# Patient Record
Sex: FEMALE | Race: BLACK OR AFRICAN AMERICAN | Employment: FULL TIME | URBAN - METROPOLITAN AREA
[De-identification: names, ages, dates, MRNs, and addresses within clinical notes are randomized per-mention and may not be internally consistent; named-entity substitution may affect disease eponyms.]

---

## 2022-10-17 ENCOUNTER — APPOINTMENT (OUTPATIENT)
Dept: CT IMAGING | Age: 52
End: 2022-10-17
Payer: COMMERCIAL

## 2022-10-17 ENCOUNTER — HOSPITAL ENCOUNTER (EMERGENCY)
Age: 52
Discharge: HOME | End: 2022-10-17
Attending: EMERGENCY MEDICINE
Payer: COMMERCIAL

## 2022-10-17 ENCOUNTER — APPOINTMENT (OUTPATIENT)
Dept: GENERAL RADIOLOGY | Age: 52
End: 2022-10-17
Payer: COMMERCIAL

## 2022-10-17 VITALS
DIASTOLIC BLOOD PRESSURE: 76 MMHG | SYSTOLIC BLOOD PRESSURE: 123 MMHG | RESPIRATION RATE: 20 BRPM | HEART RATE: 91 BPM | HEIGHT: 71 IN | TEMPERATURE: 97.8 F | OXYGEN SATURATION: 100 %

## 2022-10-17 DIAGNOSIS — R55 NEAR SYNCOPE: Primary | ICD-10-CM

## 2022-10-17 DIAGNOSIS — R42 DIZZINESS: ICD-10-CM

## 2022-10-17 DIAGNOSIS — Z98.890 POST-OPERATIVE STATE: ICD-10-CM

## 2022-10-17 DIAGNOSIS — R74.8 ELEVATED LIVER ENZYMES: ICD-10-CM

## 2022-10-17 LAB
ALBUMIN SERPL-MCNC: 2.9 G/DL (ref 3.5–5.2)
ALP BLD-CCNC: 174 U/L (ref 35–104)
ALT SERPL-CCNC: 65 U/L (ref 0–32)
ANION GAP SERPL CALCULATED.3IONS-SCNC: 13 MMOL/L (ref 7–16)
AST SERPL-CCNC: 58 U/L (ref 0–31)
BACTERIA: ABNORMAL /HPF
BASOPHILS ABSOLUTE: 0.06 E9/L (ref 0–0.2)
BASOPHILS RELATIVE PERCENT: 0.6 % (ref 0–2)
BILIRUB SERPL-MCNC: 0.4 MG/DL (ref 0–1.2)
BILIRUBIN URINE: NEGATIVE
BLOOD, URINE: ABNORMAL
BUN BLDV-MCNC: 15 MG/DL (ref 6–20)
CALCIUM SERPL-MCNC: 9.7 MG/DL (ref 8.6–10.2)
CHLORIDE BLD-SCNC: 93 MMOL/L (ref 98–107)
CLARITY: CLEAR
CO2: 22 MMOL/L (ref 22–29)
COLOR: YELLOW
CREAT SERPL-MCNC: 0.6 MG/DL (ref 0.5–1)
EKG ATRIAL RATE: 91 BPM
EKG P AXIS: 49 DEGREES
EKG P-R INTERVAL: 162 MS
EKG Q-T INTERVAL: 384 MS
EKG QRS DURATION: 74 MS
EKG QTC CALCULATION (BAZETT): 472 MS
EKG R AXIS: -3 DEGREES
EKG T AXIS: 59 DEGREES
EKG VENTRICULAR RATE: 91 BPM
EOSINOPHILS ABSOLUTE: 0.17 E9/L (ref 0.05–0.5)
EOSINOPHILS RELATIVE PERCENT: 1.6 % (ref 0–6)
GFR AFRICAN AMERICAN: >60
GFR NON-AFRICAN AMERICAN: >60 ML/MIN/1.73
GLUCOSE BLD-MCNC: 239 MG/DL (ref 74–99)
GLUCOSE URINE: NEGATIVE MG/DL
HCT VFR BLD CALC: 31.1 % (ref 34–48)
HEMOGLOBIN: 9.9 G/DL (ref 11.5–15.5)
IMMATURE GRANULOCYTES #: 0.05 E9/L
IMMATURE GRANULOCYTES %: 0.5 % (ref 0–5)
KETONES, URINE: NEGATIVE MG/DL
LEUKOCYTE ESTERASE, URINE: NEGATIVE
LIPASE: 47 U/L (ref 13–60)
LYMPHOCYTES ABSOLUTE: 1.73 E9/L (ref 1.5–4)
LYMPHOCYTES RELATIVE PERCENT: 16.5 % (ref 20–42)
MCH RBC QN AUTO: 28.5 PG (ref 26–35)
MCHC RBC AUTO-ENTMCNC: 31.8 % (ref 32–34.5)
MCV RBC AUTO: 89.6 FL (ref 80–99.9)
MONOCYTES ABSOLUTE: 0.66 E9/L (ref 0.1–0.95)
MONOCYTES RELATIVE PERCENT: 6.3 % (ref 2–12)
NEUTROPHILS ABSOLUTE: 7.8 E9/L (ref 1.8–7.3)
NEUTROPHILS RELATIVE PERCENT: 74.5 % (ref 43–80)
NITRITE, URINE: NEGATIVE
PDW BLD-RTO: 14.6 FL (ref 11.5–15)
PH UA: 6 (ref 5–9)
PLATELET # BLD: 293 E9/L (ref 130–450)
PMV BLD AUTO: 9.8 FL (ref 7–12)
POTASSIUM REFLEX MAGNESIUM: 4.2 MMOL/L (ref 3.5–5)
PROTEIN UA: NEGATIVE MG/DL
RBC # BLD: 3.47 E12/L (ref 3.5–5.5)
RBC UA: ABNORMAL /HPF (ref 0–2)
SODIUM BLD-SCNC: 128 MMOL/L (ref 132–146)
SPECIFIC GRAVITY UA: <=1.005 (ref 1–1.03)
TOTAL PROTEIN: 8.1 G/DL (ref 6.4–8.3)
TROPONIN, HIGH SENSITIVITY: 8 NG/L (ref 0–9)
UROBILINOGEN, URINE: 0.2 E.U./DL
WBC # BLD: 10.5 E9/L (ref 4.5–11.5)
WBC UA: ABNORMAL /HPF (ref 0–5)

## 2022-10-17 PROCEDURE — 71045 X-RAY EXAM CHEST 1 VIEW: CPT

## 2022-10-17 PROCEDURE — 81001 URINALYSIS AUTO W/SCOPE: CPT

## 2022-10-17 PROCEDURE — 6360000002 HC RX W HCPCS: Performed by: EMERGENCY MEDICINE

## 2022-10-17 PROCEDURE — 2580000003 HC RX 258: Performed by: EMERGENCY MEDICINE

## 2022-10-17 PROCEDURE — 96375 TX/PRO/DX INJ NEW DRUG ADDON: CPT

## 2022-10-17 PROCEDURE — 83690 ASSAY OF LIPASE: CPT

## 2022-10-17 PROCEDURE — 80053 COMPREHEN METABOLIC PANEL: CPT

## 2022-10-17 PROCEDURE — 96361 HYDRATE IV INFUSION ADD-ON: CPT

## 2022-10-17 PROCEDURE — 6360000004 HC RX CONTRAST MEDICATION: Performed by: RADIOLOGY

## 2022-10-17 PROCEDURE — 70450 CT HEAD/BRAIN W/O DYE: CPT

## 2022-10-17 PROCEDURE — 74177 CT ABD & PELVIS W/CONTRAST: CPT

## 2022-10-17 PROCEDURE — 99285 EMERGENCY DEPT VISIT HI MDM: CPT

## 2022-10-17 PROCEDURE — 84484 ASSAY OF TROPONIN QUANT: CPT

## 2022-10-17 PROCEDURE — 85025 COMPLETE CBC W/AUTO DIFF WBC: CPT

## 2022-10-17 PROCEDURE — 96374 THER/PROPH/DIAG INJ IV PUSH: CPT

## 2022-10-17 PROCEDURE — 93005 ELECTROCARDIOGRAM TRACING: CPT | Performed by: EMERGENCY MEDICINE

## 2022-10-17 RX ORDER — DIPHENHYDRAMINE HYDROCHLORIDE 50 MG/ML
12.5 INJECTION INTRAMUSCULAR; INTRAVENOUS ONCE
Status: COMPLETED | OUTPATIENT
Start: 2022-10-17 | End: 2022-10-17

## 2022-10-17 RX ORDER — MORPHINE SULFATE 4 MG/ML
4 INJECTION, SOLUTION INTRAMUSCULAR; INTRAVENOUS ONCE
Status: COMPLETED | OUTPATIENT
Start: 2022-10-17 | End: 2022-10-17

## 2022-10-17 RX ORDER — 0.9 % SODIUM CHLORIDE 0.9 %
1000 INTRAVENOUS SOLUTION INTRAVENOUS ONCE
Status: COMPLETED | OUTPATIENT
Start: 2022-10-17 | End: 2022-10-17

## 2022-10-17 RX ORDER — DEXAMETHASONE SODIUM PHOSPHATE 10 MG/ML
8 INJECTION INTRAMUSCULAR; INTRAVENOUS ONCE
Status: COMPLETED | OUTPATIENT
Start: 2022-10-17 | End: 2022-10-17

## 2022-10-17 RX ORDER — METOCLOPRAMIDE HYDROCHLORIDE 5 MG/ML
5 INJECTION INTRAMUSCULAR; INTRAVENOUS ONCE
Status: COMPLETED | OUTPATIENT
Start: 2022-10-17 | End: 2022-10-17

## 2022-10-17 RX ADMIN — MORPHINE SULFATE 4 MG: 4 INJECTION, SOLUTION INTRAMUSCULAR; INTRAVENOUS at 16:18

## 2022-10-17 RX ADMIN — DIPHENHYDRAMINE HYDROCHLORIDE 12.5 MG: 50 INJECTION, SOLUTION INTRAMUSCULAR; INTRAVENOUS at 16:18

## 2022-10-17 RX ADMIN — METOCLOPRAMIDE 5 MG: 5 INJECTION, SOLUTION INTRAMUSCULAR; INTRAVENOUS at 16:18

## 2022-10-17 RX ADMIN — IOPAMIDOL 75 ML: 755 INJECTION, SOLUTION INTRAVENOUS at 15:34

## 2022-10-17 RX ADMIN — SODIUM CHLORIDE 1000 ML: 9 INJECTION, SOLUTION INTRAVENOUS at 13:47

## 2022-10-17 RX ADMIN — DEXAMETHASONE SODIUM PHOSPHATE 8 MG: 10 INJECTION INTRAMUSCULAR; INTRAVENOUS at 16:18

## 2022-10-17 ASSESSMENT — LIFESTYLE VARIABLES
HOW OFTEN DO YOU HAVE A DRINK CONTAINING ALCOHOL: NEVER
HOW MANY STANDARD DRINKS CONTAINING ALCOHOL DO YOU HAVE ON A TYPICAL DAY: PATIENT DOES NOT DRINK

## 2022-10-17 ASSESSMENT — PAIN - FUNCTIONAL ASSESSMENT: PAIN_FUNCTIONAL_ASSESSMENT: NONE - DENIES PAIN

## 2022-10-17 NOTE — ED PROVIDER NOTES
Department of Emergency Medicine   ED  Provider Note  Admit Date/RoomTime: 10/17/2022 12:43 PM  [facility]          History of Present Illness:  10/17/22, Time: 12:58 PM EDT  Chief Complaint   Patient presents with    Dizziness     Patient with a sudden onset of dizziness around 1000. States she got weak in the knees and fell but denies LOC or injury. Roldan Chavez is a 45 y.o. female presenting to the ED for dizziness and lightheadedness, beginning this morning while at work. The complaint has been persistent, mild in severity, and worsened by nothing. Patient states that while she was at work today she began to feel dizzy and lightheaded. Patient was able to sit down, ate a snack but continued to feel lightheaded. Patient stated that when she stood up her legs felt weak but she did not fall nor did she strike her head. She states that since having gastric bypass several weeks ago she has been feeling either headache or lightheaded and dizzy. She denies any vision changes, no chest pain or shortness of breath. She denies any abdominal pain. There has been no nausea/vomiting/diarrhea, no dysuria. Review of Systems:   Pertinent positives and negatives are stated within HPI, all other systems reviewed and are negative.        --------------------------------------------- PAST HISTORY ---------------------------------------------  Past Medical History:  has a past medical history of Anemia, Diabetes mellitus ([facility] [address] Papanicolaou smear of cervix with atypical squamous cells of undetermined significance (ASC-US), and Screening for human papillomavirus (HPV). Past Surgical History:  has a past surgical history that includes pr  delivery+postpartum care; pr  delivery+postpartum care; Colposcopy; pr conization cervix,knife/laser; and Hamilton-en-Y Gastric Bypass. Social History:  reports that she has quit smoking.  She does not have any smokeless tobacco history on file. She reports that she does not use drugs. Family History: family history includes Breast Cancer in her none; Heart Disease in her none; Ovarian Cancer in her none; Uterine Cancer in her none. . Unless otherwise noted, family history is non contributory    The patients home medications have been reviewed. Allergies: Colchicine        ---------------------------------------------------PHYSICAL EXAM--------------------------------------    Constitutional/General: Alert and oriented x3  Head: Normocephalic and atraumatic  Eyes: PERRL, EOMI, sclera non icteric  Mouth: Oropharynx clear, handling secretions, no trismus, no asymmetry of the posterior oropharynx or uvular edema  Neck: Supple, full ROM, no stridor, no meningeal signs  Respiratory: Lungs clear to auscultation bilaterally, no wheezes, rales, or rhonchi. Not in respiratory distress  Cardiovascular:  Regular rate. Regular rhythm. No murmurs, no aortic murmurs, no gallops, or rubs. 2+ distal pulses. Equal extremity pulses. Chest: No chest wall tenderness  GI:  Abdomen Soft, Non tender, Non distended. No rebound, guarding, or rigidity. No pulsatile masses. Musculoskeletal: Moves all extremities x 4. Warm and well perfused, no clubbing, cyanosis, or edema. Capillary refill <3 seconds  Integument: skin warm and dry. No rashes. Neurologic: GCS 15, no focal deficits, symmetric strength 5/5 in the upper and lower extremities bilaterally  Psychiatric: Normal Affect          -------------------------------------------------- RESULTS -------------------------------------------------  I have personally reviewed all laboratory and imaging results for this patient. Results are listed below.      LABS: (Lab results interpreted by me)  Results for orders placed or performed during the hospital encounter of 10/17/22   CBC with Auto Differential   Result Value Ref Range    WBC 10.5 4.5 - 11.5 E9/L    RBC 3.47 (L) 3.50 - 5.50 E12/L    Hemoglobin 9.9 (L) 11.5 - 15.5 g/dL    Hematocrit 31.1 (L) 34.0 - 48.0 %    MCV 89.6 80.0 - 99.9 fL    MCH 28.5 26.0 - 35.0 pg    MCHC 31.8 (L) 32.0 - 34.5 %    RDW 14.6 11.5 - 15.0 fL    Platelets 846 345 - 103 E9/L    MPV 9.8 7.0 - 12.0 fL    Neutrophils % 74.5 43.0 - 80.0 %    Immature Granulocytes % 0.5 0.0 - 5.0 %    Lymphocytes % 16.5 (L) 20.0 - 42.0 %    Monocytes % 6.3 2.0 - 12.0 %    Eosinophils % 1.6 0.0 - 6.0 %    Basophils % 0.6 0.0 - 2.0 %    Neutrophils Absolute 7.80 (H) 1.80 - 7.30 E9/L    Immature Granulocytes # 0.05 E9/L    Lymphocytes Absolute 1.73 1.50 - 4.00 E9/L    Monocytes Absolute 0.66 0.10 - 0.95 E9/L    Eosinophils Absolute 0.17 0.05 - 0.50 E9/L    Basophils Absolute 0.06 0.00 - 0.20 E9/L   Comprehensive Metabolic Panel w/ Reflex to MG   Result Value Ref Range    Sodium 128 (L) 132 - 146 mmol/L    Potassium reflex Magnesium 4.2 3.5 - 5.0 mmol/L    Chloride 93 (L) 98 - 107 mmol/L    CO2 22 22 - 29 mmol/L    Anion Gap 13 7 - 16 mmol/L    Glucose 239 (H) 74 - 99 mg/dL    BUN 15 6 - 20 mg/dL    Creatinine 0.6 0.5 - 1.0 mg/dL    GFR Non-African American >60 >=60 mL/min/1.73    GFR African American >60     Calcium 9.7 8.6 - 10.2 mg/dL    Total Protein 8.1 6.4 - 8.3 g/dL    Albumin 2.9 (L) 3.5 - 5.2 g/dL    Total Bilirubin 0.4 0.0 - 1.2 mg/dL    Alkaline Phosphatase 174 (H) 35 - 104 U/L    ALT 65 (H) 0 - 32 U/L    AST 58 (H) 0 - 31 U/L   Lipase   Result Value Ref Range    Lipase 47 13 - 60 U/L   Troponin   Result Value Ref Range    Troponin, High Sensitivity 8 0 - 9 ng/L   Urinalysis with Microscopic   Result Value Ref Range    Color, UA Yellow Straw/Yellow    Clarity, UA Clear Clear    Glucose, Ur Negative Negative mg/dL    Bilirubin Urine Negative Negative    Ketones, Urine Negative Negative mg/dL    Specific Gravity, UA <=1.005 1.005 - 1.030    Blood, Urine MODERATE (A) Negative    pH, UA 6.0 5.0 - 9.0    Protein, UA Negative Negative mg/dL    Urobilinogen, Urine 0.2 <2.0 E.U./dL    Nitrite, Urine Negative Negative Leukocyte Esterase, Urine Negative Negative    WBC, UA NONE 0 - 5 /HPF    RBC, UA 10-20 (A) 0 - 2 /HPF    Bacteria, UA NONE SEEN None Seen /HPF   EKG 12 Lead   Result Value Ref Range    Ventricular Rate 91 BPM    Atrial Rate 91 BPM    P-R Interval 162 ms    QRS Duration 74 ms    Q-T Interval 384 ms    QTc Calculation (Bazett) 472 ms    P Axis 49 degrees    R Axis -3 degrees    T Axis 59 degrees   ,       RADIOLOGY:  Interpreted by Radiologist unless otherwise specified  XR CHEST PORTABLE   Final Result   No acute process. CT Head WO Contrast    (Results Pending)   CT ABDOMEN PELVIS W IV CONTRAST Additional Contrast? None    (Results Pending)         EKG Interpretation  Interpreted by emergency department physician, Dr. Urvashi Kimball    Date of EKG: 10/17/22  Time: 1307    Rhythm: normal sinus   Rate: 91  Axis: normal  Conduction: normal  ST Segments: no acute change  T Waves: no acute change    Clinical Impression: No findings suggestive of acute ischemia or injury, no conduction delay  Comparison to prior EKG: no previous EKG      ------------------------- NURSING NOTES AND VITALS REVIEWED ---------------------------   The nursing notes within the ED encounter and vital signs as below have been reviewed by myself  /76   Pulse 95   Temp 97.8 °F (36.6 °C) (Oral)   Resp 16   Ht 5' 11\" (1.803 m)   SpO2 97%     Oxygen Saturation Interpretation: Normal    The patients available past medical records and past encounters were reviewed. ------------------------------ ED COURSE/MEDICAL DECISION MAKING----------------------  Medications   0.9 % sodium chloride bolus (0 mLs IntraVENous Stopped 10/17/22 9)   iopamidol (ISOVUE-370) 76 % injection 75 mL (75 mLs IntraVENous Given 10/17/22 1534)           The cardiac monitor revealed sinus rhythm with a heart rate in the 80s as interpreted by me. The cardiac monitor was ordered secondary to the patient's chest pain and to monitor for patient for dysrhythmia. CPT [id number]           Medical Decision Making:     I, Dr. Lea Beach, am the primary provider of record    49-year-old female with recent gastric bypass presenting with lightheadedness and dizziness. She has been having similar episodes since her surgery. She arrives neurologically intact, hemodynamically stable. EKG shows no findings suggestive of acute ischemia or injury, no conduction delay. No prior EKG for comparison. Troponin 8. Urinalysis shows blood but no signs of infection. No leukocytosis, hemoglobin 9.9 with no baseline available. Metabolic panel is within acceptable limits, slight increase in glucose of 239, corrected sodium 131. She does have decreased albumin at 2.9, slight elevation in alkaline phosphatase and very subtle elevation in ALT/AST with no recorded baseline. Lipase is negative. Chest x-ray is unremarkable. Awaiting CT head, will also perform CT abdomen pelvis to assess for any postsurgical complication causing her symptoms. Patient's imaging is still pending, will be signed out to oncoming physician Dr. Galina Youssef to follow-up on imaging. If imaging is reassuring and she is feeling better, discharge and follow-up with her surgeon would be acceptable. Obviously if there are any abnormalities, would take the appropriate steps. Re-Evaluations: This patient's ED course included: a personal history and physicial examination, IV medications, cardiac monitoring, and continuous pulse oximetry    This patient has remained hemodynamically stable and been closely monitored during their ED course. Counseling: The emergency provider has spoken with the patient and discussed todays results, in addition to providing specific details for the plan of care and counseling regarding the diagnosis and prognosis.   Questions are answered at this time and they are agreeable with the plan.       --------------------------------- IMPRESSION AND DISPOSITION ---------------------------------    IMPRESSION  1. Near syncope        DISPOSITION  Disposition: Pending CT imaging  Patient condition is stable        NOTE: This report was transcribed using voice recognition software.  Every effort was made to ensure accuracy; however, inadvertent computerized transcription errors may be present        Benjamin Molina DO  10/17/22 1549

## 2022-10-17 NOTE — Clinical Note
Kristyn Vincent was seen and treated in our emergency department on 10/17/2022. She may return to work on 10/19/2022. If you have any questions or concerns, please don't hesitate to call.       Kuldeep Morrow, DO

## 2022-10-17 NOTE — ED NOTES
Pt ambulated to bathroom. Pt states she is still dizzy. Will continue to monitor.      Shalom Alejandra RN  10/17/22 1500

## 2022-10-17 NOTE — ED PROVIDER NOTES
Department of Emergency Medicine    ED  Provider Note - Sign out / Oncoming Provider   Admit Date/RoomTime: 10/17/2022 12:43 PM  1600PM EDT      I received this patient at sign out from Dr. Yu De Leon. I have discussed the patient's initial exam, treatment and plan of care with the out going physician. I have introduced my self to the patient / family and have answered their questions to this point. I have examined the patient myself and reviewed ordered tests / medications and  reviewed any available results to this point. If a resident is involved in the Emergency Department care, I have discussed my findings and plan with them as well. MDM:   Patient signed out to me pending CT scan and blood work and ambulation, states he feels completely better, discussed CT results lab work results importance of follow-up with her gastric surgeon either in [facility] or with a local gastric surgery clinic. She states understanding agreement. Was given a work note         Re-evaluation. Patients symptoms are improving  Repeat physical examination is improved  -   pt states they feel \"better\", tolerating PO well          --------------------------------- IMPRESSION AND DISPOSITION ---------------------------------    IMPRESSION  1. Near syncope    2. Dizziness    3. Elevated liver enzymes    4.  Post-operative state        DISPOSITION  Disposition: Discharge to home  Patient condition is stable         Art Reynolds DO  10/17/22 1398

## 2022-10-21 ENCOUNTER — HOSPITAL ENCOUNTER (OUTPATIENT)
Age: 52
Discharge: HOME | End: 2022-10-23
Payer: COMMERCIAL

## 2022-10-21 ENCOUNTER — OFFICE VISIT (OUTPATIENT)
Dept: FAMILY MEDICINE CLINIC | Age: 52
End: 2022-10-21
Payer: COMMERCIAL

## 2022-10-21 ENCOUNTER — HOSPITAL ENCOUNTER (OUTPATIENT)
Dept: GENERAL RADIOLOGY | Age: 52
Discharge: HOME | End: 2022-10-23
Payer: COMMERCIAL

## 2022-10-21 VITALS
WEIGHT: 224 LBS | OXYGEN SATURATION: 98 % | DIASTOLIC BLOOD PRESSURE: 78 MMHG | BODY MASS INDEX: 31.36 KG/M2 | SYSTOLIC BLOOD PRESSURE: 105 MMHG | RESPIRATION RATE: 18 BRPM | HEIGHT: 71 IN | HEART RATE: 98 BPM | TEMPERATURE: 97.8 F

## 2022-10-21 DIAGNOSIS — M54.2 NECK PAIN: Primary | ICD-10-CM

## 2022-10-21 DIAGNOSIS — M54.2 NECK PAIN: ICD-10-CM

## 2022-10-21 PROCEDURE — 72050 X-RAY EXAM NECK SPINE 4/5VWS: CPT

## 2022-10-21 PROCEDURE — 99203 OFFICE O/P NEW LOW 30 MIN: CPT

## 2022-10-21 RX ORDER — LORATADINE 10 MG/1
10 TABLET ORAL DAILY PRN
Status: ON HOLD | COMMUNITY
Start: 2017-08-29

## 2022-10-21 RX ORDER — FOLIC ACID 1 MG/1
1 TABLET ORAL DAILY
Status: ON HOLD | COMMUNITY
Start: 2022-10-03

## 2022-10-21 RX ORDER — FLUTICASONE PROPIONATE 50 MCG
1-2 SPRAY, SUSPENSION (ML) NASAL DAILY PRN
Status: ON HOLD | COMMUNITY
Start: 2020-07-22

## 2022-10-21 RX ORDER — LIDOCAINE 50 MG/G
1 PATCH TOPICAL EVERY 24 HOURS
COMMUNITY
Start: 2022-09-29 | End: 2022-10-28 | Stop reason: SDUPTHER

## 2022-10-21 RX ORDER — DULOXETIN HYDROCHLORIDE 30 MG/1
CAPSULE, DELAYED RELEASE ORAL
Status: ON HOLD | COMMUNITY
Start: 2022-10-12

## 2022-10-21 RX ORDER — CYCLOBENZAPRINE HCL 10 MG
10 TABLET ORAL NIGHTLY PRN
Status: ON HOLD | COMMUNITY
Start: 2022-10-12

## 2022-10-21 RX ORDER — AMITRIPTYLINE HYDROCHLORIDE 10 MG/1
10 TABLET, FILM COATED ORAL NIGHTLY
Status: ON HOLD | COMMUNITY
Start: 2022-10-12

## 2022-10-21 RX ORDER — METHOTREXATE 2.5 MG/1
10 TABLET ORAL WEEKLY
Status: ON HOLD | COMMUNITY
Start: 2022-10-03

## 2022-10-21 RX ORDER — PANTOPRAZOLE SODIUM 40 MG/1
40 TABLET, DELAYED RELEASE ORAL DAILY
Status: ON HOLD | COMMUNITY
Start: 2022-09-13

## 2022-10-21 ASSESSMENT — PATIENT HEALTH QUESTIONNAIRE - PHQ9
2. FEELING DOWN, DEPRESSED OR HOPELESS: 0
SUM OF ALL RESPONSES TO PHQ9 QUESTIONS 1 & 2: 0
SUM OF ALL RESPONSES TO PHQ QUESTIONS 1-9: 0
1. LITTLE INTEREST OR PLEASURE IN DOING THINGS: 0
SUM OF ALL RESPONSES TO PHQ QUESTIONS 1-9: 0

## 2022-10-21 NOTE — PATIENT INSTRUCTIONS
Physical therapy that offers dry needling and other modalities to decrease tendonitis and neck pain.

## 2022-10-21 NOTE — PROGRESS NOTES
S: 45 y.o. female with   Chief Complaint   Patient presents with    Establish Care     Previous PCP in [facility]     ED Follow-up     10/17 Near syncope, Dizziness, Elevated liver enzymes      Neck Pain     Radiates into shoulders & back       Pt is here to establish care. Pt has neck and head pain that comes and goes. It is radiating located around her ears, neck. No numbness tingling. No fever. Has a hx of inflammatory arthritis. See rhuematology, ENT,   Was given cymbalta for pain. O: VS:  height is 5' 11\" (1.803 m) and weight is 224 lb (101.6 kg). Her temporal temperature is 97.8 °F (36.6 °C). Her blood pressure is 105/78 and her pulse is 98. Her respiration is 18 and oxygen saturation is 98%. BP Readings from Last 3 Encounters:   10/21/22 105/78   10/17/22 123/76     See resident note    Impression/Plan:   1) neck pain - x-ray and to PT. 2) tmj - to dentist  3) prev - schedule follow up. Health Maintenance Due   Topic Date Due    Depression Screen  Never done    HIV screen  Never done    Hepatitis C screen  Never done    Diabetes screen  Never done    Colorectal Cancer Screen  Never done    Breast cancer screen  Never done    Shingles vaccine (2 of 3) 05/08/2020    Cervical cancer screen  05/04/2021    COVID-19 Vaccine (2 - Booster for Robby series) 05/05/2021         Attending Physician Statement  I have discussed the case, including pertinent history and exam findings with the resident. I also have seen the patient and performed key portions of the examination. I agree with the documented assessment and plan.       Geni Thomas MD

## 2022-10-21 NOTE — PROGRESS NOTES
bypass surgery. Question early liver cirrhosis. Please correlate with liver function test.       Constipation. Lower ventral hernia containing a loop of non distended colon. CT head without contrast showed no intracranial abnormality or hemorrhage. Past Medical History:      Diagnosis Date    Anemia     Diabetes mellitus ([address]     Papanicolaou smear of cervix with atypical squamous cells of undetermined significance (ASC-US)          Screening for human papillomavirus (HPV)     4/10/07      Past Surgical History:        Procedure Laterality Date    COLPOSCOPY      2001 : outside [address] [place] [address]      2022    [place]       :      [place]ARE       :      [place] CONIZATION CERVIX,KNIFE/LASER      2001 : outside [facility]      Allergies:    Colchicine and Darvon [propoxyphene]  Family History:       Problem Relation Age of Onset    Diabetes Mother     Hypertension Mother     Diabetes Father     Hypertension Father     Gout Father     Heart Disease None     Ovarian Cancer None     Uterine Cancer None     Breast Cancer None      Review of Systems:   Review of Systems   Constitutional:  Negative for fatigue, fever and unexpected weight change. HENT:  Negative for ear pain, sinus pressure and sinus pain. Eyes:  Negative for pain, discharge, redness and visual disturbance. Respiratory:  Negative for cough, chest tightness and shortness of breath. Cardiovascular:  Negative for chest pain and leg swelling. Gastrointestinal:  Negative for abdominal pain, blood in stool, diarrhea, nausea and vomiting. Genitourinary:  Negative for difficulty urinating. Musculoskeletal:  Positive for neck pain. Negative for arthralgias, gait problem, myalgias and neck stiffness. Skin:  Negative for wound. Neurological:  Negative for dizziness, light-headedness, numbness and headaches. Hematological: Negative. Psychiatric/Behavioral:  Negative for sleep disturbance and suicidal ideas. PHYSICAL EXAM   Vitals: /78   Pulse 98   Temp 97.8 °F (36.6 °C) (Temporal)   Resp 18   Ht 5' 11\" (1.803 m)   Wt 224 lb (101.6 kg)   SpO2 98%   BMI 31.24 kg/m²   Physical Exam  Vitals reviewed. Constitutional:       Appearance: Normal appearance. HENT:      Head: Normocephalic and atraumatic. Nose: Nose normal. No congestion or rhinorrhea. Mouth/Throat:      Mouth: Mucous membranes are moist.      Pharynx: Oropharynx is clear. Eyes:      General: No scleral icterus. Extraocular Movements: Extraocular movements intact. Conjunctiva/sclera: Conjunctivae normal.      Pupils: Pupils are equal, round, and reactive to light. Neck:      Comments: Decrease ROM in all direction due to pain   Tender neck, shoulder, ear, and TMJ b/l   Cardiovascular:      Rate and Rhythm: Normal rate and regular rhythm. Heart sounds: Normal heart sounds. No murmur heard. No gallop. Pulmonary:      Effort: Pulmonary effort is normal.      Breath sounds: Normal breath sounds. No wheezing or rales. Abdominal:      General: Bowel sounds are normal. There is no distension. Palpations: Abdomen is soft. Tenderness: There is no abdominal tenderness. There is no guarding. Musculoskeletal:         General: Normal range of motion. Cervical back: Neck supple. Tenderness present. Right lower leg: No edema. Left lower leg: No edema. Skin:     Coloration: Skin is not jaundiced. Neurological:      General: No focal deficit present. Mental Status: She is alert and oriented to person, place, and time. Psychiatric:         Mood and Affect: Mood normal.         Behavior: Behavior normal.         Thought Content:  Thought content normal.         Judgment: Judgment normal.     BP Readings from Last 3 Encounters:   10/21/22 105/78   10/17/22 123/76        ASSESSMENT AND PLAN   Tex Garcia was seen today for the followin. Neck pain  - Patient advised to avoid the ibuprofen   - XR CERVICAL SPINE (4-5 VIEWS); Future  -Supportive pain control with ice and heat,  -Close follow-up for preventative and discuss other conditions.  -Patient to follow-up with her surgeon.  -Follow-up with dentist for TMJ    Patient recently was in the emergency department CBC CMP lipase troponin and urinalysis with microscopy was reviewed. Counseled regarding above diagnosis, including possible risks and complications, especially if left uncontrolled. Counseled regarding the possible side effects, risks, benefits and alternatives to treatment; patient and/or guardian verbalizes understanding, agrees, feels comfortable with and wishes to proceed with above treatment plan. Call or go to ED immediately if symptoms worsen or persist. Advised patient to call with any new medication issues, and, as applicable, read all Rx info from pharmacy to assure aware of all possible risks and side effects of medication before taking. Patient and/or guardian given opportunity to ask questions/raise concerns. The patient verbalized comfort and understanding of instructions. I encourage further reading and education about your health conditions. Information on many health conditions is provided by the American Academy of Family Physicians: https://familydoctor. org/  Please bring any questions to me at your next visit. Medication List:    Current Outpatient Medications   Medication Sig Dispense Refill    fluticasone (FLONASE) 50 MCG/ACT nasal spray       folic acid (FOLVITE) 1 MG tablet Take 1 Tablet by mouth daily.       blood glucose test strips (ASCENSIA AUTODISC VI;ONE TOUCH ULTRA TEST VI) strip 1 strip      lidocaine (LIDODERM) 5 % Place 1 patch onto the skin every 24 hours      loratadine (CLARITIN) 10 MG tablet Take 10 mg by mouth daily      methotrexate (RHEUMATREX) 2.5 MG chemo tablet Take 4 Tablets by mouth once weekly. pantoprazole (PROTONIX) 40 MG tablet Take 1 tablet by mouth once a day      sertraline (ZOLOFT) 50 MG tablet Take 50 mg by mouth daily      DULoxetine (CYMBALTA) 30 MG extended release capsule take 1 capsule by mouth once daily for 2 weeks, then increase to 2 capsules daily      cyclobenzaprine (FLEXERIL) 10 MG tablet take 1 tablet by mouth once daily at bedtime as needed      amitriptyline (ELAVIL) 10 MG tablet take 1 tablet by mouth every night at bedtime       No current facility-administered medications for this visit. Return to Office: Return in about 1 week (around 10/28/2022). This document may have been prepared at least partially through the use of voice recognition software. Although effort is taken to assure the accuracy of this document, it is possible that grammatical, syntax,  or spelling errors may occur.     Kory Khan MD   Family Medicine Resident Physician PGY-2

## 2022-10-24 DIAGNOSIS — I65.23 CAROTID ARTERY CALCIFICATION, BILATERAL: Primary | ICD-10-CM

## 2022-10-24 NOTE — PROGRESS NOTES
Patient had a recent X-ray of neck which showed: Soft tissue calcifications associated with right and left neck soft tissue  which could suggest calcific atherosclerotic plaque at level of carotid bulbs  and proximal internal carotid arteries. Previous carotid US 10 years ago, showed stenosis. Carotid Ultrasounds ordered today. 1. Carotid artery calcification, bilateral  - US CAROTID ARTERY BILATERAL; Future      Case discussed with.  Dr. Miguel Angel Michael MD   Resident, PGY-2

## 2022-10-25 NOTE — RESULT ENCOUNTER NOTE
Please call patient and let her know that I have ordered an additional test for her. The xray showed some calcifications in her carotids, she had the same u/s ordered 10 years ago which showed she had some stenosis. Please let her know that she has no fractures but she does have some additional bony spurs anteriorly in her spine, which could be causing her pain.

## 2022-10-28 ENCOUNTER — OFFICE VISIT (OUTPATIENT)
Dept: FAMILY MEDICINE CLINIC | Age: 52
End: 2022-10-28
Payer: COMMERCIAL

## 2022-10-28 VITALS
WEIGHT: 224 LBS | HEIGHT: 71 IN | RESPIRATION RATE: 18 BRPM | BODY MASS INDEX: 31.36 KG/M2 | HEART RATE: 88 BPM | OXYGEN SATURATION: 98 % | SYSTOLIC BLOOD PRESSURE: 106 MMHG | DIASTOLIC BLOOD PRESSURE: 55 MMHG

## 2022-10-28 DIAGNOSIS — Z11.4 ENCOUNTER FOR SCREENING FOR HIV: ICD-10-CM

## 2022-10-28 DIAGNOSIS — Z13.1 DIABETES MELLITUS SCREENING: ICD-10-CM

## 2022-10-28 DIAGNOSIS — Z13.220 LIPID SCREENING: ICD-10-CM

## 2022-10-28 DIAGNOSIS — E87.1 HYPONATREMIA: Primary | ICD-10-CM

## 2022-10-28 DIAGNOSIS — M54.2 NECK PAIN: ICD-10-CM

## 2022-10-28 DIAGNOSIS — Z11.59 NEED FOR HEPATITIS C SCREENING TEST: ICD-10-CM

## 2022-10-28 DIAGNOSIS — I95.9 HYPOTENSION, UNSPECIFIED HYPOTENSION TYPE: ICD-10-CM

## 2022-10-28 PROCEDURE — 99213 OFFICE O/P EST LOW 20 MIN: CPT

## 2022-10-28 RX ORDER — LIDOCAINE 50 MG/G
1 PATCH TOPICAL EVERY 24 HOURS
Qty: 30 PATCH | Refills: 0 | Status: ON HOLD | OUTPATIENT
Start: 2022-10-28

## 2022-10-28 SDOH — ECONOMIC STABILITY: FOOD INSECURITY: WITHIN THE PAST 12 MONTHS, THE FOOD YOU BOUGHT JUST DIDN'T LAST AND YOU DIDN'T HAVE MONEY TO GET MORE.: NEVER TRUE

## 2022-10-28 SDOH — ECONOMIC STABILITY: FOOD INSECURITY: WITHIN THE PAST 12 MONTHS, YOU WORRIED THAT YOUR FOOD WOULD RUN OUT BEFORE YOU GOT MONEY TO BUY MORE.: NEVER TRUE

## 2022-10-28 ASSESSMENT — ENCOUNTER SYMPTOMS
DIARRHEA: 0
SINUS PAIN: 0
EYE DISCHARGE: 0
ABDOMINAL PAIN: 0
SINUS PRESSURE: 0
CHEST TIGHTNESS: 0
NAUSEA: 0
SHORTNESS OF BREATH: 0
EYE PAIN: 0
EYE DISCHARGE: 0
NAUSEA: 0
CHEST TIGHTNESS: 0
SINUS PRESSURE: 0
SINUS PAIN: 0
ABDOMINAL PAIN: 0
BLOOD IN STOOL: 0
SHORTNESS OF BREATH: 0
EYE REDNESS: 0
COUGH: 0
DIARRHEA: 0
VOMITING: 0
VOMITING: 0
COUGH: 0
EYE REDNESS: 0
EYE PAIN: 0
BLOOD IN STOOL: 0

## 2022-10-28 ASSESSMENT — SOCIAL DETERMINANTS OF HEALTH (SDOH): HOW HARD IS IT FOR YOU TO PAY FOR THE VERY BASICS LIKE FOOD, HOUSING, MEDICAL CARE, AND HEATING?: NOT HARD AT ALL

## 2022-10-28 NOTE — PROGRESS NOTES
[address]  Precepting Note    Subjective: Follow up new patient visit    Cervicalgia   Seen by [facility] neuro, ophthalmology, ENT and rheum  Is on chronic methotrexate   Imaging benign but has soft tissue calcification  Carotid u/s are ordered      Here today for continue care  HM issues reviewed  Needs Na repeated       ROS otherwise negative     Past medical, surgical, family and social history were reviewed, non-contributory, and unchanged unless otherwise stated. Objective:    BP (!) 106/55   Pulse 88   Resp 18   Ht 5' 11\" (1.803 m)   Wt 224 lb (101.6 kg)   SpO2 98%   BMI 31.24 kg/m²     Exam is as noted by resident    Assessment/Plan:  Cervicalgia   Refer to PT  Hyponatremia   Check labs incl Na and cortisol      Attending Physician Statement  I have reviewed the chart, including any radiology or labs. I have discussed the case, including pertinent history and exam findings with the resident. I agree with the assessment, plan and orders as documented by the resident. Please refer to the resident note for additional information.       Electronically signed by Shahnaz Patel MD on 10/28/2022 at 3:04 PM

## 2022-10-28 NOTE — PROGRESS NOTES
[facility]  Department of Family Medicine  Family Medicine Residency Program      Patient: Hiral Gasca  1970 45 y.o. female     Date of Service: 10/29/2022      Chief complaint:   Chief Complaint   Patient presents with    Follow-up       HISTORY OF PRESENTING ILLNESS     Hiral Gasca is a 45 y.o. female presented to the clinic for a one week follow up for preventive care and pain. Pain:  Location -neck and head pain   Intensity - 7/10 pain ,   Quality - achy   Onset - one week after her Gastric sleeve surgery 2022  Progression - better than last week  Radiation - b/l ears and checks. Alleviating talked to telemedicine surgery 650mg tylenol seems to help,    Aggrevating - not sure   Associated symptoms - no numbness, no tingeing , no dizziness     Open to Physical therapy     Patient informed of x-ray results     1. No fracture or malalignment involving cervical spine. 2. Degenerative endplate changes with anterior osteophytosis seen at C5/C6. 3. Soft tissue calcifications associated with right and left neck soft tissue  which could suggest calcific atherosclerotic plaque at level of carotid bulbs and proximal internal carotid arteries. Repeat - BP   110/60 mannual  Pulse 87   Patient denies any dizziness, recent falls, headaches.         Past Medical History:      Diagnosis Date    Anemia     Diabetes mellitus ([address]     Papanicolaou smear of cervix with atypical squamous cells of undetermined significance (ASC-US)          Screening for human papillomavirus (HPV)     4/10/07      Past Surgical History:        Procedure Laterality Date    COLPOSCOPY      2001 : outside [address] [place] [address]      2022    [place]      [place] :      [place]      1998 :      [place] CERVIX[place]KNIFE/LASER      [facility]      Allergies:    Colchicine and Darvon [propoxyphene]  Family History:       Problem Relation Age of Onset    Diabetes Mother     Hypertension Mother     Diabetes Father     Hypertension Father     Gout Father     Heart Disease None     Ovarian Cancer None     Uterine Cancer None     Breast Cancer None      Review of Systems:   Review of Systems   Constitutional:  Negative for fatigue, fever and unexpected weight change. HENT:  Negative for ear pain, sinus pressure and sinus pain. Eyes:  Negative for pain, discharge, redness and visual disturbance. Respiratory:  Negative for cough, chest tightness and shortness of breath. Cardiovascular:  Negative for chest pain and leg swelling. Gastrointestinal:  Negative for abdominal pain, blood in stool, diarrhea, nausea and vomiting. Genitourinary:  Negative for difficulty urinating. Musculoskeletal:  Positive for neck pain. Negative for arthralgias, gait problem, myalgias and neck stiffness. Skin:  Negative for wound. Neurological:  Negative for light-headedness, numbness and headaches. Hematological: Negative. Psychiatric/Behavioral:  Negative for sleep disturbance and suicidal ideas. PHYSICAL EXAM   Vitals: BP (!) 106/55   Pulse 88   Resp 18   Ht 5' 11\" (1.803 m)   Wt 224 lb (101.6 kg)   SpO2 98%   BMI 31.24 kg/m²   Physical Exam  Vitals reviewed. Constitutional:       Appearance: Normal appearance. HENT:      Head: Normocephalic and atraumatic. Nose: Nose normal. No congestion or rhinorrhea. Mouth/Throat:      Mouth: Mucous membranes are moist.      Pharynx: Oropharynx is clear. Eyes:      General: No scleral icterus. Extraocular Movements: Extraocular movements intact. Conjunctiva/sclera: Conjunctivae normal.      Pupils: Pupils are equal, round, and reactive to light. Neck:      Vascular: Carotid bruit present. Comments: Decrease ROM due to pain   Cardiovascular:      Rate and Rhythm: Normal rate and regular rhythm.       Heart sounds: Normal vegetables, decrease sweets and carbs. Counseled regarding above diagnosis, including possible risks and complications, especially if left uncontrolled. Counseled regarding the possible side effects, risks, benefits and alternatives to treatment; patient and/or guardian verbalizes understanding, agrees, feels comfortable with and wishes to proceed with above treatment plan. Call or go to ED immediately if symptoms worsen or persist. Advised patient to call with any new medication issues, and, as applicable, read all Rx info from pharmacy to assure aware of all possible risks and side effects of medication before taking. Patient and/or guardian given opportunity to ask questions/raise concerns. The patient verbalized comfort and understanding of instructions. I encourage further reading and education about your health conditions. Information on many health conditions is provided by the American Academy of Family [employer]icians: https://familydoctor. org/  Please bring any questions to me at your next visit. Medication List:    Current Outpatient Medications   Medication Sig Dispense Refill    lidocaine (LIDODERM) 5 % Place 1 patch onto the skin every 24 hours 30 patch 0    fluticasone (FLONASE) 50 MCG/ACT nasal spray       folic acid (FOLVITE) 1 MG tablet Take 1 Tablet by mouth daily. blood glucose test strips (ASCENSIA AUTODISC VI;ONE TOUCH ULTRA TEST VI) strip 1 strip      loratadine (CLARITIN) 10 MG tablet Take 10 mg by mouth daily      methotrexate (RHEUMATREX) 2.5 MG chemo tablet Take 4 Tablets by mouth once weekly.       pantoprazole (PROTONIX) 40 MG tablet Take 1 tablet by mouth once a day      sertraline (ZOLOFT) 50 MG tablet Take 50 mg by mouth daily      DULoxetine (CYMBALTA) 30 MG extended release capsule take 1 capsule by mouth once daily for 2 weeks, then increase to 2 capsules daily      cyclobenzaprine (FLEXERIL) 10 MG tablet take 1 tablet by mouth once daily at bedtime as needed amitriptyline (ELAVIL) 10 MG tablet take 1 tablet by mouth every night at bedtime       No current facility-administered medications for this visit. Return to Office: Return in about 1 month (around 11/28/2022) for Follow up on mental health concerns. This document may have been prepared at least partially through the use of voice recognition software. Although effort is taken to assure the accuracy of this document, it is possible that grammatical, syntax,  or spelling errors may occur.     Barrett Connors MD   Family Medicine Resident Physician PGY-2

## 2022-11-02 ENCOUNTER — NURSE ONLY (OUTPATIENT)
Dept: FAMILY MEDICINE CLINIC | Age: 52
End: 2022-11-02

## 2022-11-02 DIAGNOSIS — Z13.1 DIABETES MELLITUS SCREENING: ICD-10-CM

## 2022-11-02 DIAGNOSIS — Z11.59 NEED FOR HEPATITIS C SCREENING TEST: ICD-10-CM

## 2022-11-02 DIAGNOSIS — Z11.4 ENCOUNTER FOR SCREENING FOR HIV: ICD-10-CM

## 2022-11-02 DIAGNOSIS — E87.1 HYPONATREMIA: Primary | ICD-10-CM

## 2022-11-02 DIAGNOSIS — Z13.220 LIPID SCREENING: ICD-10-CM

## 2022-11-02 DIAGNOSIS — E87.1 HYPONATREMIA: ICD-10-CM

## 2022-11-02 LAB
ALBUMIN SERPL-MCNC: 3.4 G/DL (ref 3.5–5.2)
ALP BLD-CCNC: 177 U/L (ref 35–104)
ALT SERPL-CCNC: 38 U/L (ref 0–32)
ANION GAP SERPL CALCULATED.3IONS-SCNC: 11 MMOL/L (ref 7–16)
AST SERPL-CCNC: 37 U/L (ref 0–31)
BILIRUB SERPL-MCNC: 0.3 MG/DL (ref 0–1.2)
BUN BLDV-MCNC: 18 MG/DL (ref 6–20)
CALCIUM SERPL-MCNC: 10.2 MG/DL (ref 8.6–10.2)
CHLORIDE BLD-SCNC: 97 MMOL/L (ref 98–107)
CHOLESTEROL, TOTAL: 188 MG/DL (ref 0–199)
CO2: 27 MMOL/L (ref 22–29)
CORTISOL TOTAL: 18.11 MCG/DL (ref 2.68–18.4)
CREAT SERPL-MCNC: 0.9 MG/DL (ref 0.5–1)
GFR SERPL CREATININE-BSD FRML MDRD: >60 ML/MIN/1.73
GLUCOSE BLD-MCNC: 148 MG/DL (ref 74–99)
HBA1C MFR BLD: 7 % (ref 4–5.6)
HDLC SERPL-MCNC: 37 MG/DL
LDL CHOLESTEROL CALCULATED: 125 MG/DL (ref 0–99)
POTASSIUM SERPL-SCNC: 4.6 MMOL/L (ref 3.5–5)
SODIUM BLD-SCNC: 135 MMOL/L (ref 132–146)
TOTAL PROTEIN: 8.6 G/DL (ref 6.4–8.3)
TRIGL SERPL-MCNC: 128 MG/DL (ref 0–149)
TSH SERPL DL<=0.05 MIU/L-ACNC: 1.6 UIU/ML (ref 0.27–4.2)
VLDLC SERPL CALC-MCNC: 26 MG/DL

## 2022-11-03 LAB
HEPATITIS C ANTIBODY INTERPRETATION: NORMAL
HIV-1 AND HIV-2 ANTIBODIES: NORMAL

## 2022-11-04 ENCOUNTER — TELEPHONE (OUTPATIENT)
Dept: FAMILY MEDICINE CLINIC | Age: 52
End: 2022-11-04

## 2022-11-04 DIAGNOSIS — M54.2 CERVICALGIA: Primary | ICD-10-CM

## 2022-11-04 DIAGNOSIS — M19.90 INFLAMMATORY ARTHRITIS: ICD-10-CM

## 2022-11-04 RX ORDER — GABAPENTIN 100 MG/1
100 CAPSULE ORAL NIGHTLY
Qty: 90 CAPSULE | Refills: 1 | Status: ON HOLD | OUTPATIENT
Start: 2022-11-04 | End: 2023-05-03

## 2022-11-04 NOTE — TELEPHONE ENCOUNTER
Patient called stating she is in a lot of pain. Right side of head down her back. States she discussed this pain with the physician last Friday. Patient is in severe pain - sharp pain, throbbing. She stated that at this very moment she is a 10 on the pain scale. I did advise the ED, but patient states her copay is $250 and they never find anything. She is open to starting the gabapentin and is would like a referral to neurology and rheumatology     If agreeable, please send medication to [facility] [place] [address]    She would like the referrals to be within [facility].      Patient also awaiting lab results

## 2022-11-04 NOTE — TELEPHONE ENCOUNTER
Patient called discussed 10 out of 10 neck pain which has been an ongoing issue since august, I have seen the patient for over the last 2 weeks. Patient notified about all labs. Patient's hyponatremia has improved from 1   Cortisol level within normal limits  TSH with normal limits  HIV and hepatitis C screening both negative  Lipid panel elevated for LDL. Patient's hemoglobin 7.0-patient offered metformin at this time in addition to lifestyle modifications. Patient currently declined secondary to her ongoing issues and states after her bariatric surgery her glucose levels went down and did not the metformin anymore. 1. Cervicalgia  - gabapentin (NEURONTIN) 100 MG capsule; Take 1 capsule by mouth nightly for 180 days. Gabapentin start with 100 mg daily, increase to 100 mg 1-3 times daily next week. Dispense: 90 capsule; Refill: 1    2.  Inflammatory arthritis  Patient wants to establish care - followed with rheumatology in [address] [place] [address], Rheumatology, Leopoldo Breech, MD     Case discussed with Dr. Jayme Kenney

## 2022-11-05 ENCOUNTER — APPOINTMENT (OUTPATIENT)
Dept: CT IMAGING | Age: 52
DRG: 551 | End: 2022-11-05
Payer: COMMERCIAL

## 2022-11-05 ENCOUNTER — HOSPITAL ENCOUNTER (INPATIENT)
Age: 52
LOS: 18 days | Discharge: NURSING, REHAB OR LONG-TERM CARE | DRG: 551 | End: 2022-11-23
Attending: EMERGENCY MEDICINE | Admitting: INTERNAL MEDICINE
Payer: COMMERCIAL

## 2022-11-05 ENCOUNTER — APPOINTMENT (OUTPATIENT)
Dept: GENERAL RADIOLOGY | Age: 52
DRG: 551 | End: 2022-11-05
Payer: COMMERCIAL

## 2022-11-05 DIAGNOSIS — S32.020A COMPRESSION FRACTURE OF L2 VERTEBRA, INITIAL ENCOUNTER (HCC): Primary | ICD-10-CM

## 2022-11-05 PROBLEM — I88.8: Status: ACTIVE | Noted: 2022-11-05

## 2022-11-05 PROBLEM — S32.020B: Status: ACTIVE | Noted: 2022-11-05

## 2022-11-05 LAB
ALBUMIN SERPL-MCNC: 3.1 G/DL (ref 3.5–5.2)
ALP BLD-CCNC: 188 U/L (ref 35–104)
ALT SERPL-CCNC: 45 U/L (ref 0–32)
ANION GAP SERPL CALCULATED.3IONS-SCNC: 12 MMOL/L (ref 7–16)
AST SERPL-CCNC: 49 U/L (ref 0–31)
BILIRUB SERPL-MCNC: 0.3 MG/DL (ref 0–1.2)
BUN BLDV-MCNC: 14 MG/DL (ref 6–20)
CALCIUM SERPL-MCNC: 10 MG/DL (ref 8.6–10.2)
CHLORIDE BLD-SCNC: 95 MMOL/L (ref 98–107)
CHP ED QC CHECK: NORMAL
CO2: 26 MMOL/L (ref 22–29)
CREAT SERPL-MCNC: 0.7 MG/DL (ref 0.5–1)
GFR SERPL CREATININE-BSD FRML MDRD: >60 ML/MIN/1.73
GLUCOSE BLD-MCNC: 169 MG/DL (ref 74–99)
GLUCOSE BLD-MCNC: 218 MG/DL
HCT VFR BLD CALC: 32.4 % (ref 34–48)
HEMOGLOBIN: 10.4 G/DL (ref 11.5–15.5)
LACTIC ACID: 0.8 MMOL/L (ref 0.5–2.2)
MAGNESIUM: 2.1 MG/DL (ref 1.6–2.6)
MCH RBC QN AUTO: 28.7 PG (ref 26–35)
MCHC RBC AUTO-ENTMCNC: 32.1 % (ref 32–34.5)
MCV RBC AUTO: 89.3 FL (ref 80–99.9)
METER GLUCOSE: 218 MG/DL (ref 74–99)
PDW BLD-RTO: 15.2 FL (ref 11.5–15)
PLATELET # BLD: 295 E9/L (ref 130–450)
PMV BLD AUTO: 8.9 FL (ref 7–12)
POTASSIUM SERPL-SCNC: 4.2 MMOL/L (ref 3.5–5)
RBC # BLD: 3.63 E12/L (ref 3.5–5.5)
SODIUM BLD-SCNC: 133 MMOL/L (ref 132–146)
TOTAL PROTEIN: 8.5 G/DL (ref 6.4–8.3)
TROPONIN, HIGH SENSITIVITY: 11 NG/L (ref 0–9)
WBC # BLD: 11.2 E9/L (ref 4.5–11.5)

## 2022-11-05 PROCEDURE — 74177 CT ABD & PELVIS W/CONTRAST: CPT

## 2022-11-05 PROCEDURE — 96376 TX/PRO/DX INJ SAME DRUG ADON: CPT

## 2022-11-05 PROCEDURE — 6360000004 HC RX CONTRAST MEDICATION: Performed by: RADIOLOGY

## 2022-11-05 PROCEDURE — 99285 EMERGENCY DEPT VISIT HI MDM: CPT

## 2022-11-05 PROCEDURE — 6360000002 HC RX W HCPCS: Performed by: INTERNAL MEDICINE

## 2022-11-05 PROCEDURE — 2580000003 HC RX 258: Performed by: EMERGENCY MEDICINE

## 2022-11-05 PROCEDURE — 84484 ASSAY OF TROPONIN QUANT: CPT

## 2022-11-05 PROCEDURE — 83605 ASSAY OF LACTIC ACID: CPT

## 2022-11-05 PROCEDURE — 80053 COMPREHEN METABOLIC PANEL: CPT

## 2022-11-05 PROCEDURE — 85027 COMPLETE CBC AUTOMATED: CPT

## 2022-11-05 PROCEDURE — 1200000000 HC SEMI PRIVATE

## 2022-11-05 PROCEDURE — 6360000002 HC RX W HCPCS: Performed by: EMERGENCY MEDICINE

## 2022-11-05 PROCEDURE — 83735 ASSAY OF MAGNESIUM: CPT

## 2022-11-05 PROCEDURE — 72131 CT LUMBAR SPINE W/O DYE: CPT

## 2022-11-05 PROCEDURE — 82962 GLUCOSE BLOOD TEST: CPT

## 2022-11-05 PROCEDURE — 2580000003 HC RX 258: Performed by: RADIOLOGY

## 2022-11-05 PROCEDURE — 93005 ELECTROCARDIOGRAM TRACING: CPT | Performed by: EMERGENCY MEDICINE

## 2022-11-05 PROCEDURE — 71046 X-RAY EXAM CHEST 2 VIEWS: CPT

## 2022-11-05 PROCEDURE — 6370000000 HC RX 637 (ALT 250 FOR IP): Performed by: INTERNAL MEDICINE

## 2022-11-05 PROCEDURE — 72128 CT CHEST SPINE W/O DYE: CPT

## 2022-11-05 PROCEDURE — 6360000002 HC RX W HCPCS

## 2022-11-05 PROCEDURE — 96374 THER/PROPH/DIAG INJ IV PUSH: CPT

## 2022-11-05 RX ORDER — INSULIN LISPRO 100 [IU]/ML
0-4 INJECTION, SOLUTION INTRAVENOUS; SUBCUTANEOUS NIGHTLY
Status: DISCONTINUED | OUTPATIENT
Start: 2022-11-05 | End: 2022-11-23 | Stop reason: HOSPADM

## 2022-11-05 RX ORDER — ONDANSETRON 2 MG/ML
4 INJECTION INTRAMUSCULAR; INTRAVENOUS EVERY 6 HOURS PRN
Status: DISCONTINUED | OUTPATIENT
Start: 2022-11-05 | End: 2022-11-23 | Stop reason: HOSPADM

## 2022-11-05 RX ORDER — FENTANYL CITRATE 50 UG/ML
INJECTION, SOLUTION INTRAMUSCULAR; INTRAVENOUS
Status: COMPLETED
Start: 2022-11-05 | End: 2022-11-05

## 2022-11-05 RX ORDER — 0.9 % SODIUM CHLORIDE 0.9 %
1000 INTRAVENOUS SOLUTION INTRAVENOUS ONCE
Status: COMPLETED | OUTPATIENT
Start: 2022-11-05 | End: 2022-11-05

## 2022-11-05 RX ORDER — LIDOCAINE 4 G/G
1 PATCH TOPICAL DAILY
Status: DISCONTINUED | OUTPATIENT
Start: 2022-11-06 | End: 2022-11-23 | Stop reason: HOSPADM

## 2022-11-05 RX ORDER — AMITRIPTYLINE HYDROCHLORIDE 10 MG/1
10 TABLET, FILM COATED ORAL NIGHTLY
Status: DISCONTINUED | OUTPATIENT
Start: 2022-11-05 | End: 2022-11-23 | Stop reason: HOSPADM

## 2022-11-05 RX ORDER — ACETAMINOPHEN 650 MG/1
650 SUPPOSITORY RECTAL EVERY 6 HOURS PRN
Status: DISCONTINUED | OUTPATIENT
Start: 2022-11-05 | End: 2022-11-23 | Stop reason: HOSPADM

## 2022-11-05 RX ORDER — SODIUM CHLORIDE 0.9 % (FLUSH) 0.9 %
10 SYRINGE (ML) INJECTION PRN
Status: DISCONTINUED | OUTPATIENT
Start: 2022-11-05 | End: 2022-11-23 | Stop reason: HOSPADM

## 2022-11-05 RX ORDER — PANTOPRAZOLE SODIUM 40 MG/1
40 TABLET, DELAYED RELEASE ORAL
Status: DISCONTINUED | OUTPATIENT
Start: 2022-11-06 | End: 2022-11-23 | Stop reason: HOSPADM

## 2022-11-05 RX ORDER — HEPARIN SODIUM 10000 [USP'U]/ML
5000 INJECTION, SOLUTION INTRAVENOUS; SUBCUTANEOUS EVERY 8 HOURS
Status: DISCONTINUED | OUTPATIENT
Start: 2022-11-05 | End: 2022-11-23 | Stop reason: HOSPADM

## 2022-11-05 RX ORDER — SODIUM CHLORIDE 0.9 % (FLUSH) 0.9 %
10 SYRINGE (ML) INJECTION
Status: COMPLETED | OUTPATIENT
Start: 2022-11-05 | End: 2022-11-05

## 2022-11-05 RX ORDER — SODIUM CHLORIDE 0.9 % (FLUSH) 0.9 %
10 SYRINGE (ML) INJECTION EVERY 12 HOURS SCHEDULED
Status: DISCONTINUED | OUTPATIENT
Start: 2022-11-05 | End: 2022-11-23 | Stop reason: HOSPADM

## 2022-11-05 RX ORDER — FENTANYL CITRATE 50 UG/ML
50 INJECTION, SOLUTION INTRAMUSCULAR; INTRAVENOUS ONCE
Status: COMPLETED | OUTPATIENT
Start: 2022-11-05 | End: 2022-11-05

## 2022-11-05 RX ORDER — INSULIN LISPRO 100 [IU]/ML
0-4 INJECTION, SOLUTION INTRAVENOUS; SUBCUTANEOUS
Status: DISCONTINUED | OUTPATIENT
Start: 2022-11-05 | End: 2022-11-23 | Stop reason: HOSPADM

## 2022-11-05 RX ORDER — DEXTROSE MONOHYDRATE 100 MG/ML
INJECTION, SOLUTION INTRAVENOUS CONTINUOUS PRN
Status: DISCONTINUED | OUTPATIENT
Start: 2022-11-05 | End: 2022-11-23 | Stop reason: HOSPADM

## 2022-11-05 RX ORDER — CYCLOBENZAPRINE HCL 10 MG
10 TABLET ORAL NIGHTLY PRN
Status: DISCONTINUED | OUTPATIENT
Start: 2022-11-05 | End: 2022-11-10

## 2022-11-05 RX ORDER — ONDANSETRON 4 MG/1
4 TABLET, ORALLY DISINTEGRATING ORAL EVERY 8 HOURS PRN
Status: DISCONTINUED | OUTPATIENT
Start: 2022-11-05 | End: 2022-11-23 | Stop reason: HOSPADM

## 2022-11-05 RX ORDER — SODIUM CHLORIDE 9 MG/ML
INJECTION, SOLUTION INTRAVENOUS PRN
Status: DISCONTINUED | OUTPATIENT
Start: 2022-11-05 | End: 2022-11-23 | Stop reason: HOSPADM

## 2022-11-05 RX ORDER — FOLIC ACID 1 MG/1
1 TABLET ORAL DAILY
Status: DISCONTINUED | OUTPATIENT
Start: 2022-11-06 | End: 2022-11-23 | Stop reason: HOSPADM

## 2022-11-05 RX ORDER — ACETAMINOPHEN 325 MG/1
650 TABLET ORAL EVERY 6 HOURS PRN
Status: DISCONTINUED | OUTPATIENT
Start: 2022-11-05 | End: 2022-11-23 | Stop reason: HOSPADM

## 2022-11-05 RX ORDER — GABAPENTIN 100 MG/1
100 CAPSULE ORAL NIGHTLY
Status: DISCONTINUED | OUTPATIENT
Start: 2022-11-05 | End: 2022-11-10

## 2022-11-05 RX ADMIN — FENTANYL CITRATE 50 MCG: 0.05 INJECTION, SOLUTION INTRAMUSCULAR; INTRAVENOUS at 15:20

## 2022-11-05 RX ADMIN — FENTANYL CITRATE 50 MCG: 50 INJECTION, SOLUTION INTRAMUSCULAR; INTRAVENOUS at 15:20

## 2022-11-05 RX ADMIN — FENTANYL CITRATE 50 MCG: 0.05 INJECTION, SOLUTION INTRAMUSCULAR; INTRAVENOUS at 13:21

## 2022-11-05 RX ADMIN — Medication 10 ML: at 14:23

## 2022-11-05 RX ADMIN — FENTANYL CITRATE 50 MCG: 0.05 INJECTION, SOLUTION INTRAMUSCULAR; INTRAVENOUS at 17:30

## 2022-11-05 RX ADMIN — HYDROMORPHONE HYDROCHLORIDE 0.5 MG: 1 INJECTION, SOLUTION INTRAMUSCULAR; INTRAVENOUS; SUBCUTANEOUS at 22:59

## 2022-11-05 RX ADMIN — IOPAMIDOL 75 ML: 755 INJECTION, SOLUTION INTRAVENOUS at 14:23

## 2022-11-05 RX ADMIN — SODIUM CHLORIDE 1000 ML: 9 INJECTION, SOLUTION INTRAVENOUS at 13:22

## 2022-11-05 RX ADMIN — INSULIN LISPRO 1 UNITS: 100 INJECTION, SOLUTION INTRAVENOUS; SUBCUTANEOUS at 21:05

## 2022-11-05 ASSESSMENT — PAIN SCALES - GENERAL
PAINLEVEL_OUTOF10: 10
PAINLEVEL_OUTOF10: 9

## 2022-11-05 ASSESSMENT — PAIN DESCRIPTION - LOCATION
LOCATION: BACK

## 2022-11-05 ASSESSMENT — PAIN - FUNCTIONAL ASSESSMENT: PAIN_FUNCTIONAL_ASSESSMENT: 0-10

## 2022-11-05 ASSESSMENT — PAIN DESCRIPTION - ORIENTATION: ORIENTATION: LOWER

## 2022-11-05 NOTE — ED NOTES
Pt family member angry at bedside stating that she did not want the pt to receive pain medication. Pt family member was not present during med admin. Pt did not refuse medication. Patient alert and oriented x4.       [place], RN  11/05/22 8533

## 2022-11-05 NOTE — ED PROVIDER NOTES
HPI:  22,   Time: 10:19 AM EDT       Jakbo Zuniga is a 45 y.o. female presenting to the ED for Dizziness and fall complaint of back pain after fall, beginning several hours ago. The complaint has been persistent, moderate in severity, and worsened by nothing. Patient states she was getting up today when she had dizzy spell and fell down. Injuring her back. Patient stated she had a syncopal event several months ago similar when she got up from a lying position. She complains of dizziness. She denies any focal motor weakness. She has no chest pain or pain between her shoulder blades. Also complains of abdominal pain after the fall. She has history of diabetes squamous cell carcinoma of the cervix also states that she had gastric bypass several months ago. Review of Systems:   Pertinent positives and negatives are stated within HPI, all other systems reviewed and are negative.    --------------------------------------------- PAST HISTORY ---------------------------------------------  Past Medical History:  has a past medical history of Anemia, Diabetes mellitus ([facility] Papanicolaou smear of cervix with atypical squamous cells of undetermined significance (ASC-US), and Screening for human papillomavirus (HPV). Past Surgical History:  has a past surgical history that includes pr  delivery+postpartum care; pr  delivery+postpartum care; Colposcopy; pr conization cervix,knife/laser; and Gastric Band. Social History:  reports that she has quit smoking. She has never used smokeless tobacco. She reports that she does not use drugs. Family History: family history includes Breast Cancer in her none; Diabetes in her father and mother; Gout in her father; Heart Disease in her none; Hypertension in her father and mother; Ovarian Cancer in her none; Uterine Cancer in her none. The patients home medications have been reviewed.     Allergies: Colchicine and Darvon [propoxyphene]    ---------------------------------------------------PHYSICAL EXAM--------------------------------------    Constitutional/General: Alert and oriented x3, well appearing, non toxic in NAD  Head: Normocephalic and atraumatic  Eyes: PERRL, EOMI, conjunctive normal, sclera non icteric  Mouth: Oropharynx clear, handling secretions, no trismus, no asymmetry of the posterior oropharynx or uvular edema  Neck: Supple, full ROM, non tender to palpation in the midline, no stridor, no crepitus, no meningeal signs  Respiratory: Lungs clear to auscultation bilaterally, no wheezes, rales, or rhonchi. Not in respiratory distress  Cardiovascular:  Regular rate. Regular rhythm. No murmurs, gallops, or rubs. 2+ distal pulses  Chest: No chest wall tenderness  GI:  Abdomen Soft, Non tender, Non distended. +BS. No organomegaly, no palpable masses,  No rebound, guarding, or rigidity. Musculoskeletal: Moves all extremities x 4. Warm and well perfused, no clubbing, cyanosis, or edema. Capillary refill <3 seconds  Integument: skin warm and dry. No rashes. Lymphatic: no lymphadenopathy noted  Neurologic: GCS 15, no focal deficits, symmetric strength 5/5 in the upper and lower extremities bilaterally  Psychiatric: Normal Affect    -------------------------------------------------- RESULTS -------------------------------------------------  I have personally reviewed all laboratory and imaging results for this patient. Results are listed below.      LABS:  Results for orders placed or performed during the hospital encounter of 11/05/22   Troponin   Result Value Ref Range    Troponin, High Sensitivity 11 (H) 0 - 9 ng/L   CBC   Result Value Ref Range    WBC 11.2 4.5 - 11.5 E9/L    RBC 3.63 3.50 - 5.50 E12/L    Hemoglobin 10.4 (L) 11.5 - 15.5 g/dL    Hematocrit 32.4 (L) 34.0 - 48.0 %    MCV 89.3 80.0 - 99.9 fL    MCH 28.7 26.0 - 35.0 pg    MCHC 32.1 32.0 - 34.5 %    RDW 15.2 (H) 11.5 - 15.0 fL    Platelets 224 892 - 446 E9/L MPV 8.9 7.0 - 12.0 fL   Comprehensive Metabolic Panel   Result Value Ref Range    Sodium 133 132 - 146 mmol/L    Potassium 4.2 3.5 - 5.0 mmol/L    Chloride 95 (L) 98 - 107 mmol/L    CO2 26 22 - 29 mmol/L    Anion Gap 12 7 - 16 mmol/L    Glucose 169 (H) 74 - 99 mg/dL    BUN 14 6 - 20 mg/dL    Creatinine 0.7 0.5 - 1.0 mg/dL    Est, Glom Filt Rate >60 >=60 mL/min/1.73    Calcium 10.0 8.6 - 10.2 mg/dL    Total Protein 8.5 (H) 6.4 - 8.3 g/dL    Albumin 3.1 (L) 3.5 - 5.2 g/dL    Total Bilirubin 0.3 0.0 - 1.2 mg/dL    Alkaline Phosphatase 188 (H) 35 - 104 U/L    ALT 45 (H) 0 - 32 U/L    AST 49 (H) 0 - 31 U/L   Magnesium   Result Value Ref Range    Magnesium 2.1 1.6 - 2.6 mg/dL   Lactic Acid   Result Value Ref Range    Lactic Acid 0.8 0.5 - 2.2 mmol/L   Comprehensive Metabolic Panel w/ Reflex to MG   Result Value Ref Range    Sodium 134 132 - 146 mmol/L    Potassium reflex Magnesium 4.1 3.5 - 5.0 mmol/L    Chloride 96 (L) 98 - 107 mmol/L    CO2 25 22 - 29 mmol/L    Anion Gap 13 7 - 16 mmol/L    Glucose 134 (H) 74 - 99 mg/dL    BUN 11 6 - 20 mg/dL    Creatinine 0.6 0.5 - 1.0 mg/dL    Est, Glom Filt Rate >60 >=60 mL/min/1.73    Calcium 10.3 (H) 8.6 - 10.2 mg/dL    Total Protein 9.0 (H) 6.4 - 8.3 g/dL    Albumin 3.1 (L) 3.5 - 5.2 g/dL    Total Bilirubin 0.3 0.0 - 1.2 mg/dL    Alkaline Phosphatase 194 (H) 35 - 104 U/L    ALT 46 (H) 0 - 32 U/L    AST 43 (H) 0 - 31 U/L   CBC auto differential   Result Value Ref Range    WBC 10.2 4.5 - 11.5 E9/L    RBC 3.79 3.50 - 5.50 E12/L    Hemoglobin 10.7 (L) 11.5 - 15.5 g/dL    Hematocrit 34.0 34.0 - 48.0 %    MCV 89.7 80.0 - 99.9 fL    MCH 28.2 26.0 - 35.0 pg    MCHC 31.5 (L) 32.0 - 34.5 %    RDW 15.5 (H) 11.5 - 15.0 fL    Platelets 196 763 - 567 E9/L    MPV 8.7 7.0 - 12.0 fL    Neutrophils % 79.7 43.0 - 80.0 %    Immature Granulocytes % 0.6 0.0 - 5.0 %    Lymphocytes % 14.4 (L) 20.0 - 42.0 %    Monocytes % 2.9 2.0 - 12.0 %    Eosinophils % 1.8 0.0 - 6.0 %    Basophils % 0.6 0.0 - 2.0 % Neutrophils Absolute 8.11 (H) 1.80 - 7.30 E9/L    Immature Granulocytes # 0.06 E9/L    Lymphocytes Absolute 1.46 (L) 1.50 - 4.00 E9/L    Monocytes Absolute 0.29 0.10 - 0.95 E9/L    Eosinophils Absolute 0.18 0.05 - 0.50 E9/L    Basophils Absolute 0.06 0.00 - 0.20 E9/L   Hemoglobin A1c   Result Value Ref Range    Hemoglobin A1C 6.8 (H) 4.0 - 5.6 %   POCT glucose   Result Value Ref Range    Glucose 218 mg/dL    QC OK? pass    POCT Glucose   Result Value Ref Range    Meter Glucose 218 (H) 74 - 99 mg/dL   POCT Glucose   Result Value Ref Range    Meter Glucose 147 (H) 74 - 99 mg/dL   EKG 12 Lead   Result Value Ref Range    Ventricular Rate 88 BPM    Atrial Rate 88 BPM    P-R Interval 154 ms    QRS Duration 78 ms    Q-T Interval 370 ms    QTc Calculation (Bazett) 447 ms    P Axis 16 degrees    R Axis -30 degrees    T Axis 36 degrees       RADIOLOGY:  Interpreted by Radiologist.  CT THORACIC SPINE WO CONTRAST   Final Result   Focal depression involving superior endplate of T1 related to fracture of   uncertain chronicity. CT LUMBAR SPINE WO CONTRAST   Final Result   Stable mild vertebral malalignment. Disc space narrowing, discovertebral degenerative changes, and facet   arthritis with bony spinal canal stenosis. New superior vertebral body compression deformities at L2, L3, and L5.         CT ABDOMEN PELVIS W IV CONTRAST Additional Contrast? None   Final Result   1. New depressed fractures involving superior endplates of L2, L3, and L5. Chronic depressed fracture involving superior endplate of L4.   2. No acute process in the abdomen or pelvis. Stable ventral abdominal wall   hernia containing mesenteric fat and segment of large bowel. 3. Stable mild splenomegaly. XR CHEST (2 VW)   Final Result   No acute process. EKG:  This EKG is signed and interpreted by the EP.     Time: 1053  Rate: 88  Rhythm: Sinus  Interpretation: no acute changes  Comparison: stable as compared to patient's most recent EKG      ------------------------- NURSING NOTES AND VITALS REVIEWED ---------------------------   The nursing notes within the ED encounter and vital signs as below have been reviewed by myself. BP (!) 89/57   Pulse 98   Temp 97.6 °F (36.4 °C)   Resp 18   SpO2 92%   Oxygen Saturation Interpretation: Normal    The patients available past medical records and past encounters were reviewed.         ------------------------------ ED COURSE/MEDICAL DECISION MAKING----------------------  Medications   cyclobenzaprine (FLEXERIL) tablet 10 mg (10 mg Oral Given 86/7/79 1155)   folic acid (FOLVITE) tablet 1 mg (1 mg Oral Given 11/6/22 0857)   amitriptyline (ELAVIL) tablet 10 mg (10 mg Oral Not Given 11/6/22 0244)   gabapentin (NEURONTIN) capsule 100 mg (100 mg Oral Given 11/6/22 0200)   lidocaine 4 % external patch 1 patch (1 patch Topical Patch Applied 11/6/22 0857)   pantoprazole (PROTONIX) tablet 40 mg (40 mg Oral Given 11/6/22 0857)   sertraline (ZOLOFT) tablet 50 mg (50 mg Oral Given 11/6/22 0857)   sodium chloride flush 0.9 % injection 10 mL (10 mLs IntraVENous Given 11/6/22 0858)   sodium chloride flush 0.9 % injection 10 mL (has no administration in time range)   0.9 % sodium chloride infusion (has no administration in time range)   ondansetron (ZOFRAN-ODT) disintegrating tablet 4 mg (has no administration in time range)     Or   ondansetron (ZOFRAN) injection 4 mg (has no administration in time range)   magnesium hydroxide (MILK OF MAGNESIA) 400 MG/5ML suspension 30 mL (has no administration in time range)   acetaminophen (TYLENOL) tablet 650 mg (650 mg Oral Given 11/6/22 0703)     Or   acetaminophen (TYLENOL) suppository 650 mg ( Rectal See Alternative 11/6/22 0703)   heparin (porcine) injection 5,000 Units (5,000 Units SubCUTAneous Not Given 11/6/22 0703)   glucose chewable tablet 16 g (has no administration in time range)   dextrose bolus 10% 125 mL (has no administration in time range)     Or dextrose bolus 10% 250 mL (has no administration in time range)   glucagon (rDNA) injection 1 mg (has no administration in time range)   dextrose 10 % infusion (has no administration in time range)   insulin lispro (HUMALOG) injection vial 0-4 Units (0 Units SubCUTAneous Not Given 11/6/22 0857)   insulin lispro (HUMALOG) injection vial 0-4 Units (0 Units SubCUTAneous Not Given 11/5/22 2002)   HYDROmorphone (DILAUDID) injection 0.5 mg (0.5 mg IntraVENous Given 11/6/22 0257)   0.9 % sodium chloride bolus (0 mLs IntraVENous Stopped 11/5/22 1650)   fentaNYL (SUBLIMAZE) injection 50 mcg (50 mcg IntraVENous Given 11/5/22 1321)   sodium chloride flush 0.9 % injection 10 mL (10 mLs IntraVENous Given 11/5/22 1423)   iopamidol (ISOVUE-370) 76 % injection 75 mL (75 mLs IntraVENous Given 11/5/22 1423)   fentaNYL (SUBLIMAZE) injection 50 mcg (50 mcg IntraVENous Given 11/5/22 1520)   fentaNYL (SUBLIMAZE) injection 50 mcg (50 mcg IntraVENous Given 11/5/22 1730)         ED COURSE:  ED Course as of 11/06/22 0950   Sat Nov 05, 2022   1712 Discussed admission with [facility] hospitalist Dr. [place] will admit . [JN]      ED Course User Index  [JN] Bryson Rincon DO       Medical Decision Making:    Patient suffered a ground-level fall on her back. Complains of lower back pain. CT scan of the lumbar spine concerning for new compression fractures 2 3 and 5. Patient has no neurological deficits or concern for cauda equina. She was given multiple doses of fentanyl for pain control. [facility] I discussed results with patient and daughter. They are agreeable for admission.       This patient's ED course included: a personal history and physicial examination, re-evaluation prior to disposition, multiple bedside re-evaluations, IV medications, cardiac monitoring, continuous pulse oximetry, and complex medical decision making and emergency management    This patient has remained hemodynamically stable, improved, and been closely monitored during their ED course. Re-Evaluations:             Re-evaluation. Patients symptoms are improving    Re-examination  11/5/22   10:19 AM EDT    Consultations:             [facility]    Critical Care: none    Counseling: The emergency provider has spoken with the patient and family member patient and daughter and discussed todays results, in addition to providing specific details for the plan of care and counseling regarding the diagnosis and prognosis. Questions are answered at this time and they are agreeable with the plan.       --------------------------------- IMPRESSION AND DISPOSITION ---------------------------------    IMPRESSION  1. Compression fracture of L2 vertebra, initial [facility]        DISPOSITION  Disposition: Admit to med/surg floor  Patient condition is stable    NOTE: This report was transcribed using voice recognition software.  Every effort was made to ensure accuracy; however, inadvertent computerized transcription errors may be present       Bryson Rincon DO  11/06/22 [id number]

## 2022-11-05 NOTE — ED NOTES
Pt states that the pain medication has relieved most of her pain at the moment. Pt resting in position of comfort on cot presenting in no acute/ apparent distress (NAD). Respirations are noted even and unlabored with good rise and fall of the chest observed. Pt updated with current plan of care (POC) and all questions/ concerns addressed. Patient voices no needs at this time. Cot noted in lowest position, locked, with side rails X 2 up for patient safety. Will continue to monitor patient for acute changes.        Isla Heimlich, RN  11/05/22 9894

## 2022-11-05 NOTE — H&P
Hospitalist History & Physical      PATIENT NAME:  Cecy Null    MRN:  [id number]  SERVICE DATE:  22    Primary Care Physician: Carmen Yang MD       SUBJECTIVE  CHIEF COMPLAINT:  had concerns including Back Pain and Dizziness (Dizziness/back pain starting today. States she got up and got dizzy and fell back down.). HPI:  Ms. Cecy Null, a 45y.o. year old female  who  has a past medical history of Anemia, Diabetes mellitus ([facility] [address] Papanicolaou smear of cervix with atypical squamous cells of undetermined significance (ASC-US), and Screening for human papillomavirus (HPV). presents with dizzy after standing up and feel on her back, developed pain, no LOC no head trauma no chest pain palpitation fever or chills, no nausea or vomiting. No leg or arm weakness or tingling.      PAST MEDICAL HISTORY:    Past Medical History:   Diagnosis Date    Anemia     Diabetes mellitus ([facility] [address]     Papanicolaou smear of cervix with atypical squamous cells of undetermined significance (ASC-US)          Screening for human papillomavirus (HPV)     4/10/07      PAST SURGICAL HISTORY:    Past Surgical History:   Procedure Laterality Date    COLPOSCOPY      2001 : outside [facility]     GASTRIC BAND      2022    ME  DELIVERY+POSTPARTUM CARE       :      ME  DELIVERY+POSTPARTUM CARE      1998 :      ME CONIZATION CERVIX,KNIFE/LASER      2001 : outside [facility]      FAMILY HISTORY:    Family History   Problem Relation Age of Onset    Diabetes Mother     Hypertension Mother     Diabetes Father     Hypertension Father     Gout Father     Heart Disease None     Ovarian Cancer None     Uterine Cancer None     Breast Cancer None      SOCIAL HISTORY:    Social History     Socioeconomic History    Marital status: Single     Spouse name: Not on file    Number of children: Not on file    Years of education: Not on file    Highest education level: Not on file   Occupational History    Not on file   Tobacco Use    Smoking status: Former    Smokeless tobacco: Never    Tobacco comments:     Quit smokin2002   Vaping Use    Vaping Use: Never used   Substance and Sexual Activity    Alcohol use: Not on file    Drug use: No    Sexual activity: Not Currently   Other Topics Concern    Not on file   Social History Narrative    Not on file     Social Determinants of Health     Financial Resource Strain: Low Risk     Difficulty of Paying Living Expenses: Not hard at all   Food Insecurity: No Food Insecurity    Worried About Running Out of Food in the Last Year: Never true    Ran Out of Food in the Last Year: Never true   Transportation Needs: Not on file   Physical Activity: Not on file   Stress: Not on file   Social Connections: Not on file   Intimate Partner Violence: Not on file   Housing Stability: Not on file    TOBACCO:   reports that she has quit smoking. She has never used smokeless tobacco.  ETOH:   has no history on file for alcohol use. MEDICATIONS:   Prior to Admission medications    Medication Sig Start Date End Date Taking? Authorizing Provider   Ibuprofen (ADVIL PO) Take by mouth Takes as directed when needed for pain   Yes Historical Provider, MD   Multiple Vitamin (MULTIVITAMINS PO) Take 3 tablets by mouth daily   Yes Historical Provider, MD   CALCIUM PO Take 2 tablets by mouth daily   Yes Historical Provider, MD   gabapentin (NEURONTIN) 100 MG capsule Take 1 capsule by mouth nightly for 180 days. Gabapentin start with 100 mg daily, increase to 100 mg 1-3 times daily next week.  11/4/22 5/3/23  Sammie Olivares MD   lidocaine (LIDODERM) 5 % Place 1 patch onto the skin every 24 hours 10/28/22   Sammie Olivares MD   fluticasone (FLONASE) 50 MCG/ACT nasal spray 1-2 sprays by Nasal route daily as needed for Allergies 20   Historical Provider, MD   folic acid (FOLVITE) 1 MG tablet Take 1 mg by mouth daily 10/3/22   Historical Provider, MD   loratadine (CLARITIN) 10 MG tablet Take 10 mg by mouth daily as needed 8/29/17   Historical Provider, MD   methotrexate (RHEUMATREX) 2.5 MG chemo tablet Take 10 mg by mouth once a week (Fridays) 10/3/22   Historical Provider, MD   pantoprazole (PROTONIX) 40 MG tablet Take 40 mg by mouth daily 9/13/22   Historical Provider, MD   sertraline (ZOLOFT) 50 MG tablet Take 50 mg by mouth daily 5/7/18   Historical Provider, MD   DULoxetine (CYMBALTA) 30 MG extended release capsule take 1 capsule by mouth once daily for 2 weeks, then increase to 2 capsules daily 10/12/22   Historical Provider, MD   cyclobenzaprine (FLEXERIL) 10 MG tablet Take 10 mg by mouth nightly as needed 10/12/22   Historical Provider, MD   amitriptyline (ELAVIL) 10 MG tablet Take 10 mg by mouth nightly 10/12/22   Historical Provider, MD        ALLERGIES: Colchicine and Darvon [propoxyphene]    REVIEW OF SYSTEM:   ROS as noted in HPI, 12 point ROS reviewed and otherwise negative. OBJECTIVE  PHYSICAL EXAM:   Vitals:    11/05/22 0934   BP: (!) 96/59   Pulse: 91   Resp: 18   Temp: 97.6 °F (36.4 °C)   SpO2: 97%       General appearance: alert, appears stated age and cooperative  CONSTITUTIONAL:  no apparent distress  ENT:  normocephalic, without obvious abnormality, atraumatic  NECK:  supple, symmetrical, trachea midline  Heart: regular rate and rhythm, S1, S2 normal   Lungs: clear to auscultation bilaterally  Abdomen: soft lax, not tender, not distended, positive bowel sounds  Extremities: extremities normal, atraumatic, no cyanosis, edema  Skin: Normal skin color. No rashes or lesions. Neurologic:  Neurovascularly intact without any focal sensory/motor deficits. Cranial nerves: II-XII intact, grossly non-focal.  Psychiatric: Alert and oriented, thought content appropriate, normal insight      DATA:     Diagnostic tests reviewed for today's visit:    Most recent labs and imaging results reviewed.    Labs:   Recent Results (from the past 72 hour(s))   EKG 12 Lead    Collection Time: 11/05/22 10:55 AM   Result Value Ref Range    Ventricular Rate 88 BPM    Atrial Rate 88 BPM    P-R Interval 154 ms    QRS Duration 78 ms    Q-T Interval 370 ms    QTc Calculation (Bazett) 447 ms    P Axis 16 degrees    R Axis -30 degrees    T Axis 36 degrees   Troponin    Collection Time: 11/05/22 10:57 AM   Result Value Ref Range    Troponin, High Sensitivity 11 (H) 0 - 9 ng/L   CBC    Collection Time: 11/05/22 10:57 AM   Result Value Ref Range    WBC 11.2 4.5 - 11.5 E9/L    RBC 3.63 3.50 - 5.50 E12/L    Hemoglobin 10.4 (L) 11.5 - 15.5 g/dL    Hematocrit 32.4 (L) 34.0 - 48.0 %    MCV 89.3 80.0 - 99.9 fL    MCH 28.7 26.0 - 35.0 pg    MCHC 32.1 32.0 - 34.5 %    RDW 15.2 (H) 11.5 - 15.0 fL    Platelets 143 485 - 079 E9/L    MPV 8.9 7.0 - 12.0 fL   Comprehensive Metabolic Panel    Collection Time: 11/05/22 10:57 AM   Result Value Ref Range    Sodium 133 132 - 146 mmol/L    Potassium 4.2 3.5 - 5.0 mmol/L    Chloride 95 (L) 98 - 107 mmol/L    CO2 26 22 - 29 mmol/L    Anion Gap 12 7 - 16 mmol/L    Glucose 169 (H) 74 - 99 mg/dL    BUN 14 6 - 20 mg/dL    Creatinine 0.7 0.5 - 1.0 mg/dL    Est, Glom Filt Rate >60 >=60 mL/min/1.73    Calcium 10.0 8.6 - 10.2 mg/dL    Total Protein 8.5 (H) 6.4 - 8.3 g/dL    Albumin 3.1 (L) 3.5 - 5.2 g/dL    Total Bilirubin 0.3 0.0 - 1.2 mg/dL    Alkaline Phosphatase 188 (H) 35 - 104 U/L    ALT 45 (H) 0 - 32 U/L    AST 49 (H) 0 - 31 U/L   Magnesium    Collection Time: 11/05/22 10:57 AM   Result Value Ref Range    Magnesium 2.1 1.6 - 2.6 mg/dL   Lactic Acid    Collection Time: 11/05/22 10:57 AM   Result Value Ref Range    Lactic Acid 0.8 0.5 - 2.2 mmol/L     Oupatient labs:  Lab Results   Component Value Date    CHOL 188 11/02/2022    TRIG 128 11/02/2022    HDL 37 11/02/2022    LDLCALC 125 (H) 11/02/2022    TSH 1.600 11/02/2022    LABA1C 7.0 (H) 11/02/2022       Urinalysis:    Lab Results   Component Value Date/Time    NITRU Negative 10/17/2022 01:17 PM    WBCUA NONE 10/17/2022 01:17 PM BACTERIA NONE SEEN 10/17/2022 01:17 PM    RBCUA 10-20 10/17/2022 01:17 PM    BLOODU MODERATE 10/17/2022 01:17 PM    SPECGRAV <=1.005 10/17/2022 01:17 PM    GLUCOSEU Negative 10/17/2022 01:17 PM       Imaging:  XR CHEST (2 VW)    Result Date: 11/5/2022  EXAMINATION: TWO XRAY VIEWS OF THE CHEST 11/5/2022 12:38 pm COMPARISON: None. HISTORY: ORDERING SYSTEM PROVIDED HISTORY: cough TECHNOLOGIST PROVIDED HISTORY: Reason for exam:->cough What reading provider will be dictating this exam?->CRC FINDINGS: The lungs are without acute focal process. There is no effusion or pneumothorax. The cardiomediastinal silhouette is without acute process. The osseous structures are without acute process. No acute process. XR CERVICAL SPINE (4-5 VIEWS)    Result Date: 10/22/2022  EXAMINATION: XRAY VIEWS OF THE CERVICAL SPINE 10/21/2022 4:49 pm COMPARISON: None. HISTORY: ORDERING SYSTEM PROVIDED HISTORY: Neck pain TECHNOLOGIST PROVIDED HISTORY: Reason for exam:->NECK PAIN FINDINGS: No fracture or malalignment involving the cervical spine. Degenerative anterior osteophytosis seen at C5/C6. No prevertebral soft tissue edema. Calcifications associated with right and left neck soft tissue likely at level of bilateral carotid bulbs. Odontoid view is unremarkable. 1. No fracture or malalignment involving cervical spine. 2. Degenerative endplate changes with anterior osteophytosis seen at C5/C6. 3. Soft tissue calcifications associated with right and left neck soft tissue which could suggest calcific atherosclerotic plaque at level of carotid bulbs and proximal internal carotid arteries.      CT Head WO Contrast    Result Date: 10/17/2022  EXAMINATION: CT OF THE HEAD WITHOUT CONTRAST  10/17/2022 3:24 pm TECHNIQUE: CT of the head was performed without the administration of intravenous contrast. Automated exposure control, iterative reconstruction, and/or weight based adjustment of the mA/kV was utilized to reduce the radiation dose to as low as reasonably achievable. COMPARISON: None. HISTORY: ORDERING SYSTEM PROVIDED HISTORY: dizziness TECHNOLOGIST PROVIDED HISTORY: Reason for exam:->dizziness Has a \"code stroke\" or \"stroke alert\" been called? ->No Decision Support Exception - unselect if not a suspected or confirmed emergency medical condition->Emergency Medical Condition (MA) What reading provider will be dictating this exam?->CRC FINDINGS: BRAIN/VENTRICLES: There is no acute intracranial hemorrhage, mass effect or midline shift. No abnormal extra-axial fluid collection. The gray-white differentiation is maintained without evidence of an acute infarct. There is no evidence of hydrocephalus. ORBITS: The visualized portion of the orbits demonstrate no acute abnormality. SINUSES: The visualized paranasal sinuses and mastoid air cells demonstrate no acute abnormality. SOFT TISSUES/SKULL:  No acute abnormality of the visualized skull or soft tissues. 1. There is no acute intracranial abnormality. Specifically, there is no acute intracranial hemorrhage. .     CT THORACIC SPINE WO CONTRAST    Result Date: 11/5/2022  EXAMINATION: CT OF THE THORACIC SPINE WITHOUT CONTRAST  11/5/2022 2:18 pm: TECHNIQUE: CT of the thoracic spine was performed without the administration of intravenous contrast. Multiplanar reformatted images are provided for review. Automated exposure control, iterative reconstruction, and/or weight based adjustment of the mA/kV was utilized to reduce the radiation dose to as low as reasonably achievable. COMPARISON: None. HISTORY: ORDERING SYSTEM PROVIDED HISTORY: fall back pain TECHNOLOGIST PROVIDED HISTORY: Reason for exam:->fall back pain What reading provider will be dictating this exam?->CRC FINDINGS: Focal depression involving superior endplate of T1 related to fracture of uncertain chronicity. Satisfactory alignment of the thoracic spine. No osseous central canal stenosis.   Moderate right neural foraminal narrowing at T4/T5. Focal depression involving superior endplate of T1 related to fracture of uncertain chronicity. CT ABDOMEN PELVIS W IV CONTRAST Additional Contrast? None    Result Date: 11/5/2022  EXAMINATION: CT OF THE ABDOMEN AND PELVIS WITH CONTRAST 11/5/2022 2:18 pm TECHNIQUE: CT of the abdomen and pelvis was performed with the administration of intravenous contrast. Multiplanar reformatted images are provided for review. Automated exposure control, iterative reconstruction, and/or weight based adjustment of the mA/kV was utilized to reduce the radiation dose to as low as reasonably achievable. COMPARISON: October 17, 2022 HISTORY: ORDERING SYSTEM PROVIDED HISTORY: fall abdominal pain syncope TECHNOLOGIST PROVIDED HISTORY: Additional Contrast?->None Reason for exam:->fall abdominal pain syncope Decision Support Exception - unselect if not a suspected or confirmed emergency medical condition->Emergency Medical Condition (MA) What reading provider will be dictating this exam?->CRC FINDINGS: No bowel obstruction, free air, or free fluid. Large amount of stool throughout the colon. There is a ventral abdominal wall hernia containing segments of large bowel and mesenteric fat. Hernia measures 5.2 x 7.5 cm and appears similar in size compared to prior. No obstruction or strangulation at site of the hernia. The appendix is visualized and appears unremarkable. No intrahepatic or extrahepatic bile duct dilatation. Gallbladder is unremarkable. Pancreas is unremarkable. Stable mild splenomegaly. Redemonstration of multiple prominent and mildly enlarged lymph nodes at level of the amelia hepatis measuring up to 2.2 x 1.6 cm. Adrenal glands are unremarkable. No hydronephrosis or perinephric edema. New compression fractures are seen involving superior endplates of L2, L3, and L5.   Stable depressed fracture involving superior endplate of L4.     1. New depressed fractures involving superior endplates of L2, L3, and L5. Chronic depressed fracture involving superior endplate of L4. 2. No acute process in the abdomen or pelvis. Stable ventral abdominal wall hernia containing mesenteric fat and segment of large bowel. 3. Stable mild splenomegaly. CT ABDOMEN PELVIS W IV CONTRAST Additional Contrast? None    Result Date: 10/17/2022  EXAMINATION: CT OF THE ABDOMEN AND PELVIS WITH CONTRAST 10/17/2022 3:24 pm TECHNIQUE: CT of the abdomen and pelvis was performed with the administration of intravenous contrast. Multiplanar reformatted images are provided for review. Automated exposure control, iterative reconstruction, and/or weight based adjustment of the mA/kV was utilized to reduce the radiation dose to as low as reasonably achievable. COMPARISON: None. HISTORY: ORDERING SYSTEM PROVIDED HISTORY: weak, near syncope, recent gastric bypass TECHNOLOGIST PROVIDED HISTORY: Additional Contrast?->None Reason for exam:->weak, near syncope, recent gastric bypass Decision Support Exception - unselect if not a suspected or confirmed emergency medical condition->Emergency Medical Condition (MA) What reading provider will be dictating this exam?->CRC FINDINGS: Lower Chest: Parenchymal changes at the lung bases. No active airspace consolidation or pleural effusion noted. Organs: The liver demonstrates mildly undulating margins. There are no focal hepatic lesions. Gallbladder is present with no calcified stones. Spleen is prominent in size. Pancreas and adrenal glands appear unremarkable. There is symmetric enhancement of the kidneys with no hydronephrosis. GI/Bowel: Post gastric bypass surgery. There are no obstructing lesions. There is a large amount of retained stool in the colon. The appendix is normal. Pelvis: Bladder appears unremarkable. There is no significant free fluid. Peritoneum/Retroperitoneum: No free air or significant adenopathy. Bones/Soft Tissues: Physical hernia containing loop of colon.      No acute inflammatory process in the abdomen and pelvis, status post gastric bypass surgery. Question early liver cirrhosis. Please correlate with liver function test. Constipation. Lower ventral hernia containing a loop of non distended colon. XR CHEST PORTABLE    Result Date: 10/17/2022  EXAMINATION: ONE XRAY VIEW OF THE CHEST 10/17/2022 2:01 pm COMPARISON: None. HISTORY: ORDERING SYSTEM PROVIDED HISTORY: near syncope TECHNOLOGIST PROVIDED HISTORY: Reason for exam:->near syncope What reading provider will be dictating this exam?->CRC FINDINGS: The lungs are without acute focal process. There is no effusion or pneumothorax. The cardiomediastinal silhouette is without acute process. The osseous structures are without acute process. No acute process. CT THORACIC SPINE WO CONTRAST   Final Result   Focal depression involving superior endplate of T1 related to fracture of   uncertain chronicity. CT ABDOMEN PELVIS W IV CONTRAST Additional Contrast? None   Final Result   1. New depressed fractures involving superior endplates of L2, L3, and L5. Chronic depressed fracture involving superior endplate of L4.   2. No acute process in the abdomen or pelvis. Stable ventral abdominal wall   hernia containing mesenteric fat and segment of large bowel. 3. Stable mild splenomegaly. XR CHEST (2 VW)   Final Result   No acute process. CT LUMBAR SPINE WO CONTRAST    (Results Pending)         ASSESSMENT AND PLAN  Active Problems:    * No active hospital problems. *  Resolved Problems:    * No resolved hospital problems.  *    - Acute compression fracture of L2, L3, and L5  - Fall  - T2DM    Plan:  - admit for tele monitoring   - pain control  - fall precautions  - PT/OT  - consult neurosurgery   - follow labs  - resume home regimen   - accuchecks and ssi      VTE Prophylaxis: low molecular weight heparin -    DVT Prophylaxis: []Lovenox [x]Heparin []PCD [] [address] []Encouraged ambulation    Diet: No diet orders on file  Code Status: No Order  Surrogate decision maker confirmed with patient:  Primary Emergency Contact: verónica carter      Disposition: [x]Med/Surg [] Intermediate [] ICU/CCU   Admit status: [] Observation [x] Inpatient       Additional work up or/and treatment plan may be added today or thereafter based on clinical progression. I am managing a portion of pt care. Some medical issues are handled by other specialists. Additional work up and treatment should be done by my colleague hospitalist and at out pt setting by pt PCP and other out pt providers.      SIGNATURESenia Nunez MD  DATE: November 5, 2022

## 2022-11-05 NOTE — ED NOTES
Pt resting in position of comfort on cot. Respirations are noted even and unlabored with good rise and fall of the chest observed. Pt updated with current plan of care (POC) and all questions/ concerns addressed. Cot noted in lowest position, locked, with side rails X 2 up for patient safety. Will continue to monitor patient for acute changes.        Fermín Brown RN  11/05/22 0363

## 2022-11-06 ENCOUNTER — APPOINTMENT (OUTPATIENT)
Dept: CT IMAGING | Age: 52
DRG: 551 | End: 2022-11-06
Payer: COMMERCIAL

## 2022-11-06 PROBLEM — S32.020A COMPRESSION FRACTURE OF L2 LUMBAR VERTEBRA (HCC): Status: ACTIVE | Noted: 2022-11-06

## 2022-11-06 LAB
ALBUMIN SERPL-MCNC: 3.1 G/DL (ref 3.5–5.2)
ALP BLD-CCNC: 194 U/L (ref 35–104)
ALT SERPL-CCNC: 46 U/L (ref 0–32)
ANION GAP SERPL CALCULATED.3IONS-SCNC: 13 MMOL/L (ref 7–16)
AST SERPL-CCNC: 43 U/L (ref 0–31)
BASOPHILS ABSOLUTE: 0.06 E9/L (ref 0–0.2)
BASOPHILS RELATIVE PERCENT: 0.6 % (ref 0–2)
BILIRUB SERPL-MCNC: 0.3 MG/DL (ref 0–1.2)
BUN BLDV-MCNC: 11 MG/DL (ref 6–20)
CALCIUM SERPL-MCNC: 10.3 MG/DL (ref 8.6–10.2)
CHLORIDE BLD-SCNC: 96 MMOL/L (ref 98–107)
CO2: 25 MMOL/L (ref 22–29)
CREAT SERPL-MCNC: 0.6 MG/DL (ref 0.5–1)
EKG ATRIAL RATE: 88 BPM
EKG P AXIS: 16 DEGREES
EKG P-R INTERVAL: 154 MS
EKG Q-T INTERVAL: 370 MS
EKG QRS DURATION: 78 MS
EKG QTC CALCULATION (BAZETT): 447 MS
EKG R AXIS: -30 DEGREES
EKG T AXIS: 36 DEGREES
EKG VENTRICULAR RATE: 88 BPM
EOSINOPHILS ABSOLUTE: 0.18 E9/L (ref 0.05–0.5)
EOSINOPHILS RELATIVE PERCENT: 1.8 % (ref 0–6)
GFR SERPL CREATININE-BSD FRML MDRD: >60 ML/MIN/1.73
GLUCOSE BLD-MCNC: 134 MG/DL (ref 74–99)
HBA1C MFR BLD: 6.8 % (ref 4–5.6)
HCT VFR BLD CALC: 34 % (ref 34–48)
HEMOGLOBIN: 10.7 G/DL (ref 11.5–15.5)
IMMATURE GRANULOCYTES #: 0.06 E9/L
IMMATURE GRANULOCYTES %: 0.6 % (ref 0–5)
LYMPHOCYTES ABSOLUTE: 1.46 E9/L (ref 1.5–4)
LYMPHOCYTES RELATIVE PERCENT: 14.4 % (ref 20–42)
MCH RBC QN AUTO: 28.2 PG (ref 26–35)
MCHC RBC AUTO-ENTMCNC: 31.5 % (ref 32–34.5)
MCV RBC AUTO: 89.7 FL (ref 80–99.9)
METER GLUCOSE: 126 MG/DL (ref 74–99)
METER GLUCOSE: 133 MG/DL (ref 74–99)
METER GLUCOSE: 138 MG/DL (ref 74–99)
METER GLUCOSE: 147 MG/DL (ref 74–99)
MONOCYTES ABSOLUTE: 0.29 E9/L (ref 0.1–0.95)
MONOCYTES RELATIVE PERCENT: 2.9 % (ref 2–12)
NEUTROPHILS ABSOLUTE: 8.11 E9/L (ref 1.8–7.3)
NEUTROPHILS RELATIVE PERCENT: 79.7 % (ref 43–80)
PDW BLD-RTO: 15.5 FL (ref 11.5–15)
PLATELET # BLD: 334 E9/L (ref 130–450)
PMV BLD AUTO: 8.7 FL (ref 7–12)
POTASSIUM REFLEX MAGNESIUM: 4.1 MMOL/L (ref 3.5–5)
RBC # BLD: 3.79 E12/L (ref 3.5–5.5)
SODIUM BLD-SCNC: 134 MMOL/L (ref 132–146)
TOTAL PROTEIN: 9 G/DL (ref 6.4–8.3)
WBC # BLD: 10.2 E9/L (ref 4.5–11.5)

## 2022-11-06 PROCEDURE — 2580000003 HC RX 258: Performed by: INTERNAL MEDICINE

## 2022-11-06 PROCEDURE — 2580000003 HC RX 258: Performed by: FAMILY MEDICINE

## 2022-11-06 PROCEDURE — 6370000000 HC RX 637 (ALT 250 FOR IP): Performed by: INTERNAL MEDICINE

## 2022-11-06 PROCEDURE — 6360000002 HC RX W HCPCS: Performed by: INTERNAL MEDICINE

## 2022-11-06 PROCEDURE — 93010 ELECTROCARDIOGRAM REPORT: CPT | Performed by: INTERNAL MEDICINE

## 2022-11-06 PROCEDURE — 82962 GLUCOSE BLOOD TEST: CPT

## 2022-11-06 PROCEDURE — 85025 COMPLETE CBC W/AUTO DIFF WBC: CPT

## 2022-11-06 PROCEDURE — 80053 COMPREHEN METABOLIC PANEL: CPT

## 2022-11-06 PROCEDURE — 70496 CT ANGIOGRAPHY HEAD: CPT

## 2022-11-06 PROCEDURE — 6370000000 HC RX 637 (ALT 250 FOR IP)

## 2022-11-06 PROCEDURE — 70450 CT HEAD/BRAIN W/O DYE: CPT

## 2022-11-06 PROCEDURE — 83036 HEMOGLOBIN GLYCOSYLATED A1C: CPT

## 2022-11-06 PROCEDURE — 6360000004 HC RX CONTRAST MEDICATION: Performed by: RADIOLOGY

## 2022-11-06 PROCEDURE — 99254 IP/OBS CNSLTJ NEW/EST MOD 60: CPT | Performed by: NEUROLOGICAL SURGERY

## 2022-11-06 PROCEDURE — 70498 CT ANGIOGRAPHY NECK: CPT

## 2022-11-06 PROCEDURE — 1200000000 HC SEMI PRIVATE

## 2022-11-06 RX ORDER — 0.9 % SODIUM CHLORIDE 0.9 %
500 INTRAVENOUS SOLUTION INTRAVENOUS
Status: ACTIVE | OUTPATIENT
Start: 2022-11-06 | End: 2022-11-07

## 2022-11-06 RX ORDER — SODIUM CHLORIDE 0.9 % (FLUSH) 0.9 %
10 SYRINGE (ML) INJECTION PRN
Status: DISCONTINUED | OUTPATIENT
Start: 2022-11-06 | End: 2022-11-23 | Stop reason: HOSPADM

## 2022-11-06 RX ORDER — OXYCODONE HYDROCHLORIDE 5 MG/1
5 TABLET ORAL EVERY 4 HOURS PRN
Status: DISCONTINUED | OUTPATIENT
Start: 2022-11-06 | End: 2022-11-10

## 2022-11-06 RX ORDER — SODIUM CHLORIDE 9 MG/ML
INJECTION, SOLUTION INTRAVENOUS CONTINUOUS
Status: DISCONTINUED | OUTPATIENT
Start: 2022-11-06 | End: 2022-11-08

## 2022-11-06 RX ADMIN — HYDROMORPHONE HYDROCHLORIDE 0.5 MG: 1 INJECTION, SOLUTION INTRAMUSCULAR; INTRAVENOUS; SUBCUTANEOUS at 21:59

## 2022-11-06 RX ADMIN — HYDROMORPHONE HYDROCHLORIDE 0.5 MG: 1 INJECTION, SOLUTION INTRAMUSCULAR; INTRAVENOUS; SUBCUTANEOUS at 17:26

## 2022-11-06 RX ADMIN — SERTRALINE HYDROCHLORIDE 50 MG: 50 TABLET ORAL at 08:57

## 2022-11-06 RX ADMIN — OXYCODONE 5 MG: 5 TABLET ORAL at 16:25

## 2022-11-06 RX ADMIN — HEPARIN SODIUM 5000 UNITS: 10000 INJECTION INTRAVENOUS; SUBCUTANEOUS at 22:01

## 2022-11-06 RX ADMIN — CYCLOBENZAPRINE 10 MG: 10 TABLET, FILM COATED ORAL at 02:57

## 2022-11-06 RX ADMIN — GABAPENTIN 100 MG: 100 CAPSULE ORAL at 22:04

## 2022-11-06 RX ADMIN — ACETAMINOPHEN 650 MG: 325 TABLET, FILM COATED ORAL at 07:03

## 2022-11-06 RX ADMIN — OXYCODONE 5 MG: 5 TABLET ORAL at 12:25

## 2022-11-06 RX ADMIN — IOPAMIDOL 60 ML: 755 INJECTION, SOLUTION INTRAVENOUS at 19:26

## 2022-11-06 RX ADMIN — SODIUM CHLORIDE: 9 INJECTION, SOLUTION INTRAVENOUS at 13:45

## 2022-11-06 RX ADMIN — GABAPENTIN 100 MG: 100 CAPSULE ORAL at 02:00

## 2022-11-06 RX ADMIN — HYDROMORPHONE HYDROCHLORIDE 0.5 MG: 1 INJECTION, SOLUTION INTRAMUSCULAR; INTRAVENOUS; SUBCUTANEOUS at 02:57

## 2022-11-06 RX ADMIN — PANTOPRAZOLE SODIUM 40 MG: 40 TABLET, DELAYED RELEASE ORAL at 08:57

## 2022-11-06 RX ADMIN — FOLIC ACID 1 MG: 1 TABLET ORAL at 08:57

## 2022-11-06 RX ADMIN — SODIUM CHLORIDE, PRESERVATIVE FREE 10 ML: 5 INJECTION INTRAVENOUS at 08:58

## 2022-11-06 RX ADMIN — HYDROMORPHONE HYDROCHLORIDE 0.5 MG: 1 INJECTION, SOLUTION INTRAMUSCULAR; INTRAVENOUS; SUBCUTANEOUS at 13:43

## 2022-11-06 ASSESSMENT — PAIN SCALES - GENERAL
PAINLEVEL_OUTOF10: 10
PAINLEVEL_OUTOF10: 10
PAINLEVEL_OUTOF10: 6
PAINLEVEL_OUTOF10: 10

## 2022-11-06 ASSESSMENT — PAIN DESCRIPTION - ORIENTATION: ORIENTATION: MID;LOWER

## 2022-11-06 ASSESSMENT — PAIN DESCRIPTION - LOCATION
LOCATION: BACK

## 2022-11-06 ASSESSMENT — PAIN - FUNCTIONAL ASSESSMENT
PAIN_FUNCTIONAL_ASSESSMENT: 0-10
PAIN_FUNCTIONAL_ASSESSMENT: PREVENTS OR INTERFERES SOME ACTIVE ACTIVITIES AND ADLS

## 2022-11-06 ASSESSMENT — PAIN DESCRIPTION - DESCRIPTORS
DESCRIPTORS: ACHING;SPASM;SHARP;DISCOMFORT
DESCRIPTORS: SHARP;THROBBING

## 2022-11-06 ASSESSMENT — PAIN DESCRIPTION - PAIN TYPE: TYPE: ACUTE PAIN

## 2022-11-06 NOTE — PROGRESS NOTES
Hospitalist Progress Note      Synopsis: Patient admitted for   Ms. Sonu Lockhart, a 45y.o. year old female  who  has a past medical history of Anemia, Diabetes mellitus ([address] Papanicolaou smear of cervix with atypical squamous cells of undetermined significance (ASC-US), and Screening for human papillomavirus (HPV). presents with dizziness after standing up and feel on her back, developed pain, CT showed Acute compression fracture of L2, L3, and L5. Neurosurgery was consulted. Hospital day 1     Subjective: And examined at bedside, patient complaining of significant low back pain. Denies radiation of pain into her legs. Stable overnight. No issues reported. Patient seen and examined  Records reviewed. Temp (24hrs), Av.6 °F (36.4 °C), Min:97.6 °F (36.4 °C), Max:97.6 °F (36.4 °C)    DIET: ADULT DIET; Regular  CODE: Full Code  No intake or output data in the 24 hours ending 22 0759    Review of Systems: All bolded are positive; please see HPI  General:  Fever, chills, diaphoresis, fatigue, malaise, night sweats, weight loss  Psychological:  Anxiety, disorientation, hallucinations. ENT:  Epistaxis, headaches, vertigo, visual changes. Cardiovascular:  Chest pain, irregular heartbeats, palpitations, paroxysmal nocturnal dyspnea. Respiratory:  Shortness of breath, coughing, sputum production, hemoptysis, wheezing, orthopnea.   Gastrointestinal:  Nausea, vomiting, diarrhea, heartburn, constipation, abdominal pain, hematemesis, hematochezia, melena, acholic stools  Genito-Urinary:  Dysuria, urgency, frequency, hematuria  Musculoskeletal:  Joint pain, joint stiffness, joint swelling, muscle pain  Neurology:  Headache, focal neurological deficits, weakness, numbness, paresthesia  Derm:  Rashes, ulcers, excoriations, bruising  Extremities:  Decreased ROM, peripheral edema, mottling    Objective:    /64   Pulse 77   Temp 97.6 °F (36.4 °C)   Resp 17   SpO2 97%     General appearance: No apparent distress, appears stated age and cooperative. HEENT: Conjunctivae/corneas clear. Mucous membranes moist.  Neck: Supple. No JVD. Respiratory:  Clear to auscultation bilaterally. Normal respiratory effort. Cardiovascular:  RRR. S1, S2 without MRG. PV: Pulses palpable. No edema. Abdomen: Soft, non-tender, non-distended. +BS  Musculoskeletal: No obvious deformities. Skin: Normal skin color. No rashes or lesions. Good turgor. Neurologic:  Grossly non-focal. Awake, alert, following commands. Psychiatric: Alert and oriented, thought content appropriate, normal insight and judgement    Medications:  REVIEWED DAILY    Infusion Medications    sodium chloride      dextrose       Scheduled Medications    folic acid  1 mg Oral Daily    amitriptyline  10 mg Oral Nightly    gabapentin  100 mg Oral Nightly    lidocaine  1 patch Topical Daily    pantoprazole  40 mg Oral QAM AC    sertraline  50 mg Oral Daily    sodium chloride flush  10 mL IntraVENous 2 times per day    heparin (porcine)  5,000 Units SubCUTAneous Q8H    insulin lispro  0-4 Units SubCUTAneous TID WC    insulin lispro  0-4 Units SubCUTAneous Nightly     PRN Meds: cyclobenzaprine, sodium chloride flush, sodium chloride, ondansetron **OR** ondansetron, magnesium hydroxide, acetaminophen **OR** acetaminophen, glucose, dextrose bolus **OR** dextrose bolus, glucagon (rDNA), dextrose, HYDROmorphone    Labs:     Recent Labs     11/05/22  1057 11/06/22  0646   WBC 11.2 10.2   HGB 10.4* 10.7*   HCT 32.4* 34.0    334       Recent Labs     11/05/22  1057 11/06/22  0646    134   K 4.2 4.1   CL 95* 96*   CO2 26 25   BUN 14 11   CREATININE 0.7 0.6   CALCIUM 10.0 10.3*       Recent Labs     11/05/22  1057 11/06/22  0646   PROT 8.5* 9.0*   ALKPHOS 188* 194*   ALT 45* 46*   AST 49* 43*   BILITOT 0.3 0.3       No results for input(s): INR in the last 72 hours.     No results for input(s): Janise Seip in the last 72 hours.    Chronic labs:    Lab Results   Component Value Date    CHOL 188 11/02/2022    TRIG 128 11/02/2022    HDL 37 11/02/2022    LDLCALC 125 (H) 11/02/2022    TSH 1.600 11/02/2022    LABA1C 6.8 (H) 11/06/2022       Radiology: REVIEWED DAILY    Assessment:  - Acute compression fracture of L2, L3, and L5  - Fall  - T2DM  Plan:  - admit for tele monitoring   - pain control  - fall precautions  - PT/OT  - consult neurosurgery   - follow labs  - resume home regimen   - accuchecks and ssi    DVT Prophylaxis [] Lovenox  [x]  Heparin [] DOAC [] PCDs [] Ambulation    GI Prophylaxis [] PPI  [] H2 Blocker   [] Carafate  [] Diet/Tube Feeds   Level of care [x] Med/Surg  [] Intermediate  []  ICU   Diet ADULT DIET; Regular    Family contact []  N/A    [x] At bedside  [] Phone call   Disposition Patient requires continued admission further evaluation of back pain and lumbar fractures by neurosurgery   MDM [] Low    [x] Moderate  []   High       Discharge Plan: To home when clinically stable    +++++++++++++++++++++++++++++++++++++++++++++++++  Zipporah Lundborg, APRN [employer] - [address] [place] [address], [place]  +++++++++++++++++++++++++++++++++++++++++++++++++  NOTE: This report was transcribed using voice recognition software. Every effort was made to ensure accuracy; however, inadvertent computerized transcription errors may be present.

## 2022-11-06 NOTE — ED NOTES
Pt requesting pain medication. This RN perfect served attending Dr. Tresa Pina asking for pain meds for the patient.  Waiting for response back      Richard Lam RN  11/05/22 7605

## 2022-11-06 NOTE — CONSULTS
Chief Complaint:   Chief Complaint   Patient presents with    Back Pain    Dizziness     Dizziness/back pain starting today. States she got up and got dizzy and fell back down. HPI:     I had the pleasure of seeing Robin German today in house. As you know, this delightful right handed, single mother of two, non-smoker and  presents with low back pain s/p fall while feeling suddenly dizzy. She fell and landed on her buttocks. Upon specific questioning, she endorse significant back pain, no n/w/t, loss of bowel or bladder. CT showed L2 compression fracture for which we have been consulted.     Past Medical History:   Diagnosis Date    Anemia     Diabetes mellitus ([facility] [address]     Papanicolaou smear of cervix with atypical squamous cells of undetermined significance (ASC-US)          Screening for human papillomavirus (HPV)     4/10/07      Past Surgical History:   Procedure Laterality Date    COLPOSCOPY      2001 : outside [facility]      2022    [place]  DELIV[place] C[place]      1991 :      [place]      1998 :      [place] CON[place] CERV[place],KNIFE/LASER      2001 : outside [address] [place] [address] History   Problem Relation Age of Onset    Diabetes Mother     Hypertension Mother     Diabetes Father     Hypertension Father     Gout Father     Heart Disease None     Ovarian Cancer None     Uterine Cancer None     Breast Cancer None       Social History     Socioeconomic History    Marital status: Single     Spouse name: Not on file    Number of children: Not on file    Years of education: Not on file    Highest education level: Not on file   Occupational History    Not on file   Tobacco Use    Smoking status: Former    Smokeless tobacco: Never    Tobacco comments:     Quit smokin2002   Vaping Use    Vaping Use: Never used   Substance and Sexual Activity    Alcohol use: Not on file    Drug use: No    Sexual activity: Not Currently   Other Topics Concern    Not on file   Social History Narrative    Not on file     Social Determinants of Health     Financial Resource Strain: Low Risk     Difficulty of Paying Living Expenses: Not hard at all   Food Insecurity: No Food Insecurity    Worried About Running Out of Food in the Last Year: Never true    Ran Out of Food in the Last Year: Never true   Transportation Needs: Not on file   Physical Activity: Not on file   Stress: Not on file   Social Connections: Not on file   Intimate Partner Violence: Not on file   Housing Stability: Not on file       Medications:   Current Facility-Administered Medications   Medication Dose Route Frequency Provider Last Rate Last Admin    0.9 % sodium chloride bolus  500 mL IntraVENous Once PRN VALERIE Liu CNP        oxyCODONE (ROXICODONE) immediate release tablet 5 mg  5 mg Oral Q4H PRN VALERIE Liu - CNP   5 mg at 11/06/22 1225    cyclobenzaprine (FLEXERIL) tablet 10 mg  10 mg Oral Nightly PRN Rubi Monroy MD   10 mg at 94/43/20 8887    folic acid (FOLVITE) tablet 1 mg  1 mg Oral Daily Matthews Session, MD   1 mg at 11/06/22 0857    amitriptyline (ELAVIL) tablet 10 mg  10 mg Oral Nightly Matthews Session, MD        gabapentin (NEURONTIN) capsule 100 mg  100 mg Oral Nightly Rubi Monroy MD   100 mg at 11/06/22 0200    lidocaine 4 % external patch 1 patch  1 patch Topical Daily Matthews Session, MD   1 patch at 11/06/22 0857    pantoprazole (PROTONIX) tablet 40 mg  40 mg Oral QAM AC Rubi Monroy MD   40 mg at 11/06/22 0857    sertraline (ZOLOFT) tablet 50 mg  50 mg Oral Daily Rubi Monroy MD   50 mg at 11/06/22 0857    sodium chloride flush 0.9 % injection 10 mL  10 mL IntraVENous 2 times per day Matthews Session, MD   10 mL at 11/06/22 0858    sodium chloride flush 0.9 % injection 10 mL  10 mL IntraVENous PRN Matthews MD Jina        0Alexei9 % sodium chloride infusion   IntraVENous PRN Matthews Session, MD ondansetron (ZOFRAN-ODT) disintegrating tablet 4 mg  4 mg Oral Q8H PRN Rubi Ulloa MD        Or    ondansetron [facility]) injection 4 mg  4 mg IntraVENous Q6H PRN Rubi Ulloa MD        magnesium hydroxide (MILK OF MAGNESIA) 400 MG/5ML suspension 30 mL  30 mL Oral Daily PRN Rubi Ulloa MD        acetaminophen (TYLENOL) tablet 650 mg  650 mg Oral Q6H PRN Davionr Rosalba Ulloa MD   650 mg at 11/06/22 0703    Or    acetaminophen (TYLENOL) suppository 650 mg  650 mg Rectal Q6H PRN Rubi Ulloa MD        heparin (porcine) injection 5,000 Units  5,000 Units SubCUTAneous Joya Oneill MD        glucose chewable tablet 16 g  4 tablet Oral PRN Davionr oRsalba Ulloa MD        dextrose bolus 10% 125 mL  125 mL IntraVENous PRN Rubi Ulloa MD        Or    dextrose bolus 10% 250 mL  250 mL IntraVENous PRN Casi Motta MD        glucagon (rDNA) injection 1 mg  1 mg SubCUTAneous PRN Rubi Ulola MD        dextrose 10 % infusion   IntraVENous Continuous PRN Rubi Ulloa MD        insulin lispro (HUMALOG) injection vial 0-4 Units  0-4 Units SubCUTAneous TID WC Rubi Ulloa MD   1 Units at 11/05/22 2105    insulin lispro (HUMALOG) injection vial 0-4 Units  0-4 Units SubCUTAneous Nightly Rubi Ulloa MD        HYDROmorphone (DILAUDID) injection 0.5 mg  0.5 mg IntraVENous Q4H PRN Rubi Ulloa MD   0.5 mg at 11/06/22 0257        Allergies:    Colchicine and Darvon [propoxyphene]       Review of Systems:    Denies any chest pain, headache, dyspnea, recent weight loss, fevers, chills or night sweats. Physical Examination:    BP 93/64   Pulse 80   Temp 98 °F (36.7 °C) (Oral)   Resp 16   SpO2 98%      On focused neurological examination, she  is awake alert oriented and rationally conversant. Speech is clear and fluent. Pupils are equal and reactive to light bilaterally, extraocular movements are intact, visual fields are full to confrontation.   Her  face is symmetric and grossly intact to fine touch. Uvula and tongue are both midline. Shoulder shrug is symmetric and strong. Cervical spine is well aligned and nontender to direct palpation. She  can forward flex, extend , laterally rotate and laterally extend without limitation or pain. Motor examination reveals preserved power in the upper and lower extremities at 5 out of 5 throughout. Reflexes are symmetric and brisk. Plantar responses are downgoing. There is no clonus. Patient is intact to fine touch in all dermatomes throughout. Straight leg raise is negative. Palpation of the spine reveals no kyphotic or scoliotic gross deformities. There is pain to deep percussion and  palpation. ASSESSMENT:    I personally reviewed Rc Calvillo's radiographic images, particularly her CT of her lumbar spine which shows compression deformity at L2, L 3, L 5.  Spinal alignment is intact        MEDICAL DECISION MAKING & PLAN:    No acute Nsx intervention is required at this time. Ordered LSO for comfort. Needs pain control per primary service. Thank you so much for allowing us to participate in the care of this patient. No follow-ups on file. Electronically signed by Oumou New MD on 11/6/2022 at 12:30 PM       NOTE: This report was transcribed using voice recognition software.  Every effort was made to ensure accuracy; however, inadvertent computerized transcription errors may be present

## 2022-11-07 LAB
ANION GAP SERPL CALCULATED.3IONS-SCNC: 13 MMOL/L (ref 7–16)
BASOPHILS ABSOLUTE: 0.05 E9/L (ref 0–0.2)
BASOPHILS RELATIVE PERCENT: 0.5 % (ref 0–2)
BUN BLDV-MCNC: 13 MG/DL (ref 6–20)
CALCIUM SERPL-MCNC: 9.5 MG/DL (ref 8.6–10.2)
CHLORIDE BLD-SCNC: 95 MMOL/L (ref 98–107)
CO2: 23 MMOL/L (ref 22–29)
CREAT SERPL-MCNC: 0.7 MG/DL (ref 0.5–1)
EOSINOPHILS ABSOLUTE: 0.28 E9/L (ref 0.05–0.5)
EOSINOPHILS RELATIVE PERCENT: 3 % (ref 0–6)
GFR SERPL CREATININE-BSD FRML MDRD: >60 ML/MIN/1.73
GLUCOSE BLD-MCNC: 117 MG/DL (ref 74–99)
HCT VFR BLD CALC: 32.3 % (ref 34–48)
HEMOGLOBIN: 10.1 G/DL (ref 11.5–15.5)
IMMATURE GRANULOCYTES #: 0.04 E9/L
IMMATURE GRANULOCYTES %: 0.4 % (ref 0–5)
LYMPHOCYTES ABSOLUTE: 1.37 E9/L (ref 1.5–4)
LYMPHOCYTES RELATIVE PERCENT: 14.7 % (ref 20–42)
MCH RBC QN AUTO: 28.1 PG (ref 26–35)
MCHC RBC AUTO-ENTMCNC: 31.3 % (ref 32–34.5)
MCV RBC AUTO: 90 FL (ref 80–99.9)
METER GLUCOSE: 118 MG/DL (ref 74–99)
METER GLUCOSE: 137 MG/DL (ref 74–99)
METER GLUCOSE: 141 MG/DL (ref 74–99)
METER GLUCOSE: 181 MG/DL (ref 74–99)
MONOCYTES ABSOLUTE: 0.5 E9/L (ref 0.1–0.95)
MONOCYTES RELATIVE PERCENT: 5.4 % (ref 2–12)
NEUTROPHILS ABSOLUTE: 7.09 E9/L (ref 1.8–7.3)
NEUTROPHILS RELATIVE PERCENT: 76 % (ref 43–80)
PDW BLD-RTO: 15.5 FL (ref 11.5–15)
PLATELET # BLD: 325 E9/L (ref 130–450)
PMV BLD AUTO: 9 FL (ref 7–12)
POTASSIUM REFLEX MAGNESIUM: 4.5 MMOL/L (ref 3.5–5)
RBC # BLD: 3.59 E12/L (ref 3.5–5.5)
SODIUM BLD-SCNC: 131 MMOL/L (ref 132–146)
WBC # BLD: 9.3 E9/L (ref 4.5–11.5)

## 2022-11-07 PROCEDURE — 97530 THERAPEUTIC ACTIVITIES: CPT

## 2022-11-07 PROCEDURE — 97165 OT EVAL LOW COMPLEX 30 MIN: CPT

## 2022-11-07 PROCEDURE — 6360000002 HC RX W HCPCS: Performed by: INTERNAL MEDICINE

## 2022-11-07 PROCEDURE — 6370000000 HC RX 637 (ALT 250 FOR IP)

## 2022-11-07 PROCEDURE — 97161 PT EVAL LOW COMPLEX 20 MIN: CPT

## 2022-11-07 PROCEDURE — 97535 SELF CARE MNGMENT TRAINING: CPT

## 2022-11-07 PROCEDURE — 6370000000 HC RX 637 (ALT 250 FOR IP): Performed by: INTERNAL MEDICINE

## 2022-11-07 PROCEDURE — 2580000003 HC RX 258: Performed by: FAMILY MEDICINE

## 2022-11-07 PROCEDURE — 80048 BASIC METABOLIC PNL TOTAL CA: CPT

## 2022-11-07 PROCEDURE — 99232 SBSQ HOSP IP/OBS MODERATE 35: CPT | Performed by: NEUROLOGICAL SURGERY

## 2022-11-07 PROCEDURE — 82962 GLUCOSE BLOOD TEST: CPT

## 2022-11-07 PROCEDURE — 85025 COMPLETE CBC W/AUTO DIFF WBC: CPT

## 2022-11-07 PROCEDURE — 36415 COLL VENOUS BLD VENIPUNCTURE: CPT

## 2022-11-07 PROCEDURE — 1200000000 HC SEMI PRIVATE

## 2022-11-07 RX ORDER — MIDODRINE HYDROCHLORIDE 5 MG/1
5 TABLET ORAL
Status: DISCONTINUED | OUTPATIENT
Start: 2022-11-07 | End: 2022-11-10

## 2022-11-07 RX ADMIN — FOLIC ACID 1 MG: 1 TABLET ORAL at 08:46

## 2022-11-07 RX ADMIN — SERTRALINE HYDROCHLORIDE 50 MG: 50 TABLET ORAL at 08:47

## 2022-11-07 RX ADMIN — OXYCODONE 5 MG: 5 TABLET ORAL at 12:20

## 2022-11-07 RX ADMIN — HYDROMORPHONE HYDROCHLORIDE 0.5 MG: 1 INJECTION, SOLUTION INTRAMUSCULAR; INTRAVENOUS; SUBCUTANEOUS at 23:57

## 2022-11-07 RX ADMIN — GABAPENTIN 100 MG: 100 CAPSULE ORAL at 21:25

## 2022-11-07 RX ADMIN — SODIUM CHLORIDE: 9 INJECTION, SOLUTION INTRAVENOUS at 12:19

## 2022-11-07 RX ADMIN — PANTOPRAZOLE SODIUM 40 MG: 40 TABLET, DELAYED RELEASE ORAL at 05:54

## 2022-11-07 RX ADMIN — CYCLOBENZAPRINE 10 MG: 10 TABLET, FILM COATED ORAL at 23:47

## 2022-11-07 RX ADMIN — OXYCODONE 5 MG: 5 TABLET ORAL at 16:42

## 2022-11-07 RX ADMIN — MIDODRINE HYDROCHLORIDE 5 MG: 5 TABLET ORAL at 12:20

## 2022-11-07 RX ADMIN — OXYCODONE 5 MG: 5 TABLET ORAL at 00:10

## 2022-11-07 RX ADMIN — SODIUM CHLORIDE: 9 INJECTION, SOLUTION INTRAVENOUS at 00:11

## 2022-11-07 RX ADMIN — HYDROMORPHONE HYDROCHLORIDE 0.5 MG: 1 INJECTION, SOLUTION INTRAMUSCULAR; INTRAVENOUS; SUBCUTANEOUS at 04:37

## 2022-11-07 RX ADMIN — HEPARIN SODIUM 5000 UNITS: 10000 INJECTION INTRAVENOUS; SUBCUTANEOUS at 05:54

## 2022-11-07 RX ADMIN — OXYCODONE 5 MG: 5 TABLET ORAL at 21:25

## 2022-11-07 RX ADMIN — OXYCODONE 5 MG: 5 TABLET ORAL at 05:54

## 2022-11-07 RX ADMIN — ACETAMINOPHEN 650 MG: 325 TABLET, FILM COATED ORAL at 15:42

## 2022-11-07 RX ADMIN — AMITRIPTYLINE HYDROCHLORIDE 10 MG: 10 TABLET, FILM COATED ORAL at 21:25

## 2022-11-07 RX ADMIN — HYDROMORPHONE HYDROCHLORIDE 0.5 MG: 1 INJECTION, SOLUTION INTRAMUSCULAR; INTRAVENOUS; SUBCUTANEOUS at 08:55

## 2022-11-07 RX ADMIN — HEPARIN SODIUM 5000 UNITS: 10000 INJECTION INTRAVENOUS; SUBCUTANEOUS at 12:23

## 2022-11-07 RX ADMIN — HEPARIN SODIUM 5000 UNITS: 10000 INJECTION INTRAVENOUS; SUBCUTANEOUS at 21:26

## 2022-11-07 RX ADMIN — MIDODRINE HYDROCHLORIDE 5 MG: 5 TABLET ORAL at 16:42

## 2022-11-07 ASSESSMENT — PAIN DESCRIPTION - LOCATION
LOCATION: EAR;JAW
LOCATION: ABDOMEN;BACK
LOCATION: BACK;NECK
LOCATION: ARM;BACK
LOCATION: BACK
LOCATION: ABDOMEN
LOCATION: BACK;NECK
LOCATION: BACK
LOCATION: SHOULDER
LOCATION: BACK;SHOULDER
LOCATION: BACK

## 2022-11-07 ASSESSMENT — PAIN DESCRIPTION - DESCRIPTORS
DESCRIPTORS: ACHING;CRAMPING
DESCRIPTORS: THROBBING;STABBING;SPASM
DESCRIPTORS: SHARP;THROBBING
DESCRIPTORS: ACHING
DESCRIPTORS: SHARP;THROBBING
DESCRIPTORS: ACHING
DESCRIPTORS: THROBBING
DESCRIPTORS: THROBBING
DESCRIPTORS: CRAMPING;DISCOMFORT

## 2022-11-07 ASSESSMENT — PAIN SCALES - GENERAL
PAINLEVEL_OUTOF10: 9
PAINLEVEL_OUTOF10: 10
PAINLEVEL_OUTOF10: 9

## 2022-11-07 ASSESSMENT — PAIN - FUNCTIONAL ASSESSMENT
PAIN_FUNCTIONAL_ASSESSMENT: PREVENTS OR INTERFERES SOME ACTIVE ACTIVITIES AND ADLS
PAIN_FUNCTIONAL_ASSESSMENT: PREVENTS OR INTERFERES WITH MANY ACTIVE NOT PASSIVE ACTIVITIES

## 2022-11-07 ASSESSMENT — PAIN DESCRIPTION - ORIENTATION
ORIENTATION: RIGHT
ORIENTATION: OUTER
ORIENTATION: RIGHT
ORIENTATION: OUTER
ORIENTATION: RIGHT

## 2022-11-07 ASSESSMENT — PAIN DESCRIPTION - PAIN TYPE: TYPE: ACUTE PAIN

## 2022-11-07 NOTE — PROGRESS NOTES
Department of Neurosurgery  Progress Note    CHIEF COMPLAINT: Back pain, L2, L3 and L5 compression deformity     SUBJECTIVE:  No acute events overnight. Resting in bed with brace on. REVIEW OF SYSTEMS :  Constitutional: Negative for chills and fever. Neurological: Negative for dizziness, tremors and speech change.      OBJECTIVE:   VITALS:  BP (!) 90/50   Pulse 88   Temp 98 °F (36.7 °C) (Oral)   Resp 18   Ht 5' 11\" (1.803 m)   Wt 225 lb (102.1 kg)   SpO2 95%   BMI 31.38 kg/m²     PHYSICAL:  Alert, oriented  Appears stated age  PERRL  EOMI  LARSON  Sensation intact to light touch    DATA:  CBC:   Lab Results   Component Value Date/Time    WBC 9.3 11/07/2022 04:51 AM    RBC 3.59 11/07/2022 04:51 AM    HGB 10.1 11/07/2022 04:51 AM    HCT 32.3 11/07/2022 04:51 AM    MCV 90.0 11/07/2022 04:51 AM    MCH 28.1 11/07/2022 04:51 AM    MCHC 31.3 11/07/2022 04:51 AM    RDW 15.5 11/07/2022 04:51 AM     11/07/2022 04:51 AM    MPV 9.0 11/07/2022 04:51 AM     BMP:    Lab Results   Component Value Date/Time     11/07/2022 04:51 AM    K 4.5 11/07/2022 04:51 AM    CL 95 11/07/2022 04:51 AM    CO2 23 11/07/2022 04:51 AM    BUN 13 11/07/2022 04:51 AM    LABALBU 3.1 11/06/2022 06:46 AM    CREATININE 0.7 11/07/2022 04:51 AM    CALCIUM 9.5 11/07/2022 04:51 AM    GFRAA >60 10/17/2022 01:11 PM    LABGLOM >60 11/07/2022 04:51 AM    GLUCOSE 117 11/07/2022 04:51 AM     PT/INR:  No results found for: PROTIME, INR  PTT:  No results found for: APTT, PTT[APTT}    Current Inpatient Medications  Current Facility-Administered Medications: midodrine (PROAMATINE) tablet 5 mg, 5 mg, Oral, TID WC  oxyCODONE (ROXICODONE) immediate release tablet 5 mg, 5 mg, Oral, Q4H PRN  0.9 % sodium chloride infusion, , IntraVENous, Continuous  sodium chloride flush 0.9 % injection 10 mL, 10 mL, IntraVENous, PRN  cyclobenzaprine (FLEXERIL) tablet 10 mg, 10 mg, Oral, Nightly PRN  folic acid (FOLVITE) tablet 1 mg, 1 mg, Oral, Daily  amitriptyline (ELAVIL) tablet 10 mg, 10 mg, Oral, Nightly  gabapentin (NEURONTIN) capsule 100 mg, 100 mg, Oral, Nightly  lidocaine 4 % external patch 1 patch, 1 patch, Topical, Daily  pantoprazole (PROTONIX) tablet 40 mg, 40 mg, Oral, QAM AC  sertraline (ZOLOFT) tablet 50 mg, 50 mg, Oral, Daily  sodium chloride flush 0.9 % injection 10 mL, 10 mL, IntraVENous, 2 times per day  sodium chloride flush 0.9 % injection 10 mL, 10 mL, IntraVENous, PRN  0.9 % sodium chloride infusion, , IntraVENous, PRN  ondansetron (ZOFRAN-ODT) disintegrating tablet 4 mg, 4 mg, Oral, Q8H PRN **OR** ondansetron (ZOFRAN) injection 4 mg, 4 mg, IntraVENous, Q6H PRN  magnesium hydroxide (MILK OF MAGNESIA) 400 MG/5ML suspension 30 mL, 30 mL, Oral, Daily PRN  acetaminophen (TYLENOL) tablet 650 mg, 650 mg, Oral, Q6H PRN **OR** acetaminophen (TYLENOL) suppository 650 mg, 650 mg, Rectal, Q6H PRN  heparin (porcine) injection 5,000 Units, 5,000 Units, SubCUTAneous, Q8H  glucose chewable tablet 16 g, 4 tablet, Oral, PRN  dextrose bolus 10% 125 mL, 125 mL, IntraVENous, PRN **OR** dextrose bolus 10% 250 mL, 250 mL, IntraVENous, PRN  glucagon (rDNA) injection 1 mg, 1 mg, SubCUTAneous, PRN  dextrose 10 % infusion, , IntraVENous, Continuous PRN  insulin lispro (HUMALOG) injection vial 0-4 Units, 0-4 Units, SubCUTAneous, TID WC  insulin lispro (HUMALOG) injection vial 0-4 Units, 0-4 Units, SubCUTAneous, Nightly  HYDROmorphone (DILAUDID) injection 0.5 mg, 0.5 mg, IntraVENous, Q4H PRN    ASSESSMENT:   -Bart Lamar is a 44 y/o female who CT shows compression deformity at L2, L3 and L5. PLAN:  -Pain control  -LSO for comfort when >45 degrees  -PT/OT with brace  -No neurosurgical interventions, will sign off  -Follow up in clinic in 4 weeks with lumbar XR      Electronically signed by Lee Ann Albert PA-C on 11/7/2022 at 1:00 PM     Nsx Attending:    Patient was seen and examined by me with the team.  I personally reviewed all pertinent radiological images.   I concur with Miss Fahad's clinical assessment and plan. No new events. LSO present at bedside. Remains neurologically intact for me on exam with preserved power throughout. Plan as above. Thank you so much for allowing us to participate in the care of this patient. I certify that I spent more than half of the total time on this encounter. NOTE: This report was transcribed using voice recognition software.  Every effort was made to ensure accuracy; however, inadvertent computerized transcription errors may be present

## 2022-11-07 NOTE — PROGRESS NOTES
Hospitalist Progress Note      Synopsis:   Ms. Og Bhatia, a 45y.o. year old female  who  has a past medical history of Anemia, Diabetes mellitus ([address] Papanicolaou smear of cervix with atypical squamous cells of undetermined significance (ASC-US), and Screening for human papillomavirus (HPV). presents with dizziness after standing up and feel on her back, developed pain, CT showed Acute compression fracture of L2, L3, and L5. Neurosurgery was consulted. Additionally, but since she has complaints of dizziness since yesterday evening while she was going to the bathroom, causing her to lose consciousness. Allegedly, per her PCP she does have plaque in her carotid. CTA of head and neck was ordered and revealed no hemodynamically significant stenosis or dissection of the cervical internal carotid arteries. However, a 3 mm x 6 mm x 4 mm focus of mucus versus soft tissue nodule along the right lateral wall of the subglottic trachea was appreciated. Recommendations were a nonemergent follow-up CT with contrast versus direct visualization as outpatient. Surgery saw patient and decided that no acute intervention will be required at this time. Hospital day 2     Subjective:  Patient seen and examined at bedside, still with reports of intractable back pain, denies radiation into her legs. Stable overnight. No issues reported. Patient seen and examined  Records reviewed. Temp (24hrs), Av.1 °F (36.7 °C), Min:98 °F (36.7 °C), Max:98.4 °F (36.9 °C)    DIET: ADULT DIET; Regular  CODE: Full Code  No intake or output data in the 24 hours ending 22 1042    Review of Systems: All bolded are positive; please see HPI  General:  Fever, chills, diaphoresis, fatigue, malaise, night sweats, weight loss  Psychological:  Anxiety, disorientation, hallucinations. ENT:  Epistaxis, headaches, vertigo, visual changes.   Cardiovascular:  Chest pain, irregular heartbeats, palpitations, paroxysmal nocturnal dyspnea. Respiratory:  Shortness of breath, coughing, sputum production, hemoptysis, wheezing, orthopnea. Gastrointestinal:  Nausea, vomiting, diarrhea, heartburn, constipation, abdominal pain, hematemesis, hematochezia, melena, acholic stools  Genito-Urinary:  Dysuria, urgency, frequency, hematuria  Musculoskeletal:  Joint pain, joint stiffness, joint swelling, muscle pain  Neurology:  Headache, focal neurological deficits, weakness, numbness, paresthesia  Derm:  Rashes, ulcers, excoriations, bruising  Extremities:  Decreased ROM, peripheral edema, mottling    Objective:    BP (!) 90/50   Pulse 88   Temp 98 °F (36.7 °C) (Oral)   Resp 18   Ht 5' 11\" (1.803 m)   Wt 225 lb (102.1 kg)   SpO2 95%   BMI 31.38 kg/m²     General appearance: No apparent distress, appears stated age and cooperative. HEENT: Conjunctivae/corneas clear. Mucous membranes moist.  Neck: Supple. No JVD. Respiratory:  Clear to auscultation bilaterally. Normal respiratory effort. Cardiovascular:  RRR. S1, S2 without MRG. PV: Pulses palpable. No edema. Abdomen: Soft, non-tender, non-distended. +BS  Musculoskeletal: No obvious deformities. Skin: Normal skin color. No rashes or lesions. Good turgor. Neurologic:  Grossly non-focal. Awake, alert, following commands.    Psychiatric: Alert and oriented, thought content appropriate, normal insight and judgement    Medications:  REVIEWED DAILY    Infusion Medications    sodium chloride 100 mL/hr at 11/07/22 0011    sodium chloride      dextrose       Scheduled Medications    folic acid  1 mg Oral Daily    amitriptyline  10 mg Oral Nightly    gabapentin  100 mg Oral Nightly    lidocaine  1 patch Topical Daily    pantoprazole  40 mg Oral QAM AC    sertraline  50 mg Oral Daily    sodium chloride flush  10 mL IntraVENous 2 times per day    heparin (porcine)  5,000 Units SubCUTAneous Q8H    insulin lispro  0-4 Units SubCUTAneous TID WC    insulin lispro  0-4 Units SubCUTAneous Nightly     PRN Meds: sodium chloride, oxyCODONE, sodium chloride flush, cyclobenzaprine, sodium chloride flush, sodium chloride, ondansetron **OR** ondansetron, magnesium hydroxide, acetaminophen **OR** acetaminophen, glucose, dextrose bolus **OR** dextrose bolus, glucagon (rDNA), dextrose, HYDROmorphone    Labs:     Recent Labs     11/05/22  1057 11/06/22  0646 11/07/22  0451   WBC 11.2 10.2 9.3   HGB 10.4* 10.7* 10.1*   HCT 32.4* 34.0 32.3*    334 325       Recent Labs     11/05/22  1057 11/06/22  0646 11/07/22  0451    134 131*   K 4.2 4.1 4.5   CL 95* 96* 95*   CO2 26 25 23   BUN 14 11 13   CREATININE 0.7 0.6 0.7   CALCIUM 10.0 10.3* 9.5       Recent Labs     11/05/22  1057 11/06/22  0646   PROT 8.5* 9.0*   ALKPHOS 188* 194*   ALT 45* 46*   AST 49* 43*   BILITOT 0.3 0.3       No results for input(s): INR in the last 72 hours. No results for input(s): Merissa Marshall in the last 72 hours. Chronic labs:    Lab Results   Component Value Date    CHOL 188 11/02/2022    TRIG 128 11/02/2022    HDL 37 11/02/2022    LDLCALC 125 (H) 11/02/2022    TSH 1.600 11/02/2022    LABA1C 6.8 (H) 11/06/2022       Radiology: REVIEWED DAILY    Assessment:  Acute compression fracture of L2, L3, and L5  Fall  T2DM  Plan:    pain control  fall precautions  PT/OT  consult neurosurgery   follow labs  resume home regimen   accuchecks and ssi  CTA of head and neck was ordered and revealed no hemodynamically significant stenosis or dissection of the cervical internal carotid arteries. However, a 3 mm x 6 mm x 4 mm focus of mucus versus soft tissue nodule along the right lateral wall of the subglottic trachea was appreciated. Recommendations were a nonemergent follow-up CT with contrast versus direct visualization as outpatient.        DVT Prophylaxis [] Lovenox  [x]  Heparin [] DOAC [] PCDs [] Ambulation    GI Prophylaxis [] PPI  [] H2 Blocker   [] Carafate  [x] Diet/Tube Feeds   Level of care [x] Med/Surg  [] Intermediate  []  ICU   Diet ADULT DIET; Regular    Family contact []  N/A    [] At bedside  [] Phone call   Disposition Patient requires continued admission lumbar back fracture will need PT OT consult prior to discharge   MDM [] Low    [x] Moderate  []   High       Discharge Plan: To home possibly rehab pending clinical improvement    +++++++++++++++++++++++++++++++++++++++++++++++++  Walter Paris , [place]  +++++++++++++++++++++++++++++++++++++++++++++++++  NOTE: This report was transcribed using voice recognition software. Every effort was made to ensure accuracy; however, inadvertent computerized transcription errors may be present.

## 2022-11-07 NOTE — PROGRESS NOTES
[address]        SPPS:                                                  Patient Name: Og Bhatia    MRN: [id number]    : 1970    Room: [address] [place] [address]      Evaluating OT: Akanksha Bates, [address] OTR/L; [id number]      Referring Provider: Raleigh Kawasaki, MD    Specific Provider Orders/Date: OT Eval and Treat 22      Diagnosis: Compression fracture of L2 lumbar vertebra, open, initial encounter  Bubo     Surgery: none this admission     Pertinent Medical History:  has a past medical history of Anemia, Diabetes mellitus ([place]), Papanicolaou smear of cervix with atypical squamous cells of undetermined significance (ASC-US), and Screening for human papillomavirus (HPV).       Reason for admission: fell backwards at home d/t dizziness, LOC    Recommended Adaptive Equipment: TBD pending progression - BSC, extended tub bench, AE for LE bathing and dressing PRN     Precautions:  Fall Risk, Bed Alarm, LSO (for comfort post L2,L3,L5 compression fx), Orthostatic Hypotension, Spinal Neutrality      Assessment of current deficits:    [x] Functional mobility  [x]ADLs  [x] Strength               [x]Cognition    [x] Functional transfers   [x] IADLs         [x] Safety Awareness   [x]Endurance    [x] Fine Coordination              [x] Balance      [] Vision/perception   []Sensation     []Gross Motor Coordination  [] ROM  [] Delirium                   [] Motor Control     OT PLAN OF CARE   OT POC based on physician orders, patient diagnosis and results of clinical assessment    Frequency/Duration; 3-5 days/wk for 2 weeks PRN   Specific OT Treatment Interventions to include:   * Instruction/training on adapted ADL techniques and AE recommendations to increase functional independence within precautions       * Training on energy conservation strategies, correct breathing pattern and techniques to improve independence/tolerance for self-care routine  * Functional transfer/mobility training/DME recommendations for increased independence, safety, and fall prevention  * Patient/Family education to increase follow through with safety techniques and functional independence  * Recommendation of environmental modifications for increased safety with functional transfers/mobility and ADLs  * Therapeutic exercise to improve motor endurance, ROM, and functional strength for ADLs/functional transfers  * Therapeutic activities to facilitate/challenge dynamic balance, stand tolerance for increased safety and independence with ADLs    Home Living: Pt lives with daughter in 2 story home with 1 step and 0HR to enter. Bed and Bath located n 2nd floor with full flight of steps (7+3) and 1HR to access. 1/2 bath on 1st floor. Bathroom setup: tub/shower unit   Equipment owned: none    Prior Level of Function: IND with ADLs , IND with IADLs; ambulated with no AD prior  Driving: yes  Occupation: full time  (travels/drives to homes)    Pain Level: 10/10 lower back pain, and R side neck/arm pain  Cognition: A&O: 4/4; Follows multi step directions   Memory:  good   Sequencing:  fair   Problem solving:  fair   Judgement/safety:  fair     Functional Assessment:  AM-PAC Daily Activity Raw Score: 14/24   Initial Eval Status  Date: 11/7/22 Treatment Status  Date: STGs = LTGs  Time frame: 10-14 days   Feeding Minimal Assist   Independent    Grooming Minimal Assist   Independent    UB Dressing Minimal Assist   Independent    LB Dressing Dependent   Minimal Assist    Bathing Maximal Assist  Minimal Assist    Toileting Maximal Assist   Minimal Assist    Bed Mobility  Log Roll: Max A  Supine to sit: Max A x 2   Sit to supine: Max A x 2   Supine to sit: Mod Ind   Sit to supine: Mod Ind    Functional Transfers Sit to stand:NT   Stand to sit:NT  Stand pivot: NT  Commode: NT  Sit to stand: Mod Ind   Stand to sit: Mod Ind  Stand pivot: Mod Ind  Commode: Mod Ind    Functional Mobility NT  Limited d/t dizziness seated EOB  Mod Ind    Balance Sitting:     Static - SBA     Dynamic - SBA  Standing: NT  Sitting:  Static: ind  Dynamic: ind  Standing: mod ind   Activity Tolerance FAIR  Limited d/t dizziness and pain  GOOD   Visual/  Perceptual Glasses: yes - reading        Safety Fair  Educated on spinal neutrality/precautions  Good   during ADL completion following spinal percautions   Vitals BP sitting EOB: 73/38  BP supine: 86/58    Pt symptomatic/dizzy seated EOB required return supine for safety          Hand Dominance; Right   AROM (PROM) Strength Additional Info:  Goal:   RUE  ~30 degrees AROM limited d/t pain Limited d/t pain good  and wfl FMC/dexterity noted during ADL tasks   Improve overall RUE strength  for participation in functional tasks       LUE WFL 4/5 Good  and wfl FMC/dexterity noted during ADL tasks   Improve overall LUE strength for participation in functional tasks         [facility]   Sensation:  No c/o numbness or tingling   Tone: WFL   Edema: unremarkable    Comment: Cleared by RN to see pt. Upon arrival patient lying supine in bd and agreeable to OT session. At end of session, patient lying supine in bed with call light and phone within reach, all lines and tubes intact. Overall patient demonstrated  decreased independence and safety during completion of ADL/functional transfer/mobility tasks. Pt would benefit from continued skilled OT to increase safety and independence with completion of ADL/IADL tasks for functional independence and quality of life. Treatment: Pt required vc's for proper technique/safety with hand placement/body mechanics/posture for bed mobility/ADLs/functional tranfers/mobility/ww management. Pt required vc's for sequencing/initiation of ADLs/functional transfers. Pt able to  sit EOB ~3 mins to increase core strength/balance/activity tolerance for ease with ADLs.  Pt educated on spinal neutrality prior to OOB activity. Pt required increased time to complete ADLs/functional transfers due to management of multiple lines. Pt required skilled monitoring of SpO2 during session and education on energy conservation techniques. Pt required rest breaks during session and educated on pursed lip breathing. Pt appeared to have tolerated session well and appears motivated/cooperative/pleasant . Pt instructed on use of call light for assistance and fall prevention. Pt demo'ing fair understanding of education provided. Continue to educate. Rehab Potential: Good  for established goals     LTG: maximize independence with ADLs to return to PLOF    Patient and/or family were instructed on functional diagnosis, prognosis/goals and OT plan of care. Demonstrated FAIR understanding. [] Malnutrition indicators have been identified and nursing has been notified to ensure a dietitian consult is ordered. Eval Complexity: Low     Evaluation time includes thorough review of current medical information, gathering information on past medical & social history & PLOF, completion of standardized testing, informal observation of tasks, consultation with other medical professions/disciplines, assessment of data & development of POC/goals.      Time In: 0950  Time Out: 1045  Total Treatment Time:     Min Units   OT Eval Low 83975  x     OT Eval Medium 82928      OT Eval High 64834      OT Re-Eval [id number]       Therapeutic Ex [id number]       Therapeutic Activities 90875  15  1   ADL/Self Care 49514  25 2   Orthotic Management 57581       Manual [id number]     Neuro Re-Ed [id number]       Non-Billable Time          Evaluation Time additionally includes thorough review of current medical information, gathering information on past medical history/social history and prior level of function, interpretation of standardized testing/informal observation of tasks, assessment of data and development of plan of care and goals.           LALO Duong OTR/L; [id number]

## 2022-11-07 NOTE — HOME CARE
[facility] received referral. Will follow after discharge. Spoke with patient and verified demographics.   Alma Cormier LPN, [facility]

## 2022-11-07 NOTE — CARE COORDINATION
Patient with a past medical history of Anemia, Diabetes mellitus ([facility] [address] Papanicolaou smear of cervix with atypical squamous cells of undetermined significance (ASC-US), and Screening for human papillomavirus (HPV) is admitted with Acute compression fracture of L2, L3, and L5. Patient got dizzy after standing up and fell on her back. Per neurosurgery note today, LSO for comfort when >45 degrees. PT/OT with brace. No neurosurgical interventions, will sign off. Follow up in clinic in 4 weeks with lumbar XR. I met with patient in room to explain role, discuss therapy eval and transition of care. She lives with daughter in a two story home with one stairs to enter and no rail. Bed is on second floor and bath is on second floor with 13 steps and one rail to access. Pt ambulated without AD PTA and was fully independent. She would like home health care at discharge and does not have a preference for an agency. Referral made to [facility] at [address] [facility]. Home health care orders are in. Patient placed on daily therapy TX's to focus on stairs once her pain is better controlled since plan is home and she has 13 steps to get to bed & bath. Patient said her daughter will transport her home at time of discharge.   Mariusz Calderon RN CM  [phone]

## 2022-11-08 LAB
ANION GAP SERPL CALCULATED.3IONS-SCNC: 10 MMOL/L (ref 7–16)
BASOPHILS ABSOLUTE: 0.04 E9/L (ref 0–0.2)
BASOPHILS RELATIVE PERCENT: 0.6 % (ref 0–2)
BUN BLDV-MCNC: 11 MG/DL (ref 6–20)
CALCIUM SERPL-MCNC: 9.2 MG/DL (ref 8.6–10.2)
CHLORIDE BLD-SCNC: 99 MMOL/L (ref 98–107)
CO2: 23 MMOL/L (ref 22–29)
CREAT SERPL-MCNC: 0.6 MG/DL (ref 0.5–1)
EOSINOPHILS ABSOLUTE: 0.25 E9/L (ref 0.05–0.5)
EOSINOPHILS RELATIVE PERCENT: 3.5 % (ref 0–6)
GFR SERPL CREATININE-BSD FRML MDRD: >60 ML/MIN/1.73
GLUCOSE BLD-MCNC: 166 MG/DL (ref 74–99)
HCT VFR BLD CALC: 29.1 % (ref 34–48)
HEMOGLOBIN: 9.2 G/DL (ref 11.5–15.5)
IMMATURE GRANULOCYTES #: 0.03 E9/L
IMMATURE GRANULOCYTES %: 0.4 % (ref 0–5)
LYMPHOCYTES ABSOLUTE: 1.33 E9/L (ref 1.5–4)
LYMPHOCYTES RELATIVE PERCENT: 18.9 % (ref 20–42)
MCH RBC QN AUTO: 28.4 PG (ref 26–35)
MCHC RBC AUTO-ENTMCNC: 31.6 % (ref 32–34.5)
MCV RBC AUTO: 89.8 FL (ref 80–99.9)
METER GLUCOSE: 129 MG/DL (ref 74–99)
METER GLUCOSE: 139 MG/DL (ref 74–99)
METER GLUCOSE: 220 MG/DL (ref 74–99)
METER GLUCOSE: 220 MG/DL (ref 74–99)
METER GLUCOSE: 457 MG/DL (ref 74–99)
MONOCYTES ABSOLUTE: 0.58 E9/L (ref 0.1–0.95)
MONOCYTES RELATIVE PERCENT: 8.2 % (ref 2–12)
NEUTROPHILS ABSOLUTE: 4.82 E9/L (ref 1.8–7.3)
NEUTROPHILS RELATIVE PERCENT: 68.4 % (ref 43–80)
PDW BLD-RTO: 15 FL (ref 11.5–15)
PLATELET # BLD: 251 E9/L (ref 130–450)
PMV BLD AUTO: 8.8 FL (ref 7–12)
POTASSIUM REFLEX MAGNESIUM: 3.9 MMOL/L (ref 3.5–5)
RBC # BLD: 3.24 E12/L (ref 3.5–5.5)
SARS-COV-2, NAAT: NOT DETECTED
SODIUM BLD-SCNC: 132 MMOL/L (ref 132–146)
WBC # BLD: 7.1 E9/L (ref 4.5–11.5)

## 2022-11-08 PROCEDURE — 6370000000 HC RX 637 (ALT 250 FOR IP): Performed by: INTERNAL MEDICINE

## 2022-11-08 PROCEDURE — 6370000000 HC RX 637 (ALT 250 FOR IP)

## 2022-11-08 PROCEDURE — 6360000002 HC RX W HCPCS: Performed by: INTERNAL MEDICINE

## 2022-11-08 PROCEDURE — 6370000000 HC RX 637 (ALT 250 FOR IP): Performed by: FAMILY MEDICINE

## 2022-11-08 PROCEDURE — 85025 COMPLETE CBC W/AUTO DIFF WBC: CPT

## 2022-11-08 PROCEDURE — 80048 BASIC METABOLIC PNL TOTAL CA: CPT

## 2022-11-08 PROCEDURE — 1200000000 HC SEMI PRIVATE

## 2022-11-08 PROCEDURE — 2580000003 HC RX 258: Performed by: FAMILY MEDICINE

## 2022-11-08 PROCEDURE — 36415 COLL VENOUS BLD VENIPUNCTURE: CPT

## 2022-11-08 PROCEDURE — 87635 SARS-COV-2 COVID-19 AMP PRB: CPT

## 2022-11-08 PROCEDURE — 2580000003 HC RX 258: Performed by: INTERNAL MEDICINE

## 2022-11-08 PROCEDURE — 6360000002 HC RX W HCPCS: Performed by: FAMILY MEDICINE

## 2022-11-08 PROCEDURE — 82962 GLUCOSE BLOOD TEST: CPT

## 2022-11-08 RX ORDER — 0.9 % SODIUM CHLORIDE 0.9 %
1000 INTRAVENOUS SOLUTION INTRAVENOUS ONCE
Status: COMPLETED | OUTPATIENT
Start: 2022-11-08 | End: 2022-11-08

## 2022-11-08 RX ORDER — MECLIZINE HCL 12.5 MG/1
25 TABLET ORAL EVERY 8 HOURS SCHEDULED
Status: DISCONTINUED | OUTPATIENT
Start: 2022-11-08 | End: 2022-11-23 | Stop reason: HOSPADM

## 2022-11-08 RX ADMIN — OXYCODONE 5 MG: 5 TABLET ORAL at 17:48

## 2022-11-08 RX ADMIN — GABAPENTIN 100 MG: 100 CAPSULE ORAL at 21:55

## 2022-11-08 RX ADMIN — MECLIZINE 25 MG: 12.5 TABLET ORAL at 21:55

## 2022-11-08 RX ADMIN — INSULIN LISPRO 1 UNITS: 100 INJECTION, SOLUTION INTRAVENOUS; SUBCUTANEOUS at 17:24

## 2022-11-08 RX ADMIN — MECLIZINE 25 MG: 12.5 TABLET ORAL at 17:23

## 2022-11-08 RX ADMIN — MIDODRINE HYDROCHLORIDE 5 MG: 5 TABLET ORAL at 12:00

## 2022-11-08 RX ADMIN — PANTOPRAZOLE SODIUM 40 MG: 40 TABLET, DELAYED RELEASE ORAL at 06:22

## 2022-11-08 RX ADMIN — SODIUM CHLORIDE, PRESERVATIVE FREE 10 ML: 5 INJECTION INTRAVENOUS at 20:18

## 2022-11-08 RX ADMIN — HYDROMORPHONE HYDROCHLORIDE 1 MG: 1 INJECTION, SOLUTION INTRAMUSCULAR; INTRAVENOUS; SUBCUTANEOUS at 14:46

## 2022-11-08 RX ADMIN — OXYCODONE 5 MG: 5 TABLET ORAL at 01:43

## 2022-11-08 RX ADMIN — HYDROMORPHONE HYDROCHLORIDE 1 MG: 1 INJECTION, SOLUTION INTRAMUSCULAR; INTRAVENOUS; SUBCUTANEOUS at 20:17

## 2022-11-08 RX ADMIN — HYDROMORPHONE HYDROCHLORIDE 1 MG: 1 INJECTION, SOLUTION INTRAMUSCULAR; INTRAVENOUS; SUBCUTANEOUS at 08:51

## 2022-11-08 RX ADMIN — SERTRALINE HYDROCHLORIDE 50 MG: 50 TABLET ORAL at 07:43

## 2022-11-08 RX ADMIN — AMITRIPTYLINE HYDROCHLORIDE 10 MG: 10 TABLET, FILM COATED ORAL at 20:18

## 2022-11-08 RX ADMIN — HEPARIN SODIUM 5000 UNITS: 10000 INJECTION INTRAVENOUS; SUBCUTANEOUS at 11:27

## 2022-11-08 RX ADMIN — MIDODRINE HYDROCHLORIDE 5 MG: 5 TABLET ORAL at 17:23

## 2022-11-08 RX ADMIN — FOLIC ACID 1 MG: 1 TABLET ORAL at 07:43

## 2022-11-08 RX ADMIN — HYDROMORPHONE HYDROCHLORIDE 1 MG: 1 INJECTION, SOLUTION INTRAMUSCULAR; INTRAVENOUS; SUBCUTANEOUS at 03:47

## 2022-11-08 RX ADMIN — MIDODRINE HYDROCHLORIDE 5 MG: 5 TABLET ORAL at 07:43

## 2022-11-08 RX ADMIN — INSULIN LISPRO 1 UNITS: 100 INJECTION, SOLUTION INTRAVENOUS; SUBCUTANEOUS at 11:28

## 2022-11-08 RX ADMIN — OXYCODONE 5 MG: 5 TABLET ORAL at 12:48

## 2022-11-08 RX ADMIN — OXYCODONE 5 MG: 5 TABLET ORAL at 21:55

## 2022-11-08 RX ADMIN — OXYCODONE 5 MG: 5 TABLET ORAL at 06:22

## 2022-11-08 RX ADMIN — HEPARIN SODIUM 5000 UNITS: 10000 INJECTION INTRAVENOUS; SUBCUTANEOUS at 20:19

## 2022-11-08 RX ADMIN — CYCLOBENZAPRINE 10 MG: 10 TABLET, FILM COATED ORAL at 12:48

## 2022-11-08 RX ADMIN — SODIUM CHLORIDE, PRESERVATIVE FREE 10 ML: 5 INJECTION INTRAVENOUS at 07:43

## 2022-11-08 RX ADMIN — SODIUM CHLORIDE 1000 ML: 9 INJECTION, SOLUTION INTRAVENOUS at 02:46

## 2022-11-08 ASSESSMENT — PAIN DESCRIPTION - ORIENTATION
ORIENTATION: LOWER
ORIENTATION: RIGHT

## 2022-11-08 ASSESSMENT — PAIN DESCRIPTION - LOCATION
LOCATION: BACK

## 2022-11-08 ASSESSMENT — PAIN DESCRIPTION - DESCRIPTORS
DESCRIPTORS: THROBBING
DESCRIPTORS: ACHING;THROBBING
DESCRIPTORS: THROBBING
DESCRIPTORS: THROBBING
DESCRIPTORS: SHARP;PRESSURE;THROBBING
DESCRIPTORS: ACHING

## 2022-11-08 ASSESSMENT — PAIN SCALES - GENERAL
PAINLEVEL_OUTOF10: 10
PAINLEVEL_OUTOF10: 10
PAINLEVEL_OUTOF10: 7
PAINLEVEL_OUTOF10: 10

## 2022-11-08 NOTE — CARE COORDINATION
Plan is home with daughter and [facility] when medically ready. Home health care orders are in. Patient placed on daily therapy TX's to focus on stairs once her pain is better controlled since plan is home and she has 13 steps to get to bed & bath. Patient said her daughter will transport her home at time of discharge.    Jessica Le RN CM  [phone]

## 2022-11-08 NOTE — PROGRESS NOTES
Hospitalist Progress Note      Synopsis:   Ms. Mercedes Jin, a 45y.o. year old female  who  has a past medical history of Anemia, Diabetes mellitus ([address] Papanicolaou smear of cervix with atypical squamous cells of undetermined significance (ASC-US), and Screening for human papillomavirus (HPV). presents with dizziness after standing up and feel on her back, developed pain, CT showed Acute compression fracture of L2, L3, and L5. Neurosurgery was consulted. Additionally, but since she has complaints of dizziness since yesterday evening while she was going to the bathroom, causing her to lose consciousness. Allegedly, per her PCP she does have plaque in her carotid. CTA of head and neck was ordered and revealed no hemodynamically significant stenosis or dissection of the cervical internal carotid arteries. However, a 3 mm x 6 mm x 4 mm focus of mucus versus soft tissue nodule along the right lateral wall of the subglottic trachea was appreciated. Recommendations were a nonemergent follow-up CT with contrast versus direct visualization as outpatient. Surgery saw patient and decided that no acute intervention will be required at this time. Hospital day 3     Subjective:  Patient seen and examined at bedside, reports resolution of nausea and improvement in pain but complains she still feels dizzy with ambulation. Temp (24hrs), Av.7 °F (36.5 °C), Min:97.5 °F (36.4 °C), Max:97.8 °F (36.6 °C)    DIET: ADULT DIET; Regular  CODE: Full Code  No intake or output data in the 24 hours ending 22 0602    Review of Systems: All bolded are positive; please see HPI  General:  Fever, chills, diaphoresis, fatigue, malaise, night sweats, weight loss  Psychological:  Anxiety, disorientation, hallucinations. ENT:  Epistaxis, headaches, vertigo, visual changes. Cardiovascular:  Chest pain, irregular heartbeats, palpitations, paroxysmal nocturnal dyspnea.   Respiratory:  Shortness of breath, coughing, sputum production, hemoptysis, wheezing, orthopnea. Gastrointestinal:  Nausea, vomiting, diarrhea, heartburn, constipation, abdominal pain, hematemesis, hematochezia, melena, acholic stools  Genito-Urinary:  Dysuria, urgency, frequency, hematuria  Musculoskeletal:  Joint pain, joint stiffness, joint swelling, muscle pain  Neurology:  Headache, focal neurological deficits, weakness, numbness, paresthesia  Derm:  Rashes, ulcers, excoriations, bruising  Extremities:  Decreased ROM, peripheral edema, mottling    Objective:    BP (!) 81/55 Comment: nurse notifed  Pulse 81   Temp 97.5 °F (36.4 °C) (Oral)   Resp 18   Ht 5' 11\" (1.803 m)   Wt 232 lb 9.6 oz (105.5 kg)   SpO2 95%   BMI 32.44 kg/m²     General appearance: No apparent distress, appears stated age and cooperative. HEENT: Conjunctivae/corneas clear. Mucous membranes moist.  Neck: Supple. No JVD. Respiratory:  Clear to auscultation bilaterally. Normal respiratory effort. Cardiovascular:  RRR. S1, S2 without MRG. PV: Pulses palpable. No edema. Abdomen: Soft, non-tender, non-distended. +BS  Musculoskeletal: No obvious deformities. Skin: Normal skin color. No rashes or lesions. Good turgor. Neurologic:  Grossly non-focal. Awake, alert, following commands.    Psychiatric: Alert and oriented, thought content appropriate, normal insight and judgement    Medications:  REVIEWED DAILY    Infusion Medications    sodium chloride 100 mL/hr at 11/07/22 1219    sodium chloride      dextrose       Scheduled Medications    meclizine  25 mg Oral 3 times per day    midodrine  5 mg Oral TID WC    folic acid  1 mg Oral Daily    amitriptyline  10 mg Oral Nightly    gabapentin  100 mg Oral Nightly    lidocaine  1 patch Topical Daily    pantoprazole  40 mg Oral QAM AC    sertraline  50 mg Oral Daily    sodium chloride flush  10 mL IntraVENous 2 times per day    heparin (porcine)  5,000 Units SubCUTAneous Q8H    insulin lispro  0-4 Units SubCUTAneous TID WC    insulin lispro  0-4 Units SubCUTAneous Nightly     PRN Meds: HYDROmorphone, oxyCODONE, sodium chloride flush, cyclobenzaprine, sodium chloride flush, sodium chloride, ondansetron **OR** ondansetron, magnesium hydroxide, acetaminophen **OR** acetaminophen, glucose, dextrose bolus **OR** dextrose bolus, glucagon (rDNA), dextrose    Labs:     Recent Labs     11/06/22  0646 11/07/22 0451 11/08/22  0520   WBC 10.2 9.3 7.1   HGB 10.7* 10.1* 9.2*   HCT 34.0 32.3* 29.1*    325 251         Recent Labs     11/06/22  0646 11/07/22  0451 11/08/22  0520    131* 132   K 4.1 4.5 3.9   CL 96* 95* 99   CO2 25 23 23   BUN 11 13 11   CREATININE 0.6 0.7 0.6   CALCIUM 10.3* 9.5 9.2         Recent Labs     11/06/22  0646   PROT 9.0*   ALKPHOS 194*   ALT 46*   AST 43*   BILITOT 0.3         No results for input(s): INR in the last 72 hours. No results for input(s): [employer] in the last 72 hours. Chronic labs:    Lab Results   Component Value Date    CHOL 188 11/02/2022    TRIG 128 11/02/2022    HDL 37 11/02/2022    LDLCALC 125 (H) 11/02/2022    TSH 1.600 11/02/2022    LABA1C 6.8 (H) 11/06/2022       Radiology: REVIEWED DAILY    Assessment:  Acute compression fracture of L2, L3, and L5  Fall  T2DM  Dizziness, suspect BPPV  Plan:    Add Meclizine  Continue pain control  fall precautions  PT/OT  resume home regimen   accuchecks and ssi  CTA of head and neck was ordered and revealed no hemodynamically significant stenosis or dissection of the cervical internal carotid arteries. However, a 3 mm x 6 mm x 4 mm focus of mucus versus soft tissue nodule along the right lateral wall of the subglottic trachea was appreciated. Recommendations were a nonemergent follow-up CT with contrast versus direct visualization as outpatient.        DVT Prophylaxis [] Lovenox  [x]  Heparin [] DOAC [] PCDs [] Ambulation    GI Prophylaxis [] PPI  [] H2 Blocker   [] Carafate  [x] Diet/Tube Feeds   Level of care [x] Med/Surg  [] Intermediate  [] ICU   Diet ADULT DIET; Regular    Family contact []  N/A    [] At bedside  [] Phone call   Disposition Patient requires continued admission lumbar back fracture will need PT OT consult prior to discharge   MDM [] Low    [x] Moderate  []   High       Discharge Plan: To home possibly rehab pending clinical improvement    +++++++++++++++++++++++++++++++++++++++++++++++++  EMELY Doshi/ Jeffery Bishop[address]  +++++++++++++++++++++++++++++++++++++++++++++++++  NOTE: This report was transcribed using voice recognition software. Every effort was made to ensure accuracy; however, inadvertent computerized transcription errors may be present.

## 2022-11-08 NOTE — PROGRESS NOTES
Occupational Therapy  OT BEDSIDE TREATMENT NOTE   [address] [facility] [address] [address]        [id number]  Patient Name: Mandy Conklin  MRN: [id number]  : 1970  Room: [address] [place] [address]A     Attempted to see pt this AM, with pt denying of therapy at this time due to pain. Will attempt at a later date/time.         [facility] [address]/L [id number]

## 2022-11-09 LAB
ANION GAP SERPL CALCULATED.3IONS-SCNC: 9 MMOL/L (ref 7–16)
BASOPHILS ABSOLUTE: 0.04 E9/L (ref 0–0.2)
BASOPHILS RELATIVE PERCENT: 0.5 % (ref 0–2)
BUN BLDV-MCNC: 10 MG/DL (ref 6–20)
CALCIUM SERPL-MCNC: 9.4 MG/DL (ref 8.6–10.2)
CHLORIDE BLD-SCNC: 98 MMOL/L (ref 98–107)
CO2: 26 MMOL/L (ref 22–29)
CREAT SERPL-MCNC: 0.6 MG/DL (ref 0.5–1)
EOSINOPHILS ABSOLUTE: 0.24 E9/L (ref 0.05–0.5)
EOSINOPHILS RELATIVE PERCENT: 2.8 % (ref 0–6)
GFR SERPL CREATININE-BSD FRML MDRD: >60 ML/MIN/1.73
GLUCOSE BLD-MCNC: 137 MG/DL (ref 74–99)
HCT VFR BLD CALC: 29 % (ref 34–48)
HEMOGLOBIN: 9.3 G/DL (ref 11.5–15.5)
IMMATURE GRANULOCYTES #: 0.05 E9/L
IMMATURE GRANULOCYTES %: 0.6 % (ref 0–5)
LYMPHOCYTES ABSOLUTE: 1.29 E9/L (ref 1.5–4)
LYMPHOCYTES RELATIVE PERCENT: 15 % (ref 20–42)
MCH RBC QN AUTO: 28.9 PG (ref 26–35)
MCHC RBC AUTO-ENTMCNC: 32.1 % (ref 32–34.5)
MCV RBC AUTO: 90.1 FL (ref 80–99.9)
METER GLUCOSE: 123 MG/DL (ref 74–99)
METER GLUCOSE: 170 MG/DL (ref 74–99)
METER GLUCOSE: 187 MG/DL (ref 74–99)
MONOCYTES ABSOLUTE: 0.78 E9/L (ref 0.1–0.95)
MONOCYTES RELATIVE PERCENT: 9.1 % (ref 2–12)
NEUTROPHILS ABSOLUTE: 6.18 E9/L (ref 1.8–7.3)
NEUTROPHILS RELATIVE PERCENT: 72 % (ref 43–80)
PDW BLD-RTO: 15 FL (ref 11.5–15)
PLATELET # BLD: 287 E9/L (ref 130–450)
PMV BLD AUTO: 9.1 FL (ref 7–12)
POTASSIUM SERPL-SCNC: 4.2 MMOL/L (ref 3.5–5)
RBC # BLD: 3.22 E12/L (ref 3.5–5.5)
SODIUM BLD-SCNC: 133 MMOL/L (ref 132–146)
WBC # BLD: 8.6 E9/L (ref 4.5–11.5)

## 2022-11-09 PROCEDURE — 85025 COMPLETE CBC W/AUTO DIFF WBC: CPT

## 2022-11-09 PROCEDURE — 2580000003 HC RX 258: Performed by: INTERNAL MEDICINE

## 2022-11-09 PROCEDURE — 6360000002 HC RX W HCPCS: Performed by: FAMILY MEDICINE

## 2022-11-09 PROCEDURE — 6360000002 HC RX W HCPCS: Performed by: INTERNAL MEDICINE

## 2022-11-09 PROCEDURE — 80048 BASIC METABOLIC PNL TOTAL CA: CPT

## 2022-11-09 PROCEDURE — 1200000000 HC SEMI PRIVATE

## 2022-11-09 PROCEDURE — 6370000000 HC RX 637 (ALT 250 FOR IP): Performed by: FAMILY MEDICINE

## 2022-11-09 PROCEDURE — 6370000000 HC RX 637 (ALT 250 FOR IP): Performed by: INTERNAL MEDICINE

## 2022-11-09 PROCEDURE — 6370000000 HC RX 637 (ALT 250 FOR IP)

## 2022-11-09 PROCEDURE — 82962 GLUCOSE BLOOD TEST: CPT

## 2022-11-09 PROCEDURE — 36415 COLL VENOUS BLD VENIPUNCTURE: CPT

## 2022-11-09 RX ADMIN — PANTOPRAZOLE SODIUM 40 MG: 40 TABLET, DELAYED RELEASE ORAL at 05:15

## 2022-11-09 RX ADMIN — HYDROMORPHONE HYDROCHLORIDE 1 MG: 1 INJECTION, SOLUTION INTRAMUSCULAR; INTRAVENOUS; SUBCUTANEOUS at 17:29

## 2022-11-09 RX ADMIN — OXYCODONE 5 MG: 5 TABLET ORAL at 08:27

## 2022-11-09 RX ADMIN — SODIUM CHLORIDE, PRESERVATIVE FREE 10 ML: 5 INJECTION INTRAVENOUS at 21:53

## 2022-11-09 RX ADMIN — HEPARIN SODIUM 5000 UNITS: 10000 INJECTION INTRAVENOUS; SUBCUTANEOUS at 13:27

## 2022-11-09 RX ADMIN — HYDROMORPHONE HYDROCHLORIDE 1 MG: 1 INJECTION, SOLUTION INTRAMUSCULAR; INTRAVENOUS; SUBCUTANEOUS at 22:29

## 2022-11-09 RX ADMIN — MECLIZINE 25 MG: 12.5 TABLET ORAL at 13:27

## 2022-11-09 RX ADMIN — HEPARIN SODIUM 5000 UNITS: 10000 INJECTION INTRAVENOUS; SUBCUTANEOUS at 05:15

## 2022-11-09 RX ADMIN — OXYCODONE 5 MG: 5 TABLET ORAL at 03:18

## 2022-11-09 RX ADMIN — FOLIC ACID 1 MG: 1 TABLET ORAL at 08:27

## 2022-11-09 RX ADMIN — MECLIZINE 25 MG: 12.5 TABLET ORAL at 05:14

## 2022-11-09 RX ADMIN — OXYCODONE 5 MG: 5 TABLET ORAL at 16:15

## 2022-11-09 RX ADMIN — HYDROMORPHONE HYDROCHLORIDE 1 MG: 1 INJECTION, SOLUTION INTRAMUSCULAR; INTRAVENOUS; SUBCUTANEOUS at 10:22

## 2022-11-09 RX ADMIN — SERTRALINE HYDROCHLORIDE 50 MG: 50 TABLET ORAL at 08:27

## 2022-11-09 RX ADMIN — SODIUM CHLORIDE, PRESERVATIVE FREE 10 ML: 5 INJECTION INTRAVENOUS at 08:29

## 2022-11-09 RX ADMIN — MIDODRINE HYDROCHLORIDE 5 MG: 5 TABLET ORAL at 16:15

## 2022-11-09 RX ADMIN — GABAPENTIN 100 MG: 100 CAPSULE ORAL at 21:08

## 2022-11-09 RX ADMIN — HYDROMORPHONE HYDROCHLORIDE 1 MG: 1 INJECTION, SOLUTION INTRAMUSCULAR; INTRAVENOUS; SUBCUTANEOUS at 00:20

## 2022-11-09 RX ADMIN — CYCLOBENZAPRINE 10 MG: 10 TABLET, FILM COATED ORAL at 21:08

## 2022-11-09 RX ADMIN — HEPARIN SODIUM 5000 UNITS: 10000 INJECTION INTRAVENOUS; SUBCUTANEOUS at 21:09

## 2022-11-09 RX ADMIN — MIDODRINE HYDROCHLORIDE 5 MG: 5 TABLET ORAL at 08:27

## 2022-11-09 RX ADMIN — AMITRIPTYLINE HYDROCHLORIDE 10 MG: 10 TABLET, FILM COATED ORAL at 21:07

## 2022-11-09 RX ADMIN — OXYCODONE 5 MG: 5 TABLET ORAL at 21:07

## 2022-11-09 RX ADMIN — MIDODRINE HYDROCHLORIDE 5 MG: 5 TABLET ORAL at 12:04

## 2022-11-09 RX ADMIN — MECLIZINE 25 MG: 12.5 TABLET ORAL at 21:08

## 2022-11-09 RX ADMIN — HYDROMORPHONE HYDROCHLORIDE 1 MG: 1 INJECTION, SOLUTION INTRAMUSCULAR; INTRAVENOUS; SUBCUTANEOUS at 05:14

## 2022-11-09 ASSESSMENT — PAIN SCALES - GENERAL
PAINLEVEL_OUTOF10: 10
PAINLEVEL_OUTOF10: 10
PAINLEVEL_OUTOF10: 8
PAINLEVEL_OUTOF10: 10
PAINLEVEL_OUTOF10: 9
PAINLEVEL_OUTOF10: 10
PAINLEVEL_OUTOF10: 10
PAINLEVEL_OUTOF10: 9
PAINLEVEL_OUTOF10: 10
PAINLEVEL_OUTOF10: 9
PAINLEVEL_OUTOF10: 10

## 2022-11-09 ASSESSMENT — PAIN DESCRIPTION - DESCRIPTORS
DESCRIPTORS: ACHING
DESCRIPTORS: ACHING;THROBBING
DESCRIPTORS: ACHING;CRAMPING;DISCOMFORT
DESCRIPTORS: ACHING;THROBBING
DESCRIPTORS: ACHING;DISCOMFORT;CRAMPING
DESCRIPTORS: ACHING;CRAMPING;DISCOMFORT
DESCRIPTORS: ACHING
DESCRIPTORS: ACHING;CRAMPING;DISCOMFORT
DESCRIPTORS: ACHING;DISCOMFORT;CRAMPING
DESCRIPTORS: THROBBING

## 2022-11-09 ASSESSMENT — PAIN DESCRIPTION - LOCATION
LOCATION: BACK

## 2022-11-09 ASSESSMENT — PAIN DESCRIPTION - ORIENTATION
ORIENTATION: LOWER
ORIENTATION: MID
ORIENTATION: LOWER

## 2022-11-09 ASSESSMENT — PAIN DESCRIPTION - FREQUENCY: FREQUENCY: INTERMITTENT

## 2022-11-09 NOTE — PLAN OF CARE
Problem: Pain  Goal: Verbalizes/displays adequate comfort level or baseline comfort level  Outcome: Progressing  Flowsheets (Taken 11/9/2022 1045)  Verbalizes/displays adequate comfort level or baseline comfort level: Encourage patient to monitor pain and request assistance     Problem: Safety - Adult  Goal: Free from fall injury  Outcome: Progressing

## 2022-11-09 NOTE — PROGRESS NOTES
Hospitalist Progress Note      Synopsis:   Ms. Morenita Church, a 45y.o. year old female  who  has a past medical history of Anemia, Diabetes mellitus ([address] Papanicolaou smear of cervix with atypical squamous cells of undetermined significance (ASC-US), and Screening for human papillomavirus (HPV). presents with dizziness after standing up and feel on her back, developed pain, CT showed Acute compression fracture of L2, L3, and L5. Neurosurgery was consulted. Additionally, but since she has complaints of dizziness since yesterday evening while she was going to the bathroom, causing her to lose consciousness. Allegedly, per her PCP she does have plaque in her carotid. CTA of head and neck was ordered and revealed no hemodynamically significant stenosis or dissection of the cervical internal carotid arteries. However, a 3 mm x 6 mm x 4 mm focus of mucus versus soft tissue nodule along the right lateral wall of the subglottic trachea was appreciated. Recommendations were a nonemergent follow-up CT with contrast versus direct visualization as outpatient. Surgery saw patient and decided that no acute intervention will be required at this time. Hospital day 4     Subjective:  Patient seen and examined at bedside, reports resolution of nausea and improvement in pain but complains she still feels dizzy with ambulation. Temp (24hrs), Av °F (36.7 °C), Min:97.6 °F (36.4 °C), Max:98.2 °F (36.8 °C)    DIET: ADULT DIET; Regular  CODE: Full Code  No intake or output data in the 24 hours ending 22 1301    Review of Systems: All bolded are positive; please see HPI  General:  Fever, chills, diaphoresis, fatigue, malaise, night sweats, weight loss  Psychological:  Anxiety, disorientation, hallucinations. ENT:  Epistaxis, headaches, vertigo, visual changes. Cardiovascular:  Chest pain, irregular heartbeats, palpitations, paroxysmal nocturnal dyspnea.   Respiratory:  Shortness of breath, coughing, sputum production, hemoptysis, wheezing, orthopnea. Gastrointestinal:  Nausea, vomiting, diarrhea, heartburn, constipation, abdominal pain, hematemesis, hematochezia, melena, acholic stools  Genito-Urinary:  Dysuria, urgency, frequency, hematuria  Musculoskeletal:  Joint pain, joint stiffness, joint swelling, muscle pain  Neurology:  Headache, focal neurological deficits, weakness, numbness, paresthesia  Derm:  Rashes, ulcers, excoriations, bruising  Extremities:  Decreased ROM, peripheral edema, mottling    Objective:    BP 95/60   Pulse 88   Temp 98.2 °F (36.8 °C) (Oral)   Resp 16   Ht 5' 11\" (1.803 m)   Wt 229 lb 8 oz (104.1 kg)   SpO2 94%   BMI 32.01 kg/m²     General appearance: No apparent distress, appears stated age and cooperative. HEENT: Conjunctivae/corneas clear. Mucous membranes moist.  Neck: Supple. No JVD. Respiratory:  Clear to auscultation bilaterally. Normal respiratory effort. Cardiovascular:  RRR. S1, S2 without MRG. PV: Pulses palpable. No edema. Abdomen: Soft, non-tender, non-distended. +BS  Musculoskeletal: No obvious deformities. Skin: Normal skin color. No rashes or lesions. Good turgor. Neurologic:  Grossly non-focal. Awake, alert, following commands.    Psychiatric: Alert and oriented, thought content appropriate, normal insight and judgement    Medications:  REVIEWED DAILY    Infusion Medications    sodium chloride      dextrose       Scheduled Medications    meclizine  25 mg Oral 3 times per day    midodrine  5 mg Oral TID WC    folic acid  1 mg Oral Daily    amitriptyline  10 mg Oral Nightly    gabapentin  100 mg Oral Nightly    lidocaine  1 patch Topical Daily    pantoprazole  40 mg Oral QAM AC    sertraline  50 mg Oral Daily    sodium chloride flush  10 mL IntraVENous 2 times per day    heparin (porcine)  5,000 Units SubCUTAneous Q8H    insulin lispro  0-4 Units SubCUTAneous TID WC    insulin lispro  0-4 Units SubCUTAneous Nightly     PRN Meds: HYDROmorphone, oxyCODONE, sodium chloride flush, cyclobenzaprine, sodium chloride flush, sodium chloride, ondansetron **OR** ondansetron, magnesium hydroxide, acetaminophen **OR** acetaminophen, glucose, dextrose bolus **OR** dextrose bolus, glucagon (rDNA), dextrose    Labs:     Recent Labs     11/07/22  0451 11/08/22  0520 11/09/22 0442   WBC 9.3 7.1 8.6   HGB 10.1* 9.2* 9.3*   HCT 32.3* 29.1* 29.0*    251 287         Recent Labs     11/07/22  0451 11/08/22  0520 11/09/22 0442   * 132 133   K 4.5 3.9 4.2   CL 95* 99 98   CO2 23 23 26   BUN 13 11 10   CREATININE 0.7 0.6 0.6   CALCIUM 9.5 9.2 9.4         No results for input(s): PROT, ALB, ALKPHOS, ALT, AST, BILITOT, AMYLASE, LIPASE in the last 72 hours. No results for input(s): INR in the last 72 hours. No results for input(s): Jennie Beto in the last 72 hours. Chronic labs:    Lab Results   Component Value Date    CHOL 188 11/02/2022    TRIG 128 11/02/2022    HDL 37 11/02/2022    LDLCALC 125 (H) 11/02/2022    TSH 1.600 11/02/2022    LABA1C 6.8 (H) 11/06/2022       Radiology: REVIEWED DAILY    Assessment:  Dizziness, suspect BPPV  Acute compression fracture of L2, L3, and L5  Accidental Fall  T2DM    Plan:    Continue Meclizine, still reports feeling dizzy, monitor for improvement. Continue pain control  Fall precautions  PT/OT  Accuchecks and ssi  CTA of head and neck was ordered and revealed no hemodynamically significant stenosis or dissection of the cervical internal carotid arteries. However, a 3 mm x 6 mm x 4 mm focus of mucus versus soft tissue nodule along the right lateral wall of the subglottic trachea was appreciated. Recommendations were a nonemergent follow-up CT with contrast versus direct visualization as outpatient.        DVT Prophylaxis [] Lovenox  [x]  Heparin [] DOAC [] PCDs [] Ambulation    GI Prophylaxis [] PPI  [] H2 Blocker   [] Carafate  [x] Diet/Tube Feeds   Level of care [x] Med/Surg  [] Intermediate  []  ICU   Diet ADULT DIET; Regular    Family contact []  N/A    [] At bedside  [] Phone call   Disposition Patient requires continued admission lumbar back fracture will need PT OT consult prior to discharge   MDM [] Low    [x] Moderate  []   High       Discharge Plan: To home when dizziness is improved.    +++++++++++++++++++++++++++++++++++++++++++++++++  VALERIE Soria  [facility] Physician - [address] [place] [address]  +++++++++++++++++++++++++++++++++++++++++++++++++  NOTE: This report was transcribed using voice recognition software. Every effort was made to ensure accuracy; however, inadvertent computerized transcription errors may be present.

## 2022-11-09 NOTE — CARE COORDINATION
Plan is home with daughter and [facility] when medically ready. Home health care orders are in. Per internal med, patient reports resolution of nausea and improvement in pain but complains she still feels dizzy with ambulation. Suspect BPPV. Plan: Add Meclizine. Patient placed on daily therapy TX's to focus on stairs once her pain is better controlled since plan is home and she has 13 steps to get to bed & bath. Patient said her daughter will transport her home at time of discharge.   Shasha Parker RN CM  [phone]

## 2022-11-09 NOTE — PROGRESS NOTES
Physician Progress Note      PATIENT:               Brent Salgado  CSN #:                  [id number]  :                       1970  ADMIT DATE:       2022 9:49 AM  DISCH DATE:  RESPONDING  PROVIDER #:        Eduardo Conte MD          QUERY TEXT:    Pt noted to have \"Acute compression fracture of L2, L3, and L5\" per H&P . If possible, please document in progress notes and discharge summary if you   are evaluating and/or treating any of the following: The medical record reflects the following:  Risk Factors: 43 y/o, chronic depressed fracture L4 on CT.  ED \"syncopal   event several months ago similar when she got up from a lying position\". Clinical Indicators:  ED \"Patient suffered a ground-level fall on her   back\". H&P \"presents with dizzy after standing up and feel on her back,   developed pain\".  H&P and IM PN  \"Acute compression fracture of L2, L3,   and L5\". - Neurosurgery consult \" CT of her lumbar spine which shows   compression deformity at L2, L 3, L 5\". CT abdomen impression noted as \"New   depressed fractures involving superior endplates of L2, L3, and L5. Chronic   depressed fracture involving superior endplate of B3\". Treatment: Pain control, LSO, neurosurgery (recommends no acute intervention),   CT lumbar., OT/PT,  Options provided:  -- Nontraumatic acute compression fracture of L2, L3, and L5  -- Traumatic acute compression fracture of L2, L3, and L5  -- Other - I will add my own diagnosis  -- Disagree - Not applicable / Not valid  -- Disagree - Clinically unable to determine / Unknown  -- Refer to Clinical Documentation Reviewer    PROVIDER RESPONSE TEXT:    This patient has a traumatic acute compression fracture of L2, L3, and L5   fracture.     Query created by: Julius Walsh on 2022 1:29 AM      Electronically signed by:  Eduardo Conte MD 2022 12:31 PM

## 2022-11-10 LAB
ANION GAP SERPL CALCULATED.3IONS-SCNC: 12 MMOL/L (ref 7–16)
BASOPHILS ABSOLUTE: 0.03 E9/L (ref 0–0.2)
BASOPHILS RELATIVE PERCENT: 0.4 % (ref 0–2)
BUN BLDV-MCNC: 10 MG/DL (ref 6–20)
CALCIUM SERPL-MCNC: 9.4 MG/DL (ref 8.6–10.2)
CHLORIDE BLD-SCNC: 94 MMOL/L (ref 98–107)
CO2: 27 MMOL/L (ref 22–29)
CREAT SERPL-MCNC: 0.6 MG/DL (ref 0.5–1)
EOSINOPHILS ABSOLUTE: 0.21 E9/L (ref 0.05–0.5)
EOSINOPHILS RELATIVE PERCENT: 2.6 % (ref 0–6)
GFR SERPL CREATININE-BSD FRML MDRD: >60 ML/MIN/1.73
GLUCOSE BLD-MCNC: 130 MG/DL (ref 74–99)
HCT VFR BLD CALC: 31.5 % (ref 34–48)
HEMOGLOBIN: 10 G/DL (ref 11.5–15.5)
IMMATURE GRANULOCYTES #: 0.03 E9/L
IMMATURE GRANULOCYTES %: 0.4 % (ref 0–5)
LYMPHOCYTES ABSOLUTE: 0.93 E9/L (ref 1.5–4)
LYMPHOCYTES RELATIVE PERCENT: 11.5 % (ref 20–42)
MCH RBC QN AUTO: 28.3 PG (ref 26–35)
MCHC RBC AUTO-ENTMCNC: 31.7 % (ref 32–34.5)
MCV RBC AUTO: 89.2 FL (ref 80–99.9)
METER GLUCOSE: 136 MG/DL (ref 74–99)
METER GLUCOSE: 177 MG/DL (ref 74–99)
METER GLUCOSE: 233 MG/DL (ref 74–99)
MONOCYTES ABSOLUTE: 0.62 E9/L (ref 0.1–0.95)
MONOCYTES RELATIVE PERCENT: 7.6 % (ref 2–12)
NEUTROPHILS ABSOLUTE: 6.29 E9/L (ref 1.8–7.3)
NEUTROPHILS RELATIVE PERCENT: 77.5 % (ref 43–80)
PDW BLD-RTO: 15.1 FL (ref 11.5–15)
PLATELET # BLD: 289 E9/L (ref 130–450)
PMV BLD AUTO: 9.1 FL (ref 7–12)
POTASSIUM SERPL-SCNC: 3.7 MMOL/L (ref 3.5–5)
RBC # BLD: 3.53 E12/L (ref 3.5–5.5)
SODIUM BLD-SCNC: 133 MMOL/L (ref 132–146)
WBC # BLD: 8.1 E9/L (ref 4.5–11.5)

## 2022-11-10 PROCEDURE — 97535 SELF CARE MNGMENT TRAINING: CPT

## 2022-11-10 PROCEDURE — 2580000003 HC RX 258: Performed by: FAMILY MEDICINE

## 2022-11-10 PROCEDURE — 1200000000 HC SEMI PRIVATE

## 2022-11-10 PROCEDURE — 2580000003 HC RX 258: Performed by: INTERNAL MEDICINE

## 2022-11-10 PROCEDURE — 36415 COLL VENOUS BLD VENIPUNCTURE: CPT

## 2022-11-10 PROCEDURE — 6360000002 HC RX W HCPCS: Performed by: FAMILY MEDICINE

## 2022-11-10 PROCEDURE — 80048 BASIC METABOLIC PNL TOTAL CA: CPT

## 2022-11-10 PROCEDURE — 85025 COMPLETE CBC W/AUTO DIFF WBC: CPT

## 2022-11-10 PROCEDURE — 82962 GLUCOSE BLOOD TEST: CPT

## 2022-11-10 PROCEDURE — 6360000002 HC RX W HCPCS: Performed by: INTERNAL MEDICINE

## 2022-11-10 PROCEDURE — 6370000000 HC RX 637 (ALT 250 FOR IP): Performed by: FAMILY MEDICINE

## 2022-11-10 PROCEDURE — 6370000000 HC RX 637 (ALT 250 FOR IP): Performed by: INTERNAL MEDICINE

## 2022-11-10 PROCEDURE — 97530 THERAPEUTIC ACTIVITIES: CPT

## 2022-11-10 PROCEDURE — 6370000000 HC RX 637 (ALT 250 FOR IP)

## 2022-11-10 RX ORDER — 0.9 % SODIUM CHLORIDE 0.9 %
1000 INTRAVENOUS SOLUTION INTRAVENOUS ONCE
Status: COMPLETED | OUTPATIENT
Start: 2022-11-10 | End: 2022-11-10

## 2022-11-10 RX ORDER — KETOROLAC TROMETHAMINE 30 MG/ML
30 INJECTION, SOLUTION INTRAMUSCULAR; INTRAVENOUS EVERY 6 HOURS PRN
Status: DISCONTINUED | OUTPATIENT
Start: 2022-11-10 | End: 2022-11-10

## 2022-11-10 RX ORDER — OXYCODONE AND ACETAMINOPHEN 10; 325 MG/1; MG/1
1 TABLET ORAL EVERY 6 HOURS
Status: DISCONTINUED | OUTPATIENT
Start: 2022-11-10 | End: 2022-11-23 | Stop reason: HOSPADM

## 2022-11-10 RX ORDER — SODIUM CHLORIDE 9 MG/ML
INJECTION, SOLUTION INTRAVENOUS ONCE
Status: COMPLETED | OUTPATIENT
Start: 2022-11-10 | End: 2022-11-10

## 2022-11-10 RX ORDER — SODIUM CHLORIDE AND POTASSIUM CHLORIDE .9; .15 G/100ML; G/100ML
SOLUTION INTRAVENOUS CONTINUOUS
Status: DISCONTINUED | OUTPATIENT
Start: 2022-11-10 | End: 2022-11-14

## 2022-11-10 RX ORDER — TRAMADOL HYDROCHLORIDE 50 MG/1
50 TABLET ORAL EVERY 6 HOURS SCHEDULED
Status: DISCONTINUED | OUTPATIENT
Start: 2022-11-10 | End: 2022-11-10

## 2022-11-10 RX ORDER — MIDODRINE HYDROCHLORIDE 10 MG/1
10 TABLET ORAL
Status: DISCONTINUED | OUTPATIENT
Start: 2022-11-10 | End: 2022-11-18

## 2022-11-10 RX ORDER — KETOROLAC TROMETHAMINE 30 MG/ML
30 INJECTION, SOLUTION INTRAMUSCULAR; INTRAVENOUS ONCE
Status: COMPLETED | OUTPATIENT
Start: 2022-11-10 | End: 2022-11-10

## 2022-11-10 RX ADMIN — TRAMADOL HYDROCHLORIDE 50 MG: 50 TABLET, COATED ORAL at 14:48

## 2022-11-10 RX ADMIN — PANTOPRAZOLE SODIUM 40 MG: 40 TABLET, DELAYED RELEASE ORAL at 05:17

## 2022-11-10 RX ADMIN — MIDODRINE HYDROCHLORIDE 5 MG: 5 TABLET ORAL at 11:49

## 2022-11-10 RX ADMIN — SERTRALINE HYDROCHLORIDE 50 MG: 50 TABLET ORAL at 08:27

## 2022-11-10 RX ADMIN — HEPARIN SODIUM 5000 UNITS: 10000 INJECTION INTRAVENOUS; SUBCUTANEOUS at 20:45

## 2022-11-10 RX ADMIN — MIDODRINE HYDROCHLORIDE 5 MG: 5 TABLET ORAL at 08:23

## 2022-11-10 RX ADMIN — MIDODRINE HYDROCHLORIDE 10 MG: 10 TABLET ORAL at 19:07

## 2022-11-10 RX ADMIN — KETOROLAC TROMETHAMINE 30 MG: 30 INJECTION, SOLUTION INTRAMUSCULAR at 17:25

## 2022-11-10 RX ADMIN — HEPARIN SODIUM 5000 UNITS: 10000 INJECTION INTRAVENOUS; SUBCUTANEOUS at 12:03

## 2022-11-10 RX ADMIN — POTASSIUM CHLORIDE AND SODIUM CHLORIDE: 900; 150 INJECTION, SOLUTION INTRAVENOUS at 18:48

## 2022-11-10 RX ADMIN — SODIUM CHLORIDE, PRESERVATIVE FREE 10 ML: 5 INJECTION INTRAVENOUS at 08:25

## 2022-11-10 RX ADMIN — MECLIZINE 25 MG: 12.5 TABLET ORAL at 20:44

## 2022-11-10 RX ADMIN — KETOROLAC TROMETHAMINE 30 MG: 30 INJECTION, SOLUTION INTRAMUSCULAR at 01:04

## 2022-11-10 RX ADMIN — MECLIZINE 25 MG: 12.5 TABLET ORAL at 05:18

## 2022-11-10 RX ADMIN — AMITRIPTYLINE HYDROCHLORIDE 10 MG: 10 TABLET, FILM COATED ORAL at 20:44

## 2022-11-10 RX ADMIN — OXYCODONE AND ACETAMINOPHEN 1 TABLET: 325; 10 TABLET ORAL at 20:44

## 2022-11-10 RX ADMIN — MECLIZINE 25 MG: 12.5 TABLET ORAL at 14:49

## 2022-11-10 RX ADMIN — SODIUM CHLORIDE 1000 ML: 9 INJECTION, SOLUTION INTRAVENOUS at 14:47

## 2022-11-10 RX ADMIN — SODIUM CHLORIDE: 9 INJECTION, SOLUTION INTRAVENOUS at 01:12

## 2022-11-10 RX ADMIN — OXYCODONE 5 MG: 5 TABLET ORAL at 05:17

## 2022-11-10 RX ADMIN — KETOROLAC TROMETHAMINE 30 MG: 30 INJECTION, SOLUTION INTRAMUSCULAR at 08:25

## 2022-11-10 RX ADMIN — HEPARIN SODIUM 5000 UNITS: 10000 INJECTION INTRAVENOUS; SUBCUTANEOUS at 05:18

## 2022-11-10 RX ADMIN — FOLIC ACID 1 MG: 1 TABLET ORAL at 08:25

## 2022-11-10 ASSESSMENT — PAIN DESCRIPTION - PAIN TYPE: TYPE: ACUTE PAIN

## 2022-11-10 ASSESSMENT — PAIN DESCRIPTION - LOCATION
LOCATION: BACK

## 2022-11-10 ASSESSMENT — PAIN SCALES - GENERAL
PAINLEVEL_OUTOF10: 10
PAINLEVEL_OUTOF10: 9

## 2022-11-10 ASSESSMENT — PAIN - FUNCTIONAL ASSESSMENT: PAIN_FUNCTIONAL_ASSESSMENT: PREVENTS OR INTERFERES SOME ACTIVE ACTIVITIES AND ADLS

## 2022-11-10 ASSESSMENT — PAIN DESCRIPTION - DESCRIPTORS
DESCRIPTORS: ACHING;STABBING
DESCRIPTORS: ACHING
DESCRIPTORS: ACHING;DISCOMFORT
DESCRIPTORS: ACHING;THROBBING

## 2022-11-10 ASSESSMENT — PAIN DESCRIPTION - FREQUENCY: FREQUENCY: INTERMITTENT

## 2022-11-10 ASSESSMENT — PAIN DESCRIPTION - ORIENTATION
ORIENTATION: LOWER
ORIENTATION: MID
ORIENTATION: MID

## 2022-11-10 NOTE — PROGRESS NOTES
Hospitalist Progress Note      Synopsis:   Ms. Laurina Frankel, a 45y.o. year old female  who  has a past medical history of Anemia, Diabetes mellitus ([address] Papanicolaou smear of cervix with atypical squamous cells of undetermined significance (ASC-US), and Screening for human papillomavirus (HPV). presents with dizziness after standing up and feel on her back, developed pain, CT showed Acute compression fracture of L2, L3, and L5. Neurosurgery was consulted. Additionally, but since she has complaints of dizziness since yesterday evening while she was going to the bathroom, causing her to lose consciousness. Allegedly, per her PCP she does have plaque in her carotid. CTA of head and neck was ordered and revealed no hemodynamically significant stenosis or dissection of the cervical internal carotid arteries. However, a 3 mm x 6 mm x 4 mm focus of mucus versus soft tissue nodule along the right lateral wall of the subglottic trachea was appreciated. Recommendations were a nonemergent follow-up CT with contrast versus direct visualization as outpatient. Surgery saw patient and decided that no acute intervention will be required at this time. Hospital day 5     Subjective:  Patient seen and examined at bedside, reports resolution of nausea and improvement in pain but complains she still feels dizzy with ambulation. Temp (24hrs), Av.2 °F (36.8 °C), Min:98.1 °F (36.7 °C), Max:98.2 °F (36.8 °C)    DIET: ADULT DIET; Regular  CODE: Full Code    Intake/Output Summary (Last 24 hours) at 11/10/2022 6886  Last data filed at 2022 1748  Gross per 24 hour   Intake 240 ml   Output --   Net 240 ml       Review of Systems: All bolded are positive; please see HPI  General:  Fever, chills, diaphoresis, fatigue, malaise, night sweats, weight loss  Psychological:  Anxiety, disorientation, hallucinations. ENT:  Epistaxis, headaches, vertigo, visual changes.   Cardiovascular:  Chest pain, irregular heartbeats, palpitations, paroxysmal nocturnal dyspnea. Respiratory:  Shortness of breath, coughing, sputum production, hemoptysis, wheezing, orthopnea. Gastrointestinal:  Nausea, vomiting, diarrhea, heartburn, constipation, abdominal pain, hematemesis, hematochezia, melena, acholic stools  Genito-Urinary:  Dysuria, urgency, frequency, hematuria  Musculoskeletal:  Joint pain, joint stiffness, joint swelling, muscle pain  Neurology:  Headache, focal neurological deficits, weakness, numbness, paresthesia  Derm:  Rashes, ulcers, excoriations, bruising  Extremities:  Decreased ROM, peripheral edema, mottling    Objective:    /74   Pulse 89   Temp 98.1 °F (36.7 °C)   Resp 20   Ht 5' 11\" (1.803 m)   Wt 230 lb 9.6 oz (104.6 kg)   SpO2 95%   BMI 32.16 kg/m²     General appearance: No apparent distress, appears stated age and cooperative. HEENT: Conjunctivae/corneas clear. Mucous membranes moist.  Neck: Supple. No JVD. Respiratory:  Clear to auscultation bilaterally. Normal respiratory effort. Cardiovascular:  RRR. S1, S2 without MRG. PV: Pulses palpable. No edema. Abdomen: Soft, non-tender, non-distended. +BS  Musculoskeletal: No obvious deformities. Skin: Normal skin color. No rashes or lesions. Good turgor. Neurologic:  Grossly non-focal. Awake, alert, following commands.    Psychiatric: Alert and oriented, thought content appropriate, normal insight and judgement    Medications:  REVIEWED DAILY    Infusion Medications    0.9% NaCl with KCl 20 mEq      sodium chloride      dextrose       Scheduled Medications    traMADol  50 mg Oral 4 times per day    midodrine  10 mg Oral TID WC    meclizine  25 mg Oral 3 times per day    folic acid  1 mg Oral Daily    amitriptyline  10 mg Oral Nightly    lidocaine  1 patch Topical Daily    pantoprazole  40 mg Oral QAM AC    sertraline  50 mg Oral Daily    sodium chloride flush  10 mL IntraVENous 2 times per day    heparin (porcine)  5,000 Units SubCUTAneous Q8H    insulin lispro  0-4 Units SubCUTAneous TID WC    insulin lispro  0-4 Units SubCUTAneous Nightly     PRN Meds: ketorolac, HYDROmorphone, oxyCODONE, sodium chloride flush, cyclobenzaprine, sodium chloride flush, sodium chloride, ondansetron **OR** ondansetron, magnesium hydroxide, acetaminophen **OR** acetaminophen, glucose, dextrose bolus **OR** dextrose bolus, glucagon (rDNA), dextrose    Labs:     Recent Labs     11/08/22  0520 11/09/22  0442 11/10/22  0617   WBC 7.1 8.6 8.1   HGB 9.2* 9.3* 10.0*   HCT 29.1* 29.0* 31.5*    287 289         Recent Labs     11/08/22  0520 11/09/22  0442 11/10/22  0617    133 133   K 3.9 4.2 3.7   CL 99 98 94*   CO2 23 26 27   BUN 11 10 10   CREATININE 0.6 0.6 0.6   CALCIUM 9.2 9.4 9.4         No results for input(s): PROT, ALB, ALKPHOS, ALT, AST, BILITOT, AMYLASE, LIPASE in the last 72 hours. No results for input(s): INR in the last 72 hours. No results for input(s): Inga Nasuti in the last 72 hours. Chronic labs:    Lab Results   Component Value Date    CHOL 188 11/02/2022    TRIG 128 11/02/2022    HDL 37 11/02/2022    LDLCALC 125 (H) 11/02/2022    TSH 1.600 11/02/2022    LABA1C 6.8 (H) 11/06/2022       Radiology: REVIEWED DAILY    Assessment:  Dizziness, suspect BPPV  Acute compression fracture of L2, L3, and L5  Accidental Fall  T2DM  Lateral wall subglottic tracheal mass, incidental finding    Plan:    Continue Meclizine, still reports feeling dizzy, monitor for improvement. Positive orthostatics, will bolus a liter of saline and continue IV maintenance. Recommend out of bed to chair as tolerated with assistance in getting up. Tried tramadol but did not help, will switch to Percocet for better pain control   Fall precautions  PT/OT  Accuchecks and ssi  CTA of head and neck was ordered and revealed no hemodynamically significant stenosis or dissection of the cervical internal carotid arteries.   However, a 3 mm x 6 mm x 4 mm focus of mucus versus soft tissue nodule along the right lateral wall of the subglottic trachea was appreciated. Recommendations were a nonemergent follow-up CT with contrast versus direct visualization as outpatient. - will order CT while inpatient. DVT Prophylaxis [] Lovenox  [x]  Heparin [] DOAC [] PCDs [] Ambulation    GI Prophylaxis [] PPI  [] H2 Blocker   [] Carafate  [x] Diet/Tube Feeds   Level of care [x] Med/Surg  [] Intermediate  []  ICU   Diet ADULT DIET; Regular    Family contact []  N/A    [] At bedside  [] Phone call   Disposition Patient requires continued admission lumbar back fracture will need PT OT consult prior to discharge   MDM [] Low    [x] Moderate  []   High       Discharge Plan: To home when dizziness is improved.    +++++++++++++++++++++++++++++++++++++++++++++++++  Ana Cristina Head, APRN  [employer] - [address] [place] [address], [place]  +++++++++++++++++++++++++++++++++++++++++++++++++  NOTE: This report was transcribed using voice recognition software. Every effort was made to ensure accuracy; however, inadvertent computerized transcription errors may be present.

## 2022-11-10 NOTE — PROGRESS NOTES
Physical Therapy  Physical Therapy Treatment    Name: Lucinda Yip  : 1970  MRN: [id number]      Date of Service: 11/10/2022    Evaluating PT:  Ria [facility], PT, DPT [id number]    Room #:  1048/2399-N  Diagnosis:  Weldon Leak [I88.8]  Compression fracture of L2 lumbar vertebra, open, initial encounter ([facility] [S32.020B]  Compression fracture of L2 vertebra, initial encounter ([facility] [L82.054O]  PMHx/PSHx:    Past Medical History:   Diagnosis Date    Anemia     Di[facility] mellitus ([facility]     Papanicolaou smear of cervix with atypical squamous cells of undetermined significance (ASC-US)          Screening for human papillomavirus (HPV)     4/10/07      Precautions:  Fall risk, L2, L3, L5 compression fx- LSO for comfort, Orthostatic Hypotension  Equipment Needs:  TBD    SUBJECTIVE:    Pt lives with daughter in a two story home with one stairs to enter and no rail. Bed is on second floor and bath is on second floor with 13 steps and one rail to access. Pt ambulated without AD PTA and was fully independent. Equipment Owned:    OBJECTIVE:   Initial Evaluation  Date: 22 Treatment  11/10/22 Short Term/ Long Term   Goals   AM-PAC 6 Clicks   *limited due to severe pain and dizziness     Was pt agreeable to Eval/treatment? Yes Yes    Does pt have pain? Yes 10/10 LBP 6/10 LBP    Bed Mobility  Rolling: Max A  Supine to sit: Max x2  Sit to supine: Max x2  Scooting: Max x2 Rolling: Mod A  Supine to sit: Max A  Sit to supine: Max A  Scooting: SBA Indep   Transfers Sit to stand: NT  Stand to sit: NT  Stand pivot: NT Sit to stand: Mod A  Stand to sit: Mod A  Stand pivot:  Mod x2 Indep   Ambulation    NT NT >50 feet Indep   Stair negotiation: ascended and descended  NT  14 steps with one rail Supervision   ROM BUE:  R UE AROM limited due to pain  BLE:  B LE AROM WFL     Strength BUE:  Refer to OT  BLE:  4+/5  4+/5   Balance Sitting EOB:  CGA for safety  Dynamic Standing:  NT  Sitting EOB:  Indep  Dynamic Standing:  Indep     Pt is A & O x 4  Sensation:  Pt denies numbness and tingling to extremities  Edema:  unremarkable  Vitals: Supine 101/66, Sitting 68/51, Standing 77/55    Patient education  Pt educated on role and benefits of physical therapy, safety and technique for mobility    Patient response to education:   Pt verbalized understanding Pt demonstrated skill Pt requires further education in this area   x x x     ASSESSMENT:    Conditions Requiring Skilled Therapeutic Intervention:    []Decreased strength     []Decreased ROM  [x]Decreased functional mobility  []Decreased balance   [x]Decreased endurance   []Decreased posture  []Decreased sensation  []Decreased coordination   []Decreased vision  []Decreased safety awareness   [x]Increased pain       Comments:  Patient deemed medically stable prior to therapy treatment. Patient was semi supine in bed. Patient provided consent to evaluation and OT collaboration. Education to review spinal neutrality and LSO for comfort. Increased assist to EOB for trunk and LE management. Increased symptoms of dizziness once EOB, with significantly hypotensive BP as noted above. Once symptoms mildly reduced following LE activities, hydration and application of LSO with goal of lumbar support and increase in abdominal pressure, patient did stand pivot to [facility] for void. Symptoms increased again with standing but were stable to allow for void, quick hygiene per patient's request and then return to supine. Increased time for all activities to acclimate to positional changes. Patient left with skilled positioning utilizing pillows, with all needs met and call light in reach for safety. LSO donned for comfort. She will continue to benefit from skilled PT post DC in order to improve mobility. CM notified of mobility restrictions due to pain and orthostasis.      Treatment:  Patient practiced and was instructed in the following treatment:    Bed mobility training - pt given verbal and tactile cues to facilitate proper sequencing and safety during rolling and supine>sit as well as provided with physical assistance to complete task   Sitting EOB for >15 minutes for upright tolerance, postural awareness and BLE ROM, hygiene  LAQ, ankle pumps, glute sets  Skilled positioning - Pt placed in the chair position with pillows utilized to facilitate upright posture, joint and skin integrity, and comfort for R UE and LB    Skilled vital monitoring for postural tolerance    PHYSICAL THERAPY PLAN OF CARE:    Pt progressing toward goals very slowly.     Time in  0940  Time out  1025    Total Treatment Time  45 minutes       CPT codes:  [] Low Complexity PT evaluation 90703  [] Moderate Complexity PT evaluation 08896  [] High Complexity PT evaluation 85941  [] PT Re-evaluation 45493  [] Gait training 00907 - minutes  [] Manual therapy 53723 - minutes  [x] Therapeutic activities 33716 45 minutes  [] Therapeutic exercises 90231 - minutes  [] Neuromuscular reeducation 25546 - minutes     Yuridia Dwyer, PT, DPT [id number]

## 2022-11-10 NOTE — CARE COORDINATION
Plan is home with daughter and [facility] when medically ready. Home health care orders are in. Per therapy note today, Increased symptoms of dizziness once EOB, with significantly hypotensive BP. Rosalba Thapa Once symptoms mildly reduced following LE activities, hydration and application of LSO with goal of lumbar support and increase in abdominal pressure, patient did stand pivot to [facility] for void. Symptoms increased again with standing but were stable to allow for void, quick hygiene per patient's request and then return to supine. Increased time for all activities to acclimate to positional changes. Patient is on daily therapy TX's to focus on stairs once her dizziness is better controlled since plan is home and she has 13 steps to get to bed & bath. Patient's daughter will transport her home at time of discharge.    Mariusz Calderon RN   [phone]

## 2022-11-10 NOTE — PROGRESS NOTES
Occupational Therapy  OT BEDSIDE TREATMENT NOTE   [facility] [address]  [address] [place] [address]       VQAP:  Patient Name: Antonia Hodge  MRN: [id number]  : 1970  Room: [address] [place] [address]-A     Per OT Eval:    Evaluating OT: Lorna Champion, [address] OTR/L; [id number]       Referring Provider: Everlene Oppenheim, MD    Specific Provider Orders/Date: OT Eval and Treat 22       Diagnosis: Compression fracture of L2 lumbar vertebra, open, initial encounter  [place]     Surgery: none this admission     Pertinent Medical History:  has a past medical history of Anemia, Diabetes mellitus ([facility] Papanicolaou smear of cervix with atypical squamous cells of undetermined significance (ASC-US), and Screening for human papillomavirus (HPV).        Reason for admission: fell backwards at home d/t dizziness, LOC     Recommended Adaptive Equipment: TBD pending progression - BSC, extended tub bench, AE for LE bathing and dressing PRN      Precautions:  Fall Risk, Bed Alarm, LSO (for comfort post L2,L3,L5 compression fx), Orthostatic Hypotension, Spinal Neutrality       Assessment of current deficits:    [x] Functional mobility            [x]ADLs           [x] Strength                  [x]Cognition    [x] Functional transfers          [x] IADLs         [x] Safety Awareness   [x]Endurance    [x] Fine Coordination                         [x] Balance      [] Vision/perception   []Sensation      []Gross Motor Coordination             [] ROM           [] Delirium                   [] Motor Control      OT PLAN OF CARE   OT POC based on physician orders, patient diagnosis and results of clinical assessment     Frequency/Duration; 3-5 days/wk for 2 weeks PRN   Specific OT Treatment Interventions to include:   * Instruction/training on adapted ADL techniques and AE recommendations to increase functional independence within precautions       * Training on energy conservation strategies, correct breathing pattern and techniques to improve independence/tolerance for self-care routine  * Functional transfer/mobility training/DME recommendations for increased independence, safety, and fall prevention  * Patient/Family education to increase follow through with safety techniques and functional independence  * Recommendation of environmental modifications for increased safety with functional transfers/mobility and ADLs  * Therapeutic exercise to improve motor endurance, ROM, and functional strength for ADLs/functional transfers  * Therapeutic activities to facilitate/challenge dynamic balance, stand tolerance for increased safety and independence with ADLs     Home Living: Pt lives with daughter in 2 story home with 1 step and 0HR to enter. Bed and Bath located n 2nd floor with full flight of steps (7+3) and 1HR to access. 1/2 bath on 1st floor.    Bathroom setup: tub/shower unit   Equipment owned: none     Prior Level of Function: IND with ADLs , IND with IADLs; ambulated with no AD prior  Driving: yes  Occupation: full time  (travels/drives to homes)     Pain Level: 6/10 lower back pain, and R side neck/arm pain, pain meds on board    Cognition: A&O: 4/4; Follows multi step directions, pleasant & cooperative               Memory:  good              Sequencing:  fair              Problem solving:  fair              Judgement/safety:  fair                Functional Assessment:  AM-PAC Daily Activity Raw Score: 14/24    Initial Eval Status  Date: 11/7/22 Treatment Status  Date:  11/10/22 STGs = LTGs  Time frame: 10-14 days   Feeding Minimal Assist  Mod Indep  Independent    Grooming Minimal Assist   SBA  Simple grooming tasks seated  Independent    UB Dressing Minimal Assist   Min A  Doff/guerline gown seated  Independent    LB Dressing Dependent   Max A Minimal Assist    Bathing Maximal Assist  UB: Min A  LB: Max A  Sponge bathing  Minimal Assist    Toileting Maximal Assist   Max A  Unable to tolerate standing & wiping for hygiene, using BSC Minimal Assist    Bed Mobility  Log Roll: Max A  Supine to sit: Max A x 2   Sit to supine: Max A x 2   Mod A  rolling  Max A supine < > sit Supine to sit: Mod Ind   Sit to supine: Mod Ind    Functional Transfers Sit to stand:NT   Stand to sit:NT  Stand pivot: NT  Commode: NT  Mod A  Sit < > stand   Sit to stand: Mod Ind   Stand to sit: Mod Ind  Stand pivot: Mod Ind  Commode: Mod Ind    Functional Mobility NT  Limited d/t dizziness seated EOB  Mod A x 2 stand pivot  Mod Ind    Balance Sitting:     Static - SBA     Dynamic - SBA  Standing: NT Sitting: SBA  Standing: Mod A 1-2  Due to dizziness for safety   Sitting:  Static: ind  Dynamic: ind  Standing: mod ind   Activity Tolerance FAIR  Limited d/t dizziness and pain  Fair-  Limited by dizziness  BP supine 101/66  Seated 68/51  After transfer 77/55  (Informed nsg) GOOD   Visual/  Perceptual Glasses: yes - reading          Safety Fair  Educated on spinal neutrality/precautions  Fair Good   during ADL completion following spinal percautions   Vitals BP sitting EOB: 73/38  BP supine: 86/58     Pt symptomatic/dizzy seated EOB required return supine for safety                Education:  Pt was educated through out treatment regarding proper technique & safety with bed mobility, functional transfers & ADL compensatory strategies to ease tasks, improve safety & prevent falls to return home safely. Comments: Upon arrival pt was in bed & agreeable for therapy. At end of session pt was returned to bed due to dizziness, HOB semi-upright all lines and tubes intact, call light within reach. Pt has made Fair progress towards set goals.    Continue with current plan of care      Treatment Time In: 9:40            Treatment Time Out: 10:25           Treatment Charges: Mins Units   Ther Ex  99732     Manual Therapy 20208     Thera Activities 68604 15 1   ADL/Home Mgt 17102 30 2   Neuro Re-ed 69684     Group Therapy      Orthotic manage/training  [id number]     Non-Billable Time     Total Timed Treatment 45 3       Sudha BARRON  [address] [place] [address], [address] [place] [address]

## 2022-11-11 ENCOUNTER — APPOINTMENT (OUTPATIENT)
Dept: CT IMAGING | Age: 52
DRG: 551 | End: 2022-11-11
Payer: COMMERCIAL

## 2022-11-11 LAB
ANION GAP SERPL CALCULATED.3IONS-SCNC: 8 MMOL/L (ref 7–16)
BASOPHILS ABSOLUTE: 0.02 E9/L (ref 0–0.2)
BASOPHILS RELATIVE PERCENT: 0.3 % (ref 0–2)
BUN BLDV-MCNC: 11 MG/DL (ref 6–20)
CALCIUM SERPL-MCNC: 9.3 MG/DL (ref 8.6–10.2)
CHLORIDE BLD-SCNC: 100 MMOL/L (ref 98–107)
CO2: 28 MMOL/L (ref 22–29)
CREAT SERPL-MCNC: 0.6 MG/DL (ref 0.5–1)
EOSINOPHILS ABSOLUTE: 0.15 E9/L (ref 0.05–0.5)
EOSINOPHILS RELATIVE PERCENT: 2.6 % (ref 0–6)
GFR SERPL CREATININE-BSD FRML MDRD: >60 ML/MIN/1.73
GLUCOSE BLD-MCNC: 123 MG/DL (ref 74–99)
HCT VFR BLD CALC: 29.6 % (ref 34–48)
HEMOGLOBIN: 9.2 G/DL (ref 11.5–15.5)
IMMATURE GRANULOCYTES #: 0.05 E9/L
IMMATURE GRANULOCYTES %: 0.9 % (ref 0–5)
LYMPHOCYTES ABSOLUTE: 0.68 E9/L (ref 1.5–4)
LYMPHOCYTES RELATIVE PERCENT: 11.7 % (ref 20–42)
MCH RBC QN AUTO: 27.7 PG (ref 26–35)
MCHC RBC AUTO-ENTMCNC: 31.1 % (ref 32–34.5)
MCV RBC AUTO: 89.2 FL (ref 80–99.9)
METER GLUCOSE: 127 MG/DL (ref 74–99)
METER GLUCOSE: 132 MG/DL (ref 74–99)
METER GLUCOSE: 148 MG/DL (ref 74–99)
METER GLUCOSE: 224 MG/DL (ref 74–99)
MONOCYTES ABSOLUTE: 0.41 E9/L (ref 0.1–0.95)
MONOCYTES RELATIVE PERCENT: 7.1 % (ref 2–12)
NEUTROPHILS ABSOLUTE: 4.49 E9/L (ref 1.8–7.3)
NEUTROPHILS RELATIVE PERCENT: 77.4 % (ref 43–80)
PDW BLD-RTO: 15.1 FL (ref 11.5–15)
PLATELET # BLD: 280 E9/L (ref 130–450)
PMV BLD AUTO: 9.2 FL (ref 7–12)
POTASSIUM SERPL-SCNC: 4.1 MMOL/L (ref 3.5–5)
RBC # BLD: 3.32 E12/L (ref 3.5–5.5)
SODIUM BLD-SCNC: 136 MMOL/L (ref 132–146)
WBC # BLD: 5.8 E9/L (ref 4.5–11.5)

## 2022-11-11 PROCEDURE — 6360000002 HC RX W HCPCS: Performed by: FAMILY MEDICINE

## 2022-11-11 PROCEDURE — 6370000000 HC RX 637 (ALT 250 FOR IP): Performed by: FAMILY MEDICINE

## 2022-11-11 PROCEDURE — 97530 THERAPEUTIC ACTIVITIES: CPT

## 2022-11-11 PROCEDURE — 80048 BASIC METABOLIC PNL TOTAL CA: CPT

## 2022-11-11 PROCEDURE — 6370000000 HC RX 637 (ALT 250 FOR IP): Performed by: INTERNAL MEDICINE

## 2022-11-11 PROCEDURE — 6360000004 HC RX CONTRAST MEDICATION: Performed by: RADIOLOGY

## 2022-11-11 PROCEDURE — 1200000000 HC SEMI PRIVATE

## 2022-11-11 PROCEDURE — 70491 CT SOFT TISSUE NECK W/DYE: CPT

## 2022-11-11 PROCEDURE — 82962 GLUCOSE BLOOD TEST: CPT

## 2022-11-11 PROCEDURE — 36415 COLL VENOUS BLD VENIPUNCTURE: CPT

## 2022-11-11 PROCEDURE — 85025 COMPLETE CBC W/AUTO DIFF WBC: CPT

## 2022-11-11 PROCEDURE — 6360000002 HC RX W HCPCS: Performed by: INTERNAL MEDICINE

## 2022-11-11 RX ORDER — BISACODYL 10 MG
10 SUPPOSITORY, RECTAL RECTAL DAILY PRN
Status: DISCONTINUED | OUTPATIENT
Start: 2022-11-11 | End: 2022-11-23 | Stop reason: HOSPADM

## 2022-11-11 RX ORDER — SENNA PLUS 8.6 MG/1
1 TABLET ORAL NIGHTLY
Status: DISCONTINUED | OUTPATIENT
Start: 2022-11-11 | End: 2022-11-23 | Stop reason: HOSPADM

## 2022-11-11 RX ORDER — POLYETHYLENE GLYCOL 3350 17 G/17G
17 POWDER, FOR SOLUTION ORAL DAILY
Status: DISCONTINUED | OUTPATIENT
Start: 2022-11-11 | End: 2022-11-23 | Stop reason: HOSPADM

## 2022-11-11 RX ADMIN — AMITRIPTYLINE HYDROCHLORIDE 10 MG: 10 TABLET, FILM COATED ORAL at 20:36

## 2022-11-11 RX ADMIN — OXYCODONE AND ACETAMINOPHEN 1 TABLET: 325; 10 TABLET ORAL at 01:42

## 2022-11-11 RX ADMIN — HEPARIN SODIUM 5000 UNITS: 10000 INJECTION INTRAVENOUS; SUBCUTANEOUS at 04:52

## 2022-11-11 RX ADMIN — HEPARIN SODIUM 5000 UNITS: 10000 INJECTION INTRAVENOUS; SUBCUTANEOUS at 11:54

## 2022-11-11 RX ADMIN — MECLIZINE 25 MG: 12.5 TABLET ORAL at 14:34

## 2022-11-11 RX ADMIN — SERTRALINE HYDROCHLORIDE 50 MG: 50 TABLET ORAL at 08:20

## 2022-11-11 RX ADMIN — MIDODRINE HYDROCHLORIDE 10 MG: 10 TABLET ORAL at 17:05

## 2022-11-11 RX ADMIN — POTASSIUM CHLORIDE AND SODIUM CHLORIDE: 900; 150 INJECTION, SOLUTION INTRAVENOUS at 01:44

## 2022-11-11 RX ADMIN — MIDODRINE HYDROCHLORIDE 10 MG: 10 TABLET ORAL at 08:21

## 2022-11-11 RX ADMIN — OXYCODONE AND ACETAMINOPHEN 1 TABLET: 325; 10 TABLET ORAL at 14:35

## 2022-11-11 RX ADMIN — OXYCODONE AND ACETAMINOPHEN 1 TABLET: 325; 10 TABLET ORAL at 20:36

## 2022-11-11 RX ADMIN — PANTOPRAZOLE SODIUM 40 MG: 40 TABLET, DELAYED RELEASE ORAL at 05:18

## 2022-11-11 RX ADMIN — MECLIZINE 25 MG: 12.5 TABLET ORAL at 20:36

## 2022-11-11 RX ADMIN — HYDROMORPHONE HYDROCHLORIDE 1 MG: 1 INJECTION, SOLUTION INTRAMUSCULAR; INTRAVENOUS; SUBCUTANEOUS at 06:09

## 2022-11-11 RX ADMIN — IOPAMIDOL 75 ML: 755 INJECTION, SOLUTION INTRAVENOUS at 11:44

## 2022-11-11 RX ADMIN — HEPARIN SODIUM 5000 UNITS: 10000 INJECTION INTRAVENOUS; SUBCUTANEOUS at 20:36

## 2022-11-11 RX ADMIN — HYDROMORPHONE HYDROCHLORIDE 1 MG: 1 INJECTION, SOLUTION INTRAMUSCULAR; INTRAVENOUS; SUBCUTANEOUS at 08:20

## 2022-11-11 RX ADMIN — MECLIZINE 25 MG: 12.5 TABLET ORAL at 04:53

## 2022-11-11 RX ADMIN — MIDODRINE HYDROCHLORIDE 10 MG: 10 TABLET ORAL at 11:54

## 2022-11-11 RX ADMIN — OXYCODONE AND ACETAMINOPHEN 1 TABLET: 325; 10 TABLET ORAL at 09:45

## 2022-11-11 RX ADMIN — FOLIC ACID 1 MG: 1 TABLET ORAL at 08:18

## 2022-11-11 RX ADMIN — POLYETHYLENE GLYCOL 3350 17 G: 17 POWDER, FOR SOLUTION ORAL at 09:45

## 2022-11-11 RX ADMIN — HYDROMORPHONE HYDROCHLORIDE 1 MG: 1 INJECTION, SOLUTION INTRAMUSCULAR; INTRAVENOUS; SUBCUTANEOUS at 11:55

## 2022-11-11 ASSESSMENT — PAIN DESCRIPTION - LOCATION
LOCATION: ABDOMEN
LOCATION: BACK
LOCATION: NECK

## 2022-11-11 ASSESSMENT — PAIN SCALES - GENERAL
PAINLEVEL_OUTOF10: 9
PAINLEVEL_OUTOF10: 10
PAINLEVEL_OUTOF10: 9
PAINLEVEL_OUTOF10: 10
PAINLEVEL_OUTOF10: 9
PAINLEVEL_OUTOF10: 9
PAINLEVEL_OUTOF10: 10

## 2022-11-11 ASSESSMENT — PAIN DESCRIPTION - ORIENTATION
ORIENTATION: RIGHT
ORIENTATION: MID
ORIENTATION: MID

## 2022-11-11 ASSESSMENT — PAIN DESCRIPTION - DESCRIPTORS
DESCRIPTORS: SHARP
DESCRIPTORS: ACHING;DISCOMFORT;DULL
DESCRIPTORS: ACHING

## 2022-11-11 NOTE — PROGRESS NOTES
Nutrition Note    Type and Reason for Visit: Initial, RD Nutrition Re-Screen/LOS (RD Re-Screen Negative)    Nutrition Assessment:  Pt re-screened per LOS protocol. Chart reviewed. Pt currently eating ~75% of most meals (sporadic intake at times, of note, pt s/p gastric banding 8/2022) and w/ no other significant nutritional issues noted at this time. Will follow-up per policy. Please consult if RD needed.     Orin Cornell RD, LD  Contact: ext [phone]

## 2022-11-11 NOTE — PLAN OF CARE
Problem: Pain  Goal: Verbalizes/displays adequate comfort level or baseline comfort level  Outcome: Progressing     Problem: ABCDS Injury Assessment  Goal: Absence of physical injury  Outcome: Progressing     Problem: Safety - Adult  Goal: Free from fall injury  Outcome: Progressing     Problem: Chronic Conditions and Co-morbidities  Goal: Patient's chronic conditions and co-morbidity symptoms are monitored and maintained or improved  Outcome: Progressing     Problem: Skin/Tissue Integrity  Goal: Absence of new skin breakdown  Description: 1. Monitor for areas of redness and/or skin breakdown  2. Assess vascular access sites hourly  3. Every 4-6 hours minimum:  Change oxygen saturation probe site  4. Every 4-6 hours:  If on nasal continuous positive airway pressure, respiratory therapy assess nares and determine need for appliance change or resting period.   Outcome: Progressing

## 2022-11-11 NOTE — PROGRESS NOTES
Physical Therapy  Treatment Note    Name: Cecy Artist  : 1970  MRN: [id number]      Date of Service: 2022    Evaluating PT:  Angel Luis Roberto, PT, DPT [id number]    Room #:  3529/4863-H  Diagnosis:  Ingris Haley [I88.8]  Compression fracture of L2 lumbar vertebra, open, initial encounter ([facility] [S32.020B]  Compression fracture of L2 vertebra, initial encounter ([facility] [Q78.808V]  PMHx/PSHx:    Past Medical History:   Diagnosis Date    Anemia     Diabetes mellitus ([facility]     Papanicolaou smear of cervix with atypical squamous cells of undetermined significance (ASC-US)          Screening for human papillomavirus (HPV)     4/10/07      Precautions:  Fall risk, L2, L3, L5 compression fx- LSO for comfort, Orthostatic Hypotension  Equipment Needs:  TBD    SUBJECTIVE:    Pt lives with daughter in a two story home with one stairs to enter and no rail. Bed is on second floor and bath is on second floor with 13 steps and one rail to access. Pt ambulated without AD PTA and was fully independent. Equipment Owned:    OBJECTIVE:   Initial Evaluation  Date: 22 Treatment  22 Short Term/ Long Term   Goals   AM-PAC 6 Clicks   *limited due to severe pain and dizziness     Was pt agreeable to Eval/treatment? Yes Yes    Does pt have pain? Yes 10/10 LBP 6/10 LBP    Bed Mobility  Rolling:  Max A  Supine to sit: Max x2  Sit to supine: Max x2  Scooting: Max x2 Rolling: ModA  Supine to sit: MaxA  Sit to supine: MaxA  Scooting: SBA Indep   Transfers Sit to stand: NT  Stand to sit: NT  Stand pivot: NT Sit to stand: ModA  Stand to sit: ModA  Stand pivot: ModA Indep   Ambulation    NT 10 feet with [employer] with ModA >50 feet Indep   Stair negotiation: ascended and descended  NT NT 14 steps with one rail Supervision   ROM BUE:  R UE AROM limited due to pain  BLE:  B LE AROM WFL BLE: AROM WFL    Strength BUE:  Refer to OT  BLE:  4+/5 BLE: grossly 4+/5 4+/5   Balance Sitting EOB:  CGA for safety  Dynamic Standing:  NT Sitting: SBA  Standing: ModA with [employer] Sitting EOB:  Indep  Dynamic Standing:  Indep       Pt is A & O x 4  Edema:  None noted in BLE    Therapeutic Exercises:    Sit<>stand x2 with ModA<>Celestino  Standing balance activities: performed static and dynamic standing balance activities within AD with single and BUE support with reaches within ADRIANA with Min/ModA    Patient education  Pt educated on bed mobility, transfer technique, brace management, proper use of walker, positioning in bed. Patient response to education:   Pt verbalized understanding Pt demonstrated skill Pt requires further education in this area   Yes With assist Yes     ASSESSMENT:    Comments: The pt was able to roll and sit up at the EOB, reported she needed to use the bedside commode, nursing was present to take vitals and reported they were negative for orthostatics. The pt was assisted with maintaining spinal neutrality, assisted with bed mobility and transfer to a bedside commode, assisted with donning LSO. The pt was assisted with standing balance and short distance ambulation, the pt became fatigued and reported dizziness, bed was moved and the pt was assisted over to the bed, returned to supine and positioned with skill, left with call light in reach and all needs met. Treatment:  Patient practiced and was instructed in the following treatment:    Bed mobility training - pt given verbal and tactile cues to facilitate proper sequencing and safety during rolling and supine>sit as well as provided with physical assistance to complete task   Transfer training: verbal cues for hand placement during sit to stand transfer and assistance for anterior weight shift in order to facilitate initiation of the stand. Standing balance activities: performed static and dynamic standing balance activities within AD with single and BUE support in order to progress safety with standing activities as well as improve independence with standing ADLs.   Gait training: verbal cues for sequencing and safety, verbal cues for appropriate step length and positioning within walker with dynamic activities, physical assist for walker management, and steadying   Skilled repositioning in bed to promote improved posture and improved cardiorespiratory function. Pt positioned with bed in semi-chair position to enhance skin/joint protection. PLAN:    Patient is making good progress towards established goals. Will continue with current POC. Time in  0955  Time out  1020    Total Treatment Time  25 minutes     CPT codes:  [] Gait training 06492 0 minutes  [] Manual therapy 64516 0 minutes  [x] Therapeutic activities 27124 25 minutes  [] Therapeutic exercises 53512 0 minutes  [] Neuromuscular reeducation 61981 0 minutes    Martina Lara.  Jason No, [facility] Meri Lakhani  number:  [id number]

## 2022-11-11 NOTE — PROGRESS NOTES
Hospitalist Progress Note      Synopsis:   Ms. Og Bhatia, a 45y.o. year old female  who  has a past medical history of Anemia, Diabetes mellitus ([address] Papanicolaou smear of cervix with atypical squamous cells of undetermined significance (ASC-US), and Screening for human papillomavirus (HPV). presents with dizziness after standing up and feel on her back, developed pain, CT showed Acute compression fracture of L2, L3, and L5. Neurosurgery was consulted. Additionally, but since she has complaints of dizziness since yesterday evening while she was going to the bathroom, causing her to lose consciousness. Allegedly, per her PCP she does have plaque in her carotid. CTA of head and neck was ordered and revealed no hemodynamically significant stenosis or dissection of the cervical internal carotid arteries. However, a 3 mm x 6 mm x 4 mm focus of mucus versus soft tissue nodule along the right lateral wall of the subglottic trachea was appreciated. Recommendations were a nonemergent follow-up CT with contrast versus direct visualization as outpatient. Surgery saw patient and decided that no acute intervention will be required at this time. Hospital day 6     Subjective:  Patient seen and examined at bedside, complains she still feels dizzy with ambulation. Temp (24hrs), Av.9 °F (37.2 °C), Min:98.7 °F (37.1 °C), Max:99 °F (37.2 °C)    DIET: ADULT DIET; Regular  CODE: Full Code    Intake/Output Summary (Last 24 hours) at 2022 1852  Last data filed at 2022 1435  Gross per 24 hour   Intake 3060 ml   Output --   Net 3060 ml         Review of Systems: All bolded are positive; please see HPI  General:  Fever, chills, diaphoresis, fatigue, malaise, night sweats, weight loss  Psychological:  Anxiety, disorientation, hallucinations. ENT:  Epistaxis, headaches, vertigo, visual changes.   Cardiovascular:  Chest pain, irregular heartbeats, palpitations, paroxysmal nocturnal dyspnea. Respiratory:  Shortness of breath, coughing, sputum production, hemoptysis, wheezing, orthopnea. Gastrointestinal:  Nausea, vomiting, diarrhea, heartburn, constipation, abdominal pain, hematemesis, hematochezia, melena, acholic stools  Genito-Urinary:  Dysuria, urgency, frequency, hematuria  Musculoskeletal:  Joint pain, joint stiffness, joint swelling, muscle pain  Neurology:  Headache, focal neurological deficits, weakness, numbness, paresthesia  Derm:  Rashes, ulcers, excoriations, bruising  Extremities:  Decreased ROM, peripheral edema, mottling    Objective:    /71   Pulse 92   Temp 99 °F (37.2 °C) (Oral)   Resp 18   Ht 5' 11\" (1.803 m)   Wt 226 lb 8 oz (102.7 kg)   SpO2 95%   BMI 31.59 kg/m²     General appearance: No apparent distress, appears stated age and cooperative. HEENT: Conjunctivae/corneas clear. Mucous membranes moist.  Neck: Supple. No JVD. Respiratory:  Clear to auscultation bilaterally. Normal respiratory effort. Cardiovascular:  RRR. S1, S2 without MRG. PV: Pulses palpable. No edema. Abdomen: Soft, non-tender, non-distended. +BS  Musculoskeletal: No obvious deformities. Skin: Normal skin color. No rashes or lesions. Good turgor. Neurologic:  Grossly non-focal. Awake, alert, following commands.    Psychiatric: Alert and oriented, thought content appropriate, normal insight and judgement    Medications:  REVIEWED DAILY    Infusion Medications    0.9% NaCl with KCl 20 mEq 125 mL/hr at 11/11/22 0144    sodium chloride      dextrose       Scheduled Medications    polyethylene glycol  17 g Oral Daily    senna  1 tablet Oral Nightly    midodrine  10 mg Oral TID WC    oxyCODONE-acetaminophen  1 tablet Oral Q6H    meclizine  25 mg Oral 3 times per day    folic acid  1 mg Oral Daily    amitriptyline  10 mg Oral Nightly    lidocaine  1 patch Topical Daily    pantoprazole  40 mg Oral QAM AC    sertraline  50 mg Oral Daily    sodium chloride flush  10 mL IntraVENous 2 times per day    heparin (porcine)  5,000 Units SubCUTAneous Q8H    insulin lispro  0-4 Units SubCUTAneous TID WC    insulin lispro  0-4 Units SubCUTAneous Nightly     PRN Meds: bisacodyl, HYDROmorphone, sodium chloride flush, sodium chloride flush, sodium chloride, ondansetron **OR** ondansetron, magnesium hydroxide, acetaminophen **OR** acetaminophen, glucose, dextrose bolus **OR** dextrose bolus, glucagon (rDNA), dextrose    Labs:     Recent Labs     11/09/22  0442 11/10/22  0617 11/11/22  0449   WBC 8.6 8.1 5.8   HGB 9.3* 10.0* 9.2*   HCT 29.0* 31.5* 29.6*    289 280         Recent Labs     11/09/22  0442 11/10/22  0617 11/11/22  0449    133 136   K 4.2 3.7 4.1   CL 98 94* 100   CO2 26 27 28   BUN 10 10 11   CREATININE 0.6 0.6 0.6   CALCIUM 9.4 9.4 9.3         No results for input(s): PROT, ALB, ALKPHOS, ALT, AST, BILITOT, AMYLASE, LIPASE in the last 72 hours. No results for input(s): INR in the last 72 hours. No results for input(s): Shane Ebbs in the last 72 hours. Chronic labs:    Lab Results   Component Value Date    CHOL 188 11/02/2022    TRIG 128 11/02/2022    HDL 37 11/02/2022    LDLCALC 125 (H) 11/02/2022    TSH 1.600 11/02/2022    LABA1C 6.8 (H) 11/06/2022       Radiology: REVIEWED DAILY    Assessment:  Dizziness, suspect Diabetic Autonomic Dysfunction, BPPV  Acute compression fracture of L2, L3, and L5  Accidental Fall  T2DM  Lateral wall subglottic tracheal mass, - ruled out on repeat imaging. Plan:    Continue Meclizine and IV hydration. Still reports feeling dizzy but a little better  Positive orthostatics, and now improving. Recheck in the AM.  Recommend out of bed to chair as tolerated with assistance in getting up.   Tried tramadol but did not help, Continue Percocet for better pain control   Fall precautions  PT/OT  Accuchecks and ssi  CTA of head and neck was ordered and revealed no hemodynamically significant stenosis or dissection of the cervical internal carotid arteries. However, a 3 mm x 6 mm x 4 mm focus of mucus versus soft tissue nodule along the right lateral wall of the subglottic trachea was appreciated. Recommendations were a nonemergent follow-up CT with contrast versus direct visualization as outpatient. - will order CT while inpatient. DVT Prophylaxis [] Lovenox  [x]  Heparin [] DOAC [] PCDs [] Ambulation    GI Prophylaxis [] PPI  [] H2 Blocker   [] Carafate  [x] Diet/Tube Feeds   Level of care [x] Med/Surg  [] Intermediate  []  ICU   Diet ADULT DIET; Regular    Family contact []  N/A    [] At bedside  [] Phone call   Disposition Patient requires continued admission lumbar back fracture will need PT OT consult prior to discharge   MDM [] Low    [x] Moderate  []   High       Discharge Plan: To home when dizziness is improved.    +++++++++++++++++++++++++++++++++++++++++++++++++  VALERIE Madera Physician - [address] [place] [address]  +++++++++++++++++++++++++++++++++++++++++++++++++  NOTE: This report was transcribed using voice recognition software. Every effort was made to ensure accuracy; however, inadvertent computerized transcription errors may be present.

## 2022-11-11 NOTE — PLAN OF CARE
Problem: Pain  Goal: Verbalizes/displays adequate comfort level or baseline comfort level  Outcome: Progressing     Problem: ABCDS Injury Assessment  Goal: Absence of physical injury  Outcome: Progressing  Flowsheets (Taken 11/10/2022 0825)  Absence of Physical Injury: Implement safety measures based on patient assessment     Problem: Safety - Adult  Goal: Free from fall injury  Outcome: Progressing     Problem: Chronic Conditions and Co-morbidities  Goal: Patient's chronic conditions and co-morbidity symptoms are monitored and maintained or improved  Outcome: Progressing  Flowsheets (Taken 11/10/2022 0825)  Care Plan - Patient's Chronic Conditions and Co-Morbidity Symptoms are Monitored and Maintained or Improved: Monitor and assess patient's chronic conditions and comorbid symptoms for stability, deterioration, or improvement

## 2022-11-11 NOTE — CARE COORDINATION
Plan is home with daughter and [facility] when medically ready. Home health care orders are in. Per internal med note yesterday, Lateral wall subglottic tracheal mass, incidental finding. Plan: Continue Meclizine, still reports feeling dizzy, monitor for improvement. Positive orthostatics, will bolus a liter of saline and continue IV maintenance. Recommend out of bed to chair as tolerated with assistance in getting up. Tried tramadol but did not help, will switch to Percocet for better pain control. Patient is on daily therapy TX's to focus on stairs once her dizziness is better controlled since plan is home and she has 13 steps to get to bed & bath. Patient's daughter will transport her home at time of discharge.     Shasha Parker RN CM  [phone]

## 2022-11-11 NOTE — PROGRESS NOTES
Occupational Therapy     Date:2022  Patient Name: Josi Chatterjee  MRN: [id number]  : 1970  Room: [address] [place] [address]-A     Completed chart review & attempted with pt reporting pain & dizziness and unable to tolerate, stating she worked with PT earlier. Will attempt to see pt at another time. Lang Schroeder.  [address] [place] [address], [address] [place] [address]

## 2022-11-12 LAB
METER GLUCOSE: 126 MG/DL (ref 74–99)
METER GLUCOSE: 127 MG/DL (ref 74–99)
METER GLUCOSE: 168 MG/DL (ref 74–99)
METER GLUCOSE: 169 MG/DL (ref 74–99)
METER GLUCOSE: 190 MG/DL (ref 74–99)

## 2022-11-12 PROCEDURE — 6370000000 HC RX 637 (ALT 250 FOR IP): Performed by: INTERNAL MEDICINE

## 2022-11-12 PROCEDURE — 1200000000 HC SEMI PRIVATE

## 2022-11-12 PROCEDURE — 6360000002 HC RX W HCPCS: Performed by: FAMILY MEDICINE

## 2022-11-12 PROCEDURE — 6360000002 HC RX W HCPCS: Performed by: INTERNAL MEDICINE

## 2022-11-12 PROCEDURE — 6370000000 HC RX 637 (ALT 250 FOR IP): Performed by: FAMILY MEDICINE

## 2022-11-12 PROCEDURE — 2580000003 HC RX 258: Performed by: INTERNAL MEDICINE

## 2022-11-12 PROCEDURE — 82962 GLUCOSE BLOOD TEST: CPT

## 2022-11-12 RX ADMIN — HYDROMORPHONE HYDROCHLORIDE 1 MG: 1 INJECTION, SOLUTION INTRAMUSCULAR; INTRAVENOUS; SUBCUTANEOUS at 12:57

## 2022-11-12 RX ADMIN — SODIUM CHLORIDE, PRESERVATIVE FREE 10 ML: 5 INJECTION INTRAVENOUS at 01:04

## 2022-11-12 RX ADMIN — MECLIZINE 25 MG: 12.5 TABLET ORAL at 06:24

## 2022-11-12 RX ADMIN — HYDROMORPHONE HYDROCHLORIDE 1 MG: 1 INJECTION, SOLUTION INTRAMUSCULAR; INTRAVENOUS; SUBCUTANEOUS at 01:03

## 2022-11-12 RX ADMIN — POTASSIUM CHLORIDE AND SODIUM CHLORIDE: 900; 150 INJECTION, SOLUTION INTRAVENOUS at 22:49

## 2022-11-12 RX ADMIN — SODIUM CHLORIDE, PRESERVATIVE FREE 10 ML: 5 INJECTION INTRAVENOUS at 20:11

## 2022-11-12 RX ADMIN — MIDODRINE HYDROCHLORIDE 10 MG: 10 TABLET ORAL at 16:54

## 2022-11-12 RX ADMIN — MIDODRINE HYDROCHLORIDE 10 MG: 10 TABLET ORAL at 08:30

## 2022-11-12 RX ADMIN — HEPARIN SODIUM 5000 UNITS: 10000 INJECTION INTRAVENOUS; SUBCUTANEOUS at 20:13

## 2022-11-12 RX ADMIN — SENNOSIDES 8.6 MG: 8.6 TABLET, FILM COATED ORAL at 20:06

## 2022-11-12 RX ADMIN — OXYCODONE AND ACETAMINOPHEN 1 TABLET: 325; 10 TABLET ORAL at 22:49

## 2022-11-12 RX ADMIN — SODIUM CHLORIDE, PRESERVATIVE FREE 10 ML: 5 INJECTION INTRAVENOUS at 08:18

## 2022-11-12 RX ADMIN — PANTOPRAZOLE SODIUM 40 MG: 40 TABLET, DELAYED RELEASE ORAL at 06:24

## 2022-11-12 RX ADMIN — MIDODRINE HYDROCHLORIDE 10 MG: 10 TABLET ORAL at 12:16

## 2022-11-12 RX ADMIN — MECLIZINE 25 MG: 12.5 TABLET ORAL at 14:34

## 2022-11-12 RX ADMIN — OXYCODONE AND ACETAMINOPHEN 1 TABLET: 325; 10 TABLET ORAL at 16:50

## 2022-11-12 RX ADMIN — POLYETHYLENE GLYCOL 3350 17 G: 17 POWDER, FOR SOLUTION ORAL at 08:17

## 2022-11-12 RX ADMIN — HEPARIN SODIUM 5000 UNITS: 10000 INJECTION INTRAVENOUS; SUBCUTANEOUS at 12:16

## 2022-11-12 RX ADMIN — HYDROMORPHONE HYDROCHLORIDE 1 MG: 1 INJECTION, SOLUTION INTRAMUSCULAR; INTRAVENOUS; SUBCUTANEOUS at 03:55

## 2022-11-12 RX ADMIN — SERTRALINE HYDROCHLORIDE 50 MG: 50 TABLET ORAL at 08:17

## 2022-11-12 RX ADMIN — HYDROMORPHONE HYDROCHLORIDE 1 MG: 1 INJECTION, SOLUTION INTRAMUSCULAR; INTRAVENOUS; SUBCUTANEOUS at 20:13

## 2022-11-12 RX ADMIN — MECLIZINE 25 MG: 12.5 TABLET ORAL at 20:06

## 2022-11-12 RX ADMIN — AMITRIPTYLINE HYDROCHLORIDE 10 MG: 10 TABLET, FILM COATED ORAL at 20:09

## 2022-11-12 RX ADMIN — HEPARIN SODIUM 5000 UNITS: 10000 INJECTION INTRAVENOUS; SUBCUTANEOUS at 06:24

## 2022-11-12 RX ADMIN — FOLIC ACID 1 MG: 1 TABLET ORAL at 08:17

## 2022-11-12 RX ADMIN — OXYCODONE AND ACETAMINOPHEN 1 TABLET: 325; 10 TABLET ORAL at 08:17

## 2022-11-12 ASSESSMENT — PAIN SCALES - GENERAL
PAINLEVEL_OUTOF10: 9
PAINLEVEL_OUTOF10: 10
PAINLEVEL_OUTOF10: 6
PAINLEVEL_OUTOF10: 9
PAINLEVEL_OUTOF10: 10
PAINLEVEL_OUTOF10: 10
PAINLEVEL_OUTOF10: 7
PAINLEVEL_OUTOF10: 10
PAINLEVEL_OUTOF10: 10

## 2022-11-12 ASSESSMENT — PAIN DESCRIPTION - FREQUENCY: FREQUENCY: INTERMITTENT

## 2022-11-12 ASSESSMENT — PAIN - FUNCTIONAL ASSESSMENT: PAIN_FUNCTIONAL_ASSESSMENT: PREVENTS OR INTERFERES SOME ACTIVE ACTIVITIES AND ADLS

## 2022-11-12 ASSESSMENT — PAIN DESCRIPTION - LOCATION
LOCATION: BACK

## 2022-11-12 ASSESSMENT — PAIN DESCRIPTION - ORIENTATION
ORIENTATION: RIGHT;LEFT
ORIENTATION: RIGHT;LEFT

## 2022-11-12 ASSESSMENT — PAIN DESCRIPTION - PAIN TYPE: TYPE: ACUTE PAIN

## 2022-11-12 ASSESSMENT — PAIN DESCRIPTION - DESCRIPTORS
DESCRIPTORS: ACHING;DISCOMFORT

## 2022-11-12 NOTE — PROGRESS NOTES
Hospitalist Progress Note      Synopsis:   Ms. Jakob Zuniga, a 45y.o. year old female  who  has a past medical history of Anemia, Diabetes mellitus presents with back pain after getting dizzy upon standing and falling on her back. CT showed Acute compression fracture of L2, L3, and L5. Neurosurgery was consulted and placed LSO brace. Mild improvement in back pain on analgesics. Pt's dizziness suspected to be due Diabetic Autonomic Neuropathy vs BPPV. Pt also Orthostatic. CTA of head and neck, no hemodynamically significant stenosis or dissection of the cervical internal carotid arteries. However, a 3 mm x 6 mm x 4 mm focus of mucus versus soft tissue nodule along the right lateral wall of the subglottic trachea was appreciated which was ruled out on repeat imaging. Anticipate DC to home when dizziness improves and back pain controlled with meds. Hospital day 7     Subjective:  Patient seen and examined at bedside, complains she still feels dizzy with ambulation but seeing some improvement today. Temp (24hrs), Av.6 °F (36.4 °C), Min:97.6 °F (36.4 °C), Max:97.6 °F (36.4 °C)    DIET: ADULT DIET; Regular  CODE: Full Code    Intake/Output Summary (Last 24 hours) at 2022 0955  Last data filed at 2022 1435  Gross per 24 hour   Intake 3060 ml   Output --   Net 3060 ml         Objective:    /70   Pulse 94   Temp 97.6 °F (36.4 °C) (Oral)   Resp 22   Ht 5' 11\" (1.803 m)   Wt 218 lb 7 oz (99.1 kg)   SpO2 95%   BMI 30.47 kg/m²     General appearance: No apparent distress, appears stated age and cooperative. HEENT: Conjunctivae/corneas clear. Mucous membranes moist.  Neck: Supple. No JVD. Respiratory:  Clear to auscultation bilaterally. Normal respiratory effort. Cardiovascular:  RRR. S1, S2 without MRG. PV: Pulses palpable. No edema. Abdomen: Soft, non-tender, non-distended. +BS  Musculoskeletal: No obvious deformities. Skin: Normal skin color.   No rashes or lesions. Good turgor. Neurologic:  Grossly non-focal. Awake, alert, following commands. Psychiatric: Alert and oriented, thought content appropriate, normal insight and judgement    Medications:  REVIEWED DAILY    Infusion Medications    0.9% NaCl with KCl 20 mEq 125 mL/hr at 11/11/22 0144    sodium chloride      dextrose       Scheduled Medications    polyethylene glycol  17 g Oral Daily    senna  1 tablet Oral Nightly    midodrine  10 mg Oral TID WC    oxyCODONE-acetaminophen  1 tablet Oral Q6H    meclizine  25 mg Oral 3 times per day    folic acid  1 mg Oral Daily    amitriptyline  10 mg Oral Nightly    lidocaine  1 patch Topical Daily    pantoprazole  40 mg Oral QAM AC    sertraline  50 mg Oral Daily    sodium chloride flush  10 mL IntraVENous 2 times per day    heparin (porcine)  5,000 Units SubCUTAneous Q8H    insulin lispro  0-4 Units SubCUTAneous TID WC    insulin lispro  0-4 Units SubCUTAneous Nightly     PRN Meds: bisacodyl, HYDROmorphone, sodium chloride flush, sodium chloride flush, sodium chloride, ondansetron **OR** ondansetron, magnesium hydroxide, acetaminophen **OR** acetaminophen, glucose, dextrose bolus **OR** dextrose bolus, glucagon (rDNA), dextrose    Labs:     Recent Labs     11/10/22  0617 11/11/22  0449   WBC 8.1 5.8   HGB 10.0* 9.2*   HCT 31.5* 29.6*    280         Recent Labs     11/10/22  0617 11/11/22  0449    136   K 3.7 4.1   CL 94* 100   CO2 27 28   BUN 10 11   CREATININE 0.6 0.6   CALCIUM 9.4 9.3         No results for input(s): PROT, ALB, ALKPHOS, ALT, AST, BILITOT, AMYLASE, LIPASE in the last 72 hours. No results for input(s): INR in the last 72 hours. No results for input(s): Lis Earnest in the last 72 hours.     Chronic labs:    Lab Results   Component Value Date    CHOL 188 11/02/2022    TRIG 128 11/02/2022    HDL 37 11/02/2022    LDLCALC 125 (H) 11/02/2022    TSH 1.600 11/02/2022    LABA1C 6.8 (H) 11/06/2022       Radiology: REVIEWED DAILY    Assessment:  Dizziness, suspect Diabetic Autonomic Neuropathy vs BPPV  Acute compression fracture of L2, L3, and L5  Accidental Fall  T2DM      Plan:  Continue Meclizine and IV hydration. Still reports feeling dizzy but a little better  Positive orthostatics, and now improving. Monitor. Recommend out of bed to chair as tolerated with assistance in getting up. Tried tramadol but did not help, Continue Percocet for better pain control   Fall precautions  PT/OT  Accuchecks and ssi  CTA of head and neck on admission showed possible soft tissue nodule along the right lateral wall of the subglottic trachea was appreciated. Repeat CT however said this was no longer present. DVT Prophylaxis [] Lovenox  [x]  Heparin [] DOAC [] PCDs [] Ambulation    GI Prophylaxis [] PPI  [] H2 Blocker   [] Carafate  [x] Diet/Tube Feeds   Level of care [x] Med/Surg  [] Intermediate  []  ICU   Diet ADULT DIET; Regular    Family contact []  N/A    [] At bedside  [] Phone call   Disposition Patient requires continued admission lumbar back fracture will need PT OT consult prior to discharge   MDM [] Low    [x] Moderate  []   High         Discharge Plan: To home when dizziness is improved.    +++++++++++++++++++++++++++++++++++++++++++++++++  VALERIE Clinton Physician - [address] [place] [address]  +++++++++++++++++++++++++++++++++++++++++++++++++  NOTE: This report was transcribed using voice recognition software. Every effort was made to ensure accuracy; however, inadvertent computerized transcription errors may be present.

## 2022-11-13 ENCOUNTER — APPOINTMENT (OUTPATIENT)
Dept: GENERAL RADIOLOGY | Age: 52
DRG: 551 | End: 2022-11-13
Payer: COMMERCIAL

## 2022-11-13 LAB
METER GLUCOSE: 128 MG/DL (ref 74–99)
METER GLUCOSE: 136 MG/DL (ref 74–99)
METER GLUCOSE: 137 MG/DL (ref 74–99)
METER GLUCOSE: 158 MG/DL (ref 74–99)
METER GLUCOSE: 177 MG/DL (ref 74–99)

## 2022-11-13 PROCEDURE — 1200000000 HC SEMI PRIVATE

## 2022-11-13 PROCEDURE — 6360000002 HC RX W HCPCS: Performed by: INTERNAL MEDICINE

## 2022-11-13 PROCEDURE — 71045 X-RAY EXAM CHEST 1 VIEW: CPT

## 2022-11-13 PROCEDURE — 6360000002 HC RX W HCPCS: Performed by: FAMILY MEDICINE

## 2022-11-13 PROCEDURE — 82962 GLUCOSE BLOOD TEST: CPT

## 2022-11-13 PROCEDURE — 6370000000 HC RX 637 (ALT 250 FOR IP): Performed by: FAMILY MEDICINE

## 2022-11-13 PROCEDURE — 6370000000 HC RX 637 (ALT 250 FOR IP): Performed by: INTERNAL MEDICINE

## 2022-11-13 RX ADMIN — PANTOPRAZOLE SODIUM 40 MG: 40 TABLET, DELAYED RELEASE ORAL at 05:07

## 2022-11-13 RX ADMIN — MIDODRINE HYDROCHLORIDE 10 MG: 10 TABLET ORAL at 17:56

## 2022-11-13 RX ADMIN — OXYCODONE AND ACETAMINOPHEN 1 TABLET: 325; 10 TABLET ORAL at 10:53

## 2022-11-13 RX ADMIN — SERTRALINE HYDROCHLORIDE 50 MG: 50 TABLET ORAL at 09:07

## 2022-11-13 RX ADMIN — HEPARIN SODIUM 5000 UNITS: 10000 INJECTION INTRAVENOUS; SUBCUTANEOUS at 05:08

## 2022-11-13 RX ADMIN — POLYETHYLENE GLYCOL 3350 17 G: 17 POWDER, FOR SOLUTION ORAL at 09:08

## 2022-11-13 RX ADMIN — HEPARIN SODIUM 5000 UNITS: 10000 INJECTION INTRAVENOUS; SUBCUTANEOUS at 20:52

## 2022-11-13 RX ADMIN — HYDROMORPHONE HYDROCHLORIDE 1 MG: 1 INJECTION, SOLUTION INTRAMUSCULAR; INTRAVENOUS; SUBCUTANEOUS at 13:28

## 2022-11-13 RX ADMIN — OXYCODONE AND ACETAMINOPHEN 1 TABLET: 325; 10 TABLET ORAL at 05:07

## 2022-11-13 RX ADMIN — MECLIZINE 25 MG: 12.5 TABLET ORAL at 05:07

## 2022-11-13 RX ADMIN — FOLIC ACID 1 MG: 1 TABLET ORAL at 09:07

## 2022-11-13 RX ADMIN — MECLIZINE 25 MG: 12.5 TABLET ORAL at 20:47

## 2022-11-13 RX ADMIN — MIDODRINE HYDROCHLORIDE 10 MG: 10 TABLET ORAL at 12:33

## 2022-11-13 RX ADMIN — OXYCODONE AND ACETAMINOPHEN 1 TABLET: 325; 10 TABLET ORAL at 22:33

## 2022-11-13 RX ADMIN — POTASSIUM CHLORIDE AND SODIUM CHLORIDE: 900; 150 INJECTION, SOLUTION INTRAVENOUS at 22:35

## 2022-11-13 RX ADMIN — SENNOSIDES 8.6 MG: 8.6 TABLET, FILM COATED ORAL at 20:47

## 2022-11-13 RX ADMIN — HYDROMORPHONE HYDROCHLORIDE 1 MG: 1 INJECTION, SOLUTION INTRAMUSCULAR; INTRAVENOUS; SUBCUTANEOUS at 01:45

## 2022-11-13 RX ADMIN — OXYCODONE AND ACETAMINOPHEN 1 TABLET: 325; 10 TABLET ORAL at 17:56

## 2022-11-13 RX ADMIN — HYDROMORPHONE HYDROCHLORIDE 1 MG: 1 INJECTION, SOLUTION INTRAMUSCULAR; INTRAVENOUS; SUBCUTANEOUS at 20:47

## 2022-11-13 RX ADMIN — AMITRIPTYLINE HYDROCHLORIDE 10 MG: 10 TABLET, FILM COATED ORAL at 22:33

## 2022-11-13 RX ADMIN — MIDODRINE HYDROCHLORIDE 10 MG: 10 TABLET ORAL at 09:08

## 2022-11-13 RX ADMIN — MECLIZINE 25 MG: 12.5 TABLET ORAL at 15:07

## 2022-11-13 ASSESSMENT — PAIN SCALES - GENERAL
PAINLEVEL_OUTOF10: 9
PAINLEVEL_OUTOF10: 10
PAINLEVEL_OUTOF10: 9
PAINLEVEL_OUTOF10: 10
PAINLEVEL_OUTOF10: 9

## 2022-11-13 ASSESSMENT — PAIN DESCRIPTION - LOCATION
LOCATION: BACK

## 2022-11-13 ASSESSMENT — PAIN DESCRIPTION - DESCRIPTORS
DESCRIPTORS: JABBING
DESCRIPTORS: ACHING
DESCRIPTORS: ACHING;DISCOMFORT
DESCRIPTORS: ACHING;DISCOMFORT
DESCRIPTORS: ACHING;DISCOMFORT;CRAMPING
DESCRIPTORS: ACHING;DISCOMFORT
DESCRIPTORS: ACHING;DISCOMFORT

## 2022-11-13 ASSESSMENT — PAIN DESCRIPTION - ORIENTATION
ORIENTATION: MID
ORIENTATION: RIGHT;LEFT
ORIENTATION: LOWER
ORIENTATION: LOWER
ORIENTATION: MID
ORIENTATION: LOWER
ORIENTATION: LOWER

## 2022-11-13 NOTE — PROGRESS NOTES
Physical Therapy  [facility]    Name: Morenita Church  : 1970  MRN: [id number]  Date of Service: 2022  Attempted to see this pt for a PT treatment, she refused to get OOB due to back pain. Will re-attempt PT treatment at a later date. Kalli Mckay.  Charanjit Current, [address] [place], [place] [address]larry Lakhani  number:  PT [id number]

## 2022-11-13 NOTE — PROGRESS NOTES
Hospitalist Progress Note      Synopsis:   Ms. Sonu Lcokhart, a 45y.o. year old female  who  has a past medical history of Anemia, Diabetes mellitus presents with back pain after getting dizzy upon standing and falling on her back. CT showed Acute compression fracture of L2, L3, and L5. Neurosurgery was consulted and placed LSO brace. Mild improvement in back pain on analgesics. Pt's dizziness suspected to be due Diabetic Autonomic Neuropathy vs BPPV. Pt also Orthostatic. CTA of head and neck, no hemodynamically significant stenosis or dissection of the cervical internal carotid arteries. However, a 3 mm x 6 mm x 4 mm focus of mucus versus soft tissue nodule along the right lateral wall of the subglottic trachea was appreciated which was ruled out on repeat imaging. Anticipate DC to home when dizziness improves and back pain controlled with meds. Hospital day 8     Subjective:  Patient seen and examined at bedside. She is complaining of pain in her neck/ear. +Dizziness. She is also c/o of lower back pain when she moves her right leg. Temp (24hrs), Av °F (36.7 °C), Min:97.8 °F (36.6 °C), Max:98.2 °F (36.8 °C)    DIET: ADULT DIET; Regular  CODE: Full Code    Intake/Output Summary (Last 24 hours) at 2022  Last data filed at 2022  Gross per 24 hour   Intake 200 ml   Output --   Net 200 ml         Objective:    BP 90/65   Pulse 79   Temp 98.2 °F (36.8 °C) (Oral)   Resp 18   Ht 5' 11\" (1.803 m)   Wt 218 lb 7 oz (99.1 kg)   SpO2 100%   BMI 30.47 kg/m²     General appearance: No apparent distress, appears stated age and cooperative. HEENT: Conjunctivae/corneas clear. Mucous membranes moist.  Neck: Supple. No JVD. TTP along the right shoulder. Respiratory:  Diminished breath Sounds (b/l)   Cardiovascular:  RRR. S1, S2 without MRG. PV: Pulses palpable. No edema. Abdomen: Soft, non-tender, non-distended. +BS  Musculoskeletal: No obvious deformities.    Skin: Normal skin color. No rashes or lesions. Good turgor. Neurologic:  Grossly non-focal. Awake, alert, following commands. Psychiatric: Alert and oriented, thought content appropriate, normal insight and judgement    Medications:  REVIEWED DAILY    Infusion Medications    0.9% NaCl with KCl 20 mEq 125 mL/hr at 11/12/22 2249    sodium chloride      dextrose       Scheduled Medications    polyethylene glycol  17 g Oral Daily    senna  1 tablet Oral Nightly    midodrine  10 mg Oral TID WC    oxyCODONE-acetaminophen  1 tablet Oral Q6H    meclizine  25 mg Oral 3 times per day    folic acid  1 mg Oral Daily    amitriptyline  10 mg Oral Nightly    lidocaine  1 patch Topical Daily    pantoprazole  40 mg Oral QAM AC    sertraline  50 mg Oral Daily    sodium chloride flush  10 mL IntraVENous 2 times per day    heparin (porcine)  5,000 Units SubCUTAneous Q8H    insulin lispro  0-4 Units SubCUTAneous TID WC    insulin lispro  0-4 Units SubCUTAneous Nightly     PRN Meds: bisacodyl, HYDROmorphone, sodium chloride flush, sodium chloride flush, sodium chloride, ondansetron **OR** ondansetron, magnesium hydroxide, acetaminophen **OR** acetaminophen, glucose, dextrose bolus **OR** dextrose bolus, glucagon (rDNA), dextrose    Labs:     Recent Labs     11/11/22  0449   WBC 5.8   HGB 9.2*   HCT 29.6*            Recent Labs     11/11/22  0449      K 4.1      CO2 28   BUN 11   CREATININE 0.6   CALCIUM 9.3         No results for input(s): PROT, ALB, ALKPHOS, ALT, AST, BILITOT, AMYLASE, LIPASE in the last 72 hours. No results for input(s): INR in the last 72 hours. No results for input(s): Elie Gubler in the last 72 hours.     Chronic labs:    Lab Results   Component Value Date    CHOL 188 11/02/2022    TRIG 128 11/02/2022    HDL 37 11/02/2022    LDLCALC 125 (H) 11/02/2022    TSH 1.600 11/02/2022    LABA1C 6.8 (H) 11/06/2022       Radiology: REVIEWED DAILY    Assessment:    45y.o. year old female  who has a past medical history of Anemia, Diabetes mellitus presents with back pain after getting dizzy upon standing and falling on her back. CT showed Acute compression fracture of L2, L3, and L5. Neurosurgery was consulted and placed LSO brace. Mild improvement in back pain on analgesics. Pt's dizziness suspected to be due Diabetic Autonomic Neuropathy vs BPPV. Pt also Orthostatic. CTA of head and neck, no hemodynamically significant stenosis or dissection of the cervical internal carotid arteries. However, a 3 mm x 6 mm x 4 mm focus of mucus versus soft tissue nodule along the right lateral wall of the subglottic trachea was appreciated which was ruled out on repeat imaging. Anticipate DC to home when dizziness improves and back pain controlled with meds    Plan:  1.) Dizziness/Syncope: As per the NP's note on 11/7/22 in the synopsis: \" Additionally, but since she has complaints of dizziness since yesterday evening while she was going to the bathroom, causing her to lose consciousness. \"   Was suspected the dizziness was secondary to diabetic autonomic neuropathy vs BPPV. CT head 11/6/22: No acute abnormalities. PT/OT following. As per the notes, Patient is orthostatic positive. On Midodrine. Will repeat Orthostatics for the am.   Will order compression stockings. TSH was WNL. B12 levels are pending. ON IVF; Will consult neurology if there are any further recommendation at this time. 2.) Pain in her right ear, that can extend to her buccal region/temple/neck and shoulder. CTA of head and neck on admission showed possible soft tissue nodule along the right lateral wall of the subglottic trachea was appreciated. Repeat CT however said this was no longer present.    - patient states that the pain started to occur after her gastric bypass in August.   As per her that she saw ENT/Rheum/Neurology in the past.    3.) Acute compression fracture of L2, L3, and L5; Seen by neurosurgery.   --LSO for comfort when >45 degrees  -Follow up in clinic in 4 weeks with lumbar XR (Dr. Vanna Kendrick)    4.) DMII: hbA1c: 6.8. SSI     5.) Patient's states that her roommate tested positive for COVID: So there is risk for exposure. 11/8/22: test was negative. 6.) Diminished breath Sounds: Chest xray is pending. IS ordered. 7.) DVT proph: heparin    DVT Prophylaxis [] Lovenox  [x]  Heparin [] DOAC [] PCDs [] Ambulation    GI Prophylaxis [] PPI  [] H2 Blocker   [] Carafate  [x] Diet/Tube Feeds   Level of care [x] Med/Surg  [] Intermediate  []  ICU   Diet ADULT DIET; Regular    Family contact []  N/A    [] At bedside  [] Phone call   Disposition Patient requires continued admission lumbar back fracture will need PT OT consult prior to discharge   MDM [] Low    [x] Moderate  []   High         Discharge Plan: To home when dizziness is improved. Baylee Davis MD    +++++++++++++++++++++++++++++++++++++++++++++++++  [employer] - [address] [facility], [place]  +++++++++++++++++++++++++++++++++++++++++++++++++  NOTE: This report was transcribed using voice recognition software. Every effort was made to ensure accuracy; however, inadvertent computerized transcription errors may be present.

## 2022-11-13 NOTE — PLAN OF CARE
Problem: Pain  Goal: Verbalizes/displays adequate comfort level or baseline comfort level  Outcome: Progressing     Problem: ABCDS Injury Assessment  Goal: Absence of physical injury  Outcome: Progressing  Flowsheets (Taken 11/13/2022 0907)  Absence of Physical Injury: Implement safety measures based on patient assessment     Problem: Safety - Adult  Goal: Free from fall injury  Outcome: Progressing  Flowsheets (Taken 11/13/2022 0907)  Free From Fall Injury: Instruct family/caregiver on patient safety     Problem: Chronic Conditions and Co-morbidities  Goal: Patient's chronic conditions and co-morbidity symptoms are monitored and maintained or improved  Outcome: Progressing  Flowsheets (Taken 11/13/2022 0907)  Care Plan - Patient's Chronic Conditions and Co-Morbidity Symptoms are Monitored and Maintained or Improved: Monitor and assess patient's chronic conditions and comorbid symptoms for stability, deterioration, or improvement     Problem: Skin/Tissue Integrity  Goal: Absence of new skin breakdown  Description: 1. Monitor for areas of redness and/or skin breakdown  2. Assess vascular access sites hourly  3. Every 4-6 hours minimum:  Change oxygen saturation probe site  4. Every 4-6 hours:  If on nasal continuous positive airway pressure, respiratory therapy assess nares and determine need for appliance change or resting period.   Outcome: Progressing

## 2022-11-14 LAB
ANION GAP SERPL CALCULATED.3IONS-SCNC: 8 MMOL/L (ref 7–16)
BASOPHILS ABSOLUTE: 0.05 E9/L (ref 0–0.2)
BASOPHILS RELATIVE PERCENT: 0.9 % (ref 0–2)
BUN BLDV-MCNC: 9 MG/DL (ref 6–20)
CALCIUM SERPL-MCNC: 9.8 MG/DL (ref 8.6–10.2)
CHLORIDE BLD-SCNC: 100 MMOL/L (ref 98–107)
CO2: 28 MMOL/L (ref 22–29)
CREAT SERPL-MCNC: 0.6 MG/DL (ref 0.5–1)
EOSINOPHILS ABSOLUTE: 0.23 E9/L (ref 0.05–0.5)
EOSINOPHILS RELATIVE PERCENT: 4 % (ref 0–6)
GFR SERPL CREATININE-BSD FRML MDRD: >60 ML/MIN/1.73
GLUCOSE BLD-MCNC: 117 MG/DL (ref 74–99)
HCT VFR BLD CALC: 32 % (ref 34–48)
HEMOGLOBIN: 9.8 G/DL (ref 11.5–15.5)
IMMATURE GRANULOCYTES #: 0.02 E9/L
IMMATURE GRANULOCYTES %: 0.3 % (ref 0–5)
LYMPHOCYTES ABSOLUTE: 1.34 E9/L (ref 1.5–4)
LYMPHOCYTES RELATIVE PERCENT: 23.1 % (ref 20–42)
MAGNESIUM: 1.7 MG/DL (ref 1.6–2.6)
MCH RBC QN AUTO: 27.6 PG (ref 26–35)
MCHC RBC AUTO-ENTMCNC: 30.6 % (ref 32–34.5)
MCV RBC AUTO: 90.1 FL (ref 80–99.9)
METER GLUCOSE: 100 MG/DL (ref 74–99)
METER GLUCOSE: 113 MG/DL (ref 74–99)
METER GLUCOSE: 121 MG/DL (ref 74–99)
METER GLUCOSE: 164 MG/DL (ref 74–99)
MONOCYTES ABSOLUTE: 0.39 E9/L (ref 0.1–0.95)
MONOCYTES RELATIVE PERCENT: 6.7 % (ref 2–12)
NEUTROPHILS ABSOLUTE: 3.78 E9/L (ref 1.8–7.3)
NEUTROPHILS RELATIVE PERCENT: 65 % (ref 43–80)
PDW BLD-RTO: 15.3 FL (ref 11.5–15)
PHOSPHORUS: 3.9 MG/DL (ref 2.5–4.5)
PLATELET # BLD: 292 E9/L (ref 130–450)
PMV BLD AUTO: 8.9 FL (ref 7–12)
POTASSIUM SERPL-SCNC: 4.2 MMOL/L (ref 3.5–5)
RBC # BLD: 3.55 E12/L (ref 3.5–5.5)
SODIUM BLD-SCNC: 136 MMOL/L (ref 132–146)
VITAMIN B-12: 1061 PG/ML (ref 211–946)
WBC # BLD: 5.8 E9/L (ref 4.5–11.5)

## 2022-11-14 PROCEDURE — 97535 SELF CARE MNGMENT TRAINING: CPT

## 2022-11-14 PROCEDURE — 6370000000 HC RX 637 (ALT 250 FOR IP): Performed by: FAMILY MEDICINE

## 2022-11-14 PROCEDURE — 85025 COMPLETE CBC W/AUTO DIFF WBC: CPT

## 2022-11-14 PROCEDURE — 99222 1ST HOSP IP/OBS MODERATE 55: CPT | Performed by: PSYCHIATRY & NEUROLOGY

## 2022-11-14 PROCEDURE — 6370000000 HC RX 637 (ALT 250 FOR IP): Performed by: INTERNAL MEDICINE

## 2022-11-14 PROCEDURE — 6360000002 HC RX W HCPCS: Performed by: FAMILY MEDICINE

## 2022-11-14 PROCEDURE — 6370000000 HC RX 637 (ALT 250 FOR IP): Performed by: STUDENT IN AN ORGANIZED HEALTH CARE EDUCATION/TRAINING PROGRAM

## 2022-11-14 PROCEDURE — 6360000002 HC RX W HCPCS: Performed by: INTERNAL MEDICINE

## 2022-11-14 PROCEDURE — 82962 GLUCOSE BLOOD TEST: CPT

## 2022-11-14 PROCEDURE — 83735 ASSAY OF MAGNESIUM: CPT

## 2022-11-14 PROCEDURE — 1200000000 HC SEMI PRIVATE

## 2022-11-14 PROCEDURE — 2580000003 HC RX 258: Performed by: INTERNAL MEDICINE

## 2022-11-14 PROCEDURE — 80048 BASIC METABOLIC PNL TOTAL CA: CPT

## 2022-11-14 PROCEDURE — 36415 COLL VENOUS BLD VENIPUNCTURE: CPT

## 2022-11-14 PROCEDURE — 82607 VITAMIN B-12: CPT

## 2022-11-14 PROCEDURE — 84100 ASSAY OF PHOSPHORUS: CPT

## 2022-11-14 RX ORDER — BACLOFEN 10 MG/1
10 TABLET ORAL EVERY 8 HOURS PRN
Status: DISCONTINUED | OUTPATIENT
Start: 2022-11-14 | End: 2022-11-16

## 2022-11-14 RX ADMIN — MIDODRINE HYDROCHLORIDE 10 MG: 10 TABLET ORAL at 08:31

## 2022-11-14 RX ADMIN — OXYCODONE AND ACETAMINOPHEN 1 TABLET: 325; 10 TABLET ORAL at 04:35

## 2022-11-14 RX ADMIN — BACLOFEN 10 MG: 10 TABLET ORAL at 15:07

## 2022-11-14 RX ADMIN — SENNOSIDES 8.6 MG: 8.6 TABLET, FILM COATED ORAL at 20:43

## 2022-11-14 RX ADMIN — OXYCODONE AND ACETAMINOPHEN 1 TABLET: 325; 10 TABLET ORAL at 17:31

## 2022-11-14 RX ADMIN — HEPARIN SODIUM 5000 UNITS: 10000 INJECTION INTRAVENOUS; SUBCUTANEOUS at 11:58

## 2022-11-14 RX ADMIN — MIDODRINE HYDROCHLORIDE 10 MG: 10 TABLET ORAL at 11:52

## 2022-11-14 RX ADMIN — SODIUM CHLORIDE, PRESERVATIVE FREE 10 ML: 5 INJECTION INTRAVENOUS at 20:44

## 2022-11-14 RX ADMIN — SODIUM CHLORIDE, PRESERVATIVE FREE 10 ML: 5 INJECTION INTRAVENOUS at 08:33

## 2022-11-14 RX ADMIN — HYDROMORPHONE HYDROCHLORIDE 1 MG: 1 INJECTION, SOLUTION INTRAMUSCULAR; INTRAVENOUS; SUBCUTANEOUS at 01:45

## 2022-11-14 RX ADMIN — MECLIZINE 25 MG: 12.5 TABLET ORAL at 15:07

## 2022-11-14 RX ADMIN — FOLIC ACID 1 MG: 1 TABLET ORAL at 08:31

## 2022-11-14 RX ADMIN — HYDROMORPHONE HYDROCHLORIDE 1 MG: 1 INJECTION, SOLUTION INTRAMUSCULAR; INTRAVENOUS; SUBCUTANEOUS at 08:41

## 2022-11-14 RX ADMIN — HEPARIN SODIUM 5000 UNITS: 10000 INJECTION INTRAVENOUS; SUBCUTANEOUS at 20:41

## 2022-11-14 RX ADMIN — PANTOPRAZOLE SODIUM 40 MG: 40 TABLET, DELAYED RELEASE ORAL at 05:09

## 2022-11-14 RX ADMIN — OXYCODONE AND ACETAMINOPHEN 1 TABLET: 325; 10 TABLET ORAL at 22:36

## 2022-11-14 RX ADMIN — HYDROMORPHONE HYDROCHLORIDE 1 MG: 1 INJECTION, SOLUTION INTRAMUSCULAR; INTRAVENOUS; SUBCUTANEOUS at 20:43

## 2022-11-14 RX ADMIN — OXYCODONE AND ACETAMINOPHEN 1 TABLET: 325; 10 TABLET ORAL at 11:52

## 2022-11-14 RX ADMIN — HEPARIN SODIUM 5000 UNITS: 10000 INJECTION INTRAVENOUS; SUBCUTANEOUS at 04:35

## 2022-11-14 RX ADMIN — POLYETHYLENE GLYCOL 3350 17 G: 17 POWDER, FOR SOLUTION ORAL at 08:30

## 2022-11-14 RX ADMIN — AMITRIPTYLINE HYDROCHLORIDE 10 MG: 10 TABLET, FILM COATED ORAL at 20:43

## 2022-11-14 RX ADMIN — MECLIZINE 25 MG: 12.5 TABLET ORAL at 20:43

## 2022-11-14 RX ADMIN — SERTRALINE HYDROCHLORIDE 50 MG: 50 TABLET ORAL at 08:30

## 2022-11-14 RX ADMIN — MECLIZINE 25 MG: 12.5 TABLET ORAL at 05:09

## 2022-11-14 RX ADMIN — MIDODRINE HYDROCHLORIDE 10 MG: 10 TABLET ORAL at 17:31

## 2022-11-14 ASSESSMENT — PAIN DESCRIPTION - LOCATION
LOCATION: BACK

## 2022-11-14 ASSESSMENT — ENCOUNTER SYMPTOMS
VOMITING: 0
BACK PAIN: 1
SHORTNESS OF BREATH: 0
TROUBLE SWALLOWING: 0
NAUSEA: 0
VOICE CHANGE: 1

## 2022-11-14 ASSESSMENT — PAIN DESCRIPTION - DESCRIPTORS
DESCRIPTORS: ACHING;JABBING
DESCRIPTORS: THROBBING
DESCRIPTORS: CRAMPING
DESCRIPTORS: ACHING

## 2022-11-14 ASSESSMENT — PAIN DESCRIPTION - ORIENTATION
ORIENTATION: MID
ORIENTATION: LOWER;MID
ORIENTATION: MID
ORIENTATION: MID

## 2022-11-14 NOTE — CARE COORDINATION
Plan is home with daughter and [facility] when medically ready. Home health care orders are in. per internal med note from yesterday, patient is complaining of pain in her neck/ear. +Dizziness. She is also c/o of lower back pain when she moves her right leg. Patient is orthostatic positive. On Midodrine. Will repeat Orthostatics for the am. Will order compression stockings. .. B12 levels are pending. ON IVF; Will consult neurology if there are any further recommendation at this time. Pain in her right ear, that can extend to her buccal region/temple/neck and shoulder. ..patient states that the pain started to occur after her gastric bypass in August. As per her that she saw ENT/Rheum/Neurology in the past. CXR today was negative. Patient is on daily therapy TX's to focus on stairs once her dizziness is better controlled since plan is home and she has 13 steps to get to bed & bath. Patient's daughter will transport her home at time of discharge.     Arthur Goodwin RN   [phone]

## 2022-11-14 NOTE — PROGRESS NOTES
Hospitalist Progress Note      Synopsis: Ms. Ana Paula Guaman, a 45y.o. year old female  who  has a past medical history of Anemia, Diabetes mellitus presents with back pain after getting dizzy upon standing and falling on her back. CT showed Acute compression fracture of L2, L3, and L5. Neurosurgery was consulted and placed LSO brace. Mild improvement in back pain on analgesics. Pt's dizziness suspected to be due Diabetic Autonomic Neuropathy vs BPPV. Pt also Orthostatic. CTA of head and neck, no hemodynamically significant stenosis or dissection of the cervical internal carotid arteries. However, a 3 mm x 6 mm x 4 mm focus of mucus versus soft tissue nodule along the right lateral wall of the subglottic trachea was appreciated which was ruled out on repeat imaging. Anticipate DC to home when dizziness improves and back pain controlled with meds. Hospital day 9     Subjective:  Patient assessed at bedside, alert, oriented, answering questions appropriately   Patient continues to state 10/10 lower back pain radiating down both gluteal muscles  Patient states paraesthesia to bilateral lower extremities  Patient states ear pain that migrates from right to left intermittently  Patient for MRI today   XR chest portable resulted no acute process  Neurology following  Patient without further medical complaints at time of assessment  Records reviewed. Temp (24hrs), Av.9 °F (36.6 °C), Min:97.8 °F (36.6 °C), Max:98 °F (36.7 °C)    DIET: ADULT DIET; Regular  CODE: Full Code  No intake or output data in the 24 hours ending 22 0258    Review of Systems: All bolded are positive; please see HPI  General:  Fever, chills, diaphoresis, fatigue, malaise, night sweats, weight loss  Psychological:  Anxiety, disorientation, hallucinations. ENT:  Epistaxis, headaches, vertigo, visual changes. Cardiovascular:  Chest pain, irregular heartbeats, palpitations, paroxysmal nocturnal dyspnea.   Respiratory: Shortness of breath, coughing, sputum production, hemoptysis, wheezing, orthopnea. Gastrointestinal:  Nausea, vomiting, diarrhea, heartburn, constipation, abdominal pain, hematemesis, hematochezia, melena, acholic stools  Genito-Urinary:  Dysuria, urgency, frequency, hematuria  Musculoskeletal:  Joint pain, joint stiffness, joint swelling, muscle pain  Neurology:  Headache, focal neurological deficits, weakness, numbness, paresthesia  Derm:  Rashes, ulcers, excoriations, bruising  Extremities:  Decreased ROM, peripheral edema, mottling    Objective:    /77   Pulse 93   Temp 97.8 °F (36.6 °C) (Oral)   Resp 18   Ht 5' 11\" (1.803 m)   Wt 218 lb 7 oz (99.1 kg)   SpO2 100%   BMI 30.47 kg/m²     General appearance: No apparent distress, appears stated age and cooperative. HEENT: Conjunctivae/corneas clear. Mucous membranes moist.  Neck: Supple. No JVD. Respiratory:  Diminshed to auscultation bilaterally. Normal respiratory effort. Cardiovascular:  RRR. S1, S2 without MRG. PV: Pulses palpable. No edema. Abdomen: Soft, non-tender, non-distended. +BS  Musculoskeletal: No obvious deformities. Skin: Normal skin color. No rashes or lesions. Good turgor. Neurologic:  Grossly non-focal. Awake, alert, following commands.    Psychiatric: Alert and oriented, thought content appropriate, normal insight and judgement    Medications:  REVIEWED DAILY    Infusion Medications    sodium chloride      dextrose       Scheduled Medications    polyethylene glycol  17 g Oral Daily    senna  1 tablet Oral Nightly    midodrine  10 mg Oral TID WC    oxyCODONE-acetaminophen  1 tablet Oral Q6H    meclizine  25 mg Oral 3 times per day    folic acid  1 mg Oral Daily    amitriptyline  10 mg Oral Nightly    lidocaine  1 patch Topical Daily    pantoprazole  40 mg Oral QAM AC    sertraline  50 mg Oral Daily    sodium chloride flush  10 mL IntraVENous 2 times per day    heparin (porcine)  5,000 Units SubCUTAneous Q8H    insulin lispro  0-4 Units SubCUTAneous TID     insulin lispro  0-4 Units SubCUTAneous Nightly     PRN Meds: baclofen, bisacodyl, HYDROmorphone, sodium chloride flush, sodium chloride flush, sodium chloride, ondansetron **OR** ondansetron, magnesium hydroxide, acetaminophen **OR** acetaminophen, glucose, dextrose bolus **OR** dextrose bolus, glucagon (rDNA), dextrose    Labs:     Recent Labs     11/14/22 0432   WBC 5.8   HGB 9.8*   HCT 32.0*          Recent Labs     11/14/22 0432      K 4.2      CO2 28   BUN 9   CREATININE 0.6   CALCIUM 9.8   PHOS 3.9       No results for input(s): PROT, ALB, ALKPHOS, ALT, AST, BILITOT, AMYLASE, LIPASE in the last 72 hours. No results for input(s): INR in the last 72 hours. No results for input(s): Rupali Bigness in the last 72 hours. Chronic labs:    Lab Results   Component Value Date    CHOL 188 11/02/2022    TRIG 128 11/02/2022    HDL 37 11/02/2022    LDLCALC 125 (H) 11/02/2022    TSH 1.600 11/02/2022    LABA1C 6.8 (H) 11/06/2022       Radiology: REVIEWED DAILY    Assessment & Plan:    1.) Dizziness/Syncope: As per the NP's note on 11/7/22 in the synopsis: \" Additionally, but since she has complaints of dizziness since yesterday evening while she was going to the bathroom, causing her to lose consciousness. \"   Was suspected the dizziness was secondary to diabetic autonomic neuropathy vs BPPV. CT head 11/6/22: No acute abnormalities. PT/OT following. As per the notes, Patient is orthostatic positive. On Midodrine. Will repeat Orthostatics for the am.   Will order compression stockings. TSH was WNL. B12 levels 1061  ON IVF; Neurology following     2.) Pain in her right ear, that can extend to her buccal region/temple/neck and shoulder. CTA of head and neck on admission showed possible soft tissue nodule along the right lateral wall of the subglottic trachea was appreciated. Repeat CT however said this was no longer present.    - patient states that the pain started to occur after her gastric bypass in August.   As per her that she saw ENT/Rheum/Neurology in the past.     3.) Acute compression fracture of L2, L3, and L5; Seen by neurosurgery.   --LSO for comfort when >45 degrees  -Follow up in clinic in 4 weeks with lumbar XR (Dr. Gail Blanchard)     4.) DMII: hbA1c: 6.8. SSI      5.) Patient's states that her roommate tested positive for COVID: So there is risk for exposure. 11/8/22: test was negative. 6.) Diminished breath Sounds: Chest xray - No acute process     7.) DVT proph: heparin    DVT Prophylaxis [] Lovenox  [x]  Heparin [] DOAC [] PCDs [] Ambulation    GI Prophylaxis [x] PPI  [] H2 Blocker   [] Carafate  [] Diet/Tube Feeds   Level of care [x] Med/Surg  [] Intermediate  []  ICU   Diet ADULT DIET; Regular    Family contact [x]  N/A    [] At bedside  [] Phone call     Discharge Plan: To home once medically stable    +++++++++++++++++++++++++++++++++++++++++++++++++  VALERIE Sandoval Physician - [address] [place] [address], [facility]  +++++++++++++++++++++++++++++++++++++++++++++++++  NOTE: This report was transcribed using voice recognition software. Every effort was made to ensure accuracy; however, inadvertent computerized transcription errors may be present.

## 2022-11-14 NOTE — CONSULTS
[facility]  Neurology Consult    Date:  11/14/2022  Patient Name:  Antonia Hodge  YOB: 1970  MRN: [id number]     PCP:  Helen Sanchez MD   Referring:  No ref. provider found      Reason for consult: syncope, dizziness and neck pain. History obtained from: patient and medical record. Assessment  Antonia Hodge is a 45 y.o. female with:  Orthostatic syncope, most likely 2/2 diabetic autonomic neuropathy given her history  Occipital neuralgia, more prominent on right than left with significant bilateral sternocleidomastoid muscle tension. Plan  Ordered MRI w/o contrast.  Baclofen 10 mg PO prn. Consider occipital nerve blocks in the future    History of Present Illness:  Antonia Hodge is a 45 y.o. female with PMHx of anemia, DM w/ peripheral neuropathy and recent bariatric surgery presenting for evaluation of dizziness, syncope and neck pain. Pt had a bariatric surgery on 6/0/57 with no complications, but a week later started having a shooting right ear/neck pain that radiated in a band like fashion to her left ear and up to her frontal head. Notes that it is constant and unbearable. Tyelnol 650 mg was taken w/ no relief to sx so pt started taking Aleve and Advil that were contraindicated 2/2 surgery. Her evaluation by ENT was normal, rheumatology put her on methotrexate for increased RF and inflammatory markers. She was also seen by neurology and put on Duloxetine with no improvement in pain. Pt used Flexeril with mild relief to her pain. Pt states on 10/17 she had 2 syncopal episodes at work in which she felt dizzy and lost consciousness. Nine days ago as she got up to use the restroom, she felt dizzy and heavier (\"something weighing me down\") after which she had a syncopal episode.  Pt remembers falling but does not remember gaining consciousness 5 minutes later and taking unclearly to her daughter Disha Andrea was told my voice was not right, it was motorized\". The next thing she remembers is her being in the ED. Denied vision changes, palpitations, diaphoresis, SOB, dizziness induced by head movements, hx of migraines, gastroparesis and retinal neuropathy. In the ED, she was found to have  compression fractures of L2,3 and 5. CT and CTA head were negative. Neurosurgery was consulted and provided a LSO brace, no plan for intervention. During admission pt was found to be orthostatic positive, she was started on Midodrine. Today she is complaining of mild back pain. In addition, pt states that she had DM for almost 30 years and it was poorly controlled in the past (A1c 13-14%). Her Hgb A1c prior to the surgery was 8.4%, currently her A1c is 6.8%. She lost 90 lbs since her surgery. Review of Systems:  Review of Systems   Constitutional:  Negative for chills, diaphoresis and fever. HENT:  Positive for ear pain and voice change. Negative for hearing loss, tinnitus and trouble swallowing. Eyes:  Negative for visual disturbance. Respiratory:  Negative for shortness of breath. Cardiovascular:  Negative for chest pain and palpitations. Gastrointestinal:  Negative for nausea and vomiting. Musculoskeletal:  Positive for back pain and neck pain. Negative for gait problem and neck stiffness. Skin:  Negative for rash. Neurological:  Positive for dizziness, syncope, speech difficulty and headaches. Negative for seizures, facial asymmetry, weakness, light-headedness and numbness. Psychiatric/Behavioral:  Positive for confusion. All other systems reviewed and are negative.       Medical History:   Past Medical History:   Diagnosis Date    Anemia     Diabetes mellitus ([address]     Papanicolaou smear of cervix with atypical squamous cells of undetermined significance (ASC-US)     1995     Screening for human papillomavirus (HPV)     4/10/07         Surgical History:   Past Surgical History:   Procedure Laterality Date    COLPOSCOPY      2001 : outside [place]     [place]STR[place]      [place]      [place]ERV[place]KNIFE/LASER      2001 : outside [place]         Family History:  Family History   Problem Relation Age of Onset    Diabetes Mother     Hypertension Mother     Diabetes Father     Hypertension Father     Gout Father     Heart Disease None     Ovarian Cancer None     Uterine Cancer None     Breast Cancer None          Social History:  Social History     Tobacco Use    Smoking status: Former    Smokeless tobacco: Never    Tobacco comments:     Quit smokin2002   Vaping Use    Vaping Use: Never used   Substance Use Topics    Drug use: No        Home Medications:      Current Outpatient Medications   Medication Instructions    amitriptyline (ELAVIL) 10 mg, Oral, NIGHTLY    CALCIUM PO 2 tablets, Oral, DAILY    cyclobenzaprine (FLEXERIL) 10 mg, Oral, NIGHTLY PRN    DULoxetine (CYMBALTA) 30 MG extended release capsule take 1 capsule by mouth once daily for 2 weeks, then increase to 2 capsules daily    fluticasone (FLONASE) 50 MCG/ACT nasal spray 1-2 sprays, Nasal, DAILY PRN    folic acid (FOLVITE) 1 mg, Oral, DAILY    gabapentin (NEURONTIN) 100 mg, Oral, NIGHTLY, Gabapentin start with 100 mg daily, increase to 100 mg 1-3 times daily next week.    lidocaine (LIDODERM) 5 % 1 patch, TransDERmal, EVERY 24 HOURS    loratadine (CLARITIN) 10 mg, Oral, DAILY PRN    methotrexate (RHEUMATREX) 10 mg, Oral, WEEKLY, ()    Multiple Vitamin (MULTIVITAMINS PO) 3 tablets, Oral, DAILY    pantoprazole (PROTONIX) 40 mg, Oral, DAILY    sertraline (ZOLOFT) 50 mg, Oral, DAILY       Allergies:       Allergies   Allergen Reactions    Colchicine     Darvon [Propoxyphene] Other (See Comments)     GI Upset          Physical Examination  Vitals   Vitals:    22 0215 22 0435 22 0830 22 1731   BP:   101/77 115/79   Pulse:   93 Resp: 17 16 18    Temp:   97.8 °F (36.6 °C)    TempSrc:   Oral    SpO2:   100%    Weight:       Height:            General: Patient appears in no acute distress. Awake and oriented x 3. HEENT: Normocephalic, atraumatic  Chest: Clear to auscultation bilaterally  Heart: No murmurs appreciated  Extremities/Peripheral vascular: No edema/swelling noted. No cold limbs noted. Neurologic Examination    Mental Status  Alert, and oriented to person, place, and time. Speech is fluent with intact comprehension. No evidence of memory impairment. Attention and concentration appear normal.     Cranial Nerves  II, III, IV, VI: Pupils equally round and reactive to light. EOM intact, no nystagmus. V. Facial sensation intact to light touch bilaterally. VII: Facial movements symmetric and strong. VIII: Hearing intact to voice. IX,X: Palate elevates symmetrically. No dysarthria. XI: Sternocleidomastoid and trapezius 5/5 bilaterally. XII: Tongue is midline. Motor  Spontaneously moves all extremities. Right Left   Deltoid 5 5   Biceps 5 5   Triceps 5 5   Handgrip 5 5   Hip Flexion 5 5   Ankle Dorsiflexion 5 5   Ankle Plantarflexion 5 5     Tone: Normal in all four limbs    Bulk: Normal in all four limbs with no evidence of atrophy    Pronator drift: absent bilaterally    Sensation  Light Touch: Intact distally in all four limbs. Pinprick: decreased in bilateral hands up to wrists and bilateral lower extremities up to just above her knees. Vibration: Diminished up to wrists bilaterally.  Decreased up to knees in BL LEs    Reflexes     Right Left   Biceps 2 2   Brachioradialis 2 2   Patellar 2 2   Achilles 2 2   Ankle Clonus     Babinski absent absent     Coordination  Finger to nose testing normal bilaterally    Gait  Deferred for safety/fall consideration      Labs  Recent Labs     11/14/22  0432      K 4.2      CO2 28   BUN 9   CREATININE 0.6   GLUCOSE 117*   CALCIUM 9.8   WBC 5.8   RBC 3.55   HGB 9.8* HCT 32.0*   MCV 90.1   MCH 27.6   MCHC 30.6*   RDW 15.3*      MPV 8.9         Imaging  XR CHEST PORTABLE   Final Result   No acute process. CT SOFT TISSUE NECK W CONTRAST   Final Result   No acute abnormality of the soft tissue structures of the neck. Previously described soft tissue focus along the right tracheal wall in the   subglottic region is not seen on the current exam.         CT HEAD WO CONTRAST   Final Result   CT Head:      No acute intracranial hemorrhage or mass effect. No CT evidence of large acute infarct. CTA Neck:      No hemodynamically significant stenosis or dissection of the cervical   internal carotid arteries. No high-grade stenosis or dissection of the cervical vertebral arteries. CTA Head:      No large vessel arterial occlusion or high-grade stenosis. 3 mm x 6 mm x 4 mm focus of mucus versus soft tissue nodule along the right   lateral wall of the subglottic trachea. To exclude neoplasm, recommend   nonemergent follow-up CT neck with contrast versus direct visualization. RECOMMENDATION:   1.1 cm incidental right thyroid nodule. No follow-up imaging is recommended. Reference: J Am Dean Radiol. 2015 Feb;12(2): 143-50         CTA NECK W CONTRAST   Final Result   CT Head:      No acute intracranial hemorrhage or mass effect. No CT evidence of large acute infarct. CTA Neck:      No hemodynamically significant stenosis or dissection of the cervical   internal carotid arteries. No high-grade stenosis or dissection of the cervical vertebral arteries. CTA Head:      No large vessel arterial occlusion or high-grade stenosis. 3 mm x 6 mm x 4 mm focus of mucus versus soft tissue nodule along the right   lateral wall of the subglottic trachea. To exclude neoplasm, recommend   nonemergent follow-up CT neck with contrast versus direct visualization. RECOMMENDATION:   1.1 cm incidental right thyroid nodule.  No follow-up this patient. I discussed the chief complaint, history of present illness, and review of systems as well as the past medical/social/family history sections for this patient. I have examined this patient and participated in the care of this patient. I have reviewed the pertinent clinical information including physical exam, labs, and radiographic studies. I have discussed the patient's care and plan with the resident/NP/PA/medical student. Please see pertinent Consult or Medical Progress Note for further details.     Electronically signed by Orlin Stokes DO on 11/14/2022 at 11:06 PM

## 2022-11-14 NOTE — PROGRESS NOTES
Occupational Therapy  OT BEDSIDE TREATMENT NOTE   [address] [address]  [address] [place] [address]       PLPO:  Patient Name: Srinivasan Lynne  MRN: [id number]  : 1970  Room: [address] [place] [address]A     Per OT Eval:    Evaluating OT: Mae Slaughter, [address] OTR/L; [id number]       Referring Provider: Elisa Guerrero MD    Specific Provider Orders/Date: OT Eval and Treat 22       Diagnosis: Compression fracture of L2 lumbar vertebra, open, initial encounter  Bubo     Surgery: none this admission     Pertinent Medical History:  has a past medical history of Anemia, Diabetes mellitus ([facility] [facility] Papanicolaou smear of cervix with atypical squamous cells of undetermined significance (ASC-US), and Screening for human papillomavirus (HPV).        Reason for admission: fell backwards at home d/t dizziness, LOC     Recommended Adaptive Equipment: TBD pending progression - BSC, extended tub bench, AE for LE bathing and dressing PRN      Precautions:  Fall Risk, Bed Alarm, LSO (for comfort post L2,L3,L5 compression fx), Orthostatic Hypotension, Spinal Neutrality       Assessment of current deficits:    [x] Functional mobility            [x]ADLs           [x] Strength                  [x]Cognition    [x] Functional transfers          [x] IADLs         [x] Safety Awareness   [x]Endurance    [x] Fine Coordination                         [x] Balance      [] Vision/perception   []Sensation      []Gross Motor Coordination             [] ROM           [] Delirium                   [] Motor Control      OT PLAN OF CARE   OT POC based on physician orders, patient diagnosis and results of clinical assessment     Frequency/Duration; 3-5 days/wk for 2 weeks PRN   Specific OT Treatment Interventions to include:   * Instruction/training on adapted ADL techniques and AE recommendations to increase functional independence within precautions       * Training on energy conservation strategies, correct breathing pattern and techniques to improve independence/tolerance for self-care routine  * Functional transfer/mobility training/DME recommendations for increased independence, safety, and fall prevention  * Patient/Family education to increase follow through with safety techniques and functional independence  * Recommendation of environmental modifications for increased safety with functional transfers/mobility and ADLs  * Therapeutic exercise to improve motor endurance, ROM, and functional strength for ADLs/functional transfers  * Therapeutic activities to facilitate/challenge dynamic balance, stand tolerance for increased safety and independence with ADLs     Home Living: Pt lives with daughter in 2 story home with 1 step and 0HR to enter. Bed and Bath located n 2nd floor with full flight of steps (7+3) and 1HR to access. 1/2 bath on 1st floor. Bathroom setup: tub/shower unit   Equipment owned: none     Prior Level of Function: IND with ADLs , IND with IADLs; ambulated with no AD prior  Driving: yes  Occupation: full time  (travels/drives to homes)     Pain Level: 6/10 lower back pain, and R side neck/arm pain, pain meds on board    Cognition: A&O: 4/4; Follows multi step directions, pleasant & cooperative               Memory:  good              Sequencing:  fair+              Problem solving:  fair              Judgement/safety:  fair                Functional Assessment:  AM-PAC Daily Activity Raw Score: 16/24    Initial Eval Status  Date: 11/7/22 Treatment Status  Date:  11/14/22 STGs = LTGs  Time frame: 10-14 days   Feeding Minimal Assist  Mod Indep  Independent    Grooming Minimal Assist  Set up  To wash face and apply deodorant seated EOB Independent    UB Dressing Minimal Assist   SBA  Doff/guerline gown seated   Max A  To don/doff LSO seated Independent    LB Dressing Dependent   Max A  Max A to don/doff socks seated EOB.  Mod A to don/doff underwear seated EOB and standing for posterior Minimal Assist    Bathing Maximal Assist  UB: Min A  LB: Mod A  Sponge bathing seated EOB and standing for perla area Minimal Assist    Toileting Maximal Assist   Min A- hygiene  Min A- clothing management Minimal Assist    Bed Mobility  Log Roll: Max A  Supine to sit: Max A x 2   Sit to supine: Max A x 2   Mod A-  rolling  Max A supine < > sit  Educated pt on technique to increase independence. Supine to sit: Mod Ind   Sit to supine: Mod Ind    Functional Transfers Sit to stand:NT   Stand to sit:NT  Stand pivot: NT  Commode: NT  Min A  Sit < > stand  Cuing for hand placement  Mod A- toilet transfer (bedside commode) Sit to stand: Mod Ind   Stand to sit: Mod Ind  Stand pivot: Mod Ind  Commode: Mod Ind    Functional Mobility NT  Limited d/t dizziness seated EOB  Mod A- stand pivot Mod Ind    Balance Sitting:     Static - SBA     Dynamic - SBA  Standing: NT Sitting: Ind  Standing: Min A     Sitting:  Static: ind  Dynamic: ind  Standing: mod ind   Activity Tolerance FAIR  Limited d/t dizziness and pain  Fair GOOD   Visual/  Perceptual Glasses: yes - reading          Safety Fair  Educated on spinal neutrality/precautions  Fair Good   during ADL completion following spinal percautions   Vitals BP sitting EOB: 73/38  BP supine: 86/58     Pt symptomatic/dizzy seated EOB required return supine for safety                Comments: Upon arrival pt was in bed & agreeable for therapy. Pt educated on adaptive techniques to increase independence and safety during ADL's, bed mobility, and functional transfers while maintaining spinal precautions. At end of session pt was returned to bed due to pain, HOB semi-upright all lines and tubes intact, call light within reach. Pt has made Fair progress towards set goals.    Continue with current plan of care      Treatment Time In: 3:01            Treatment Time Out: 3:50           Treatment Charges: Mins Units   Ther Ex  [id number]     Manual Therapy [id number]     Thera Activities [id number]     ADL/Home t 47542 49 3   Neuro Re-ed 33270     Group Therapy      Orthotic manage/training  [id number]     Non-Billable Time     Total Timed Treatment [address]

## 2022-11-15 ENCOUNTER — APPOINTMENT (OUTPATIENT)
Dept: MRI IMAGING | Age: 52
DRG: 551 | End: 2022-11-15
Payer: COMMERCIAL

## 2022-11-15 LAB
METER GLUCOSE: 118 MG/DL (ref 74–99)
METER GLUCOSE: 118 MG/DL (ref 74–99)
METER GLUCOSE: 126 MG/DL (ref 74–99)
METER GLUCOSE: 210 MG/DL (ref 74–99)

## 2022-11-15 PROCEDURE — 6370000000 HC RX 637 (ALT 250 FOR IP): Performed by: STUDENT IN AN ORGANIZED HEALTH CARE EDUCATION/TRAINING PROGRAM

## 2022-11-15 PROCEDURE — 97530 THERAPEUTIC ACTIVITIES: CPT

## 2022-11-15 PROCEDURE — 70551 MRI BRAIN STEM W/O DYE: CPT

## 2022-11-15 PROCEDURE — 1200000000 HC SEMI PRIVATE

## 2022-11-15 PROCEDURE — 6360000002 HC RX W HCPCS: Performed by: FAMILY MEDICINE

## 2022-11-15 PROCEDURE — 97535 SELF CARE MNGMENT TRAINING: CPT

## 2022-11-15 PROCEDURE — 6360000002 HC RX W HCPCS: Performed by: INTERNAL MEDICINE

## 2022-11-15 PROCEDURE — 6370000000 HC RX 637 (ALT 250 FOR IP): Performed by: INTERNAL MEDICINE

## 2022-11-15 PROCEDURE — 82962 GLUCOSE BLOOD TEST: CPT

## 2022-11-15 PROCEDURE — 6370000000 HC RX 637 (ALT 250 FOR IP): Performed by: FAMILY MEDICINE

## 2022-11-15 PROCEDURE — 2580000003 HC RX 258: Performed by: INTERNAL MEDICINE

## 2022-11-15 RX ADMIN — SODIUM CHLORIDE, PRESERVATIVE FREE 10 ML: 5 INJECTION INTRAVENOUS at 08:33

## 2022-11-15 RX ADMIN — HEPARIN SODIUM 5000 UNITS: 10000 INJECTION INTRAVENOUS; SUBCUTANEOUS at 12:15

## 2022-11-15 RX ADMIN — HYDROMORPHONE HYDROCHLORIDE 1 MG: 1 INJECTION, SOLUTION INTRAMUSCULAR; INTRAVENOUS; SUBCUTANEOUS at 06:24

## 2022-11-15 RX ADMIN — HYDROMORPHONE HYDROCHLORIDE 1 MG: 1 INJECTION, SOLUTION INTRAMUSCULAR; INTRAVENOUS; SUBCUTANEOUS at 20:15

## 2022-11-15 RX ADMIN — INSULIN LISPRO 1 UNITS: 100 INJECTION, SOLUTION INTRAVENOUS; SUBCUTANEOUS at 12:14

## 2022-11-15 RX ADMIN — BACLOFEN 10 MG: 10 TABLET ORAL at 02:14

## 2022-11-15 RX ADMIN — HEPARIN SODIUM 5000 UNITS: 10000 INJECTION INTRAVENOUS; SUBCUTANEOUS at 20:14

## 2022-11-15 RX ADMIN — OXYCODONE AND ACETAMINOPHEN 1 TABLET: 325; 10 TABLET ORAL at 17:56

## 2022-11-15 RX ADMIN — FOLIC ACID 1 MG: 1 TABLET ORAL at 08:32

## 2022-11-15 RX ADMIN — AMITRIPTYLINE HYDROCHLORIDE 10 MG: 10 TABLET, FILM COATED ORAL at 20:15

## 2022-11-15 RX ADMIN — MIDODRINE HYDROCHLORIDE 10 MG: 10 TABLET ORAL at 12:15

## 2022-11-15 RX ADMIN — SERTRALINE HYDROCHLORIDE 50 MG: 50 TABLET ORAL at 08:32

## 2022-11-15 RX ADMIN — OXYCODONE AND ACETAMINOPHEN 1 TABLET: 325; 10 TABLET ORAL at 11:25

## 2022-11-15 RX ADMIN — PANTOPRAZOLE SODIUM 40 MG: 40 TABLET, DELAYED RELEASE ORAL at 06:25

## 2022-11-15 RX ADMIN — OXYCODONE AND ACETAMINOPHEN 1 TABLET: 325; 10 TABLET ORAL at 04:39

## 2022-11-15 RX ADMIN — MIDODRINE HYDROCHLORIDE 10 MG: 10 TABLET ORAL at 17:56

## 2022-11-15 RX ADMIN — MECLIZINE 25 MG: 12.5 TABLET ORAL at 04:39

## 2022-11-15 RX ADMIN — MECLIZINE 25 MG: 12.5 TABLET ORAL at 20:15

## 2022-11-15 RX ADMIN — OXYCODONE AND ACETAMINOPHEN 1 TABLET: 325; 10 TABLET ORAL at 23:21

## 2022-11-15 RX ADMIN — MIDODRINE HYDROCHLORIDE 10 MG: 10 TABLET ORAL at 08:32

## 2022-11-15 RX ADMIN — SENNOSIDES 8.6 MG: 8.6 TABLET, FILM COATED ORAL at 20:15

## 2022-11-15 RX ADMIN — HEPARIN SODIUM 5000 UNITS: 10000 INJECTION INTRAVENOUS; SUBCUTANEOUS at 04:39

## 2022-11-15 RX ADMIN — MECLIZINE 25 MG: 12.5 TABLET ORAL at 14:18

## 2022-11-15 RX ADMIN — SODIUM CHLORIDE, PRESERVATIVE FREE 10 ML: 5 INJECTION INTRAVENOUS at 20:15

## 2022-11-15 RX ADMIN — POLYETHYLENE GLYCOL 3350 17 G: 17 POWDER, FOR SOLUTION ORAL at 08:32

## 2022-11-15 ASSESSMENT — PAIN DESCRIPTION - LOCATION
LOCATION: NECK
LOCATION: BACK

## 2022-11-15 ASSESSMENT — PAIN DESCRIPTION - ORIENTATION
ORIENTATION: RIGHT
ORIENTATION: MID
ORIENTATION: RIGHT

## 2022-11-15 ASSESSMENT — PAIN DESCRIPTION - FREQUENCY: FREQUENCY: CONTINUOUS

## 2022-11-15 ASSESSMENT — PAIN SCALES - GENERAL
PAINLEVEL_OUTOF10: 10
PAINLEVEL_OUTOF10: 5
PAINLEVEL_OUTOF10: 10
PAINLEVEL_OUTOF10: 10

## 2022-11-15 ASSESSMENT — PAIN DESCRIPTION - DESCRIPTORS
DESCRIPTORS: ACHING;THROBBING
DESCRIPTORS: ACHING;SORE;THROBBING
DESCRIPTORS: CRAMPING;ACHING
DESCRIPTORS: ACHING;SORE

## 2022-11-15 ASSESSMENT — PAIN DESCRIPTION - PAIN TYPE: TYPE: ACUTE PAIN

## 2022-11-15 NOTE — PROGRESS NOTES
chloride flush, sodium chloride flush, sodium chloride, ondansetron **OR** ondansetron, magnesium hydroxide, acetaminophen **OR** acetaminophen, glucose, dextrose bolus **OR** dextrose bolus, glucagon (rDNA), dextrose      Intake/Output Summary (Last 24 hours) at 11/15/2022 1122  Last data filed at 11/15/2022 0910  Gross per 24 hour   Intake --   Output 900 ml   Net -900 ml       Exam:    /73   Pulse 77   Temp 97.9 °F (36.6 °C) (Oral)   Resp 18   Ht 5' 11\" (1.803 m)   Wt 225 lb 9.6 oz (102.3 kg)   SpO2 97%   BMI 31.46 kg/m²     General appearance: Sitting comfortably in bed. No apparent distress. Respiratory: Clear to auscultation bilaterally. Cardiovascular: Normal S1/S2. Regular rhythm and rate. Abdomen: Soft, non-tender, non-distended with normal bowel sounds. Musculoskeletal: No clubbing, cyanosis or edema bilaterally. Skin: Skin color, texture, turgor normal.  No rashes or lesions. Neurologic:  No focal deficit. Psychiatric: Alert and oriented, thought content appropriate, normal insight  Peripheral Pulses: +2 palpable, equal bilaterally       Labs:   Recent Labs     11/14/22  0432   WBC 5.8   HGB 9.8*   HCT 32.0*        Recent Labs     11/14/22  0432      K 4.2      CO2 28   BUN 9   CREATININE 0.6   CALCIUM 9.8   PHOS 3.9     No results for input(s): AST, ALT, BILIDIR, BILITOT, ALKPHOS in the last 72 hours. No results for input(s): INR in the last 72 hours. No results for input(s): [place] in the last 72 hours. Radiology:  XR CHEST PORTABLE   Final Result   No acute process. CT SOFT TISSUE NECK W CONTRAST   Final Result   No acute abnormality of the soft tissue structures of the neck. Previously described soft tissue focus along the right tracheal wall in the   subglottic region is not seen on the current exam.         CT HEAD WO CONTRAST   Final Result   CT Head:      No acute intracranial hemorrhage or mass effect.       No CT evidence of large acute infarct. CTA Neck:      No hemodynamically significant stenosis or dissection of the cervical   internal carotid arteries. No high-grade stenosis or dissection of the cervical vertebral arteries. CTA Head:      No large vessel arterial occlusion or high-grade stenosis. 3 mm x 6 mm x 4 mm focus of mucus versus soft tissue nodule along the right   lateral wall of the subglottic trachea. To exclude neoplasm, recommend   nonemergent follow-up CT neck with contrast versus direct visualization. RECOMMENDATION:   1.1 cm incidental right thyroid nodule. No follow-up imaging is recommended. Reference: J Am Dean Radiol. 2015 Feb;12(2): 143-50         CTA NECK W CONTRAST   Final Result   CT Head:      No acute intracranial hemorrhage or mass effect. No CT evidence of large acute infarct. CTA Neck:      No hemodynamically significant stenosis or dissection of the cervical   internal carotid arteries. No high-grade stenosis or dissection of the cervical vertebral arteries. CTA Head:      No large vessel arterial occlusion or high-grade stenosis. 3 mm x 6 mm x 4 mm focus of mucus versus soft tissue nodule along the right   lateral wall of the subglottic trachea. To exclude neoplasm, recommend   nonemergent follow-up CT neck with contrast versus direct visualization. RECOMMENDATION:   1.1 cm incidental right thyroid nodule. No follow-up imaging is recommended. Reference: J Am Dean Radiol. 2015 Feb;12(2): 143-50         CTA HEAD W CONTRAST   Final Result   CT Head:      No acute intracranial hemorrhage or mass effect. No CT evidence of large acute infarct. CTA Neck:      No hemodynamically significant stenosis or dissection of the cervical   internal carotid arteries. No high-grade stenosis or dissection of the cervical vertebral arteries. CTA Head:      No large vessel arterial occlusion or high-grade stenosis.       3 mm x 6 mm x 4 mm focus of mucus versus soft tissue nodule along the right   lateral wall of the subglottic trachea. To exclude neoplasm, recommend   nonemergent follow-up CT neck with contrast versus direct visualization. RECOMMENDATION:   1.1 cm incidental right thyroid nodule. No follow-up imaging is recommended. Reference: J Am Dean Radiol. 2015 Feb;12(2): 143-50         CT THORACIC SPINE WO CONTRAST   Final Result   Focal depression involving superior endplate of T1 related to fracture of   uncertain chronicity. CT LUMBAR SPINE WO CONTRAST   Final Result   Stable mild vertebral malalignment. Disc space narrowing, discovertebral degenerative changes, and facet   arthritis with bony spinal canal stenosis. New superior vertebral body compression deformities at L2, L3, and L5.         CT ABDOMEN PELVIS W IV CONTRAST Additional Contrast? None   Final Result   1. New depressed fractures involving superior endplates of L2, L3, and L5. Chronic depressed fracture involving superior endplate of L4.   2. No acute process in the abdomen or pelvis. Stable ventral abdominal wall   hernia containing mesenteric fat and segment of large bowel. 3. Stable mild splenomegaly. XR CHEST (2 VW)   Final Result   No acute process.          MRI BRAIN WO CONTRAST    (Results Pending)             Active Hospital Problems    Diagnosis Date Noted    Compression fracture of L2 lumbar vertebra ([facility] [address] [I29.355F] 11/06/2022     Priority: Medium    Compression fracture of L2 lumbar vertebra, open, initial encounter ([address] [S32.020B] 11/05/2022     Priority: Medium    Bubo [I88.8] 11/05/2022     Priority: Medium       Assessment  Syncope - likely secondary to orthostatic hypotension related to diabetic autonomic neuropathy  Acute compression fractures - involves L2, L3, and L5 vertebral bodies  Occipital neuralgia  Type 2 diabetes mellitus  Chronic anemia  Physical deconditioning    Plan:  Continue with Baclofen per neurologist  Awaiting MRI brain  PRN analgesia  Bowel regimen  LSO brace when out of bed  PT, activity as tolerated  Fall precautions  Basal corrective bolus insulin regimen    DVT Prophylaxis: SQ Heparin  Diet: ADULT DIET;  Regular  Code Status: Full Code    PT/OT Eval Status: Ongoing    Dispo - TBD    Ashley Reid MD 11/15/2022 11:22 AM

## 2022-11-15 NOTE — CARE COORDINATION
Plan is home with daughter and [facility] when medically ready. Home health care orders are in. Per neurology note today, 1. Orthostatic syncope, most likely 2/2 diabetic autonomic neuropathy given her history  2. Occipital neuralgia, more prominent on right than left with significant bilateral sternocleidomastoid muscle tension. Plan: Ordered MRI w/o contrast. Baclofen 10 mg PO prn. Consider occipital nerve blocks in the future. MRI is still pending. Await results and further input & plan from neurology. Patient is on daily therapy TX's to focus on stairs once her dizziness is better controlled since plan is home and she has 13 steps to get to bed & bath. Patient's daughter will transport her home at time of discharge.    Osvaldo Ellison RN CM  [phone]

## 2022-11-15 NOTE — PROGRESS NOTES
Physical Therapy  Treatment Note    Name: Jakob Zuniga  : 1970  MRN: [id number]      Date of Service: 11/15/2022    Evaluating PT:  Yuridia Dwyer PT, DPT [id number]    Room #:  8910/7237-C  Diagnosis:  Sharon Zayas [I88.8]  Compression fracture of L2 lumbar vertebra, open, initial encounter ([facility] [S32.020B]  Compression fracture of L2 vertebra, initial encounter ([facility] [Y06.828L]  PMHx/PSHx:    Past Medical History:   Diagnosis Date    Anemia     Diabetes mellitus ([facility]     Papanicolaou smear of cervix with atypical squamous cells of undetermined significance (ASC-US)          Screening for human papillomavirus (HPV)     4/10/07      Precautions:  Fall risk, L2, L3, L5 compression fx- LSO for comfort, Orthostatic Hypotension  Equipment Needs:  TBD    SUBJECTIVE:    Pt lives with daughter in a two story home with one stairs to enter and no rail. Bed is on second floor and bath is on second floor with 13 steps and one rail to access. Pt ambulated without AD PTA and was fully independent. Equipment Owned:    OBJECTIVE:   Initial Evaluation  Date: 22 Treatment  11/15/22 Short Term/ Long Term   Goals   AM-PAC 6 Clicks 0/  *limited due to severe pain and dizziness     Was pt agreeable to Eval/treatment? Yes Yes    Does pt have pain? Yes 10/10 LBP 6/10 LBP    Bed Mobility  Rolling:  Max A  Supine to sit: Max x2  Sit to supine: Max x2  Scooting: Max x2 Rolling: SBA  Supine to sit: Celestino  Sit to supine: ModA  Scooting: SBA Indep   Transfers Sit to stand: NT  Stand to sit: NT  Stand pivot: NT Sit to stand: ModA from bed Mod x2 from chair  Stand to sit: ModA  Stand pivot: ModA Indep   Ambulation    NT 10 feet with [employer] with ModA for R LE advancement >50 feet Indep   Stair negotiation: ascended and descended  NT NT 14 steps with one rail Supervision   ROM BUE:  R UE AROM limited due to pain  BLE:  B LE AROM WFL BLE: AROM WFL    Strength BUE:  Refer to OT  BLE:  4+/5 BLE: grossly 4+/5 4+/5   Balance Sitting EOB:  CGA for safety  Dynamic Standing:  NT Sitting: SBA  Standing: ModA with [employer] Sitting EOB:  Indep  Dynamic Standing:  Indep       Pt is A & O x 4  Edema:  None noted in BLE    Therapeutic Exercises:    Sit<>stand x2 with ModA<>Celestino  Standing balance activities: performed static and dynamic standing balance activities within AD with single and BUE support with reaches within ADRIANA with Min/ModA    Patient education  Pt educated on bed mobility, transfer technique, brace management, proper use of walker, positioning in bed. Patient response to education:   Pt verbalized understanding Pt demonstrated skill Pt requires further education in this area   Yes With assist Yes     ASSESSMENT:    Comments:  Patient deemed medically stable prior to start of session. Spinal precautions reviewed. The pt was able to roll and sit up at the EOB, the pt was assisted with maintaining spinal neutrality. Increased time EOB to allow improved tolerance to position change. Short distance ambulation to [facility] complete void, increased time to complete with cuing to perform deep breathing, and elevation of knees to relax pelvic floor. Return to bed required significantly increased time with assist to dependently advance R LE with ambulation. Mild posterior lean requiring external correction when side stepping. Patient returned to supine and positioned with skill, left with call light in reach and all needs met. Skilled positioning for comfort. Treatment:  Patient practiced and was instructed in the following treatment:    Bed mobility training - pt given verbal and tactile cues to facilitate proper sequencing and safety during rolling and supine>sit as well as provided with physical assistance to complete task   Transfer training: verbal cues for hand placement during sit to stand transfer and assistance for anterior weight shift in order to facilitate initiation of the stand.    Standing balance activities: performed static and dynamic standing balance activities within AD with single and BUE support in order to progress safety with standing activities as well as improve independence with standing ADLs. Gait training: verbal cues for sequencing and safety, verbal cues for appropriate step length and positioning within walker with dynamic activities, physical assist for walker management, and steadying   Skilled repositioning in bed to promote improved posture and improved cardiorespiratory function. Pt positioned with bed in semi-chair position to enhance skin/joint protection. PLAN:    Patient is making good progress towards established goals. Will continue with current POC.       Time in  1535  Time out  1630    Total Treatment Time  55 minutes     CPT codes:  [] Gait training 56779 0 minutes  [] Manual therapy 21890 0 minutes  [x] Therapeutic activities 75891 55 minutes  [] Therapeutic exercises 77755 0 minutes  [] Neuromuscular reeducation 61530 0 minutes    Manas Rowe PT, DPT  License number:  [id number]

## 2022-11-15 NOTE — PROGRESS NOTES
Occupational Therapy  OT BEDSIDE TREATMENT NOTE   [address] [address] [facility]  [address] [place] [address]       ZQOB:  Patient Name: Lucy Askew  MRN: [id number]  : 1970  Room: [address] [place] [address]-A     Per OT Eval:    Evaluating OT: Shala aWng, [address] N[address] Esparza OTR/L; [id number]       Referring Provider: Rudi Caballero MD    Specific Provider Orders/Date: OT Eval and Treat 22       Diagnosis: Compression fracture of L2 lumbar vertebra, open, initial encounter  Bubo     Surgery: none this admission     Pertinent Medical History:  has a past medical history of Anemia, Diabetes mellitus ([facility] Papanicolaou smear of cervix with atypical squamous cells of undetermined significance (ASC-US), and Screening for human papillomavirus (HPV).        Reason for admission: fell backwards at home d/t dizziness, LOC     Recommended Adaptive Equipment: TBD pending progression - BSC, extended tub bench, AE for LE bathing and dressing PRN      Precautions:  Fall Risk, Bed Alarm, LSO (for comfort post L2,L3,L5 compression fx), Orthostatic Hypotension, Spinal Neutrality       Assessment of current deficits:    [x] Functional mobility            [x]ADLs           [x] Strength                  [x]Cognition    [x] Functional transfers          [x] IADLs         [x] Safety Awareness   [x]Endurance    [x] Fine Coordination                         [x] Balance      [] Vision/perception   []Sensation      []Gross Motor Coordination             [] ROM           [] Delirium                   [] Motor Control      OT PLAN OF CARE   OT POC based on physician orders, patient diagnosis and results of clinical assessment     Frequency/Duration; 3-5 days/wk for 2 weeks PRN   Specific OT Treatment Interventions to include:   * Instruction/training on adapted ADL techniques and AE recommendations to increase functional independence within precautions       * Training on energy conservation strategies, correct breathing pattern and techniques to improve independence/tolerance for self-care routine  * Functional transfer/mobility training/DME recommendations for increased independence, safety, and fall prevention  * Patient/Family education to increase follow through with safety techniques and functional independence  * Recommendation of environmental modifications for increased safety with functional transfers/mobility and ADLs  * Therapeutic exercise to improve motor endurance, ROM, and functional strength for ADLs/functional transfers  * Therapeutic activities to facilitate/challenge dynamic balance, stand tolerance for increased safety and independence with ADLs     Home Living: Pt lives with daughter in 2 story home with 1 step and 0HR to enter. Bed and Bath located n 2nd floor with full flight of steps (7+3) and 1HR to access. 1/2 bath on 1st floor.    Bathroom setup: tub/shower unit   Equipment owned: none     Prior Level of Function: IND with ADLs , IND with IADLs; ambulated with no AD prior  Driving: yes  Occupation: full time  (travels/drives to homes)     Pain Level: lower back pain, and R side neck/arm pain, pain meds on board, did not rate this date, improved tolerance    Cognition: A&O: 4/4; Follows multi step directions, pleasant & cooperative               Memory:  good              Sequencing:  fair+              Problem solving:  fair+              Judgement/safety:  fair+                Functional Assessment:  AM-PAC Daily Activity Raw Score: 15/24    Initial Eval Status  Date: 11/7/22 Treatment Status  Date:  11/15/22 STGs = LTGs  Time frame: 10-14 days   Feeding Minimal Assist  Mod Indep  Independent    Grooming Minimal Assist  Set up  Seated  Independent    UB Dressing Minimal Assist   SBA  Assist to tie gown around back   Declined brace this date Independent    LB Dressing Dependent   Max A  Doff/guerline underwear, limited ROM of B LE & standing balance  Minimal Assist Bathing Maximal Assist  UB: Min A  LB: Mod A  Simulated this date  Minimal Assist    Toileting Maximal Assist   Min A- hygiene  Min A- clothing management Minimal Assist    Bed Mobility  Log Roll: Max A  Supine to sit: Max A x 2   Sit to supine: Max A x 2   Min A-  rolling  Mod A supine < > sit  Educated pt on technique to increase independence. Supine to sit: Mod Ind   Sit to supine: Mod Ind    Functional Transfers Sit to stand:NT   Stand to sit:NT  Stand pivot: NT  Commode: NT  Mod A  Sit < > stand  Cuing for hand placement  Mod A- toilet transfer (bedside commode) Sit to stand: Mod Ind   Stand to sit: Mod Ind  Stand pivot: Mod Ind  Commode: Mod Ind    Functional Mobility NT  Limited d/t dizziness seated EOB  Mod A  With walker, short household distance with very slow pace, cues for posture Mod Ind    Balance Sitting:     Static - SBA     Dynamic - SBA  Standing: NT Sitting: Ind  Standing: Min/Mod A  With walker     Sitting:  Static: ind  Dynamic: ind  Standing: mod ind   Activity Tolerance FAIR  Limited d/t dizziness and pain  Fair  Light tasks, mild dizziness but pain reported  GOOD   Visual/  Perceptual Glasses: yes - reading          Safety Fair  Educated on spinal neutrality/precautions  Fair Good   during ADL completion following spinal percautions   Vitals BP sitting EOB: 73/38  BP supine: 86/58     Pt symptomatic/dizzy seated EOB required return supine for safety                Comments: Upon arrival pt was in bed & agreeable for therapy. Pt educated on adaptive techniques to increase independence and safety during ADL's, bed mobility, and functional transfers while maintaining spinal precautions. At end of session pt was returned to bed due to pain & fatigue HOB semi-upright all lines and tubes intact, call light within reach. Pt has made Fair progress towards set goals.    Continue with current plan of care      Treatment Time In: 3:45         Treatment Time Out: 4:30           Treatment Charges: Mins Units   Ther Ex  25769     Manual Therapy 36672     Thera Activities 84264 30 2   ADL/Home Mgt 99770 15 1   Neuro Re-ed 71191     Group Therapy      Orthotic manage/training  55565     Non-Billable Time     Total Timed Treatment 45 3       Sudha BARRON  [address] [place] [address] Drive, [address] [place] [address]

## 2022-11-16 ENCOUNTER — TELEPHONE (OUTPATIENT)
Dept: FAMILY MEDICINE CLINIC | Age: 52
End: 2022-11-16

## 2022-11-16 LAB
METER GLUCOSE: 109 MG/DL (ref 74–99)
METER GLUCOSE: 133 MG/DL (ref 74–99)
METER GLUCOSE: 147 MG/DL (ref 74–99)
METER GLUCOSE: 165 MG/DL (ref 74–99)

## 2022-11-16 PROCEDURE — 6370000000 HC RX 637 (ALT 250 FOR IP): Performed by: INTERNAL MEDICINE

## 2022-11-16 PROCEDURE — 82962 GLUCOSE BLOOD TEST: CPT

## 2022-11-16 PROCEDURE — 6370000000 HC RX 637 (ALT 250 FOR IP): Performed by: FAMILY MEDICINE

## 2022-11-16 PROCEDURE — 97530 THERAPEUTIC ACTIVITIES: CPT

## 2022-11-16 PROCEDURE — 97535 SELF CARE MNGMENT TRAINING: CPT

## 2022-11-16 PROCEDURE — 6370000000 HC RX 637 (ALT 250 FOR IP): Performed by: NURSE PRACTITIONER

## 2022-11-16 PROCEDURE — 1200000000 HC SEMI PRIVATE

## 2022-11-16 PROCEDURE — 6360000002 HC RX W HCPCS: Performed by: INTERNAL MEDICINE

## 2022-11-16 PROCEDURE — 2580000003 HC RX 258: Performed by: INTERNAL MEDICINE

## 2022-11-16 PROCEDURE — 6360000002 HC RX W HCPCS: Performed by: FAMILY MEDICINE

## 2022-11-16 PROCEDURE — 99232 SBSQ HOSP IP/OBS MODERATE 35: CPT | Performed by: NURSE PRACTITIONER

## 2022-11-16 RX ORDER — BACLOFEN 10 MG/1
20 TABLET ORAL 2 TIMES DAILY
Status: DISCONTINUED | OUTPATIENT
Start: 2022-11-16 | End: 2022-11-23 | Stop reason: HOSPADM

## 2022-11-16 RX ADMIN — SERTRALINE HYDROCHLORIDE 50 MG: 50 TABLET ORAL at 09:02

## 2022-11-16 RX ADMIN — MIDODRINE HYDROCHLORIDE 10 MG: 10 TABLET ORAL at 09:02

## 2022-11-16 RX ADMIN — FOLIC ACID 1 MG: 1 TABLET ORAL at 09:02

## 2022-11-16 RX ADMIN — PANTOPRAZOLE SODIUM 40 MG: 40 TABLET, DELAYED RELEASE ORAL at 05:11

## 2022-11-16 RX ADMIN — SENNOSIDES 8.6 MG: 8.6 TABLET, FILM COATED ORAL at 21:31

## 2022-11-16 RX ADMIN — HYDROMORPHONE HYDROCHLORIDE 1 MG: 1 INJECTION, SOLUTION INTRAMUSCULAR; INTRAVENOUS; SUBCUTANEOUS at 02:33

## 2022-11-16 RX ADMIN — AMITRIPTYLINE HYDROCHLORIDE 10 MG: 10 TABLET, FILM COATED ORAL at 21:32

## 2022-11-16 RX ADMIN — OXYCODONE AND ACETAMINOPHEN 1 TABLET: 325; 10 TABLET ORAL at 23:02

## 2022-11-16 RX ADMIN — MIDODRINE HYDROCHLORIDE 10 MG: 10 TABLET ORAL at 17:47

## 2022-11-16 RX ADMIN — MIDODRINE HYDROCHLORIDE 10 MG: 10 TABLET ORAL at 12:57

## 2022-11-16 RX ADMIN — SODIUM CHLORIDE, PRESERVATIVE FREE 10 ML: 5 INJECTION INTRAVENOUS at 21:33

## 2022-11-16 RX ADMIN — MECLIZINE 25 MG: 12.5 TABLET ORAL at 05:11

## 2022-11-16 RX ADMIN — OXYCODONE AND ACETAMINOPHEN 1 TABLET: 325; 10 TABLET ORAL at 17:47

## 2022-11-16 RX ADMIN — OXYCODONE AND ACETAMINOPHEN 1 TABLET: 325; 10 TABLET ORAL at 11:40

## 2022-11-16 RX ADMIN — POLYETHYLENE GLYCOL 3350 17 G: 17 POWDER, FOR SOLUTION ORAL at 09:03

## 2022-11-16 RX ADMIN — HEPARIN SODIUM 5000 UNITS: 10000 INJECTION INTRAVENOUS; SUBCUTANEOUS at 05:11

## 2022-11-16 RX ADMIN — HEPARIN SODIUM 5000 UNITS: 10000 INJECTION INTRAVENOUS; SUBCUTANEOUS at 21:32

## 2022-11-16 RX ADMIN — SODIUM CHLORIDE, PRESERVATIVE FREE 10 ML: 5 INJECTION INTRAVENOUS at 09:03

## 2022-11-16 RX ADMIN — MECLIZINE 25 MG: 12.5 TABLET ORAL at 21:32

## 2022-11-16 RX ADMIN — OXYCODONE AND ACETAMINOPHEN 1 TABLET: 325; 10 TABLET ORAL at 05:11

## 2022-11-16 RX ADMIN — BACLOFEN 20 MG: 10 TABLET ORAL at 21:32

## 2022-11-16 RX ADMIN — MECLIZINE 25 MG: 12.5 TABLET ORAL at 15:26

## 2022-11-16 RX ADMIN — HEPARIN SODIUM 5000 UNITS: 10000 INJECTION INTRAVENOUS; SUBCUTANEOUS at 12:58

## 2022-11-16 ASSESSMENT — PAIN DESCRIPTION - LOCATION
LOCATION: BACK

## 2022-11-16 ASSESSMENT — PAIN DESCRIPTION - ORIENTATION
ORIENTATION: LOWER

## 2022-11-16 ASSESSMENT — PAIN DESCRIPTION - DESCRIPTORS
DESCRIPTORS: ACHING;THROBBING
DESCRIPTORS: ACHING;THROBBING
DESCRIPTORS: ACHING
DESCRIPTORS: ACHING;DISCOMFORT
DESCRIPTORS: ACHING;THROBBING
DESCRIPTORS: ACHING;DISCOMFORT

## 2022-11-16 ASSESSMENT — PAIN SCALES - GENERAL
PAINLEVEL_OUTOF10: 9
PAINLEVEL_OUTOF10: 9
PAINLEVEL_OUTOF10: 10
PAINLEVEL_OUTOF10: 9

## 2022-11-16 NOTE — PROGRESS NOTES
Physical Therapy  Treatment Note    Name: Robin German  : 1970  MRN: [id number]      Date of Service: 2022    Evaluating PT:  Christiano Antonio, PT, DPT [id number]    Room #:  9684/0436-N  Diagnosis:  Kathleen Sin [I88.8]  Compression fracture of L2 lumbar vertebra, open, initial encounter ([facility] [S32.020B]  Compression fracture of L2 vertebra, initial encounter ([facility] [G84.277P]  PMHx/PSHx:    Past Medical History:   Diagnosis Date    Anemia     Diabetes mellitus ([facility]     Papanicolaou smear of cervix with atypical squamous cells of undetermined significance (ASC-US)          Screening for human papillomavirus (HPV)     4/10/07      Precautions:  Fall risk, L2, L3, L5 compression fx- LSO for comfort, Orthostatic Hypotension  Equipment Needs:  TBD    SUBJECTIVE:    Pt lives with daughter in a two story home with one stairs to enter and no rail. Bed is on second floor and bath is on second floor with 13 steps and one rail to access. Pt ambulated without AD PTA and was fully independent. Equipment Owned:    OBJECTIVE:   Initial Evaluation  Date: 22 Treatment  22 Short Term/ Long Term   Goals   AM-PAC 6 Clicks   *limited due to severe pain and dizziness     Was pt agreeable to Eval/treatment? Yes Yes    Does pt have pain? Yes 10/10 LBP 6/10 LBP    Bed Mobility  Rolling:  Max A  Supine to sit: Max x2  Sit to supine: Max x2  Scooting: Max x2 NT Indep   Transfers Sit to stand: NT  Stand to sit: NT  Stand pivot: NT Sit to stand: ModA from commode  Stand to sit: Celestino  Stand pivot: Celestino with FWW Indep   Ambulation    NT 15 feet with [address] with Min A >50 feet Indep   Stair negotiation: ascended and descended  NT NT 14 steps with one rail Supervision   ROM BUE:  R UE AROM limited due to pain  BLE:  B LE AROM WFL BLE: AROM WFL    Strength BUE:  Refer to OT  BLE:  4+/5 BLE: grossly 4+/5 4+/5   Balance Sitting EOB:  CGA for safety  Dynamic Standing:  NT Sitting: SBA  Standing: Celestino with [address] Sitting EOB:  Indep  Dynamic Standing:  Indep       Pt is A & O x 4  Edema:  None noted in BLE    Therapeutic Exercises:    Sit<>stand x2 with ModA<>Celestino  Standing balance activities: performed static and dynamic standing balance activities within AD with single and BUE support with reaches within ADRIANA with Min/ModA    Patient education  Pt educated on bed mobility, transfer technique, brace management, proper use of walker, positioning in bed. Patient response to education:   Pt verbalized understanding Pt demonstrated skill Pt requires further education in this area   Yes With assist Yes     ASSESSMENT:    Comments:  Patient deemed medically stable prior to start of session. Patient was on commode with OT present upon arrival. LSO donned with assist of patient prior to mobility. Transfer from commode with ambulation to upright chair to follow. Continual need for significantly increased time to complete all tasks and distances, but improved gait including independent advancement of R LE. Continued deficits include: mild increase in trunk flexion, poor proximity to FWW and foot slide during swing phase of R LE. Patient left upright in chair and positioned with call light in reach and all needs met. Skilled positioning for comfort. Treatment:  Patient practiced and was instructed in the following treatment:    Transfer training: verbal cues for hand placement during sit to stand transfer and assistance for anterior weight shift in order to facilitate initiation of the stand. Standing balance activities: performed static and dynamic standing balance activities within AD with single and BUE support in order to progress safety with standing activities as well as improve independence with standing ADLs.   Gait training: verbal cues for sequencing and safety, verbal cues for appropriate step length and positioning within walker with dynamic activities, physical assist for walker management, and steadying   Skilled

## 2022-11-16 NOTE — PROGRESS NOTES
Jason Handy is a 45 y.o. right handed female     Neurology following for syncope, dizziness and neck pain    PMH of anemia, DM w/ peripheral neuropathy and recent bariatric surgery     Presenting for evaluation of dizziness, syncope and neck pain. Pt had a bariatric surgery on 7/0/87 with no complications, but a week later started having a shooting right ear/neck pain that radiated in a band like fashion to her left ear and up to her frontal head. Notes that it is constant and unbearable. Tyelnol 650 mg was taken w/ no relief to sx so pt started taking Aleve and Advil that were contraindicated 2/2 surgery. Her evaluation by ENT was normal, rheumatology put her on methotrexate for increased RF and inflammatory markers. She was also seen by neurology and put on Duloxetine with no improvement in pain. Pt used Flexeril with mild relief to her pain. Pt states on 10/17 she had 2 syncopal episodes at work in which she felt dizzy and lost consciousness. Nine days ago as she got up to use the restroom, she felt dizzy and heavier (\"something weighing me down\") after which she had a syncopal episode. Pt remembers falling but does not remember gaining consciousness 5 minutes later and taking unclearly to her daughter Yvonne Dey was told my voice was not right, it was motorized\". The next thing she remembers is her being in the ED. Denied vision changes, palpitations, diaphoresis, SOB, dizziness induced by head movements, hx of migraines, gastroparesis and retinal neuropathy. In the ED, she was found to have  compression fractures of L2,3 and 5. CT and CTA head were negative. Neurosurgery was consulted and provided a LSO brace, no plan for intervention. During admission pt was found to be orthostatic positive, she was started on Midodrine. MRI brain was negative. Patient lying in bed. She notes the baclofen did not help at all and asked for stronger dose if possible.   Discussed MRI brain results, obtaining 3.55 11/14/2022 04:32 AM    HGB 9.8 11/14/2022 04:32 AM    HCT 32.0 11/14/2022 04:32 AM     11/14/2022 04:32 AM    MCV 90.1 11/14/2022 04:32 AM    MCH 27.6 11/14/2022 04:32 AM    MCHC 30.6 11/14/2022 04:32 AM    RDW 15.3 11/14/2022 04:32 AM    LYMPHOPCT 23.1 11/14/2022 04:32 AM    MONOPCT 6.7 11/14/2022 04:32 AM    BASOPCT 0.9 11/14/2022 04:32 AM    MONOSABS 0.39 11/14/2022 04:32 AM    LYMPHSABS 1.34 11/14/2022 04:32 AM    EOSABS 0.23 11/14/2022 04:32 AM    BASOSABS 0.05 11/14/2022 04:32 AM     CMP:    Lab Results   Component Value Date/Time     11/14/2022 04:32 AM    K 4.2 11/14/2022 04:32 AM    K 3.9 11/08/2022 05:20 AM     11/14/2022 04:32 AM    CO2 28 11/14/2022 04:32 AM    BUN 9 11/14/2022 04:32 AM    CREATININE 0.6 11/14/2022 04:32 AM    GFRAA >60 10/17/2022 01:11 PM    LABGLOM >60 11/14/2022 04:32 AM    GLUCOSE 117 11/14/2022 04:32 AM    PROT 9.0 11/06/2022 06:46 AM    LABALBU 3.1 11/06/2022 06:46 AM    CALCIUM 9.8 11/14/2022 04:32 AM    BILITOT 0.3 11/06/2022 06:46 AM    ALKPHOS 194 11/06/2022 06:46 AM    AST 43 11/06/2022 06:46 AM    ALT 46 11/06/2022 06:46 AM     Hepatic Function Panel:    Lab Results   Component Value Date/Time    ALKPHOS 194 11/06/2022 06:46 AM    ALT 46 11/06/2022 06:46 AM    AST 43 11/06/2022 06:46 AM    PROT 9.0 11/06/2022 06:46 AM    BILITOT 0.3 11/06/2022 06:46 AM    LABALBU 3.1 11/06/2022 06:46 AM     HgBA1c:    Lab Results   Component Value Date/Time    LABA1C 6.8 11/06/2022 06:46 AM     FLP:    Lab Results   Component Value Date/Time    TRIG 128 11/02/2022 12:00 PM    HDL 37 11/02/2022 12:00 PM    LDLCALC 125 11/02/2022 12:00 PM    LABVLDL 26 11/02/2022 12:00 PM     MRI brain: negative     All labs and imaging studies reviewed independently today     Assessment:     Orthostatic syncope, likely 2/2 diabetic autonomic neuropathy     Occipital Neuralgia right side > left side with bilateral sternocleidomastoid muscle tension       Plan:     Increase baclofen to 20 mg BID  Occipital nerve blocks as OP  Follow up with OP Neurology   Neurology to sign off     Vilma Tai, APRN - CNP  1:27 PM  11/16/2022

## 2022-11-16 NOTE — PROGRESS NOTES
Consult received. Dr. Kory Hernandez to evaluate patient for appropriateness for ARU.   Mimi Brewer RN  ARU Pre-screener/  [phone]

## 2022-11-16 NOTE — PROGRESS NOTES
Hospitalist Progress Note      PCP: Chel Choudhury MD    Date of Admission: 11/5/2022      Hospital Course: This is a 49-year-old female with past medical history of diabetes mellitus, anemia who presented with back pain after a syncopal fall. On arrival to the emergency department, CT spine showed acute compression fracture of L2, L3, and L5. She was evaluated by neurosurgeon who recommended conservative management with LSO brace, pain management. CT head was negative for acute intracranial abnormality; CTA head and neck was negative for critical stenosis or LVO but it revealed a 3 mm x 6 mm x 4 mm focus of mucosal versus soft tissue nodule along the right lateral wall of the subglottic trachea. Patient was evaluated by neurologist for suspected orthostatic hypotension secondary to diabetic autonomic neuropathy; work-up in progress for occipital neuralgia with significant bilateral sternocleidomastoid muscle tension, she was started on baclofen. Subjective: Pt was seen and examined at bedside. No acute event overnight; reports improved dizziness, presyncopal symptoms but still complaining of right \"ear pain\". Denies any chest pain, shortness of breath, or palpitation.      Medications:  Reviewed    Infusion Medications    sodium chloride      dextrose       Scheduled Medications    polyethylene glycol  17 g Oral Daily    senna  1 tablet Oral Nightly    midodrine  10 mg Oral TID WC    oxyCODONE-acetaminophen  1 tablet Oral Q6H    meclizine  25 mg Oral 3 times per day    folic acid  1 mg Oral Daily    amitriptyline  10 mg Oral Nightly    lidocaine  1 patch Topical Daily    pantoprazole  40 mg Oral QAM AC    sertraline  50 mg Oral Daily    sodium chloride flush  10 mL IntraVENous 2 times per day    heparin (porcine)  5,000 Units SubCUTAneous Q8H    insulin lispro  0-4 Units SubCUTAneous TID WC    insulin lispro  0-4 Units SubCUTAneous Nightly     PRN Meds: baclofen, bisacodyl, HYDROmorphone, sodium intracranial hemorrhage or mass effect. No CT evidence of large acute infarct. CTA Neck:      No hemodynamically significant stenosis or dissection of the cervical   internal carotid arteries. No high-grade stenosis or dissection of the cervical vertebral arteries. CTA Head:      No large vessel arterial occlusion or high-grade stenosis. 3 mm x 6 mm x 4 mm focus of mucus versus soft tissue nodule along the right   lateral wall of the subglottic trachea. To exclude neoplasm, recommend   nonemergent follow-up CT neck with contrast versus direct visualization. RECOMMENDATION:   1.1 cm incidental right thyroid nodule. No follow-up imaging is recommended. Reference: J Am Dean Radiol. 2015 Feb;12(2): 143-50         CTA NECK W CONTRAST   Final Result   CT Head:      No acute intracranial hemorrhage or mass effect. No CT evidence of large acute infarct. CTA Neck:      No hemodynamically significant stenosis or dissection of the cervical   internal carotid arteries. No high-grade stenosis or dissection of the cervical vertebral arteries. CTA Head:      No large vessel arterial occlusion or high-grade stenosis. 3 mm x 6 mm x 4 mm focus of mucus versus soft tissue nodule along the right   lateral wall of the subglottic trachea. To exclude neoplasm, recommend   nonemergent follow-up CT neck with contrast versus direct visualization. RECOMMENDATION:   1.1 cm incidental right thyroid nodule. No follow-up imaging is recommended. Reference: J Am Dean Radiol. 2015 Feb;12(2): 143-50         CTA HEAD W CONTRAST   Final Result   CT Head:      No acute intracranial hemorrhage or mass effect. No CT evidence of large acute infarct. CTA Neck:      No hemodynamically significant stenosis or dissection of the cervical   internal carotid arteries. No high-grade stenosis or dissection of the cervical vertebral arteries.       CTA Head:      No large vessel arterial occlusion or high-grade stenosis. 3 mm x 6 mm x 4 mm focus of mucus versus soft tissue nodule along the right   lateral wall of the subglottic trachea. To exclude neoplasm, recommend   nonemergent follow-up CT neck with contrast versus direct visualization. RECOMMENDATION:   1.1 cm incidental right thyroid nodule. No follow-up imaging is recommended. Reference: J Am Dean Radiol. 2015 Feb;12(2): 143-50         CT THORACIC SPINE WO CONTRAST   Final Result   Focal depression involving superior endplate of T1 related to fracture of   uncertain chronicity. CT LUMBAR SPINE WO CONTRAST   Final Result   Stable mild vertebral malalignment. Disc space narrowing, discovertebral degenerative changes, and facet   arthritis with bony spinal canal stenosis. New superior vertebral body compression deformities at L2, L3, and L5.         CT ABDOMEN PELVIS W IV CONTRAST Additional Contrast? None   Final Result   1. New depressed fractures involving superior endplates of L2, L3, and L5. Chronic depressed fracture involving superior endplate of L4.   2. No acute process in the abdomen or pelvis. Stable ventral abdominal wall   hernia containing mesenteric fat and segment of large bowel. 3. Stable mild splenomegaly. XR CHEST (2 VW)   Final Result   No acute process. Active Hospital Problems    Diagnosis Date Noted    Compression fracture of L2 lumbar vertebra ([address] [M55.455Z] 11/06/2022     Priority: Medium    Compression fracture of L2 lumbar vertebra, open, initial encounter ([address] [S32.020B] 11/05/2022     Priority: Medium    Bubo [I88.8] 11/05/2022     Priority: Medium       Assessment  Syncope - likely secondary to orthostatic hypotension related to diabetic autonomic neuropathy.   MRI brain was negative for acute intracranial abnormality   Acute compression fractures - involves L2, L3, and L5 vertebral bodies  Occipital neuralgia  Hypotension  Type 2 diabetes mellitus  Chronic anemia  Physical deconditioning    Plan:  Continue with PRN analgesia   On Midodrine  Bowel regimen  LSO brace when out of bed or > 45 degrees in bed   PT, activity as tolerated  Fall precautions  Basal corrective bolus insulin regimen  Discharge planning was discussed with case management; PMNR consult for possible ARU placement    DVT Prophylaxis: SQ Heparin  Diet: ADULT DIET;  Regular  Code Status: Full Code    PT/OT Eval Status: Ongoing    Dispo - ARU    [facility] Rachael Hwang MD 11/16/2022 10:29 AM

## 2022-11-16 NOTE — TELEPHONE ENCOUNTER
Central scheduling unable to reach patient to schedule Rheumatology consultation (3 attempts made). Patient is currently hospitalized since 11/5. Will reach out to her after discharge.

## 2022-11-16 NOTE — PROGRESS NOTES
Occupational Therapy  OT BEDSIDE TREATMENT NOTE   [facility] [address] [facility]  [address] [place] [address]       MOYY:  Patient Name: Tex Garcia  MRN: [id number]  : 1970  Room: [address] [place] [address]-A     Per OT Eval:    Evaluating OT: Rebecca Brown, [address] OTR/L; [id number]       Referring Provider: Becka Flynn MD    Specific Provider Orders/Date: OT Eval and Treat 22       Diagnosis: Compression fracture of L2 lumbar vertebra, open, initial encounter  Bubo     Surgery: none this admission     Pertinent Medical History:  has a past medical history of Anemia, Diabetes mellitus ([facility] [address] [facility] Papanicolaou smear of cervix with atypical squamous cells of undetermined significance (ASC-US), and Screening for human papillomavirus (HPV).        Reason for admission: fell backwards at home d/t dizziness, LOC     Recommended Adaptive Equipment: TBD pending progression - BSC, extended tub bench, AE for LE bathing and dressing PRN      Precautions:  Fall Risk, Bed Alarm, LSO (for comfort post L2,L3,L5 compression fx), Orthostatic Hypotension, Spinal Neutrality       Assessment of current deficits:    [x] Functional mobility            [x]ADLs           [x] Strength                  [x]Cognition    [x] Functional transfers          [x] IADLs         [x] Safety Awareness   [x]Endurance    [x] Fine Coordination                         [x] Balance      [] Vision/perception   []Sensation      []Gross Motor Coordination             [] ROM           [] Delirium                   [] Motor Control      OT PLAN OF CARE   OT POC based on physician orders, patient diagnosis and results of clinical assessment     Frequency/Duration; 3-5 days/wk for 2 weeks PRN   Specific OT Treatment Interventions to include:   * Instruction/training on adapted ADL techniques and AE recommendations to increase functional independence within precautions       * Training on energy conservation strategies, correct breathing pattern and techniques to improve independence/tolerance for self-care routine  * Functional transfer/mobility training/DME recommendations for increased independence, safety, and fall prevention  * Patient/Family education to increase follow through with safety techniques and functional independence  * Recommendation of environmental modifications for increased safety with functional transfers/mobility and ADLs  * Therapeutic exercise to improve motor endurance, ROM, and functional strength for ADLs/functional transfers  * Therapeutic activities to facilitate/challenge dynamic balance, stand tolerance for increased safety and independence with ADLs     Home Living: Pt lives with daughter in 2 story home with 1 step and 0HR to enter. Bed and Bath located n 2nd floor with full flight of steps (7+3) and 1HR to access. 1/2 bath on 1st floor.    Bathroom setup: tub/shower unit   Equipment owned: none     Prior Level of Function: IND with ADLs , IND with IADLs; ambulated with no AD prior  Driving: yes  Occupation: full time  (travels/drives to homes)     Pain Level: minimal pain R side of neck, shoulder & back     Cognition: A&O: 4/4; Follows multi step directions, pleasant & cooperative               Memory:  good              Sequencing:  fair+              Problem solving:  fair+              Judgement/safety:  fair+                Functional Assessment:  AM-PAC Daily Activity Raw Score: 15/24    Initial Eval Status  Date: 11/7/22 Treatment Status  Date:  11/16/22 STGs = LTGs  Time frame: 10-14 days   Feeding Minimal Assist  Mod Indep  Independent    Grooming Minimal Assist  Set up  Seated in chair with support Independent    UB Dressing Minimal Assist   SBA  Assist to tie gown around back   Max A Rayo brace   seated at EOB,  instructed on technique  Independent    LB Dressing Dependent   Max A  limited ROM of B LE & standing balance, will educate on AE next session  Minimal Assist    Bathing Maximal Assist  UB: Min A  LB: Mod A  Simulated this date  Minimal Assist    Toileting Maximal Assist  Min A for standing balance with hygiene   Min A- clothing management, assist with underwear over buttock Minimal Assist    Bed Mobility  Log Roll: Max A  Supine to sit: Max A x 2   Sit to supine: Max A x 2   Min A-  rolling  Mod A supine < > sit  Educated pt on technique to increase independence. Supine to sit: Mod Ind   Sit to supine: Mod Ind    Functional Transfers Sit to stand:NT   Stand to sit:NT  Stand pivot: NT  Commode: NT  Mod A  Sit < > stand  Cuing for hand placement  Mod A- toilet transfer (bedside commode) Sit to stand: Mod Ind   Stand to sit: Mod Ind  Stand pivot: Mod Ind  Commode: Mod Ind    Functional Mobility NT  Limited d/t dizziness seated EOB  Mod A  With walker, short household distance with very slow pace, cues for posture Mod Ind    Balance Sitting:     Static - SBA     Dynamic - SBA  Standing: NT Sitting: Ind  Standing: Min A  With walker     Sitting:  Static: ind  Dynamic: ind  Standing: mod ind   Activity Tolerance FAIR  Limited d/t dizziness and pain  Fair  Improvement this date, increased tolerance with task & improved pace  GOOD   Visual/  Perceptual Glasses: yes - reading          Safety Fair  Educated on spinal neutrality/precautions  Fair Good   during ADL completion following spinal percautions   Vitals BP sitting EOB: 73/38  BP supine: 86/58     Pt symptomatic/dizzy seated EOB required return supine for safety                Comments: Upon arrival pt was in bed & agreeable for therapy. Pt educated on adaptive techniques to increase independence and safety during ADL's, bed mobility, and functional transfers while maintaining spinal precautions. At end of session pt was seated in chair, agreeable to stay up for an hour, all lines and tubes intact, call light within reach. Pt has made Fair progress towards set goals.    Continue with current plan of care      Treatment Time In: 3:15       Treatment Time Out: 3:40           Treatment Charges: Mins Units   Ther Ex  [id number]     Manual Therapy Kishor Collado [id number] [phone]   ADL/Home Mgt [id number] 10 1   Neuro Re-ed [id number]     Group Therapy      Orthotic manage/training  [id number]     Non-Billable Time     Total Timed Treatment 25 2       Sudha BARRON  [address] [place] [address], [address] [place] [address]

## 2022-11-16 NOTE — CARE COORDINATION
11/16 Update CM note. PMR consulted today, await assessment. Neuro increased Baclofen to 20 MG PO BID and recommend occipital nerve blocks and signed off. MRI Brain yesterday was negative. PT/OT evaluated yesterday, 11 and 15/24, ambulated 10' with [employer] with ModA. If ARU unable to accept, plan is to return home with [facility] for SN/PT/OT. [facility] orders on chart. JUDITH/destination will need completed pending what discharge disposition is.     Jon Palafox, MEIRN, RN  [facility] Case Management   Cell: [phone]

## 2022-11-17 LAB
ALBUMIN SERPL-MCNC: 3.2 G/DL (ref 3.5–5.2)
ANION GAP SERPL CALCULATED.3IONS-SCNC: 12 MMOL/L (ref 7–16)
BASOPHILS ABSOLUTE: 0.05 E9/L (ref 0–0.2)
BASOPHILS RELATIVE PERCENT: 0.6 % (ref 0–2)
BUN BLDV-MCNC: 10 MG/DL (ref 6–20)
CALCIUM SERPL-MCNC: 10.3 MG/DL (ref 8.6–10.2)
CHLORIDE BLD-SCNC: 93 MMOL/L (ref 98–107)
CO2: 28 MMOL/L (ref 22–29)
CREAT SERPL-MCNC: 0.7 MG/DL (ref 0.5–1)
EOSINOPHILS ABSOLUTE: 0.27 E9/L (ref 0.05–0.5)
EOSINOPHILS RELATIVE PERCENT: 3.2 % (ref 0–6)
GFR SERPL CREATININE-BSD FRML MDRD: >60 ML/MIN/1.73
GLUCOSE BLD-MCNC: 122 MG/DL (ref 74–99)
HCT VFR BLD CALC: 33.2 % (ref 34–48)
HEMOGLOBIN: 10.5 G/DL (ref 11.5–15.5)
IMMATURE GRANULOCYTES #: 0.05 E9/L
IMMATURE GRANULOCYTES %: 0.6 % (ref 0–5)
LYMPHOCYTES ABSOLUTE: 1.97 E9/L (ref 1.5–4)
LYMPHOCYTES RELATIVE PERCENT: 23.2 % (ref 20–42)
MCH RBC QN AUTO: 28.3 PG (ref 26–35)
MCHC RBC AUTO-ENTMCNC: 31.6 % (ref 32–34.5)
MCV RBC AUTO: 89.5 FL (ref 80–99.9)
METER GLUCOSE: 125 MG/DL (ref 74–99)
METER GLUCOSE: 129 MG/DL (ref 74–99)
METER GLUCOSE: 199 MG/DL (ref 74–99)
METER GLUCOSE: 209 MG/DL (ref 74–99)
MONOCYTES ABSOLUTE: 0.5 E9/L (ref 0.1–0.95)
MONOCYTES RELATIVE PERCENT: 5.9 % (ref 2–12)
NEUTROPHILS ABSOLUTE: 5.64 E9/L (ref 1.8–7.3)
NEUTROPHILS RELATIVE PERCENT: 66.5 % (ref 43–80)
PDW BLD-RTO: 15.7 FL (ref 11.5–15)
PHOSPHORUS: 5.1 MG/DL (ref 2.5–4.5)
PLATELET # BLD: 465 E9/L (ref 130–450)
PMV BLD AUTO: 9 FL (ref 7–12)
POTASSIUM SERPL-SCNC: 4 MMOL/L (ref 3.5–5)
RBC # BLD: 3.71 E12/L (ref 3.5–5.5)
SODIUM BLD-SCNC: 133 MMOL/L (ref 132–146)
WBC # BLD: 8.5 E9/L (ref 4.5–11.5)

## 2022-11-17 PROCEDURE — 36415 COLL VENOUS BLD VENIPUNCTURE: CPT

## 2022-11-17 PROCEDURE — 82962 GLUCOSE BLOOD TEST: CPT

## 2022-11-17 PROCEDURE — 1200000000 HC SEMI PRIVATE

## 2022-11-17 PROCEDURE — 80069 RENAL FUNCTION PANEL: CPT

## 2022-11-17 PROCEDURE — 2580000003 HC RX 258: Performed by: INTERNAL MEDICINE

## 2022-11-17 PROCEDURE — 85025 COMPLETE CBC W/AUTO DIFF WBC: CPT

## 2022-11-17 PROCEDURE — 6360000002 HC RX W HCPCS: Performed by: FAMILY MEDICINE

## 2022-11-17 PROCEDURE — 6360000002 HC RX W HCPCS: Performed by: INTERNAL MEDICINE

## 2022-11-17 PROCEDURE — 6370000000 HC RX 637 (ALT 250 FOR IP): Performed by: INTERNAL MEDICINE

## 2022-11-17 PROCEDURE — 97535 SELF CARE MNGMENT TRAINING: CPT

## 2022-11-17 PROCEDURE — 97530 THERAPEUTIC ACTIVITIES: CPT

## 2022-11-17 PROCEDURE — 6370000000 HC RX 637 (ALT 250 FOR IP): Performed by: NURSE PRACTITIONER

## 2022-11-17 PROCEDURE — 6370000000 HC RX 637 (ALT 250 FOR IP): Performed by: FAMILY MEDICINE

## 2022-11-17 RX ADMIN — AMITRIPTYLINE HYDROCHLORIDE 10 MG: 10 TABLET, FILM COATED ORAL at 21:45

## 2022-11-17 RX ADMIN — OXYCODONE AND ACETAMINOPHEN 1 TABLET: 325; 10 TABLET ORAL at 05:04

## 2022-11-17 RX ADMIN — BACLOFEN 20 MG: 10 TABLET ORAL at 21:45

## 2022-11-17 RX ADMIN — SODIUM CHLORIDE, PRESERVATIVE FREE 10 ML: 5 INJECTION INTRAVENOUS at 21:45

## 2022-11-17 RX ADMIN — FOLIC ACID 1 MG: 1 TABLET ORAL at 08:43

## 2022-11-17 RX ADMIN — SODIUM CHLORIDE, PRESERVATIVE FREE 10 ML: 5 INJECTION INTRAVENOUS at 08:44

## 2022-11-17 RX ADMIN — POLYETHYLENE GLYCOL 3350 17 G: 17 POWDER, FOR SOLUTION ORAL at 08:43

## 2022-11-17 RX ADMIN — MIDODRINE HYDROCHLORIDE 10 MG: 10 TABLET ORAL at 08:43

## 2022-11-17 RX ADMIN — HYDROMORPHONE HYDROCHLORIDE 1 MG: 1 INJECTION, SOLUTION INTRAMUSCULAR; INTRAVENOUS; SUBCUTANEOUS at 02:05

## 2022-11-17 RX ADMIN — MECLIZINE 25 MG: 12.5 TABLET ORAL at 05:04

## 2022-11-17 RX ADMIN — HEPARIN SODIUM 5000 UNITS: 10000 INJECTION INTRAVENOUS; SUBCUTANEOUS at 05:05

## 2022-11-17 RX ADMIN — SENNOSIDES 8.6 MG: 8.6 TABLET, FILM COATED ORAL at 21:45

## 2022-11-17 RX ADMIN — HEPARIN SODIUM 5000 UNITS: 10000 INJECTION INTRAVENOUS; SUBCUTANEOUS at 21:44

## 2022-11-17 RX ADMIN — PANTOPRAZOLE SODIUM 40 MG: 40 TABLET, DELAYED RELEASE ORAL at 05:05

## 2022-11-17 RX ADMIN — SERTRALINE HYDROCHLORIDE 50 MG: 50 TABLET ORAL at 08:44

## 2022-11-17 RX ADMIN — MIDODRINE HYDROCHLORIDE 10 MG: 10 TABLET ORAL at 18:10

## 2022-11-17 RX ADMIN — HEPARIN SODIUM 5000 UNITS: 10000 INJECTION INTRAVENOUS; SUBCUTANEOUS at 12:41

## 2022-11-17 RX ADMIN — OXYCODONE AND ACETAMINOPHEN 1 TABLET: 325; 10 TABLET ORAL at 23:18

## 2022-11-17 RX ADMIN — MECLIZINE 25 MG: 12.5 TABLET ORAL at 14:55

## 2022-11-17 RX ADMIN — MECLIZINE 25 MG: 12.5 TABLET ORAL at 21:45

## 2022-11-17 RX ADMIN — BACLOFEN 20 MG: 10 TABLET ORAL at 08:43

## 2022-11-17 RX ADMIN — OXYCODONE AND ACETAMINOPHEN 1 TABLET: 325; 10 TABLET ORAL at 12:40

## 2022-11-17 RX ADMIN — OXYCODONE AND ACETAMINOPHEN 1 TABLET: 325; 10 TABLET ORAL at 18:10

## 2022-11-17 RX ADMIN — MIDODRINE HYDROCHLORIDE 10 MG: 10 TABLET ORAL at 12:40

## 2022-11-17 ASSESSMENT — PAIN DESCRIPTION - LOCATION
LOCATION: BACK;NECK
LOCATION: BACK;NECK
LOCATION: BACK

## 2022-11-17 ASSESSMENT — PAIN DESCRIPTION - FREQUENCY
FREQUENCY: INTERMITTENT
FREQUENCY: INTERMITTENT

## 2022-11-17 ASSESSMENT — PAIN DESCRIPTION - ORIENTATION
ORIENTATION: LOWER
ORIENTATION: POSTERIOR
ORIENTATION: LOWER
ORIENTATION: POSTERIOR

## 2022-11-17 ASSESSMENT — PAIN SCALES - GENERAL
PAINLEVEL_OUTOF10: 10
PAINLEVEL_OUTOF10: 5
PAINLEVEL_OUTOF10: 9
PAINLEVEL_OUTOF10: 0
PAINLEVEL_OUTOF10: 4
PAINLEVEL_OUTOF10: 9
PAINLEVEL_OUTOF10: 7
PAINLEVEL_OUTOF10: 10
PAINLEVEL_OUTOF10: 10

## 2022-11-17 ASSESSMENT — PAIN DESCRIPTION - ONSET
ONSET: ON-GOING
ONSET: ON-GOING

## 2022-11-17 ASSESSMENT — PAIN - FUNCTIONAL ASSESSMENT
PAIN_FUNCTIONAL_ASSESSMENT: PREVENTS OR INTERFERES SOME ACTIVE ACTIVITIES AND ADLS
PAIN_FUNCTIONAL_ASSESSMENT: PREVENTS OR INTERFERES SOME ACTIVE ACTIVITIES AND ADLS

## 2022-11-17 ASSESSMENT — PAIN DESCRIPTION - DESCRIPTORS
DESCRIPTORS: ACHING
DESCRIPTORS: ACHING;DISCOMFORT;SORE
DESCRIPTORS: ACHING;THROBBING
DESCRIPTORS: DISCOMFORT;PRESSURE

## 2022-11-17 ASSESSMENT — PAIN DESCRIPTION - PAIN TYPE
TYPE: ACUTE PAIN
TYPE: ACUTE PAIN

## 2022-11-17 NOTE — CONSULTS
[address]                  [address] [place],  [facility]                                  CONSULTATION    PATIENT NAME: Maximilian Felder               :        1970  MED REC NO:   [id number]                            ROOM:       8405  ACCOUNT NO:   [de-identified]                           ADMIT DATE: 2022  PROVIDER:     Ever Gorman MD    CONSULT DATE:  2022    REHAB CONSULT    AGE:  45    SEX:  Female. CHIEF COMPLAINT:  Compression fractures of the spine. HISTORY OF PRESENT ILLNESS:  The patient had gastric bypass in 2022  in [facility] [place]. She tolerated that well initially. She began to have  some dizziness and headaches several weeks postop and those have gotten  somewhat worse. She had a fall at home and also I think had a fall at  work on 2022. She was brought to [facility] and found to  have compression fractures at L2, L3, and L5. She was not felt to  require neurosurgical intervention and was fit with a TLSO. She has  also been seen by Neurology because of this dizziness. She is on  midodrine at this point. There was some concern about another cause,  but at this point, it is just being treated with midodrine. She also  now is in contact isolation. I believe that was because of a COVID  exposure that she had here at the hospital.  She has been evaluated by  Therapy. She was initially at a max assist.  Currently, she was min to  mod for transfers, minimal to ambulate about 15 feet with a wheeled  walker, and minimal for most of her ADLs, and I was asked to see her for  planning further rehab. PAST MEDICAL HISTORY:  1. Gastric bypass as detailed above. 2.  Obesity. 3.  Type 2 diabetes mellitus. ALLERGIES:  COLCHICINE and DARVON.     CURRENT MEDICATIONS:  Elavil, baclofen, folic acid, heparin, sliding  scale insulin, Lidoderm patch, Antivert, midodrine, Percocet, Protonix,  and Zoloft. SOCIAL HISTORY:  She lives with her daughter. REVIEW OF SYSTEMS:  HEENT is positive for the dizziness. Pulmonary and  cardiac are negative. GI is positive for the gastric bypass.  is  negative. Orthopedic is positive for the current compression fractures. Neurologic is negative. PHYSICAL EXAMINATION:  GENERAL:  An obese,  female, in no acute distress at  rest.  HEAD:  Normocephalic and atraumatic. EYES:  Pupils equal, round, and reactive to light. PHARYNX:  Normal.  LUNGS:  Clear to P and A. HEART:  Regular rate and rhythm. S1, S2 normal.  No murmurs or gallops. ABDOMEN:  Bowel sounds normal.  Soft and nontender. No masses. EXTREMITIES:  Without clubbing, cyanosis, or edema. MENTAL STATUS:  Alert and oriented. SENSATION:  Grossly intact. NEUROMUSCULAR:  4/5 strength throughout. I do not see any focal  deficits, but she has a very little ability to take resistance in the  legs because it causes radiating pain to the back. PROBLEM LIST:  1.  L2, L3, and L5 compression fractures. 2.  Recent gastric bypass. 3.  Type 2 diabetes mellitus. 4.  Hypotension, currently on midodrine. 5.  Isolation due to COVID exposure. RECOMMENDATIONS:  The patient would like to be able to go home with home  therapy. We will see how she does with another therapy session and see  if and when she is going to get out of isolation. We would have to  address pre-cert if she needs to come to rehab, but if she is better  with her next session and can tolerate going home that would be her  preference.         Chayo Casillas MD    D: 11/17/2022 11:02:45       T: 11/17/2022 11:05:39     [email or web address]  Job#: [id number]     Doc#: [id number]    CC:

## 2022-11-17 NOTE — PROGRESS NOTES
Hospitalist Progress Note      PCP: Anu Starkey MD    Date of Admission: 11/5/2022      Hospital Course: This is a 52-year-old female with past medical history of diabetes mellitus, anemia who presented with back pain after a syncopal fall. On arrival to the emergency department, CT spine showed acute compression fracture of L2, L3, and L5. She was evaluated by neurosurgeon who recommended conservative management with LSO brace, pain management. CT head was negative for acute intracranial abnormality; CTA head and neck was negative for critical stenosis or LVO but it revealed a 3 mm x 6 mm x 4 mm focus of mucosal versus soft tissue nodule along the right lateral wall of the subglottic trachea. Patient was evaluated by neurologist for suspected orthostatic hypotension secondary to diabetic autonomic neuropathy; work-up in progress for occipital neuralgia with significant bilateral sternocleidomastoid muscle tension, she was started on baclofen. Subjective: Pt was seen and examined at bedside. No acute event overnight; reports improved dizziness, presyncopal symptoms but still complaining of right \"ear pain\". Denies any chest pain, shortness of breath, or palpitation.      Medications:  Reviewed    Infusion Medications    sodium chloride      dextrose       Scheduled Medications    baclofen  20 mg Oral BID    polyethylene glycol  17 g Oral Daily    senna  1 tablet Oral Nightly    midodrine  10 mg Oral TID WC    oxyCODONE-acetaminophen  1 tablet Oral Q6H    meclizine  25 mg Oral 3 times per day    folic acid  1 mg Oral Daily    amitriptyline  10 mg Oral Nightly    lidocaine  1 patch Topical Daily    pantoprazole  40 mg Oral QAM AC    sertraline  50 mg Oral Daily    sodium chloride flush  10 mL IntraVENous 2 times per day    heparin (porcine)  5,000 Units SubCUTAneous Q8H    insulin lispro  0-4 Units SubCUTAneous TID WC    insulin lispro  0-4 Units SubCUTAneous Nightly     PRN Meds: bisacodyl, HYDROmorphone, sodium chloride flush, sodium chloride flush, sodium chloride, ondansetron **OR** ondansetron, magnesium hydroxide, acetaminophen **OR** acetaminophen, glucose, dextrose bolus **OR** dextrose bolus, glucagon (rDNA), dextrose      Intake/Output Summary (Last 24 hours) at 11/17/2022 0900  Last data filed at 11/16/2022 2224  Gross per 24 hour   Intake 1090 ml   Output --   Net 1090 ml         Exam:    BP 98/67   Pulse 75   Temp 98.6 °F (37 °C) (Temporal)   Resp 20   Ht 5' 11\" (1.803 m)   Wt 217 lb 2 oz (98.5 kg)   SpO2 98%   BMI 30.28 kg/m²     General appearance: Sitting comfortably in bed. No apparent distress. Respiratory: Clear to auscultation bilaterally. Cardiovascular: Normal S1/S2. Regular rhythm and rate. Abdomen: Soft, non-tender, non-distended with normal bowel sounds. Musculoskeletal: No clubbing, cyanosis or edema bilaterally. Skin: Skin color, texture, turgor normal.  No rashes or lesions. Neurologic:  No focal deficit. Psychiatric: Alert and oriented, thought content appropriate, normal insight  Peripheral Pulses: +2 palpable, equal bilaterally       Labs:   No results for input(s): WBC, HGB, HCT, PLT in the last 72 hours. No results for input(s): NA, K, CL, CO2, BUN, CREATININE, CALCIUM, PHOS in the last 72 hours. Invalid input(s): MAGNES    No results for input(s): AST, ALT, BILIDIR, BILITOT, ALKPHOS in the last 72 hours. No results for input(s): INR in the last 72 hours. No results for input(s): Julien Matt in the last 72 hours. Radiology:  MRI BRAIN WO CONTRAST   Final Result   No evidence of recent infarct, acute intracranial hemorrhage, mass effect, or   hydrocephalus. XR CHEST PORTABLE   Final Result   No acute process. CT SOFT TISSUE NECK W CONTRAST   Final Result   No acute abnormality of the soft tissue structures of the neck.       Previously described soft tissue focus along the right tracheal wall in the   subglottic region is not seen on the current exam.         CT HEAD WO CONTRAST   Final Result   CT Head:      No acute intracranial hemorrhage or mass effect. No CT evidence of large acute infarct. CTA Neck:      No hemodynamically significant stenosis or dissection of the cervical   internal carotid arteries. No high-grade stenosis or dissection of the cervical vertebral arteries. CTA Head:      No large vessel arterial occlusion or high-grade stenosis. 3 mm x 6 mm x 4 mm focus of mucus versus soft tissue nodule along the right   lateral wall of the subglottic trachea. To exclude neoplasm, recommend   nonemergent follow-up CT neck with contrast versus direct visualization. RECOMMENDATION:   1.1 cm incidental right thyroid nodule. No follow-up imaging is recommended. Reference: J Am Dean Radiol. 2015 Feb;12(2): 143-50         CTA NECK W CONTRAST   Final Result   CT Head:      No acute intracranial hemorrhage or mass effect. No CT evidence of large acute infarct. CTA Neck:      No hemodynamically significant stenosis or dissection of the cervical   internal carotid arteries. No high-grade stenosis or dissection of the cervical vertebral arteries. CTA Head:      No large vessel arterial occlusion or high-grade stenosis. 3 mm x 6 mm x 4 mm focus of mucus versus soft tissue nodule along the right   lateral wall of the subglottic trachea. To exclude neoplasm, recommend   nonemergent follow-up CT neck with contrast versus direct visualization. RECOMMENDATION:   1.1 cm incidental right thyroid nodule. No follow-up imaging is recommended. Reference: J Am Dean Radiol. 2015 Feb;12(2): 143-50         CTA HEAD W CONTRAST   Final Result   CT Head:      No acute intracranial hemorrhage or mass effect. No CT evidence of large acute infarct. CTA Neck:      No hemodynamically significant stenosis or dissection of the cervical   internal carotid arteries.       No high-grade stenosis or dissection of the cervical vertebral arteries. CTA Head:      No large vessel arterial occlusion or high-grade stenosis. 3 mm x 6 mm x 4 mm focus of mucus versus soft tissue nodule along the right   lateral wall of the subglottic trachea. To exclude neoplasm, recommend   nonemergent follow-up CT neck with contrast versus direct visualization. RECOMMENDATION:   1.1 cm incidental right thyroid nodule. No follow-up imaging is recommended. Reference: J Am Dean Radiol. 2015 Feb;12(2): 143-50         CT THORACIC SPINE WO CONTRAST   Final Result   Focal depression involving superior endplate of T1 related to fracture of   uncertain chronicity. CT LUMBAR SPINE WO CONTRAST   Final Result   Stable mild vertebral malalignment. Disc space narrowing, discovertebral degenerative changes, and facet   arthritis with bony spinal canal stenosis. New superior vertebral body compression deformities at L2, L3, and L5.         CT ABDOMEN PELVIS W IV CONTRAST Additional Contrast? None   Final Result   1. New depressed fractures involving superior endplates of L2, L3, and L5. Chronic depressed fracture involving superior endplate of L4.   2. No acute process in the abdomen or pelvis. Stable ventral abdominal wall   hernia containing mesenteric fat and segment of large bowel. 3. Stable mild splenomegaly. XR CHEST (2 VW)   Final Result   No acute process. Active Hospital Problems    Diagnosis Date Noted    Compression fracture of L2 lumbar vertebra ([address] [C83.784B] 11/06/2022     Priority: Medium    Compression fracture of L2 lumbar vertebra, open, initial encounter ([address] [S32.020B] 11/05/2022     Priority: Medium    Bubo [I88.8] 11/05/2022     Priority: Medium       Assessment  Syncope - likely secondary to orthostatic hypotension related to diabetic autonomic neuropathy.   MRI brain was negative for acute intracranial abnormality   Acute compression fractures - involves L2, L3, and L5 vertebral bodies  Occipital neuralgia  Hypotension  Type 2 diabetes mellitus  Chronic anemia  Physical deconditioning    Plan:  Continue with PRN analgesia   On Midodrine  Bowel regimen  LSO brace when out of bed or > 45 degrees in bed   PT, activity as tolerated  Fall precautions  Basal corrective bolus insulin regimen  Discharge planning was discussed with case management, PM&R consult for possible ARU placement though patient is not very keen on going to rehab; she prefers to go home with [place] [id number] despite significant deconditioning. DVT Prophylaxis: SQ Heparin  Diet: ADULT DIET;  Regular  Code Status: Full Code    PT/OT Eval Status: Ongoing    Dispo - ARU versus Carri Johnston MD 11/17/2022 9:00 AM

## 2022-11-17 NOTE — PROGRESS NOTES
strategies, correct breathing pattern and techniques to improve independence/tolerance for self-care routine  * Functional transfer/mobility training/DME recommendations for increased independence, safety, and fall prevention  * Patient/Family education to increase follow through with safety techniques and functional independence  * Recommendation of environmental modifications for increased safety with functional transfers/mobility and ADLs  * Therapeutic exercise to improve motor endurance, ROM, and functional strength for ADLs/functional transfers  * Therapeutic activities to facilitate/challenge dynamic balance, stand tolerance for increased safety and independence with ADLs     Home Living: Pt lives with daughter in 2 story home with 1 step and 0HR to enter. Bed and Bath located n 2nd floor with full flight of steps (7+3) and 1HR to access. 1/2 bath on 1st floor.    Bathroom setup: tub/shower unit   Equipment owned: none     Prior Level of Function: IND with ADLs , IND with IADLs; ambulated with no AD prior  Driving: yes  Occupation: full time  (travels/drives to homes)     Pain Level: minimal pain R side of neck, shoulder & back     Cognition: A&O: 4/4; Follows multi step directions, pleasant & cooperative               Memory:  good              Sequencing:  fair+              Problem solving:  fair+              Judgement/safety:  fair+                Functional Assessment:  AM-PAC Daily Activity Raw Score: 15/24    Initial Eval Status  Date: 11/7/22 Treatment Status  Date:  11/17/22 STGs = LTGs  Time frame: 10-14 days   Feeding Minimal Assist  Mod Indep  Independent    Grooming Minimal Assist  Set up  Seated in chair with support Independent    UB Dressing Minimal Assist   SBA  Assist to tie gown around back   Max A Rayo brace   seated at EOB,  instructed on technique bed level to ease placement & better fit  Independent    LB Dressing Dependent   Mod A  Educated pt on AE, using reacher & sock aide to doff/guerline socks as well as pants with increased time & effort. Pt is limited by decreased R hand function when using reacher & standing balance when attempting to pull over hips, therefore requires assist over hips  Minimal Assist    Bathing Maximal Assist  UB: Min A  LB: Mod A  Simulated this date, recommending shower chair  Minimal Assist    Toileting Maximal Assist  Min A for standing balance with hygiene   Min A- clothing management, assist with underwear over buttock Minimal Assist    Bed Mobility  Log Roll: Max A  Supine to sit: Max A x 2   Sit to supine: Max A x 2   Min A-  rolling  Mod A supine < > sit  Educated pt on technique to increase independence. Supine to sit: Mod Ind   Sit to supine: Mod Ind    Functional Transfers Sit to stand:NT   Stand to sit:NT  Stand pivot: NT  Commode: NT  Mod/Min A  Sit < > stand  Cuing for hand placement   Sit to stand: Mod Ind   Stand to sit: Mod Ind  Stand pivot: Mod Ind  Commode:   Mod Ind    Functional Mobility NT  Limited d/t dizziness seated EOB  Mod/Min A  With walker, short household distance with very slow pace, cues for posture & walker management, flexed at knees & continued mild dizziness Mod Ind    Balance Sitting:     Static - SBA     Dynamic - SBA  Standing: NT Sitting: Ind  Standing: Min A  With walker     Sitting:  Static: ind  Dynamic: ind  Standing: mod ind   Activity Tolerance FAIR  Limited d/t dizziness and pain  Fair  Improvement this date, increased tolerance, continued dizziness with movements, see BP below  GOOD   Visual/  Perceptual Glasses: yes - reading          Safety Fair  Educated on spinal neutrality/precautions  Fair Good   during ADL completion following spinal percautions   Vitals BP sitting EOB: 73/38  BP supine: 86/58     Pt symptomatic/dizzy seated EOB required return supine for safety      BP supine 86/43 &  BP seated 88/52  BP standing 86/56  BP standing 93/60    Raul hose in place prior to session  Nsg present during BP assessment          Comments: Upon arrival pt was in bed & agreeable for therapy. Pt educated on adaptive techniques & use of AE to increase independence and safety during ADL's, bed mobility, and functional transfers while maintaining spinal precautions. At end of session pt was seated in chair, agreeable to stay up for an hour, all lines and tubes intact, call light within reach. Nsg aware pt was up in chair. Pt has made Fair progress towards set goals. Continue with current plan of care      Treatment Time In: 3:05    Treatment Time Out: 3:45           Treatment Charges: Mins Units   Ther Ex  [id number]     Manual Therapy [phone]     Thera Activities 29522 15 1   ADL/Home Mgt 73528 25 2   Neuro Re-ed 80076     Group Therapy      Orthotic manage/training  11528     Non-Billable Time     Total Timed Treatment 40 3       Sudha BARRON  [address] [place] [address], [address] [place] [address]

## 2022-11-17 NOTE — PLAN OF CARE
Problem: Pain  Goal: Verbalizes/displays adequate comfort level or baseline comfort level  11/17/2022 1049 by Mason Salinas RN  Outcome: Progressing     Problem: ABCDS Injury Assessment  Goal: Absence of physical injury  11/17/2022 1049 by Mason Salinas RN  Outcome: Progressing     Problem: Safety - Adult  Goal: Free from fall injury  11/17/2022 1049 by Mason Salinas RN  Outcome: Progressing     Problem: Chronic Conditions and Co-morbidities  Goal: Patient's chronic conditions and co-morbidity symptoms are monitored and maintained or improved  11/17/2022 1049 by Mason Salinas RN  Outcome: Progressing     Problem: Skin/Tissue Integrity  Goal: Absence of new skin breakdown  Description: 1. Monitor for areas of redness and/or skin breakdown  2. Assess vascular access sites hourly  3. Every 4-6 hours minimum:  Change oxygen saturation probe site  4. Every 4-6 hours:  If on nasal continuous positive airway pressure, respiratory therapy assess nares and determine need for appliance change or resting period.   11/17/2022 1049 by Mason Salinas RN  Outcome: Progressing

## 2022-11-17 NOTE — CARE COORDINATION
PMR was consulted yesterday, await assessment from Dr. Wally Ryan. Neuro increased Baclofen to 20 MG PO BID and recommend occipital nerve blocks and signed off. MRI Brain was negative. PT/OT evaluated yesterday, 14/24 and 15/24, ambulated 13' with [employer] with Min A. Bed and Bath located n 2nd floor with full flight of steps. If ARU unable to accept, plan is to return home with [facility] for SN/PT/OT. [facility] on chart. Patient is on daily therapy TX's to focus on stairs once her dizziness is better controlled since plan is might be home and she has 13 steps to get to bed & bath. Patient's daughter will transport her home at time of discharge.    Osvaldo Ellison RN CM  [phone]

## 2022-11-18 LAB
METER GLUCOSE: 107 MG/DL (ref 74–99)
METER GLUCOSE: 133 MG/DL (ref 74–99)
METER GLUCOSE: 149 MG/DL (ref 74–99)
METER GLUCOSE: 228 MG/DL (ref 74–99)
SARS-COV-2, NAAT: DETECTED

## 2022-11-18 PROCEDURE — 6370000000 HC RX 637 (ALT 250 FOR IP): Performed by: NURSE PRACTITIONER

## 2022-11-18 PROCEDURE — 97535 SELF CARE MNGMENT TRAINING: CPT

## 2022-11-18 PROCEDURE — 97530 THERAPEUTIC ACTIVITIES: CPT

## 2022-11-18 PROCEDURE — 1200000000 HC SEMI PRIVATE

## 2022-11-18 PROCEDURE — 6370000000 HC RX 637 (ALT 250 FOR IP): Performed by: FAMILY MEDICINE

## 2022-11-18 PROCEDURE — 82962 GLUCOSE BLOOD TEST: CPT

## 2022-11-18 PROCEDURE — 6360000002 HC RX W HCPCS: Performed by: INTERNAL MEDICINE

## 2022-11-18 PROCEDURE — 2580000003 HC RX 258: Performed by: INTERNAL MEDICINE

## 2022-11-18 PROCEDURE — 6370000000 HC RX 637 (ALT 250 FOR IP): Performed by: INTERNAL MEDICINE

## 2022-11-18 PROCEDURE — 6370000000 HC RX 637 (ALT 250 FOR IP): Performed by: HOSPITALIST

## 2022-11-18 PROCEDURE — 87635 SARS-COV-2 COVID-19 AMP PRB: CPT

## 2022-11-18 RX ORDER — MAGNESIUM HYDROXIDE/ALUMINUM HYDROXICE/SIMETHICONE 120; 1200; 1200 MG/30ML; MG/30ML; MG/30ML
30 SUSPENSION ORAL EVERY 6 HOURS PRN
Status: DISCONTINUED | OUTPATIENT
Start: 2022-11-18 | End: 2022-11-23 | Stop reason: HOSPADM

## 2022-11-18 RX ORDER — MIDODRINE HYDROCHLORIDE 5 MG/1
15 TABLET ORAL
Status: DISCONTINUED | OUTPATIENT
Start: 2022-11-18 | End: 2022-11-23 | Stop reason: HOSPADM

## 2022-11-18 RX ADMIN — SENNOSIDES 8.6 MG: 8.6 TABLET, FILM COATED ORAL at 21:45

## 2022-11-18 RX ADMIN — MIDODRINE HYDROCHLORIDE 15 MG: 5 TABLET ORAL at 12:21

## 2022-11-18 RX ADMIN — AMITRIPTYLINE HYDROCHLORIDE 10 MG: 10 TABLET, FILM COATED ORAL at 21:45

## 2022-11-18 RX ADMIN — HEPARIN SODIUM 5000 UNITS: 10000 INJECTION INTRAVENOUS; SUBCUTANEOUS at 21:46

## 2022-11-18 RX ADMIN — MIDODRINE HYDROCHLORIDE 10 MG: 10 TABLET ORAL at 08:48

## 2022-11-18 RX ADMIN — BACLOFEN 20 MG: 10 TABLET ORAL at 08:48

## 2022-11-18 RX ADMIN — HEPARIN SODIUM 5000 UNITS: 10000 INJECTION INTRAVENOUS; SUBCUTANEOUS at 12:22

## 2022-11-18 RX ADMIN — OXYCODONE AND ACETAMINOPHEN 1 TABLET: 325; 10 TABLET ORAL at 23:11

## 2022-11-18 RX ADMIN — FOLIC ACID 1 MG: 1 TABLET ORAL at 08:48

## 2022-11-18 RX ADMIN — POLYETHYLENE GLYCOL 3350 17 G: 17 POWDER, FOR SOLUTION ORAL at 08:48

## 2022-11-18 RX ADMIN — HEPARIN SODIUM 5000 UNITS: 10000 INJECTION INTRAVENOUS; SUBCUTANEOUS at 06:05

## 2022-11-18 RX ADMIN — SERTRALINE HYDROCHLORIDE 50 MG: 50 TABLET ORAL at 08:48

## 2022-11-18 RX ADMIN — MAGNESIUM HYDROXIDE 30 ML: 1200 LIQUID ORAL at 09:31

## 2022-11-18 RX ADMIN — OXYCODONE AND ACETAMINOPHEN 1 TABLET: 325; 10 TABLET ORAL at 12:07

## 2022-11-18 RX ADMIN — MECLIZINE 25 MG: 12.5 TABLET ORAL at 21:45

## 2022-11-18 RX ADMIN — OXYCODONE AND ACETAMINOPHEN 1 TABLET: 325; 10 TABLET ORAL at 17:24

## 2022-11-18 RX ADMIN — SODIUM CHLORIDE, PRESERVATIVE FREE 10 ML: 5 INJECTION INTRAVENOUS at 08:49

## 2022-11-18 RX ADMIN — INSULIN LISPRO 1 UNITS: 100 INJECTION, SOLUTION INTRAVENOUS; SUBCUTANEOUS at 12:21

## 2022-11-18 RX ADMIN — MECLIZINE 25 MG: 12.5 TABLET ORAL at 16:01

## 2022-11-18 RX ADMIN — MECLIZINE 25 MG: 12.5 TABLET ORAL at 06:05

## 2022-11-18 RX ADMIN — OXYCODONE AND ACETAMINOPHEN 1 TABLET: 325; 10 TABLET ORAL at 06:04

## 2022-11-18 RX ADMIN — MIDODRINE HYDROCHLORIDE 15 MG: 5 TABLET ORAL at 17:24

## 2022-11-18 RX ADMIN — PANTOPRAZOLE SODIUM 40 MG: 40 TABLET, DELAYED RELEASE ORAL at 06:05

## 2022-11-18 ASSESSMENT — PAIN - FUNCTIONAL ASSESSMENT: PAIN_FUNCTIONAL_ASSESSMENT: ACTIVITIES ARE NOT PREVENTED

## 2022-11-18 ASSESSMENT — PAIN DESCRIPTION - LOCATION
LOCATION: NECK
LOCATION: BACK

## 2022-11-18 ASSESSMENT — PAIN SCALES - GENERAL
PAINLEVEL_OUTOF10: 9
PAINLEVEL_OUTOF10: 9
PAINLEVEL_OUTOF10: 0

## 2022-11-18 ASSESSMENT — PAIN DESCRIPTION - DESCRIPTORS
DESCRIPTORS: ACHING
DESCRIPTORS: DISCOMFORT

## 2022-11-18 ASSESSMENT — PAIN DESCRIPTION - ORIENTATION
ORIENTATION: LOWER
ORIENTATION: LOWER

## 2022-11-18 NOTE — PROGRESS NOTES
Physical Therapy  Treatment Note    Name: Israel Thomposn  : 1970  MRN: [id number]      Date of Service: 2022    Evaluating PT:  Reinier Jurado, PT, DPT [id number]    Room #:  5192/3036-S  Diagnosis:  Natalie Pearson [I88.8]  Compression fracture of L2 lumbar vertebra, open, initial encounter ([facility] [S32.020B]  Compression fracture of L2 vertebra, initial encounter ([facility] [H22.665J]  PMHx/PSHx:    Past Medical History:   Diagnosis Date    Anemia     Diabetes mellitus ([facility]     Papanicolaou smear of cervix with atypical squamous cells of undetermined significance (ASC-US)          Screening for human papillomavirus (HPV)     4/10/07      Precautions:  Fall risk, L2, L3, L5 compression fx- LSO for comfort, Orthostatic Hypotension, COVID+  Equipment Needs:  TBD    SUBJECTIVE:    Pt lives with daughter in a two story home with one stairs to enter and no rail. Bed is on second floor and bath is on second floor with 13 steps and one rail to access. Pt ambulated without AD PTA and was fully independent. Equipment Owned:    OBJECTIVE:   Initial Evaluation  Date: 22 Treatment  22 Short Term/ Long Term   Goals   AM-PAC 6 Clicks   *limited due to severe pain and dizziness     Was pt agreeable to Eval/treatment? Yes Yes    Does pt have pain? Yes 10/10 LBP 0/10 LBP at rest  Pain with movement not quantified     Bed Mobility  Rolling:  Max A  Supine to sit: Max x2  Sit to supine: Max x2  Scooting: Max x2 NT Indep   Transfers Sit to stand: NT  Stand to sit: NT  Stand pivot: NT Sit to stand: ModA from bed  Stand to sit: Celestino  Stand pivot: Celestino with FWW Indep   Ambulation    NT 5 feet side stepping with [employer] with Mod A for R LE advancement >50 feet Indep   Stair negotiation: ascended and descended  NT NT 14 steps with one rail Supervision   ROM BUE:  R UE AROM limited due to pain  BLE:  B LE AROM WFL BLE: AROM WFL    Strength BUE:  Refer to OT  BLE:  4+/5 BLE: grossly 4+/5 4+/5   Balance Sitting EOB: CGA for safety  Dynamic Standing:  NT Sitting: SBA  Standing: Celestino with [employer] Sitting EOB:  Indep  Dynamic Standing:  Indep     Pt is A & O x 4  Edema:  None noted in BLE  Vitals:   Orthos: Supine 97/67 69bpm, Sitting 91/69 85bpm, Standing 70/55 89bpm     Therapeutic Exercises:    Sit<>stand x2 with ModA<>Celestino  Standing balance activities: performed static and dynamic standing balance activities within AD with single and BUE support with reaches within ADRIANA with Min/ModA    Patient education  Pt educated on bed mobility, transfer technique, brace management, proper use of walker, positioning in bed. Patient response to education:   Pt verbalized understanding Pt demonstrated skill Pt requires further education in this area   Yes With assist Yes     ASSESSMENT:    Comments:  Patient deemed medically stable prior to start of session. Patient was semi supine in bed upon arrival. No pain at rest. Orthos ordered and performed accordingly prior to further treatment. LSO donned with assist of patient prior to mobility. All vitals noted above with position hypotension sitting to standing despite wearing B compression stockings and LSO acting as abdominal binder. Symptoms with each position. Continual need for significantly increased time to complete all tasks and distances. Continued deficits during ambulation include: mild increase in trunk flexion, poor proximity to FWW and foot slide during swing phase of R LE. Additional new onset of cognitive fog and very slow processing with sequencing this date; memory retention assessed with word recall with 3/3 correct. However, maximal verbal cues needed throughout to complete multi step tasks. Additional new symptoms of coughing this date; due to this RN notified and new COVID test recommended. Patient left in chair position with call light in reach and all needs met. Skilled positioning for comfort.    Positive orthos, Cognitive fog, and coughing symptoms relayed to RN and CM this date. Treatment:  Patient practiced and was instructed in the following treatment:    Transfer training: verbal cues for hand placement during sit to stand transfer and assistance for anterior weight shift in order to facilitate initiation of the stand. Standing balance activities: performed static and dynamic standing balance activities within AD with single and BUE support in order to progress safety with standing activities as well as improve independence with standing ADLs. Gait training: verbal cues for sequencing and safety, verbal cues for appropriate step length and positioning within walker with dynamic activities, physical assist for walker management, and steadying   Skilled repositioning in upright chair to promote improved posture and improved cardiorespiratory function and lumbar support. Pt positioned with bed in semi-chair position to enhance skin/joint protection. PLAN:    Patient is making good progress towards established goals. Will continue with current POC.       Time in  1005  Time out  1045    Total Treatment Time  40 minutes     CPT codes:  [] Gait training 79414 0 minutes  [] Manual therapy 85312 0 minutes  [x] Therapeutic activities 99716 40 minutes  [] Therapeutic exercises 18825 0 minutes  [] Neuromuscular reeducation 18789 0 minutes    Brad King, PT, DPT  License number:  [id number]

## 2022-11-18 NOTE — PROGRESS NOTES
[address] [place] [address],[address] [place] [address] [address], [place]      CVZO:  Patient Name: Lucinda Yip  MRN: [id number]  : 1970  Room: [address] [place] [address]A     Per OT Eval:     Evaluating OT: Chapito Blanc, [address] OTR/L; [id number]       Referring Provider: Gurinder Colin MD    Specific Provider Orders/Date: OT Eval and Treat 22       Diagnosis: Compression fracture of L2 lumbar vertebra, open, initial encounter  Bubo     Surgery: none this admission     Pertinent Medical History:  has a past medical history of Anemia, Diabetes mellitus ([facility] [address] Papanicolaou smear of cervix with atypical squamous cells of undetermined significance (ASC-US), and Screening for human papillomavirus (HPV).        Reason for admission: fell backwards at home d/t dizziness, LOC     Recommended Adaptive Equipment: TBD pending progression - BSC, extended tub bench, AE for LE bathing and dressing PRN      Precautions:  Fall Risk, Bed Alarm, LSO (for comfort post L2,L3,L5 compression fx), Orthostatic Hypotension, Spinal Neutrality       Assessment of current deficits:    [x] Functional mobility            [x]ADLs           [x] Strength                  [x]Cognition    [x] Functional transfers          [x] IADLs         [x] Safety Awareness   [x]Endurance    [x] Fine Coordination                         [x] Balance      [] Vision/perception   []Sensation      []Gross Motor Coordination             [] ROM           [] Delirium                   [] Motor Control      OT PLAN OF CARE   OT POC based on physician orders, patient diagnosis and results of clinical assessment     Frequency/Duration; 3-5 days/wk for 2 weeks PRN   Specific OT Treatment Interventions to include:   * Instruction/training on adapted ADL techniques and AE recommendations to increase functional independence within precautions       * Training on energy conservation strategies, correct breathing pattern and techniques to improve independence/tolerance for self-care routine  * Functional transfer/mobility training/DME recommendations for increased independence, safety, and fall prevention  * Patient/Family education to increase follow through with safety techniques and functional independence  * Recommendation of environmental modifications for increased safety with functional transfers/mobility and ADLs  * Therapeutic exercise to improve motor endurance, ROM, and functional strength for ADLs/functional transfers  * Therapeutic activities to facilitate/challenge dynamic balance, stand tolerance for increased safety and independence with ADLs     Home Living: Pt lives with daughter in 2 story home with 1 step and 0HR to enter. Bed and Bath located n 2nd floor with full flight of steps (7+3) and 1HR to access. 1/2 bath on 1st floor.    Bathroom setup: tub/shower unit   Equipment owned: none     Prior Level of Function: IND with ADLs , IND with IADLs; ambulated with no AD prior  Driving: yes  Occupation: full time  (travels/drives to homes)     Pain Level: minimal pain R side of neck, shoulder & back      Cognition: A&O: 4/4; Follows multi step directions, pleasant & cooperative               Memory:  good              Sequencing:  fair+              Problem solving:  fair+              Judgement/safety:  fair+                Functional Assessment:  AM-PAC Daily Activity Raw Score: 15/24    Initial Eval Status  Date: 11/7/22 Treatment Status  Date:  11/18/22 STGs = LTGs  Time frame: 10-14 days   Feeding Minimal Assist  Mod Indep  Independent    Grooming Minimal Assist  Set up  Per last session Independent    UB Dressing Minimal Assist   SBA  Assist to tie gown around back   Max A Rayo brace   seated at EOB Independent    LB Dressing Dependent   Mod A  Per last session Minimal Assist    Bathing Maximal Assist  UB: Min A  LB: Mod A  Per last session  Minimal Assist    Toileting Maximal Assist  Min A   Per last session Minimal Assist    Bed Mobility  Log Roll: Max A  Supine to sit: Max A x 2   Sit to supine: Max A x 2  rolling: Min A   supine < > sit: Mod A      Supine to sit: Mod Ind   Sit to supine: Mod Ind    Functional Transfers Sit to stand:NT   Stand to sit:NT  Stand pivot: NT  Commode: NT  Sit < > stand: Mod A with ww and cues for all hand placement    Sit to stand: Mod Ind   Stand to sit: Mod Ind  Stand pivot: Mod Ind  Commode: Mod Ind    Functional Mobility NT  Limited d/t dizziness seated EOB  Mod A with ww   side step toward HOB  Repeated verbal   cues for step by step side stepping presented with slow pace and delayed processing with tactile assist to progress RLE and ww a  Pt symptomatic dizziness standing EOB Mod Ind    Balance Sitting:     Static - SBA     Dynamic - SBA  Standing: NT Sitting: Ind  Standing: Min A with ww    Sitting:  Static: ind  Dynamic: ind  Standing: mod ind   Activity Tolerance FAIR  Limited d/t dizziness and pain  Fair  Increased time for all tasks d/t delayed processing    Pt reported back pain while lying supine with bed adjustments required slow movements to reduce pain GOOD   Visual/  Perceptual Glasses: yes - reading          Safety Fair  Educated on spinal neutrality/precautions FAIR   Presented with decreased insight    3/3 STM recall however  Delayed processing during session required increased time and verbal cues for tasks   Good   during ADL completion following spinal percautions   Vitals BP sitting EOB: 73/38  BP supine: 86/58     Pt symptomatic/dizzy seated EOB required return supine for safety     BP supine 97/67 HR: 69  BP seated 91/69 HR: 85  BP standing 70/55 HR: 89    Raul hose in place prior to session and LSO donned           Comments: Upon arrival pt lying supine in bed.  Pt educated  through out treatment regarding proper technique & safety with  bed mobility, functional transfers & mobility, walker safety & completion of ADL tasks with modified techniques to improve independence, safety, prevent falls and allow pt to return to prior level of function. OT will continue with POC with focus on increasing independence on ADLs. At end of session, pt lying supine in bed with all lines and tubes intact, call light within reach. Pt has made FAIR progress towards set goals.    Continue with current plan of care      Treatment Time In:1008            Treatment Time Out: 8107               Treatment Charges: Mins Units   Ther Ex  80361     Manual Therapy [id number]     Thera Activities [id number] 23 2   ADL/Home t 00026 15 1   Neuro Re-ed 93085     Group Therapy      Orthotic manage/training  13826     Non-Billable Time     Total Timed Treatment 38 [address], [address], [address]

## 2022-11-18 NOTE — PROGRESS NOTES
Occupational Therapy  OT BEDSIDE TREATMENT NOTE   [address] [facility], [place] [address]  [address] [place] [address]       BAVZ:  Patient Name: Lucy Askew  MRN: [id number]  : 1970  Room: [address] [place] [address]A     Per OT Eval:    Evaluating OT: Shala Wang [place] OTR/L; [id number]       Referring Provider: Rudi Caballero MD    Specific Provider Orders/Date: OT Eval and Treat 22       Diagnosis: Compression fracture of L2 lumbar vertebra, open, initial encounter  Bubo     Surgery: none this admission     Pertinent Medical History:  has a past medical history of Anemia, Diabetes mellitus ([facility] [address] Papanicolaou smear of cervix with atypical squamous cells of undetermined significance (ASC-US), and Screening for human papillomavirus (HPV).        Reason for admission: fell backwards at home d/t dizziness, LOC     Recommended Adaptive Equipment:  issued AE reacher, sock aide, [address] shoe horn & sponge, Pt needs front wheeled walker & possible shower chair/bench      Precautions:  Fall Risk, Bed Alarm, LSO (for comfort post L2,L3,L5 compression fx), Orthostatic Hypotension, Spinal Neutrality, COVID      Assessment of current deficits:    [x] Functional mobility            [x]ADLs           [x] Strength                  [x]Cognition    [x] Functional transfers          [x] IADLs         [x] Safety Awareness   [x]Endurance    [x] Fine Coordination                         [x] Balance      [] Vision/perception   []Sensation      []Gross Motor Coordination             [] ROM           [] Delirium                   [] Motor Control      OT PLAN OF CARE   OT POC based on physician orders, patient diagnosis and results of clinical assessment     Frequency/Duration; 3-5 days/wk for 2 weeks PRN   Specific OT Treatment Interventions to include:   * Instruction/training on adapted ADL techniques and AE recommendations to increase functional independence within precautions * Training on energy conservation strategies, correct breathing pattern and techniques to improve independence/tolerance for self-care routine  * Functional transfer/mobility training/DME recommendations for increased independence, safety, and fall prevention  * Patient/Family education to increase follow through with safety techniques and functional independence  * Recommendation of environmental modifications for increased safety with functional transfers/mobility and ADLs  * Therapeutic exercise to improve motor endurance, ROM, and functional strength for ADLs/functional transfers  * Therapeutic activities to facilitate/challenge dynamic balance, stand tolerance for increased safety and independence with ADLs     Home Living: Pt lives with daughter in 2 story home with 1 step and 0HR to enter. Bed and Bath located n 2nd floor with full flight of steps (7+3) and 1HR to access. 1/2 bath on 1st floor.    Bathroom setup: tub/shower unit   Equipment owned: none     Prior Level of Function: IND with ADLs , IND with IADLs; ambulated with no AD prior  Driving: yes  Occupation: full time  (travels/drives to homes)     Pain Level: minimal pain R side of neck, shoulder & back     Cognition: A&O: 4/4; Follows multi step directions, pleasant & cooperative               Memory:  good              Sequencing:  fair+              Problem solving:  fair+              Judgement/safety:  fair+                Functional Assessment:  AM-PAC Daily Activity Raw Score: 15/24    Initial Eval Status  Date: 11/7/22 Treatment Status  Date:  11/18/22 STGs = LTGs  Time frame: 10-14 days   Feeding Minimal Assist  Mod Indep  Independent    Grooming Minimal Assist  Set up  simulated Independent    UB Dressing Minimal Assist  NT  Prior session   SBA gown  Max A with brace Independent    LB Dressing Dependent   Mod A  Reviewed use of AE with pt & issued, pt declined OOB activity at this time  Minimal Assist    Bathing Maximal Assist NT  Prior session    UB: Min A  LB: Mod A   Minimal Assist    Toileting Maximal Assist  NT  Min A   Per last session  Minimal Assist    Bed Mobility  Log Roll: Max A  Supine to sit: Max A x 2   Sit to supine: Max A x 2  NT  Per last session    Min A-  rolling  Mod A supine < > sit  Pt declined OOB activity this date    Supine to sit: Mod Ind   Sit to supine: Mod Ind    Functional Transfers Sit to stand:NT   Stand to sit:NT  Stand pivot: NT  Commode: NT NT  Prior session      Mod/Min A  Sit < > stand     Sit to stand: Mod Ind   Stand to sit: Mod Ind  Stand pivot: Mod Ind  Commode: Mod Ind    Functional Mobility NT  Limited d/t dizziness seated EOB NT  Prior session    Mod/Min A   Mod Ind    Balance Sitting:     Static - SBA     Dynamic - SBA  Standing: NT NT  Declined OOB     Sitting:  Static: ind  Dynamic: ind  Standing: mod ind   Activity Tolerance FAIR  Limited d/t dizziness and pain NT GOOD   Visual/  Perceptual Glasses: yes - reading          Safety Fair  Educated on spinal neutrality/precautions  Fair+  Good recall of spinal precautions & use of AE Good   during ADL completion following spinal percautions   Vitals BP sitting EOB: 73/38  BP supine: 86/58     Pt symptomatic/dizzy seated EOB required return supine for safety      BP supine 96/65    Raul hose in place prior to session  Nsg present during BP assessment          Comments: Upon arrival pt was in bed & agreeable for therapy only bed level this date. Pt was talking on phone with family & wanting to eat her meal. Pt educated on adaptive techniques & use of AE to increase independence with LB bath/dressing with good recall this date. At end of session pt remained in bed, all needs met. Pt has made Fair progress towards set goals.    Continue with current plan of care      Treatment Time In: 1:45    Treatment Time Out: 2:00           Treatment Charges: Mins Units   Ther Ex  45305     Manual Therapy [address] [facility] Activities [address]     ADL/Home Mgt 07848 15 1   Neuro Re-ed 30697     Group Therapy      Orthotic manage/training  87471     Non-Billable Time     Total Timed Treatment 15 1       Sudha BARRON  [address] [place] [address] Drive, [address] [place] [address]

## 2022-11-18 NOTE — PROGRESS NOTES
Nutrition Note    Type and Reason for Visit: RD Nutrition Re-Screen/LOS (RD Re-Screen Negative)    Nutrition Assessment:  Pt re-screened per LOS protocol. Chart reviewed. Pt currently eating ~75% of most meals (sporadic at times, however recent hx gastric bypass noted) and w/ no other significant nutritional issues noted at this time. Will follow-up per policy. Please consult if RD needed.     Srinivasan Renteria RD, LD  Contact: ext [phone]

## 2022-11-18 NOTE — PROGRESS NOTES
Hospitalist Progress Note      PCP: Tiesha Sorenson MD    Date of Admission: 11/5/2022      Hospital Course: This is a 20-year-old female with past medical history of diabetes mellitus, anemia who presented with back pain after a syncopal fall. On arrival to the emergency department, CT spine showed acute compression fracture of L2, L3, and L5. She was evaluated by neurosurgeon who recommended conservative management with LSO brace, pain management. CT head was negative for acute intracranial abnormality; CTA head and neck was negative for critical stenosis or LVO but it revealed a 3 mm x 6 mm x 4 mm focus of mucosal versus soft tissue nodule along the right lateral wall of the subglottic trachea. Patient was evaluated by neurologist for suspected orthostatic hypotension secondary to diabetic autonomic neuropathy; work-up in progress for occipital neuralgia with significant bilateral sternocleidomastoid muscle tension, she was started on baclofen. Patient unfortunately got exposed to COVID-19 from a roommate; she had been isolated since but repeat SARS-CoV-2 screen on 11/18 returned positive. Subjective: Pt was seen and examined at bedside. No acute event overnight; reports improved dizziness, presyncopal symptoms and periauricular pain. Denies any chest pain, shortness of breath, or palpitation.      Medications:  Reviewed    Infusion Medications    sodium chloride      dextrose       Scheduled Medications    midodrine  15 mg Oral TID WC    [Held by provider] baclofen  20 mg Oral BID    polyethylene glycol  17 g Oral Daily    senna  1 tablet Oral Nightly    oxyCODONE-acetaminophen  1 tablet Oral Q6H    meclizine  25 mg Oral 3 times per day    folic acid  1 mg Oral Daily    amitriptyline  10 mg Oral Nightly    lidocaine  1 patch Topical Daily    pantoprazole  40 mg Oral QAM AC    sertraline  50 mg Oral Daily    sodium chloride flush  10 mL IntraVENous 2 times per day    heparin (porcine)  5,000 Units SubCUTAneous Q8H    insulin lispro  0-4 Units SubCUTAneous TID WC    insulin lispro  0-4 Units SubCUTAneous Nightly     PRN Meds: aluminum & magnesium hydroxide-simethicone, bisacodyl, HYDROmorphone, sodium chloride flush, sodium chloride flush, sodium chloride, ondansetron **OR** ondansetron, magnesium hydroxide, acetaminophen **OR** acetaminophen, glucose, dextrose bolus **OR** dextrose bolus, glucagon (rDNA), dextrose      Intake/Output Summary (Last 24 hours) at 11/18/2022 1556  Last data filed at 11/18/2022 0600  Gross per 24 hour   Intake 300 ml   Output --   Net 300 ml         Exam:    BP 93/65   Pulse 87   Temp 98.5 °F (36.9 °C) (Temporal)   Resp 20   Ht 5' 11\" (1.803 m)   Wt 218 lb (98.9 kg)   SpO2 97%   BMI 30.40 kg/m²     General appearance: Sitting comfortably in bed. No apparent distress. Respiratory: Clear to auscultation bilaterally. Cardiovascular: Normal S1/S2. Regular rhythm and rate. Abdomen: Soft, non-tender, non-distended with normal bowel sounds. Musculoskeletal: No clubbing, cyanosis or edema bilaterally. Skin: Skin color, texture, turgor normal.  No rashes or lesions. Neurologic:  No focal deficit. Psychiatric: Alert and oriented, thought content appropriate, normal insight  Peripheral Pulses: +2 palpable, equal bilaterally       Labs:   Recent Labs     11/17/22  0915   WBC 8.5   HGB 10.5*   HCT 33.2*   *       Recent Labs     11/17/22  0915      K 4.0   CL 93*   CO2 28   BUN 10   CREATININE 0.7   CALCIUM 10.3*   PHOS 5.1*       No results for input(s): AST, ALT, BILIDIR, BILITOT, ALKPHOS in the last 72 hours. No results for input(s): INR in the last 72 hours. No results for input(s): Dilma Malena in the last 72 hours. Radiology:  MRI BRAIN WO CONTRAST   Final Result   No evidence of recent infarct, acute intracranial hemorrhage, mass effect, or   hydrocephalus. XR CHEST PORTABLE   Final Result   No acute process.          CT SOFT TISSUE NECK W CONTRAST   Final Result   No acute abnormality of the soft tissue structures of the neck. Previously described soft tissue focus along the right tracheal wall in the   subglottic region is not seen on the current exam.         CT HEAD WO CONTRAST   Final Result   CT Head:      No acute intracranial hemorrhage or mass effect. No CT evidence of large acute infarct. CTA Neck:      No hemodynamically significant stenosis or dissection of the cervical   internal carotid arteries. No high-grade stenosis or dissection of the cervical vertebral arteries. CTA Head:      No large vessel arterial occlusion or high-grade stenosis. 3 mm x 6 mm x 4 mm focus of mucus versus soft tissue nodule along the right   lateral wall of the subglottic trachea. To exclude neoplasm, recommend   nonemergent follow-up CT neck with contrast versus direct visualization. RECOMMENDATION:   1.1 cm incidental right thyroid nodule. No follow-up imaging is recommended. Reference: J Am Dean Radiol. 2015 Feb;12(2): 143-50         CTA NECK W CONTRAST   Final Result   CT Head:      No acute intracranial hemorrhage or mass effect. No CT evidence of large acute infarct. CTA Neck:      No hemodynamically significant stenosis or dissection of the cervical   internal carotid arteries. No high-grade stenosis or dissection of the cervical vertebral arteries. CTA Head:      No large vessel arterial occlusion or high-grade stenosis. 3 mm x 6 mm x 4 mm focus of mucus versus soft tissue nodule along the right   lateral wall of the subglottic trachea. To exclude neoplasm, recommend   nonemergent follow-up CT neck with contrast versus direct visualization. RECOMMENDATION:   1.1 cm incidental right thyroid nodule. No follow-up imaging is recommended. Reference: J Am Dean Radiol.  2015 Feb;12(2): 143-50         CTA HEAD W CONTRAST   Final Result   CT Head:      No acute intracranial hemorrhage or mass effect. No CT evidence of large acute infarct. CTA Neck:      No hemodynamically significant stenosis or dissection of the cervical   internal carotid arteries. No high-grade stenosis or dissection of the cervical vertebral arteries. CTA Head:      No large vessel arterial occlusion or high-grade stenosis. 3 mm x 6 mm x 4 mm focus of mucus versus soft tissue nodule along the right   lateral wall of the subglottic trachea. To exclude neoplasm, recommend   nonemergent follow-up CT neck with contrast versus direct visualization. RECOMMENDATION:   1.1 cm incidental right thyroid nodule. No follow-up imaging is recommended. Reference: J Am Dean Radiol. 2015 Feb;12(2): 143-50         CT THORACIC SPINE WO CONTRAST   Final Result   Focal depression involving superior endplate of T1 related to fracture of   uncertain chronicity. CT LUMBAR SPINE WO CONTRAST   Final Result   Stable mild vertebral malalignment. Disc space narrowing, discovertebral degenerative changes, and facet   arthritis with bony spinal canal stenosis. New superior vertebral body compression deformities at L2, L3, and L5.         CT ABDOMEN PELVIS W IV CONTRAST Additional Contrast? None   Final Result   1. New depressed fractures involving superior endplates of L2, L3, and L5. Chronic depressed fracture involving superior endplate of L4.   2. No acute process in the abdomen or pelvis. Stable ventral abdominal wall   hernia containing mesenteric fat and segment of large bowel. 3. Stable mild splenomegaly. XR CHEST (2 VW)   Final Result   No acute process.                    Active Hospital Problems    Diagnosis Date Noted    Compression fracture of L2 lumbar vertebra ([facility] [address] [F05.175E] 11/06/2022     Priority: Medium    Compression fracture of L2 lumbar vertebra, open, initial encounter ([address] [S32.020B] 11/05/2022     Priority: Medium    Bubo [I88.8] 11/05/2022     Priority: Medium Assessment  Syncope - likely secondary to orthostatic hypotension related to diabetic autonomic neuropathy. MRI brain was negative for acute intracranial abnormality   Acute compression fractures - involves L2, L3, and L5 vertebral bodies  Hypotension - associated with orthostatic changes  Occipital neuralgia  COVID-19 infection  Type 2 diabetes mellitus  Anemia of chronic disease  Thrombocytosis  Physical deconditioning      Plan:  Increase Midodrine to 15 mg every 8 hours  Will hold Baclofen for now for rate may be contributing to hypotension  Place abdominal binder, compression stockings  Continue with PRN analgesia   LSO brace when out of bed or > 45 degrees in bed   PT, activity as tolerated  Fall precautions  Basal corrective bolus insulin regimen  Outpatient follow-up with neurologist for possible occipital nerve block  Discharge planning was discussed with case management, patient is now agreeable to ARU      DVT Prophylaxis: SQ Heparin  Diet: ADULT DIET;  Regular  Code Status: Full Code    PT/OT Eval Status: Ongoing    Dispo - ARU     Ashley Mendoza MD 11/18/2022 3:56 PM

## 2022-11-19 LAB
METER GLUCOSE: 176 MG/DL (ref 74–99)
METER GLUCOSE: 180 MG/DL (ref 74–99)
METER GLUCOSE: 186 MG/DL (ref 74–99)
METER GLUCOSE: 82 MG/DL (ref 74–99)

## 2022-11-19 PROCEDURE — 1200000000 HC SEMI PRIVATE

## 2022-11-19 PROCEDURE — 6370000000 HC RX 637 (ALT 250 FOR IP): Performed by: NURSE PRACTITIONER

## 2022-11-19 PROCEDURE — 82962 GLUCOSE BLOOD TEST: CPT

## 2022-11-19 PROCEDURE — 6370000000 HC RX 637 (ALT 250 FOR IP): Performed by: FAMILY MEDICINE

## 2022-11-19 PROCEDURE — 6370000000 HC RX 637 (ALT 250 FOR IP): Performed by: HOSPITALIST

## 2022-11-19 PROCEDURE — 6370000000 HC RX 637 (ALT 250 FOR IP): Performed by: INTERNAL MEDICINE

## 2022-11-19 PROCEDURE — 6360000002 HC RX W HCPCS: Performed by: INTERNAL MEDICINE

## 2022-11-19 PROCEDURE — 2580000003 HC RX 258: Performed by: INTERNAL MEDICINE

## 2022-11-19 RX ADMIN — SODIUM CHLORIDE, PRESERVATIVE FREE 10 ML: 5 INJECTION INTRAVENOUS at 22:07

## 2022-11-19 RX ADMIN — MIDODRINE HYDROCHLORIDE 15 MG: 5 TABLET ORAL at 09:25

## 2022-11-19 RX ADMIN — MECLIZINE 25 MG: 12.5 TABLET ORAL at 14:01

## 2022-11-19 RX ADMIN — SODIUM CHLORIDE, PRESERVATIVE FREE 10 ML: 5 INJECTION INTRAVENOUS at 13:10

## 2022-11-19 RX ADMIN — MIDODRINE HYDROCHLORIDE 15 MG: 5 TABLET ORAL at 17:33

## 2022-11-19 RX ADMIN — HEPARIN SODIUM 5000 UNITS: 10000 INJECTION INTRAVENOUS; SUBCUTANEOUS at 12:07

## 2022-11-19 RX ADMIN — MECLIZINE 25 MG: 12.5 TABLET ORAL at 05:22

## 2022-11-19 RX ADMIN — OXYCODONE AND ACETAMINOPHEN 1 TABLET: 325; 10 TABLET ORAL at 12:16

## 2022-11-19 RX ADMIN — PANTOPRAZOLE SODIUM 40 MG: 40 TABLET, DELAYED RELEASE ORAL at 05:22

## 2022-11-19 RX ADMIN — SENNOSIDES 8.6 MG: 8.6 TABLET, FILM COATED ORAL at 22:04

## 2022-11-19 RX ADMIN — MECLIZINE 25 MG: 12.5 TABLET ORAL at 22:04

## 2022-11-19 RX ADMIN — OXYCODONE AND ACETAMINOPHEN 1 TABLET: 325; 10 TABLET ORAL at 17:33

## 2022-11-19 RX ADMIN — MIDODRINE HYDROCHLORIDE 15 MG: 5 TABLET ORAL at 12:07

## 2022-11-19 RX ADMIN — OXYCODONE AND ACETAMINOPHEN 1 TABLET: 325; 10 TABLET ORAL at 05:22

## 2022-11-19 RX ADMIN — OXYCODONE AND ACETAMINOPHEN 1 TABLET: 325; 10 TABLET ORAL at 22:04

## 2022-11-19 RX ADMIN — HEPARIN SODIUM 5000 UNITS: 10000 INJECTION INTRAVENOUS; SUBCUTANEOUS at 05:23

## 2022-11-19 RX ADMIN — FOLIC ACID 1 MG: 1 TABLET ORAL at 09:25

## 2022-11-19 RX ADMIN — SERTRALINE HYDROCHLORIDE 50 MG: 50 TABLET ORAL at 09:25

## 2022-11-19 RX ADMIN — AMITRIPTYLINE HYDROCHLORIDE 10 MG: 10 TABLET, FILM COATED ORAL at 22:05

## 2022-11-19 RX ADMIN — BACLOFEN 20 MG: 10 TABLET ORAL at 22:04

## 2022-11-19 ASSESSMENT — PAIN - FUNCTIONAL ASSESSMENT: PAIN_FUNCTIONAL_ASSESSMENT: PREVENTS OR INTERFERES SOME ACTIVE ACTIVITIES AND ADLS

## 2022-11-19 ASSESSMENT — PAIN DESCRIPTION - ORIENTATION
ORIENTATION: LOWER

## 2022-11-19 ASSESSMENT — PAIN DESCRIPTION - DESCRIPTORS
DESCRIPTORS: ACHING;CRAMPING;DISCOMFORT
DESCRIPTORS: ACHING;CRAMPING;DISCOMFORT
DESCRIPTORS: ACHING;DISCOMFORT;CRUSHING

## 2022-11-19 ASSESSMENT — PAIN DESCRIPTION - LOCATION
LOCATION: BACK
LOCATION: BACK
LOCATION: NECK;BACK

## 2022-11-19 ASSESSMENT — PAIN SCALES - GENERAL
PAINLEVEL_OUTOF10: 9
PAINLEVEL_OUTOF10: 9
PAINLEVEL_OUTOF10: 10
PAINLEVEL_OUTOF10: 10

## 2022-11-19 NOTE — PROGRESS NOTES
Hospitalist Progress Note      SYNOPSIS: Patient admitted on 2022 for Compression fracture of L2 lumbar vertebra, open, initial encounter ([facility] [address] [facility]      SUBJECTIVE:    Patient seen and examined. Patient mentions that she has had issues with low blood pressure since her gastric sleeve surgery last year. Doing okay otherwise. Records reviewed. She denies any problems breathing. 64-year-old female with past medical history of diabetes mellitus, gastric sleeve surgery, chronic anemia who presented with back pain after a syncopal fall. On arrival to the emergency department, CT spine showed acute compression fracture of L2, L3, and L5. She was evaluated by neurosurgeon who recommended conservative management with LSO brace, pain management. CT head was negative for acute intracranial abnormality; CTA head and neck was negative for critical stenosis or LVO but it revealed a 3 mm x 6 mm x 4 mm focus of mucosal versus soft tissue nodule along the right lateral wall of the subglottic trachea. Patient was evaluated by neurologist for suspected orthostatic hypotension secondary to diabetic autonomic neuropathy; work-up in progress for occipital neuralgia with significant bilateral sternocleidomastoid muscle tension, she was started on baclofen. Patient unfortunately got exposed to COVID-19 from a roommate; she had been isolated since but repeat SARS-CoV-2 screen on  returned positive. Stable overnight. No other overnight issues reported. Temp (24hrs), Av.4 °F (36.9 °C), Min:98.2 °F (36.8 °C), Max:98.6 °F (37 °C)    DIET: ADULT DIET;  Regular  CODE: Full Code    Intake/Output Summary (Last 24 hours) at 2022 1300  Last data filed at 2022 0454  Gross per 24 hour   Intake 360 ml   Output --   Net 360 ml       OBJECTIVE:    BP 97/68   Pulse 90   Temp 98.2 °F (36.8 °C) (Oral)   Resp 17   Ht 5' 11\" (1.803 m)   Wt 212 lb 12.8 oz (96.5 kg)   SpO2 97%   BMI 29.68 kg/m²     General appearance: No apparent distress, appears stated age and cooperative. HEENT:  Conjunctivae/corneas clear. Neck: Supple. No jugular venous distention. Respiratory: Clear to auscultation bilaterally, normal respiratory effort  Cardiovascular: Regular rate rhythm, normal S1-S2  Abdomen: Soft, nontender, nondistended  Musculoskeletal: No clubbing, cyanosis, no bilateral lower extremity edema. Brisk capillary refill. Skin:  No rashes  on visible skin  Neurologic: awake, alert and following commands     ASSESSMENT:  Syncope - likely secondary to orthostatic hypotension related to diabetic autonomic neuropathy. MRI brain was negative for acute intracranial abnormality   Acute compression fractures - involves L2, L3, and L5 vertebral bodies  Hypotension - associated with orthostatic changes  Occipital neuralgia  COVID-19 infection  Type 2 diabetes mellitus  Anemia of chronic disease  Thrombocytosis  Physical deconditioning          PLAN:  1. Continue midodrine details of blood pressure. Continue NOHEMI hose. Appreciate neurology's input. Concerning COVID-19 will treat conservatively as patient has no symptoms stable on room air at 97%.     DISPOSITION: Awaiting pre-CERT for discharge to rehab/SNF    Medications:  REVIEWED DAILY    Infusion Medications    sodium chloride      dextrose       Scheduled Medications    midodrine  15 mg Oral TID WC    [Held by provider] baclofen  20 mg Oral BID    polyethylene glycol  17 g Oral Daily    senna  1 tablet Oral Nightly    oxyCODONE-acetaminophen  1 tablet Oral Q6H    meclizine  25 mg Oral 3 times per day    folic acid  1 mg Oral Daily    amitriptyline  10 mg Oral Nightly    lidocaine  1 patch Topical Daily    pantoprazole  40 mg Oral QAM AC    sertraline  50 mg Oral Daily    sodium chloride flush  10 mL IntraVENous 2 times per day    heparin (porcine)  5,000 Units SubCUTAneous Q8H    insulin lispro  0-4 Units SubCUTAneous TID WC    insulin lispro  0-4 Units SubCUTAneous Nightly     PRN Meds: aluminum & magnesium hydroxide-simethicone, bisacodyl, HYDROmorphone, sodium chloride flush, sodium chloride flush, sodium chloride, ondansetron **OR** ondansetron, magnesium hydroxide, acetaminophen **OR** acetaminophen, glucose, dextrose bolus **OR** dextrose bolus, glucagon (rDNA), dextrose    Labs:     Recent Labs     11/17/22 0915   WBC 8.5   HGB 10.5*   HCT 33.2*   *       Recent Labs     11/17/22 0915      K 4.0   CL 93*   CO2 28   BUN 10   CREATININE 0.7   CALCIUM 10.3*   PHOS 5.1*       No results for input(s): PROT, ALB, ALKPHOS, ALT, AST, BILITOT, AMYLASE, LIPASE in the last 72 hours. No results for input(s): INR in the last 72 hours. No results for input(s): Donzella Rands in the last 72 hours. Chronic labs:    Lab Results   Component Value Date    CHOL 188 11/02/2022    TRIG 128 11/02/2022    HDL 37 11/02/2022    LDLCALC 125 (H) 11/02/2022    TSH 1.600 11/02/2022    LABA1C 6.8 (H) 11/06/2022       Radiology: REVIEWED DAILY    +++++++++++++++++++++++++++++++++++++++++++++++++  sEperanza Payne MD  [facility] Physician - [address], [address]  +++++++++++++++++++++++++++++++++++++++++++++++++  NOTE: This report was transcribed using voice recognition software. Every effort was made to ensure accuracy; however, inadvertent computerized transcription errors may be present.

## 2022-11-20 LAB
METER GLUCOSE: 137 MG/DL (ref 74–99)
METER GLUCOSE: 172 MG/DL (ref 74–99)
METER GLUCOSE: 225 MG/DL (ref 74–99)
METER GLUCOSE: 256 MG/DL (ref 74–99)

## 2022-11-20 PROCEDURE — 2580000003 HC RX 258: Performed by: INTERNAL MEDICINE

## 2022-11-20 PROCEDURE — 6360000002 HC RX W HCPCS: Performed by: INTERNAL MEDICINE

## 2022-11-20 PROCEDURE — 82962 GLUCOSE BLOOD TEST: CPT

## 2022-11-20 PROCEDURE — 6370000000 HC RX 637 (ALT 250 FOR IP): Performed by: NURSE PRACTITIONER

## 2022-11-20 PROCEDURE — 6370000000 HC RX 637 (ALT 250 FOR IP): Performed by: FAMILY MEDICINE

## 2022-11-20 PROCEDURE — 1200000000 HC SEMI PRIVATE

## 2022-11-20 PROCEDURE — 6370000000 HC RX 637 (ALT 250 FOR IP): Performed by: INTERNAL MEDICINE

## 2022-11-20 PROCEDURE — 6370000000 HC RX 637 (ALT 250 FOR IP): Performed by: HOSPITALIST

## 2022-11-20 RX ADMIN — FOLIC ACID 1 MG: 1 TABLET ORAL at 08:36

## 2022-11-20 RX ADMIN — OXYCODONE AND ACETAMINOPHEN 1 TABLET: 325; 10 TABLET ORAL at 11:07

## 2022-11-20 RX ADMIN — HEPARIN SODIUM 5000 UNITS: 10000 INJECTION INTRAVENOUS; SUBCUTANEOUS at 13:08

## 2022-11-20 RX ADMIN — BACLOFEN 20 MG: 10 TABLET ORAL at 21:27

## 2022-11-20 RX ADMIN — INSULIN LISPRO 1 UNITS: 100 INJECTION, SOLUTION INTRAVENOUS; SUBCUTANEOUS at 13:08

## 2022-11-20 RX ADMIN — AMITRIPTYLINE HYDROCHLORIDE 10 MG: 10 TABLET, FILM COATED ORAL at 21:27

## 2022-11-20 RX ADMIN — SODIUM CHLORIDE, PRESERVATIVE FREE 10 ML: 5 INJECTION INTRAVENOUS at 21:34

## 2022-11-20 RX ADMIN — HEPARIN SODIUM 5000 UNITS: 10000 INJECTION INTRAVENOUS; SUBCUTANEOUS at 21:28

## 2022-11-20 RX ADMIN — MECLIZINE 25 MG: 12.5 TABLET ORAL at 15:31

## 2022-11-20 RX ADMIN — OXYCODONE AND ACETAMINOPHEN 1 TABLET: 325; 10 TABLET ORAL at 16:56

## 2022-11-20 RX ADMIN — MIDODRINE HYDROCHLORIDE 15 MG: 5 TABLET ORAL at 08:36

## 2022-11-20 RX ADMIN — SENNOSIDES 8.6 MG: 8.6 TABLET, FILM COATED ORAL at 21:28

## 2022-11-20 RX ADMIN — HEPARIN SODIUM 5000 UNITS: 10000 INJECTION INTRAVENOUS; SUBCUTANEOUS at 06:13

## 2022-11-20 RX ADMIN — SODIUM CHLORIDE, PRESERVATIVE FREE 10 ML: 5 INJECTION INTRAVENOUS at 08:45

## 2022-11-20 RX ADMIN — MECLIZINE 25 MG: 12.5 TABLET ORAL at 06:12

## 2022-11-20 RX ADMIN — SERTRALINE HYDROCHLORIDE 50 MG: 50 TABLET ORAL at 08:36

## 2022-11-20 RX ADMIN — MECLIZINE 25 MG: 12.5 TABLET ORAL at 21:28

## 2022-11-20 RX ADMIN — MIDODRINE HYDROCHLORIDE 15 MG: 5 TABLET ORAL at 16:56

## 2022-11-20 RX ADMIN — BACLOFEN 20 MG: 10 TABLET ORAL at 08:36

## 2022-11-20 RX ADMIN — OXYCODONE AND ACETAMINOPHEN 1 TABLET: 325; 10 TABLET ORAL at 06:13

## 2022-11-20 RX ADMIN — PANTOPRAZOLE SODIUM 40 MG: 40 TABLET, DELAYED RELEASE ORAL at 06:13

## 2022-11-20 RX ADMIN — OXYCODONE AND ACETAMINOPHEN 1 TABLET: 325; 10 TABLET ORAL at 21:28

## 2022-11-20 RX ADMIN — MIDODRINE HYDROCHLORIDE 15 MG: 5 TABLET ORAL at 13:07

## 2022-11-20 ASSESSMENT — PAIN DESCRIPTION - DESCRIPTORS
DESCRIPTORS: ACHING;DISCOMFORT
DESCRIPTORS: ACHING;DISCOMFORT
DESCRIPTORS: ACHING;SHARP;DISCOMFORT
DESCRIPTORS: ACHING;DISCOMFORT
DESCRIPTORS: STABBING;SHOOTING

## 2022-11-20 ASSESSMENT — PAIN SCALES - GENERAL
PAINLEVEL_OUTOF10: 9

## 2022-11-20 ASSESSMENT — PAIN DESCRIPTION - ORIENTATION
ORIENTATION: LOWER

## 2022-11-20 ASSESSMENT — PAIN DESCRIPTION - LOCATION
LOCATION: BACK

## 2022-11-20 NOTE — PROGRESS NOTES
Hospitalist Progress Note      SYNOPSIS: Patient admitted on 2022 for Compression fracture of L2 lumbar vertebra, open, initial encounter ([facility] [address]      SUBJECTIVE:    Patient seen and examined. Denies any complaint today but mention that she had not had a bowel movement for 2 weeks but had one 2 days ago. She has been taking senna as she feels has been helping. She would like to wait to see if she goes today or tomorrow before adding any medication. Records reviewed. 22-year-old female with past medical history of diabetes mellitus, gastric sleeve surgery, chronic anemia who presented with back pain after a syncopal fall. On arrival to the emergency department, CT spine showed acute compression fracture of L2, L3, and L5. She was evaluated by neurosurgeon who recommended conservative management with LSO brace, pain management. CT head was negative for acute intracranial abnormality; CTA head and neck was negative for critical stenosis or LVO but it revealed a 3 mm x 6 mm x 4 mm focus of mucosal versus soft tissue nodule along the right lateral wall of the subglottic trachea. Patient was evaluated by neurologist for suspected orthostatic hypotension secondary to diabetic autonomic neuropathy; work-up in progress for occipital neuralgia with significant bilateral sternocleidomastoid muscle tension, she was started on baclofen. Patient unfortunately got exposed to COVID-19 from a roommate; she had been isolated since but repeat SARS-CoV-2 screen on  returned positive. Stable overnight. No other overnight issues reported. Temp (24hrs), Av.6 °F (37 °C), Min:98 °F (36.7 °C), Max:99.1 °F (37.3 °C)    DIET: ADULT DIET;  Regular  CODE: Full Code    Intake/Output Summary (Last 24 hours) at 2022 1237  Last data filed at 2022 0845  Gross per 24 hour   Intake 3 ml   Output --   Net 3 ml       OBJECTIVE:    /69   Pulse 84   Temp 98 °F (36.7 °C) (Oral)   Resp 18   Ht 5' 11\" (1.803 m)   Wt 209 lb 4 oz (94.9 kg)   SpO2 96%   BMI 29.18 kg/m²     General appearance: No apparent distress, appears stated age and cooperative. HEENT:  Conjunctivae/corneas clear. Neck: Supple. No jugular venous distention. Respiratory: Clear to auscultation bilaterally, normal respiratory effort  Cardiovascular: Regular rate rhythm, normal S1-S2  Abdomen: Soft, nontender, nondistended  Musculoskeletal: No clubbing, cyanosis, no bilateral lower extremity edema. Brisk capillary refill. Skin:  No rashes  on visible skin  Neurologic: awake, alert and following commands     ASSESSMENT:  Syncope - likely secondary to orthostatic hypotension related to diabetic autonomic neuropathy. MRI brain was negative for acute intracranial abnormality   Acute compression fractures - involves L2, L3, and L5 vertebral bodies  Hypotension - associated with orthostatic changes  Occipital neuralgia  COVID-19 infection  Type 2 diabetes mellitus  Anemia of chronic disease  Thrombocytosis  Physical deconditioning     PLAN:  1. Continue midodrine given orthostatic hypotension. BP stable this morning. Continue NOHEMI hose. Appreciate neurology's input. Concerning COVID-19, no treatment warranted as patient has no symptoms and stable on room air.     DISPOSITION: Precertification pending for discharge to rehab/SNF    Medications:  REVIEWED DAILY    Infusion Medications    sodium chloride      dextrose       Scheduled Medications    midodrine  15 mg Oral TID WC    baclofen  20 mg Oral BID    polyethylene glycol  17 g Oral Daily    senna  1 tablet Oral Nightly    oxyCODONE-acetaminophen  1 tablet Oral Q6H    meclizine  25 mg Oral 3 times per day    folic acid  1 mg Oral Daily    amitriptyline  10 mg Oral Nightly    lidocaine  1 patch Topical Daily    pantoprazole  40 mg Oral QAM AC    sertraline  50 mg Oral Daily    sodium chloride flush  10 mL IntraVENous 2 times per day    heparin (porcine)  5,000 Units SubCUTAneous Q8H insulin lispro  0-4 Units SubCUTAneous TID WC    insulin lispro  0-4 Units SubCUTAneous Nightly     PRN Meds: aluminum & magnesium hydroxide-simethicone, bisacodyl, HYDROmorphone, sodium chloride flush, sodium chloride flush, sodium chloride, ondansetron **OR** ondansetron, magnesium hydroxide, acetaminophen **OR** acetaminophen, glucose, dextrose bolus **OR** dextrose bolus, glucagon (rDNA), dextrose    Labs:     No results for input(s): WBC, HGB, HCT, PLT in the last 72 hours. No results for input(s): NA, K, CL, CO2, BUN, CREATININE, CALCIUM, PHOS in the last 72 hours. Invalid input(s): MAGNES    No results for input(s): PROT, ALB, ALKPHOS, ALT, AST, BILITOT, AMYLASE, LIPASE in the last 72 hours. No results for input(s): INR in the last 72 hours. No results for input(s): Rupali Bigness in the last 72 hours. Chronic labs:    Lab Results   Component Value Date    CHOL 188 11/02/2022    TRIG 128 11/02/2022    HDL 37 11/02/2022    LDLCALC 125 (H) 11/02/2022    TSH 1.600 11/02/2022    LABA1C 6.8 (H) 11/06/2022       Radiology: REVIEWED DAILY    +++++++++++++++++++++++++++++++++++++++++++++++++  Mj Ivy MD  [facility] Physician - [address] [place] [address]  +++++++++++++++++++++++++++++++++++++++++++++++++  NOTE: This report was transcribed using voice recognition software. Every effort was made to ensure accuracy; however, inadvertent computerized transcription errors may be present.

## 2022-11-21 LAB
METER GLUCOSE: 106 MG/DL (ref 74–99)
METER GLUCOSE: 122 MG/DL (ref 74–99)
METER GLUCOSE: 235 MG/DL (ref 74–99)
METER GLUCOSE: 240 MG/DL (ref 74–99)

## 2022-11-21 PROCEDURE — 97530 THERAPEUTIC ACTIVITIES: CPT

## 2022-11-21 PROCEDURE — 6370000000 HC RX 637 (ALT 250 FOR IP): Performed by: FAMILY MEDICINE

## 2022-11-21 PROCEDURE — 6370000000 HC RX 637 (ALT 250 FOR IP): Performed by: NURSE PRACTITIONER

## 2022-11-21 PROCEDURE — 6370000000 HC RX 637 (ALT 250 FOR IP): Performed by: HOSPITALIST

## 2022-11-21 PROCEDURE — 6360000002 HC RX W HCPCS: Performed by: INTERNAL MEDICINE

## 2022-11-21 PROCEDURE — 97535 SELF CARE MNGMENT TRAINING: CPT

## 2022-11-21 PROCEDURE — 82962 GLUCOSE BLOOD TEST: CPT

## 2022-11-21 PROCEDURE — 6370000000 HC RX 637 (ALT 250 FOR IP): Performed by: INTERNAL MEDICINE

## 2022-11-21 PROCEDURE — 1200000000 HC SEMI PRIVATE

## 2022-11-21 PROCEDURE — 2580000003 HC RX 258: Performed by: INTERNAL MEDICINE

## 2022-11-21 RX ADMIN — OXYCODONE AND ACETAMINOPHEN 1 TABLET: 325; 10 TABLET ORAL at 10:46

## 2022-11-21 RX ADMIN — MECLIZINE 25 MG: 12.5 TABLET ORAL at 21:33

## 2022-11-21 RX ADMIN — HEPARIN SODIUM 5000 UNITS: 10000 INJECTION INTRAVENOUS; SUBCUTANEOUS at 05:38

## 2022-11-21 RX ADMIN — INSULIN LISPRO 1 UNITS: 100 INJECTION, SOLUTION INTRAVENOUS; SUBCUTANEOUS at 12:08

## 2022-11-21 RX ADMIN — BACLOFEN 20 MG: 10 TABLET ORAL at 21:33

## 2022-11-21 RX ADMIN — BACLOFEN 20 MG: 10 TABLET ORAL at 08:41

## 2022-11-21 RX ADMIN — OXYCODONE AND ACETAMINOPHEN 1 TABLET: 325; 10 TABLET ORAL at 05:37

## 2022-11-21 RX ADMIN — AMITRIPTYLINE HYDROCHLORIDE 10 MG: 10 TABLET, FILM COATED ORAL at 21:33

## 2022-11-21 RX ADMIN — OXYCODONE AND ACETAMINOPHEN 1 TABLET: 325; 10 TABLET ORAL at 23:17

## 2022-11-21 RX ADMIN — SERTRALINE HYDROCHLORIDE 50 MG: 50 TABLET ORAL at 08:41

## 2022-11-21 RX ADMIN — MIDODRINE HYDROCHLORIDE 15 MG: 5 TABLET ORAL at 17:22

## 2022-11-21 RX ADMIN — PANTOPRAZOLE SODIUM 40 MG: 40 TABLET, DELAYED RELEASE ORAL at 05:37

## 2022-11-21 RX ADMIN — MIDODRINE HYDROCHLORIDE 15 MG: 5 TABLET ORAL at 08:41

## 2022-11-21 RX ADMIN — HEPARIN SODIUM 5000 UNITS: 10000 INJECTION INTRAVENOUS; SUBCUTANEOUS at 11:58

## 2022-11-21 RX ADMIN — FOLIC ACID 1 MG: 1 TABLET ORAL at 08:41

## 2022-11-21 RX ADMIN — MIDODRINE HYDROCHLORIDE 15 MG: 5 TABLET ORAL at 11:57

## 2022-11-21 RX ADMIN — SENNOSIDES 8.6 MG: 8.6 TABLET, FILM COATED ORAL at 21:33

## 2022-11-21 RX ADMIN — MECLIZINE 25 MG: 12.5 TABLET ORAL at 05:36

## 2022-11-21 RX ADMIN — MECLIZINE 25 MG: 12.5 TABLET ORAL at 13:55

## 2022-11-21 RX ADMIN — SODIUM CHLORIDE, PRESERVATIVE FREE 10 ML: 5 INJECTION INTRAVENOUS at 21:33

## 2022-11-21 RX ADMIN — OXYCODONE AND ACETAMINOPHEN 1 TABLET: 325; 10 TABLET ORAL at 17:22

## 2022-11-21 RX ADMIN — HEPARIN SODIUM 5000 UNITS: 10000 INJECTION INTRAVENOUS; SUBCUTANEOUS at 21:34

## 2022-11-21 ASSESSMENT — PAIN DESCRIPTION - FREQUENCY: FREQUENCY: CONTINUOUS

## 2022-11-21 ASSESSMENT — PAIN DESCRIPTION - ORIENTATION
ORIENTATION: LOWER
ORIENTATION: LOWER;MID

## 2022-11-21 ASSESSMENT — PAIN SCALES - GENERAL
PAINLEVEL_OUTOF10: 9

## 2022-11-21 ASSESSMENT — PAIN DESCRIPTION - PAIN TYPE: TYPE: ACUTE PAIN

## 2022-11-21 ASSESSMENT — PAIN DESCRIPTION - LOCATION
LOCATION: BACK

## 2022-11-21 ASSESSMENT — PAIN DESCRIPTION - ONSET: ONSET: ON-GOING

## 2022-11-21 ASSESSMENT — PAIN DESCRIPTION - DESCRIPTORS: DESCRIPTORS: ACHING

## 2022-11-21 NOTE — PROGRESS NOTES
Occupational Therapy  OT BEDSIDE TREATMENT NOTE   [address] [place] [address] [facility]  [address] [place] [address]       WOMA:  Patient Name: Yary Prajapati  MRN: [id number]  : 1970  Room: [address] [place] [address]-A     Per OT Eval:    Evaluating OT: Stevie Rodriguez, [address] OTR/L; [id number]       Referring Provider: Rusty Mistry MD    Specific Provider Orders/Date: OT Eval and Treat 22       Diagnosis: Compression fracture of L2 lumbar vertebra, open, initial encounter  Bubo     Surgery: none this admission     Pertinent Medical History:  has a past medical history of Anemia, Diabetes mellitus ([facility] [address] Papanicolaou smear of cervix with atypical squamous cells of undetermined significance (ASC-US), and Screening for human papillomavirus (HPV).        Reason for admission: fell backwards at home d/t dizziness, LOC     Recommended Adaptive Equipment: TBD pending progression - BSC, extended tub bench, AE for LE bathing and dressing PRN      Precautions:  Fall Risk, Bed Alarm, LSO (for comfort post L2,L3,L5 compression fx), Orthostatic Hypotension, Spinal Neutrality       Assessment of current deficits:    [x] Functional mobility            [x]ADLs           [x] Strength                  [x]Cognition    [x] Functional transfers          [x] IADLs         [x] Safety Awareness   [x]Endurance    [x] Fine Coordination                         [x] Balance      [] Vision/perception   []Sensation      []Gross Motor Coordination             [] ROM           [] Delirium                   [] Motor Control      OT PLAN OF CARE   OT POC based on physician orders, patient diagnosis and results of clinical assessment     Frequency/Duration; 3-5 days/wk for 2 weeks PRN   Specific OT Treatment Interventions to include:   * Instruction/training on adapted ADL techniques and AE recommendations to increase functional independence within precautions       * Training on energy conservation strategies, correct breathing pattern and techniques to improve independence/tolerance for self-care routine  * Functional transfer/mobility training/DME recommendations for increased independence, safety, and fall prevention  * Patient/Family education to increase follow through with safety techniques and functional independence  * Recommendation of environmental modifications for increased safety with functional transfers/mobility and ADLs  * Therapeutic exercise to improve motor endurance, ROM, and functional strength for ADLs/functional transfers  * Therapeutic activities to facilitate/challenge dynamic balance, stand tolerance for increased safety and independence with ADLs     Home Living: Pt lives with daughter in 2 story home with 1 step and 0HR to enter. Bed and Bath located n 2nd floor with full flight of steps (7+3) and 1HR to access. 1/2 bath on 1st floor.    Bathroom setup: tub/shower unit   Equipment owned: none     Prior Level of Function: IND with ADLs , IND with IADLs; ambulated with no AD prior  Driving: yes  Occupation: full time  (travels/drives to homes)     Pain Level: minimal pain R side of neck, shoulder & back     Cognition: A&O: 4/4; Follows 1-2 step directions, pleasant & cooperative               Memory: fair appeared to have decreased recall with instruction from prior therapy sessions               Sequencing:  fair (repeated cues needed with sequencing during tasks)              Problem solving:  fair              Judgement/safety:  fair                Functional Assessment:  AM-PAC Daily Activity Raw Score: 15/24    Initial Eval Status  Date: 11/7/22 Treatment Status  Date:  11/21/22 STGs = LTGs  Time frame: 10-14 days   Feeding Minimal Assist  Mod Indep  Independent    Grooming Minimal Assist  Set up  Seated in chair with support Independent    UB Dressing Minimal Assist   SBA  Assist to tie gown around back   Mod A Rayo brace   seated at EOB,  instructed on technique seated at EOB Independent    LB Dressing Dependent   Mod A  Simulated with AE last session   Minimal Assist    Bathing Maximal Assist  UB: Min A  LB: Mod A  Simulated this date, recommending shower chair  Minimal Assist    Toileting Maximal Assist  Min A   Simulated  Minimal Assist    Bed Mobility  Log Roll: Max A  Supine to sit: Max A x 2   Sit to supine: Max A x 2    Min A supine < > sit  Educated pt on technique to increase independence, with increased time & effort, HOB was upright    Supine to sit: Mod Ind   Sit to supine: Mod Ind    Functional Transfers Sit to stand:NT   Stand to sit:NT  Stand pivot: NT  Commode: NT  Mod/Min A  Sit < > stand  Cuing for hand placement   Sit to stand: Mod Ind   Stand to sit: Mod Ind  Stand pivot: Mod Ind  Commode:   Mod Ind    Functional Mobility NT  Limited d/t dizziness seated EOB  Mod/Min A  With walker, short household distance with very slow pace, cues for posture & walker management, flexed at knees & continued mild dizziness Mod Ind    Balance Sitting:     Static - SBA     Dynamic - SBA  Standing: NT Sitting: Ind  Standing: Min A  With walker     Sitting:  Static: ind  Dynamic: ind  Standing: mod ind   Activity Tolerance FAIR  Limited d/t dizziness and pain  Fair  Improvement this date, increased tolerance, continued dizziness with standing see BP below  GOOD   Visual/  Perceptual Glasses: yes - reading          Safety Fair  Educated on spinal neutrality/precautions  Fair-  Decreased recall or carryover of instructions  Good   during ADL completion following spinal percautions   Vitals BP sitting EOB: 73/38  BP supine: 86/58     Pt symptomatic/dizzy seated EOB required return supine for safety      BP supine 94/63   BP seated 88/64  BP standing 68/46  10/10 dizziness with standing, very slowly diminished  BP after walking 102/70   @ rest  with activity     Raul wood in place prior to session            Comments: Upon arrival pt was in bed & agreeable for therapy. Pt educated on adaptive techniques & use of AE to increase independence and safety during ADL's, bed mobility, and functional transfers while maintaining spinal precautions. At end of session pt was seated in chair, agreeable to stay up for an hour, all lines and tubes intact, call light within reach. Nsg aware pt was up in chair. Pt has made Fair progress towards set goals. Continue with current plan of care      Treatment Time In: 2:55    Treatment Time Out: 3:40           Treatment Charges: Mins Units   Ther Ex  35402     Manual Therapy Kishor Collado [id number] 70 2   ADL/Home Mgt [id number] 15 1   Neuro Re-ed 55022     Group Therapy      Orthotic manage/training  17392     Non-Billable Time     Total Timed Treatment 45 3       Sudha [place]ON  [address] [place] [address] Drive, [address] [place] [address]

## 2022-11-21 NOTE — PROGRESS NOTES
Physical Therapy  Treatment Note    Name: Jason Stager  : 1970  MRN: [id number]      Date of Service: 2022    Evaluating PT:  Malachi Eason, PT, DPT [id number]    Room #:  0954/7349-K  Diagnosis:  Nandini Bearden [I88.8]  Compression fracture of L2 lumbar vertebra, open, initial encounter ([facility] [S32.020B]  Compression fracture of L2 vertebra, initial encounter ([facility] [V50.933P]  PMHx/PSHx:    Past Medical History:   Diagnosis Date    Anemia     Di[facility] ([facility]     Papanicolaou smear of cervix with atypical squamous cells of undetermined significance (ASC-US)          Screening for human papillomavirus (HPV)     4/10/07      Precautions:  Fall risk, L2, L3, L5 compression fx- LSO for comfort, Orthostatic Hypotension, COVID+  Equipment Needs:  TBD    SUBJECTIVE:    Pt lives with daughter in a two story home with one stairs to enter and no rail. Bed is on second floor and bath is on second floor with 13 steps and one rail to access. Pt ambulated without AD PTA and was fully independent. Equipment Owned:    OBJECTIVE:   Initial Evaluation  Date: 22 Treatment  22 Short Term/ Long Term   Goals   AM-PAC 6 Clicks   *limited due to severe pain and dizziness     Was pt agreeable to Eval/treatment? Yes Yes    Does pt have pain? Yes 10/10 LBP 0/10 LBP at rest  Pain with movement not quantified     Bed Mobility  Rolling: Max A  Supine to sit: Max x2  Sit to supine: Max x2  Scooting: Max x2 Supine to sit: Min A  Scooting:  Mod A Indep   Transfers Sit to stand: NT  Stand to sit: NT  Stand pivot: NT Sit to stand: ModA from bed  Stand to sit: Celestino  Stand pivot: Celestino with FWW Indep   Ambulation    NT 6 feet with FWW Min A; R LE manual advancement 25% of time >50 feet Indep   Stair negotiation: ascended and descended  NT NT 14 steps with one rail Supervision   ROM BUE:  R UE AROM limited due to pain  BLE:  B LE AROM WFL BLE: AROM WFL    Strength BUE:  Refer to OT  BLE:  4+/5 BLE: grossly 4+/5 4+/5   Balance Sitting EOB:  CGA for safety  Dynamic Standing:  NT Sitting: SBA  Standing: Celestino with [address] Sitting EOB:  Indep  Dynamic Standing:  Indep     Pt is A & O x 4  Edema:  None noted in BLE  Vitals:   Orthos: Supine 94/63 91bpm, Sitting 88/64 109bpm, Standing 68/46 122bpm     Therapeutic Exercises:    1 sets of 10 reps-  Seated marching with AAROM utilizing towel and UE assist for R LE  Isometric hip adduction hold 3s  Glute sets hold 3s  TA contractions hold 3s    Patient education  Pt educated on bed mobility, transfer technique, brace management, proper use of walker, positioning in bed. Patient response to education:   Pt verbalized understanding Pt demonstrated skill Pt requires further education in this area   Yes With assist Yes     ASSESSMENT:    Comments:  Patient deemed medically stable prior to start of session. Patient was semi supine in bed upon arrival. No pain at rest. LSO donned with assist of patient prior to mobility. All vitals noted above with position hypotension sitting to standing despite wearing B compression stockings and LSO acting as abdominal binder. Symptoms with each position. Continual need for significantly increased time to complete all tasks and distances. Continued deficits during ambulation include: mild increase in trunk flexion, poor proximity to FWW and foot slide during swing phase of R LE. Persisting cognitive fog and very slow processing with sequencing this date. Maximal verbal cues needed throughout to complete multi step tasks. Seated there ex introduced to prevent onset of atrophy and to improve core recruitment. Patient left in chair position with call light in reach and all needs met. Skilled positioning for comfort. Positive orthos, Cognitive fog relayed to RN and CM this date.      Treatment:  Patient practiced and was instructed in the following treatment:    Transfer training: verbal cues for hand placement during sit to stand transfer and assistance for anterior weight shift in order to facilitate initiation of the stand. Standing balance activities: performed static and dynamic standing balance activities within AD with single and BUE support in order to progress safety with standing activities as well as improve independence with standing ADLs. Gait training: verbal cues for sequencing and safety, verbal cues for appropriate step length and positioning within walker with dynamic activities, physical assist for walker management, and steadying   Skilled repositioning in upright chair to promote improved posture and improved cardiorespiratory function and lumbar support. Pt positioned with bed in semi-chair position to enhance skin/joint protection. PLAN:    Patient is making good progress towards established goals. Will continue with current POC.       Time in  1455  Time out  1540    Total Treatment Time  45 minutes     CPT codes:  [] Gait training 06428 0 minutes  [] Manual therapy 75468 0 minutes  [x] Therapeutic activities 72944 45 minutes  [] Therapeutic exercises 26995 0 minutes  [] Neuromuscular reeducation 22880 0 minutes    Tasha Porter PT, DPT  License number:  [id number]

## 2022-11-21 NOTE — PROGRESS NOTES
Hospitalist Progress Note      SYNOPSIS: Patient admitted on 2022 for Compression fracture of L2 lumbar vertebra, open, initial encounter ([facility] [address]      SUBJECTIVE:    Patient seen and examined. She reports mild dizziness this morning when she first woke up which has resolved now. She was able to get up and wash herself and use the bedside commode without any dizziness. Records reviewed. 52-year-old female with past medical history of diabetes mellitus, gastric sleeve surgery, chronic anemia who presented with back pain after a syncopal fall. On arrival to the emergency department, CT spine showed acute compression fracture of L2, L3, and L5. She was evaluated by neurosurgeon who recommended conservative management with LSO brace, pain management. CT head was negative for acute intracranial abnormality; CTA head and neck was negative for critical stenosis or LVO but it revealed a 3 mm x 6 mm x 4 mm focus of mucosal versus soft tissue nodule along the right lateral wall of the subglottic trachea. Patient was evaluated by neurologist for suspected orthostatic hypotension secondary to diabetic autonomic neuropathy; work-up in progress for occipital neuralgia with significant bilateral sternocleidomastoid muscle tension, she was started on baclofen. Patient unfortunately got exposed to COVID-19 from a roommate; she had been isolated since but repeat SARS-CoV-2 screen on  returned positive. Stable overnight. No other overnight issues reported. Temp (24hrs), Av.2 °F (36.8 °C), Min:97.9 °F (36.6 °C), Max:98.5 °F (36.9 °C)    DIET: ADULT DIET;  Regular  CODE: Full Code    Intake/Output Summary (Last 24 hours) at 2022 1050  Last data filed at 2022 0553  Gross per 24 hour   Intake 537 ml   Output 250 ml   Net 287 ml       OBJECTIVE:    /73   Pulse 82   Temp 98.5 °F (36.9 °C)   Resp 20   Ht 5' 11\" (1.803 m)   Wt 209 lb 7 oz (95 kg)   SpO2 97%   BMI 29.21 kg/m²     General appearance: No apparent distress, appears stated age and cooperative. HEENT:  Conjunctivae/corneas clear. Neck: Supple. No jugular venous distention. Respiratory: Clear to auscultation bilaterally, normal respiratory effort  Cardiovascular: Regular rate rhythm, normal S1-S2  Abdomen: Soft, nontender, nondistended  Musculoskeletal: No clubbing, cyanosis, no bilateral lower extremity edema. Brisk capillary refill. Skin:  No rashes  on visible skin  Neurologic: awake, alert and following commands     ASSESSMENT:  Syncope - likely secondary to orthostatic hypotension related to diabetic autonomic neuropathy. MRI brain was negative for acute intracranial abnormality   Acute compression fractures - involves L2, L3, and L5 vertebral bodies  Hypotension - associated with orthostatic changes  Occipital neuralgia  COVID-19 infection  Type 2 diabetes mellitus  Anemia of chronic disease  Thrombocytosis  Physical deconditioning        PLAN:  Continue midodrine given orthostatic hypotension. Cortisol level was within normal limit on admission. BP stable this morning 103/73. Continue NOHEMI hose. Concerning COVID-19, no treatment warranted as patient has no symptoms and stable on room air.     DISPOSITION:  Precertification pending for discharge to rehab/SNF    Medications:  REVIEWED DAILY    Infusion Medications    sodium chloride      dextrose       Scheduled Medications    midodrine  15 mg Oral TID WC    baclofen  20 mg Oral BID    polyethylene glycol  17 g Oral Daily    senna  1 tablet Oral Nightly    oxyCODONE-acetaminophen  1 tablet Oral Q6H    meclizine  25 mg Oral 3 times per day    folic acid  1 mg Oral Daily    amitriptyline  10 mg Oral Nightly    lidocaine  1 patch Topical Daily    pantoprazole  40 mg Oral QAM AC    sertraline  50 mg Oral Daily    sodium chloride flush  10 mL IntraVENous 2 times per day    heparin (porcine)  5,000 Units SubCUTAneous Q8H    insulin lispro  0-4 Units SubCUTAneous TID WC insulin lispro  0-4 Units SubCUTAneous Nightly     PRN Meds: aluminum & magnesium hydroxide-simethicone, bisacodyl, HYDROmorphone, sodium chloride flush, sodium chloride flush, sodium chloride, ondansetron **OR** ondansetron, magnesium hydroxide, acetaminophen **OR** acetaminophen, glucose, dextrose bolus **OR** dextrose bolus, glucagon (rDNA), dextrose    Labs:     No results for input(s): WBC, HGB, HCT, PLT in the last 72 hours. No results for input(s): NA, K, CL, CO2, BUN, CREATININE, CALCIUM, PHOS in the last 72 hours. Invalid input(s): MAGNES    No results for input(s): PROT, ALB, ALKPHOS, ALT, AST, BILITOT, AMYLASE, LIPASE in the last 72 hours. No results for input(s): INR in the last 72 hours. No results for input(s): Dilma Malena in the last 72 hours. Chronic labs:    Lab Results   Component Value Date    CHOL 188 11/02/2022    TRIG 128 11/02/2022    HDL 37 11/02/2022    LDLCALC 125 (H) 11/02/2022    TSH 1.600 11/02/2022    LABA1C 6.8 (H) 11/06/2022       Radiology: REVIEWED DAILY    +++++++++++++++++++++++++++++++++++++++++++++++++  Onelia Shahid MD  [facility] - [address], [place]  +++++++++++++++++++++++++++++++++++++++++++++++++  NOTE: This report was transcribed using voice recognition software. Every effort was made to ensure accuracy; however, inadvertent computerized transcription errors may be present.

## 2022-11-21 NOTE — CARE COORDINATION
11/21 Update CM note. PT/OT just evaluated pt, awaiting notes. Received message from Renuka Xavier in [address] regarding the need for evals. Therapy attempted to meet with pt earlier but she declined evals at this time. Await written notes and response from ARU.     MEIR PetersenN, RN  [facility] Case Management   Cell: [phone]

## 2022-11-22 LAB
METER GLUCOSE: 116 MG/DL (ref 74–99)
METER GLUCOSE: 137 MG/DL (ref 74–99)
METER GLUCOSE: 148 MG/DL (ref 74–99)
METER GLUCOSE: 216 MG/DL (ref 74–99)

## 2022-11-22 PROCEDURE — 1200000000 HC SEMI PRIVATE

## 2022-11-22 PROCEDURE — 6360000002 HC RX W HCPCS: Performed by: INTERNAL MEDICINE

## 2022-11-22 PROCEDURE — 6370000000 HC RX 637 (ALT 250 FOR IP): Performed by: HOSPITALIST

## 2022-11-22 PROCEDURE — 6370000000 HC RX 637 (ALT 250 FOR IP): Performed by: FAMILY MEDICINE

## 2022-11-22 PROCEDURE — 82962 GLUCOSE BLOOD TEST: CPT

## 2022-11-22 PROCEDURE — 2580000003 HC RX 258: Performed by: INTERNAL MEDICINE

## 2022-11-22 PROCEDURE — 6370000000 HC RX 637 (ALT 250 FOR IP): Performed by: NURSE PRACTITIONER

## 2022-11-22 PROCEDURE — 6370000000 HC RX 637 (ALT 250 FOR IP): Performed by: INTERNAL MEDICINE

## 2022-11-22 RX ADMIN — SODIUM CHLORIDE, PRESERVATIVE FREE 10 ML: 5 INJECTION INTRAVENOUS at 21:39

## 2022-11-22 RX ADMIN — BACLOFEN 20 MG: 10 TABLET ORAL at 08:43

## 2022-11-22 RX ADMIN — SERTRALINE HYDROCHLORIDE 50 MG: 50 TABLET ORAL at 08:42

## 2022-11-22 RX ADMIN — OXYCODONE AND ACETAMINOPHEN 1 TABLET: 325; 10 TABLET ORAL at 05:34

## 2022-11-22 RX ADMIN — FOLIC ACID 1 MG: 1 TABLET ORAL at 08:42

## 2022-11-22 RX ADMIN — MIDODRINE HYDROCHLORIDE 15 MG: 5 TABLET ORAL at 08:42

## 2022-11-22 RX ADMIN — MECLIZINE 25 MG: 12.5 TABLET ORAL at 16:37

## 2022-11-22 RX ADMIN — HEPARIN SODIUM 5000 UNITS: 10000 INJECTION INTRAVENOUS; SUBCUTANEOUS at 21:39

## 2022-11-22 RX ADMIN — ACETAMINOPHEN 650 MG: 325 TABLET, FILM COATED ORAL at 03:20

## 2022-11-22 RX ADMIN — HEPARIN SODIUM 5000 UNITS: 10000 INJECTION INTRAVENOUS; SUBCUTANEOUS at 03:21

## 2022-11-22 RX ADMIN — MIDODRINE HYDROCHLORIDE 15 MG: 5 TABLET ORAL at 16:38

## 2022-11-22 RX ADMIN — PANTOPRAZOLE SODIUM 40 MG: 40 TABLET, DELAYED RELEASE ORAL at 05:35

## 2022-11-22 RX ADMIN — SENNOSIDES 8.6 MG: 8.6 TABLET, FILM COATED ORAL at 21:39

## 2022-11-22 RX ADMIN — HEPARIN SODIUM 5000 UNITS: 10000 INJECTION INTRAVENOUS; SUBCUTANEOUS at 11:43

## 2022-11-22 RX ADMIN — BACLOFEN 20 MG: 10 TABLET ORAL at 21:39

## 2022-11-22 RX ADMIN — AMITRIPTYLINE HYDROCHLORIDE 10 MG: 10 TABLET, FILM COATED ORAL at 21:39

## 2022-11-22 RX ADMIN — MIDODRINE HYDROCHLORIDE 15 MG: 5 TABLET ORAL at 11:42

## 2022-11-22 RX ADMIN — MECLIZINE 25 MG: 12.5 TABLET ORAL at 21:39

## 2022-11-22 RX ADMIN — SODIUM CHLORIDE, PRESERVATIVE FREE 10 ML: 5 INJECTION INTRAVENOUS at 08:44

## 2022-11-22 RX ADMIN — OXYCODONE AND ACETAMINOPHEN 1 TABLET: 325; 10 TABLET ORAL at 22:59

## 2022-11-22 RX ADMIN — MECLIZINE 25 MG: 12.5 TABLET ORAL at 05:35

## 2022-11-22 RX ADMIN — OXYCODONE AND ACETAMINOPHEN 1 TABLET: 325; 10 TABLET ORAL at 18:31

## 2022-11-22 RX ADMIN — OXYCODONE AND ACETAMINOPHEN 1 TABLET: 325; 10 TABLET ORAL at 11:41

## 2022-11-22 ASSESSMENT — PAIN DESCRIPTION - LOCATION
LOCATION: BACK

## 2022-11-22 ASSESSMENT — PAIN DESCRIPTION - ONSET
ONSET: ON-GOING
ONSET: GRADUAL

## 2022-11-22 ASSESSMENT — PAIN DESCRIPTION - ORIENTATION
ORIENTATION: LOWER
ORIENTATION: LOWER;MID
ORIENTATION: LOWER
ORIENTATION: LOWER;MID

## 2022-11-22 ASSESSMENT — PAIN SCALES - GENERAL
PAINLEVEL_OUTOF10: 9
PAINLEVEL_OUTOF10: 9
PAINLEVEL_OUTOF10: 3
PAINLEVEL_OUTOF10: 9

## 2022-11-22 ASSESSMENT — PAIN DESCRIPTION - PAIN TYPE
TYPE: ACUTE PAIN
TYPE: ACUTE PAIN;CHRONIC PAIN
TYPE: ACUTE PAIN
TYPE: ACUTE PAIN

## 2022-11-22 ASSESSMENT — PAIN DESCRIPTION - FREQUENCY
FREQUENCY: CONTINUOUS
FREQUENCY: CONTINUOUS
FREQUENCY: INTERMITTENT
FREQUENCY: CONTINUOUS

## 2022-11-22 ASSESSMENT — PAIN DESCRIPTION - DESCRIPTORS
DESCRIPTORS: ACHING
DESCRIPTORS: ACHING;DISCOMFORT;SORE
DESCRIPTORS: ACHING
DESCRIPTORS: ACHING;SORE

## 2022-11-22 ASSESSMENT — PAIN - FUNCTIONAL ASSESSMENT: PAIN_FUNCTIONAL_ASSESSMENT: PREVENTS OR INTERFERES SOME ACTIVE ACTIVITIES AND ADLS

## 2022-11-22 NOTE — CARE COORDINATION
11/22 Update CM note. Pt remains in droplet plus isolation for COVID. BP's bordeline, last documented was 99/58. PT/OT evals noted from yesterday afternoon. PT-14, OT-15, ambulated 6' with FWW Celestino (RLE manual advancement 25% of time). LM this AM with Marvel Mcdonald in [address] regarding pre-cert status and if any additional information is needed. Await callback.     Lisa Boone, BSN, RN  [facility] Case Management   Cell: [phone]

## 2022-11-22 NOTE — PLAN OF CARE
Problem: Pain  Goal: Verbalizes/displays adequate comfort level or baseline comfort level  11/22/2022 1030 by Contreras Hough RN  Outcome: Progressing     Problem: ABCDS Injury Assessment  Goal: Absence of physical injury  11/22/2022 1030 by Contreras Hough RN  Outcome: Progressing     Problem: Safety - Adult  Goal: Free from fall injury  11/22/2022 1030 by Contreras Hough RN  Outcome: Progressing     Problem: Chronic Conditions and Co-morbidities  Goal: Patient's chronic conditions and co-morbidity symptoms are monitored and maintained or improved  11/22/2022 1030 by Contreras Hough RN  Outcome: Progressing     Problem: Skin/Tissue Integrity  Goal: Absence of new skin breakdown  Description: 1. Monitor for areas of redness and/or skin breakdown  2. Assess vascular access sites hourly  3. Every 4-6 hours minimum:  Change oxygen saturation probe site  4. Every 4-6 hours:  If on nasal continuous positive airway pressure, respiratory therapy assess nares and determine need for appliance change or resting period.   11/22/2022 1030 by Contreras Hough RN  Outcome: Progressing

## 2022-11-22 NOTE — PROGRESS NOTES
Insurance requested updated therapies yesterday. Therapy attempted to see her but she was in the middle of eating and requested a later time. She did see therapies later in the day, however updated therapies were requested by 12 noon. Insurance stated we could have Dr. Liseth Holloway peer to peer once updated therapies were completed. Dr. Iron Sepulveda to do peer to peer. Will await an answer.    Serene Kimble RN  [facility] Pre-screener/  [phone]

## 2022-11-22 NOTE — PLAN OF CARE
Problem: Pain  Goal: Verbalizes/displays adequate comfort level or baseline comfort level  Outcome: Progressing     Problem: ABCDS Injury Assessment  Goal: Absence of physical injury  Outcome: Progressing     Problem: Safety - Adult  Goal: Free from fall injury  Outcome: Progressing     Problem: Chronic Conditions and Co-morbidities  Goal: Patient's chronic conditions and co-morbidity symptoms are monitored and maintained or improved  Outcome: Progressing     Problem: Skin/Tissue Integrity  Goal: Absence of new skin breakdown  Outcome: Progressing

## 2022-11-22 NOTE — PROGRESS NOTES
Hospitalist Progress Note      SYNOPSIS: Patient admitted on 2022 for Compression fracture of L2 lumbar vertebra, open, initial encounter [facility])  53-year-old female with past medical history of diabetes mellitus, gastric sleeve surgery, chronic anemia who presented with back pain after a syncopal fall. On arrival to the emergency department, CT spine showed acute compression fracture of L2, L3, and L5. She was evaluated by neurosurgeon who recommended conservative management with LSO brace, pain management. CT head was negative for acute intracranial abnormality; CTA head and neck was negative for critical stenosis or LVO but it revealed a 3 mm x 6 mm x 4 mm focus of mucosal versus soft tissue nodule along the right lateral wall of the subglottic trachea. Patient was evaluated by neurologist for suspected orthostatic hypotension secondary to diabetic autonomic neuropathy; work-up in progress for occipital neuralgia with significant bilateral sternocleidomastoid muscle tension, she was started on baclofen. Patient unfortunately got exposed to COVID-19 from a roommate; she had been isolated since but repeat SARS-CoV-2 screen on  returned positive. Consulted PT/OT. Recommend acute rehab. Awaiting insurance approval.    SUBJECTIVE:  Patient was seen at bedside this morning. Discussed about the plan regarding acute rehab. Patient would prefer to go home if she is able to. Stable overnight. No other overnight issues reported. Temp (24hrs), Av.3 °F (36.3 °C), Min:97.2 °F (36.2 °C), Max:97.5 °F (36.4 °C)    DIET: ADULT DIET;  Regular  CODE: Full Code    Intake/Output Summary (Last 24 hours) at 2022 1329  Last data filed at 2022 0539  Gross per 24 hour   Intake 300 ml   Output 600 ml   Net -300 ml       OBJECTIVE:    BP 91/63   Pulse 79   Temp 97.2 °F (36.2 °C) (Oral)   Resp 18   Ht 5' 11\" (1.803 m)   Wt 207 lb (93.9 kg)   SpO2 97%   BMI 28.87 kg/m²     General appearance: No apparent distress, appears stated age and cooperative. HEENT:  Conjunctivae/corneas clear. Neck: Supple. No jugular venous distention. Respiratory: Clear to auscultation bilaterally, normal respiratory effort  Cardiovascular: Regular rate rhythm, normal S1-S2  Abdomen: Soft, nontender, nondistended  Musculoskeletal: No clubbing, cyanosis, no bilateral lower extremity edema. Brisk capillary refill. Skin:  No rashes  on visible skin  Neurologic: awake, alert and following commands     ASSESSMENT:  Syncope - likely secondary to orthostatic hypotension related to diabetic autonomic neuropathy. MRI brain was negative for acute intracranial abnormality   Acute compression fractures - involves L2, L3, and L5 vertebral bodies  Hypotension - associated with orthostatic changes  Occipital neuralgia  COVID-19 infection  Type 2 diabetes mellitus  Anemia of chronic disease  Thrombocytosis  Physical deconditioning        PLAN:  Continue midodrine given orthostatic hypotension. Cortisol level was within normal limit on admission. BP 91/63 millimeters of Hg. We will continue to monitor continue NOHEMI hose. Concerning COVID-19, no treatment warranted as patient has no symptoms and stable on room air.     DISPOSITION:  Precertification pending for discharge to rehab/SNF    Medications:  REVIEWED DAILY    Infusion Medications    sodium chloride      dextrose       Scheduled Medications    midodrine  15 mg Oral TID WC    baclofen  20 mg Oral BID    polyethylene glycol  17 g Oral Daily    senna  1 tablet Oral Nightly    oxyCODONE-acetaminophen  1 tablet Oral Q6H    meclizine  25 mg Oral 3 times per day    folic acid  1 mg Oral Daily    amitriptyline  10 mg Oral Nightly    lidocaine  1 patch Topical Daily    pantoprazole  40 mg Oral QAM AC    sertraline  50 mg Oral Daily    sodium chloride flush  10 mL IntraVENous 2 times per day    heparin (porcine)  5,000 Units SubCUTAneous Q8H    insulin lispro  0-4 Units SubCUTAneous TID WC    insulin lispro  0-4 Units SubCUTAneous Nightly     PRN Meds: aluminum & magnesium hydroxide-simethicone, bisacodyl, HYDROmorphone, sodium chloride flush, sodium chloride flush, sodium chloride, ondansetron **OR** ondansetron, magnesium hydroxide, acetaminophen **OR** acetaminophen, glucose, dextrose bolus **OR** dextrose bolus, glucagon (rDNA), dextrose    Labs:     No results for input(s): WBC, HGB, HCT, PLT in the last 72 hours. No results for input(s): NA, K, CL, CO2, BUN, CREATININE, CALCIUM, PHOS in the last 72 hours. Invalid input(s): MAGNES    No results for input(s): PROT, ALB, ALKPHOS, ALT, AST, BILITOT, AMYLASE, LIPASE in the last 72 hours. No results for input(s): INR in the last 72 hours. No results for input(s): [employer] in the last 72 hours. Chronic labs:    Lab Results   Component Value Date    CHOL 188 11/02/2022    TRIG 128 11/02/2022    HDL 37 11/02/2022    LDLCALC 125 (H) 11/02/2022    TSH 1.600 11/02/2022    LABA1C 6.8 (H) 11/06/2022       Radiology: REVIEWED DAILY    +++++++++++++++++++++++++++++++++++++++++++++++++  Genoveva Arredondo MD  [address]  +++++++++++++++++++++++++++++++++++++++++++++++++  NOTE: This report was transcribed using voice recognition software. Every effort was made to ensure accuracy; however, inadvertent computerized transcription errors may be present.

## 2022-11-22 NOTE — PROGRESS NOTES
Acute Rehab Pre-Admission Screen      Referral date: 11/16/2022  Onset/Hospital Admit Date: 11/5/2022  9:49 AM    Current Location: [id number]    Name: Mercedes Jin  YOB: 1970  Age: 45 y.o. Admitting Diagnosis:Compression fracture L 2   Address: [address] [place] [address]  Home Phone: [phone] (home)  Social Security #:     Sex: female  Race:B. Black or   Ethnicity: A. No, not of ,  or Faroese origin    Marital Status: single   Ethnic/Cultural/Buddhism Considerations: none    Advanced Directives: [x] Full Code  [] [address] [] Medications only       [] Living Will  [] DPOA      []Organ donor      [] No mechanical breathing or ventilation     [] no tube feeding, nutrition or hydration      [] Patient does not have advanced directives or living will     Copies in Chart: no    COVERAGE INFORMATION   Primary Insurer: [employer]  Payor Contact: Review Link  Authorization #: [id number]    Verified coverage: [] Patient  [] Family/caregiver    [x] financial department [] insurance carrier    COVID SCREEN DATE:  Result: SARS-CoV-2, NAAT  COVID-19, Rapid  Collected: 11/18/22 1125   Result status: Final   Resulting lab: [address] [facility]   Reference range: Not Detected   Value: DETECTED Abnormal     Will need to be in isolation on admission to ARU. Asking  On acute side to order PCR. MEDICAL UPDATE:  History of present admission: Maggie Pineda is a 45 y.o. female who was admitted with back pain and dizziness. She went to get up and fell backwards. She was found to have L2, L3, and L5 acute compression fractures. She has a PMH of DM type 2 and Anemia. She did not require any Neurosurgical intervention and was fit with a TLSO brace. She recently had a COVID 19 exposure and on 11/18/2022 she tested positive. She recently had Gastric Bypass surgery in Aug. Of 2022.        PHYSICIAN / REFERRAL INFORMATION  Referring Physician: Dr. Patsy Humphrey MD  Attending Physician: Chidi Moise MD  Primary Care Physician: Sahara Browne MD  Consultants/Opinions (see full consult notes on chart): Neurology, Neurosurgery. SOCIAL INFORMATION  Primary  Contact: Christina Walker  Relationship: child  Primary Phone: [phone]   Previous Community Services: none  Caregiver available: [x] Yes [] No Hours per day available: as needed  Patient previously employed:  [x] Yes [] Part Time [] Full Time [] No [] Retired  Occupation/Profession:   Prior living arrangements: [] Home  [] Assisted living  [] SNF [] Other  Lived with:  [] Alone  [] Spouse  [x] Family  [] Other  Lived with: Miguel Gabriel phone: [phone]  Home:  [address] home  1 entry steps  Rails: 0  Steps:  flight to 2nd floor  Rails:  1   Bedroom: [] 1st floor  [x] 2nd floor    Bathroom:  [] 1st floor  [x] 2nd floor    Prior Functional Level: Independent for: ADL's and IADL's  Assistance for:   Dependent for:   Dominant hand: [x] Right  [] Left    Previous Home Equipment:  [] Cane [] Grab bars [] Orthotic / prosthetic   [] Shower chair [] Tub bench  [] 3-in-1 Commode [] Long handle sponge   [] Oxygen [] Sock aide  [] Wheelchair  [] motorized wc/scooter  [] Wheelchair cushion   [] Crutches [] Long handle shoehorn  [] Reachers [] Toilet seat elevator [] Rollator  [] Walker(wheeled)   [] Walker(standard) [] Mechanical lift    [x] None of the above     Has patient had 2 or more falls in the past year or any fall with injury in the past year? [] yes   [] no   [x]unknown    Has patient had major surgery during past 100 days prior to admission?    [x] yes   [] no Type/ Date: 2022 Gastric Bypass    Surgical History:  Past Surgical History:   Procedure Laterality Date    COLPOSCOPY      2001 : outside [address] [place] [address]      2022    [facility]  DELIVERY+POSTPARTUM CARE      1991 :      [facility]  DELIVERY+POSTPARTUM CARE      1998 : occupational therapy, rehabilitation psychology, recreation therapy and rehabilitation social work, nutrition services due to new deficits     Risk for Medical/Clinical Complications: Falls, injury, pain, skin breakdown, abnormal vitals, abnormal labs, DVT, PE, pneumonia, decreased mobility, neuro changes    CLINICAL DATA:     Height : 5'11\"     Weight:  207 lbs   BMI: 28.87       Date: 11/23/2022 Date:  Date:    temperature 97.4     pulse 84     respirations 18     Blood pressure 90/62     Pulse oximeter 95 % on room air        ALLERGIES: Colchicine and Darvon [propoxyphene]    DIET : ADULT DIET; Regular    Current Lab and Diagnostic Tests:   Recent Results (from the past 24 hour(s))   POCT Glucose    Collection Time: 11/22/22  1:34 PM   Result Value Ref Range    Meter Glucose 148 (H) 74 - 99 mg/dL   POCT Glucose    Collection Time: 11/22/22  6:29 PM   Result Value Ref Range    Meter Glucose 116 (H) 74 - 99 mg/dL   POCT Glucose    Collection Time: 11/22/22  8:06 PM   Result Value Ref Range    Meter Glucose 216 (H) 74 - 99 mg/dL   POCT Glucose    Collection Time: 11/23/22  5:24 AM   Result Value Ref Range    Meter Glucose 137 (H) 74 - 99 mg/dL   POCT Glucose    Collection Time: 11/23/22 12:29 PM   Result Value Ref Range    Meter Glucose 149 (H) 74 - 99 mg/dL     XR CHEST (2 VW)    Result Date: 11/5/2022  EXAMINATION: TWO XRAY VIEWS OF THE CHEST 11/5/2022 12:38 pm     No acute process. CT HEAD WO CONTRAST    Result Date: 11/6/2022  EXAMINATION: CTA OF THE NECK; CT OF THE HEAD WITHOUT CONTRAST; CTA OF THE HEAD WITH CONTRAST 11/6/2022 7:27 pm     CT Head: No acute intracranial hemorrhage or mass effect. No CT evidence of large acute infarct. CTA Neck: No hemodynamically significant stenosis or dissection of the cervical internal carotid arteries. No high-grade stenosis or dissection of the cervical vertebral arteries. CTA Head: No large vessel arterial occlusion or high-grade stenosis.  3 mm x 6 mm x 4 mm focus of mucus versus soft tissue nodule along the right lateral wall of the subglottic trachea. To exclude neoplasm, recommend nonemergent follow-up CT neck with contrast versus direct visualization. RECOMMENDATION: 1.1 cm incidental right thyroid nodule. No follow-up imaging is recommended. Reference: J Am Dean Radiol. 2015 Feb;12(2): 143-50     CT THORACIC SPINE WO CONTRAST    Result Date: 11/5/2022  EXAMINATION: CT OF THE THORACIC SPINE WITHOUT CONTRAST  11/5/2022 2:18 pm:     Focal depression involving superior endplate of T1 related to fracture of uncertain chronicity. CT LUMBAR SPINE WO CONTRAST    Result Date: 11/5/2022  EXAMINATION: CT OF THE LUMBAR SPINE WITHOUT CONTRAST  11/5/2022     Stable mild vertebral malalignment. Disc space narrowing, discovertebral degenerative changes, and facet arthritis with bony spinal canal stenosis. New superior vertebral body compression deformities at L2, L3, and L5.     CT ABDOMEN PELVIS W IV CONTRAST Additional Contrast? None    Result Date: 11/5/2022  EXAMINATION: CT OF THE ABDOMEN AND PELVIS WITH CONTRAST 11/5/2022 2:18 pm   1. New depressed fractures involving superior endplates of L2, L3, and L5. Chronic depressed fracture involving superior endplate of L4. 2. No acute process in the abdomen or pelvis. Stable ventral abdominal wall hernia containing mesenteric fat and segment of large bowel. 3. Stable mild splenomegaly. CTA NECK W CONTRAST    Result Date: 11/6/2022  EXAMINATION: CTA OF THE NECK; CT OF THE HEAD WITHOUT CONTRAST; CTA OF THE HEAD WITH CONTRAST 11/6/2022 7:27 pm     CT Head: No acute intracranial hemorrhage or mass effect. No CT evidence of large acute infarct. CTA Neck: No hemodynamically significant stenosis or dissection of the cervical internal carotid arteries. No high-grade stenosis or dissection of the cervical vertebral arteries. CTA Head: No large vessel arterial occlusion or high-grade stenosis.  3 mm x 6 mm x 4 mm focus of mucus versus soft tissue nodule along the right lateral wall of the subglottic trachea. To exclude neoplasm, recommend nonemergent follow-up CT neck with contrast versus direct visualization. RECOMMENDATION: 1.1 cm incidental right thyroid nodule. No follow-up imaging is recommended. Reference: J Am Dean Radiol. 2015 Feb;12(2): 143-50     CTA HEAD W CONTRAST    Result Date: 11/6/2022  EXAMINATION: CTA OF THE NECK; CT OF THE HEAD WITHOUT CONTRAST; CTA OF THE HEAD WITH CONTRAST 11/6/2022 7:27 pm     CT Head: No acute intracranial hemorrhage or mass effect. No CT evidence of large acute infarct. CTA Neck: No hemodynamically significant stenosis or dissection of the cervical internal carotid arteries. No high-grade stenosis or dissection of the cervical vertebral arteries. CTA Head: No large vessel arterial occlusion or high-grade stenosis. 3 mm x 6 mm x 4 mm focus of mucus versus soft tissue nodule along the right lateral wall of the subglottic trachea. To exclude neoplasm, recommend nonemergent follow-up CT neck with contrast versus direct visualization. RECOMMENDATION: 1.1 cm incidental right thyroid nodule. No follow-up imaging is recommended. Reference: J Am Dean Radiol.  2015 Feb;12(2): 143-50       Additional labs or diagnostic studies needed before admission to rehabilitation unit:      Medications:   midodrine  15 mg Oral TID WC    baclofen  20 mg Oral BID    polyethylene glycol  17 g Oral Daily    senna  1 tablet Oral Nightly    oxyCODONE-acetaminophen  1 tablet Oral Q6H    meclizine  25 mg Oral 3 times per day    folic acid  1 mg Oral Daily    amitriptyline  10 mg Oral Nightly    lidocaine  1 patch Topical Daily    pantoprazole  40 mg Oral QAM AC    sertraline  50 mg Oral Daily    sodium chloride flush  10 mL IntraVENous 2 times per day    heparin (porcine)  5,000 Units SubCUTAneous Q8H    insulin lispro  0-4 Units SubCUTAneous TID WC    insulin lispro  0-4 Units SubCUTAneous Nightly      sodium chloride      dextrose aluminum & magnesium hydroxide-simethicone, bisacodyl, HYDROmorphone, sodium chloride flush, sodium chloride flush, sodium chloride, ondansetron **OR** ondansetron, magnesium hydroxide, acetaminophen **OR** acetaminophen, glucose, dextrose bolus **OR** dextrose bolus, glucagon (rDNA), dextrose    SPECIAL PRECAUTIONS: [] No current precautions  [] Cardiac  [] Renal [] Sternal [] Respiratory      [] Neurological           [] Hip  [] Spinal [] Seizure  [] Aspiration  [] Isolation precautions:    [] Contact   [] Respiratory   [] Protective     [] Droplet    [] Weight Bearing precautions:         [] Non Weight Bearing :         [] Toe Touch Weight Bearing :        [] Partial Weight Bearing :         [] Weight Bearing as Tolerated :         [x] Fall Risk:   [x] Recent history of falls [x] Falls risk level (Churchill Scale): high      [] Bed Alarm    [x] Do not leave alone in the bathroom    [] Chair Alarm    [] Cognitive impairment      [] One to One supervision  [] Sitter / Anisa Champ sitter   [] Safety enclosure bed  [x] Decreased balance     SPECIAL REHABILITATION NEEDS:   [x] IV Therapy: [x] PRN Adapter  [] Midline  [] PICC      [] Central Line    [] TPN       [] Oxygen: [] Trach [] Bi-PAP [] CPAP  [] Nasal cannula  [] Liters:      [] Wound Care:   [] Pressure ulcers(stage and location) -    [] Wound vac   [] Wound or incision care    [x] Pain Management (level of pain, meds): Oxycodone straight, Tylenol PRN, Lidocaine patch     [] Incontinence Bladder [] Montaño  Insertion date:    []Hemodialysis and  Frequency:   [] Incontinence Bowel    [x] Last bowel movement : 11/19/2022-per patient    Substance use history: [] Yes  [] No   [x] Tobacco  [] Alcohol  [] Other     [] Ethnic  [] Cultural  [] Spiritual  [] Language [] Needs  [] Other than English  [] Hearing Impaired  [] Visually Impaired  [] Speaking Impaired  [] Blind  [] Special equipment:  [] Devices/Splints  [] Type   [x] Brace   [x] TypeLSO  [] Bariatric bed  [] Extra wide commode  [] Extra wide wheelchair [] Extra wide walker  [] Antonio walker  [] Antonio wheelchair  [] Transfer lift    [] Other equipment     FUNCTIONAL STATUS PT / Kayla / Munir Fent:  FIM / EVAL Discipline Initial: 11/7/2022 Follow Up: 11/21/2022 Current:    Eating OT Minimal assist   Modified independent    Grooming OT Minimal assist   Set up    Bathing OT Maximal assist   Moderate assist      Dressing Upper Extremity OT Minimal assist   Moderate assist      Dressing Lower Extremity OT dependent   Moderate assist      Toileting OT Maximal assist   Minimal assist      Toilet Transfers OT NT Moderate assist      Tub/Shower Transfers OT NT NT    Homemaking OT NT NT    Bed Mobility PT Maximal assist   Minimal assist      Bed/Wheelchair Transfers PT NT Moderate assist      Locomotion Walk / Wheelchair  Device:  Distance: PT NT Minimal assist  [employer]  6 feet    Endurance PT fair fair    Expression SP - -    Social Interaction SP - -    Problem Solving SP fair fair    Memory SP good fair    Comprehension SP fair Fair     Swallowing SP - -    Bowel Management NSG  Incontinent    Bladder Management NSG  continent      Comments on Functional Status: Able to tolerate 3 hours of therapy per day split into four 45 minute sessions.      [x] Able to participate a minimum of 3 hours per day of therapy intervention    Required treatments/services: [x] Rehabilitation nursing [] Dietitian / nurtition                 [x] Case management  [] Respiratory Therapy      [x] Social work   [] Other     Required Therapy:  Therapy Hours per Day Days per Week Therapeutic Interventions Required   [x] Physical Therapy 1 5-7 Gait, transfers, Safety, strength, education, endurance   [x] Occupational Therapy 1 5-7 ADLs, IADLs, Safety, strength, education, endurance   [] Speech Pathology      [] Prosthetics / Orthotics       []         Anticipated Discharge Plan:   Anticipated DME Needs:  [x] Home     [] Commode   [] Alone    [] Wheelchair   [] Supervised    [] Walker   [x] Assist    [] Oxygen        [] Hospital Bed  [] Assisted Living    [] Ramp        [x] To Be Determined    Anticipated Home Health Services:  Anticipated Outpatient Services:  [] PT       [] PT  [] OT      [] OT  [] Speech     [] Speech  [] Nursing     [] Dialysis  [] Aide      [x] To Be Determined  [x] To Be Determined    Anticipated support group:  [] Amputation  [] Multiple Sclerosis  [] Stroke  [] Brain Injury  [] Spinal cord injury  [] Other     Barriers to discharge: Impaired mobility and impaired self care    Discharge Support: [] Patient lives alone and does not have a caregiver available     [x] Patient has a caregiver available     [x] Discharge plan has been verified with patient's caregiver      [x] Caregiver is in agreement with the discharge plan     Expected functional status for safe discharge: modified self care    Patient/support person goals: to go home    Expected length of stay: 1-2 weeks    Discussed expected length of stay and agreeable to IRF plan: [] Yes   [x] No    Impairment Group Category: 8.9    Etiological Diagnosis: L 2, L 3, and L 5 compression fractures. Primary Rehabilitation Diagnosis:  L 2, L 3, L 5 compression fractures    Electronically signed by Maico James RN on 11/23/2022 at 1:30 PM    Prescreen completed __________________________________ (signature of prescreener)    Date:   11/23/2022 Time:  1:30pm    JUSTIFICATION FOR ADMISSION TO ACUTE REHABILITATION:  Patient has suffered decline in functional abilities for gait, transfers, ADL's and IADL's as well as endurance. Patient has functional deficits requiring intensive therapy across multiple disciplines in order to return home safely. Patient will need physician oversight for respiratory issues, abnormal vital signs, nutritional and hydration status, safety issues, medications and therapy modalities. PT and OT will work on deficits as noted in evaluations.  Case management and social work will provide services for DME and management of a safe discharge home.           RECOMMEND LEVEL OF CARE  Recommend inpatient rehabilitation: [x] Yes   [] No  If no indicate reason:  [] Functional level too high  [] Unmotivated  [] No insurance carrier approval [] Unlikely to return to community  [] No medical necessity  [] Patient or family chose other facility  [] Too medically complex  [] Inadequate discharge plan  [] Rehabilitation bed unavailable [] Functional level too low  [] patient or family refused ARU    If patient not accepted for IRF admission, recommended level of care:  [] [facility]  [] [address]  [] [place]   [] Home Care  [] Other      [] LTAC       Physician Assigned:  [] Dr. Kim Whittington         [x] Dr. Susana Mcdowell              [] Dr. Lelo Hickman [] Dr. Buzz Giraldo  [] Dr. Dora Anders:    ____________________________________________________________________  ____________________________________________________________________  ____________________________________________________________________  ____________________________________________________________________  ____________________________________________________________________      Physician Signature:_____________________________________    Print Signature:_________________________________________    Date:   11/23/2022 Time:    1:30pm

## 2022-11-23 ENCOUNTER — HOSPITAL ENCOUNTER (INPATIENT)
Age: 52
LOS: 14 days | Discharge: HOME HEALTH | DRG: 559 | End: 2022-12-07
Attending: PHYSICAL MEDICINE & REHABILITATION | Admitting: PHYSICAL MEDICINE & REHABILITATION
Payer: COMMERCIAL

## 2022-11-23 VITALS
HEIGHT: 71 IN | RESPIRATION RATE: 18 BRPM | HEART RATE: 84 BPM | TEMPERATURE: 97.4 F | BODY MASS INDEX: 28.98 KG/M2 | SYSTOLIC BLOOD PRESSURE: 90 MMHG | WEIGHT: 207 LBS | DIASTOLIC BLOOD PRESSURE: 62 MMHG | OXYGEN SATURATION: 95 %

## 2022-11-23 DIAGNOSIS — S32.020A COMPRESSION FRACTURE OF L2 LUMBAR VERTEBRA, CLOSED, INITIAL ENCOUNTER (HCC): ICD-10-CM

## 2022-11-23 DIAGNOSIS — S32.020D COMPRESSION FRACTURE OF L2 VERTEBRA WITH ROUTINE HEALING, SUBSEQUENT ENCOUNTER: ICD-10-CM

## 2022-11-23 DIAGNOSIS — R26.9 ABNORMALITY OF GAIT AND MOBILITY: Primary | ICD-10-CM

## 2022-11-23 LAB
ADENOVIRUS BY PCR: NOT DETECTED
BORDETELLA PARAPERTUSSIS BY PCR: NOT DETECTED
BORDETELLA PERTUSSIS BY PCR: NOT DETECTED
CHLAMYDOPHILIA PNEUMONIAE BY PCR: NOT DETECTED
CORONAVIRUS 229E BY PCR: NOT DETECTED
CORONAVIRUS HKU1 BY PCR: NOT DETECTED
CORONAVIRUS NL63 BY PCR: NOT DETECTED
CORONAVIRUS OC43 BY PCR: NOT DETECTED
HUMAN METAPNEUMOVIRUS BY PCR: NOT DETECTED
HUMAN RHINOVIRUS/ENTEROVIRUS BY PCR: NOT DETECTED
INFLUENZA A BY PCR: NOT DETECTED
INFLUENZA B BY PCR: NOT DETECTED
METER GLUCOSE: 137 MG/DL (ref 74–99)
METER GLUCOSE: 149 MG/DL (ref 74–99)
METER GLUCOSE: 191 MG/DL (ref 74–99)
MYCOPLASMA PNEUMONIAE BY PCR: NOT DETECTED
PARAINFLUENZA VIRUS 1 BY PCR: NOT DETECTED
PARAINFLUENZA VIRUS 2 BY PCR: NOT DETECTED
PARAINFLUENZA VIRUS 3 BY PCR: NOT DETECTED
PARAINFLUENZA VIRUS 4 BY PCR: NOT DETECTED
RESPIRATORY SYNCYTIAL VIRUS BY PCR: NOT DETECTED
SARS-COV-2, PCR: DETECTED

## 2022-11-23 PROCEDURE — 6360000002 HC RX W HCPCS: Performed by: INTERNAL MEDICINE

## 2022-11-23 PROCEDURE — 6360000002 HC RX W HCPCS: Performed by: FAMILY MEDICINE

## 2022-11-23 PROCEDURE — 6370000000 HC RX 637 (ALT 250 FOR IP): Performed by: HOSPITALIST

## 2022-11-23 PROCEDURE — 6370000000 HC RX 637 (ALT 250 FOR IP): Performed by: INTERNAL MEDICINE

## 2022-11-23 PROCEDURE — 82962 GLUCOSE BLOOD TEST: CPT

## 2022-11-23 PROCEDURE — 6370000000 HC RX 637 (ALT 250 FOR IP): Performed by: NURSE PRACTITIONER

## 2022-11-23 PROCEDURE — 6370000000 HC RX 637 (ALT 250 FOR IP): Performed by: STUDENT IN AN ORGANIZED HEALTH CARE EDUCATION/TRAINING PROGRAM

## 2022-11-23 PROCEDURE — 97535 SELF CARE MNGMENT TRAINING: CPT

## 2022-11-23 PROCEDURE — 6370000000 HC RX 637 (ALT 250 FOR IP): Performed by: FAMILY MEDICINE

## 2022-11-23 PROCEDURE — 97530 THERAPEUTIC ACTIVITIES: CPT

## 2022-11-23 PROCEDURE — 0202U NFCT DS 22 TRGT SARS-COV-2: CPT

## 2022-11-23 PROCEDURE — 1280000000 HC REHAB R&B

## 2022-11-23 PROCEDURE — 2580000003 HC RX 258: Performed by: INTERNAL MEDICINE

## 2022-11-23 PROCEDURE — 6360000002 HC RX W HCPCS: Performed by: STUDENT IN AN ORGANIZED HEALTH CARE EDUCATION/TRAINING PROGRAM

## 2022-11-23 RX ORDER — AMITRIPTYLINE HYDROCHLORIDE 10 MG/1
10 TABLET, FILM COATED ORAL NIGHTLY
Status: CANCELLED | OUTPATIENT
Start: 2022-11-23

## 2022-11-23 RX ORDER — MIDODRINE HYDROCHLORIDE 5 MG/1
15 TABLET ORAL
Status: CANCELLED | OUTPATIENT
Start: 2022-11-23

## 2022-11-23 RX ORDER — MECLIZINE HCL 12.5 MG/1
25 TABLET ORAL EVERY 8 HOURS SCHEDULED
Status: CANCELLED | OUTPATIENT
Start: 2022-11-23

## 2022-11-23 RX ORDER — SENNA PLUS 8.6 MG/1
1 TABLET ORAL NIGHTLY
Status: CANCELLED | OUTPATIENT
Start: 2022-11-23

## 2022-11-23 RX ORDER — BACLOFEN 10 MG/1
20 TABLET ORAL 2 TIMES DAILY
Status: CANCELLED | OUTPATIENT
Start: 2022-11-23

## 2022-11-23 RX ORDER — ACETAMINOPHEN 325 MG/1
650 TABLET ORAL EVERY 4 HOURS PRN
Status: DISCONTINUED | OUTPATIENT
Start: 2022-11-23 | End: 2022-12-07 | Stop reason: HOSPADM

## 2022-11-23 RX ORDER — FOLIC ACID 1 MG/1
1 TABLET ORAL DAILY
Status: DISCONTINUED | OUTPATIENT
Start: 2022-11-24 | End: 2022-12-07 | Stop reason: HOSPADM

## 2022-11-23 RX ORDER — PANTOPRAZOLE SODIUM 40 MG/1
40 TABLET, DELAYED RELEASE ORAL
Status: CANCELLED | OUTPATIENT
Start: 2022-11-24

## 2022-11-23 RX ORDER — PANTOPRAZOLE SODIUM 40 MG/1
40 TABLET, DELAYED RELEASE ORAL
Status: DISCONTINUED | OUTPATIENT
Start: 2022-11-24 | End: 2022-12-07 | Stop reason: HOSPADM

## 2022-11-23 RX ORDER — BISACODYL 10 MG
10 SUPPOSITORY, RECTAL RECTAL DAILY PRN
Status: CANCELLED | OUTPATIENT
Start: 2022-11-23

## 2022-11-23 RX ORDER — SENNA PLUS 8.6 MG/1
1 TABLET ORAL NIGHTLY
Status: DISCONTINUED | OUTPATIENT
Start: 2022-11-23 | End: 2022-12-07 | Stop reason: HOSPADM

## 2022-11-23 RX ORDER — MIDODRINE HYDROCHLORIDE 5 MG/1
15 TABLET ORAL
Status: DISCONTINUED | OUTPATIENT
Start: 2022-11-23 | End: 2022-12-07 | Stop reason: HOSPADM

## 2022-11-23 RX ORDER — POLYETHYLENE GLYCOL 3350 17 G/17G
17 POWDER, FOR SOLUTION ORAL DAILY PRN
Status: DISCONTINUED | OUTPATIENT
Start: 2022-11-23 | End: 2022-12-07 | Stop reason: HOSPADM

## 2022-11-23 RX ORDER — BISACODYL 10 MG
10 SUPPOSITORY, RECTAL RECTAL DAILY PRN
Status: DISCONTINUED | OUTPATIENT
Start: 2022-11-23 | End: 2022-12-07 | Stop reason: HOSPADM

## 2022-11-23 RX ORDER — FOLIC ACID 1 MG/1
1 TABLET ORAL DAILY
Status: CANCELLED | OUTPATIENT
Start: 2022-11-24

## 2022-11-23 RX ORDER — BACLOFEN 10 MG/1
20 TABLET ORAL 2 TIMES DAILY
Status: DISCONTINUED | OUTPATIENT
Start: 2022-11-23 | End: 2022-11-25

## 2022-11-23 RX ORDER — HEPARIN SODIUM 10000 [USP'U]/ML
5000 INJECTION, SOLUTION INTRAVENOUS; SUBCUTANEOUS EVERY 8 HOURS
Status: DISCONTINUED | OUTPATIENT
Start: 2022-11-23 | End: 2022-12-07 | Stop reason: HOSPADM

## 2022-11-23 RX ORDER — OXYCODONE AND ACETAMINOPHEN 10; 325 MG/1; MG/1
1 TABLET ORAL EVERY 6 HOURS
Status: CANCELLED | OUTPATIENT
Start: 2022-11-23

## 2022-11-23 RX ORDER — AMITRIPTYLINE HYDROCHLORIDE 10 MG/1
10 TABLET, FILM COATED ORAL NIGHTLY
Status: DISCONTINUED | OUTPATIENT
Start: 2022-11-23 | End: 2022-11-25

## 2022-11-23 RX ORDER — MECLIZINE HCL 12.5 MG/1
25 TABLET ORAL EVERY 8 HOURS SCHEDULED
Status: DISCONTINUED | OUTPATIENT
Start: 2022-11-23 | End: 2022-11-28

## 2022-11-23 RX ORDER — HEPARIN SODIUM 10000 [USP'U]/ML
5000 INJECTION, SOLUTION INTRAVENOUS; SUBCUTANEOUS EVERY 8 HOURS
Status: CANCELLED | OUTPATIENT
Start: 2022-11-23

## 2022-11-23 RX ORDER — OXYCODONE AND ACETAMINOPHEN 10; 325 MG/1; MG/1
1 TABLET ORAL EVERY 6 HOURS
Status: DISCONTINUED | OUTPATIENT
Start: 2022-11-23 | End: 2022-11-30

## 2022-11-23 RX ADMIN — MECLIZINE 25 MG: 12.5 TABLET ORAL at 12:52

## 2022-11-23 RX ADMIN — SENNOSIDES 8.6 MG: 8.6 TABLET, FILM COATED ORAL at 21:15

## 2022-11-23 RX ADMIN — FOLIC ACID 1 MG: 1 TABLET ORAL at 08:49

## 2022-11-23 RX ADMIN — OXYCODONE AND ACETAMINOPHEN 1 TABLET: 325; 10 TABLET ORAL at 16:58

## 2022-11-23 RX ADMIN — OXYCODONE AND ACETAMINOPHEN 1 TABLET: 325; 10 TABLET ORAL at 11:32

## 2022-11-23 RX ADMIN — BACLOFEN 20 MG: 10 TABLET ORAL at 08:49

## 2022-11-23 RX ADMIN — MIDODRINE HYDROCHLORIDE 15 MG: 5 TABLET ORAL at 16:58

## 2022-11-23 RX ADMIN — SERTRALINE HYDROCHLORIDE 50 MG: 50 TABLET ORAL at 08:49

## 2022-11-23 RX ADMIN — OXYCODONE AND ACETAMINOPHEN 1 TABLET: 325; 10 TABLET ORAL at 22:52

## 2022-11-23 RX ADMIN — HEPARIN SODIUM 5000 UNITS: 10000 INJECTION INTRAVENOUS; SUBCUTANEOUS at 21:18

## 2022-11-23 RX ADMIN — MIDODRINE HYDROCHLORIDE 15 MG: 5 TABLET ORAL at 08:48

## 2022-11-23 RX ADMIN — BACLOFEN 20 MG: 10 TABLET ORAL at 21:15

## 2022-11-23 RX ADMIN — AMITRIPTYLINE HYDROCHLORIDE 10 MG: 10 TABLET, FILM COATED ORAL at 21:15

## 2022-11-23 RX ADMIN — SODIUM CHLORIDE, PRESERVATIVE FREE 10 ML: 5 INJECTION INTRAVENOUS at 08:50

## 2022-11-23 RX ADMIN — MECLIZINE 25 MG: 12.5 TABLET ORAL at 21:15

## 2022-11-23 RX ADMIN — MIDODRINE HYDROCHLORIDE 15 MG: 5 TABLET ORAL at 12:53

## 2022-11-23 RX ADMIN — HYDROMORPHONE HYDROCHLORIDE 1 MG: 1 INJECTION, SOLUTION INTRAMUSCULAR; INTRAVENOUS; SUBCUTANEOUS at 03:34

## 2022-11-23 RX ADMIN — HEPARIN SODIUM 5000 UNITS: 10000 INJECTION INTRAVENOUS; SUBCUTANEOUS at 12:53

## 2022-11-23 ASSESSMENT — PAIN SCALES - GENERAL
PAINLEVEL_OUTOF10: 9

## 2022-11-23 ASSESSMENT — PAIN DESCRIPTION - DESCRIPTORS
DESCRIPTORS: THROBBING;TENDER
DESCRIPTORS: THROBBING
DESCRIPTORS: ACHING
DESCRIPTORS: ACHING

## 2022-11-23 ASSESSMENT — PAIN DESCRIPTION - LOCATION
LOCATION: BACK;NECK
LOCATION: BACK;NECK
LOCATION: BACK
LOCATION: BACK;NECK
LOCATION: BACK

## 2022-11-23 ASSESSMENT — PAIN DESCRIPTION - ORIENTATION
ORIENTATION: LOWER
ORIENTATION: LOWER;RIGHT
ORIENTATION: LOWER;RIGHT
ORIENTATION: LOWER
ORIENTATION: LOWER;RIGHT

## 2022-11-23 ASSESSMENT — PAIN DESCRIPTION - ONSET
ONSET: ON-GOING
ONSET: ON-GOING

## 2022-11-23 ASSESSMENT — PAIN DESCRIPTION - FREQUENCY
FREQUENCY: CONTINUOUS
FREQUENCY: CONTINUOUS

## 2022-11-23 ASSESSMENT — PAIN DESCRIPTION - PAIN TYPE
TYPE: ACUTE PAIN
TYPE: ACUTE PAIN
TYPE: CHRONIC PAIN
TYPE: CHRONIC PAIN

## 2022-11-23 NOTE — PROGRESS NOTES
Hospitalist Progress Note      SYNOPSIS: Patient admitted on 2022 for Compression fracture of L2 lumbar vertebra, open, initial encounter [facility])  57-year-old female with past medical history of diabetes mellitus, gastric sleeve surgery, chronic anemia who presented with back pain after a syncopal fall. On arrival to the emergency department, CT spine showed acute compression fracture of L2, L3, and L5. She was evaluated by neurosurgeon who recommended conservative management with LSO brace, pain management. CT head was negative for acute intracranial abnormality; CTA head and neck was negative for critical stenosis or LVO but it revealed a 3 mm x 6 mm x 4 mm focus of mucosal versus soft tissue nodule along the right lateral wall of the subglottic trachea. Patient was evaluated by neurologist for suspected orthostatic hypotension secondary to diabetic autonomic neuropathy; work-up in progress for occipital neuralgia with significant bilateral sternocleidomastoid muscle tension, she was started on baclofen. Patient unfortunately got exposed to COVID-19 from a roommate; she had been isolated since but repeat SARS-CoV-2 screen on  returned positive. Consulted PT/OT. Recommend acute rehab. Awaiting insurance approval.     SUBJECTIVE:  Patient was seen at bedside this morning. Discussed about the plan regarding acute rehab. Complains of increased pain when shes cold. Discussed regarding stiffness in cold which aggravates pain. Stable overnight. No other overnight issues reported. Temp (24hrs), Av °F (36.7 °C), Min:97.4 °F (36.3 °C), Max:98.5 °F (36.9 °C)    DIET: ADULT DIET;  Regular  CODE: Full Code    Intake/Output Summary (Last 24 hours) at 2022 0917  Last data filed at 2022 0612  Gross per 24 hour   Intake 240 ml   Output --   Net 240 ml       OBJECTIVE:    BP 90/62   Pulse 84   Temp 97.4 °F (36.3 °C) (Tympanic)   Resp 18   Ht 5' 11\" (1.803 m)   Wt 207 lb (93.9 kg) SpO2 95%   BMI 28.87 kg/m²     General appearance: No apparent distress, appears stated age and cooperative. HEENT:  Conjunctivae/corneas clear. Neck: Supple. No jugular venous distention. Respiratory: Clear to auscultation bilaterally, normal respiratory effort  Cardiovascular: Regular rate rhythm, normal S1-S2  Abdomen: Soft, nontender, nondistended  Musculoskeletal: No clubbing, cyanosis, no bilateral lower extremity edema. Brisk capillary refill. Skin:  No rashes  on visible skin  Neurologic: awake, alert and following commands     ASSESSMENT:  Syncope - likely secondary to orthostatic hypotension related to diabetic autonomic neuropathy. MRI brain was negative for acute intracranial abnormality   Acute compression fractures - involves L2, L3, and L5 vertebral bodies  Hypotension - associated with orthostatic changes  Occipital neuralgia  COVID-19 infection  Type 2 diabetes mellitus  Anemia of chronic disease  Thrombocytosis  Physical deconditioning        PLAN:  Continue midodrine given orthostatic hypotension. Cortisol level was within normal limit on admission. BP 91/63 millimeters of Hg. We will continue to monitor continue NOHEMI hose. Concerning COVID-19, no treatment warranted as patient has no symptoms and stable on room air.     DISPOSITION:  Precertification pending for discharge to rehab/SNF    Medications:  REVIEWED DAILY    Infusion Medications    sodium chloride      dextrose       Scheduled Medications    midodrine  15 mg Oral TID WC    baclofen  20 mg Oral BID    polyethylene glycol  17 g Oral Daily    senna  1 tablet Oral Nightly    oxyCODONE-acetaminophen  1 tablet Oral Q6H    meclizine  25 mg Oral 3 times per day    folic acid  1 mg Oral Daily    amitriptyline  10 mg Oral Nightly    lidocaine  1 patch Topical Daily    pantoprazole  40 mg Oral QAM AC    sertraline  50 mg Oral Daily    sodium chloride flush  10 mL IntraVENous 2 times per day    heparin (porcine)  5,000 Units SubCUTAneous Q8H    insulin lispro  0-4 Units SubCUTAneous TID WC    insulin lispro  0-4 Units SubCUTAneous Nightly     PRN Meds: aluminum & magnesium hydroxide-simethicone, bisacodyl, HYDROmorphone, sodium chloride flush, sodium chloride flush, sodium chloride, ondansetron **OR** ondansetron, magnesium hydroxide, acetaminophen **OR** acetaminophen, glucose, dextrose bolus **OR** dextrose bolus, glucagon (rDNA), dextrose    Labs:     No results for input(s): WBC, HGB, HCT, PLT in the last 72 hours. No results for input(s): NA, K, CL, CO2, BUN, CREATININE, CALCIUM, PHOS in the last 72 hours. Invalid input(s): MAGNES    No results for input(s): PROT, ALB, ALKPHOS, ALT, AST, BILITOT, AMYLASE, LIPASE in the last 72 hours. No results for input(s): INR in the last 72 hours. No results for input(s): Lis Earnest in the last 72 hours. Chronic labs:    Lab Results   Component Value Date    CHOL 188 11/02/2022    TRIG 128 11/02/2022    HDL 37 11/02/2022    LDLCALC 125 (H) 11/02/2022    TSH 1.600 11/02/2022    LABA1C 6.8 (H) 11/06/2022       Radiology: REVIEWED DAILY    +++++++++++++++++++++++++++++++++++++++++++++++++  MD EMELY Pritchard/ Jeffery Weber [address] [place] [address]  +++++++++++++++++++++++++++++++++++++++++++++++++  NOTE: This report was transcribed using voice recognition software. Every effort was made to ensure accuracy; however, inadvertent computerized transcription errors may be present.

## 2022-11-23 NOTE — PROGRESS NOTES
Progress Note  Date:2022       JDXB:[id number]/0230-A  Patient Austyn Bailey     YOB: 1970     Age:52 y.o. Subjective    Subjective:  Symptoms:  Stable. She reports weakness. Diet:  Poor intake. Activity level: Impaired due to weakness. Pain:  She complains of pain that is mild. Review of Systems   Neurological:  Positive for weakness. Objective         Vitals Last 24 Hours:  TEMPERATURE:  Temp  Av °F (36.7 °C)  Min: 97.4 °F (36.3 °C)  Max: 98.5 °F (36.9 °C)  RESPIRATIONS RANGE: Resp  Av  Min: 18  Max: 18  PULSE OXIMETRY RANGE: SpO2  Av.5 %  Min: 95 %  Max: 96 %  PULSE RANGE: Pulse  Av  Min: 82  Max: 87  BLOOD PRESSURE RANGE: Systolic (71WAW), LCX:10 , Min:85 , UDN:719   ; Diastolic (92FAW), UOK:74, Min:56, Max:71    I/O (24Hr): Intake/Output Summary (Last 24 hours) at 2022 0943  Last data filed at 2022 0612  Gross per 24 hour   Intake 240 ml   Output --   Net 240 ml     Objective:  General Appearance: In no acute distress. Vital signs: (most recent): Blood pressure 90/62, pulse 84, temperature 97.4 °F (36.3 °C), temperature source Tympanic, resp. rate 18, height 5' 11\" (1.803 m), weight 207 lb (93.9 kg), SpO2 95 %. (Orthostatic hypotension). Output: Producing urine. Lungs:  Normal effort and normal respiratory rate. Breath sounds clear to auscultation. Heart: Normal rate. Regular rhythm. S1 normal and S2 normal.    Abdomen: Abdomen is soft. Bowel sounds are normal.     Neurological: Patient is alert. (4/5 strength). Labs/Imaging/Diagnostics    Labs:  CBC:No results for input(s): WBC, RBC, HGB, HCT, MCV, RDW, PLT in the last 72 hours. CHEMISTRIES:No results for input(s): NA, K, CL, CO2, BUN, CREATININE, GLUCOSE, CA, PHOS, MG in the last 72 hours. PT/INR:No results for input(s): PROTIME, INR in the last 72 hours. APTT:No results for input(s): APTT in the last 72 hours.   LIVER PROFILE:No results for input(s): AST, ALT, BILIDIR, BILITOT, ALKPHOS in the last 72 hours. Imaging Last 24 Hours:  No results found. Assessment//Plan           Hospital Problems             Last Modified POA    * (Principal) Compression fracture of L2 lumbar vertebra, open, initial encounter ([facility] [address] 11/5/2022 Yes    Bubo 11/5/2022 Yes    Compression fracture of L2 lumbar vertebra ([facility] [address] 11/6/2022 Yes     Assessment:    Condition: In stable condition. (Multiple compression fractures  Orthostatic hypotension  Prior gastric bypass  COVID-19). Plan:   Encourage ambulation. (Patient is currently in isolation for COVID-19  I am attempting to get her pre-CERT for acute rehab  The biggest issue is her orthostatic hypotension  She is already on a high dose of midodrine  We may have to look at adding sodium chloride and/or Florinef  I will address that in the pre-CERT today).      Electronically signed by Angelique Brooks MD on 11/23/22 at 9:43 AM EST

## 2022-11-23 NOTE — H&P
PM&R Admission History & Physical Examination    Patient: Paul Arce  Age/sex: 45 y.o. female  Medical Record #: [id number]    Admission to Acute Rehab Unit: Date: 2022 diagnosis: Compression fracture of L2 lumbar vertebra, closed, initial encounter [facility]) [G37.848P]  Admitting Physician: Angelique Brody MD  Consulting physician: Jasvir Klinetingham  Primary care provider: Sahara Browne MD      Chief complaint:   Impairments and acitivities limitations in ADLs, mobility,  secondary to fall with multiple spinal fractures    HPI:   Paul Arce is a 45 y.o. female with past medical history significant for diabetes and recent gastric bypass surgery who presented to [facility] on .  2022 following a fall. She has had multiple recent falls. She was found to have compression fractures at L2-L3 and L5. She was not felt to require neurosurgical intervention. She has had ongoing problems due to orthostatic hypotension despite treatment with midodrine. She also tested positive for [facility] course was complicated by COVID and orthostatic hypotension. Present status: Stable  Pain: Moderate  PO intake: Fair  BM: Not reported  Micturition: Voiding  DVT prophylaxis with heparin.    Weight bearing status: Protonix    Past Medical History:   Diagnosis Date    Anemia     Diabetes mellitus ([address]     Papanicolaou smear of cervix with atypical squamous cells of undetermined significance (ASC-US)          Screening for human papillomavirus (HPV)     4/10/07        Past Surgical History:   Procedure Laterality Date    COLPOSCOPY      2001 : outside [address] [place] [address]      2022    NE  DELIVERY+POSTPARTUM CARE      1991 :      NE  DELIVERY+POSTPARTUM CARE      1998 :      NE CONIZATION CERVIX,KNIFE/LASER      2001 : outside [facility]        Family History   Problem Relation Age of Onset    Diabetes Mother     Hypertension Mother Diabetes Father     Hypertension Father     Gout Father     Heart Disease None     Ovarian Cancer None     Uterine Cancer None     Breast Cancer None        Allergies   Allergen Reactions    Colchicine     Darvon [Propoxyphene] Other (See Comments)     GI Upset       Scheduled Meds:  amitriptyline, 10 mg, Nightly  baclofen, 20 mg, BID  [START ON 69/04/1377] folic acid, 1 mg, Daily  heparin (porcine), 5,000 Units, Q8H  meclizine, 25 mg, 3 times per day  midodrine, 15 mg, TID WC  oxyCODONE-acetaminophen, 1 tablet, Q6H  [START ON 11/24/2022] pantoprazole, 40 mg, QAM AC  senna, 1 tablet, Nightly  [START ON 11/24/2022] sertraline, 50 mg, Daily        PRN Meds  bisacodyl, 10 mg, Daily PRN  acetaminophen, 650 mg, Q4H PRN  polyethylene glycol, 17 g, Daily PRN          Social History:  Tobacco/EtOh/Illicits: None  Working History: Had been working prior to the fall  Social supports: Daughter  Home situation: Lives in two-story home      Functional History:  Home DME: None  Premorbid ADL's: Independent  Premorbid Mobility: Independent  Day of Admission: Min to mod assist    Date of Admission: 11/23/2022    Physical Therapy(OBJECTIVE:    Initial Evaluation  Date: 11/07/22 Treatment  11/23/22 Short Term/ Long Term   Goals   AM-PAC 6 Clicks 9/36  *limited due to severe pain and dizziness 14/24     Was pt agreeable to Eval/treatment? Yes Yes     Does pt have pain? Yes 10/10 LBP 0/10 LBP at rest  Pain with movement not quantified      Bed Mobility  Rolling: Max A  Supine to sit: Max x2  Sit to supine: Max x2  Scooting: Max x2 Supine to sit: Min A  Scooting:  Mod A Indep   Transfers Sit to stand: NT  Stand to sit: NT  Stand pivot: NT Sit to stand: ModA from bed  Stand to sit: Celestino  Stand pivot: Celestino with FWW Indep   Ambulation    NT 6 feet with FWW Min A >50 feet Indep   Stair negotiation: ascended and descended  NT NT 14 steps with one rail Supervision   ROM BUE:  R UE AROM limited due to pain  BLE:  B LE AROM WFL BLE: AROM WFL Strength BUE:  Refer to OT  BLE:  4+/5 BLE: grossly 4+/5 4+/5   Balance Sitting EOB:  CGA for safety  Dynamic Standing:  NT Sitting: SBA  Standing: Celestino with [employer] Sitting EOB:  Indep  Dynamic Standing:  Indep      Pt is A & O x 4  Edema:  None noted in BLE  Vitals:   Orthos: Supine 94/63 91bpm, Sitting 88/64 109bpm, Standing 68/46 122bpm        Occupational Therapy(    :Functional Assessment:  AM-PAC Daily Activity Raw Score: 15/24    Initial Eval Status  Date: 11/7/22 Treatment Status  Date:  11/23/22 STGs = LTGs  Time frame: 10-14 days   Feeding Minimal Assist  Mod Indep  Independent    Grooming Minimal Assist  Set up  Seated in chair with support Independent    UB Dressing Minimal Assist   SBA  Assist to tie gown around back   Mod A Rayo brace   seated at EOB,  instructed on technique seated at EOB Independent    LB Dressing Dependent   Mod A  Simulated pants  Using sock aide to rayo socks with cues to recall technique SBA Minimal Assist    Bathing Maximal Assist  UB: Min A  LB: Mod A  Simulated this date, recommending shower chair  Minimal Assist    Toileting Maximal Assist  Min A   Simulated  Minimal Assist    Bed Mobility  Log Roll: Max A  Supine to sit: Max A x 2   Sit to supine: Max A x 2  Min A supine  > sit  Educated pt on technique to increase independence, with increased time & effort, HOB was upright     Supine to sit: Mod Ind   Sit to supine: Mod Ind    Functional Transfers Sit to stand:NT   Stand to sit:NT  Stand pivot: NT  Commode: NT  Min A  Sit < > stand  Cuing for hand placement    Sit to stand: Mod Ind   Stand to sit: Mod Ind  Stand pivot: Mod Ind  Commode:   Mod Ind    Functional Mobility NT  Limited d/t dizziness seated EOB  Min A  With walker, short household distance with very slow pace, cues for posture & walker management, flexed at knees  Mod Ind    Balance Sitting:     Static - SBA     Dynamic - SBA  Standing: NT Sitting: Ind  Standing: Min A  With walker     Sitting:  Static: intact  VII: Facial Motor:  intact  VIII. Hearing:  intact  XI/X: Palate:  intact  XI: Shoulder shrug/SCM:  intact  XII: Tongue:  intact      Neglect testing: No evidence of tactile or visual neglect     Cerebellar:  MILAN's:  intact  F - N:  intact    Sensory:    LT:  intact         Proprioception:  intact    Motor: Tone was  normal    Motor Findings  Strength: Right  Strength: Left    Deltoid  4 4   Biceps  4 4   Triceps  4 4   Wrist Extensors  4 4   FDP 4 4   Finger abductors 4 4   Iliospoas  4 4   Quadriceps  4 4   Tibialis anterior  4 4   EHL 4 4   Gastroc soleus  4 4       DTRs:  Reflex Right Left   Biceps 2+ 2+   Triceps 2+ 2+   Brachioradialis 2+ 2+   Patella 2+ 2+   Achilles  2+ 2+   Babinski Negative Denies polyuria or polydipsia   Clonus Negative Negative   Riley Negative Negative       Balance/Gait:  Gait: Impaired    Laboratory:  Lab Results   Component Value Date    WBC 8.5 11/17/2022    HGB 10.5 (L) 11/17/2022    HCT 33.2 (L) 11/17/2022    MCV 89.5 11/17/2022     (H) 11/17/2022     Lab Results   Component Value Date/Time     11/17/2022 09:15 AM    K 4.0 11/17/2022 09:15 AM    K 3.9 11/08/2022 05:20 AM    CL 93 11/17/2022 09:15 AM    CO2 28 11/17/2022 09:15 AM    BUN 10 11/17/2022 09:15 AM    CREATININE 0.7 11/17/2022 09:15 AM    GLUCOSE 122 11/17/2022 09:15 AM    CALCIUM 10.3 11/17/2022 09:15 AM      Lab Results   Component Value Date    ALT 46 (H) 11/06/2022    AST 43 (H) 11/06/2022    ALKPHOS 194 (H) 11/06/2022    BILITOT 0.3 11/06/2022     Lab Results   Component Value Date/Time    COLORU Yellow 10/17/2022 01:17 PM    NITRU Negative 10/17/2022 01:17 PM    GLUCOSEU Negative 10/17/2022 01:17 PM    KETUA Negative 10/17/2022 01:17 PM    UROBILINOGEN 0.2 10/17/2022 01:17 PM    BILIRUBINUR Negative 10/17/2022 01:17 PM     Hemoglobin A1C   Date Value Ref Range Status   11/06/2022 6.8 (H) 4.0 - 5.6 % Final       Microbiology:   No results for input(s): BC in the last 72 hours.   No results for input(s): Paola Sever in the last 72 hours. No results for input(s): LABURIN in the last 72 hours. No results for input(s): CULTRESP in the last 72 hours. Pertinent Imaging (radiologist interpretation unless otherwise stated): No orders to display       DIAGNOSIS: Multiple spinal fractures  Orthostatic hypotension  Type 2 diabetes mellitus  Diabetic neuropathy  Recent gastric bypass surgery    IMPRESSION/INDIVIDUAL PLAN OF CARE:  Josi Chatterjee is a 45 y.o. right handed female with PMH significant for diabetes and recent gastric bypass, who presented on 11/23/2022 s/p multiple falls with multiple compression fractures, f. Hospital course was /complicated by acute COVID infection and orthostatic hypotension. Patient has impairments in:   Demonstrating , impaired strength, impaired ROM, impaired balance, impaired safety awareness, decreased endurance. Patient has activities limitations in self care, mobility,  and is admitted to [facility] for acute comprehensive rehabilitation. I. Rehabilitation Necessity/Diagnosis:   Medical impact on function as a result of impaired functional mobility, ambulation, bed mobility, transfers, self-care/ADL's, strength, endurance, range of motion/flexibility, coordination and impaired gait/balance. Treatment plan will include a comprehensive rehabilitation program with intense therapies for 3 hours/day, 5 days/week. 1. Physical therapy to address bed mobidility, car and mat transfer, progressive ambulation with use of least restrictive assistive device, optimization of gait biomechanics, truncal strengthening, lower extremity strengthening, coordination, range of motion/flexibility, wheelchair and cushion evaluation, durable medical equipment evaluation, patient and family instruction and endurance training.   2. Occupational therapy to address feeding, grooming, upper and lower body dressing and bathing, toileting, tub/toilet/bed transfers, durable medical equipment evaluation, upper extremity strengthening, range of motion/flexibility, dexterity, coordination and patient and family instruction. 3. Rehabilitation nursing 24 hours per day to monitor bowel and bladder function, work on bowel routine, voiding trials/callahan catheter management as per bladder management protocol, assess falls risk upon admission and periodically thereafter, implement and revise falls prevention strategies, maintain skin integrity through initial and daily pressure sore risk assessment (Zion scale), implement and revise pressure sore prevention strategies, educate patient and family members regarding medication administration, ADL's, transfers, and mobility and continue therapy carryover with ADL's, transfers, and mobility  4. Social work and case management consults for discharge planning/disposition issues, as well as coordination and communication of patient progress between family and providers. 5. Rehab psychology - as needed for adjustment, coping  6. Recreational therapy for community reintegration activities. This patient is sufficiently stable at this time of admission to [facility] to be able to participate in an intensive rehabilitation program described above and requires physician supervision by a rehabilitation physician as outlined below in Medical Management section. II. Medical Management:  # Neuro - s/p severe orthostatic hypotension. She has not responded to midodrine    # Ortho - s/p spinal compression fractures. She still having significant pain requiring high doses of narcotics      # Pulm -maintain adequate oxygenation and pulmonary function    # GI/bowel/FEN -   - Diet: Regular, thin  liquids. - Bowel program: Colace 100 BID, senna 2 tabs nightly PRN,   - GI prophylaxis: Protonix  - Given high prevalence of Vitamin D deficiency in [place]: supplement with ergocalciferol 50K units/week x8 weeks. - MVI daily.       # Pain control - Tylenol PRN for mild pain, Percocet for more severe pain    # DVT prophylaxis - chemoprophylaxis with heparin    # Skin -   - maintain skin integrity. - turns q2H. # Renal/urinary -   - Renal: No evidence of renal failure on last BMP will recheck  - Bladder: check PVR's times three    # Heme -   -  Anemia: Hgb stable will recheck  # Cardiovascular System-address blood pressure and orthostatic hypotension  # ID -continue on isolation for COVID  # Endocrine -maintain adequate control of diabetes    # Disposition/social - likely discharge home, will discuss in team rounds. # Code status: Full Code was discussed with patient    III. Estimated length of stay: 1-2 weeks    IV. Goals -improved to independent with all ADLs and self-care  Improved to independent with transfers and gait  Improved pain control  Improved blood pressure control    V. Anticipated discharge setting: Home    VI. Prognosis:  Rehab Prognosis: Good  Medical Prognosis: Good    CMS Required Post-Admission Physician Evaluation Elements  History and Physical, including medical history, functional history and active comorbidities as in above text. Post -Admission Physician Evaluation comparison with pre-admission screen:  I concur with the preadmission screen   Medication Reconciliation CMS Requirements:  Drug Regimen Review:  Upon admission, a complete drug regimen review identify potential clinically significant medication issues requiring immediate attention by a physician. Medication Intervention:   I am going to consider additional sodium chloride and Florinef for blood pressure control. I will address this if possible with her gastric bypass surgeon. I may also add vitamin D and Miacalcin to try to improve pain control and decrease the necessity for narcotics    Electronically signed by Mars Hanson MD on 11/23/2022 at 4:53 PM       Please note that the above documentation was prepared using voice recognition software. Every attempt was made to ensure accuracy but there may be spelling, grammatical, and contextual errors.

## 2022-11-23 NOTE — DISCHARGE INSTR - COC
Continuity of Care Form    Patient Name: Hiral Gasca   :  1970  MRN:  [id number]    [facility] date:  2022  Discharge date:  2022    Code Status Order: Full Code   Advance Directives:     Admitting Physician:  Oly Stiles MD  PCP: Tex Garcia MD    Discharging Nurse: Trini Osman RN  [address] Unit/Room#: 5237/6602-B  Discharging Unit Phone Number: [phone]    Emergency Contact:   Extended Emergency Contact Information  Primary Emergency Contact: verónica carter  Mobile Phone: [phone]  Relation: Child  Preferred language: English    Past Surgical History:  Past Surgical History:   Procedure Laterality Date    COLPOSCOPY      2001 : outside [address] [place] [address]      2022    IN  DELIVERY+POSTPARTUM CARE      1991 :      IN  DELIVERY+POSTPARTUM CARE      1998 :      IN CONIZATION CERVIX,KNIFE/LASER      2001 : outside [facility]        Immunization History:   Immunization History   Administered Date(s) Administered    COVID-19, J&J, (age 18y+), IM, 0.5 mL 03/10/2021       Active Problems:  Patient Active Problem List   Diagnosis Code    Compression fracture of L2 lumbar vertebra, open, initial encounter ([facility] [address] S32.020B    Bubo I88.8    Compression fracture of L2 lumbar vertebra ([facility] S32.020A       Isolation/Infection:   Isolation            Droplet Plus          Patient Infection Status       Infection Onset Added Last Indicated Last Indicated By Review Planned Expiration Resolved Resolved By    COVID-19 22 Respiratory Panel, Molecular, with COVID-19 (Restricted: peds pts or suitable admitted adults) 22      Resolved    COVID-19 (Rule Out)  11/15/22 11/18/22 COVID-19, Rapid (Ordered)   22 Rule-Out Test Resulted    Exposure 22.             Nurse Assessment:  Last Vital Signs: BP 90/62   Pulse 84   Temp 97.4 °F (36.3 °C) (Tympanic)   Resp 18   Ht 5' 11\" (1.803 m)   Wt 207 lb (93.9 kg)   SpO2 95%   BMI 28.87 kg/m²     Last documented pain score (0-10 scale): Pain Level: 9  Last Weight:   Wt Readings from Last 1 Encounters:   11/22/22 207 lb (93.9 kg)     Mental Status:  oriented and alert    IV Access:  - None    Nursing Mobility/ADLs:  Walking   Assisted  Transfer  Assisted  Bathing  Assisted  Dressing  Assisted  Toileting  Assisted  Feeding  Independent  Med Admin  Assisted  Med Delivery   whole    Wound Care Documentation and Therapy:        Elimination:  Continence: Bowel: yes  Bladder: Yes  Urinary Catheter: None   Colostomy/Ileostomy/Ileal Conduit: No       Date of Last BM: ***    Intake/Output Summary (Last 24 hours) at 11/23/2022 1417  Last data filed at 11/23/2022 0612  Gross per 24 hour   Intake 240 ml   Output --   Net 240 ml     I/O last 3 completed shifts: In: 5 [P.O.:540]  Out: 600 [Urine:600]    Safety Concerns: At Risk for Falls    Impairments/Disabilities:      None    Nutrition Therapy:  Current Nutrition Therapy:   - Oral Diet:  General    Routes of Feeding: Oral  Liquids: Thin Liquids  Daily Fluid Restriction: no  Last Modified Barium Swallow with Video (Video Swallowing Test): not done    Treatments at the Time of Hospital Discharge:   Respiratory Treatments: ***  Oxygen Therapy:  is not on home oxygen therapy.   Ventilator:    - No ventilator support    Rehab Therapies: Physical Therapy  Weight Bearing Status/Restrictions: No weight bearing restrictions  Other Medical Equipment (for information only, NOT a DME order):  walker  Other Treatments: ***    Patient's personal belongings (please select all that are sent with patient):  Socorro Harada, clothing RN SIGNATURE:  Electronically signed by Luci Sepulveda RN on 11/23/22 at 2:26 PM EST    CASE MANAGEMENT/SOCIAL WORK SECTION    Inpatient Status Date: ***    Readmission Risk Assessment Score:  Readmission Risk              Risk of Unplanned Readmission:  15           Discharging to Facility/ Agency   Name: Address:  Phone:  Fax:    Dialysis Facility (if applicable)   Name:  Address:  Dialysis Schedule:  Phone:  Fax:    / signature: {Esignature:[id number]}    PHYSICIAN SECTION    Prognosis: {Prognosis:7978038994}    Condition at Discharge: Santana Magana Patient Condition:[id number]}    Rehab Potential (if transferring to Rehab): {Prognosis:3208866018}    Recommended Labs or Other Treatments After Discharge: ***    Physician Certification: I certify the above information and transfer of Lucy Askew  is necessary for the continuing treatment of the diagnosis listed and that she requires {Admit to Appropriate Level of Care:84568} for {GREATER/LESS:980000941} 30 days.      Update Admission H&P: {CHP DME Changes in PWUUL:009040782}    PHYSICIAN SIGNATURE:  {Esignature:[id number]}

## 2022-11-23 NOTE — LETTER
[address]  Phone: [phone]        December 2, 2022     Patient: Mandy Conklin   YOB: 1970   Date of Visit: 11/05/2022       To Whom It May Concern:    Heron Ortiz is currently a patient on the Acute Rehab Unit. She was admitted to the Emergency Room on 11/5/2022. She is receiving physical, occupational, and speech therapy as well as rehab nursing. Her length of stay is undetermined at this time. If you have any questions or concerns, please don't hesitate to call [phone]. Sincerely,        . DAVIAN Kimble  Acute Rehab Social Work  [address]

## 2022-11-23 NOTE — CARE COORDINATION
DR. Sheeba Perales from [facility] AT 11:40 AM. Await outcome. Per Dr. Trinh Covert note today, Patient is currently in isolation for COVID-19. I am attempting to get her pre-CERT for acute rehab. The biggest issue is her orthostatic hypotension. She is already on a high dose of midodrine. We may have to look at adding sodium chloride and/or Florinef. I will address that in the pre-CERT today. If AR denied, Plan will be home with daughter and [facility] when medically ready. Home health care orders are in. Patient's daughter will transport her home at time of discharge if home is the plan.   Deb Chau RN cm [phone]

## 2022-11-23 NOTE — PROGRESS NOTES
Dr. Manuela Kumar did Peer to Peer with her insurance. She was approved for 7 days and will admit today. She will be admitted to room 5517-B. She does not need a rapid test as she was already positive on 11/18/2022. She will admit to ARU in isolation. We are asking if she could have a PCR test to confirm.  Evelyn kaufman.    Abdullahi Eyl RN  ARU Pre-screener/  [phone]

## 2022-11-23 NOTE — PROGRESS NOTES
Met with patient at bedside. She is aware that she was approved to come to ARU later today. She is agreeable to the 3 hours of therapy a day. She will admit to room 5517-B.    Jey Kumar RN  ARU Pre-screener/  [phone]

## 2022-11-23 NOTE — CARE COORDINATION
Discharge order noted. Per Surjit Alex from [address] [place] [address] note today, Dr. Herson Up did Peer to Peer with her insurance. She was approved for 7 days and will admit today. She will be admitted to room 5517-B. She does not need a rapid test as she was already positive on 11/18/2022. She will admit to ARU in isolation. We are asking if she could have a PCR test to confirm. PCR Covid-19 test ordered. Anette from [facility] notified.   West Pickard RN   [phone]

## 2022-11-23 NOTE — PROGRESS NOTES
Physical Therapy  Treatment Note    Name: Cesar Millan  : 1970  MRN: [id number]      Date of Service: 2022    Evaluating PT:  Ceci Chesaning, PT, DPT [id number]    Room #:  8919/4170-P  Diagnosis:  Joseluis Nip [I88.8]  Compression fracture of L2 lumbar vertebra, open, initial encounter ([facility] [S32.020B]  Compression fracture of L2 vertebra, initial encounter ([facility] [R61.970R]  PMHx/PSHx:    Past Medical History:   Diagnosis Date    Anemia     Diabetes mellitus ([facility]     Papanicolaou smear of cervix with atypical squamous cells of undetermined significance (ASC-US)          Screening for human papillomavirus (HPV)     4/10/07      Precautions:  Fall risk, L2, L3, L5 compression fx- LSO for comfort, Orthostatic Hypotension, COVID+  Equipment Needs:  TBD    SUBJECTIVE:    Pt lives with daughter in a two story home with one stairs to enter and no rail. Bed is on second floor and bath is on second floor with 13 steps and one rail to access. Pt ambulated without AD PTA and was fully independent. Equipment Owned:    OBJECTIVE:   Initial Evaluation  Date: 22 Treatment  22 Short Term/ Long Term   Goals   AM-PAC 6 Clicks   *limited due to severe pain and dizziness     Was pt agreeable to Eval/treatment? Yes Yes    Does pt have pain? Yes 10/10 LBP 0/10 LBP at rest  Pain with movement not quantified     Bed Mobility  Rolling: Max A  Supine to sit: Max x2  Sit to supine: Max x2  Scooting: Max x2 Supine to sit: Min A  Scooting:  Mod A Indep   Transfers Sit to stand: NT  Stand to sit: NT  Stand pivot: NT Sit to stand: ModA from bed  Stand to sit: Celestino  Stand pivot: Celestino with FWW Indep   Ambulation    NT 6 feet with FWW Min A >50 feet Indep   Stair negotiation: ascended and descended  NT NT 14 steps with one rail Supervision   ROM BUE:  R UE AROM limited due to pain  BLE:  B LE AROM WFL BLE: AROM WFL    Strength BUE:  Refer to OT  BLE:  4+/5 BLE: grossly 4+/5 4+/5   Balance Sitting EOB:  CGA for safety  Dynamic Standing:  NT Sitting: SBA  Standing: Celestino with [address] Sitting EOB:  Indep  Dynamic Standing:  Indep     Pt is A & O x 4  Edema:  None noted in BLE  Vitals:   Orthos: Supine 94/63 91bpm, Sitting 88/64 109bpm, Standing 68/46 122bpm     Therapeutic Exercises:    1 sets of 10 reps-  Seated marching with AAROM utilizing towel and UE assist for R LE  Isometric hip adduction hold 3s  Glute sets hold 3s  TA contractions hold 3s    Patient education  Pt educated on bed mobility, transfer technique, brace management, proper use of walker, positioning in bed. Patient response to education:   Pt verbalized understanding Pt demonstrated skill Pt requires further education in this area   Yes With assist Yes     ASSESSMENT:    Comments:  Patient deemed medically stable prior to start of session. Patient was semi supine in bed upon arrival. No pain at rest. LSO donned with assist of patient prior to mobility. Symptoms of pain but dizziness denied. Continual need for significantly increased time to complete all tasks and distances. Continued deficits during ambulation include: mild increase in trunk flexion, poor proximity to FWW and foot slide during swing phase of R LE. Persisting cognitive fog and very slow processing with sequencing this date. Maximal verbal cues needed throughout to complete multi step tasks. Seated there ex introduced to prevent onset of atrophy and to improve core recruitment. Patient left in chair position with call light in reach and all needs met. Skilled positioning for comfort. Treatment:  Patient practiced and was instructed in the following treatment:    Transfer training: verbal cues for hand placement during sit to stand transfer and assistance for anterior weight shift in order to facilitate initiation of the stand.    Standing balance activities: performed static and dynamic standing balance activities within AD with single and BUE support in order to progress safety with standing activities as well as improve independence with standing ADLs. Gait training: verbal cues for sequencing and safety, verbal cues for appropriate step length and positioning within walker with dynamic activities, physical assist for walker management, and steadying   Skilled repositioning in upright chair to promote improved posture and improved cardiorespiratory function and lumbar support. Pt positioned with bed in semi-chair position to enhance skin/joint protection. PLAN:    Patient is making good progress towards established goals. Will continue with current POC.       Time in  1110  Time out  1150    Total Treatment Time  40 minutes     CPT codes:  [] Gait training 97453 0 minutes  [] Manual therapy 89205 0 minutes  [x] Therapeutic activities 34987 40 minutes  [] Therapeutic exercises 85682 0 minutes  [] Neuromuscular reeducation 12942 0 minutes    Brooks Foster PT, DPT  License number:  [id number]

## 2022-11-23 NOTE — LETTER
PORTABLE PATIENT PROFILE  Diogenes Alejandra  [id number]    MEDICAL DIAGNOSIS/CONDITION:   Patient Active Problem List   Diagnosis    Compression fracture of L2 lumbar vertebra, open, initial encounter ([facility]    Bubo    Compression fracture of L2 lumbar vertebra ([facility]    Compression fracture of L2 lumbar vertebra, closed, initial encounter ([facility]    Malnutrition following gastrointestinal surgery    Syncope and collapse    Orthostatic hypotension    Excessive weight loss       INSURANCE INFORMATION:  Pay[employer]: [employer] /  /  /     ADVANCED DIRECTIVES:      [unfilled]     EMERGENCY CONTACT:       RISK FACTORS:   Social History     Tobacco Use    Smoking status: Former    Smokeless tobacco: Never    Tobacco comments:     Quit smokin2002   Substance Use Topics    Alcohol use: Not on file       ALLERGIES:  Allergies   Allergen Reactions    Colchicine     Darvon [Propoxyphene] Other (See Comments)     GI Upset       IMMUNIZATIONS:  Immunization History   Administered Date(s) Administered    COVID-19, J&J, (age 18y+), IM, 0.5 mL 03/10/2021       SWALLOWING:        VISION AND HEARING:   Sensory Problems  Visual impairment: Glasses    PHYSICIANS INVOLVED WITH CARE:    Joesph Chun MD  No ref.  provider found  MD Joesph Fierro MD

## 2022-11-23 NOTE — PLAN OF CARE
Problem: Pain  Goal: Verbalizes/displays adequate comfort level or baseline comfort level  11/23/2022 1421 by Surya Vu RN  Outcome: Completed  11/23/2022 1144 by Surya Vu RN  Outcome: Progressing     Problem: ABCDS Injury Assessment  Goal: Absence of physical injury  11/23/2022 1421 by Surya Vu RN  Outcome: Completed  11/23/2022 1144 by Surya Vu RN  Outcome: Progressing     Problem: Safety - Adult  Goal: Free from fall injury  11/23/2022 1421 by Surya Vu RN  Outcome: Completed  11/23/2022 1144 by Surya Vu RN  Outcome: Progressing     Problem: Chronic Conditions and Co-morbidities  Goal: Patient's chronic conditions and co-morbidity symptoms are monitored and maintained or improved  11/23/2022 1421 by Surya Vu RN  Outcome: Completed  11/23/2022 1144 by Surya Vu RN  Outcome: Progressing     Problem: Skin/Tissue Integrity  Goal: Absence of new skin breakdown  Description: 1. Monitor for areas of redness and/or skin breakdown  2. Assess vascular access sites hourly  3. Every 4-6 hours minimum:  Change oxygen saturation probe site  4. Every 4-6 hours:  If on nasal continuous positive airway pressure, respiratory therapy assess nares and determine need for appliance change or resting period.   11/23/2022 1421 by Surya Vu RN  Outcome: Completed  11/23/2022 1144 by Surya Vu RN  Outcome: Progressing

## 2022-11-23 NOTE — PROGRESS NOTES
Occupational Therapy  OT BEDSIDE TREATMENT NOTE   [address] [place] [address] [address]  [address] [place] [address]       NSYB:  Patient Name: Yary Prajapati  MRN: [id number]  : 1970  Room: [address] [place] [address]-A     Per OT Eval:    Evaluating OT: Stevie Rodriguez, [address] OTR/L; [id number]       Referring Provider: Rusty Mistry MD    Specific Provider Orders/Date: OT Eval and Treat 22       Diagnosis: Compression fracture of L2 lumbar vertebra, open, initial encounter  Bubo     Surgery: none this admission     Pertinent Medical History:  has a past medical history of Anemia, Diabetes mellitus ([facility] Papanicolaou smear of cervix with atypical squamous cells of undetermined significance (ASC-US), and Screening for human papillomavirus (HPV).        Reason for admission: fell backwards at home d/t dizziness, LOC     Recommended Adaptive Equipment: TBD pending progression - BSC, extended tub bench, AE for LE bathing and dressing PRN      Precautions:  Fall Risk, Bed Alarm, LSO (for comfort post L2,L3,L5 compression fx), Orthostatic Hypotension, Spinal Neutrality       Assessment of current deficits:    [x] Functional mobility            [x]ADLs           [x] Strength                  [x]Cognition    [x] Functional transfers          [x] IADLs         [x] Safety Awareness   [x]Endurance    [x] Fine Coordination                         [x] Balance      [] Vision/perception   []Sensation      []Gross Motor Coordination             [] ROM           [] Delirium                   [] Motor Control      OT PLAN OF CARE   OT POC based on physician orders, patient diagnosis and results of clinical assessment     Frequency/Duration; 3-5 days/wk for 2 weeks PRN   Specific OT Treatment Interventions to include:   * Instruction/training on adapted ADL techniques and AE recommendations to increase functional independence within precautions       * Training on energy conservation strategies, correct breathing pattern and techniques to improve independence/tolerance for self-care routine  * Functional transfer/mobility training/DME recommendations for increased independence, safety, and fall prevention  * Patient/Family education to increase follow through with safety techniques and functional independence  * Recommendation of environmental modifications for increased safety with functional transfers/mobility and ADLs  * Therapeutic exercise to improve motor endurance, ROM, and functional strength for ADLs/functional transfers  * Therapeutic activities to facilitate/challenge dynamic balance, stand tolerance for increased safety and independence with ADLs     Home Living: Pt lives with daughter in 2 story home with 1 step and 0HR to enter. Bed and Bath located n 2nd floor with full flight of steps (7+3) and 1HR to access. 1/2 bath on 1st floor.    Bathroom setup: tub/shower unit   Equipment owned: none     Prior Level of Function: IND with ADLs , IND with IADLs; ambulated with no AD prior  Driving: yes  Occupation: full time  (travels/drives to homes)     Pain Level: 9.5 low back pain, pain meds provided     Cognition: A&O: 4/4; Follows 1-2 step directions, pleasant & cooperative but mod encouragement for participation due to c/o low back pain               Memory: fair appeared to have decreased recall with instruction from prior therapy sessions               Sequencing:  fair (repeated cues needed with sequencing during tasks)              Problem solving:  fair              Judgement/safety:  fair                Functional Assessment:  AM-PAC Daily Activity Raw Score: 15/24    Initial Eval Status  Date: 11/7/22 Treatment Status  Date:  11/23/22 STGs = LTGs  Time frame: 10-14 days   Feeding Minimal Assist  Mod Indep  Independent    Grooming Minimal Assist  Set up  Seated in chair with support Independent    UB Dressing Minimal Assist   SBA  Assist to tie gown around back   Mod A Rayo brace   seated at EOB,  instructed on technique seated at EOB Independent    LB Dressing Dependent   Mod A  Simulated pants  Using sock aide to rayo socks with cues to recall technique SBA Minimal Assist    Bathing Maximal Assist  UB: Min A  LB: Mod A  Simulated this date, recommending shower chair  Minimal Assist    Toileting Maximal Assist  Min A   Simulated  Minimal Assist    Bed Mobility  Log Roll: Max A  Supine to sit: Max A x 2   Sit to supine: Max A x 2  Min A supine  > sit  Educated pt on technique to increase independence, with increased time & effort, HOB was upright    Supine to sit: Mod Ind   Sit to supine: Mod Ind    Functional Transfers Sit to stand:NT   Stand to sit:NT  Stand pivot: NT  Commode: NT  Min A  Sit < > stand  Cuing for hand placement   Sit to stand: Mod Ind   Stand to sit: Mod Ind  Stand pivot: Mod Ind  Commode: Mod Ind    Functional Mobility NT  Limited d/t dizziness seated EOB  Min A  With walker, short household distance with very slow pace, cues for posture & walker management, flexed at knees  Mod Ind    Balance Sitting:     Static - SBA     Dynamic - SBA  Standing: NT Sitting: Ind  Standing: Min A  With walker     Sitting:  Static: ind  Dynamic: ind  Standing: mod ind   Activity Tolerance FAIR  Limited d/t dizziness and pain  Fair  Light tasks due to pain  GOOD   Visual/  Perceptual Glasses: yes - reading          Safety Fair  Educated on spinal neutrality/precautions  Fair-  Decreased recall or carryover of instructions  Good   during ADL completion following spinal percautions   Vitals BP sitting EOB: 73/38  BP supine: 86/58     Pt symptomatic/dizzy seated EOB required return supine for safety          Raul hose in place prior to session            Comments: Upon arrival pt was in bed & agreeable for therapy.  Pt educated on adaptive techniques & use of AE to increase independence and safety during ADL's, bed mobility, and functional transfers while maintaining spinal precautions. At end of session pt was seated in chair, agreeable to stay up for an hour, all lines and tubes intact, call light within reach. Nsg aware pt was up in chair. Pt has made Fair progress towards set goals. Continue with current plan of care    Treatment Time In: 11:10    Treatment Time Out: 11:50          Treatment Charges: Mins Units   Ther Ex  [id number]     Manual Therapy [id number]     Thera Activities 98600 30 2   ADL/Home Mgt 28375 10 1   Neuro Re-ed 36671     Group Therapy      Orthotic manage/training  85233     Non-Billable Time     Total Timed Treatment 40 3       Sudha BARRON  [address] [place] [address], [address] [place] [address]

## 2022-11-23 NOTE — DISCHARGE SUMMARY
Hospitalist Discharge Summary    Patient ID: Laurina Frankel   Patient : 1970  Patient's PCP: Rajendra Damon MD    Admit Date: 2022   Admitting Physician: Lola Puga MD    Discharge Date:  2022   Discharge Physician: Travis Leone MD   Discharge Condition: Stable  Discharge Disposition: Molly Figueroa course in brief:  (Please refer to daily progress notes for a comprehensive review of the hospitalization by requesting medical records)  75-year-old female with past medical history of diabetes mellitus, gastric sleeve surgery, chronic anemia who presented with back pain after a syncopal fall. On arrival to the emergency department, CT spine showed acute compression fracture of L2, L3, and L5. She was evaluated by neurosurgeon who recommended conservative management with LSO brace, pain management. CT head was negative for acute intracranial abnormality; CTA head and neck was negative for critical stenosis or LVO but it revealed a 3 mm x 6 mm x 4 mm focus of mucosal versus soft tissue nodule along the right lateral wall of the subglottic trachea. Patient was evaluated by neurologist for suspected orthostatic hypotension secondary to diabetic autonomic neuropathy; work-up in progress for occipital neuralgia with significant bilateral sternocleidomastoid muscle tension, she was started on baclofen. Patient unfortunately got exposed to COVID-19 from a roommate; she had been isolated since but repeat SARS-CoV-2 screen on  returned positive. Consulted PT/OT. Recommend acute rehab. Continue midodrine given orthostatic hypotension. Patient was deemed stable for DC to Acute rehab.         Consults:   IP CONSULT TO HOSPITALIST  IP CONSULT TO NEUROSURGERY  INPATIENT CONSULT TO ORTHOTIST/PROSTHETIST  TEST MOCK CON71  IP CONSULT TO NEUROLOGY  IP CONSULT TO PHYSICAL MEDICINE REHAB    Discharge Diagnoses:  Syncope - likely secondary to orthostatic hypotension related to diabetic autonomic neuropathy. MRI brain was negative for acute intracranial abnormality   Acute compression fractures - involves L2, L3, and L5 vertebral bodies  Hypotension - associated with orthostatic changes  Occipital neuralgia  COVID-19 infection  Type 2 diabetes mellitus  Anemia of chronic disease  Thrombocytosis  Physical deconditioning      Discharge Instructions / Follow up: Follow-up with PCP within 1 week of discharge. Follow-up with consultants as indicated by them. Compliance with medications as prescribed on discharge. Future Appointments   Date Time Provider Manav Otto   12/1/2022  3:20 Edel Brown MD [facility]   12/8/2022 11:00 AM SHAMA Villasenor [facility]       The patient's condition is stable. At this time the patient is without objective evidence of an acute process requiring continuing hospitalization or inpatient management. They are stable for discharge with outpatient follow-up. I have spoken with the patient and discussed the results of the current hospitalization, in addition to providing specific details for the plan of care and counseling regarding the diagnosis and prognosis. The plan has been discussed in detail and they are aware of the specific conditions for emergent return, as well as the importance of follow-up. Their questions are answered at this time and they are agreeable with the plan for discharge to ARU. Continued appropriate risk factor modification of blood pressure, diabetes and serum lipids will remain essential to reducing risk of future atherosclerotic development    Activity: activity as tolerated    Physical exam:  General appearance: No apparent distress, appears stated age and cooperative. HEENT: Conjunctivae/corneas clear. Mucous membranes moist.  Neck: Supple. No JVD. Respiratory:  Clear to auscultation bilaterally. Normal respiratory effort. Cardiovascular:  RRR. S1, S2 without MRG. PV: Pulses palpable. No edema. Abdomen: Soft, non-tender, non-distended. +BS  Musculoskeletal: No obvious deformities. Decreased ROM in the trunk due to pain. No tenderness elicited  Skin: Normal skin color. No rashes or lesions. Good turgor. Neurologic:  Grossly non-focal. Awake, alert, following commands. Psychiatric: Alert and oriented, thought content appropriate, normal insight and judgement    Significant labs:  CBC:   No results for input(s): WBC, RBC, HGB, HCT, MCV, RDW, PLT in the last 72 hours. BMP: No results for input(s): NA, K, CL, CO2, BUN, CREATININE, CA, MG, PHOS in the last 72 hours. LFT:  No results for input(s): PROT, ALB, ALKPHOS, ALT, AST, BILITOT, AMYLASE, LIPASE in the last 72 hours. PT/INR: No results for input(s): INR, APTT in the last 72 hours. BNP: No results for input(s): BNP in the last 72 hours. Hgb A1C:   Lab Results   Component Value Date    LABA1C 6.8 (H) 11/06/2022     Folate and B12:   Lab Results   Component Value Date    ZQSRGCMJ93 7542 (H) 11/14/2022   , No results found for: FOLATE  Thyroid Studies:   Lab Results   Component Value Date    TSH 1.600 11/02/2022       Urinalysis:    Lab Results   Component Value Date/Time    NITRU Negative 10/17/2022 01:17 PM    WBCUA NONE 10/17/2022 01:17 PM    BACTERIA NONE SEEN 10/17/2022 01:17 PM    RBCUA 10-20 10/17/2022 01:17 PM    BLOODU MODERATE 10/17/2022 01:17 PM    SPECGRAV <=1.005 10/17/2022 01:17 PM    GLUCOSEU Negative 10/17/2022 01:17 PM       Imaging:  XR CHEST (2 VW)    Result Date: 11/5/2022  EXAMINATION: TWO XRAY VIEWS OF THE CHEST 11/5/2022 12:38 pm COMPARISON: None. HISTORY: ORDERING SYSTEM PROVIDED HISTORY: cough TECHNOLOGIST PROVIDED HISTORY: Reason for exam:->cough What reading provider will be dictating this exam?->CRC FINDINGS: The lungs are without acute focal process. There is no effusion or pneumothorax. The cardiomediastinal silhouette is without acute process. The osseous structures are without acute process.      No acute process. CT HEAD WO CONTRAST    Result Date: 11/6/2022  EXAMINATION: CTA OF THE NECK; CT OF THE HEAD WITHOUT CONTRAST; CTA OF THE HEAD WITH CONTRAST 11/6/2022 7:27 pm TECHNIQUE: CTA of the neck was performed with the administration of intravenous contrast. Multiplanar reformatted images are provided for review. MIP images are provided for review. Stenosis of the internal carotid arteries measured using NASCET criteria. Automated exposure control, iterative reconstruction, and/or weight based adjustment of the mA/kV was utilized to reduce the radiation dose to as low as reasonably achievable.; CT of the head was performed without the administration of intravenous contrast. Automated exposure control, iterative reconstruction, and/or weight based adjustment of the mA/kV was utilized to reduce the radiation dose to as low as reasonably achievable.; CTA of the head/brain was performed with the administration of intravenous contrast. Multiplanar reformatted images are provided for review. MIP images are provided for review. Automated exposure control, iterative reconstruction, and/or weight based adjustment of the mA/kV was utilized to reduce the radiation dose to as low as reasonably achievable. COMPARISON: CT head 10/17/2022 HISTORY: ORDERING SYSTEM PROVIDED HISTORY: dizziness, syncope. carotid plaque TECHNOLOGIST PROVIDED HISTORY: Reason for exam:->dizziness, syncope. carotid plaque Has a \"code stroke\" or \"stroke alert\" been called? ->No What reading provider will be dictating this exam?->CRC FINDINGS: CT HEAD: No acute intracranial hemorrhage or mass effect. No CT evidence of large acute infarct. Minimal chronic microvascular ischemic changes. No evidence of hydrocephalus. No acute calvarial fracture. Small right maxillary sinus mucous retention cyst. CTA NECK: Aortic arch: The brachiocephalic artery and the left common carotid artery share a common origin.   Calcified plaque Common carotids: No significant stenosis. Retropharyngeal course on the right. Internal carotids: Less than 50% stenosis of the bilateral cervical ICA, secondary to soft and calcified plaque. Vertebrals: The cervical vertebral arteries demonstrate no high-grade stenosis, occlusion, or dissection. Calcified plaque at the origins bilaterally. 11 mm right thyroid nodule. Degenerative changes of the spine. 3 mm x 6 mm x 4 mm mucus versus nodule along the right lateral wall of the subglottic trachea (series 304 image 95 and series 607, image 67). Small right maxillary sinus mucous retention cyst.  Age-indeterminate mild height loss of T1 secondary to Schmorl's node. There are a few missing teeth. CTA HEAD: ANTERIOR CIRCULATION: Intracranial ICA: No high-grade stenosis or occlusion of the intracranial internal carotid arteries. MCA: No flow-limiting stenosis. ART: No flow-limiting stenosis. POSTERIOR CIRCULATION: Intradural vertebral arteries: No flow-limiting stenosis. Basilar artery: No flow-limiting stenosis. PCA: No flow-limiting stenosis. Small right P1 segment due to prominent posterior communicating artery. Dural sinuses: The major dural venus sinuses are grossly patent. CT Head: No acute intracranial hemorrhage or mass effect. No CT evidence of large acute infarct. CTA Neck: No hemodynamically significant stenosis or dissection of the cervical internal carotid arteries. No high-grade stenosis or dissection of the cervical vertebral arteries. CTA Head: No large vessel arterial occlusion or high-grade stenosis. 3 mm x 6 mm x 4 mm focus of mucus versus soft tissue nodule along the right lateral wall of the subglottic trachea. To exclude neoplasm, recommend nonemergent follow-up CT neck with contrast versus direct visualization. RECOMMENDATION: 1.1 cm incidental right thyroid nodule. No follow-up imaging is recommended. Reference: J Am Dean Radiol.  2015 Feb;12(2): 143-50     CT SOFT TISSUE NECK W CONTRAST    Result Date: 11/11/2022  EXAMINATION: CT OF THE NECK SOFT TISSUE WITH CONTRAST  11/11/2022 TECHNIQUE: CT of the neck was performed with the administration of intravenous contrast. Multiplanar reformatted images are provided for review. Automated exposure control, iterative reconstruction, and/or weight based adjustment of the mA/kV was utilized to reduce the radiation dose to as low as reasonably achievable. COMPARISON: CT soft tissue neck performed 11/06/2022. HISTORY: ORDERING SYSTEM PROVIDED HISTORY: subglottic tracheal mass on prior CT TECHNOLOGIST PROVIDED HISTORY: Reason for exam:->subglottic tracheal mass on prior CT What reading provider will be dictating this exam?->CRC FINDINGS: PHARYNX/LARYNX:  The palatine tonsils are normal in appearance. The tongue is normal in appearance. The valleculae, epiglottis, aryepiglottic folds and pyriform sinuses appear unremarkable. The previously visualized soft tissue focus along the right tracheal wall in the subglottic region is not seen on the current exam and likely represented mucous. The true and false vocal cords are normal in appearance. No mass or abscess is seen. SALIVARY GLANDS/THYROID:  The parotid and submandibular glands appear unremarkable. The thyroid gland appears unremarkable. LYMPH NODES:  No cervical or supraclavicular lymphadenopathy is seen. SOFT TISSUES:  No appreciable soft tissue swelling or mass is seen. BRAIN/ORBITS/SINUSES:  The visualized portion of the intracranial contents appear unremarkable. The visualized portion of the orbits, paranasal sinuses and mastoid air cells demonstrate no acute abnormality. LUNG APICES/SUPERIOR MEDIASTINUM:  No focal consolidation is seen within the visualized lung apices. No superior mediastinal lymphadenopathy or mass. The visualized portion of the trachea appears unremarkable. BONES:  No aggressive appearing lytic or blastic bony lesion. No acute abnormality of the soft tissue structures of the neck. Previously described soft tissue focus along the right tracheal wall in the subglottic region is not seen on the current exam.     CT THORACIC SPINE WO CONTRAST    Result Date: 11/5/2022  EXAMINATION: CT OF THE THORACIC SPINE WITHOUT CONTRAST  11/5/2022 2:18 pm: TECHNIQUE: CT of the thoracic spine was performed without the administration of intravenous contrast. Multiplanar reformatted images are provided for review. Automated exposure control, iterative reconstruction, and/or weight based adjustment of the mA/kV was utilized to reduce the radiation dose to as low as reasonably achievable. COMPARISON: None. HISTORY: ORDERING SYSTEM PROVIDED HISTORY: fall back pain TECHNOLOGIST PROVIDED HISTORY: Reason for exam:->fall back pain What reading provider will be dictating this exam?->CRC FINDINGS: Focal depression involving superior endplate of T1 related to fracture of uncertain chronicity. Satisfactory alignment of the thoracic spine. No osseous central canal stenosis. Moderate right neural foraminal narrowing at T4/T5. Focal depression involving superior endplate of T1 related to fracture of uncertain chronicity. CT LUMBAR SPINE WO CONTRAST    Result Date: 11/5/2022  EXAMINATION: CT OF THE LUMBAR SPINE WITHOUT CONTRAST  11/5/2022 TECHNIQUE: CT of the lumbar spine was performed without the administration of intravenous contrast. Multiplanar reformatted images are provided for review. Adjustment of mA and/or kV according to patient size was utilized. Automated exposure control, iterative reconstruction, and/or weight based adjustment of the mA/kV was utilized to reduce the radiation dose to as low as reasonably achievable. COMPARISON: CT abdomen and pelvis 10/17/2022.  HISTORY: ORDERING SYSTEM PROVIDED HISTORY: fall lower back pain TECHNOLOGIST PROVIDED HISTORY: Reason for exam:->fall lower back pain Decision Support Exception - unselect if not a suspected or confirmed emergency medical condition->Emergency Medical Condition (MA) What reading provider will be dictating this exam?->CRC FINDINGS: There is mild posterior malalignment of L5 on S1. This finding is unchanged. Disc space narrowing and discovertebral degenerative changes are identified. Facet arthritis is also seen. There is bony spinal canal stenosis, greatest at L2-3 and L3-4. There are new superior vertebral body compression deformities at L2, L3, and L5. No significant retropulsion or post-traumatic bony spinal canal stenosis is seen at these levels. There is a stable upper endplate defect most consistent with a Schmorl node at L4 on the left. The paraspinal soft tissues appear unremarkable. Stable mild vertebral malalignment. Disc space narrowing, discovertebral degenerative changes, and facet arthritis with bony spinal canal stenosis. New superior vertebral body compression deformities at L2, L3, and L5.     CT ABDOMEN PELVIS W IV CONTRAST Additional Contrast? None    Result Date: 11/5/2022  EXAMINATION: CT OF THE ABDOMEN AND PELVIS WITH CONTRAST 11/5/2022 2:18 pm TECHNIQUE: CT of the abdomen and pelvis was performed with the administration of intravenous contrast. Multiplanar reformatted images are provided for review. Automated exposure control, iterative reconstruction, and/or weight based adjustment of the mA/kV was utilized to reduce the radiation dose to as low as reasonably achievable. COMPARISON: October 17, 2022 HISTORY: ORDERING SYSTEM PROVIDED HISTORY: fall abdominal pain syncope TECHNOLOGIST PROVIDED HISTORY: Additional Contrast?->None Reason for exam:->fall abdominal pain syncope Decision Support Exception - unselect if not a suspected or confirmed emergency medical condition->Emergency Medical Condition (MA) What reading provider will be dictating this exam?->CRC FINDINGS: No bowel obstruction, free air, or free fluid. Large amount of stool throughout the colon.   There is a ventral abdominal wall hernia containing segments of large bowel and mesenteric fat. Hernia measures 5.2 x 7.5 cm and appears similar in size compared to prior. No obstruction or strangulation at site of the hernia. The appendix is visualized and appears unremarkable. No intrahepatic or extrahepatic bile duct dilatation. Gallbladder is unremarkable. Pancreas is unremarkable. Stable mild splenomegaly. Redemonstration of multiple prominent and mildly enlarged lymph nodes at level of the amelia hepatis measuring up to 2.2 x 1.6 cm. Adrenal glands are unremarkable. No hydronephrosis or perinephric edema. New compression fractures are seen involving superior endplates of L2, L3, and L5. Stable depressed fracture involving superior endplate of L4.     1. New depressed fractures involving superior endplates of L2, L3, and L5. Chronic depressed fracture involving superior endplate of L4. 2. No acute process in the abdomen or pelvis. Stable ventral abdominal wall hernia containing mesenteric fat and segment of large bowel. 3. Stable mild splenomegaly. XR CHEST PORTABLE    Result Date: 11/14/2022  EXAMINATION: ONE XRAY VIEW OF THE CHEST 11/13/2022 9:11 pm COMPARISON: 5 November 2022 HISTORY: ORDERING SYSTEM PROVIDED HISTORY: Diminished breath sounds TECHNOLOGIST PROVIDED HISTORY: Reason for exam:->Diminished breath sounds What reading provider will be dictating this exam?->CRC FINDINGS: The lungs are without acute focal process. There is no effusion or pneumothorax. The cardiomediastinal silhouette is without acute process. The osseous structures are without acute process. No acute process. CTA NECK W CONTRAST    Result Date: 11/6/2022  EXAMINATION: CTA OF THE NECK; CT OF THE HEAD WITHOUT CONTRAST; CTA OF THE HEAD WITH CONTRAST 11/6/2022 7:27 pm TECHNIQUE: CTA of the neck was performed with the administration of intravenous contrast. Multiplanar reformatted images are provided for review. MIP images are provided for review.  Stenosis of the internal carotid arteries measured using NASCET criteria. Automated exposure control, iterative reconstruction, and/or weight based adjustment of the mA/kV was utilized to reduce the radiation dose to as low as reasonably achievable.; CT of the head was performed without the administration of intravenous contrast. Automated exposure control, iterative reconstruction, and/or weight based adjustment of the mA/kV was utilized to reduce the radiation dose to as low as reasonably achievable.; CTA of the head/brain was performed with the administration of intravenous contrast. Multiplanar reformatted images are provided for review. MIP images are provided for review. Automated exposure control, iterative reconstruction, and/or weight based adjustment of the mA/kV was utilized to reduce the radiation dose to as low as reasonably achievable. COMPARISON: CT head 10/17/2022 HISTORY: ORDERING SYSTEM PROVIDED HISTORY: dizziness, syncope. carotid plaque TECHNOLOGIST PROVIDED HISTORY: Reason for exam:->dizziness, syncope. carotid plaque Has a \"code stroke\" or \"stroke alert\" been called? ->No What reading provider will be dictating this exam?->CRC FINDINGS: CT HEAD: No acute intracranial hemorrhage or mass effect. No CT evidence of large acute infarct. Minimal chronic microvascular ischemic changes. No evidence of hydrocephalus. No acute calvarial fracture. Small right maxillary sinus mucous retention cyst. CTA NECK: Aortic arch: The brachiocephalic artery and the left common carotid artery share a common origin. Calcified plaque Common carotids: No significant stenosis. Retropharyngeal course on the right. Internal carotids: Less than 50% stenosis of the bilateral cervical ICA, secondary to soft and calcified plaque. Vertebrals: The cervical vertebral arteries demonstrate no high-grade stenosis, occlusion, or dissection. Calcified plaque at the origins bilaterally. 11 mm right thyroid nodule. Degenerative changes of the spine.   3 mm x 6 mm x 4 mm mucus versus nodule along the right lateral wall of the subglottic trachea (series 304 image 95 and series 607, image 67). Small right maxillary sinus mucous retention cyst.  Age-indeterminate mild height loss of T1 secondary to Schmorl's node. There are a few missing teeth. CTA HEAD: ANTERIOR CIRCULATION: Intracranial ICA: No high-grade stenosis or occlusion of the intracranial internal carotid arteries. MCA: No flow-limiting stenosis. ART: No flow-limiting stenosis. POSTERIOR CIRCULATION: Intradural vertebral arteries: No flow-limiting stenosis. Basilar artery: No flow-limiting stenosis. PCA: No flow-limiting stenosis. Small right P1 segment due to prominent posterior communicating artery. Dural sinuses: The major dural venus sinuses are grossly patent. CT Head: No acute intracranial hemorrhage or mass effect. No CT evidence of large acute infarct. CTA Neck: No hemodynamically significant stenosis or dissection of the cervical internal carotid arteries. No high-grade stenosis or dissection of the cervical vertebral arteries. CTA Head: No large vessel arterial occlusion or high-grade stenosis. 3 mm x 6 mm x 4 mm focus of mucus versus soft tissue nodule along the right lateral wall of the subglottic trachea. To exclude neoplasm, recommend nonemergent follow-up CT neck with contrast versus direct visualization. RECOMMENDATION: 1.1 cm incidental right thyroid nodule. No follow-up imaging is recommended. Reference: J Am Dean Radiol. 2015 Feb;12(2): 143-50     CTA HEAD W CONTRAST    Result Date: 11/6/2022  EXAMINATION: CTA OF THE NECK; CT OF THE HEAD WITHOUT CONTRAST; CTA OF THE HEAD WITH CONTRAST 11/6/2022 7:27 pm TECHNIQUE: CTA of the neck was performed with the administration of intravenous contrast. Multiplanar reformatted images are provided for review. MIP images are provided for review. Stenosis of the internal carotid arteries measured using NASCET criteria.  Automated exposure control, iterative reconstruction, and/or weight based adjustment of the mA/kV was utilized to reduce the radiation dose to as low as reasonably achievable.; CT of the head was performed without the administration of intravenous contrast. Automated exposure control, iterative reconstruction, and/or weight based adjustment of the mA/kV was utilized to reduce the radiation dose to as low as reasonably achievable.; CTA of the head/brain was performed with the administration of intravenous contrast. Multiplanar reformatted images are provided for review. MIP images are provided for review. Automated exposure control, iterative reconstruction, and/or weight based adjustment of the mA/kV was utilized to reduce the radiation dose to as low as reasonably achievable. COMPARISON: CT head 10/17/2022 HISTORY: ORDERING SYSTEM PROVIDED HISTORY: dizziness, syncope. carotid plaque TECHNOLOGIST PROVIDED HISTORY: Reason for exam:->dizziness, syncope. carotid plaque Has a \"code stroke\" or \"stroke alert\" been called? ->No What reading provider will be dictating this exam?->CRC FINDINGS: CT HEAD: No acute intracranial hemorrhage or mass effect. No CT evidence of large acute infarct. Minimal chronic microvascular ischemic changes. No evidence of hydrocephalus. No acute calvarial fracture. Small right maxillary sinus mucous retention cyst. CTA NECK: Aortic arch: The brachiocephalic artery and the left common carotid artery share a common origin. Calcified plaque Common carotids: No significant stenosis. Retropharyngeal course on the right. Internal carotids: Less than 50% stenosis of the bilateral cervical ICA, secondary to soft and calcified plaque. Vertebrals: The cervical vertebral arteries demonstrate no high-grade stenosis, occlusion, or dissection. Calcified plaque at the origins bilaterally. 11 mm right thyroid nodule. Degenerative changes of the spine.   3 mm x 6 mm x 4 mm mucus versus nodule along the right lateral wall of the subglottic trachea (series 304 image 95 and series 607, image 67). Small right maxillary sinus mucous retention cyst.  Age-indeterminate mild height loss of T1 secondary to Schmorl's node. There are a few missing teeth. CTA HEAD: ANTERIOR CIRCULATION: Intracranial ICA: No high-grade stenosis or occlusion of the intracranial internal carotid arteries. MCA: No flow-limiting stenosis. ART: No flow-limiting stenosis. POSTERIOR CIRCULATION: Intradural vertebral arteries: No flow-limiting stenosis. Basilar artery: No flow-limiting stenosis. PCA: No flow-limiting stenosis. Small right P1 segment due to prominent posterior communicating artery. Dural sinuses: The major dural venus sinuses are grossly patent. CT Head: No acute intracranial hemorrhage or mass effect. No CT evidence of large acute infarct. CTA Neck: No hemodynamically significant stenosis or dissection of the cervical internal carotid arteries. No high-grade stenosis or dissection of the cervical vertebral arteries. CTA Head: No large vessel arterial occlusion or high-grade stenosis. 3 mm x 6 mm x 4 mm focus of mucus versus soft tissue nodule along the right lateral wall of the subglottic trachea. To exclude neoplasm, recommend nonemergent follow-up CT neck with contrast versus direct visualization. RECOMMENDATION: 1.1 cm incidental right thyroid nodule. No follow-up imaging is recommended. Reference: J Am Dean Radiol.  2015 Feb;12(2): 143-50     MRI BRAIN WO CONTRAST    Result Date: 11/15/2022  EXAMINATION: MRI OF THE BRAIN WITHOUT CONTRAST  11/15/2022 4:16 pm TECHNIQUE: Multiplanar multisequence MRI of the brain was performed without the administration of intravenous contrast. COMPARISON: CT brain 11/06/2022 HISTORY: ORDERING SYSTEM PROVIDED HISTORY: dizziness with associated fall, right ear pain TECHNOLOGIST PROVIDED HISTORY: Reason for exam:->dizziness with associated fall, right ear pain What reading provider will be dictating this exam?->CRC FINDINGS: Ischemia: No restricted diffusion is seen to suggest ischemia. Parenchyma: No evidence of recent intracranial hemorrhage. No mass effect. Ventricles: No evidence of hydrocephalus. Flow voids: Flow voids are present in the proximal major intracranial arteries. Trace bilateral mastoid effusions. Trace ethmoidal mucosal thickening. No evidence of recent infarct, acute intracranial hemorrhage, mass effect, or hydrocephalus. Discharge Medications:      Medication List        ASK your doctor about these medications      amitriptyline 10 MG tablet  Commonly known as: ELAVIL     CALCIUM PO     cyclobenzaprine 10 MG tablet  Commonly known as: FLEXERIL     DULoxetine 30 MG extended release capsule  Commonly known as: CYMBALTA     fluticasone 50 MCG/ACT nasal spray  Commonly known as: FLONASE     folic acid 1 MG tablet  Commonly known as: FOLVITE     gabapentin 100 MG capsule  Commonly known as: NEURONTIN  Take 1 capsule by mouth nightly for 180 days. Gabapentin start with 100 mg daily, increase to 100 mg 1-3 times daily next week.     lidocaine 5 %  Commonly known as: LIDODERM  Place 1 patch onto the skin every 24 hours     loratadine 10 MG tablet  Commonly known as: CLARITIN     methotrexate 2.5 MG chemo tablet  Commonly known as: RHEUMATREX     MULTIVITAMINS PO     pantoprazole 40 MG tablet  Commonly known as: PROTONIX     sertraline 50 MG tablet  Commonly known as: ZOLOFT              Time Spent on discharge is more than 45 minutes in the examination, evaluation, counseling and review of medications and discharge plan.    +++++++++++++++++++++++++++++++++++++++++++++++++  Rosaland Dandy, MD C/ [facility] [address]  +++++++++++++++++++++++++++++++++++++++++++++++++  NOTE: This report was transcribed using voice recognition software.  Every effort was made to ensure accuracy; however, inadvertent computerized transcription errors may be present.

## 2022-11-24 LAB
ANION GAP SERPL CALCULATED.3IONS-SCNC: 13 MMOL/L (ref 7–16)
BASOPHILS ABSOLUTE: 0.05 E9/L (ref 0–0.2)
BASOPHILS RELATIVE PERCENT: 0.7 % (ref 0–2)
BUN BLDV-MCNC: 17 MG/DL (ref 6–20)
CALCIUM SERPL-MCNC: 10.2 MG/DL (ref 8.6–10.2)
CHLORIDE BLD-SCNC: 98 MMOL/L (ref 98–107)
CO2: 25 MMOL/L (ref 22–29)
CREAT SERPL-MCNC: 0.6 MG/DL (ref 0.5–1)
EOSINOPHILS ABSOLUTE: 0.19 E9/L (ref 0.05–0.5)
EOSINOPHILS RELATIVE PERCENT: 2.7 % (ref 0–6)
GFR SERPL CREATININE-BSD FRML MDRD: >60 ML/MIN/1.73
GLUCOSE BLD-MCNC: 136 MG/DL (ref 74–99)
HCT VFR BLD CALC: 29.8 % (ref 34–48)
HEMOGLOBIN: 9.4 G/DL (ref 11.5–15.5)
IMMATURE GRANULOCYTES #: 0.02 E9/L
IMMATURE GRANULOCYTES %: 0.3 % (ref 0–5)
LYMPHOCYTES ABSOLUTE: 1.96 E9/L (ref 1.5–4)
LYMPHOCYTES RELATIVE PERCENT: 27.5 % (ref 20–42)
MCH RBC QN AUTO: 27.6 PG (ref 26–35)
MCHC RBC AUTO-ENTMCNC: 31.5 % (ref 32–34.5)
MCV RBC AUTO: 87.6 FL (ref 80–99.9)
METER GLUCOSE: 104 MG/DL (ref 74–99)
METER GLUCOSE: 177 MG/DL (ref 74–99)
METER GLUCOSE: 202 MG/DL (ref 74–99)
METER GLUCOSE: 247 MG/DL (ref 74–99)
MONOCYTES ABSOLUTE: 0.58 E9/L (ref 0.1–0.95)
MONOCYTES RELATIVE PERCENT: 8.1 % (ref 2–12)
NEUTROPHILS ABSOLUTE: 4.34 E9/L (ref 1.8–7.3)
NEUTROPHILS RELATIVE PERCENT: 60.7 % (ref 43–80)
PDW BLD-RTO: 15.7 FL (ref 11.5–15)
PLATELET # BLD: 320 E9/L (ref 130–450)
PMV BLD AUTO: 9.7 FL (ref 7–12)
POTASSIUM REFLEX MAGNESIUM: 4.3 MMOL/L (ref 3.5–5)
RBC # BLD: 3.4 E12/L (ref 3.5–5.5)
SODIUM BLD-SCNC: 136 MMOL/L (ref 132–146)
WBC # BLD: 7.1 E9/L (ref 4.5–11.5)

## 2022-11-24 PROCEDURE — 80048 BASIC METABOLIC PNL TOTAL CA: CPT

## 2022-11-24 PROCEDURE — 6370000000 HC RX 637 (ALT 250 FOR IP): Performed by: PHYSICAL MEDICINE & REHABILITATION

## 2022-11-24 PROCEDURE — 6370000000 HC RX 637 (ALT 250 FOR IP): Performed by: STUDENT IN AN ORGANIZED HEALTH CARE EDUCATION/TRAINING PROGRAM

## 2022-11-24 PROCEDURE — 97162 PT EVAL MOD COMPLEX 30 MIN: CPT

## 2022-11-24 PROCEDURE — 82962 GLUCOSE BLOOD TEST: CPT

## 2022-11-24 PROCEDURE — 97530 THERAPEUTIC ACTIVITIES: CPT

## 2022-11-24 PROCEDURE — 2580000003 HC RX 258: Performed by: PHYSICAL MEDICINE & REHABILITATION

## 2022-11-24 PROCEDURE — 97535 SELF CARE MNGMENT TRAINING: CPT

## 2022-11-24 PROCEDURE — 6360000002 HC RX W HCPCS: Performed by: STUDENT IN AN ORGANIZED HEALTH CARE EDUCATION/TRAINING PROGRAM

## 2022-11-24 PROCEDURE — 1280000000 HC REHAB R&B

## 2022-11-24 PROCEDURE — 36415 COLL VENOUS BLD VENIPUNCTURE: CPT

## 2022-11-24 PROCEDURE — 85025 COMPLETE CBC W/AUTO DIFF WBC: CPT

## 2022-11-24 PROCEDURE — 97165 OT EVAL LOW COMPLEX 30 MIN: CPT

## 2022-11-24 RX ORDER — SODIUM CHLORIDE 9 MG/ML
INJECTION, SOLUTION INTRAVENOUS CONTINUOUS
Status: DISCONTINUED | OUTPATIENT
Start: 2022-11-24 | End: 2022-11-30

## 2022-11-24 RX ORDER — AMMONIUM LACTATE 12 G/100G
LOTION TOPICAL PRN
Status: DISCONTINUED | OUTPATIENT
Start: 2022-11-24 | End: 2022-12-07 | Stop reason: HOSPADM

## 2022-11-24 RX ORDER — INSULIN LISPRO 100 [IU]/ML
0-4 INJECTION, SOLUTION INTRAVENOUS; SUBCUTANEOUS NIGHTLY
Status: DISCONTINUED | OUTPATIENT
Start: 2022-11-24 | End: 2022-11-30

## 2022-11-24 RX ORDER — DEXTROSE MONOHYDRATE 100 MG/ML
INJECTION, SOLUTION INTRAVENOUS CONTINUOUS PRN
Status: DISCONTINUED | OUTPATIENT
Start: 2022-11-24 | End: 2022-11-30

## 2022-11-24 RX ORDER — INSULIN LISPRO 100 [IU]/ML
0-4 INJECTION, SOLUTION INTRAVENOUS; SUBCUTANEOUS
Status: DISCONTINUED | OUTPATIENT
Start: 2022-11-24 | End: 2022-11-30

## 2022-11-24 RX ADMIN — ACETAMINOPHEN 650 MG: 325 TABLET ORAL at 01:23

## 2022-11-24 RX ADMIN — FOLIC ACID 1 MG: 1 TABLET ORAL at 09:23

## 2022-11-24 RX ADMIN — HEPARIN SODIUM 5000 UNITS: 10000 INJECTION INTRAVENOUS; SUBCUTANEOUS at 21:19

## 2022-11-24 RX ADMIN — MIDODRINE HYDROCHLORIDE 15 MG: 5 TABLET ORAL at 11:08

## 2022-11-24 RX ADMIN — SODIUM CHLORIDE: 9 INJECTION, SOLUTION INTRAVENOUS at 15:35

## 2022-11-24 RX ADMIN — PANTOPRAZOLE SODIUM 40 MG: 40 TABLET, DELAYED RELEASE ORAL at 06:11

## 2022-11-24 RX ADMIN — BACLOFEN 20 MG: 10 TABLET ORAL at 22:37

## 2022-11-24 RX ADMIN — AMITRIPTYLINE HYDROCHLORIDE 10 MG: 10 TABLET, FILM COATED ORAL at 21:18

## 2022-11-24 RX ADMIN — HEPARIN SODIUM 5000 UNITS: 10000 INJECTION INTRAVENOUS; SUBCUTANEOUS at 13:43

## 2022-11-24 RX ADMIN — MECLIZINE 25 MG: 12.5 TABLET ORAL at 06:11

## 2022-11-24 RX ADMIN — MECLIZINE 25 MG: 12.5 TABLET ORAL at 13:47

## 2022-11-24 RX ADMIN — OXYCODONE AND ACETAMINOPHEN 1 TABLET: 325; 10 TABLET ORAL at 16:58

## 2022-11-24 RX ADMIN — OXYCODONE AND ACETAMINOPHEN 1 TABLET: 325; 10 TABLET ORAL at 22:47

## 2022-11-24 RX ADMIN — MIDODRINE HYDROCHLORIDE 15 MG: 5 TABLET ORAL at 09:23

## 2022-11-24 RX ADMIN — MECLIZINE 25 MG: 12.5 TABLET ORAL at 21:18

## 2022-11-24 RX ADMIN — BACLOFEN 20 MG: 10 TABLET ORAL at 09:23

## 2022-11-24 RX ADMIN — SERTRALINE 50 MG: 50 TABLET, FILM COATED ORAL at 09:23

## 2022-11-24 RX ADMIN — HEPARIN SODIUM 5000 UNITS: 10000 INJECTION INTRAVENOUS; SUBCUTANEOUS at 04:58

## 2022-11-24 RX ADMIN — SENNOSIDES 8.6 MG: 8.6 TABLET, FILM COATED ORAL at 21:18

## 2022-11-24 RX ADMIN — Medication: at 23:12

## 2022-11-24 RX ADMIN — MIDODRINE HYDROCHLORIDE 15 MG: 5 TABLET ORAL at 16:57

## 2022-11-24 RX ADMIN — OXYCODONE AND ACETAMINOPHEN 1 TABLET: 325; 10 TABLET ORAL at 04:56

## 2022-11-24 ASSESSMENT — PAIN DESCRIPTION - ORIENTATION
ORIENTATION: LOWER

## 2022-11-24 ASSESSMENT — PAIN DESCRIPTION - DESCRIPTORS
DESCRIPTORS: THROBBING
DESCRIPTORS: ACHING
DESCRIPTORS: DISCOMFORT;SHARP;SHOOTING
DESCRIPTORS: ACHING
DESCRIPTORS: ACHING

## 2022-11-24 ASSESSMENT — PAIN DESCRIPTION - LOCATION
LOCATION: BACK

## 2022-11-24 ASSESSMENT — PAIN SCALES - GENERAL
PAINLEVEL_OUTOF10: 9
PAINLEVEL_OUTOF10: 8
PAINLEVEL_OUTOF10: 9
PAINLEVEL_OUTOF10: 8
PAINLEVEL_OUTOF10: 0
PAINLEVEL_OUTOF10: 9
PAINLEVEL_OUTOF10: 9
PAINLEVEL_OUTOF10: 8

## 2022-11-24 ASSESSMENT — PAIN DESCRIPTION - ONSET: ONSET: ON-GOING

## 2022-11-24 ASSESSMENT — PAIN DESCRIPTION - FREQUENCY: FREQUENCY: CONTINUOUS

## 2022-11-24 ASSESSMENT — PAIN DESCRIPTION - PAIN TYPE
TYPE: ACUTE PAIN

## 2022-11-24 ASSESSMENT — PAIN - FUNCTIONAL ASSESSMENT
PAIN_FUNCTIONAL_ASSESSMENT: ACTIVITIES ARE NOT PREVENTED
PAIN_FUNCTIONAL_ASSESSMENT: ACTIVITIES ARE NOT PREVENTED
PAIN_FUNCTIONAL_ASSESSMENT: PREVENTS OR INTERFERES SOME ACTIVE ACTIVITIES AND ADLS

## 2022-11-24 NOTE — PROGRESS NOTES
No acute events overnight. Severe orthostasis in PT this am upon standing. BP improved upon returning to bed. Now sitting up, asymptomatic, BP 85/62. Thigh high TEDs added. Patient on midodrine. Encourage adequate fluid intake. Monitor closely.        Yuriy Savage MD  Physical Medicine and Rehabilitation

## 2022-11-24 NOTE — PROGRESS NOTES
OCCUPATIONAL THERAPY INITIAL EVALUATION      Date:2022  Patient Name: Hiral Gasca  MRN: [id number]  : 1970  Room: [address] [place] [address]    Discharge Recommendations: Home with Home Health OT    Frequency / Duration: BID 5-7 tx/wk; 3-4 weeks    Recommended Adaptive Equipment: AE LB dressing/bathing/toileting, extended tub bench    Referring Physician: Luis Carlos Bell MD  Specific OT orders/date: 22 Bid daily      Diagnosis: Compression fracture of L2 lumbar vertebra    Reason for Admission: Pt fell at home d/t dizziness resulting in L2, L3, & L5 compression fx's. Surgery/Procedure: None this admission     Past Medical History:  has a past medical history of Anemia, Diabetes mellitus ([address] Papanicolaou smear of cervix with atypical squamous cells of undetermined significance (ASC-US), and Screening for human papillomavirus (HPV). Precautions: LSO for comfort (L2, L3, L5 compression fx's), spinal precautions, monitor BP (orthostatic hypotension), droplet+ precautions COVID-19    Pain Level:   Pt did not rate on scale however c/o mild low back pain with movement. Therapist provided repositioning techniques. Home Living: Pt lives with daughter in a 2 with 1 steps to enter and 0 rail(s); bed/bath on 2nd floor with 7+3 steps & 2 handrails. Bathroom setup: [address] [place] [address] owned: None    Prior Level of Function: IND with ADLs;  IND with IADLs. No AD for functional mobility. Driving: Yes  Occupation:       Cognition: A&O: 4/4. Pt motivated to improve safety & function throughout session. Follows 2 step commands.    Memory: Good   Comprehension: Fair+   Problem solving: Fair+   Judgement/safety: Fair               Communication skills: Good           Vision: WFL               Glasses:Yes - readers                                                   Hearing: WFL    Perception: WFL    UE Assessment:  Hand Dominance: Right    Rt UE ROM: WFL  Rt UE Strength: 4-/5  Rt  strength: WFL  Coordination: WFL    Lt UE ROM: WFL  Lt UE Strength: 4-/5   Lt  Strength: WFL  Coordination: WFL    Sensation: WFL BUE - light touch sensation intact  Tone: WFL BUE  Edema: Unremarkable     Functional Mobility Assessment:  Supine to sit: TBA  Sit to supine: TBA  (Pt seated in w/c upon arrival)    Sit to stand: Moderate Assist  Stand to sit: Moderate Assist    Commode Transfer: TBA (deferred d/t safety & low BP)   Tub / Shower Transfer: TBA (deferred d/t safety & low BP)     Function Mobility: Moderate Assist     Balance:  Static sitting: Supervision  Dynamic sitting: SBA    Static standing: Moderate Assist  Dynamic standing: Moderate Assist    Activities of Daily Living:  Feeding: Setup    Grooming: Minimal Assist seated    *oral care: SBA seated    Bathing: Moderate Assist seated    Toileting: Maximal Assist    UB Dressing: Moderate Assist to don LSO    LB Dressing: Moderate Assist with AE    *footwear: Maximal Assist    Homemaking: TBA    Endurance: Fair tolerance with light activity    Assessment of current deficits   [x]Functional mobility   [x]ADLs [x]Strength  []Cognition  [x]Functional transfers  [x]IADLs [x]Safety Awareness  [x]Endurance  []Fine Motor Coordination  [x]Balance      []Vision/perception  []Sensation    []Gross Motor Coordination  []ROM  []Delirium                  []Communication     Plan of Care: 5-7 tx/wk for 3-4 weeks.   [x]ADL retraining: adapted techniques and AE recommendations to increase independence within precautions    [x]Energy conservation techniques to improve tolerance for self care routine   [x]Functional transfer/mobility training for fall prevention & DME recommendations         [x]Patient/family education to increase safety and functional independence   [x]Environmental modifications for safe mobility and completion of ADLs                   []Cognitive retraining ex's to improve problem solving skills & safe participation in ADLs/IADLs     []Sensory re-education techniques to improve extremity awareness, maintain skin integrity and improve hand function                     []Visual/Perceptual retraining ex's to improve body awareness and safety during transfers and ADLs  []Splinting/postioning needs to maintain joint/skin integrity and prevent contractures  [x]Therapeutic activity to improve functional skills                                   [x]Therapeutic exercise to improve functional engagement of daily activities. [x]Balance retraining ex's for postural control with dynamic challenges  []Neuromuscular re-education exercises                    []Delirium prevention/treatment    [x]Other:       Patient / Family Goal:   Return home independently & safely. Long Term Goals: 3-4 weeks  Time Frame for Long term goals: 3-4 weeks   Long term goal 1: Pt will be IND for self feeding  Long term goal 2: Pt will be Mod I  for grooming task  Long term goal 3: Pt will be SBA for UB dressing including LSO. Long term goal 4: Pt will be Min A  for LB dressing and Mod A for footwear using AE PRN. Long term goal 5: Pt will be Min A for bathing task using AE PRN. Long term goal 6: Pt will be Mod A  for toileting task  Long term goal 7: Pt will be Mod A for commode transfer using AD as needed  Long term goal 8: Pt will be Mod A  for tub/shower  transfer  Long term goal 9: Pt will be Min A for light homemaking task  Long term goal 11: Pt will increase BUE strength by 1 muscle grade to improve functional engagement of ADLs. Long term goal 12: Pt will demonstrate Good safety, insight, reasoning, judgement, & problem-solving strategies throughout functional tasks to implement safety awareness & fall prevention strategies. Rehab potential:   good     Treatment Initiated:  Treatment: OT treatment provided this date includes:    ADL-  Instruction/training on safety and adapted techniques for completion of ADLs: Pt sat EOB to address dynamic sitting balance & pelvic stability to engage in ADLs while maintaining safety. Pt required assist for posterior hygiene care & LB dressing tasks with verbal cues to maintain spinal precautions. Pt required assist to don LSO with proper positioning seated EOB. Pt required increased time throughout ADLs c/o 5/10 dizziness - pt reporting improved symptoms after ~3 minutes with seated rest break. Mobility-  Instruction/training on safety and improved independence with functional transfers and functional mobility. Pt utilized ww to<>from w/c with verbal cues for proper hand placement & increased assist to maintain proper body mechanics & safe transfer progressions. Pt demo'd narrow ADRIANA & unsteady gait pattern throughout functional mobility short distance with assist/cues for ww management/safety. Sitting EOB ~25 minutes to improve dynamic sitting balance and activity tolerance during ADLs. Skilled positioning/alignment-  Proper Positioning/Alignment. Pt required assist/cues throughout session to implement proper body mechanics/spinal precautions to promote skin/joint integrity, safety awareness, & implementation of fall prevention strategies throughout daily activities. Skilled monitoring of Vitals -   BP seated on w/c beginning of session 80/66, 102 bpm. After ~5 minutes BP 94/61, 100 bpm. BP standing 65/46, 115 bpm. BP seated EOB 83/60, 109 bpm. BP standing 68/45, 131 bpm. RN made aware.        Time in Time out   Evaluation     Treatment 8:00am    8:16am 8:15am    9:15am   Individual  Time: 75 minutes   Concurrent Time (tx more than 1 pt at a time): NA  Co-Tx Time: NA    Low Complexity Evaluation +  Total Tx Time: 60 minutes    Tomasa Kayser OTR/L; [id number]

## 2022-11-24 NOTE — PROGRESS NOTES
Physical Therapy    Evaluating Therapist: Austin Walton PT, DPT [id number]       ROOM: [address] [place] [address]  DIAGNOSIS: Closed compression fracture of L2 lumbar vertebra   PRECAUTIONS: Falls, Spinal precautions, LSO for comfort (L2, L3, and L5 compression fx), Orthostatic hypotension (compression stockings), Droplet Plus isolation, COVID-19  HPI:  Pt had a recent gastric bypass and suffered a fall on 11/5. She has had multiple recent falls. Compression fractures noted at L2-L3 and L5. She was not a candidate for neurosurgical intervention. Pt has tested positive for orthostatic hypotension and COVID. Social:  Pt lives with her dtr in a 2 floor plan with 1 steps and 0 rails. Bedroom and bathroom are on the 2nd level with 7+3 steps with B rails. Prior to admission: Independent with no assistive device. Works as a . Drives. Initial Evaluation  Date: 11/24/22 AM     PM    Short Term Goals Long Term Goals    Was pt agreeable to Eval/treatment? Yes        Does pt have pain? 9/10 mid back       Bed Mobility  Rolling: Min A  Supine to sit: Mod A  Sit to supine: Mod A  Scooting: Min A   SBA Modified Independent     Transfers Sit to stand:  Mod A  Stand to sit: Mod A  Stand pivot: Min A with FWW   SBA with FWW   Modified Independent with LRAD   Ambulation    3 feet x2 reps with FWW with Min A   >100 feet with FWW with SBA >300 feet with LRAD with Modified Independent     Walking 10 feet on uneven surface NT d/t hypotension and droplet plus isolation    10 feet with FWW with Min A 10 feet with LRAD with Modified Independent     Wheel Chair Mobility NA       Car Transfers NT d/t hypotension and droplet plus isolation    Min A Modified Independent     Stair negotiation: ascended and descended  NT d/t hypotension and droplet plus isolation    4 steps with B rail with Min A 12 steps with 1 rail with Modified Independent     Curb Step:   ascended and descended NT d/t hypotension and droplet plus isolation    6 inch step with FWW and Min A 6 inch step with LRAD and Modified Independent     Picking up object off the floor NT d/t hypotension    Will  cone with Min A assist Will  cone with no assist   BLE ROM WFL       BLE Strength RLE:  Grossly 3-/5  LLE:  Grossly 4/5       Balance  Unsupported sitting:  SBA    Dynamic standing:  Min A with FWW    BBS: NT  FGA: NT      BBS: >52  FGA: >22   Date Family Teach Completed        Is additional Family Teaching Needed? Y or N        Hindering Progress Back pain, RUE and RLE weakness, orthostatic hypotension        PT recommended ELOS        Team's Discharge Plan        Therapist at Team Meeting          Pt is alert and Oriented x 4  Sensation: Reports mild numbness in B feet. Lt touch sensation intact. Edema: Unremarkable   Endurance: Fair(-) Limited d/t hypotension      ASSESSMENT  Pt displays functional ability as noted in the objective portion of this evaluation. Sitting in chair BP:  85/53   Standing BP:  66/50  Sitting in chair BP:  92/67  Sitting EOB BP:  98/66  Sitting in chair BP:  94/71    Comments:  Pt limited in evaluation d/t hypotension and droplet plus isolation d/t COVID-19. RN present on entry. Pt hypotensive in chair. RN donned B thigh high joey hose. Assessed BP in standing. Pt hypotensive (66/50). Discussed with RN. Pt OK for light activity d/t having received BP meds prior. Assessed pt's UE and LE ROM and strength in chair. Pt demonstrates weakness of both RUE and RLE compared to LUE and LLE. Pt able to stand and pivot wheelchair<>bed. Completed sit<>supine with assistance of trunk and BLE. Pt had pain in lying flat in bed. Returned to sitting EOB. BP assessed and better than previous readings. Completed STS from bed with cues for hand placement. Completed pivot back to wheelchair. Further activity terminated d/t hypotension. Discussed with RN.        Patient education  Pt educated on LSO, sit<>stand and pivot training Patient response to education:   Pt verbalized understanding Pt demonstrated skill Pt requires further education in this area   Yes  Yes  Yes      Rehab potential is Good for reaching above PT goals. Pts/ family goals   1. Home     Patient and or family understand(s) diagnosis, prognosis, and plan of care. Good    PLAN  PT care will be provided in accordance with the objectives noted above. Whenever appropriate, clear delegation orders will be provided for nursing staff. Exercises and functional mobility practice will be used as well as appropriate assistive devices or modalities to obtain goals. Patient and family education will also be administered as needed. Frequency of treatments will be 2x/day M-F and 1x/day Sat or Sun x 14 days.     Time in: 1120  Time out: [address] [facility], PT, DPT  [id number]

## 2022-11-24 NOTE — PROGRESS NOTES
Patient's BP 90/62 manual sitting. This nurse assisted patient in applying MATT thigh high NOHEMI hose. This nurse then assisted physical therapist to stand patient, patient's BP significantly dropped 66/50. Dr. Jade Elise notified, orders to hold off on scheduled Percocet, recheck BP manually in one hour and notify physician if BP less than 90 systolic.

## 2022-11-25 LAB
METER GLUCOSE: 135 MG/DL (ref 74–99)
METER GLUCOSE: 151 MG/DL (ref 74–99)
METER GLUCOSE: 168 MG/DL (ref 74–99)
METER GLUCOSE: 179 MG/DL (ref 74–99)

## 2022-11-25 PROCEDURE — 97535 SELF CARE MNGMENT TRAINING: CPT

## 2022-11-25 PROCEDURE — 97530 THERAPEUTIC ACTIVITIES: CPT

## 2022-11-25 PROCEDURE — 6370000000 HC RX 637 (ALT 250 FOR IP): Performed by: STUDENT IN AN ORGANIZED HEALTH CARE EDUCATION/TRAINING PROGRAM

## 2022-11-25 PROCEDURE — 1280000000 HC REHAB R&B

## 2022-11-25 PROCEDURE — 6370000000 HC RX 637 (ALT 250 FOR IP): Performed by: PHYSICAL MEDICINE & REHABILITATION

## 2022-11-25 PROCEDURE — 97110 THERAPEUTIC EXERCISES: CPT

## 2022-11-25 PROCEDURE — 6360000002 HC RX W HCPCS: Performed by: STUDENT IN AN ORGANIZED HEALTH CARE EDUCATION/TRAINING PROGRAM

## 2022-11-25 PROCEDURE — 2580000003 HC RX 258: Performed by: PHYSICAL MEDICINE & REHABILITATION

## 2022-11-25 PROCEDURE — 82962 GLUCOSE BLOOD TEST: CPT

## 2022-11-25 RX ORDER — BACLOFEN 10 MG/1
10 TABLET ORAL 2 TIMES DAILY
Status: DISCONTINUED | OUTPATIENT
Start: 2022-11-25 | End: 2022-11-28

## 2022-11-25 RX ORDER — SODIUM CHLORIDE 1000 MG
1 TABLET, SOLUBLE MISCELLANEOUS
Status: DISCONTINUED | OUTPATIENT
Start: 2022-11-25 | End: 2022-12-07 | Stop reason: HOSPADM

## 2022-11-25 RX ORDER — FLUDROCORTISONE ACETATE 0.1 MG/1
0.1 TABLET ORAL DAILY
Status: DISCONTINUED | OUTPATIENT
Start: 2022-11-25 | End: 2022-12-07 | Stop reason: HOSPADM

## 2022-11-25 RX ORDER — SODIUM CHLORIDE 0.9 % (FLUSH) 0.9 %
5-40 SYRINGE (ML) INJECTION 2 TIMES DAILY
Status: DISCONTINUED | OUTPATIENT
Start: 2022-11-25 | End: 2022-12-04

## 2022-11-25 RX ADMIN — OXYCODONE AND ACETAMINOPHEN 1 TABLET: 325; 10 TABLET ORAL at 05:10

## 2022-11-25 RX ADMIN — FLUDROCORTISONE ACETATE 0.1 MG: 0.1 TABLET ORAL at 11:14

## 2022-11-25 RX ADMIN — FOLIC ACID 1 MG: 1 TABLET ORAL at 09:10

## 2022-11-25 RX ADMIN — MIDODRINE HYDROCHLORIDE 15 MG: 5 TABLET ORAL at 14:55

## 2022-11-25 RX ADMIN — SERTRALINE 50 MG: 50 TABLET, FILM COATED ORAL at 09:11

## 2022-11-25 RX ADMIN — OXYCODONE AND ACETAMINOPHEN 1 TABLET: 325; 10 TABLET ORAL at 23:50

## 2022-11-25 RX ADMIN — OXYCODONE AND ACETAMINOPHEN 1 TABLET: 325; 10 TABLET ORAL at 16:53

## 2022-11-25 RX ADMIN — SODIUM CHLORIDE, PRESERVATIVE FREE 10 ML: 5 INJECTION INTRAVENOUS at 22:41

## 2022-11-25 RX ADMIN — BACLOFEN 10 MG: 10 TABLET ORAL at 22:41

## 2022-11-25 RX ADMIN — MECLIZINE 25 MG: 12.5 TABLET ORAL at 14:56

## 2022-11-25 RX ADMIN — HEPARIN SODIUM 5000 UNITS: 10000 INJECTION INTRAVENOUS; SUBCUTANEOUS at 05:11

## 2022-11-25 RX ADMIN — HEPARIN SODIUM 5000 UNITS: 10000 INJECTION INTRAVENOUS; SUBCUTANEOUS at 14:58

## 2022-11-25 RX ADMIN — MECLIZINE 25 MG: 12.5 TABLET ORAL at 06:23

## 2022-11-25 RX ADMIN — MIDODRINE HYDROCHLORIDE 15 MG: 5 TABLET ORAL at 09:10

## 2022-11-25 RX ADMIN — PANTOPRAZOLE SODIUM 40 MG: 40 TABLET, DELAYED RELEASE ORAL at 06:23

## 2022-11-25 RX ADMIN — SODIUM CHLORIDE 1 G: 1 TABLET ORAL at 11:29

## 2022-11-25 RX ADMIN — SENNOSIDES 8.6 MG: 8.6 TABLET, FILM COATED ORAL at 23:51

## 2022-11-25 RX ADMIN — SODIUM CHLORIDE, PRESERVATIVE FREE 10 ML: 5 INJECTION INTRAVENOUS at 09:10

## 2022-11-25 RX ADMIN — SODIUM CHLORIDE 1 G: 1 TABLET ORAL at 16:55

## 2022-11-25 RX ADMIN — SODIUM CHLORIDE, PRESERVATIVE FREE 10 ML: 5 INJECTION INTRAVENOUS at 01:55

## 2022-11-25 RX ADMIN — OXYCODONE AND ACETAMINOPHEN 1 TABLET: 325; 10 TABLET ORAL at 11:14

## 2022-11-25 RX ADMIN — HEPARIN SODIUM 5000 UNITS: 10000 INJECTION INTRAVENOUS; SUBCUTANEOUS at 22:43

## 2022-11-25 RX ADMIN — MECLIZINE 25 MG: 12.5 TABLET ORAL at 22:41

## 2022-11-25 ASSESSMENT — PAIN - FUNCTIONAL ASSESSMENT
PAIN_FUNCTIONAL_ASSESSMENT: ACTIVITIES ARE NOT PREVENTED
PAIN_FUNCTIONAL_ASSESSMENT: ACTIVITIES ARE NOT PREVENTED
PAIN_FUNCTIONAL_ASSESSMENT: PREVENTS OR INTERFERES SOME ACTIVE ACTIVITIES AND ADLS
PAIN_FUNCTIONAL_ASSESSMENT: PREVENTS OR INTERFERES SOME ACTIVE ACTIVITIES AND ADLS

## 2022-11-25 ASSESSMENT — PAIN SCALES - GENERAL
PAINLEVEL_OUTOF10: 8
PAINLEVEL_OUTOF10: 9
PAINLEVEL_OUTOF10: 8

## 2022-11-25 ASSESSMENT — PAIN DESCRIPTION - LOCATION
LOCATION: BACK
LOCATION: BACK;SHOULDER
LOCATION: BUTTOCKS

## 2022-11-25 ASSESSMENT — PAIN DESCRIPTION - ORIENTATION
ORIENTATION: LOWER
ORIENTATION: RIGHT;LOWER

## 2022-11-25 ASSESSMENT — PAIN DESCRIPTION - DESCRIPTORS
DESCRIPTORS: DISCOMFORT
DESCRIPTORS: THROBBING
DESCRIPTORS: THROBBING;DISCOMFORT
DESCRIPTORS: ACHING

## 2022-11-25 NOTE — PROGRESS NOTES
Occupational Therapy  OCCUPATIONAL THERAPY DAILY NOTE    Date:2022  Patient Name: Paul Arce  MRN: [id number]  : 1970  Room: [address] [place], [place] [address]       Referring Physician: Angelique Brody MD    Diagnosis:  S/P Fall with Compression fractures of L2  L3, L5 lumbar vertebra       Surgery/Procedure: None this admission      Past Medical History:  has a past medical history of Anemia, Diabetes mellitus ([address] Gastric Sleeve      Precautions: LSO for comfort (L2, L3, L5 compression fx's), spinal precautions, monitor BP (orthostatic hypotension), droplet+ precautions COVID-19      Functional Assessment:   Date Status AE  Comments   Feeding 22 Set up      Grooming 22 Minimal Assist   Washed hands seated at sink          Oral Care 22 Minimal Assist      Bathing 22 Moderate Assist      UB Dressing 22 Moderate Assist   Pt doffed LSO brace  at SBA    LB Dressing 22 Moderate Assist      Footwear 22 Maximal Assist  Reacher  Pt assisted with doffing slipper socks using reacher    Toileting 22 Mod Assist    Assist to pull pants up and down. Pt completed hygiene following voiding    Homemaking 22 TBA         Functional Transfers / Balance:   Date Status DME  Comments   Sit Balance 22 Supervision      Stand Balance 22 Minimal Assist  ww    [] Tub  [] Shower   Transfer 22 TBA      Commode   Transfer 22 Moderate Assist  Ww, grab rail  Assist with sit to stand off of low commode surface. Pt declined raised commode seat at this time    Functional   Mobility 22 Minimal Assist  Ww  A few steps in bathroom    Other:SPT w/c to bed     Sit to stand transfer w/c to ww       Be mobility : sit to supine  22   Minimal Assist       Minimal Assist         Minimal Assist  Ww      ww Verbal cues for hand placement on ww     Pt completed STS transfer x3 reps with blood pressure remaining stable     Assist to lift RLE onto bed and verbal cues for pt to follow spinal precautions      Functional Exercises / Activity:   AROM to RUE in all planes of pt's tolerance to help maintain joint integrity 3 sets 10 reps   Strength exercises to LUE with 2 # dumbbell weigh 3 sets 10 reps in all planes   R hand strength exercises with pink medium resistive foam block 3 sets 10 reps     Sensory / Neuromuscular Re-Education:      Cognitive Skills:   Status Comments   Problem   Solving fair     Memory fair     Sequencing fair     Safety fair       Visual Perception:    Education:  Pt was educated on use of AE for doffing slipper socks     [] Family teach completed on:    Pain Level: 7/10 RUE during activity and low back     Additional Notes:   11/25/22 Pt's BP am seated after activity 97/67                  Pt's BP following commode and w/c transfers 101/69     Patient has made good  progress during treatment sessions toward set goals. Therapy emphasis to obtain goals:Plan of Care: 5-7 tx/wk for 3-4 weeks. [x]ADL retraining: adapted techniques and AE recommendations to increase independence within precautions                  [x]Energy conservation techniques to improve tolerance for self care routine   [x]Functional transfer/mobility training for fall prevention & DME recommendations         [x]Patient/family education to increase safety and functional independence            [x]Environmental modifications for safe mobility and completion of ADLs                            [x]Cognitive retraining ex's to improve problem solving skills & safe participation in ADLs/IADLs     [x]Therapeutic activity to improve functional skills                                         [x]Therapeutic exercise to improve functional engagement of daily activities. [x]Balance retraining ex's for postural control with dynamic challenges  [x]Neuromuscular re-education exercises                                              [x] Continue with current OT Plan of care.   [] Prepare for Discharge    Long Term Goals: 3-4 weeks  Time Frame for Long term goals: 3-4 weeks   Long term goal 1: Pt will be IND for self feeding  Long term goal 2: Pt will be Mod I  for grooming task  Long term goal 3: Pt will be SBA for UB dressing including LSO. Long term goal 4: Pt will be Min A  for LB dressing and Mod A for footwear using AE PRN. Long term goal 5: Pt will be Min A for bathing task using AE PRN. Long term goal 6: Pt will be Mod A  for toileting task  Long term goal 7: Pt will be Mod A for commode transfer using AD as needed  Long term goal 8: Pt will be Mod A  for tub/shower  transfer  Long term goal 9: Pt will be Min A for light homemaking task  Long term goal 11: Pt will increase BUE strength by 1 muscle grade to improve functional engagement of ADLs. Long term goal 12: Pt will demonstrate Good safety, insight, reasoning, judgement, & problem-solving strategies throughout functional tasks to implement safety awareness & fall prevention strategies.      DISCHARGE RECOMMENDATIONS  Recommended DME:    Post Discharge Care:   []Home Independently  []Home with 24hr Care / Supervision [x]Home with Partial Supervision []Home with Home Health OT []Home with Out Pt OT []Other: ___   Comments:         Time in Time out Tx Time Breakdown  Variance:   First Session  [id number] [x] Individual Tx-30   [] Concurrent Tx -  [] Co-Tx -   [] Group Tx -   [x] Time Missed - 15       Pt with Nursing    Second Session [id number] [x] Individual Tx-45   [] Concurrent Tx -  [] Co-Tx -   [] Group Tx -   [] Time Missed -     Third Session    [] Individual Tx-   [] Concurrent Tx -  [] Co-Tx -   [] Group Tx -   [] Time Missed -         Total Tx Time: 75 mins        Lluvia Jimenez OTR/L [id number]

## 2022-11-25 NOTE — CARE COORDINATION
Acute Rehab Social Work Assessment     PCP: Dr. Lacie Banerjee    Patient Resides with: her daughter- Rafi Dixon. Sudhakar Born is  and recently moved to [place] about a month ago. Home Architecture : They live in a 2story condo with (1) entry step (0) rails. Bed/Bath on 2nd floor with (13) steps (1) rail. Pt. Has tub/shower combo with curtain. Will you return to your own home? Yes. Primary Caregivers: Sudhakar Born has (2) children; Syl Mckay (25) works FT remotely for [employer]. Rafi Lat will be able to provide 24hr supervision and assistance. Latricia Viera (32) lives in [place]. Level of Function PTA : Pt. Was completely independent prior to admission. She graduated with a MSW and was working [employer]. 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 DME Pta  : None    Community Agency Involvement PTA : [facility] (11/7/22)    Family Teaching: to be scheduled    Strength: Per Fay Castillo is stubborn in a good way. \"    Goals: Per [place] Lat- \" I'd like to see her be more active and independent. \"    IRF ALE - Transportation, Mood, Social isolation, health literacy completed under flowsheets. NAME RELATION PRIMARY # ADDITIONAL #    Syl Mckay daughter Oscar Ambrose son [place]              Height: 5'11  Weight: 211    [facility] reported Sudhakar Born started passing out about a month ago and has concerns about this once she is home. Rafi Dixon asking if Cardiology needs to be consulted. Dr. Selby Apley aware and will consult.     DAVIAN Rodgers  11/25/2022

## 2022-11-25 NOTE — PROGRESS NOTES
Physical Therapy    Evaluating Therapist: Nj Shannon PT, DPT [id number]       ROOM: [address] [place] [address]  DIAGNOSIS: Closed compression fracture of L2 lumbar vertebra   PRECAUTIONS: Falls, Spinal precautions, LSO for comfort (L2, L3, and L5 compression fx), Orthostatic hypotension (compression stockings), Droplet Plus isolation, COVID-19  HPI:  Pt had a recent gastric bypass and suffered a fall on 11/5. She has had multiple recent falls. Compression fractures noted at L2-L3 and L5. She was not a candidate for neurosurgical intervention. Pt has tested positive for orthostatic hypotension and COVID. Social:  Pt lives with her dtr in a 2 floor plan with 1 steps and 0 rails. Bedroom and bathroom are on the 2nd level with 7+3 steps with B rails. Prior to admission: Independent with no assistive device. Works as a . Drives. Initial Evaluation  Date: 11/24/22 AM     PM    Short Term Goals Long Term Goals    Was pt agreeable to Eval/treatment? Yes  yes yes     Does pt have pain? 9/10 mid back  no     Bed Mobility  Rolling: Min A  Supine to sit: Mod A  Sit to supine: Mod A  Scooting: Min A NT NT SBA Modified Independent     Transfers Sit to stand:  Mod A  Stand to sit: Mod A  Stand pivot: Min A with FWW Sit to stand: Min A  Stand to sit: Min A  Stand pivot: NT Sit to stand: Min A  Stand to sit: Min A  Stand pivot: Min A with FWW SBA with FWW   Modified Independent with LRAD   Ambulation    3 feet x2 reps with FWW with Min A NT NT >100 feet with FWW with SBA >300 feet with LRAD with Modified Independent     Walking 10 feet on uneven surface NT d/t hypotension and droplet plus isolation  NT NT 10 feet with FWW with Min A 10 feet with LRAD with Modified Independent     Wheel Chair Mobility NA NT NT     Car Transfers NT d/t hypotension and droplet plus isolation  NT NT Min A Modified Independent     Stair negotiation: ascended and descended  NT d/t hypotension and droplet plus isolation  NT NT 4 steps with B rail with Min A 12 steps with 1 rail with Modified Independent     Curb Step:   ascended and descended NT d/t hypotension and droplet plus isolation  NT NT 6 inch step with FWW and Min A 6 inch step with LRAD and Modified Independent     Picking up object off the floor NT d/t hypotension    Will  cone with Min A assist Will  cone with no assist   BLE ROM WFL       BLE Strength RLE:  Grossly 3-/5  LLE:  Grossly 4/5       Balance  Unsupported sitting:  SBA    Dynamic standing:  Min A with FWW    BBS: NT  FGA: NT      BBS: >52  FGA: >22   Date Family Teach Completed        Is additional Family Teaching Needed? Y or N        Hindering Progress Back pain, RUE and RLE weakness, orthostatic hypotension        PT recommended ELOS        Team's Discharge Plan        Therapist at Team Meeting          Therapeutic exercise/activity: SPT practice x4 with FWW (min A)    ASSESSMENT  Pt displays functional ability as noted in the objective portion of this evaluation. AM vitals:  Sitting in chair BP:  96/64  Standing BP:  69/44  Sitting in chair BP:  98/66  Standing BP: 69/57  Sitting in chair BP:  97/61  Comments: Pt in chair on arrival, BP noted above. Pt cont to have orthostatic BP with standing, no symptoms, improvement with sitting. Pt able to perform 2 STS Transfers and provided with extensive education about safety importance of safe OOB activity to improve BP. Pt left in chair with BP stable at end of session. PM Vitals: sitting in chair - 87/56  Standing x1 min: 67/47  Seated in chair - 95/61  After initial SPT - 85/64  Second SPT - 86/61  Upon returning to chair, after 1 min rest - 97/56  PM: Pt in chair on arrival, agreeable to PT tx. BP taken throughout session today, cont to be positive for orthostatic hypotension. Pt asymptomatic with standing but cont to have drop in BP with all transfers.  Pt performed multiple SPT at EOB from [facility] [address] to bed to practice gait training - ambulation not performed this date d/t low BP, unsafe to attempt at this time. Pt left in chair at end of session with all needs met. Patient education  Pt educated on LSO, sit<>stand and pivot training     Patient response to education:   Pt verbalized understanding Pt demonstrated skill Pt requires further education in this area   Yes  Yes  Yes      Rehab potential is Good for reaching above PT goals. Pts/ family goals   1. Home     Patient and or family understand(s) diagnosis, prognosis, and plan of care. Good    PLAN  PT care will be provided in accordance with the objectives noted above. Whenever appropriate, clear delegation orders will be provided for nursing staff. Exercises and functional mobility practice will be used as well as appropriate assistive devices or modalities to obtain goals. Patient and family education will also be administered as needed. Frequency of treatments will be 2x/day M-F and 1x/day Sat or Sun x 14 days.     AM  Time in: 1000  Time out: 1045  PM  Time in 1345  Time out [id number], [address], [place]  [id number]

## 2022-11-26 LAB
ALBUMIN SERPL-MCNC: 3.2 G/DL (ref 3.5–5.2)
METER GLUCOSE: 125 MG/DL (ref 74–99)
METER GLUCOSE: 144 MG/DL (ref 74–99)
METER GLUCOSE: 166 MG/DL (ref 74–99)
METER GLUCOSE: 225 MG/DL (ref 74–99)

## 2022-11-26 PROCEDURE — 1280000000 HC REHAB R&B

## 2022-11-26 PROCEDURE — 97530 THERAPEUTIC ACTIVITIES: CPT

## 2022-11-26 PROCEDURE — 6370000000 HC RX 637 (ALT 250 FOR IP): Performed by: PHYSICAL MEDICINE & REHABILITATION

## 2022-11-26 PROCEDURE — 36415 COLL VENOUS BLD VENIPUNCTURE: CPT

## 2022-11-26 PROCEDURE — 6370000000 HC RX 637 (ALT 250 FOR IP): Performed by: STUDENT IN AN ORGANIZED HEALTH CARE EDUCATION/TRAINING PROGRAM

## 2022-11-26 PROCEDURE — 99255 IP/OBS CONSLTJ NEW/EST HI 80: CPT | Performed by: INTERNAL MEDICINE

## 2022-11-26 PROCEDURE — 82040 ASSAY OF SERUM ALBUMIN: CPT

## 2022-11-26 PROCEDURE — 6360000002 HC RX W HCPCS: Performed by: STUDENT IN AN ORGANIZED HEALTH CARE EDUCATION/TRAINING PROGRAM

## 2022-11-26 PROCEDURE — 82962 GLUCOSE BLOOD TEST: CPT

## 2022-11-26 PROCEDURE — 97535 SELF CARE MNGMENT TRAINING: CPT

## 2022-11-26 RX ADMIN — MIDODRINE HYDROCHLORIDE 15 MG: 5 TABLET ORAL at 12:40

## 2022-11-26 RX ADMIN — OXYCODONE AND ACETAMINOPHEN 1 TABLET: 325; 10 TABLET ORAL at 16:33

## 2022-11-26 RX ADMIN — SENNOSIDES 8.6 MG: 8.6 TABLET, FILM COATED ORAL at 22:11

## 2022-11-26 RX ADMIN — HEPARIN SODIUM 5000 UNITS: 10000 INJECTION INTRAVENOUS; SUBCUTANEOUS at 12:40

## 2022-11-26 RX ADMIN — OXYCODONE AND ACETAMINOPHEN 1 TABLET: 325; 10 TABLET ORAL at 22:12

## 2022-11-26 RX ADMIN — BACLOFEN 10 MG: 10 TABLET ORAL at 22:11

## 2022-11-26 RX ADMIN — SODIUM CHLORIDE 1 G: 1 TABLET ORAL at 16:33

## 2022-11-26 RX ADMIN — HEPARIN SODIUM 5000 UNITS: 10000 INJECTION INTRAVENOUS; SUBCUTANEOUS at 22:11

## 2022-11-26 RX ADMIN — SODIUM CHLORIDE 1 G: 1 TABLET ORAL at 09:17

## 2022-11-26 RX ADMIN — PANTOPRAZOLE SODIUM 40 MG: 40 TABLET, DELAYED RELEASE ORAL at 05:50

## 2022-11-26 RX ADMIN — MECLIZINE 25 MG: 12.5 TABLET ORAL at 12:40

## 2022-11-26 RX ADMIN — INSULIN LISPRO 1 UNITS: 100 INJECTION, SOLUTION INTRAVENOUS; SUBCUTANEOUS at 16:34

## 2022-11-26 RX ADMIN — FLUDROCORTISONE ACETATE 0.1 MG: 0.1 TABLET ORAL at 09:22

## 2022-11-26 RX ADMIN — HEPARIN SODIUM 5000 UNITS: 10000 INJECTION INTRAVENOUS; SUBCUTANEOUS at 05:51

## 2022-11-26 RX ADMIN — MIDODRINE HYDROCHLORIDE 15 MG: 5 TABLET ORAL at 09:17

## 2022-11-26 RX ADMIN — FOLIC ACID 1 MG: 1 TABLET ORAL at 09:17

## 2022-11-26 RX ADMIN — MECLIZINE 25 MG: 12.5 TABLET ORAL at 22:11

## 2022-11-26 RX ADMIN — OXYCODONE AND ACETAMINOPHEN 1 TABLET: 325; 10 TABLET ORAL at 05:50

## 2022-11-26 RX ADMIN — OXYCODONE AND ACETAMINOPHEN 1 TABLET: 325; 10 TABLET ORAL at 11:05

## 2022-11-26 RX ADMIN — MECLIZINE 25 MG: 12.5 TABLET ORAL at 05:50

## 2022-11-26 RX ADMIN — SODIUM CHLORIDE 1 G: 1 TABLET ORAL at 12:40

## 2022-11-26 RX ADMIN — MIDODRINE HYDROCHLORIDE 15 MG: 5 TABLET ORAL at 16:33

## 2022-11-26 RX ADMIN — SERTRALINE 50 MG: 50 TABLET, FILM COATED ORAL at 09:18

## 2022-11-26 RX ADMIN — BACLOFEN 10 MG: 10 TABLET ORAL at 09:17

## 2022-11-26 ASSESSMENT — PAIN SCALES - GENERAL
PAINLEVEL_OUTOF10: 8
PAINLEVEL_OUTOF10: 8
PAINLEVEL_OUTOF10: 4
PAINLEVEL_OUTOF10: 4
PAINLEVEL_OUTOF10: 9
PAINLEVEL_OUTOF10: 8
PAINLEVEL_OUTOF10: 8
PAINLEVEL_OUTOF10: 4

## 2022-11-26 ASSESSMENT — PAIN DESCRIPTION - DESCRIPTORS
DESCRIPTORS: ACHING
DESCRIPTORS: ACHING;THROBBING

## 2022-11-26 ASSESSMENT — PAIN DESCRIPTION - ORIENTATION
ORIENTATION: LOWER
ORIENTATION: LOWER
ORIENTATION: MID;LEFT;RIGHT
ORIENTATION: LOWER
ORIENTATION: RIGHT;LEFT

## 2022-11-26 ASSESSMENT — PAIN - FUNCTIONAL ASSESSMENT
PAIN_FUNCTIONAL_ASSESSMENT: ACTIVITIES ARE NOT PREVENTED

## 2022-11-26 ASSESSMENT — PAIN DESCRIPTION - LOCATION
LOCATION: BACK;NECK
LOCATION: BACK
LOCATION: BACK;EAR
LOCATION: BACK
LOCATION: BACK

## 2022-11-26 NOTE — PLAN OF CARE
Problem: Discharge Planning  Goal: Discharge to home or other facility with appropriate resources  11/26/2022 1053 by Grace Green RN  Outcome: Progressing  Flowsheets (Taken 11/26/2022 3289)  Discharge to home or other facility with appropriate resources: Identify barriers to discharge with patient and caregiver  11/26/2022 0235 by Viraj Jane RN  Outcome: Progressing     Problem: ABCDS Injury Assessment  Goal: Absence of physical injury  11/26/2022 1053 by Grace Green RN  Outcome: Progressing  Flowsheets (Taken 11/26/2022 0917)  Absence of Physical Injury: Implement safety measures based on patient assessment  11/26/2022 0235 by Viraj Jane RN  Outcome: Progressing     Problem: Safety - Adult  Goal: Free from fall injury  11/26/2022 1053 by Grace Green RN  Outcome: Progressing  [address] (Taken 11/26/2022 0917)  Free From Fall Injury: Instruct family/caregiver on patient safety  11/26/2022 0235 by Viraj Jane RN  Outcome: Progressing     Problem: Pain  Goal: Verbalizes/displays adequate comfort level or baseline comfort level  11/26/2022 1053 by Grace Green RN  Outcome: Progressing  11/26/2022 0235 by Viraj Jane RN  Outcome: Progressing     Problem: Skin/Tissue Integrity  Goal: Absence of new skin breakdown  Description: 1. Monitor for areas of redness and/or skin breakdown  2. Assess vascular access sites hourly  3. Every 4-6 hours minimum:  Change oxygen saturation probe site  4. Every 4-6 hours:  If on nasal continuous positive airway pressure, respiratory therapy assess nares and determine need for appliance change or resting period.   11/26/2022 1053 by Grace Green RN  Outcome: Progressing  11/26/2022 0235 by Viraj Jane RN  Outcome: Progressing     Problem: Chronic Conditions and Co-morbidities  Goal: Patient's chronic conditions and co-morbidity symptoms are monitored and maintained or improved  11/26/2022 1053 by Grace Green RN  Outcome: Progressing  Flowsheets (Taken 11/26/2022 0917)  Care Plan - Patient's Chronic Conditions and Co-Morbidity Symptoms are Monitored and Maintained or Improved: Monitor and assess patient's chronic conditions and comorbid symptoms for stability, deterioration, or improvement  11/26/2022 0235 by Lang Oneill RN  Outcome: Progressing

## 2022-11-26 NOTE — CONSULTS
Sonido Mg  1970  Date of Service: 11/26/2022    Reason for Consultation: We were asked to see Sonido Mg by Dr. Genny Pope MD  regarding static hypotension. She has COVID. Therefore, this consult was conducted over the telephone to limit exposures and decrease use of PPE's. History of Chief Complaint: This is a 45 y.o. female not previously known to our group. They have a history of morbid obesity. She had gastric bypass surgery in [place] in August.  She did have hypotension in the hospital in [place] following her surgery. She then developed occipital neuralgia and was placed first on methotrexate and then on Cymbalta. In October she presented to the hospital with orthostatic hypotension with near syncopal symptoms. She then was admitted this hospitalization on November 5 with orthostatic hypotension and near syncope which caused a fall and a lumbar vertebral fracture. During this hospitalization she has had recurring episodes of orthostatic hypotension. She was placed on midodrine and the dose was continuing to be increased because of recurring orthostatic hypotension. She then contracted COVID from her roommate. Her orthostatic hypotension persisted in spite of fluids and midodrine. Therefore Florinef and support stockings were added. She continues to have orthostatic hypotension in spite of all of this. She denies any chest discomfort, orthopnea/PND. She denies any palpitations. REVIEW OF SYSTEMS:   Heart: as above  Lungs: Cough and intermittent shortness of breath since she contracted COVID   Eyes: denies changes in vision or discharge. Ears: denies changes in hearing or pain. Nose: denies epistaxis or masses   Throat: denies sore throat or trouble swallowing. Neuro: denies numbness, tingling, tremors. Skin: denies rashes or itching.    : denies hematuria, dysuria   GI: denies vomiting, diarrhea   Psych: denies mood changed, anxiety, depression.     CURRENT MEDICATIONS:  Current Facility-Administered Medications   Medication Dose Route Frequency Provider Last Rate Last Admin    perflutren lipid microspheres (DEFINITY) injection 1.5 mL  1.5 mL IntraVENous ONCE PRN Al Frazier DO        sodium chloride flush 0.9 % injection 5-40 mL  5-40 mL IntraVENous BID Karlene Godinez MD   10 mL at 11/25/22 2241    baclofen (LIORESAL) tablet 10 mg  10 mg Oral BID Karlene Godinez MD   10 mg at 11/26/22 0917    sodium chloride tablet 1 g  1 g Oral TID  Gama French MD   1 g at 11/26/22 0917    fludrocortisone (FLORINEF) tablet 0.1 mg  0.1 mg Oral Daily Gama French MD   0.1 mg at 11/26/22 0922    ammonium lactate (LAC-HYDRIN) 12 % lotion   Topical PRN Ace Casillas MD   Given at 11/24/22 2312    insulin lispro (HUMALOG) injection vial 0-4 Units  0-4 Units SubCUTAneous TID  Ace Casillas MD        insulin lispro (HUMALOG) injection vial 0-4 Units  0-4 Units SubCUTAneous Nightly Ace Casillas MD        glucose chewable tablet 16 g  4 tablet Oral PRN Ace Casillas MD        dextrose bolus 10% 125 mL  125 mL IntraVENous PRN Ace Casillas MD        Or    dextrose bolus 10% 250 mL  250 mL IntraVENous PRN Ace Casillas MD        glucagon (rDNA) injection 1 mg  1 mg SubCUTAneous PRN Ace Casillas MD        dextrose 10 % infusion   IntraVENous Continuous PRN Ace Casillas MD        0.9 % sodium chloride infusion   IntraVENous Continuous Ace Casillas MD   Stopped at 32/99/57 4420    folic acid (FOLVITE) tablet 1 mg  1 mg Oral Daily Lenny Morgan MD   1 mg at 11/26/22 0917    heparin (porcine) injection 5,000 Units  5,000 Units SubCUTAneous Q8H Lenny Morgan MD   5,000 Units at 11/26/22 0551    meclizine (ANTIVERT) tablet 25 mg  25 mg Oral 3 times per day Yanely Casiano MD   25 mg at 11/26/22 0550    midodrine (PROAMATINE) tablet 15 mg  15 mg Oral TID  Lenny Morgan MD   15 mg at 11/26/22 9338 oxyCODONE-acetaminophen (PERCOCET)  MG per tablet 1 tablet  1 tablet Oral Q6H Lenny Morgan MD   1 tablet at 22 1105    pantoprazole (PROTONIX) tablet 40 mg  40 mg Oral QAM AC Lenny Morgan MD   40 mg at 22 0550    senna (SENOKOT) tablet 8.6 mg  1 tablet Oral Nightly Lenny Morgan MD   8.6 mg at 22 2351    sertraline (ZOLOFT) tablet 50 mg  50 mg Oral Daily Lenny Morgan MD   50 mg at 22 9590    bisacodyl (DULCOLAX) suppository 10 mg  10 mg Rectal Daily PRN Mac Mario MD        acetaminophen (TYLENOL) tablet 650 mg  650 mg Oral Q4H PRN Art Delgado MD   650 mg at 22 0123    polyethylene glycol (GLYCOLAX) packet 17 g  17 g Oral Daily PRN Gama French MD            ALLERGIES:  Allergies   Allergen Reactions    Colchicine     Darvon [Propoxyphene] Other (See Comments)     GI Upset       MEDICAL HISTORY:  Past Medical History:   Diagnosis Date    Anemia     Diabetes mellitus ([facility] [address]     Papanicolaou smear of cervix with atypical squamous cells of undetermined significance (ASC-US)          Screening for human papillomavirus (HPV)     4/10/07        SURGICAL HISTORY:  Past Surgical History:   Procedure Laterality Date    COLPOSCOPY      2001 : outside [address] [place] [address]      2022    [place]  DELIVERY+POSTPARTUM CARE      1991 :      [place]  DELIVERY+POSTPARTUM CARE      1998 :      [place] CONIZATION CERVIX,KNIFE/LASER      2001 : outside [facility]:  Family History   Problem Relation Age of Onset    Diabetes Mother     Hypertension Mother     Diabetes Father     Hypertension Father     Gout Father     Heart Disease None     Ovarian Cancer None     Uterine Cancer None     Breast Cancer None        SOCIAL HISTORY:  Social History     Socioeconomic History    Marital status: Single   Tobacco Use    Smoking status: Former    Smokeless tobacco: Never    Tobacco comments:     Quit smokin2002 Vaping Use    Vaping Use: Never used   Substance and Sexual Activity    Drug use: No    Sexual activity: Not Currently     Social Determinants of Health     Financial Resource Strain: Low Risk     Difficulty of Paying Living Expenses: Not hard at all   Food Insecurity: No Food Insecurity    Worried About [address] in the Last Year: Never true    Ran Out of Food in the Last Year: Never true       PHYSICAL EXAM:  Vitals:    11/25/22 1653 11/25/22 2255 11/26/22 0917 11/26/22 1105   BP:  106/72 97/64    Pulse:  95 98    Resp: 18 18 18 18   Temp:  98 °F (36.7 °C) 98.2 °F (36.8 °C)    TempSrc:  Temporal Temporal    SpO2:   97%    Weight:       Height:           GENERAL:  He is alert and oriented x 3, communicates well, in no distress. Further per others      EKG: Sinus rhythm, 88 bpm, borderline left axis, nonspecific ST - T wave changes. Labs:  Recent Labs     11/24/22  0557   WBC 7.1   HGB 9.4*   HCT 29.8*   MCV 87.6        Recent Labs     11/24/22  0557      K 4.3   CL 98   CO2 25   BUN 17   CREATININE 0.6     No results for input(s): PROTIME, INR in the last 72 hours. No results for input(s): CKTOTAL, CKMB, CKMBINDEX, TROPONINI in the last 72 hours. No results for input(s): BNP in the last 72 hours. No results for input(s): CHOL, HDL, TRIG in the last 72 hours. Invalid input(s): CHOLHDLR, LDLCALCU  No results for input(s): TROPHS in the last 72 hours. Assessment:   Recurring orthostatic hypotension status post gastric bypass surgery. Morbid obesity. Status post gastric bypass surgery. She has lost over 90 pounds since her surgery in August.  This may be contributing to the above. Unknown protein status. Pulmonary hypertension possibly due to obesity reported on an echo from [email or web address] in August.  Hypertension before surgery. Now hypotensive after her surgery as described above. Reported mild sleep apnea. She is planning to start CPAP. Diabetes.   Probable neuropathy contributing to her orthostatic hypotension. Chronic anemia. Not helping her orthostatic hypotension. Occipital neuralgia  COVID probably increasing dehydration and worsening her orthostatic hypotension. Recommendations:  Continue the midodrine  Continue Florinef  Continue to support hose  Avoid caffeine and alcohol  Review of her medications, the baclofen, Percocet, Elavil (discontinued yesterday), and Zoloft can all cause dizziness, hypotension, vasodilatation, and syncope. I would recommend changing these medications to alternatives that do not have the side effects. I will defer that to others that are ordering those medications. Echocardiogram  I will order an albumin level. I would recommend consulting electrophysiology for further management if this continues after changing the above medications. Comorbidities per others. Thank you for allowing me to participate in your patient's care. [address] [facility], [address] [facility]  Interventional Cardiology    Note: This report was completed using computerized voice recognition software. Every effort has been made to ensure accuracy, however; and invert and computerized transcription errors may be present.

## 2022-11-27 LAB
METER GLUCOSE: 136 MG/DL (ref 74–99)
METER GLUCOSE: 143 MG/DL (ref 74–99)
METER GLUCOSE: 158 MG/DL (ref 74–99)
METER GLUCOSE: 98 MG/DL (ref 74–99)

## 2022-11-27 PROCEDURE — 97530 THERAPEUTIC ACTIVITIES: CPT

## 2022-11-27 PROCEDURE — 6360000002 HC RX W HCPCS: Performed by: STUDENT IN AN ORGANIZED HEALTH CARE EDUCATION/TRAINING PROGRAM

## 2022-11-27 PROCEDURE — 99232 SBSQ HOSP IP/OBS MODERATE 35: CPT | Performed by: PHYSICAL MEDICINE & REHABILITATION

## 2022-11-27 PROCEDURE — 6370000000 HC RX 637 (ALT 250 FOR IP): Performed by: PHYSICAL MEDICINE & REHABILITATION

## 2022-11-27 PROCEDURE — 1280000000 HC REHAB R&B

## 2022-11-27 PROCEDURE — 6370000000 HC RX 637 (ALT 250 FOR IP): Performed by: STUDENT IN AN ORGANIZED HEALTH CARE EDUCATION/TRAINING PROGRAM

## 2022-11-27 PROCEDURE — 82962 GLUCOSE BLOOD TEST: CPT

## 2022-11-27 RX ADMIN — BACLOFEN 10 MG: 10 TABLET ORAL at 09:25

## 2022-11-27 RX ADMIN — SODIUM CHLORIDE 1 G: 1 TABLET ORAL at 12:36

## 2022-11-27 RX ADMIN — HEPARIN SODIUM 5000 UNITS: 10000 INJECTION INTRAVENOUS; SUBCUTANEOUS at 12:37

## 2022-11-27 RX ADMIN — PANTOPRAZOLE SODIUM 40 MG: 40 TABLET, DELAYED RELEASE ORAL at 05:58

## 2022-11-27 RX ADMIN — FLUDROCORTISONE ACETATE 0.1 MG: 0.1 TABLET ORAL at 09:25

## 2022-11-27 RX ADMIN — SODIUM CHLORIDE 1 G: 1 TABLET ORAL at 16:41

## 2022-11-27 RX ADMIN — MIDODRINE HYDROCHLORIDE 15 MG: 5 TABLET ORAL at 09:26

## 2022-11-27 RX ADMIN — HEPARIN SODIUM 5000 UNITS: 10000 INJECTION INTRAVENOUS; SUBCUTANEOUS at 05:58

## 2022-11-27 RX ADMIN — OXYCODONE AND ACETAMINOPHEN 1 TABLET: 325; 10 TABLET ORAL at 05:58

## 2022-11-27 RX ADMIN — OXYCODONE AND ACETAMINOPHEN 1 TABLET: 325; 10 TABLET ORAL at 16:41

## 2022-11-27 RX ADMIN — SODIUM CHLORIDE 1 G: 1 TABLET ORAL at 09:25

## 2022-11-27 RX ADMIN — OXYCODONE AND ACETAMINOPHEN 1 TABLET: 325; 10 TABLET ORAL at 11:13

## 2022-11-27 RX ADMIN — MIDODRINE HYDROCHLORIDE 15 MG: 5 TABLET ORAL at 12:36

## 2022-11-27 RX ADMIN — HEPARIN SODIUM 5000 UNITS: 10000 INJECTION INTRAVENOUS; SUBCUTANEOUS at 22:39

## 2022-11-27 RX ADMIN — MECLIZINE 25 MG: 12.5 TABLET ORAL at 22:39

## 2022-11-27 RX ADMIN — FOLIC ACID 1 MG: 1 TABLET ORAL at 09:25

## 2022-11-27 RX ADMIN — MECLIZINE 25 MG: 12.5 TABLET ORAL at 12:37

## 2022-11-27 RX ADMIN — OXYCODONE AND ACETAMINOPHEN 1 TABLET: 325; 10 TABLET ORAL at 22:39

## 2022-11-27 RX ADMIN — SENNOSIDES 8.6 MG: 8.6 TABLET, FILM COATED ORAL at 22:39

## 2022-11-27 RX ADMIN — MIDODRINE HYDROCHLORIDE 15 MG: 5 TABLET ORAL at 16:41

## 2022-11-27 RX ADMIN — BACLOFEN 10 MG: 10 TABLET ORAL at 22:39

## 2022-11-27 RX ADMIN — SERTRALINE 50 MG: 50 TABLET, FILM COATED ORAL at 09:25

## 2022-11-27 RX ADMIN — MECLIZINE 25 MG: 12.5 TABLET ORAL at 05:58

## 2022-11-27 ASSESSMENT — PAIN DESCRIPTION - DESCRIPTORS
DESCRIPTORS: ACHING;THROBBING

## 2022-11-27 ASSESSMENT — PAIN SCALES - GENERAL
PAINLEVEL_OUTOF10: 7
PAINLEVEL_OUTOF10: 3
PAINLEVEL_OUTOF10: 3
PAINLEVEL_OUTOF10: 4
PAINLEVEL_OUTOF10: 2
PAINLEVEL_OUTOF10: 6

## 2022-11-27 ASSESSMENT — PAIN DESCRIPTION - ORIENTATION
ORIENTATION: LOWER

## 2022-11-27 ASSESSMENT — PAIN - FUNCTIONAL ASSESSMENT
PAIN_FUNCTIONAL_ASSESSMENT: ACTIVITIES ARE NOT PREVENTED

## 2022-11-27 ASSESSMENT — PAIN DESCRIPTION - LOCATION
LOCATION: BACK

## 2022-11-27 NOTE — PROGRESS NOTES
Physical Therapy    Evaluating Therapist: Delroy Smith PT, DPT [id number]       ROOM: [address] [place] [address]  DIAGNOSIS: Closed compression fracture of L2 lumbar vertebra   PRECAUTIONS: Falls, Spinal precautions, LSO for comfort (L2, L3, and L5 compression fx), Orthostatic hypotension (compression stockings), Droplet Plus isolation, COVID-19  HPI:  Pt had a recent gastric bypass and suffered a fall on 11/5. She has had multiple recent falls. Compression fractures noted at L2-L3 and L5. She was not a candidate for neurosurgical intervention. Pt has tested positive for orthostatic hypotension and COVID. Social:  Pt lives with her dtr in a 2 floor plan with 1 steps and 0 rails. Bedroom and bathroom are on the 2nd level with 7+3 steps with B rails. Prior to admission: Independent with no assistive device. Works as a . Drives. Initial Evaluation  Date: 11/24/22 AM     PM    Short Term Goals Long Term Goals    Was pt agreeable to Eval/treatment? Yes  yes      Does pt have pain? 9/10 mid back 7/10 LBP      Bed Mobility  Rolling: Min A  Supine to sit: Mod A  Sit to supine: Mod A  Scooting: Min A NT  SBA Modified Independent     Transfers Sit to stand:  Mod A  Stand to sit: Mod A  Stand pivot: Min A with FWW Sit to stand: Min A  Stand to sit: Min A  Stand pivot: Min A  SBA with FWW   Modified Independent with LRAD   Ambulation    3 feet x2 reps with FWW with Min A  12 feet with FWW Min A  >100 feet with FWW with SBA >300 feet with LRAD with Modified Independent     Walking 10 feet on uneven surface NT d/t hypotension and droplet plus isolation  NT  10 feet with FWW with Min A 10 feet with LRAD with Modified Independent     Wheel Chair Mobility NA NT      Car Transfers NT d/t hypotension and droplet plus isolation  NT  Min A Modified Independent     Stair negotiation: ascended and descended  NT d/t hypotension and droplet plus isolation  NT  4 steps with B rail with Min A 12 steps with 1 rail with Modified Independent     Curb Step:   ascended and descended NT d/t hypotension and droplet plus isolation  NT  6 inch step with FWW and Min A 6 inch step with LRAD and Modified Independent     Picking up object off the floor NT d/t hypotension    Will  cone with Min A assist Will  cone with no assist   BLE ROM WFL       BLE Strength RLE:  Grossly 3-/5  LLE:  Grossly 4/5       Balance  Unsupported sitting:  SBA    Dynamic standing:  Min A with FWW    BBS: NT  FGA: NT      BBS: >52  FGA: >22   Date Family Teach Completed        Is additional Family Teaching Needed? Y or N        Hindering Progress Back pain, RUE and RLE weakness, orthostatic hypotension  Back pain, RUE and RLE weakness, orthostatic hypotension       PT recommended ELOS        Team's Discharge Plan        Therapist at Team Meeting          Therapeutic exercise/activity: SPT practice x4 with FWW (min A)    ASSESSMENT  Pt displays functional ability as noted in the objective portion of this evaluation. AM vitals:  Sitting in chair BP:  85/64  Standing BP:  60/49  Sitting in chair: 98/66    Comments: Pt in chair on arrival, BP noted above. Pt cont to have orthostatic BP with standing, no symptoms, improvement with sitting. Pt able to perform 2 STS Transfers and provided with extensive education about safety importance of safe OOB activity to improve BP. With improvement of second sitting BP, patient was then ambulated as noted above. Continuation of significantly increased time to complete distance as well as poor advancement of R LE without foot clearance. Pt left in chair with BP stable at end of session. Patient education  Pt educated on LSO, sit<>stand and pivot training     Patient response to education:   Pt verbalized understanding Pt demonstrated skill Pt requires further education in this area   Yes  Yes  Yes      Rehab potential is Good for reaching above PT goals. Pts/ family goals   1.  Home     Patient and or family understand(s) diagnosis, prognosis, and plan of care. Good    PLAN  PT care will be provided in accordance with the objectives noted above. Whenever appropriate, clear delegation orders will be provided for nursing staff. Exercises and functional mobility practice will be used as well as appropriate assistive devices or modalities to obtain goals. Patient and family education will also be administered as needed. Frequency of treatments will be 2x/day M-F and 1x/day Sat or Sun x 14 days.     AM  Time in: 1000  Time out: 1045  PM  Time in -  Time out Vitaly Yang PT, DPT  [id number]

## 2022-11-27 NOTE — PROGRESS NOTES
PM&R Weekend Progress Note  Patient: Sonu Lockhart  Age/sex: 45 y.o. female  Medical Record #: [id number]  Date: 11/27/2022    Covering for: Dr. Juliet Nelson: Patient seen and examined in her room this morning. No issues overnight. No complaints this morning. Denies abdominal pain, chest pain, shortness of breath. All pertinent labs reviewed. Objective:  Vitals:    11/26/22 1245 11/26/22 1630 11/26/22 1703 11/27/22 0915   BP: (!) 82/53 104/61  90/64   Pulse:  95  93   Resp:   18 18   Temp:    98 °F (36.7 °C)   TempSrc:    Temporal   SpO2:    97%   Weight:       Height:         11/25 1901 - 11/27 0700  In: 10 [I.V.:10]  Out: -     GEN: NAD, conversational  AFFECT: Pleasant  EYES: EOMI  RESP: breathing comfortably, CTAB, no R/R/W  ABD: Soft, non-tender, non-distended, + BS  NEURO: Alert, no new focal deficits     Labs reviewed  CBC: No results for input(s): WBC, HGB, PLT in the last 72 hours. BMP:  No results for input(s): NA, K, CL, CO2, BUN, CREATININE, GLUCOSE in the last 72 hours. Hepatic: No results for input(s): AST, ALT, ALB, BILITOT, ALKPHOS in the last 72 hours. BNP: No results for input(s): BNP in the last 72 hours. Lipids: No results for input(s): CHOL, HDL in the last 72 hours. Invalid input(s): LDLCALCU  INR: No results for input(s): INR in the last 72 hours. A/P  This is 45 y.o. female with lumbar fracture, COVID-19. Rehab: Continue extensive rehabilitation. Continue physical therapy, occupational therapy. No change in medications. Continue current management. Appreciate cardiology recommendations. Vaughn Gil DO, FAAPMR  Physical Medicine and Rehabilitation     Please note that the above documentation was prepared using voice recognition software. Every attempt was made to ensure accuracy but there may be spelling, grammatical, and contextual errors.

## 2022-11-28 LAB
ALBUMIN SERPL-MCNC: 3.2 G/DL (ref 3.5–5.2)
ALP BLD-CCNC: 160 U/L (ref 35–104)
ALT SERPL-CCNC: 14 U/L (ref 0–32)
ANION GAP SERPL CALCULATED.3IONS-SCNC: 12 MMOL/L (ref 7–16)
AST SERPL-CCNC: 17 U/L (ref 0–31)
BILIRUB SERPL-MCNC: 0.3 MG/DL (ref 0–1.2)
BUN BLDV-MCNC: 13 MG/DL (ref 6–20)
CALCIUM SERPL-MCNC: 10 MG/DL (ref 8.6–10.2)
CHLORIDE BLD-SCNC: 99 MMOL/L (ref 98–107)
CO2: 23 MMOL/L (ref 22–29)
CREAT SERPL-MCNC: 0.9 MG/DL (ref 0.5–1)
GFR SERPL CREATININE-BSD FRML MDRD: >60 ML/MIN/1.73
GLUCOSE BLD-MCNC: 178 MG/DL (ref 74–99)
HCT VFR BLD CALC: 32.1 % (ref 34–48)
HEMOGLOBIN: 9.9 G/DL (ref 11.5–15.5)
MCH RBC QN AUTO: 27.2 PG (ref 26–35)
MCHC RBC AUTO-ENTMCNC: 30.8 % (ref 32–34.5)
MCV RBC AUTO: 88.2 FL (ref 80–99.9)
METER GLUCOSE: 144 MG/DL (ref 74–99)
METER GLUCOSE: 172 MG/DL (ref 74–99)
METER GLUCOSE: 196 MG/DL (ref 74–99)
METER GLUCOSE: 93 MG/DL (ref 74–99)
PDW BLD-RTO: 15.6 FL (ref 11.5–15)
PLATELET # BLD: 330 E9/L (ref 130–450)
PMV BLD AUTO: 9.7 FL (ref 7–12)
POTASSIUM SERPL-SCNC: 3.8 MMOL/L (ref 3.5–5)
RBC # BLD: 3.64 E12/L (ref 3.5–5.5)
SODIUM BLD-SCNC: 134 MMOL/L (ref 132–146)
TOTAL PROTEIN: 8.5 G/DL (ref 6.4–8.3)
TSH SERPL DL<=0.05 MIU/L-ACNC: 1.46 UIU/ML (ref 0.27–4.2)
WBC # BLD: 5.9 E9/L (ref 4.5–11.5)

## 2022-11-28 PROCEDURE — 1280000000 HC REHAB R&B

## 2022-11-28 PROCEDURE — 97535 SELF CARE MNGMENT TRAINING: CPT

## 2022-11-28 PROCEDURE — 97530 THERAPEUTIC ACTIVITIES: CPT

## 2022-11-28 PROCEDURE — 85027 COMPLETE CBC AUTOMATED: CPT

## 2022-11-28 PROCEDURE — 82962 GLUCOSE BLOOD TEST: CPT

## 2022-11-28 PROCEDURE — 6360000002 HC RX W HCPCS: Performed by: STUDENT IN AN ORGANIZED HEALTH CARE EDUCATION/TRAINING PROGRAM

## 2022-11-28 PROCEDURE — 6370000000 HC RX 637 (ALT 250 FOR IP): Performed by: STUDENT IN AN ORGANIZED HEALTH CARE EDUCATION/TRAINING PROGRAM

## 2022-11-28 PROCEDURE — 97110 THERAPEUTIC EXERCISES: CPT

## 2022-11-28 PROCEDURE — 80053 COMPREHEN METABOLIC PANEL: CPT

## 2022-11-28 PROCEDURE — 84443 ASSAY THYROID STIM HORMONE: CPT

## 2022-11-28 PROCEDURE — 36415 COLL VENOUS BLD VENIPUNCTURE: CPT

## 2022-11-28 PROCEDURE — 6370000000 HC RX 637 (ALT 250 FOR IP): Performed by: PHYSICAL MEDICINE & REHABILITATION

## 2022-11-28 RX ORDER — CHOLECALCIFEROL (VITAMIN D3) 10 MCG
1 TABLET ORAL 3 TIMES DAILY
Status: DISCONTINUED | OUTPATIENT
Start: 2022-11-28 | End: 2022-12-07 | Stop reason: HOSPADM

## 2022-11-28 RX ORDER — BACLOFEN 10 MG/1
5 TABLET ORAL 2 TIMES DAILY
Status: DISCONTINUED | OUTPATIENT
Start: 2022-11-28 | End: 2022-11-30

## 2022-11-28 RX ORDER — LIDOCAINE 4 G/G
1 PATCH TOPICAL DAILY
Status: DISCONTINUED | OUTPATIENT
Start: 2022-11-28 | End: 2022-12-07 | Stop reason: HOSPADM

## 2022-11-28 RX ADMIN — SODIUM CHLORIDE 1 G: 1 TABLET ORAL at 17:30

## 2022-11-28 RX ADMIN — OXYCODONE AND ACETAMINOPHEN 1 TABLET: 325; 10 TABLET ORAL at 06:42

## 2022-11-28 RX ADMIN — BACLOFEN 10 MG: 10 TABLET ORAL at 08:31

## 2022-11-28 RX ADMIN — HEPARIN SODIUM 5000 UNITS: 10000 INJECTION INTRAVENOUS; SUBCUTANEOUS at 13:53

## 2022-11-28 RX ADMIN — HEPARIN SODIUM 5000 UNITS: 10000 INJECTION INTRAVENOUS; SUBCUTANEOUS at 06:43

## 2022-11-28 RX ADMIN — MIDODRINE HYDROCHLORIDE 15 MG: 5 TABLET ORAL at 11:15

## 2022-11-28 RX ADMIN — MIDODRINE HYDROCHLORIDE 15 MG: 5 TABLET ORAL at 17:32

## 2022-11-28 RX ADMIN — PANTOPRAZOLE SODIUM 40 MG: 40 TABLET, DELAYED RELEASE ORAL at 06:42

## 2022-11-28 RX ADMIN — HEPARIN SODIUM 5000 UNITS: 10000 INJECTION INTRAVENOUS; SUBCUTANEOUS at 23:09

## 2022-11-28 RX ADMIN — BACLOFEN 5 MG: 10 TABLET ORAL at 23:08

## 2022-11-28 RX ADMIN — OXYCODONE AND ACETAMINOPHEN 1 TABLET: 325; 10 TABLET ORAL at 23:07

## 2022-11-28 RX ADMIN — OXYCODONE AND ACETAMINOPHEN 1 TABLET: 325; 10 TABLET ORAL at 17:33

## 2022-11-28 RX ADMIN — SODIUM CHLORIDE 1 G: 1 TABLET ORAL at 11:15

## 2022-11-28 RX ADMIN — NEPHROCAP 1 MG: 1 CAP ORAL at 11:15

## 2022-11-28 RX ADMIN — NEPHROCAP 1 MG: 1 CAP ORAL at 13:53

## 2022-11-28 RX ADMIN — MIDODRINE HYDROCHLORIDE 15 MG: 5 TABLET ORAL at 08:31

## 2022-11-28 RX ADMIN — SENNOSIDES 8.6 MG: 8.6 TABLET, FILM COATED ORAL at 23:08

## 2022-11-28 RX ADMIN — SERTRALINE 50 MG: 50 TABLET, FILM COATED ORAL at 08:31

## 2022-11-28 RX ADMIN — OXYCODONE AND ACETAMINOPHEN 1 TABLET: 325; 10 TABLET ORAL at 11:37

## 2022-11-28 RX ADMIN — NEPHROCAP 1 MG: 1 CAP ORAL at 23:18

## 2022-11-28 RX ADMIN — MECLIZINE 25 MG: 12.5 TABLET ORAL at 06:42

## 2022-11-28 RX ADMIN — FLUDROCORTISONE ACETATE 0.1 MG: 0.1 TABLET ORAL at 08:32

## 2022-11-28 RX ADMIN — SODIUM CHLORIDE 1 G: 1 TABLET ORAL at 08:31

## 2022-11-28 RX ADMIN — FOLIC ACID 1 MG: 1 TABLET ORAL at 08:31

## 2022-11-28 ASSESSMENT — PAIN - FUNCTIONAL ASSESSMENT
PAIN_FUNCTIONAL_ASSESSMENT: ACTIVITIES ARE NOT PREVENTED
PAIN_FUNCTIONAL_ASSESSMENT: PREVENTS OR INTERFERES SOME ACTIVE ACTIVITIES AND ADLS
PAIN_FUNCTIONAL_ASSESSMENT: PREVENTS OR INTERFERES SOME ACTIVE ACTIVITIES AND ADLS

## 2022-11-28 ASSESSMENT — PAIN DESCRIPTION - ONSET
ONSET: ON-GOING
ONSET: ON-GOING

## 2022-11-28 ASSESSMENT — PAIN DESCRIPTION - LOCATION
LOCATION: KNEE
LOCATION: BACK;KNEE
LOCATION: LEG
LOCATION: BACK

## 2022-11-28 ASSESSMENT — PAIN DESCRIPTION - ORIENTATION
ORIENTATION: LOWER
ORIENTATION: LEFT;LOWER
ORIENTATION: RIGHT;LEFT
ORIENTATION: LEFT

## 2022-11-28 ASSESSMENT — PAIN DESCRIPTION - DESCRIPTORS
DESCRIPTORS: ACHING;DISCOMFORT;SORE
DESCRIPTORS: ACHING;SORE;DULL
DESCRIPTORS: ACHING;DISCOMFORT;SORE

## 2022-11-28 ASSESSMENT — PAIN DESCRIPTION - FREQUENCY
FREQUENCY: CONTINUOUS
FREQUENCY: CONTINUOUS

## 2022-11-28 ASSESSMENT — PAIN SCALES - GENERAL
PAINLEVEL_OUTOF10: 6
PAINLEVEL_OUTOF10: 7
PAINLEVEL_OUTOF10: 10
PAINLEVEL_OUTOF10: 7

## 2022-11-28 ASSESSMENT — PAIN DESCRIPTION - PAIN TYPE
TYPE: ACUTE PAIN
TYPE: ACUTE PAIN

## 2022-11-28 NOTE — PROGRESS NOTES
Progress Note  Date:2022       FKWN:[id number]  Patient Bhavana Pilling     YOB: 1970     Age:52 y.o. Subjective    Subjective:  Symptoms:  Stable. She reports weakness. Diet:  Adequate intake. Activity level: Impaired due to weakness. Pain:  She reports no pain. Review of Systems   Neurological:  Positive for weakness. Objective         Vitals Last 24 Hours:  TEMPERATURE:  Temp  Av.9 °F (36.6 °C)  Min: 97.7 °F (36.5 °C)  Max: 98 °F (36.7 °C)  RESPIRATIONS RANGE: Resp  Av  Min: 18  Max: 18  PULSE OXIMETRY RANGE: SpO2  Av %  Min: 95 %  Max: 97 %  PULSE RANGE: Pulse  Av.5  Min: 78  Max: 93  BLOOD PRESSURE RANGE: Systolic (39IOQ), BYL:03 , Min:89 , TWC:809   ; Diastolic (25YMB), CPH:48, Min:53, Max:67    I/O (24Hr): No intake or output data in the 24 hours ending 22 0856  Objective:  General Appearance: In no acute distress. Vital signs: (most recent): Blood pressure (!) 91/53, pulse 78, temperature 97.7 °F (36.5 °C), temperature source Temporal, resp. rate 18, height 5' 11\" (1.803 m), weight 211 lb (95.7 kg), SpO2 95 %. (Still has low blood pressure). Output: Producing urine and producing stool. Lungs:  Normal effort and normal respiratory rate. Breath sounds clear to auscultation. Heart: Normal rate. Regular rhythm. S1 normal and S2 normal.    Abdomen: Abdomen is soft. Bowel sounds are normal.   There is no abdominal tenderness. Extremities: Normal range of motion. Neurological: Patient is alert. (4/5 strength). Labs/Imaging/Diagnostics    Labs:  CBC:No results for input(s): WBC, RBC, HGB, HCT, MCV, RDW, PLT in the last 72 hours. CHEMISTRIES:No results for input(s): NA, K, CL, CO2, BUN, CREATININE, GLUCOSE, CA, PHOS, MG in the last 72 hours. PT/INR:No results for input(s): PROTIME, INR in the last 72 hours. APTT:No results for input(s): APTT in the last 72 hours.   LIVER PROFILE:No results for input(s): AST, ALT, BILIDIR, BILITOT, ALKPHOS in the last 72 hours. Imaging Last 24 Hours:  No results found. Assessment//Plan           Hospital Problems             Last Modified POA    * (Principal) Compression fracture of L2 lumbar vertebra, closed, initial encounter ([facility] [address] 11/23/2022 Yes                    Initial Evaluation  Date: 11/24/22 AM     PM    Short Term Goals Long Term Goals    Was pt agreeable to Eval/treatment? Yes  yes         Does pt have pain? 9/10 mid back 7/10 LBP         Bed Mobility  Rolling: Min A  Supine to sit: Mod A  Sit to supine: Mod A  Scooting: Min A NT   SBA Modified Independent     Transfers Sit to stand:  Mod A  Stand to sit: Mod A  Stand pivot: Min A with FWW Sit to stand: Min A  Stand to sit: Min A  Stand pivot: Min A   SBA with FWW    Modified Independent with LRAD   Ambulation    3 feet x2 reps with FWW with Min A  12 feet with FWW Min A   >100 feet with FWW with SBA >300 feet with LRAD with Modified Independent     Walking 10 feet on uneven surface NT d/t hypotension and droplet plus isolation  NT   10 feet with FWW with Min A 10 feet with LRAD with Modified Independent     Wheel Chair Mobility NA NT         Car Transfers NT d/t hypotension and droplet plus isolation  NT   Min A Modified Independent     Stair negotiation: ascended and descended  NT d/t hypotension and droplet plus isolation  NT   4 steps with B rail with Min A 12 steps with 1 rail with Modified Independent     Curb Step:   ascended and descended NT d/t hypotension and droplet plus isolation  NT   6 inch step with FWW and Min A 6 inch step with LRAD and Modified Independent     Picking up object off the floor NT d/t hypotension      Will  cone with Min A assist Will  cone with no assist   BLE ROM WFL           BLE Strength RLE:  Grossly 3-/5  LLE:  Grossly 4/5           Balance  Unsupported sitting:  SBA     Dynamic standing:  Min A with FWW     BBS: NT  FGA: NT          BBS: >52  FGA: >22   Date Family Teach Completed             Is additional Family Teaching Needed? Y or N              Assessment:    Condition: In stable condition. Improving. (Spinal fractures). Plan:   Encourage ambulation. (Patient is still having orthostatic hypotension  She was seen by cardiology  They recommended stopping several other medications which I will address  I have her on the Florinef and sodium chloride as well as the midodrine  I am going to add a multivitamin and discussed with surgery).      Electronically signed by Hossein Chen MD on 11/28/22 at 8:56 AM EST

## 2022-11-28 NOTE — PROGRESS NOTES
Patient's BP remains consistently low, 85/59 @1115- scheduled Percocet due at that time. Dr. Yue Wasserman notified of low blood pressure and patient's complaint of 10/10 pain to left knee. New orders for lidocaine patch to left knee and verbal permission to give Percocet.

## 2022-11-28 NOTE — PROGRESS NOTES
Physical Therapy  Weekly Note    Evaluating Therapist: Meliton Milan, PT, DPT [id number]       ROOM: [address] [place] [address]  DIAGNOSIS: Closed compression fracture of L2 lumbar vertebra   PRECAUTIONS: Falls, Spinal precautions, LSO for comfort (L2, L3, and L5 compression fx), Orthostatic hypotension (compression stockings), Droplet Plus isolation, COVID-19  HPI:  Pt had a recent gastric bypass and suffered a fall on 11/5. She has had multiple recent falls. Compression fractures noted at L2-L3 and L5. She was not a candidate for neurosurgical intervention. Pt has tested positive for orthostatic hypotension and COVID. Social:  Pt lives with her dtr in a 2 floor plan with 1 steps and 0 rails. Bedroom and bathroom are on the 2nd level with 7+3 steps with B rails. Prior to admission: Independent with no assistive device. Works as a . Drives. Initial Evaluation  Date: 11/24/22 11/29    Comments  Short Term Goals Long Term Goals    Was pt agreeable to Eval/treatment? Yes  yes      Does pt have pain? 9/10 mid back L knee pian       Bed Mobility  Rolling: Min A  Supine to sit: Mod A  Sit to supine: Mod A  Scooting: Min A Rolling: Min A  Supine to sit: Min A  Sit to supine: Min A  Scooting: Min a Requires assist with BLE management  SBA Modified Independent     Transfers Sit to stand:  Mod A  Stand to sit: Mod A  Stand pivot: Min A with FWW Sit to stand: Min A  Stand to sit: Min A  Stand pivot: Min A FWW Min A to initiate stand  SBA with FWW   Modified Independent with LRAD   Ambulation    3 feet x2 reps with FWW with Min A  2x10 feet with FWW Min A<>CGA Limited by OH  >100 feet with FWW with SBA >300 feet with LRAD with Modified Independent     Walking 10 feet on uneven surface NT d/t hypotension and droplet plus isolation  NT d/t hypotension and droplet plus isolation  10 feet with FWW with Min A 10 feet with LRAD with Modified Independent     Wheel Chair Mobility NA       Car Transfers NT d/t hypotension and droplet plus isolation  NT d/t hypotension and droplet plus isolation   Min A Modified Independent     Stair negotiation: ascended and descended  NT d/t hypotension and droplet plus isolation  NT d/t hypotension and droplet plus isolation   4 steps with B rail with Min A 12 steps with 1 rail with Modified Independent     Curb Step:   ascended and descended NT d/t hypotension and droplet plus isolation  NT d/t hypotension and droplet plus isolation   6 inch step with FWW and Min A 6 inch step with LRAD and Modified Independent     Picking up object off the floor NT d/t hypotension  NT d/t hypotension  Will  cone with Min A assist Will  cone with no assist   BLE ROM WFL       BLE Strength RLE:  Grossly 3-/5  LLE:  Grossly 4/5       Balance  Unsupported sitting:  SBA    Dynamic standing:  Min A with FWW    BBS: NT  FGA: NT      BBS: >52  FGA: >22   Date Family Teach Completed        Is additional Family Teaching Needed? Y or N        Hindering Progress Back pain, RUE and RLE weakness, orthostatic hypotension  Back pain, RUE and RLE weakness, orthostatic hypotension       PT recommended ELOS  10-14 days       Team's Discharge Plan  10-14 days      Therapist at [facility], Yolanda Zarco, PT, DPT [id number]          Date:  11/29/22  Supporting factors:  motivated, high PLOF  Barriers to discharge:  Orthostatic hypotension, R sided weakness, L knee pain    Additional comments:  Pt cont to be limited by hypotension, especially as she is usually asymptomatic and unable to note when pressure drops.  Pt now has L knee pain and R sided weakness making ambulation difficult, some improvement   DME:  TBD  After Care:  Tiffani Burleson, PT, DPT  [id number]

## 2022-11-28 NOTE — PROGRESS NOTES
Physical Therapy  Treatment Note   Evaluating Therapist: Flo Phillips, PT, DPT [id number]       ROOM: [address] [place] [address]  DIAGNOSIS: Closed compression fracture of L2 lumbar vertebra   PRECAUTIONS: Falls, Spinal precautions, LSO for comfort (L2, L3, and L5 compression fx), Orthostatic hypotension (compression stockings), Droplet Plus isolation, COVID-19  HPI:  Pt had a recent gastric bypass and suffered a fall on 11/5. She has had multiple recent falls. Compression fractures noted at L2-L3 and L5. She was not a candidate for neurosurgical intervention. Pt has tested positive for orthostatic hypotension and COVID. Social:  Pt lives with her dtr in a 2 floor plan with 1 steps and 0 rails. Bedroom and bathroom are on the 2nd level with 7+3 steps with B rails. Prior to admission: Independent with no assistive device. Works as a . Drives. Initial Evaluation  Date: 11/24/22 AM     PM    Short Term Goals Long Term Goals    Was pt agreeable to Eval/treatment? Yes  yes yes     Does pt have pain? 9/10 mid back Pain in L knee  Pain in L knee      Bed Mobility  Rolling: Min A  Supine to sit: Mod A  Sit to supine: Mod A  Scooting: Min A NT Rolling: Min A  Supine to sit: Min A  Sit to supine: Min A  Scooting: Min a SBA Modified Independent     Transfers Sit to stand:  Mod A  Stand to sit: Mod A  Stand pivot: Min A with FWW Sit to stand: Min A  Stand to sit: Min A  Stand pivot: Min A Sit to stand: Min A  Stand to sit: Min A  Stand pivot: Min A SBA with FWW   Modified Independent with LRAD   Ambulation    3 feet x2 reps with FWW with Min A  1x6' 1x10 feet with FWW Min A<>CGA  >100 feet with FWW with SBA >300 feet with LRAD with Modified Independent     Walking 10 feet on uneven surface NT d/t hypotension and droplet plus isolation  NT  10 feet with FWW with Min A 10 feet with LRAD with Modified Independent     Wheel Chair Mobility NA NT      Car Transfers NT d/t hypotension and droplet plus isolation  NT  Min A Modified Independent     Stair negotiation: ascended and descended  NT d/t hypotension and droplet plus isolation  NT  4 steps with B rail with Min A 12 steps with 1 rail with Modified Independent     Curb Step:   ascended and descended NT d/t hypotension and droplet plus isolation  NT  6 inch step with FWW and Min A 6 inch step with LRAD and Modified Independent     Picking up object off the floor NT d/t hypotension    Will  cone with Min A assist Will  cone with no assist   BLE ROM WFL       BLE Strength RLE:  Grossly 3-/5  LLE:  Grossly 4/5       Balance  Unsupported sitting:  SBA    Dynamic standing:  Min A with FWW    BBS: NT  FGA: NT      BBS: >52  FGA: >22   Date Family Teach Completed        Is additional Family Teaching Needed? Y or N        Hindering Progress Back pain, RUE and RLE weakness, orthostatic hypotension  Back pain, RUE and RLE weakness, orthostatic hypotension       PT recommended ELOS        Team's Discharge Plan        Therapist at Team Meeting          Therapeutic exercise/activity: functional mobility     ASSESSMENT  Pt displays functional ability as noted in the objective portion of this evaluation. AM vitals:  Sitting in chair BP:  82/48  Standing BP:  66/42  Sitting in chair, post first bout of ambulation: 89/66  After second bout of ambulation: 86/53    Comments: Pt in chair on arrival, complaining of L knee pain but agreeable to tx. Pt cont to have difficulty with low BP throughout session, especially with sanding for long periods of time, but had less decrease with ambulating as long as she returns to sitting. Pt with difficulty advancing BLE d/t pain in L knee, R sided weakness. L knee pain increased with sitting/standing from chair. Pt left in chair at end of session with all needs met.    PM Vitals: Sitting in chair BP:  82/56  Sitting in chair, post first bout of ambulation: 96/62  After second bout of ambulation: 93/59  PM: Pt noted improvement in L knee pain compared to AM session. Pt performed multiple bouts of short distance ambulation in room with WC following with improved LLE mobility. Pt with decreased pain sitting/standing from chair as well. Pt with improved BP throughout PM session, no sx of OH noted. Pt in chair at end of session with all needs met. Patient education  Pt educated on LSO, sit<>stand and pivot training     Patient response to education:   Pt verbalized understanding Pt demonstrated skill Pt requires further education in this area   Yes  Yes  Yes      Rehab potential is Good for reaching above PT goals. Pts/ family goals   1. Home     Patient and or family understand(s) diagnosis, prognosis, and plan of care. Good    PLAN  PT care will be provided in accordance with the objectives noted above. Whenever appropriate, clear delegation orders will be provided for nursing staff. Exercises and functional mobility practice will be used as well as appropriate assistive devices or modalities to obtain goals. Patient and family education will also be administered as needed. Frequency of treatments will be 2x/day M-F and 1x/day Sat or Sun x 14 days.     AM  Time in: 1000  Time out: 1045  PM  Time in: 1345  Time out: [id number] [place], [place]  [id number]

## 2022-11-28 NOTE — PROGRESS NOTES
Occupational Therapy  OCCUPATIONAL THERAPY DAILY NOTE    Date:2022  Patient Name: Herve Black  MRN: [id number]  : 1970  Room: [address] [place] [address]       Referring Physician: Reyes Hector MD    Diagnosis:  S/P Fall with Compression fractures of L2  L3, L5 lumbar vertebra       Surgery/Procedure: None this admission      Past Medical History:  has a past medical history of Anemia, Diabetes mellitus ([address] Gastric Sleeve      Precautions: LSO for comfort (L2, L3, L5 compression fx's), spinal precautions, monitor BP (orthostatic hypotension), droplet+ precautions COVID-19      Functional Assessment:   Date Status AE  Comments   Feeding 22 Set up      Grooming 22 Minimal Assist            Oral Care 22 Minimal Assist      Bathing 22 Moderate Assist      UB Dressing 22 Min/mod Assist      LB Dressing 22 Moderate Assist      Footwear 22  Mod A  Sock aid  Pt needed total assist to don thigh high joey hose. Pt able to don slipper socks over the joey hose at [address] [place] [address] with sock aid   Toileting 22 Mod Assist       Homemaking 22  Min A/CGA   Pt stood at bedside table and folded wash clothes and pillow cases.   Pt removed pillow cases and replaced with new ones seated at Mod I level      Functional Transfers / Balance:   Date Status DME  Comments   Sit Balance 22 Sup      Stand Balance 22  CGA/Min A  ww    [] Tub  [] Shower   Transfer 22 TBA      Commode   Transfer 22  Min  Assist  Ww, 3 in 1 placed over commode     Functional   Mobility 22  Min A/CGA  Ww  A few feet in pt's room    Other:SPT w/c to bed     Sit to stand transfer w/c to ww       Bed mobility : supine to sit 22   Minimal Assist       Minimal Assist         Minimal Assist  Ww      ww      Functional Exercises / Activity:   Strength exercises to LUE with 2 # dumbbell weight 3 sets 10 reps in all planes RUE pt completed elbow flex/ext  3 sets 10 reps   AROM exercises to pt's R shoulder including pendulum exercises to help maintain joint integrity     B hand strength with 3 # digi flex 3 sets 15 reps   Standing balance/endurance activity at bedside table level. Pt stood x3 reps at CGA/Min A while completing laundry activity. Pt's blood pressure following standing activity with 99/66. Pt with no complaints of dizziness. Sensory / Neuromuscular Re-Education:      Cognitive Skills:   Status Comments   Problem   Solving fair     Memory fair     Sequencing fair     Safety fair       Visual Perception:    Education:  Pt educated on pacing and energy conservation techniques to utilize during ADL and functional activities    [] Family teach completed on:    Pain Level: 10/10  L knee. Nursing notified and meds provided     Additional Notes:   11/25/22 Pt's BP am seated after activity 97/67                  Pt's BP following commode and w/c transfers 101/69     11/26/22 Pt's BP supine 82/53 and following activity increased 92/66. Pt requested a fan in her room due to being so hot at times and nurse informed. Patient has made good  progress during treatment sessions toward set goals. Therapy emphasis to obtain goals:Plan of Care: 5-7 tx/wk for 3-4 weeks.   [x]ADL retraining: adapted techniques and AE recommendations to increase independence within precautions                  [x]Energy conservation techniques to improve tolerance for self care routine   [x]Functional transfer/mobility training for fall prevention & DME recommendations         [x]Patient/family education to increase safety and functional independence            [x]Environmental modifications for safe mobility and completion of ADLs                            [x]Cognitive retraining ex's to improve problem solving skills & safe participation in ADLs/IADLs     [x]Therapeutic activity to improve functional skills [x]Therapeutic exercise to improve functional engagement of daily activities. [x]Balance retraining ex's for postural control with dynamic challenges  [x]Neuromuscular re-education exercises                                              [x] Continue with current OT Plan of care. [] Prepare for Discharge    Long Term Goals: 3-4 weeks  Time Frame for Long term goals: 3-4 weeks   Long term goal 1: Pt will be IND for self feeding  Long term goal 2: Pt will be Mod I  for grooming task  Long term goal 3: Pt will be SBA for UB dressing including LSO. Long term goal 4: Pt will be Min A  for LB dressing and Mod A for footwear using AE PRN. Long term goal 5: Pt will be Min A for bathing task using AE PRN. Long term goal 6: Pt will be Mod A  for toileting task  Long term goal 7: Pt will be Mod A for commode transfer using AD as needed  Long term goal 8: Pt will be Mod A  for tub/shower  transfer  Long term goal 9: Pt will be Min A for light homemaking task  Long term goal 11: Pt will increase BUE strength by 1 muscle grade to improve functional engagement of ADLs. Long term goal 12: Pt will demonstrate Good safety, insight, reasoning, judgement, & problem-solving strategies throughout functional tasks to implement safety awareness & fall prevention strategies.      DISCHARGE RECOMMENDATIONS  Recommended DME:    Post Discharge Care:   []Home Independently  []Home with 24hr Care / Supervision [x]Home with Partial Supervision []Home with Home Health OT []Home with Out Pt OT []Other: ___   Comments:         Time in Time out Tx Time Breakdown  Variance:   First Session  [id number]  [x] Individual Tx-45  [] Concurrent Tx -  [] Co-Tx -   [] Group Tx -   [] Time Missed -      Second Session [id number] [x] Individual Tx-45  [] Concurrent Tx -  [] Co-Tx -   [] Group Tx -   [] Time Missed -     Third Session    [] Individual Tx-   [] Concurrent Tx -  [] Co-Tx -   [] Group Tx -   [] Time Missed -         Total Tx Time: 90 mins Shaista Lange OTR/L [id number]

## 2022-11-29 ENCOUNTER — APPOINTMENT (OUTPATIENT)
Dept: GENERAL RADIOLOGY | Age: 52
DRG: 559 | End: 2022-11-29
Attending: PHYSICAL MEDICINE & REHABILITATION
Payer: COMMERCIAL

## 2022-11-29 ENCOUNTER — CLINICAL DOCUMENTATION (OUTPATIENT)
Dept: SPIRITUAL SERVICES | Age: 52
End: 2022-11-29

## 2022-11-29 LAB
METER GLUCOSE: 123 MG/DL (ref 74–99)
METER GLUCOSE: 131 MG/DL (ref 74–99)
METER GLUCOSE: 140 MG/DL (ref 74–99)
METER GLUCOSE: 142 MG/DL (ref 74–99)

## 2022-11-29 PROCEDURE — 97530 THERAPEUTIC ACTIVITIES: CPT

## 2022-11-29 PROCEDURE — 97535 SELF CARE MNGMENT TRAINING: CPT

## 2022-11-29 PROCEDURE — 73562 X-RAY EXAM OF KNEE 3: CPT | Performed by: RADIOLOGY

## 2022-11-29 PROCEDURE — 73562 X-RAY EXAM OF KNEE 3: CPT

## 2022-11-29 PROCEDURE — 82962 GLUCOSE BLOOD TEST: CPT

## 2022-11-29 PROCEDURE — 6370000000 HC RX 637 (ALT 250 FOR IP): Performed by: PHYSICAL MEDICINE & REHABILITATION

## 2022-11-29 PROCEDURE — 97110 THERAPEUTIC EXERCISES: CPT

## 2022-11-29 PROCEDURE — 1280000000 HC REHAB R&B

## 2022-11-29 PROCEDURE — 6370000000 HC RX 637 (ALT 250 FOR IP): Performed by: STUDENT IN AN ORGANIZED HEALTH CARE EDUCATION/TRAINING PROGRAM

## 2022-11-29 PROCEDURE — 6360000002 HC RX W HCPCS: Performed by: STUDENT IN AN ORGANIZED HEALTH CARE EDUCATION/TRAINING PROGRAM

## 2022-11-29 PROCEDURE — 2500000003 HC RX 250 WO HCPCS: Performed by: PHYSICAL MEDICINE & REHABILITATION

## 2022-11-29 RX ADMIN — MICONAZOLE NITRATE: 20.6 POWDER TOPICAL at 17:55

## 2022-11-29 RX ADMIN — ACETAMINOPHEN 650 MG: 325 TABLET ORAL at 03:35

## 2022-11-29 RX ADMIN — SODIUM CHLORIDE 1 G: 1 TABLET ORAL at 11:36

## 2022-11-29 RX ADMIN — FLUDROCORTISONE ACETATE 0.1 MG: 0.1 TABLET ORAL at 08:07

## 2022-11-29 RX ADMIN — SODIUM CHLORIDE 1 G: 1 TABLET ORAL at 17:54

## 2022-11-29 RX ADMIN — OXYCODONE AND ACETAMINOPHEN 1 TABLET: 325; 10 TABLET ORAL at 17:54

## 2022-11-29 RX ADMIN — FOLIC ACID 1 MG: 1 TABLET ORAL at 08:07

## 2022-11-29 RX ADMIN — MIDODRINE HYDROCHLORIDE 15 MG: 5 TABLET ORAL at 11:36

## 2022-11-29 RX ADMIN — NEPHROCAP 1 MG: 1 CAP ORAL at 08:07

## 2022-11-29 RX ADMIN — NEPHROCAP 1 MG: 1 CAP ORAL at 13:36

## 2022-11-29 RX ADMIN — HEPARIN SODIUM 5000 UNITS: 10000 INJECTION INTRAVENOUS; SUBCUTANEOUS at 13:37

## 2022-11-29 RX ADMIN — MIDODRINE HYDROCHLORIDE 15 MG: 5 TABLET ORAL at 17:54

## 2022-11-29 RX ADMIN — PANTOPRAZOLE SODIUM 40 MG: 40 TABLET, DELAYED RELEASE ORAL at 06:49

## 2022-11-29 RX ADMIN — Medication: at 22:54

## 2022-11-29 RX ADMIN — PETROLATUM: 420 OINTMENT TOPICAL at 22:53

## 2022-11-29 RX ADMIN — HEPARIN SODIUM 5000 UNITS: 10000 INJECTION INTRAVENOUS; SUBCUTANEOUS at 22:04

## 2022-11-29 RX ADMIN — SODIUM CHLORIDE 1 G: 1 TABLET ORAL at 08:07

## 2022-11-29 RX ADMIN — MIDODRINE HYDROCHLORIDE 15 MG: 5 TABLET ORAL at 08:07

## 2022-11-29 RX ADMIN — SENNOSIDES 8.6 MG: 8.6 TABLET, FILM COATED ORAL at 22:01

## 2022-11-29 RX ADMIN — OXYCODONE AND ACETAMINOPHEN 1 TABLET: 325; 10 TABLET ORAL at 11:46

## 2022-11-29 RX ADMIN — OXYCODONE AND ACETAMINOPHEN 1 TABLET: 325; 10 TABLET ORAL at 23:09

## 2022-11-29 RX ADMIN — OXYCODONE AND ACETAMINOPHEN 1 TABLET: 325; 10 TABLET ORAL at 05:41

## 2022-11-29 RX ADMIN — NEPHROCAP 1 MG: 1 CAP ORAL at 22:01

## 2022-11-29 RX ADMIN — HEPARIN SODIUM 5000 UNITS: 10000 INJECTION INTRAVENOUS; SUBCUTANEOUS at 05:42

## 2022-11-29 RX ADMIN — BACLOFEN 5 MG: 10 TABLET ORAL at 08:07

## 2022-11-29 RX ADMIN — BACLOFEN 5 MG: 10 TABLET ORAL at 22:01

## 2022-11-29 RX ADMIN — MICONAZOLE NITRATE: 20.6 POWDER TOPICAL at 22:40

## 2022-11-29 ASSESSMENT — PAIN DESCRIPTION - ONSET: ONSET: ON-GOING

## 2022-11-29 ASSESSMENT — PAIN DESCRIPTION - FREQUENCY: FREQUENCY: CONTINUOUS

## 2022-11-29 ASSESSMENT — PAIN DESCRIPTION - DESCRIPTORS
DESCRIPTORS: ACHING
DESCRIPTORS: ACHING;DISCOMFORT;THROBBING
DESCRIPTORS: ACHING;DISCOMFORT;SORE;THROBBING
DESCRIPTORS: THROBBING
DESCRIPTORS: SORE

## 2022-11-29 ASSESSMENT — PAIN DESCRIPTION - ORIENTATION
ORIENTATION: LEFT
ORIENTATION: LEFT
ORIENTATION: LEFT;LOWER
ORIENTATION: LEFT
ORIENTATION: LEFT
ORIENTATION: LEFT;LOWER

## 2022-11-29 ASSESSMENT — PAIN - FUNCTIONAL ASSESSMENT
PAIN_FUNCTIONAL_ASSESSMENT: ACTIVITIES ARE NOT PREVENTED

## 2022-11-29 ASSESSMENT — PAIN DESCRIPTION - LOCATION
LOCATION: KNEE
LOCATION: KNEE;BACK
LOCATION: KNEE
LOCATION: KNEE
LOCATION: KNEE;BACK
LOCATION: KNEE

## 2022-11-29 ASSESSMENT — PAIN SCALES - GENERAL
PAINLEVEL_OUTOF10: 9
PAINLEVEL_OUTOF10: 10
PAINLEVEL_OUTOF10: 6
PAINLEVEL_OUTOF10: 7

## 2022-11-29 ASSESSMENT — PAIN DESCRIPTION - PAIN TYPE: TYPE: ACUTE PAIN

## 2022-11-29 NOTE — PROGRESS NOTES
Occupational Therapy  OCCUPATIONAL THERAPY DAILY NOTE    Date:2022  Patient Name: Cesar Millan  MRN: [id number]  : 1970  Room: [address] [place], [place] [address]     Referring Physician: Hossein Chen MD  Diagnosis:  S/P Fall with Compression fractures of L2  L3, L5 lumbar vertebra  Surgery/Procedure: None this admission    Past Medical History:  has a past medical history of Anemia, Diabetes mellitus ([address] Gastric Sleeve    Precautions: LSO for comfort (L2, L3, L5 compression fx's), spinal precautions, monitor BP (orthostatic hypotension),     Functional Assessment:   Date Status AE  Comments   Feeding 22 Set up      Grooming 22 Minimal Assist            Oral Care 22 Minimal Assist      Bathing 22 Moderate Assist      UB Dressing 22 Min/mod Assist      LB Dressing 22 Moderate Assist      Footwear 22  Mod A  Sock aid  Pt donned slipper socks with sock aid    Toileting 22 Mod Assist       Homemaking 22  Min A/CGA        Functional Transfers / Balance:   Date Status DME  Comments   Sit Balance 22 Sup      Stand Balance 22  CGA/Min A  ww    [x] Tub  [] Shower   Transfer 22  Mod A  Ww, ETB  Assist to lift BLE's over edge of tub and with sit to stand off of tub bench    Commode   Transfer 22  Min  Assist  Ww, 3 in 1 placed over commode     Functional   Mobility 22  Min A/CGA  Ww  Short distance in therapy apt setting over carpet floor surface    Other:SPT w/c to bed     Sit to stand transfer w/c to ww       Bed mobility : supine to sit    On/off firm recliner  22  Minimal Assist       Minimal Assist         Minimal Assist       Minima Assist  Ww      Ww              ww      Functional Exercises / Activity:   AROM exercises to R shoulder with pt completing over the door pulleys for 3 mins x 3 reps   BUE ROM/strength exercises: christiano box  with 5 # wt 3 sets 10 reps in all planes on table top surface                                                     2 # weighted ball 3 sets 10 reps in all planes within spinal precautions                                                     Red can do bar 3 sets 10 reps  B hand strength with 5 # digi flex 3 sets 10 reps       Sensory / Neuromuscular Re-Education:      Cognitive Skills:   Status Comments   Problem   Solving fair     Memory fair     Sequencing fair     Safety fair       Visual Perception:    Education:  Pt was educated on types of DME for tub/shower combo    [] Family teach completed on:    Pain Level: 5/10  L knee. Additional Notes:   11/25/22 Pt's BP am seated after activity 97/67                  Pt's BP following commode and w/c transfers 101/69     11/26/22 Pt's BP supine 82/53 and following activity increased 92/66. Pt requested a fan in her room due to being so hot at times and nurse informed. Patient has made good  progress during treatment sessions toward set goals. Therapy emphasis to obtain goals:Plan of Care: 5-7 tx/wk for 3-4 weeks. [x]ADL retraining: adapted techniques and AE recommendations to increase independence within precautions                  [x]Energy conservation techniques to improve tolerance for self care routine   [x]Functional transfer/mobility training for fall prevention & DME recommendations         [x]Patient/family education to increase safety and functional independence            [x]Environmental modifications for safe mobility and completion of ADLs                            [x]Cognitive retraining ex's to improve problem solving skills & safe participation in ADLs/IADLs     [x]Therapeutic activity to improve functional skills                                         [x]Therapeutic exercise to improve functional engagement of daily activities.    [x]Balance retraining ex's for postural control with dynamic challenges  [x]Neuromuscular re-education exercises [x] Continue with current OT Plan of care. [] Prepare for Discharge    Long Term Goals: 3-4 weeks  Time Frame for Long term goals: 3-4 weeks   Long term goal 1: Pt will be IND for self feeding  Long term goal 2: Pt will be Mod I  for grooming task  Long term goal 3: Pt will be SBA for UB dressing including LSO. Long term goal 4: Pt will be Min A  for LB dressing and Mod A for footwear using AE PRN. Long term goal 5: Pt will be Min A for bathing task using AE PRN. Long term goal 6: Pt will be SBA  for toileting task  Long term goal 7: Pt will be SBA for commode transfer using AD as needed  Long term goal 8: Pt will be Mod A  for tub/shower  transfer  Long term goal 9: Pt will be Min A for light homemaking task  Long term goal 11: Pt will increase BUE strength by 1 muscle grade to improve functional engagement of ADLs. Long term goal 12: Pt will demonstrate Good safety, insight, reasoning, judgement, & problem-solving strategies throughout functional tasks to implement safety awareness & fall prevention strategies. 11/29/22- Pt POC & goals are updated on this date. Pt lives with her daughter in a 2-story condo- [address] no HR- bed/bath 2nd floor- tub combo. Pt daughter able to provide Sup & assist. PLOF independent.  Pt tentative length of stay 10-14 days Krishan Jackson OTR/L [id number]   DISCHARGE RECOMMENDATIONS  Recommended DME:    Post Discharge Care:   []Home Independently  []Home with 24hr Care / Supervision [x]Home with Partial Supervision []Home with Home Health OT []Home with Out Pt OT []Other: ___   Comments:         Time in Time out Tx Time Breakdown  Variance:   First Session  [id number]  [x] Individual Tx-  [] Concurrent Tx -  [] Co-Tx -   [] Group Tx -   [] Time Missed -      Second Session [id number] [x] Individual Tx-  [] Concurrent Tx -  [] Co-Tx -   [] Group Tx -   [] Time Missed -     Third Session    [] Individual Tx-   [] Concurrent Tx -  [] Co-Tx -   [] Group Tx -   [] Time Missed - Total Tx Time:  90 mins    Nisha Triana OTR/L [id number]

## 2022-11-29 NOTE — FLOWSHEET NOTE
Inpatient Wound Care(initial evaluation) 5517    Admit Date: 11/23/2022  3:25 PM    Reason for consult:  folds, buttocks    Significant history:    Chief complaint:   Impairments and activities limitations in ADLs, mobility,  secondary to fall with multiple spinal fractures     HPI:   Lucy Askew is a 45 y.o. female with past medical history significant for diabetes and recent gastric bypass surgery who presented to [facility] on .  11/5/2022 following a fall. She has had multiple recent falls. She was found to have compression fractures at L2-L3 and L5. She was not felt to require neurosurgical intervention. She has had ongoing problems due to orthostatic hypotension despite treatment with midodrine. She also tested positive for [facility] course was complicated by COVID and orthostatic hypotension. Findings:     11/29/22 1215   Skin Integumentary    Skin Integrity Excoriation   Location under breast, groins, abdominal fold   Skin Fold Management Yes   Treatment Pharmaceutical   Date Applied   (ordered)   Dressing Site Abdominal pannus;Breasts;Groin   Skin Integrity Site 2   Skin Integrity Location 2 Erosion/denuded   Location 2 under breast   Skin Integrity Site 3   Skin Integrity Location 3   (raised hyper pigmented area, painful)    Location 3 left inner buttocks   Skin Integrity Site 4   Skin Integrity Location 4   (chronic discoloration)   Location 4 coccyx   Skin Integrity Site 5   Skin Integrity Location 5   (dry flaky skin)   Location 5 generalized     **Informed Consent**    The patient has given verbal consent to have photos taken of folds, buttocks  and inserted into their chart as part of their permanent medical record for purposes of documentation, treatment management and/or medical review.    All Images taken on 11/29/22 of patient name: Lucy Askew were transmitted and stored on secured Jawbone located within [employer] by a registered

## 2022-11-29 NOTE — PROGRESS NOTES
Progress Note  Date:2022       [id number]  Patient Estuardo Dawson     YOB: 1970     Age:52 y.o. Subjective    Subjective:  Symptoms:  Stable. She reports weakness. Diet:  Adequate intake. Activity level: Impaired due to weakness. Pain:  She complains of pain that is moderate. Review of Systems   Neurological:  Positive for weakness. Objective         Vitals Last 24 Hours:  TEMPERATURE:  Temp  Av.3 °F (36.3 °C)  Min: 97.3 °F (36.3 °C)  Max: 97.3 °F (36.3 °C)  RESPIRATIONS RANGE: Resp  Av.4  Min: 16  Max: 20  PULSE OXIMETRY RANGE: SpO2  Av %  Min: 93 %  Max: 93 %  PULSE RANGE: Pulse  Av.5  Min: 81  Max: 94  BLOOD PRESSURE RANGE: Systolic (52SGO), MBF:74 , Min:85 , QWM:40   ; Diastolic (45XII), CXC:09, Min:58, Max:59    I/O (24Hr): Intake/Output Summary (Last 24 hours) at 2022 0825  Last data filed at 2022 1406  Gross per 24 hour   Intake 240 ml   Output --   Net 240 ml     Objective:  General Appearance: In no acute distress. Vital signs: (most recent): Blood pressure (!) 96/58, pulse 81, temperature 97.3 °F (36.3 °C), temperature source Temporal, resp. rate 20, height 5' 11\" (1.803 m), weight 211 lb (95.7 kg), SpO2 93 %. (Orthostatic hypotension). Output: Producing urine and producing stool. Lungs:  Normal effort and normal respiratory rate. Breath sounds clear to auscultation. Heart: Normal rate. Regular rhythm. S1 normal and S2 normal.    Abdomen: Abdomen is soft. Bowel sounds are normal.   There is no abdominal tenderness. Extremities: Decreased range of motion. Neurological: Patient is alert. (4/5 strength).     Labs/Imaging/Diagnostics    Labs:  CBC:  Recent Labs     22  1037   WBC 5.9   RBC 3.64   HGB 9.9*   HCT 32.1*   MCV 88.2   RDW 15.6*        CHEMISTRIES:  Recent Labs     22  1037      K 3.8   CL 99   CO2 23   BUN 13   CREATININE 0.9   GLUCOSE 178*     PT/INR:No results for input(s): PROTIME, INR in the last 72 hours. APTT:No results for input(s): APTT in the last 72 hours. LIVER PROFILE:  Recent Labs     11/28/22  1037   AST 17   ALT 14   BILITOT 0.3   ALKPHOS 160*       Imaging Last 24 Hours:  No results found. Assessment//Plan           Hospital Problems             Last Modified POA    * (Principal) Compression fracture of L2 lumbar vertebra, closed, initial encounter ([facility] [address] 11/23/2022 Yes     Assessment:    Condition: In stable condition. (Spinal fracture  Orthostatic hypotension  COVID). Plan:   Encourage ambulation. (Patient can come out of COVID isolation  She still having orthostatic hypotension  I had cardiology see her and I will address the hypotension further  I am also going to contact someone in relation to the gastric bypass).      Electronically signed by Sheba Rosales MD on 11/29/22 at 8:25 AM EST

## 2022-11-29 NOTE — PATIENT CARE CONFERENCE
[address] NOTE/PATIENT PLAN OF CARE    The physician was present and led this team conference    Date: 2022  Admission date: 2022  Patient Name: Viktoriya Cristobal        MRN: [id number]    : 1970  (45 y.o.)  Gender: female   Rehab diagnosis/surgery with date:  lumbar fractures at L2-L3, L5  Impairment Group Code:  8.9      MEDICAL/FUNCTIONAL HISTORY/STATUS:  orthostatic hypotension, recent COVID infection, can come out of isolation  today  recent history of gastric bypass  in August with 90 pound wt loss  history of diabetes with neuropathy, sleep apnea, pulmonary hypertension, chronic anemia  wearing an LSO brace    Consultations/Labs/X-rays: cardiology following for orthostatic hypotension      MEDICATION UPDATE:  on florinef and Midodrine      NURSING :    Bowel:   Always Continent  [x]   Occasionally incontinent  []   Frequently incontinent  []   Always incontinent  []   Not occurred  []     Bladder:   Always Continent  [x]    Incontinent less than daily[]   Incontinent  daily []   Always incontinent  []   No urine output    []   Indwelling catheter []       Toilet Hygiene:   Current level : mod assist  Short term Toilet hygiene goal: min assist  Long term toilet hygiene goal:  standby assist    Skin integrity: red areas  Pain: back and left knee    NUTRITION    Diet  regular  Liquid consistency   thin    SOCIAL INFORMATION:  Lives with: daughter  Prior community services:  [place]:  2 story condo, no entry, 2nd floor bed and bath with 13 steps up and 1 rail  Prior Level of function:  independent, was working as a   DME:  none    FAMILY / PATIENT EDUCATION:  will schedule with daughter    PHYSICAL THERAPY    Bed mobility:   Current level: min assist  Short term bed mobility goal: standby assist  Long term bed mobility goal: Modified independent    Chair/bed transfers:  Current level: min assist with a wheeled walker  Short term Chair/bed transfers goal: standby assist  Long term Chair/bed transfers goal: Modified independent      Ambulation:   Current level: 10 ft wheeled walker. at Contact guard assist-min assist  Short term ambulation goal: 100 ft at standby assist  Long term ambulation goal: 300 ft at Modified independent    Stairs:   Current level : to be assessed. Other comments: asymptomatic  with hypotension, having left knee pain      OCCUPATIONAL THERAPY:      Tub/shower:   Current level: to be assessed    Feeding:  Current level: setup  Short term feeding goal: Modified independent. Long term feeding goal: independent    Grooming:   Current level: min assist  Short term grooming goal: supervision  Long term grooming goal: Modified independent    Bathing:  Current level: sit and stand is mod assist  Short term bathing goal: min assist  Long term bathing goal: min assist    Homemaking:   Current level: limited due to recent isolation, min assist  Short term homemaking goal: min assist  Long term homemaking goal: Modified independent.     Upper body dressing:  Current level: min assist-mod assist  Short term upper body dressing goal: min assist  Long term upper body dressing goal: standby assist    Lower body dressing:                                                                          Current level: mod assist             Short term lower body dressing goal: min assist          Long term lower body dressing goal: min assist            Footwear  Current level: mod assist with a sock aide  Short term goal:  min assist  Long term goal:min assist      Toilet transfer:   Current level: min assist with a wheeled walker to 3 in 1 commode  Short term toilet transfer goal: Contact guard assist  Long term toilet transfer goal: standby assist      Patient/family's personal goals: return home  Factors supporting goal achievement:  motivated  Factors hindering goal achievement:  orthostatic hypotension      Discharge Plan   Estimated Length of Stay: 10-14 days            Destination: home  Services at Discharge: to be assessed  Equipment at Discharge: to be assessed      INTERDISCIPLINARY TEAM/PHYSICIAN RECOMMENDATION AND/OR REVISIONS OF PLAN OF CARE:  continue to monitor hypotension, pain control, family education      I approve the established interdisciplinary plan of care as documented within the medical record of Hiral Gasca. Electronically signed by Kendrick Jin RN on 11/29/2022 at 9:42 AM  The following interdisciplinary team members were present:  Clemmie Meigs, RN Leetta Hugger, LSW Richard S. Haybarger, PT, DPT  Ambrosio Abernathy OTR

## 2022-11-29 NOTE — CARE COORDINATION
Per Team:  Plan 10-14 day JEFF. Updated the pt. And her daughterHemal Robles. LTG: Min A / Pod [address]77 and limited by orthostatic BP-  Cardiology following. Pt. Also complains of L knee pain- which Dr. William Fregoso is arthritis but will use a lidocaine patch.     FT 12/6 PM    Mendez Whittington, LSW  11/29/2022

## 2022-11-29 NOTE — ACP (ADVANCE CARE PLANNING)
Advance Care Planning   Advance Care Planning Note  Ambulatory Spiritual Care Services    Date:  11/29/2022    Received request from [facility] Provider. Consultation conversation participants:   Patient who understands ACP conversation     Goals of ACP Conversation:  Discuss advance care planning documents    Health Care Decision Makers:      Primary Decision Maker: verónica walker - Jose Carlos - [phone]    Secondary Decision Maker: Jose Walker - Child - [phone]    Supplemental (Other) Decision Maker: Sravanthi Lora - Other - [phone]   Summary:  Verified Documents  Completed [facility]    Advance Care Planning Documents (Patient Wishes)  Currently on file:   None    Assessment:   Patient family supports healthcare decisions     Interventions:  Provided education on documents for clarity and greater understanding  Discussed and provided education on state decision maker hierarchy  Assisted in the completion of documents according to patient's wishes at this time  Encouraged ongoing ACP conversation with future decision makers and loved ones    Care Preferences Communicated:     Hospitalization:  If the patient's health worsens and it becomes clear that the chance of recovery is unlikely,     the patient wants hospitalization. Ventilation:   If the patient, in their present state of health, suddenly became very ill and unable to breathe on their own,     the patient would desire the use of a ventilator (breathing machine). If their health worsens and it becomes clear that the change of recovery is unlikely,     the patient would desire the use of a ventilator (breathing machine). Resuscitation:  In the event the patient's heart stopped as a result of an underlying serious health condition, the patient communicates a preference for      resuscitative attempts (CPR). Outcomes:  New advance directive completed.   Returned original document(s) to patient, as well as copies for distribution to appointed agents  Copy of advance directive given to staff to scan into medical record. Routed ACP note to attending provider or other IDT member.     Patient / Healthcare Decision Maker Instructions:  Review completed ACP document(s) and update, if needed, with changes health or future preferences    Electronically signed by Scotty Agarwal on 11/29/2022 at 5:12 PM.

## 2022-11-29 NOTE — PROGRESS NOTES
Physical Therapy  Treatment Note   Evaluating Therapist: Trinity Fitzgerald, PT, DPT [id number]       ROOM: [address] [place] [address]  DIAGNOSIS: Closed compression fracture of L2 lumbar vertebra   PRECAUTIONS: Falls, Spinal precautions, LSO for comfort (L2, L3, and L5 compression fx), Orthostatic hypotension (compression stockings), Droplet Plus isolation, COVID-19  HPI:  Pt had a recent gastric bypass and suffered a fall on 11/5. She has had multiple recent falls. Compression fractures noted at L2-L3 and L5. She was not a candidate for neurosurgical intervention. Pt has tested positive for orthostatic hypotension and COVID. Social:  Pt lives with her dtr in a 2 floor plan with 1 steps and 0 rails. Bedroom and bathroom are on the 2nd level with 7+3 steps with B rails. Prior to admission: Independent with no assistive device. Works as a . Drives. Initial Evaluation  Date: 11/24/22 AM     PM    Short Term Goals Long Term Goals    Was pt agreeable to Eval/treatment? Yes  yes yes     Does pt have pain? 9/10 mid back Pain in L knee  Pain in L knee      Bed Mobility  Rolling: Min A  Supine to sit: Mod A  Sit to supine: Mod A  Scooting: Min A NT Rolling: Min A  Supine to sit: Min A  Sit to supine: Min A  Scooting: Min a SBA Modified Independent     Transfers Sit to stand:  Mod A  Stand to sit: Mod A  Stand pivot: Min A with FWW Sit to stand: Min A  Stand to sit: Min A  Stand pivot: Min A Sit to stand: Min A<>CGA  Stand to sit: Min A<>CGA  Stand pivot: NT SBA with FWW   Modified Independent with LRAD   Ambulation    3 feet x2 reps with FWW with Min A  2x25 feet with FWW Min A<>CGA (WC follow) 4x40' FWW CGA (WC follow) >100 feet with FWW with SBA >300 feet with LRAD with Modified Independent     Walking 10 feet on uneven surface NT d/t hypotension and droplet plus isolation  NT NT 10 feet with FWW with Min A 10 feet with LRAD with Modified Independent     Wheel Chair Mobility NA NT NT     Car Transfers NT d/t hypotension and droplet plus isolation  NT NT Min A Modified Independent     Stair negotiation: ascended and descended  NT d/t hypotension and droplet plus isolation  NT NT 4 steps with B rail with Min A 12 steps with 1 rail with Modified Independent     Curb Step:   ascended and descended NT d/t hypotension and droplet plus isolation  NT NT 6 inch step with FWW and Min A 6 inch step with LRAD and Modified Independent     Picking up object off the floor NT d/t hypotension    Will  cone with Min A assist Will  cone with no assist   BLE ROM WFL       BLE Strength RLE:  Grossly 3-/5  LLE:  Grossly 4/5       Balance  Unsupported sitting:  SBA    Dynamic standing:  Min A with FWW    BBS: NT  FGA: NT      BBS: >52  FGA: >22   Date Family Teach Completed        Is additional Family Teaching Needed? Y or N        Hindering Progress Back pain, RUE and RLE weakness, orthostatic hypotension  Back pain, RUE and RLE weakness, orthostatic hypotension       PT recommended OS        Team's Discharge Plan        Therapist at Team Meeting          Therapeutic exercise/activity: functional mobility     ASSESSMENT  Pt displays functional ability as noted in the objective portion of this evaluation. AM vitals:  Sitting in chair BP:  89/59  Standing BP:  59/49  Sitting in chair, post first bout of ambulation: 81/65  After second bout of ambulation: 95/63  Comments: Pt in chair on arrival, required assist with donning clothing, brace and joey hose prior to attending PT. Pt performed multiple stands to don garments, able to maintain standing at walker without assist while assisting with clothing. Pt BP noted above and pt had no sx of [place] throughout session but did have some L knee pain. Pt performed two bouts of ambulation with slow gait speed but improved step length. Pt taken to OT at end of session with all needs met.      PM Vitals: Sitting in chair BP:  106/82  Sitting in chair, post first bout of ambulation: 96/68  After second bout of ambulation: 116/57  PM: Pt in bed on arrival, agreeable to PT tx. Pt performed multiple bouts of ambulation today with same deficits as AM session. Pt able to slightly increase distance this session without OH sx. Pt taken to OT at end of session. Patient education  Pt educated on LSO, sit<>stand and pivot training     Patient response to education:   Pt verbalized understanding Pt demonstrated skill Pt requires further education in this area   Yes  Yes  Yes      Rehab potential is Good for reaching above PT goals. Pts/ family goals   1. Home     Patient and or family understand(s) diagnosis, prognosis, and plan of care. Good    PLAN  PT care will be provided in accordance with the objectives noted above. Whenever appropriate, clear delegation orders will be provided for nursing staff. Exercises and functional mobility practice will be used as well as appropriate assistive devices or modalities to obtain goals. Patient and family education will also be administered as needed. Frequency of treatments will be 2x/day M-F and 1x/day Sat or Sun x 14 days.     AM  Time in: 1000  Time out: 1045  PM  Time in: 1345  Time out: [address] [facility], [address] [facility], [address] [place] [address]  [id number]

## 2022-11-30 LAB
METER GLUCOSE: 129 MG/DL (ref 74–99)
METER GLUCOSE: 207 MG/DL (ref 74–99)

## 2022-11-30 PROCEDURE — 6370000000 HC RX 637 (ALT 250 FOR IP): Performed by: PHYSICAL MEDICINE & REHABILITATION

## 2022-11-30 PROCEDURE — 99232 SBSQ HOSP IP/OBS MODERATE 35: CPT | Performed by: INTERNAL MEDICINE

## 2022-11-30 PROCEDURE — 6360000002 HC RX W HCPCS: Performed by: STUDENT IN AN ORGANIZED HEALTH CARE EDUCATION/TRAINING PROGRAM

## 2022-11-30 PROCEDURE — 6370000000 HC RX 637 (ALT 250 FOR IP): Performed by: STUDENT IN AN ORGANIZED HEALTH CARE EDUCATION/TRAINING PROGRAM

## 2022-11-30 PROCEDURE — 97535 SELF CARE MNGMENT TRAINING: CPT

## 2022-11-30 PROCEDURE — 82962 GLUCOSE BLOOD TEST: CPT

## 2022-11-30 PROCEDURE — 97530 THERAPEUTIC ACTIVITIES: CPT

## 2022-11-30 PROCEDURE — 1280000000 HC REHAB R&B

## 2022-11-30 PROCEDURE — 97110 THERAPEUTIC EXERCISES: CPT

## 2022-11-30 RX ORDER — OXYCODONE HYDROCHLORIDE AND ACETAMINOPHEN 5; 325 MG/1; MG/1
1 TABLET ORAL EVERY 4 HOURS
Status: DISCONTINUED | OUTPATIENT
Start: 2022-11-30 | End: 2022-12-07 | Stop reason: HOSPADM

## 2022-11-30 RX ADMIN — FOLIC ACID 1 MG: 1 TABLET ORAL at 09:28

## 2022-11-30 RX ADMIN — PETROLATUM: 420 OINTMENT TOPICAL at 20:59

## 2022-11-30 RX ADMIN — SENNOSIDES 8.6 MG: 8.6 TABLET, FILM COATED ORAL at 20:57

## 2022-11-30 RX ADMIN — OXYCODONE AND ACETAMINOPHEN 1 TABLET: 5; 325 TABLET ORAL at 17:35

## 2022-11-30 RX ADMIN — FLUDROCORTISONE ACETATE 0.1 MG: 0.1 TABLET ORAL at 09:29

## 2022-11-30 RX ADMIN — NEPHROCAP 1 MG: 1 CAP ORAL at 09:27

## 2022-11-30 RX ADMIN — OXYCODONE AND ACETAMINOPHEN 1 TABLET: 5; 325 TABLET ORAL at 09:28

## 2022-11-30 RX ADMIN — SODIUM CHLORIDE 1 G: 1 TABLET ORAL at 12:46

## 2022-11-30 RX ADMIN — MIDODRINE HYDROCHLORIDE 15 MG: 5 TABLET ORAL at 09:27

## 2022-11-30 RX ADMIN — OXYCODONE AND ACETAMINOPHEN 1 TABLET: 5; 325 TABLET ORAL at 12:46

## 2022-11-30 RX ADMIN — HEPARIN SODIUM 5000 UNITS: 10000 INJECTION INTRAVENOUS; SUBCUTANEOUS at 20:59

## 2022-11-30 RX ADMIN — PANTOPRAZOLE SODIUM 40 MG: 40 TABLET, DELAYED RELEASE ORAL at 06:30

## 2022-11-30 RX ADMIN — NEPHROCAP 1 MG: 1 CAP ORAL at 20:57

## 2022-11-30 RX ADMIN — OXYCODONE AND ACETAMINOPHEN 1 TABLET: 325; 10 TABLET ORAL at 04:27

## 2022-11-30 RX ADMIN — MIDODRINE HYDROCHLORIDE 15 MG: 5 TABLET ORAL at 17:35

## 2022-11-30 RX ADMIN — MICONAZOLE NITRATE: 20.6 POWDER TOPICAL at 20:59

## 2022-11-30 RX ADMIN — SODIUM CHLORIDE 1 G: 1 TABLET ORAL at 17:35

## 2022-11-30 RX ADMIN — SODIUM CHLORIDE 1 G: 1 TABLET ORAL at 09:28

## 2022-11-30 RX ADMIN — HEPARIN SODIUM 5000 UNITS: 10000 INJECTION INTRAVENOUS; SUBCUTANEOUS at 04:28

## 2022-11-30 RX ADMIN — NEPHROCAP 1 MG: 1 CAP ORAL at 12:46

## 2022-11-30 RX ADMIN — OXYCODONE AND ACETAMINOPHEN 1 TABLET: 5; 325 TABLET ORAL at 20:57

## 2022-11-30 RX ADMIN — HEPARIN SODIUM 5000 UNITS: 10000 INJECTION INTRAVENOUS; SUBCUTANEOUS at 12:46

## 2022-11-30 RX ADMIN — MIDODRINE HYDROCHLORIDE 15 MG: 5 TABLET ORAL at 12:46

## 2022-11-30 RX ADMIN — MICONAZOLE NITRATE: 20.6 POWDER TOPICAL at 09:27

## 2022-11-30 ASSESSMENT — PAIN SCALES - GENERAL
PAINLEVEL_OUTOF10: 6
PAINLEVEL_OUTOF10: 7
PAINLEVEL_OUTOF10: 4

## 2022-11-30 ASSESSMENT — PAIN DESCRIPTION - LOCATION
LOCATION: BACK
LOCATION: KNEE
LOCATION: KNEE

## 2022-11-30 ASSESSMENT — PAIN - FUNCTIONAL ASSESSMENT: PAIN_FUNCTIONAL_ASSESSMENT: ACTIVITIES ARE NOT PREVENTED

## 2022-11-30 ASSESSMENT — PAIN DESCRIPTION - ORIENTATION
ORIENTATION: LEFT
ORIENTATION: LEFT

## 2022-11-30 ASSESSMENT — PAIN DESCRIPTION - DESCRIPTORS
DESCRIPTORS: DISCOMFORT;SORE;TENDER
DESCRIPTORS: ACHING

## 2022-11-30 NOTE — PROGRESS NOTES
Spoke to nursing taking care of patient. Patient has had low BPs that improve with activity. She has not had any syncope. She has had some lightheadedness with standing. On midodrine and florinef from previous providers. continue thigh high teds. Encourage oral fluid increase. Bariatric surgery in the past and has consult to them to evaluate. Can see tomorrow if still needs full consult. Nothing urgent at this time.    Roland Burgos, APRN - CNP

## 2022-11-30 NOTE — PROGRESS NOTES
Comprehensive Nutrition Assessment    Type and Reason for Visit:  Initial (LOS)    Nutrition Recommendations/Plan:   No nutritional supplementation recommended at this time. Malnutrition Assessment:  Malnutrition Status:  Insufficient data (11/30/22 1150)    Context:  Acute Illness     Findings of the 6 clinical characteristics of malnutrition:  Energy Intake:  No significant decrease in energy intake  Weight Loss:  Unable to assess (d/t lack of actual weight history)     Body Fat Loss:  Unable to assess     Muscle Mass Loss:  Unable to assess    Fluid Accumulation:  No significant fluid accumulation     Strength:  Not Performed    Nutrition Assessment:    Patient adm s/p recent falls ; noted compression lumbar fractures ; recent COVID-19 ; hx of DM ; hx of gastric bypass surgery/gastric band sleeve (8/2022) ; no ONS recommended at this time    Nutrition Related Findings:    I&Os WNL, no edema, active BS, rounded abd, erosion to buttocks ; Wound Type: None       Current Nutrition Intake & Therapies:    Average Meal Intake: %     ADULT DIET; Regular    Anthropometric Measures:  Height: 5' 11\" (180.3 cm)  Ideal Body Weight (IBW): 155 lbs (70 kg)       Current Body Weight: 211 lb (95.7 kg) (11/23, bedscale), 136.1 % IBW. Current BMI (kg/m2): 29.4  Usual Body Weight:  (UTO ; EMR shows past weight of 232# bedscale on 11/8/22)                       BMI Categories: Overweight (BMI 25.0-29. 9)    Estimated Daily Nutrient Needs:  Energy Requirements Based On: Formula  Weight Used for Energy Requirements: Current  Energy (kcal/day): 5320-2960 (REE 1655 x 1.2 SF)  Weight Used for Protein Requirements: Ideal  Protein (g/day):  (1.3-1.5g/kg IBW)  Method Used for Fluid Requirements: 1 ml/kcal  Fluid (ml/day): 9159-4088    Nutrition Diagnosis:   No nutrition diagnosis at this time     Nutrition Interventions:   Food and/or Nutrient Delivery: Continue Current Diet  Nutrition Education/Counseling: Education not indicated  Coordination of Nutrition Care: Continue to monitor while inpatient       Goals:  Previous Goal Met: Progressing toward Goal(s)  Goals: PO intake 75% or greater, by next RD assessment       Nutrition Monitoring and Evaluation:   Behavioral-Environmental Outcomes: None Identified  Food/Nutrient Intake Outcomes: Food and Nutrient Intake  Physical Signs/Symptoms Outcomes: Biochemical Data, Chewing or Swallowing, GI Status, Fluid Status or Edema, Hemodynamic Status, Meal Time Behavior, Nutrition Focused Physical Findings, Skin, Weight    Discharge Planning:     Too soon to determine     Santa Cain RD, LD  Contact: [phone]

## 2022-11-30 NOTE — CONSULTS
GENERAL SURGERY  CONSULT NOTE  2022    Physician Consulted: Dr. Morris Record  Reason for Consult: orthostatic hypotension following bariatric surgery  Referring Physician: Dr. Sara Stone is a 45 y.o. female who is currently admitted to the rehab center for recovery of recent fall with lumbar fractures treated with with brace. Bariatric surgery consulted for hypotension that began following her gastric sleeve surgery in [facility] [email or web address] [facility]    Patient states 2022 she underwent bariatric surgery. Prior to the operation she lost approximately 50 lbs. She states she has lost an additional 50 pounds following the surgery. Patient states her symptoms of lightheaded and dizziness began in early October although there is EMR records indicating she has had orthostatic hypotension prior to her symptoms. She states that on  she collapsed to the floor and was found unconscious by her daughter which is what led to her most recent admission. Since being in rehab she has not had any symptoms of feeling faint and denies any postural tachycardia. She seems to be adequately eating and drinking plenty of fluids. Besides her lower back pain, her only other complaint is right facial pain which she was evaluated by rheumatology as outpatient and treated for possible trigeminal neuralgia.        Past Medical History:   Diagnosis Date    Anemia     Diabetes mellitus ([address]     Papanicolaou smear of cervix with atypical squamous cells of undetermined significance (ASC-US)          Screening for human papillomavirus (HPV)     4/10/07        Past Surgical History:   Procedure Laterality Date    COLPOSCOPY      2001 : outside [address] [place] [address]      2022    OR  DELIVERY+POSTPARTUM CARE      1991 :      OR  DELIVERY+POSTPARTUM CARE      1998 :      OR CONIZATION CERVIX,KNIFE/LASER      2001 : outside [facility]        Medications Prior to Admission    Prior to Admission medications    Medication Sig Start Date End Date Taking? Authorizing Provider   Multiple Vitamin (MULTIVITAMINS PO) Take 3 tablets by mouth daily    Historical Provider, MD   CALCIUM PO Take 2 tablets by mouth daily    Historical Provider, MD   gabapentin (NEURONTIN) 100 MG capsule Take 1 capsule by mouth nightly for 180 days. Gabapentin start with 100 mg daily, increase to 100 mg 1-3 times daily next week.  11/4/22 5/3/23  Eden Lamas MD   lidocaine (LIDODERM) 5 % Place 1 patch onto the skin every 24 hours 10/28/22   Eden Lamas MD   fluticasone (FLONASE) 50 MCG/ACT nasal spray 1-2 sprays by Nasal route daily as needed for Allergies 7/22/20   Historical Provider, MD   folic acid (FOLVITE) 1 MG tablet Take 1 mg by mouth daily 10/3/22   Historical Provider, MD   loratadine (CLARITIN) 10 MG tablet Take 10 mg by mouth daily as needed 8/29/17   Historical Provider, MD   methotrexate (RHEUMATREX) 2.5 MG chemo tablet Take 10 mg by mouth once a week (Fridays) 10/3/22   Historical Provider, MD   pantoprazole (PROTONIX) 40 MG tablet Take 40 mg by mouth daily 9/13/22   Historical Provider, MD   sertraline (ZOLOFT) 50 MG tablet Take 50 mg by mouth daily 5/7/18   Historical Provider, MD   DULoxetine (CYMBALTA) 30 MG extended release capsule take 1 capsule by mouth once daily for 2 weeks, then increase to 2 capsules daily 10/12/22   Historical Provider, MD   cyclobenzaprine (FLEXERIL) 10 MG tablet Take 10 mg by mouth nightly as needed 10/12/22   Historical Provider, MD   amitriptyline (ELAVIL) 10 MG tablet Take 10 mg by mouth nightly 10/12/22   Historical Provider, MD       Allergies   Allergen Reactions    Colchicine     Darvon [Propoxyphene] Other (See Comments)     GI Upset       Family History   Problem Relation Age of Onset    Diabetes Mother     Hypertension Mother     Diabetes Father     Hypertension Father     Gout Father     Heart Disease None     Ovarian Cancer None     Uterine Cancer None     Breast Cancer None        Social History     Tobacco Use    Smoking status: Former    Smokeless tobacco: Never    Tobacco comments:     Quit smokin2002   Vaping Use    Vaping Use: Never used   Substance Use Topics    Drug use: No         Review of Systems: Review of Systems - History obtained from chart review and the patient  General ROS: positive for  - weight loss  negative for - fatigue or malaise  Ophthalmic ROS: negative for - loss of vision  Allergy and Immunology ROS: negative for - hives  Respiratory ROS: no cough, shortness of breath, or wheezing  Cardiovascular ROS: negative for - irregular heartbeat, palpitations, or rapid heart rate  Gastrointestinal ROS: no abdominal pain, change in bowel habits, or black or bloody stools  Genito-Urinary ROS: no dysuria, trouble voiding, or hematuria  Neurological ROS: positive for - right facial pain   Dermatological ROS: positive for - darkening skin of the face    PHYSICAL EXAM:    Vitals:    22 0919   BP: (!) 87/52   Pulse: 96   Resp: 16   Temp: 97.7 °F (36.5 °C)   SpO2:        GENERAL: Acute distress, answers questions appropriately  HEAD:  Normocephalic. Atraumatic. EYES:   No scleral icterus. PERRL. LUNGS:  No increased work of breathing on room air  CARDIOVASCULAR: RR, hypotensive  ABDOMEN:  Soft, non-distended, non-tender. Reducible midline mass, no guarding or rigidity  EXTREMITIES:   MAEx4. Atraumatic. No LE edema. SKIN: Darker complexion of the face compared to neck and extremities warm, dry  NEUROLOGIC:  GCS 15    ASSESSMENT/PLAN:  45 y.o. female with gastric sleeve and total of 100 pound weight loss in less than 1 year presents with orthostatic hypotension.   -Physiologic changes following bariatric surgery could predispose patient to protein and vitamin malnutrition particularly B1, 6 and 12 ultimately leading to decreased intravascular oncotic pressure.   Recent albumin 3.2    Plan will be  - Recommend protein dense nutrition goal of 100 g a day  - Patient is on B complex vitamin as well as folic acid, please continue  - Recommend compression stockings  - Cardiology and EP following, already on max dose midodrine  - Recommend follow-up visit with Dr. Ryder Tavares when possible      discussed with Dr. Vivek Boyce.     Omar Paul, DO  Surgery Resident PGY-1  11/30/2022  4:35 PM

## 2022-11-30 NOTE — PLAN OF CARE
Problem: Discharge Planning  Goal: Discharge to home or other facility with appropriate resources  11/30/2022 1616 by Juliana Vasquez RN  Outcome: Progressing  11/30/2022 0221 by Ami Avalos RN  Outcome: Progressing     Problem: ABCDS Injury Assessment  Goal: Absence of physical injury  11/30/2022 1616 by Juliana Vasquez RN  Outcome: Progressing  11/30/2022 0221 by Ami Avalos RN  Outcome: Progressing     Problem: Safety - Adult  Goal: Free from fall injury  11/30/2022 1616 by Juliana Vasquez RN  Outcome: Progressing  11/30/2022 0221 by Ami Avalos RN  Outcome: Progressing     Problem: Pain  Goal: Verbalizes/displays adequate comfort level or baseline comfort level  11/30/2022 1616 by Juliana Vasquez RN  Outcome: Progressing  11/30/2022 0221 by Ami Avalos RN  Outcome: Progressing     Problem: Skin/Tissue Integrity  Goal: Absence of new skin breakdown  Description: 1. Monitor for areas of redness and/or skin breakdown  2. Assess vascular access sites hourly  3. Every 4-6 hours minimum:  Change oxygen saturation probe site  4. Every 4-6 hours:  If on nasal continuous positive airway pressure, respiratory therapy assess nares and determine need for appliance change or resting period.   11/30/2022 1616 by Juliana Vasquez RN  Outcome: Progressing  11/30/2022 0221 by Ami Avalos RN  Outcome: Progressing

## 2022-11-30 NOTE — PROGRESS NOTES
Patient/Other Family Member Physical Therapy  Treatment Note   Evaluating Therapist: Veronica Guajardo, PT, DPT [id number]       ROOM: [address] [place] [address]  DIAGNOSIS: Closed compression fracture of L2 lumbar vertebra   PRECAUTIONS: Falls, Spinal precautions, LSO for comfort (L2, L3, and L5 compression fx), Orthostatic hypotension (compression stockings), Droplet Plus isolation, COVID-19  HPI:  Pt had a recent gastric bypass and suffered a fall on 11/5. She has had multiple recent falls. Compression fractures noted at L2-L3 and L5. She was not a candidate for neurosurgical intervention. Pt has tested positive for orthostatic hypotension and COVID. Social:  Pt lives with her dtr in a 2 floor plan with 1 steps and 0 rails. Bedroom and bathroom are on the 2nd level with 7+3 steps with B rails. Prior to admission: Independent with no assistive device. Works as a . Drives. Initial Evaluation  Date: 11/24/22 AM     PM    Short Term Goals Long Term Goals    Was pt agreeable to Eval/treatment? Yes  yes yes     Does pt have pain? 9/10 mid back Pain in L knee  Pain in L knee      Bed Mobility  Rolling: Min A  Supine to sit: Mod A  Sit to supine: Mod A  Scooting: Min A NT NT SBA Modified Independent     Transfers Sit to stand:  Mod A  Stand to sit: Mod A  Stand pivot: Min A with FWW Sit to stand: Min A  Stand to sit: Min A  Stand pivot: Min A Sit to stand: Min A<>CGA  Stand to sit: Min A<>CGA  Stand pivot: NT SBA with FWW   Modified Independent with LRAD   Ambulation    3 feet x2 reps with FWW with Min A  4x35' feet with FWW CGA 5x45' FWW CGA >100 feet with FWW with SBA >300 feet with LRAD with Modified Independent     Walking 10 feet on uneven surface NT d/t hypotension and droplet plus isolation  NT NT 10 feet with FWW with Min A 10 feet with LRAD with Modified Independent     Wheel Chair Mobility NA NT NT     Car Transfers NT d/t hypotension and droplet plus isolation  NT NT Min A Modified Independent     Stair negotiation: ascended and descended  NT d/t hypotension and droplet plus isolation  NT NT 4 steps with B rail with Min A 12 steps with 1 rail with Modified Independent     Curb Step:   ascended and descended NT d/t hypotension and droplet plus isolation  NT NT 6 inch step with FWW and Min A 6 inch step with LRAD and Modified Independent     Picking up object off the floor NT d/t hypotension    Will  cone with Min A assist Will  cone with no assist   BLE ROM WFL       BLE Strength RLE:  Grossly 3-/5  LLE:  Grossly 4/5       Balance  Unsupported sitting:  SBA    Dynamic standing:  Min A with FWW    BBS: NT  FGA: NT      BBS: >52  FGA: >22   Date Family Teach Completed        Is additional Family Teaching Needed? Y or N        Hindering Progress Back pain, RUE and RLE weakness, orthostatic hypotension  Back pain, RUE and RLE weakness, orthostatic hypotension       PT recommended ELOS        Team's Discharge Plan        Therapist at Team Meeting          Therapeutic exercise/activity: functional mobility     ASSESSMENT  Pt displays functional ability as noted in the objective portion of this evaluation. AM vitals:  Sitting in chair BP:  86/57  Sitting in chair, post first bout of ambulation: 97/57  After second bout of ambulation: 90/60  Comments: Pt in chair on arrival and agreeable to PT tx. Pt notes L knee pain is improving since yesterday. Pt able to perform 4 bouts of ambulation with CGA; pt encouraged to increase speed and able to slightly with cues. Pt with no OH sx throughout session today. Pt taken to OT at end of session. PM Vitals: Sitting in chair BP: 90/56  Sitting in chair, post first bout of ambulation: 87/56  After second bout of ambulation: 93/56  After third bout of ambulation: 96/56  PM: Pt in chair on arrival, agreeable to PT tx. BP stable as noted above throughout session. Pt performed multiple bouts of ambulation with slightly increased distance this session. Pt returned to room at end of session. Patient education  Pt educated on LSO, sit<>stand and pivot training     Patient response to education:   Pt verbalized understanding Pt demonstrated skill Pt requires further education in this area   Yes  Yes  Yes      Rehab potential is Good for reaching above PT goals. Pts/ family goals   1. Home     Patient and or family understand(s) diagnosis, prognosis, and plan of care. Good    PLAN  PT care will be provided in accordance with the objectives noted above. Whenever appropriate, clear delegation orders will be provided for nursing staff. Exercises and functional mobility practice will be used as well as appropriate assistive devices or modalities to obtain goals. Patient and family education will also be administered as needed. Frequency of treatments will be 2x/day M-F and 1x/day Sat or Sun x 14 days.     AM  Time in: 1000  Time out: 1045  PM  Time in: 1345  Time out: [id number] [address]  [id number]

## 2022-11-30 NOTE — PROGRESS NOTES
Progress Note  Date:2022       PFPZ:[id number]  Patient Name:Mae Villela     YOB: 1970     Age:52 y.o. Subjective    Subjective:  Symptoms:  Stable. She reports weakness. Diet:  Adequate intake. Activity level: Impaired due to weakness. Pain:  She complains of pain that is mild. Review of Systems   Neurological:  Positive for weakness. Objective         Vitals Last 24 Hours:  TEMPERATURE:  Temp  Av.9 °F (36.6 °C)  Min: 97.9 °F (36.6 °C)  Max: 97.9 °F (36.6 °C)  RESPIRATIONS RANGE: Resp  Av  Min: 18  Max: 18  PULSE OXIMETRY RANGE: No data recorded  PULSE RANGE: Pulse  Av.7  Min: 74  Max: 92  BLOOD PRESSURE RANGE: Systolic (43YQB), RNL:47 , Min:97 , ALKO:315   ; Diastolic (13MUB), NJV:09, Min:59, Max:71    I/O (24Hr): Intake/Output Summary (Last 24 hours) at 2022 0857  Last data filed at 2022 2306  Gross per 24 hour   Intake 240 ml   Output --   Net 240 ml     Objective:  General Appearance: In no acute distress. Vital signs: (most recent): Blood pressure (!) 100/59, pulse 74, temperature 97.9 °F (36.6 °C), temperature source Temporal, resp. rate 18, height 5' 11\" (1.803 m), weight 211 lb (95.7 kg), SpO2 93 %. (Blood pressure ranging  systolic). Output: Producing urine and producing stool. Lungs:  Normal effort and normal respiratory rate. Breath sounds clear to auscultation. Heart: Normal rate. Regular rhythm. S1 normal and S2 normal.    Abdomen: Abdomen is soft. Bowel sounds are normal.   There is no abdominal tenderness. Extremities: Normal range of motion. Neurological: Patient is alert. (4/5 strength).     Labs/Imaging/Diagnostics    Labs:  CBC:  Recent Labs     22  1037   WBC 5.9   RBC 3.64   HGB 9.9*   HCT 32.1*   MCV 88.2   RDW 15.6*        CHEMISTRIES:  Recent Labs     22  1037      K 3.8   CL 99   CO2 23   BUN 13   CREATININE 0.9   GLUCOSE 178*     PT/INR:No results for input(s): PROTIME, INR in the last 72 hours. APTT:No results for input(s): APTT in the last 72 hours. LIVER PROFILE:  Recent Labs     11/28/22  1037   AST 17   ALT 14   BILITOT 0.3   ALKPHOS 160*       Imaging Last 24 Hours:  XR KNEE LEFT (3 VIEWS)    Result Date: 11/29/2022  EXAMINATION: THREE XRAY VIEWS OF THE LEFT KNEE 11/29/2022 5:33 pm COMPARISON: None. HISTORY: ORDERING SYSTEM PROVIDED HISTORY: pain TECHNOLOGIST PROVIDED HISTORY: Reason for exam:->pain What reading provider will be dictating this exam?->CRC FINDINGS: There is mild degenerative narrowing of the medial and lateral compartment with associated trace osteophytes along margin of medial tibial plateau and lateral femoral condyle. There is mild degenerative change along the posterior patellar margin. There is a small suprapatellar joint effusion. There is no acute fracture or dislocation on these views. The mineralization and bony architecture within normal limits. There is no pathological calcification or evidence for soft tissue abnormality. 1.  Mild tricompartmental osteoarthritis. 2.  Small joint effusion.      Assessment//Plan           Hospital Problems             Last Modified POA    * (Principal) Compression fracture of L2 lumbar vertebra, closed, initial encounter ([facility] [address] 11/23/2022 Yes   Assessment:      Date   Status   AE    Comments     Feeding   11/25/22   Set up              Grooming   11/25/22   Minimal Assist                    Oral Care   11/25/22   Minimal Assist              Bathing   11/25/22   Moderate Assist              UB Dressing   11/26/22   Min/mod Assist              LB Dressing   11/25/22   Moderate Assist              Footwear   11/29/22    Mod A    Sock aid    Pt donned slipper socks with sock aid      Toileting   11/25/22   Mod Assist               Homemaking   11/28/22    Min A/CGA                   Functional Transfers / Balance:      Date Status DME  Comments   Sit Balance 11/28/22 Sup        Stand Balance 11/29/22  CGA/Min A  ww     [x] Tub  [] Shower   Transfer 11/29/22  Mod A  Ww, ETB  Assist to lift BLE's over edge of tub and with sit to stand off of tub bench    Commode   Transfer 11/28/22  Min  Assist  Ww, 3 in 1 placed over commode      Functional   Mobility 11/29/22  Min A/CGA  Ww  Short distance in therapy apt setting over carpet floor surface    Other:SPT w/c to bed      Sit to stand transfer w/c to ww         Bed mobility : supine to sit     On/off firm recliner  11/29/22 11/29/22 11/26/22 11/29/22  Minimal Assist         Minimal Assist            Minimal Assist         Minima Assist  Ww        Ww                    ww        Assessment:    Condition: In stable condition. Improving. (L2 fracture  Orthostatic hypotension  Gastric sleeve). Plan:   Encourage ambulation. (Patient's blood pressure seems to be a little bit better  I have a call out to the bariatric center to discuss her case although she had the surgery in [email or web address]  Pain is a little better  I am going to decrease the Percocet to 5/3/2025 every 4 hours straight  I am also going to stop the baclofen  Keep her on all the medications I have started to stabilize her blood pressure).      Electronically signed by Carmen Murray MD on 11/30/22 at 8:57 AM EST

## 2022-11-30 NOTE — PROGRESS NOTES
PROGRESS NOTE     CARDIOLOGY    Chief complaint: Seen today for follow up, management & recommendations for orthostatic hypotension. This was conducted over the telephone to reduce exposures and use of PPE's for her COVID. She denies chest pain or shortness of breath today. She states that she has only been standing at the bedside for about a second. She denies near syncope with this. Wt Readings from Last 3 Encounters:   11/23/22 211 lb (95.7 kg)   11/22/22 207 lb (93.9 kg)   10/28/22 224 lb (101.6 kg)     Temp Readings from Last 3 Encounters:   11/30/22 97.7 °F (36.5 °C) (Oral)   11/23/22 97.4 °F (36.3 °C) (Tympanic)   10/21/22 97.8 °F (36.6 °C) (Temporal)     BP Readings from Last 3 Encounters:   11/30/22 (!) 87/52   11/23/22 90/62   10/28/22 (!) 106/55     Pulse Readings from Last 3 Encounters:   11/30/22 96   11/23/22 84   10/28/22 88         Intake/Output Summary (Last 24 hours) at 11/30/2022 0925  Last data filed at 11/29/2022 2306  Gross per 24 hour   Intake 240 ml   Output --   Net 240 ml       Recent Labs     11/28/22  1037   WBC 5.9   HGB 9.9*   HCT 32.1*   MCV 88.2        Recent Labs     11/28/22  1037      K 3.8   CL 99   CO2 23   BUN 13   CREATININE 0.9     No results for input(s): PROTIME, INR in the last 72 hours. No results for input(s): CKTOTAL, CKMB, CKMBINDEX, TROPONINI in the last 72 hours. No results for input(s): BNP in the last 72 hours. No results for input(s): CHOL, HDL, TRIG in the last 72 hours. Invalid input(s): CHOLHDLR, LDLCALCU  No results for input(s): TROPHS in the last 72 hours.       oxyCODONE-acetaminophen (PERCOCET) 5-325 MG per tablet 1 tablet, Q4H  miconazole (MICOTIN) 2 % powder, BID  white petrolatum ointment, Nightly   And  white petrolatum ointment, BID PRN  b complex-C-folic acid (NEPHROCAPS) capsule 1 mg, TID  lidocaine 4 % external patch 1 patch, Daily  sodium chloride flush 0.9 % injection 5-40 mL, BID  sodium chloride tablet 1 g, TID WC  fludrocortisone (FLORINEF) tablet 0.1 mg, Daily  ammonium lactate (LAC-HYDRIN) 12 % lotion, PRN  folic acid (FOLVITE) tablet 1 mg, Daily  heparin (porcine) injection 5,000 Units, Q8H  midodrine (PROAMATINE) tablet 15 mg, TID WC  pantoprazole (PROTONIX) tablet 40 mg, QAM AC  senna (SENOKOT) tablet 8.6 mg, Nightly  bisacodyl (DULCOLAX) suppository 10 mg, Daily PRN  acetaminophen (TYLENOL) tablet 650 mg, Q4H PRN  polyethylene glycol (GLYCOLAX) packet 17 g, Daily PRN        Review of systems:     Further is unobtainable. Physical exam:    Constitutional: A&O x3, communicates well, no acute distress. Further per others. Assessment/Recommendations  Morbid obesity. Status post gastric bypass surgery. Significant weight loss. Recurring orthostatic hypotension since her gastric bypass surgery. She is on multiple medications. However, her her orthostatics remain positive. I will consult electrophysiology for further recommendations. Pulmonary hypertension  Hypertension prior to her surgery. Now hypotensive as described above. Mild sleep apnea. She is planning on using CPAP. Diabetes. Possible neuropathy contributing to her orthostatic hypotension. Chronic anemia. Not helping her orthostatic hypotension. Occipital neuralgia  COVID  Cardiology will sign off. Note: This report was completed using computerized voice recognition software. Every effort has been made to ensure accuracy, however; and invert and computerized transcription errors may be present.

## 2022-11-30 NOTE — PROGRESS NOTES
Occupational Therapy  OCCUPATIONAL THERAPY DAILY NOTE    Date:2022  Patient Name: Jeff Espinosa  MRN: [id number]  : 1970  Room: [address] [place], [place] [address]     Referring Physician: Nathalie Abreu MD  Diagnosis:  S/P Fall with Compression fractures of L2  L3, L5 lumbar vertebra  Surgery/Procedure: None this admission    Past Medical History:  has a past medical history of Anemia, Diabetes mellitus ([address] Gastric Sleeve    Precautions: LSO for comfort (L2, L3, L5 compression fx's), spinal precautions, monitor BP (orthostatic hypotension),     Functional Assessment:   Date Status AE  Comments   Feeding 22  Independent    Pt able to open all packages on tray and feed herself breakfast    Grooming   Mod I    Pt washed face and styled hair seated at Mod I level          Oral Care 22  Mod I   Pt brushed teeth and used mouth wash seated at sink    Bathing 22  SBA   Pt completed bathing seated in shower    UB Dressing 22  Min A   Assist to don LSO brace tightly    LB Dressing 22  Min A  Reacher  Pt donned depends and pants with reacher. Min A to stand and pull up pants    Footwear 22  Min A  Sock aid  Assist to don thigh high joey hose stockings. Pt used sock aid to don slipper socks needing some assist to pull on fully over joey hoes    Toileting 22  Min A   Pt completed hygiene.  Assist for balance to stand and pull up over hips    Homemaking 22  Min A/CGA        Functional Transfers / Balance:   Date Status DME  Comments   Sit Balance 22  Sup      Stand Balance 22 CGA/Min A  ww    [x] Tub    [x] Shower   Transfer 22  Mod A     Mod A  Ww, ETB     Grab rail, shower seat      Transfer in and out of walk in shower with assist to sit down on and stand back up from shower seat    Commode   Transfer 22  Min  Assist  Ww, 3 in 1 placed over commode     Functional   Mobility 22  Min A/CGA  Ww  A few feet in pt's bathroom Other: SPT w/c to bed     Sit to stand transfer w/c to ww       Bed mobility : supine to sit    On/off firm recliner  11/30/22 11/29/22 11/30/22 11/29/22  Minimal Assist       Minimal Assist         Minimal Assist       Minimal Assist  Ww      Ww              ww      Functional Exercises / Activity:  BUE ROM/strength exercises: wheel and putty on table top surface               ROM to R shoulder with pt completing ROM shoulder x 2 reps   B hand strength with 7 # digi flex 3 sets 10 reps       Sensory / Neuromuscular Re-Education:      Cognitive Skills:   Status Comments   Problem   Solving fair     Memory fair     Sequencing fair     Safety fair       Visual Perception:    Education:  Pt was educated on safe transfers in and out of walk in shower     [] Family teach completed on:    Pain Level: 5/10  L knee. Additional Notes:   11/25/22 Pt's BP am seated after activity 97/67                  Pt's BP following commode and w/c transfers 101/69     11/26/22 Pt's BP supine 82/53 and following activity increased 92/66. Pt requested a fan in her room due to being so hot at times and nurse informed. Patient has made good  progress during treatment sessions toward set goals. Therapy emphasis to obtain goals:Plan of Care: 5-7 tx/wk for 3-4 weeks.   [x]ADL retraining: adapted techniques and AE recommendations to increase independence within precautions                  [x]Energy conservation techniques to improve tolerance for self care routine   [x]Functional transfer/mobility training for fall prevention & DME recommendations         [x]Patient/family education to increase safety and functional independence            [x]Environmental modifications for safe mobility and completion of ADLs                            [x]Cognitive retraining ex's to improve problem solving skills & safe participation in ADLs/IADLs     [x]Therapeutic activity to improve functional skills [x]Therapeutic exercise to improve functional engagement of daily activities. [x]Balance retraining ex's for postural control with dynamic challenges  [x]Neuromuscular re-education exercises                                              [x] Continue with current OT Plan of care. [] Prepare for Discharge    Long Term Goals: 3-4 weeks  Time Frame for Long term goals: 3-4 weeks   Long term goal 1: Pt will be IND for self feeding  Long term goal 2: Pt will be Mod I  for grooming task  Long term goal 3: Pt will be SBA for UB dressing including LSO. Long term goal 4: Pt will be Min A  for LB dressing and Mod A for footwear using AE PRN. Long term goal 5: Pt will be Min A for bathing task using AE PRN. Long term goal 6: Pt will be SBA  for toileting task  Long term goal 7: Pt will be SBA for commode transfer using AD as needed  Long term goal 8: Pt will be Mod A  for tub/shower  transfer  Long term goal 9: Pt will be Min A for light homemaking task  Long term goal 11: Pt will increase BUE strength by 1 muscle grade to improve functional engagement of ADLs. Long term goal 12: Pt will demonstrate Good safety, insight, reasoning, judgement, & problem-solving strategies throughout functional tasks to implement safety awareness & fall prevention strategies. 11/29/22- Pt POC & goals are updated on this date. Pt lives with her daughter in a 2-story condo- [address] no HR- bed/bath 2nd floor- tub combo. Pt daughter able to provide Sup & assist. PLOF independent.  Pt tentative length of stay 10-14 days Emy Menon OTR/L [id number]   DISCHARGE RECOMMENDATIONS  Recommended DME:    Post Discharge Care:   []Home Independently  []Home with 24hr Care / Supervision [x]Home with Partial Supervision []Home with Home Health OT []Home with Out Pt OT []Other: ___   Comments:         Time in Time out Tx Time Breakdown  Variance:   First Session  [id number] [x] Individual Tx-60  [] Concurrent Tx -  [] Co-Tx -   [] Group Tx -   [] Time Missed -      Second Session [id number] [x] Individual Tx-30   [] Concurrent Tx -  [] Co-Tx -   [] Group Tx -   [] Time Missed -     Third Session    [] Individual Tx-   [] Concurrent Tx -  [] Co-Tx -   [] Group Tx -   [] Time Missed -         Total Tx Time:  90 mins    Talbot Essex OTR/L [id number]

## 2022-12-01 PROBLEM — K91.2 MALNUTRITION FOLLOWING GASTROINTESTINAL SURGERY: Status: ACTIVE | Noted: 2022-12-01

## 2022-12-01 LAB
METER GLUCOSE: 110 MG/DL (ref 74–99)
METER GLUCOSE: 118 MG/DL (ref 74–99)

## 2022-12-01 PROCEDURE — 97110 THERAPEUTIC EXERCISES: CPT

## 2022-12-01 PROCEDURE — 6370000000 HC RX 637 (ALT 250 FOR IP): Performed by: PHYSICAL MEDICINE & REHABILITATION

## 2022-12-01 PROCEDURE — 99253 IP/OBS CNSLTJ NEW/EST LOW 45: CPT | Performed by: SURGERY

## 2022-12-01 PROCEDURE — 82962 GLUCOSE BLOOD TEST: CPT

## 2022-12-01 PROCEDURE — 6360000002 HC RX W HCPCS: Performed by: STUDENT IN AN ORGANIZED HEALTH CARE EDUCATION/TRAINING PROGRAM

## 2022-12-01 PROCEDURE — 97530 THERAPEUTIC ACTIVITIES: CPT

## 2022-12-01 PROCEDURE — 1280000000 HC REHAB R&B

## 2022-12-01 PROCEDURE — 6370000000 HC RX 637 (ALT 250 FOR IP): Performed by: STUDENT IN AN ORGANIZED HEALTH CARE EDUCATION/TRAINING PROGRAM

## 2022-12-01 PROCEDURE — 99222 1ST HOSP IP/OBS MODERATE 55: CPT | Performed by: INTERNAL MEDICINE

## 2022-12-01 RX ADMIN — HEPARIN SODIUM 5000 UNITS: 10000 INJECTION INTRAVENOUS; SUBCUTANEOUS at 05:45

## 2022-12-01 RX ADMIN — MIDODRINE HYDROCHLORIDE 15 MG: 5 TABLET ORAL at 08:05

## 2022-12-01 RX ADMIN — MIDODRINE HYDROCHLORIDE 15 MG: 5 TABLET ORAL at 17:33

## 2022-12-01 RX ADMIN — NEPHROCAP 1 MG: 1 CAP ORAL at 12:58

## 2022-12-01 RX ADMIN — OXYCODONE AND ACETAMINOPHEN 1 TABLET: 5; 325 TABLET ORAL at 01:12

## 2022-12-01 RX ADMIN — SENNOSIDES 8.6 MG: 8.6 TABLET, FILM COATED ORAL at 21:55

## 2022-12-01 RX ADMIN — SODIUM CHLORIDE 1 G: 1 TABLET ORAL at 12:58

## 2022-12-01 RX ADMIN — OXYCODONE AND ACETAMINOPHEN 1 TABLET: 5; 325 TABLET ORAL at 05:45

## 2022-12-01 RX ADMIN — NEPHROCAP 1 MG: 1 CAP ORAL at 21:54

## 2022-12-01 RX ADMIN — SODIUM CHLORIDE 1 G: 1 TABLET ORAL at 17:33

## 2022-12-01 RX ADMIN — OXYCODONE AND ACETAMINOPHEN 1 TABLET: 5; 325 TABLET ORAL at 17:33

## 2022-12-01 RX ADMIN — MICONAZOLE NITRATE: 20.6 POWDER TOPICAL at 08:07

## 2022-12-01 RX ADMIN — OXYCODONE AND ACETAMINOPHEN 1 TABLET: 5; 325 TABLET ORAL at 21:55

## 2022-12-01 RX ADMIN — FLUDROCORTISONE ACETATE 0.1 MG: 0.1 TABLET ORAL at 08:06

## 2022-12-01 RX ADMIN — OXYCODONE AND ACETAMINOPHEN 1 TABLET: 5; 325 TABLET ORAL at 08:06

## 2022-12-01 RX ADMIN — MICONAZOLE NITRATE: 20.6 POWDER TOPICAL at 22:28

## 2022-12-01 RX ADMIN — MIDODRINE HYDROCHLORIDE 15 MG: 5 TABLET ORAL at 12:58

## 2022-12-01 RX ADMIN — HEPARIN SODIUM 5000 UNITS: 10000 INJECTION INTRAVENOUS; SUBCUTANEOUS at 22:02

## 2022-12-01 RX ADMIN — PANTOPRAZOLE SODIUM 40 MG: 40 TABLET, DELAYED RELEASE ORAL at 05:45

## 2022-12-01 RX ADMIN — NEPHROCAP 1 MG: 1 CAP ORAL at 08:06

## 2022-12-01 RX ADMIN — FOLIC ACID 1 MG: 1 TABLET ORAL at 08:05

## 2022-12-01 RX ADMIN — HEPARIN SODIUM 5000 UNITS: 10000 INJECTION INTRAVENOUS; SUBCUTANEOUS at 12:58

## 2022-12-01 RX ADMIN — SODIUM CHLORIDE 1 G: 1 TABLET ORAL at 08:05

## 2022-12-01 RX ADMIN — OXYCODONE AND ACETAMINOPHEN 1 TABLET: 5; 325 TABLET ORAL at 12:58

## 2022-12-01 RX ADMIN — PETROLATUM: 420 OINTMENT TOPICAL at 22:27

## 2022-12-01 ASSESSMENT — PAIN DESCRIPTION - LOCATION
LOCATION: BACK;KNEE
LOCATION: BACK
LOCATION: GENERALIZED
LOCATION: BACK

## 2022-12-01 ASSESSMENT — PAIN SCALES - GENERAL
PAINLEVEL_OUTOF10: 7
PAINLEVEL_OUTOF10: 6

## 2022-12-01 ASSESSMENT — PAIN DESCRIPTION - ORIENTATION: ORIENTATION: LOWER

## 2022-12-01 ASSESSMENT — PAIN DESCRIPTION - DESCRIPTORS
DESCRIPTORS: DISCOMFORT;SORE;TENDER
DESCRIPTORS: ACHING
DESCRIPTORS: DISCOMFORT;TENDER;SORE
DESCRIPTORS: DISCOMFORT;SORE;TENDER

## 2022-12-01 NOTE — CONSULTS
[address] and [address] Electrophysiology  Consultation Report  PATIENT: Nathalie Royal RECORD NUMBER: [id number]  DATE OF SERVICE:  12/1/2022  ATTENDING ELECTROPHYSIOLOGIST: Jacki Sosa MD  PRIMARY ELECTROPHYSIOLOGIST: Neel Lopez .MD  REFERRING PHYSICIAN: Robe France MD and Kennedy Lemus MD  CHIEF COMPLAINT: Lightheadedness and near syncope    HPI: This is a 45 y.o. female with a history of morbid obesity, hypertension, diabetes who has been attempting to lose weight for the past year. She says she lost 50 pounds prior to her gastric bypass surgery in August.  Following her surgery she has lost more than 50 pounds again. She recalls significant head and neck pain mostly on the right side of her face since her surgery. Her surgery was performed in [place] since she lived in that area but now has moved locally. More recently she has had symptoms of lightheadedness and syncope resulting in frequent falls and injuries. She has had compression fractures of the lumbar vertebrae and is currently in acute rehab recovering gradually. She was also positive for COVID 19 infection which she developed during her hospital admission. She has been placed on Florinef as well as midodrine for orthostatic hypotension. Support stockings have also been given and the patient has been gradually undergoing physical therapy with improvement in her functional status. All her antihypertensive medications have been discontinued.     Patient Active Problem List    Diagnosis Date Noted    Malnutrition following gastrointestinal surgery 12/01/2022     Priority: Medium    Compression fracture of L2 lumbar vertebra, closed, initial encounter ([facility] 11/23/2022     Priority: Medium    Compression fracture of L2 lumbar vertebra ([facility] 11/06/2022     Priority: Medium    Compression fracture of L2 lumbar vertebra, open, initial encounter ([facility] 11/05/2022     Priority: Delmy Landin 2022     Priority: Medium       Past Medical History:   Diagnosis Date    Anemia     Diabetes mellitus ([address]     Papanicolaou smear of cervix with atypical squamous cells of undetermined significance (ASC-US)          Screening for human papillomavirus (HPV)     4/10/07        Family History   Problem Relation Age of Onset    Diabetes Mother     Hypertension Mother     Diabetes Father     Hypertension Father     Gout Father     Heart Disease None     Ovarian Cancer None     Uterine Cancer None     Breast Cancer None        Social History     Tobacco Use    Smoking status: Former    Smokeless tobacco: Never    Tobacco comments:     Quit smokin2002   Substance Use Topics    Alcohol use: Not on file       Current Facility-Administered Medications   Medication Dose Route Frequency Provider Last Rate Last Admin    oxyCODONE-acetaminophen (PERCOCET) 5-325 MG per tablet 1 tablet  1 tablet Oral Q4H Floresita Yañez MD   1 tablet at 22 1733    miconazole (MICOTIN) 2 % powder   Topical BID Floresita Yañez MD   Given at 22 6243    white petrolatum ointment   Topical Nightly Floresita Yañez MD   Given at 22    And    white petrolatum ointment   Topical BID PRN Gama A MD Gillian        b complex-C-folic acid (NEPHROCAPS) capsule 1 mg  1 capsule Oral TID Floresita Yañez MD   1 mg at 22 1258    lidocaine 4 % external patch 1 patch  1 patch TransDERmal Daily Gama A MD Gillian   1 patch at 22 0805    sodium chloride flush 0.9 % injection 5-40 mL  5-40 mL IntraVENous BID Gama A MD Gillian   10 mL at 22 2241    sodium chloride tablet 1 g  1 g Oral TID WC Gama A MD Gillian   1 g at 22 1733    fludrocortisone (FLORINEF) tablet 0.1 mg  0.1 mg Oral Daily Gama A MD Gillian   0.1 mg at 22 0806    ammonium lactate (LAC-HYDRIN) 12 % lotion   Topical PRN Rush Eid MD   Given at 49/15/37 3114    folic acid (FOLVITE) tablet 1 mg  1 mg Oral Daily [facility] MD Eddie   1 mg at 12/01/22 0805    heparin (porcine) injection 5,000 Units  5,000 Units SubCUTAneous Q8H Lenny Morgan MD   5,000 Units at 12/01/22 1258    midodrine (PROAMATINE) tablet 15 mg  15 mg Oral TID WC Lenny Morgan MD   15 mg at 12/01/22 1733    pantoprazole (PROTONIX) tablet 40 mg  40 mg Oral QAM AC Lenny Morgan MD   40 mg at 12/01/22 0545    senna (SENOKOT) tablet 8.6 mg  1 tablet Oral Nightly Lenny Morgan MD   8.6 mg at 11/30/22 2057    bisacodyl (DULCOLAX) suppository 10 mg  10 mg Rectal Daily PRN Lory Kaur MD        acetaminophen (TYLENOL) tablet 650 mg  650 mg Oral Q4H PRN Baron Gwendolyn MD   650 mg at 11/29/22 0335    polyethylene glycol (GLYCOLAX) packet 17 g  17 g Oral Daily PRN Baron Gwendolyn MD            Allergies   Allergen Reactions    Colchicine     Darvon [Propoxyphene] Other (See Comments)     GI Upset       ROS:   Constitutional: Negative for fever, activity change and appetite change. HENT: Negative for epistaxis. Eyes: Negative for diploplia, blurred vision. Respiratory: Negative for cough, chest tightness, shortness of breath and wheezing. Cardiovascular: pertinent positives in HPI  Gastrointestinal: Negative for abdominal pain and blood in stool. All other review of systems are negative     PHYSICAL EXAM:   Vitals:    12/01/22 0615 12/01/22 0802 12/01/22 1328 12/01/22 1734   BP:  (!) 99/53  (!) 97/59   Pulse:  86  82   Resp: 16 16 16 16   Temp:  97.5 °F (36.4 °C)     TempSrc:  Temporal     SpO2:  97%     Weight:       Height:          Constitutional: Well-developed, no acute distress  Eyes: conjunctivae normal, no xanthelasma   Ears, Nose, Throat: oral mucosa moist, no cyanosis   CV: no JVD. Or leg edema, normally placed PMI, regular rate and rhythm. Normal S1S2 and no S3. No murmurs, rubs, or gallops.    Lungs: clear to auscultation bilaterally, normal respiratory effort without used of accessory muscles  Abdomen: soft, non-tender, bowel sounds present, no masses or hepatomegaly   Musculoskeletal: no digital clubbing, no edema   Skin: warm, no rashes     I have personally reviewed the laboratory, cardiac diagnostic and radiographic testing as outlined below:    Data:    No results for input(s): WBC, HGB, HCT, PLT in the last 72 hours. No results for input(s): NA, K, CL, CO2, BUN, CREATININE, GLU, CALCIUM in the last 72 hours. Invalid input(s): MAGNESIUM   Lab Results   Component Value Date/Time    MG 1.7 11/14/2022 04:32 AM     No results for input(s): TSH in the last 72 hours. No results for input(s): INR in the last 72 hours. CXR: 11/5/22    FINDINGS:   The lungs are without acute focal process. There is no effusion or   pneumothorax. The cardiomediastinal silhouette is without acute process. The   osseous structures are without acute process. Impression   No acute process. EKG: Normal sinus rhythm, left axis deviation     MRI brain:11/15/22    FINDINGS:   Ischemia: No restricted diffusion is seen to suggest ischemia. Parenchyma: No evidence of recent intracranial hemorrhage. No mass effect. Ventricles: No evidence of hydrocephalus. Flow voids: Flow voids are present in the proximal major intracranial   arteries. Trace bilateral mastoid effusions. Trace ethmoidal mucosal thickening. Impression   No evidence of recent infarct, acute intracranial hemorrhage, mass effect, or   hydrocephalus     Echocardiogram: Pending      I have independently reviewed all of the ECGs and rhythm strips per above     Assessment/Plan: This is a 45 y.o. female with a history of     1. Morbid obesity by history--patient has lost more than  pounds in the last 6 months. 2.  Orthostatic hypotension with frequent falls related to the same. The patient has been taken off her antihypertensive medications and placed on high-dose midodrine as well as Florinef.   She has support stockings and physical therapy/ambulation is ongoing    3. Hypertension and diabetes, longstanding related to her obesity  Patient now off antihypertensives    4. Sleep apnea    5. Ex-smoker    6. COVID-19 infection in the hospital.  Patient's symptoms have improved considerably    Recommendations:    Patient is on maximum doses of midodrine and is able to ambulate without falling in rehab. Florinef was also recently added. She is on maximum pharmacologic intervention possible  Physical therapy and use of support stockings emphasized again  Added salt diet would be helpful  Patient reassured. All of the above was discussed with the patient  reviewing the above stated recommendations. And a total of >50% of that time involved face-to-face time providing counseling and or coordination of care with the other providers, reviewing records/tests, counseling/education of the patient, ordering medications/tests/procedures, coordinating care, and documenting clinical information in the EHR. Thank you for allowing me to participate in your patient's care. Please call me if there are any questions or concerns.       Kerline Yañez MD  Cardiac Electrophysiology  [facility]) Physicians  [facility] [place]: [facility] Electrophysiology  6:54 PM  12/1/2022

## 2022-12-01 NOTE — PROGRESS NOTES
Occupational Therapy  OCCUPATIONAL THERAPY DAILY NOTE    Date:2022  Patient Name: Sue iLon  MRN: [id number]  : 1970  Room: [address] [place], [place] [address]     Referring Physician: Zoey Camarillo MD  Diagnosis:  S/P Fall with Compression fractures of L2  L3, L5 lumbar vertebra  Surgery/Procedure: None this admission    Past Medical History:  has a past medical history of Anemia, Diabetes mellitus ([address] Gastric Sleeve    Precautions: LSO for comfort (L2, L3, L5 compression fx's), spinal precautions, monitor BP (orthostatic hypotension),     Functional Assessment:   Date Status AE  Comments   Feeding 22  Independent       Grooming   Mod I             Oral Care 22  Mod I      Bathing 22  SBA      UB Dressing 22  Min A      LB Dressing 22  Min A  Reacher     Footwear 22  Min A  Sock aid     Toileting 22  Min A      Homemaking 22  Min A/CGA        Functional Transfers / Balance:   Date Status DME  Comments   Sit Balance 22  Sup      Stand Balance 22 CGA/SBA  ww    [x] Tub    [x] Shower   Transfer 22  Min A     Mod A  Ww, ETB     Grab rail, shower seat  Pt able to lift BLE's over edge of tub.  Pt needed some assist with sit to stand from tub bench surface   Commode   Transfer 22  Min  Assist  Ww, 3 in 1 placed over commode     Functional   Mobility 22  CGA/SBA  Ww  Throughout therapy apt setting over tile and carpet floor surfaces    Other:SPT w/c to bed     Sit to stand transfer w/c to ww       Bed mobility : supine to sit    On/off firm recliner     On/off standard queen bed  22 Minimal Assist       Minimal Assist         Minimal Assist       CGA     Min A  Ww      Ww              ww                       Assist to left LLE onto bed and verbal cues to remind pt of log rolling technique      Functional Exercises / Activity:  BUE strength exercises: 2 # dowel regina 3 sets 10 reps in all  planes of pt's tolerance                                           Red can do bar 3 sets 10 reps   ROM to R shoulder with pt completing roller coaster shoulder arc x 2 reps   B hand strength with 10 # power gripper 3 sets 10 reps   Pt completed 5 reps on the  juxacisor in right hand with focus on wrist AROM       Sensory / Neuromuscular Re-Education:      Cognitive Skills:   Status Comments   Problem   Solving fair     Memory fair     Sequencing fair     Safety fair       Visual Perception:    Education:  Pt was educated on safe transfers in and out of tub/shower combo and types of DME     [] Family teach completed on:    Pain Level: 3/10  L knee. Additional Notes:   11/25/22 Pt's BP am seated after activity 97/67                  Pt's BP following commode and w/c transfers 101/69     11/26/22 Pt's BP supine 82/53 and following activity increased 92/66. Pt requested a fan in her room due to being so hot at times and nurse informed. Patient has made good  progress during treatment sessions toward set goals. Therapy emphasis to obtain goals:Plan of Care: 5-7 tx/wk for 3-4 weeks. [x]ADL retraining: adapted techniques and AE recommendations to increase independence within precautions                  [x]Energy conservation techniques to improve tolerance for self care routine   [x]Functional transfer/mobility training for fall prevention & DME recommendations         [x]Patient/family education to increase safety and functional independence            [x]Environmental modifications for safe mobility and completion of ADLs                            [x]Cognitive retraining ex's to improve problem solving skills & safe participation in ADLs/IADLs     [x]Therapeutic activity to improve functional skills                                         [x]Therapeutic exercise to improve functional engagement of daily activities.    [x]Balance retraining ex's for postural control with dynamic challenges  [x]Neuromuscular re-education exercises                                              [x] Continue with current OT Plan of care. [] Prepare for Discharge    Long Term Goals: 3-4 weeks  Time Frame for Long term goals: 3-4 weeks   Long term goal 1: Pt will be IND for self feeding  Long term goal 2: Pt will be Mod I  for grooming task  Long term goal 3: Pt will be SBA for UB dressing including LSO. Long term goal 4: Pt will be Min A  for LB dressing and Mod A for footwear using AE PRN. Long term goal 5: Pt will be Min A for bathing task using AE PRN. Long term goal 6: Pt will be SBA  for toileting task  Long term goal 7: Pt will be SBA for commode transfer using AD as needed  Long term goal 8: Pt will be Mod A  for tub/shower  transfer  Long term goal 9: Pt will be Min A for light homemaking task  Long term goal 11: Pt will increase BUE strength by 1 muscle grade to improve functional engagement of ADLs. Long term goal 12: Pt will demonstrate Good safety, insight, reasoning, judgement, & problem-solving strategies throughout functional tasks to implement safety awareness & fall prevention strategies. 11/29/22- Pt POC & goals are updated on this date. Pt lives with her daughter in a 2-story condo- [address] no HR- bed/bath 2nd floor- tub combo. Pt daughter able to provide Sup & assist. PLOF independent.  Pt tentative length of stay 10-14 days Hari Rodriguez OTR/L [id number]   DISCHARGE RECOMMENDATIONS  Recommended DME:    Post Discharge Care:   []Home Independently  []Home with 24hr Care / Supervision [x]Home with Partial Supervision []Home with Home Health OT []Home with Out Pt OT []Other: ___   Comments:         Time in Time out Tx Time Breakdown  Variance:   First Session  [id number]  [] Individual Tx-  [x] Concurrent Tx -45  [] Co-Tx -   [] Group Tx -   [] Time Missed -      Second Session [id number]  [x] Individual Tx-45  [] Concurrent Tx -  [] Co-Tx -   [] Group Tx -   [] Time Missed -     Third Session [] Individual Tx-   [] Concurrent Tx -  [] Co-Tx -   [] Group Tx -   [] Time Missed -         Total Tx Time:  90 mins    Thomasena Prader OTR/L [id number]

## 2022-12-01 NOTE — PROGRESS NOTES
Physical Therapy  Treatment Note   Evaluating Therapist: Yadi Araujo, PT, DPT [id number]       ROOM: [address] [place] [address]  DIAGNOSIS: Closed compression fracture of L2 lumbar vertebra   PRECAUTIONS: Falls, Spinal precautions, LSO for comfort (L2, L3, and L5 compression fx), Orthostatic hypotension (compression stockings), Droplet Plus isolation, COVID-19  HPI:  Pt had a recent gastric bypass and suffered a fall on 11/5. She has had multiple recent falls. Compression fractures noted at L2-L3 and L5. She was not a candidate for neurosurgical intervention. Pt has tested positive for orthostatic hypotension and COVID. Social:  Pt lives with her dtr in a 2 floor plan with 1 steps and 0 rails. Bedroom and bathroom are on the 2nd level with 7+3 steps with B rails. Prior to admission: Independent with no assistive device. Works as a . Drives. Initial Evaluation  Date: 11/24/22 AM     PM    Short Term Goals Long Term Goals    Was pt agreeable to Eval/treatment? Yes  yes yes     Does pt have pain? 9/10 mid back Pain in L knee  Pain in L knee      Bed Mobility  Rolling: Min A  Supine to sit: Mod A  Sit to supine: Mod A  Scooting: Min A NT NT SBA Modified Independent     Transfers Sit to stand:  Mod A  Stand to sit: Mod A  Stand pivot: Min A with FWW Sit to stand: CGA  Stand to sit: CGA  Stand pivot: CGA Sit to stand: CGA  Stand to sit: CGA  Stand pivot: CGA FWW SBA with FWW   Modified Independent with LRAD   Ambulation    3 feet x2 reps with FWW with Min A  1x70', 2x120' with FWW CGA 1x160' FWW CGA >100 feet with FWW with SBA >300 feet with LRAD with Modified Independent     Walking 10 feet on uneven surface NT d/t hypotension and droplet plus isolation  NT NT 10 feet with FWW with Min A 10 feet with LRAD with Modified Independent     Wheel Chair Mobility NA NT NT     Car Transfers NT d/t hypotension and droplet plus isolation  NT NT Min A Modified Independent     Stair negotiation: ascended and descended NT d/t hypotension and droplet plus isolation  NT NT 4 steps with B rail with Min A 12 steps with 1 rail with Modified Independent     Curb Step:   ascended and descended NT d/t hypotension and droplet plus isolation  NT NT 6 inch step with FWW and Min A 6 inch step with LRAD and Modified Independent     Picking up object off the floor NT d/t hypotension    Will  cone with Min A assist Will  cone with no assist   BLE ROM WFL       BLE Strength RLE:  Grossly 3-/5  LLE:  Grossly 4/5       Balance  Unsupported sitting:  SBA    Dynamic standing:  Min A with FWW    BBS: NT  FGA: NT      BBS: >52  FGA: >22   Date Family Teach Completed        Is additional Family Teaching Needed? Y or N        Hindering Progress Back pain, RUE and RLE weakness, orthostatic hypotension  Back pain, RUE and RLE weakness, orthostatic hypotension       PT recommended ELOS        Team's Discharge Plan        Therapist at Team Meeting          Therapeutic exercise/activity: functional mobility     ASSESSMENT  Pt displays functional ability as noted in the objective portion of this evaluation. AM vitals:  Sitting in chair BP: 106/69  Standing after first bout of ambulation: 96/62  Standing after second bout of ambulation: 93/64  After activity: 104/64  Comments: Pt in chair on arrival, agreeable to PT tx. Pt with stable BP throughout session today. Pt able to increase ambulation distance today without OH sx and without increased pain in knee. Pt encouraged to increase ambulation speed this session, able to slightly as session progressed. Pt taken to OT at end of session. PM Vitals: Sitting in chair BP: 96/62  Sitting in chair, post first bout of ambulation: 97/66  Standin/61  PM: Pt on phone on arrival, politely requested time to make phone call for personal needs. Pt arrived to session ~20 min late. Pt with stable BP throughout session.  Pt able to ambulate increased distance but cont to have exteremly slow gait speed. Pt taken to OT at end of session. Patient education  Pt educated on LSO, sit<>stand and pivot training     Patient response to education:   Pt verbalized understanding Pt demonstrated skill Pt requires further education in this area   Yes  Yes  Yes      Rehab potential is Good for reaching above PT goals. Pts/ family goals   1. Home     Patient and or family understand(s) diagnosis, prognosis, and plan of care. Good    PLAN  PT care will be provided in accordance with the objectives noted above. Whenever appropriate, clear delegation orders will be provided for nursing staff. Exercises and functional mobility practice will be used as well as appropriate assistive devices or modalities to obtain goals. Patient and family education will also be administered as needed. Frequency of treatments will be 2x/day M-F and 1x/day Sat or Sun x 14 days.     AM  Time in: 1000  Time out: 1045  PM  Time in: 1405  Time out: [address] [place], [place]  [id number]

## 2022-12-02 PROBLEM — R55 SYNCOPE AND COLLAPSE: Status: ACTIVE | Noted: 2022-12-02

## 2022-12-02 PROBLEM — I95.1 ORTHOSTATIC HYPOTENSION: Status: ACTIVE | Noted: 2022-12-02

## 2022-12-02 PROBLEM — R63.4 EXCESSIVE WEIGHT LOSS: Status: ACTIVE | Noted: 2022-12-02

## 2022-12-02 LAB
METER GLUCOSE: 121 MG/DL (ref 74–99)
METER GLUCOSE: 124 MG/DL (ref 74–99)
METER GLUCOSE: 126 MG/DL (ref 74–99)

## 2022-12-02 PROCEDURE — 6370000000 HC RX 637 (ALT 250 FOR IP): Performed by: STUDENT IN AN ORGANIZED HEALTH CARE EDUCATION/TRAINING PROGRAM

## 2022-12-02 PROCEDURE — 1280000000 HC REHAB R&B

## 2022-12-02 PROCEDURE — 6360000002 HC RX W HCPCS: Performed by: STUDENT IN AN ORGANIZED HEALTH CARE EDUCATION/TRAINING PROGRAM

## 2022-12-02 PROCEDURE — 6370000000 HC RX 637 (ALT 250 FOR IP): Performed by: PHYSICAL MEDICINE & REHABILITATION

## 2022-12-02 PROCEDURE — 82962 GLUCOSE BLOOD TEST: CPT

## 2022-12-02 PROCEDURE — 97530 THERAPEUTIC ACTIVITIES: CPT

## 2022-12-02 PROCEDURE — 97535 SELF CARE MNGMENT TRAINING: CPT

## 2022-12-02 PROCEDURE — 97110 THERAPEUTIC EXERCISES: CPT

## 2022-12-02 RX ADMIN — MICONAZOLE NITRATE: 20.6 POWDER TOPICAL at 09:17

## 2022-12-02 RX ADMIN — MIDODRINE HYDROCHLORIDE 15 MG: 5 TABLET ORAL at 14:36

## 2022-12-02 RX ADMIN — HEPARIN SODIUM 5000 UNITS: 10000 INJECTION INTRAVENOUS; SUBCUTANEOUS at 05:58

## 2022-12-02 RX ADMIN — OXYCODONE AND ACETAMINOPHEN 1 TABLET: 5; 325 TABLET ORAL at 20:47

## 2022-12-02 RX ADMIN — SODIUM CHLORIDE 1 G: 1 TABLET ORAL at 09:17

## 2022-12-02 RX ADMIN — NEPHROCAP 1 MG: 1 CAP ORAL at 09:17

## 2022-12-02 RX ADMIN — FLUDROCORTISONE ACETATE 0.1 MG: 0.1 TABLET ORAL at 14:37

## 2022-12-02 RX ADMIN — NEPHROCAP 1 MG: 1 CAP ORAL at 16:41

## 2022-12-02 RX ADMIN — FOLIC ACID 1 MG: 1 TABLET ORAL at 09:18

## 2022-12-02 RX ADMIN — OXYCODONE AND ACETAMINOPHEN 1 TABLET: 5; 325 TABLET ORAL at 14:37

## 2022-12-02 RX ADMIN — SENNOSIDES 8.6 MG: 8.6 TABLET, FILM COATED ORAL at 20:47

## 2022-12-02 RX ADMIN — OXYCODONE AND ACETAMINOPHEN 1 TABLET: 5; 325 TABLET ORAL at 03:01

## 2022-12-02 RX ADMIN — OXYCODONE AND ACETAMINOPHEN 1 TABLET: 5; 325 TABLET ORAL at 05:57

## 2022-12-02 RX ADMIN — SODIUM CHLORIDE 1 G: 1 TABLET ORAL at 14:37

## 2022-12-02 RX ADMIN — HEPARIN SODIUM 5000 UNITS: 10000 INJECTION INTRAVENOUS; SUBCUTANEOUS at 14:40

## 2022-12-02 RX ADMIN — OXYCODONE AND ACETAMINOPHEN 1 TABLET: 5; 325 TABLET ORAL at 09:17

## 2022-12-02 RX ADMIN — OXYCODONE AND ACETAMINOPHEN 1 TABLET: 5; 325 TABLET ORAL at 16:42

## 2022-12-02 RX ADMIN — HEPARIN SODIUM 5000 UNITS: 10000 INJECTION INTRAVENOUS; SUBCUTANEOUS at 20:48

## 2022-12-02 RX ADMIN — NEPHROCAP 1 MG: 1 CAP ORAL at 20:47

## 2022-12-02 RX ADMIN — MIDODRINE HYDROCHLORIDE 15 MG: 5 TABLET ORAL at 16:41

## 2022-12-02 RX ADMIN — ACETAMINOPHEN 650 MG: 325 TABLET ORAL at 22:40

## 2022-12-02 RX ADMIN — MIDODRINE HYDROCHLORIDE 15 MG: 5 TABLET ORAL at 09:18

## 2022-12-02 RX ADMIN — PANTOPRAZOLE SODIUM 40 MG: 40 TABLET, DELAYED RELEASE ORAL at 09:17

## 2022-12-02 RX ADMIN — PETROLATUM: 420 OINTMENT TOPICAL at 20:48

## 2022-12-02 RX ADMIN — SODIUM CHLORIDE 1 G: 1 TABLET ORAL at 16:41

## 2022-12-02 RX ADMIN — MICONAZOLE NITRATE: 20.6 POWDER TOPICAL at 20:48

## 2022-12-02 ASSESSMENT — PAIN DESCRIPTION - LOCATION
LOCATION: BACK

## 2022-12-02 ASSESSMENT — PAIN SCALES - GENERAL
PAINLEVEL_OUTOF10: 7
PAINLEVEL_OUTOF10: 5
PAINLEVEL_OUTOF10: 6
PAINLEVEL_OUTOF10: 5
PAINLEVEL_OUTOF10: 6
PAINLEVEL_OUTOF10: 5
PAINLEVEL_OUTOF10: 5

## 2022-12-02 ASSESSMENT — PAIN DESCRIPTION - DESCRIPTORS
DESCRIPTORS: ACHING
DESCRIPTORS: ACHING
DESCRIPTORS: ACHING;DISCOMFORT

## 2022-12-02 ASSESSMENT — PAIN DESCRIPTION - ORIENTATION: ORIENTATION: LOWER

## 2022-12-02 NOTE — PROGRESS NOTES
Physical Therapy  Treatment Note   Evaluating Therapist: Kayla Monique, PT, DPT [id number]       ROOM: [address] [place] [address]  DIAGNOSIS: Closed compression fracture of L2 lumbar vertebra   PRECAUTIONS: Falls, Spinal precautions, LSO for comfort (L2, L3, and L5 compression fx), Orthostatic hypotension (compression stockings), Droplet Plus isolation, COVID-19  HPI:  Pt had a recent gastric bypass and suffered a fall on 11/5. She has had multiple recent falls. Compression fractures noted at L2-L3 and L5. She was not a candidate for neurosurgical intervention. Pt has tested positive for orthostatic hypotension and COVID. Social:  Pt lives with her dtr in a 2 floor plan with 1 steps and 0 rails. Bedroom and bathroom are on the 2nd level with 7+3 steps with B rails. Prior to admission: Independent with no assistive device. Works as a . Drives. Initial Evaluation  Date: 11/24/22 AM     PM    Short Term Goals Long Term Goals    Was pt agreeable to Eval/treatment? Yes  yes yes     Does pt have pain? 9/10 mid back Pain in L knee  Pain in L knee      Bed Mobility  Rolling: Min A  Supine to sit: Mod A  Sit to supine: Mod A  Scooting: Min A NT NT SBA Modified Independent     Transfers Sit to stand:  Mod A  Stand to sit: Mod A  Stand pivot: Min A with FWW Sit to stand: SBA  Stand to sit: SBA  Stand pivot: SBA Sit to stand: SBA  Stand to sit: SBA  Stand pivot: SBA FWW SBA with FWW   Modified Independent with LRAD   Ambulation    3 feet x2 reps with FWW with Min A  2x170' with FWW CGA 2x200' no AD CGA<>min A >100 feet with FWW with SBA >300 feet with LRAD with Modified Independent     Walking 10 feet on uneven surface NT d/t hypotension and droplet plus isolation  NT NT 10 feet with FWW with Min A 10 feet with LRAD with Modified Independent     Wheel Chair Mobility NA NT NT     Car Transfers NT d/t hypotension and droplet plus isolation  NT NT Min A Modified Independent     Stair negotiation: ascended and descended  NT d/t hypotension and droplet plus isolation  2x3 steps MATT HR CGA NT 4 steps with B rail with Min A 12 steps with 1 rail with Modified Independent     Curb Step:   ascended and descended NT d/t hypotension and droplet plus isolation  NT NT 6 inch step with FWW and Min A 6 inch step with LRAD and Modified Independent     Picking up object off the floor NT d/t hypotension    Will  cone with Min A assist Will  cone with no assist   BLE ROM WFL       BLE Strength RLE:  Grossly 3-/5  LLE:  Grossly 4/5       Balance  Unsupported sitting:  SBA    Dynamic standing:  Min A with FWW    BBS: NT  FGA: NT      BBS: >52  FGA: >22   Date Family Teach Completed        Is additional Family Teaching Needed? Y or N        Hindering Progress Back pain, RUE and RLE weakness, orthostatic hypotension  Back pain, RUE and RLE weakness, orthostatic hypotension       PT recommended ELOS        Team's Discharge Plan        Therapist at Team Meeting          Therapeutic exercise/activity:  AM: 1x100' ambulation without AD CGA    ASSESSMENT  Pt displays functional ability as noted in the objective portion of this evaluation. AM vitals:  Sitting in chair BP: 98/64  Standing after first bout of ambulation: 110/63  Standing after second bout of ambulation: 101/82  After activity:106/65  Comments: Pt able to perform multiple bouts of ambulation this PM with and without walker without LOB. Pt with slow gait speed and small step length but able to tolerate increased activity this date. Stair training initiated this date, pt performed 3/4 stairs with MATT HR d/t tall height preventing her from ascending 4th. Pt with stable BP throughout session, left with all needs met at end of session. PM Vitals: Sitting in chair BP: 105/67  Sitting in chair, post first bout of ambulation: 101///56  Standin/67  PM: Pt cont to have stable BP throughout PM session.  Pt able to perform 2 bouts of ambulation with increased distance this session without AD. Pt with 2 minor LOB able to correct for with min A, very slow gait speed throughout session. Pt noted some fatigue, pain in back with ambulation without AD. Pt returned to room at end of session, RN present to distribute medication. Patient education  Pt educated on LSO, sit<>stand and pivot training     Patient response to education:   Pt verbalized understanding Pt demonstrated skill Pt requires further education in this area   Yes  Yes  Yes      Rehab potential is Good for reaching above PT goals. Pts/ family goals   1. Home     Patient and or family understand(s) diagnosis, prognosis, and plan of care. Good    PLAN  PT care will be provided in accordance with the objectives noted above. Whenever appropriate, clear delegation orders will be provided for nursing staff. Exercises and functional mobility practice will be used as well as appropriate assistive devices or modalities to obtain goals. Patient and family education will also be administered as needed. Frequency of treatments will be 2x/day M-F and 1x/day Sat or Sun x 14 days.     AM  Time in: 1000  Time out: 1045  PM  Time in: 1345  Time out: [address] [place], [place]  [id number]

## 2022-12-02 NOTE — PROGRESS NOTES
Progress Note - covering Dr. Wally Ryan    Subjective/   45y.o. year old female on the rehab unit for L2 compression fracture. She continues to complain of pain. She is requesting a letter for her work stating her precautions and limitations. No shortness of breath or chest pain. Bowel and bladder are functioning          Objective/   VITALS:  /69   Pulse 84   Temp 98.7 °F (37.1 °C) (Temporal)   Resp 16   Ht 5' 11\" (1.803 m)   Wt 211 lb (95.7 kg)   SpO2 97%   BMI 29.43 kg/m²   24HR INTAKE/OUTPUT:    Intake/Output Summary (Last 24 hours) at 12/1/2022 2302  Last data filed at 12/1/2022 3839  Gross per 24 hour   Intake 240 ml   Output --   Net 240 ml     Constitutional:  Alert, awake, no apparent distress   Cardiovascular:  S1, S2 without m/r/g   Respiratory:  CTA B without w/r/r   Abdomen: Positive bowel sounds  Ext: 1+ bilateral LE edema, no calf tenderness  Neuro: Awake, alert and oriented x3. Good sitting balance. Functional Level  Evaluation  Date: 11/24/22 AM     PM    Short Term Goals Long Term Goals      Was pt agreeable to Eval/treatment? Yes  yes yes       Does pt have pain? 9/10 mid back Pain in L knee  Pain in L knee        Bed Mobility  Rolling: Min A  Supine to sit: Mod A  Sit to supine: Mod A  Scooting: Min A NT NT SBA Modified Independent     Transfers Sit to stand:  Mod A  Stand to sit: Mod A  Stand pivot: Min A with FWW Sit to stand: CGA  Stand to sit: CGA  Stand pivot: CGA Sit to stand: CGA  Stand to sit: CGA  Stand pivot: CGA FWW SBA with FWW    Modified Independent with LRAD   Ambulation    3 feet x2 reps with FWW with Min A  1x70', 2x120' with FWW CGA 1x160' FWW CGA >100 feet with FWW with SBA >300 feet with LRAD with Modified Independent     Walking 10 feet on uneven surface NT d/t hypotension and droplet plus isolation  NT NT 10 feet with FWW with Min A 10 feet with LRAD with Modified Independent     Wheel Chair Mobility NA NT NT       Car Transfers NT d/t hypotension and droplet plus isolation  NT NT Min A Modified Independent     Stair negotiation: ascended and descended  NT d/t hypotension and droplet plus isolation  NT NT 4 steps with B rail with Min A 12 steps with 1 rail with Modified Independent     Curb Step:   ascended and descended NT d/t hypotension and droplet plus isolation  NT NT 6 inch step with FWW and Min A 6 inch step with LRAD and Modified Independent     Picking up object off the floor NT d/t hypotension      Will  cone with Min A assist Will  cone with no assist   BLE ROM WFL           BLE Strength RLE:  Grossly 3-/5  LLE:  Grossly 4/5           Balance  Unsupported sitting:  SBA     Dynamic standing:  Min A with FWW     BBS: NT  FGA: NT          BBS: >52  FGA: >22       Scheduled Meds:   oxyCODONE-acetaminophen  1 tablet Oral Q4H    miconazole   Topical BID    white petrolatum   Topical Nightly    b complex-C-folic acid  1 capsule Oral TID    lidocaine  1 patch TransDERmal Daily    sodium chloride flush  5-40 mL IntraVENous BID    sodium chloride  1 g Oral TID WC    fludrocortisone  0.1 mg Oral Daily    folic acid  1 mg Oral Daily    heparin (porcine)  5,000 Units SubCUTAneous Q8H    midodrine  15 mg Oral TID WC    pantoprazole  40 mg Oral QAM AC    senna  1 tablet Oral Nightly     Continuous Infusions:  PRN Meds:white petrolatum **AND** white petrolatum, ammonium lactate, bisacodyl, acetaminophen, polyethylene glycol  I/O last 3 completed shifts: In: 720 [P.O.:720]  Out: -   No intake/output data recorded. Labs reviewed  CBC: No results for input(s): WBC, HGB, PLT in the last 72 hours. BMP:  No results for input(s): NA, K, CL, CO2, BUN, CREATININE, GLUCOSE in the last 72 hours. Hepatic: No results for input(s): AST, ALT, ALB, BILITOT, ALKPHOS in the last 72 hours. BNP: No results for input(s): BNP in the last 72 hours. Lipids: No results for input(s): CHOL, HDL in the last 72 hours.     Invalid input(s): LDLCALCU  INR: No results for input(s): INR in the last 72 hours.     Assessment/  Patient Active Problem List:     Compression fracture of L2 lumbar vertebra, open, initial encounter ([facility] [address]     Bubo     Compression fracture of L2 lumbar vertebra ([facility] [address]     Compression fracture of L2 lumbar vertebra, closed, initial encounter [facility])     Malnutrition following gastrointestinal surgery      Plan/  Continue the rehab program  Continue heparin for DVT prophylaxis  Continue midodrine and Florinef for blood pressure support  Continue pain control to allow mobilization  Maintain precautions as per neurosurgery          Brigida Waters MD

## 2022-12-02 NOTE — PROGRESS NOTES
Occupational Therapy  OCCUPATIONAL THERAPY DAILY NOTE    Date:2022  Patient Name: Sonu Lockhart  MRN: [id number]  : 1970  Room: [address] [place], [place] [address]     Referring Physician: Steven Benitez MD  Diagnosis:  S/P Fall with Compression fractures of L2  L3, L5 lumbar vertebra  Surgery/Procedure: None this admission    Past Medical History:  has a past medical history of Anemia, Diabetes mellitus ([address] Gastric Sleeve    Precautions: LSO for comfort (L2, L3, L5 compression fx's), spinal precautions, monitor BP (orthostatic hypotension),     Functional Assessment:   Date Status AE  Comments   Feeding 22 Independent       Grooming 22 Mod I             Oral Care 22 Mod I      Bathing 22 SBA   Bathing completed in shower    UB Dressing 22 Set up   Pt donned gown and LSO brace    LB Dressing 22  SBA  Reacher  Pt donned depends and pants with reacher. Footwear 22 Min A  Sock aid  Assist to don thigh high joey hose.  Pt able to don slipper socks with sock aid   Toileting 22  Min A      Homemaking 22  Min A/CGA        Functional Transfers / Balance:   Date Status DME  Comments   Sit Balance 22  Sup   During LB dressing    Stand Balance 22  CGA/SBA  ww Standing to pull up pants    [x] Tub    [x] Shower   Transfer 22 Min A     Min A   Ww, ETB     Grab rail, shower seat      Transfer in and out of walk in shower    Commode   Transfer 22  Min  Assist  Ww, 3 in 1 placed over commode     Functional   Mobility 22  CGA/SBA  Ww  Back and forth to the bathroom    Other:SPT w/c to bed     Sit to stand transfer w/c to ww       Bed mobility : supine to sit    On/off firm recliner     On/off standard queen bed  22 SBA       SBA         SBA       CGA     Min A  Ww      Ww              ww                            Functional Exercises / Activity:  BUE strength exercises: arm bike 5 mins for 3 reps    B hand strength with 10 # power gripper 3 sets 10 reps                                      Sensory / Neuromuscular Re-Education:      Cognitive Skills:   Status Comments   Problem   Solving fair     Memory fair     Sequencing fair     Safety fair       Visual Perception:    Education:  Pt was educated on pacing and energy conservation techniques to utilize during ADL's     [] Family teach completed on:    Pain Level: 3/10  L knee. Additional Notes:   11/25/22 Pt's BP am seated after activity 97/67                  Pt's BP following commode and w/c transfers 101/69     11/26/22 Pt's BP supine 82/53 and following activity increased 92/66. Pt requested a fan in her room due to being so hot at times and nurse informed. Patient has made good  progress during treatment sessions toward set goals. Therapy emphasis to obtain goals:Plan of Care: 5-7 tx/wk for 3-4 weeks. [x]ADL retraining: adapted techniques and AE recommendations to increase independence within precautions                  [x]Energy conservation techniques to improve tolerance for self care routine   [x]Functional transfer/mobility training for fall prevention & DME recommendations         [x]Patient/family education to increase safety and functional independence            [x]Environmental modifications for safe mobility and completion of ADLs                            [x]Cognitive retraining ex's to improve problem solving skills & safe participation in ADLs/IADLs     [x]Therapeutic activity to improve functional skills                                         [x]Therapeutic exercise to improve functional engagement of daily activities. [x]Balance retraining ex's for postural control with dynamic challenges  [x]Neuromuscular re-education exercises                                              [x] Continue with current OT Plan of care.   [] Prepare for Discharge    Long Term Goals: 3-4 weeks  Time Frame for Long term goals: 3-4 weeks   Long term goal 1: Pt will be IND for self feeding  Long term goal 2: Pt will be Mod I  for grooming task  Long term goal 3: Pt will be SBA for UB dressing including LSO. Long term goal 4: Pt will be Min A  for LB dressing and Mod A for footwear using AE PRN. Long term goal 5: Pt will be Min A for bathing task using AE PRN. Long term goal 6: Pt will be SBA  for toileting task  Long term goal 7: Pt will be SBA for commode transfer using AD as needed  Long term goal 8: Pt will be Mod A  for tub/shower  transfer  Long term goal 9: Pt will be Min A for light homemaking task  Long term goal 11: Pt will increase BUE strength by 1 muscle grade to improve functional engagement of ADLs. Long term goal 12: Pt will demonstrate Good safety, insight, reasoning, judgement, & problem-solving strategies throughout functional tasks to implement safety awareness & fall prevention strategies. 11/29/22- Pt POC & goals are updated on this date. Pt lives with her daughter in a 2-story condo- 1 [address] no HR- bed/bath 2nd floor- tub combo. Pt daughter able to provide Sup & assist. PLOF independent.  Pt tentative length of stay 10-14 days Radha Leblanc OTR/L [id number]   DISCHARGE RECOMMENDATIONS  Recommended DME:    Post Discharge Care:   []Home Independently  []Home with 24hr Care / Supervision [x]Home with Partial Supervision []Home with Home Health OT []Home with Out Pt OT []Other: ___   Comments:         Time in Time out Tx Time Breakdown  Variance:   First Session  0815  0900   [x] Individual Tx-45  [] Concurrent Tx -  [] Co-Tx -   [] Group Tx -   [] Time Missed -      Second Session [id number]  [x] Individual Tx-45  [] Concurrent Tx -  [] Co-Tx -   [] Group Tx -   [] Time Missed -     Third Session    [] Individual Tx-   [] Concurrent Tx -  [] Co-Tx -   [] Group Tx -   [] Time Missed -         Total Tx Time:  90 mins    Gray Meléndez OTR/L [id number]

## 2022-12-02 NOTE — CARE COORDINATION
Per patient request - faxed a letter of admission to her emplyer - [employer] - Josué RESENDIZ. Copy provided to patient.     DAVIAN King

## 2022-12-02 NOTE — PROGRESS NOTES
Progress Note - covering Dr. Robert Castillo    Subjective/   45y.o. year old female on the rehab unit for compression fracture. He denies new complaints. She remains anxious about a letter she needs for work. I discussed this with the . Objective/   VITALS:  BP 93/64   Pulse 89   Temp 97.8 °F (36.6 °C) (Temporal)   Resp 17   Ht 5' 11\" (1.803 m)   Wt 211 lb (95.7 kg)   SpO2 97%   BMI 29.43 kg/m²   24HR INTAKE/OUTPUT:    Intake/Output Summary (Last 24 hours) at 12/2/2022 1606  Last data filed at 12/2/2022 1313  Gross per 24 hour   Intake 720 ml   Output --   Net 720 ml     Constitutional:  Alert, awake, no apparent distress   Cardiovascular:  S1, S2 without m/r/g   Respiratory:  CTA B without w/r/r   Abdomen: Positive bowel sounds  Ext: No pitting LE edema, no calf tenderness or cords  Neuro: Awake and alert. Good sitting balance. No tremor. She fed herself breakfast this morning after set up    Functional Level                 Initial Evaluation  Date: 11/24/22 AM     PM    Short Term Goals Long Term Goals    Was pt agreeable to Eval/treatment? Yes  yes yes       Does pt have pain? 9/10 mid back Pain in L knee  Pain in L knee        Bed Mobility  Rolling: Min A  Supine to sit: Mod A  Sit to supine: Mod A  Scooting: Min A NT NT SBA Modified Independent     Transfers Sit to stand:  Mod A  Stand to sit: Mod A  Stand pivot: Min A with FWW Sit to stand: SBA  Stand to sit: SBA  Stand pivot: SBA Sit to stand: SBA  Stand to sit: SBA  Stand pivot: SBA FWW SBA with FWW    Modified Independent with LRAD   Ambulation    3 feet x2 reps with FWW with Min A  2x170' with FWW CGA 2x200' no AD CGA<>min A >100 feet with FWW with SBA >300 feet with LRAD with Modified Independent     Walking 10 feet on uneven surface NT d/t hypotension and droplet plus isolation  NT NT 10 feet with FWW with Min A 10 feet with LRAD with Modified Independent     Wheel Chair Mobility NA NT NT       Car Transfers NT d/t hypotension and droplet plus isolation  NT NT Min A Modified Independent     Stair negotiation: ascended and descended  NT d/t hypotension and droplet plus isolation  2x3 steps MATT HR CGA NT 4 steps with B rail with Min A 12 steps with 1 rail with Modified Independent     Curb Step:   ascended and descended NT d/t hypotension and droplet plus isolation  NT NT 6 inch step with FWW and Min A 6 inch step with LRAD and Modified Independent     Picking up object off the floor NT d/t hypotension      Will  cone with Min A assist Will  cone with no assist   BLE ROM WFL           BLE Strength RLE:  Grossly 3-/5  LLE:  Grossly 4/5           Balance  Unsupported sitting:  SBA     Dynamic standing:  Min A with FWW     BBS: NT  FGA: NT          BBS: >52  FGA: >22       Scheduled Meds:   oxyCODONE-acetaminophen  1 tablet Oral Q4H    miconazole   Topical BID    white petrolatum   Topical Nightly    b complex-C-folic acid  1 capsule Oral TID    lidocaine  1 patch TransDERmal Daily    sodium chloride flush  5-40 mL IntraVENous BID    sodium chloride  1 g Oral TID WC    fludrocortisone  0.1 mg Oral Daily    folic acid  1 mg Oral Daily    heparin (porcine)  5,000 Units SubCUTAneous Q8H    midodrine  15 mg Oral TID WC    pantoprazole  40 mg Oral QAM AC    senna  1 tablet Oral Nightly     Continuous Infusions:  PRN Meds:white petrolatum **AND** white petrolatum, ammonium lactate, bisacodyl, acetaminophen, polyethylene glycol  I/O last 3 completed shifts: In: 240 [P.O.:240]  Out: -   I/O this shift: In: 5 [P.O.:720]  Out: -     Labs reviewed  CBC: No results for input(s): WBC, HGB, PLT in the last 72 hours. BMP:  No results for input(s): NA, K, CL, CO2, BUN, CREATININE, GLUCOSE in the last 72 hours. Hepatic: No results for input(s): AST, ALT, ALB, BILITOT, ALKPHOS in the last 72 hours. BNP: No results for input(s): BNP in the last 72 hours. Lipids: No results for input(s): CHOL, HDL in the last 72 hours.     Invalid input(s): LDLCALCU  INR: No results for input(s): INR in the last 72 hours.     Assessment/  Patient Active Problem List:     Compression fracture of L2 lumbar vertebra, open, initial encounter ([facility] [address]     Bubo     Compression fracture of L2 lumbar vertebra ([facility] [address]     Compression fracture of L2 lumbar vertebra, closed, initial encounter [facility])     Malnutrition following gastrointestinal surgery      Plan/  Continue rehab program  Continue precautions per neurosurgery  Will obtain documentation of her hospital stay and limitations as discussed with social work  Continue current medications  Continue DVT prophylaxis with heparin subcu            Carlita Magdaleno MD

## 2022-12-03 LAB
METER GLUCOSE: 123 MG/DL (ref 74–99)
METER GLUCOSE: 132 MG/DL (ref 74–99)
METER GLUCOSE: 189 MG/DL (ref 74–99)

## 2022-12-03 PROCEDURE — 82962 GLUCOSE BLOOD TEST: CPT

## 2022-12-03 PROCEDURE — 1280000000 HC REHAB R&B

## 2022-12-03 PROCEDURE — 99233 SBSQ HOSP IP/OBS HIGH 50: CPT | Performed by: PHYSICAL MEDICINE & REHABILITATION

## 2022-12-03 PROCEDURE — 93306 TTE W/DOPPLER COMPLETE: CPT

## 2022-12-03 PROCEDURE — 6370000000 HC RX 637 (ALT 250 FOR IP): Performed by: STUDENT IN AN ORGANIZED HEALTH CARE EDUCATION/TRAINING PROGRAM

## 2022-12-03 PROCEDURE — 6360000002 HC RX W HCPCS: Performed by: STUDENT IN AN ORGANIZED HEALTH CARE EDUCATION/TRAINING PROGRAM

## 2022-12-03 PROCEDURE — 97530 THERAPEUTIC ACTIVITIES: CPT

## 2022-12-03 PROCEDURE — 6370000000 HC RX 637 (ALT 250 FOR IP): Performed by: PHYSICAL MEDICINE & REHABILITATION

## 2022-12-03 RX ADMIN — HEPARIN SODIUM 5000 UNITS: 10000 INJECTION INTRAVENOUS; SUBCUTANEOUS at 20:36

## 2022-12-03 RX ADMIN — FLUDROCORTISONE ACETATE 0.1 MG: 0.1 TABLET ORAL at 08:33

## 2022-12-03 RX ADMIN — OXYCODONE AND ACETAMINOPHEN 1 TABLET: 5; 325 TABLET ORAL at 05:27

## 2022-12-03 RX ADMIN — NEPHROCAP 1 MG: 1 CAP ORAL at 20:36

## 2022-12-03 RX ADMIN — MICONAZOLE NITRATE: 20.6 POWDER TOPICAL at 20:52

## 2022-12-03 RX ADMIN — SODIUM CHLORIDE 1 G: 1 TABLET ORAL at 08:33

## 2022-12-03 RX ADMIN — SODIUM CHLORIDE 1 G: 1 TABLET ORAL at 17:10

## 2022-12-03 RX ADMIN — SENNOSIDES 8.6 MG: 8.6 TABLET, FILM COATED ORAL at 20:36

## 2022-12-03 RX ADMIN — FOLIC ACID 1 MG: 1 TABLET ORAL at 08:33

## 2022-12-03 RX ADMIN — MIDODRINE HYDROCHLORIDE 15 MG: 5 TABLET ORAL at 08:33

## 2022-12-03 RX ADMIN — HEPARIN SODIUM 5000 UNITS: 10000 INJECTION INTRAVENOUS; SUBCUTANEOUS at 05:26

## 2022-12-03 RX ADMIN — NEPHROCAP 1 MG: 1 CAP ORAL at 12:26

## 2022-12-03 RX ADMIN — PETROLATUM: 420 OINTMENT TOPICAL at 20:51

## 2022-12-03 RX ADMIN — MIDODRINE HYDROCHLORIDE 15 MG: 5 TABLET ORAL at 17:10

## 2022-12-03 RX ADMIN — OXYCODONE AND ACETAMINOPHEN 1 TABLET: 5; 325 TABLET ORAL at 12:26

## 2022-12-03 RX ADMIN — NEPHROCAP 1 MG: 1 CAP ORAL at 08:33

## 2022-12-03 RX ADMIN — SODIUM CHLORIDE 1 G: 1 TABLET ORAL at 12:26

## 2022-12-03 RX ADMIN — OXYCODONE AND ACETAMINOPHEN 1 TABLET: 5; 325 TABLET ORAL at 01:00

## 2022-12-03 RX ADMIN — PANTOPRAZOLE SODIUM 40 MG: 40 TABLET, DELAYED RELEASE ORAL at 05:26

## 2022-12-03 RX ADMIN — OXYCODONE AND ACETAMINOPHEN 1 TABLET: 5; 325 TABLET ORAL at 17:10

## 2022-12-03 RX ADMIN — OXYCODONE AND ACETAMINOPHEN 1 TABLET: 5; 325 TABLET ORAL at 08:33

## 2022-12-03 RX ADMIN — OXYCODONE AND ACETAMINOPHEN 1 TABLET: 5; 325 TABLET ORAL at 20:41

## 2022-12-03 RX ADMIN — HEPARIN SODIUM 5000 UNITS: 10000 INJECTION INTRAVENOUS; SUBCUTANEOUS at 12:26

## 2022-12-03 RX ADMIN — MIDODRINE HYDROCHLORIDE 15 MG: 5 TABLET ORAL at 12:26

## 2022-12-03 ASSESSMENT — PAIN DESCRIPTION - DESCRIPTORS
DESCRIPTORS: ACHING;DISCOMFORT;SORE
DESCRIPTORS: ACHING;DISCOMFORT

## 2022-12-03 ASSESSMENT — PAIN DESCRIPTION - LOCATION
LOCATION: BACK

## 2022-12-03 ASSESSMENT — PAIN SCALES - GENERAL
PAINLEVEL_OUTOF10: 5
PAINLEVEL_OUTOF10: 6

## 2022-12-03 ASSESSMENT — PAIN DESCRIPTION - ORIENTATION
ORIENTATION: LOWER

## 2022-12-03 NOTE — PLAN OF CARE
Problem: Discharge Planning  Goal: Discharge to home or other facility with appropriate resources  Outcome: Progressing     Problem: ABCDS Injury Assessment  Goal: Absence of physical injury  Outcome: Progressing  Flowsheets (Taken 12/2/2022 0915)  Absence of Physical Injury: Implement safety measures based on patient assessment     Problem: Safety - Adult  Goal: Free from fall injury  Outcome: Progressing  Flowsheets (Taken 12/2/2022 0915)  Free From Fall Injury: Instruct family/caregiver on patient safety     Problem: Pain  Goal: Verbalizes/displays adequate comfort level or baseline comfort level  Outcome: Progressing     Problem: Skin/Tissue Integrity  Goal: Absence of new skin breakdown  Description: 1. Monitor for areas of redness and/or skin breakdown  2. Assess vascular access sites hourly  3. Every 4-6 hours minimum:  Change oxygen saturation probe site  4. Every 4-6 hours:  If on nasal continuous positive airway pressure, respiratory therapy assess nares and determine need for appliance change or resting period.   Outcome: Progressing     Problem: Chronic Conditions and Co-morbidities  Goal: Patient's chronic conditions and co-morbidity symptoms are monitored and maintained or improved  Outcome: Progressing

## 2022-12-03 NOTE — PROGRESS NOTES
[facility]  Comprehensive Progress Note    Subjective:   Covering for Dr. Ish Delgadillo is a 45 y.o. female admitted to inpatient rehabilitation for compression fracture. No acute events overnight. No cp, sob, n/v. Tolerating therapy. No new issues reported. The patient's medical records have been reviewed. Scheduled Meds:oxyCODONE-acetaminophen, 1 tablet, Q4H  miconazole, , BID  white petrolatum, , Nightly  b complex-C-folic acid, 1 capsule, TID  lidocaine, 1 patch, Daily  sodium chloride flush, 5-40 mL, BID  sodium chloride, 1 g, TID WC  fludrocortisone, 0.1 mg, Daily  folic acid, 1 mg, Daily  heparin (porcine), 5,000 Units, Q8H  midodrine, 15 mg, TID WC  pantoprazole, 40 mg, QAM AC  senna, 1 tablet, Nightly      Continuous Infusions:  PRN Meds:white petrolatum, , BID PRN  ammonium lactate, , PRN  bisacodyl, 10 mg, Daily PRN  acetaminophen, 650 mg, Q4H PRN  polyethylene glycol, 17 g, Daily PRN         Objective:      Vitals:    12/02/22 2030 12/02/22 2117 12/03/22 0831 12/03/22 0903   BP: 100/69  (!) 100/56    Pulse: 69  98    Resp: 16 16 18 18   Temp: 97.8 °F (36.6 °C)  97.5 °F (36.4 °C)    TempSrc: Temporal  Temporal    SpO2:   98%    Weight:       Height:         General appearance: alert, appears stated age, and cooperative, NAD  Head: NC/AT  Eyes: conjunctivae/corneas clear. PERRL, EOM's intact. Neck: supple, symmetrical, trachea midline  Lungs: clear to auscultation bilaterally  Chest wall: no tenderness  Heart: regular rate and rhythm, S1, S2 normal  Abdomen: soft, non-tender, normal bowel sounds  Ext: no cyanosis, clubbing, edema  Skin: No rashes   Psych: Appropriate mood and affect   Neurologic: Awake, alert. Good sitting balance.      Laboratory:    Lab Results   Component Value Date    WBC 5.9 11/28/2022    HGB 9.9 (L) 11/28/2022    HCT 32.1 (L) 11/28/2022    MCV 88.2 11/28/2022     11/28/2022     Lab Results   Component Value Date/Time     11/28/2022 10:37 AM    K 3.8 11/28/2022 10:37 AM    K 4.3 11/24/2022 05:57 AM    CL 99 11/28/2022 10:37 AM    CO2 23 11/28/2022 10:37 AM    BUN 13 11/28/2022 10:37 AM    CREATININE 0.9 11/28/2022 10:37 AM    GLUCOSE 178 11/28/2022 10:37 AM    CALCIUM 10.0 11/28/2022 10:37 AM      Lab Results   Component Value Date    ALT 14 11/28/2022    AST 17 11/28/2022    ALKPHOS 160 (H) 11/28/2022    BILITOT 0.3 11/28/2022       Lab Results   Component Value Date/Time    COLORU Yellow 10/17/2022 01:17 PM    NITRU Negative 10/17/2022 01:17 PM    GLUCOSEU Negative 10/17/2022 01:17 PM    KETUA Negative 10/17/2022 01:17 PM    UROBILINOGEN 0.2 10/17/2022 01:17 PM    BILIRUBINUR Negative 10/17/2022 01:17 PM     Hemoglobin A1C   Date Value Ref Range Status   11/06/2022 6.8 (H) 4.0 - 5.6 % Final          Assessment/Plan:       45 y.o. female admitted to inpatient rehabilitation for compression fracture.      -Continue acute rehab program.  Encourage ambulation.  -Continue Percocet for pain control  -Continue heparin for DVT prophylaxis      Electronically signed by Js Gao MD on 12/3/2022 at 12:22 PM

## 2022-12-03 NOTE — PLAN OF CARE
Problem: Discharge Planning  Goal: Discharge to home or other facility with appropriate resources  Outcome: Progressing     Problem: ABCDS Injury Assessment  Goal: Absence of physical injury  Outcome: Progressing     Problem: Safety - Adult  Goal: Free from fall injury  Outcome: Progressing     Problem: Pain  Goal: Verbalizes/displays adequate comfort level or baseline comfort level  Outcome: Progressing     Problem: Skin/Tissue Integrity  Goal: Absence of new skin breakdown  Description: 1. Monitor for areas of redness and/or skin breakdown  2. Assess vascular access sites hourly  3. Every 4-6 hours minimum:  Change oxygen saturation probe site  4. Every 4-6 hours:  If on nasal continuous positive airway pressure, respiratory therapy assess nares and determine need for appliance change or resting period.   Outcome: Progressing     Problem: Chronic Conditions and Co-morbidities  Goal: Patient's chronic conditions and co-morbidity symptoms are monitored and maintained or improved  Outcome: Progressing

## 2022-12-03 NOTE — PROGRESS NOTES
Physical Therapy  Treatment Note   Evaluating Therapist: Caesar Turpin PT, DPT [id number]       ROOM: [address] [place], [place] [address]  DIAGNOSIS: Closed compression fracture of L2 lumbar vertebra   PRECAUTIONS: Falls, Spinal precautions, LSO for comfort (L2, L3, and L5 compression fx), Orthostatic hypotension (compression stockings), Droplet Plus isolation, COVID-19  HPI:  Pt had a recent gastric bypass and suffered a fall on 11/5. She has had multiple recent falls. Compression fractures noted at L2-L3 and L5. She was not a candidate for neurosurgical intervention. Pt has tested positive for orthostatic hypotension and COVID. Social:  Pt lives with her dtr in a 2 floor plan with 1 steps and 0 rails. Bedroom and bathroom are on the 2nd level with 7+3 steps with B rails. Prior to admission: Independent with no assistive device. Works as a . Drives. Initial Evaluation  Date: 11/24/22 AM     PM    Short Term Goals Long Term Goals    Was pt agreeable to Eval/treatment? Yes  yes      Does pt have pain? 9/10 mid back Pain in L knee       Bed Mobility  Rolling: Min A  Supine to sit: Mod A  Sit to supine: Mod A  Scooting: Min A NT  SBA Modified Independent     Transfers Sit to stand:  Mod A  Stand to sit: Mod A  Stand pivot: Min A with FWW Sit to stand: SBA  Stand to sit: SBA  Stand pivot: SBA  SBA with FWW   Modified Independent with LRAD   Ambulation    3 feet x2 reps with FWW with Min A  175 feet x2 reps without A.D CGA  >100 feet with FWW with SBA >300 feet with LRAD with Modified Independent     Walking 10 feet on uneven surface NT d/t hypotension and droplet plus isolation  NT  10 feet with FWW with Min A 10 feet with LRAD with Modified Independent     Wheel Chair Mobility NA NT      Car Transfers NT d/t hypotension and droplet plus isolation  NT  Min A Modified Independent     Stair negotiation: ascended and descended  NT d/t hypotension and droplet plus isolation  2x3 steps MATT HR CGA  4 steps with B rail with Min A 12 steps with 1 rail with Modified Independent     Curb Step:   ascended and descended NT d/t hypotension and droplet plus isolation  NT  6 inch step with FWW and Min A 6 inch step with LRAD and Modified Independent     Picking up object off the floor NT d/t hypotension    Will  cone with Min A assist Will  cone with no assist   BLE ROM WFL       BLE Strength RLE:  Grossly 3-/5  LLE:  Grossly 4/5       Balance  Unsupported sitting:  SBA    Dynamic standing:  Min A with FWW    BBS: NT  FGA: NT      BBS: >52  FGA: >22   Date Family Teach Completed        Is additional Family Teaching Needed? Y or N        Hindering Progress Back pain, RUE and RLE weakness, orthostatic hypotension  Back pain, RUE and RLE weakness, orthostatic hypotension       PT recommended ELOS        Team's Discharge Plan        Therapist at Team Meeting          Therapeutic exercise/activity:  function  ASSESSMENT  Pt displays functional ability as noted in the objective portion of this evaluation. AM vitals:  Sitting in chair BP: 116/105  Standing after first bout of ambulation: 114/71  Standing after second bout of ambulation: 100/67  After activity:109/70  Comments: Pt reports no c/o dizziness this a.m. throughout tx session. Pt amb without A.D with improved gait and balance. Slow amb speed without A. D. but no LOB or unsteadiness noted. Pt performed steps reciprocally. Pt pleasant throughout. Pt returned to room at end of tx session. Patient education  Pt educated on LSO, sit<>stand and pivot training     Patient response to education:   Pt verbalized understanding Pt demonstrated skill Pt requires further education in this area   Yes  Yes  Yes      Rehab potential is Good for reaching above PT goals. Pts/ family goals   1. Home     Patient and or family understand(s) diagnosis, prognosis, and plan of care. Good    PLAN  PT care will be provided in accordance with the objectives noted above.   Whenever appropriate, clear delegation orders will be provided for nursing staff. Exercises and functional mobility practice will be used as well as appropriate assistive devices or modalities to obtain goals. Patient and family education will also be administered as needed. Frequency of treatments will be 2x/day M-F and 1x/day Sat or Sun x 14 days.     AM  Time in: 10:30  Time out: 11:00  Supa Campos [id number]

## 2022-12-04 LAB
METER GLUCOSE: 109 MG/DL (ref 74–99)
METER GLUCOSE: 131 MG/DL (ref 74–99)

## 2022-12-04 PROCEDURE — 97535 SELF CARE MNGMENT TRAINING: CPT

## 2022-12-04 PROCEDURE — 6370000000 HC RX 637 (ALT 250 FOR IP): Performed by: STUDENT IN AN ORGANIZED HEALTH CARE EDUCATION/TRAINING PROGRAM

## 2022-12-04 PROCEDURE — 1280000000 HC REHAB R&B

## 2022-12-04 PROCEDURE — 6370000000 HC RX 637 (ALT 250 FOR IP): Performed by: PHYSICAL MEDICINE & REHABILITATION

## 2022-12-04 PROCEDURE — 6360000002 HC RX W HCPCS: Performed by: STUDENT IN AN ORGANIZED HEALTH CARE EDUCATION/TRAINING PROGRAM

## 2022-12-04 PROCEDURE — 82962 GLUCOSE BLOOD TEST: CPT

## 2022-12-04 PROCEDURE — 97530 THERAPEUTIC ACTIVITIES: CPT

## 2022-12-04 RX ADMIN — MIDODRINE HYDROCHLORIDE 15 MG: 5 TABLET ORAL at 11:46

## 2022-12-04 RX ADMIN — OXYCODONE AND ACETAMINOPHEN 1 TABLET: 5; 325 TABLET ORAL at 09:13

## 2022-12-04 RX ADMIN — HEPARIN SODIUM 5000 UNITS: 10000 INJECTION INTRAVENOUS; SUBCUTANEOUS at 05:39

## 2022-12-04 RX ADMIN — PANTOPRAZOLE SODIUM 40 MG: 40 TABLET, DELAYED RELEASE ORAL at 05:39

## 2022-12-04 RX ADMIN — MIDODRINE HYDROCHLORIDE 15 MG: 5 TABLET ORAL at 16:59

## 2022-12-04 RX ADMIN — HEPARIN SODIUM 5000 UNITS: 10000 INJECTION INTRAVENOUS; SUBCUTANEOUS at 20:21

## 2022-12-04 RX ADMIN — MICONAZOLE NITRATE: 20.6 POWDER TOPICAL at 08:06

## 2022-12-04 RX ADMIN — FOLIC ACID 1 MG: 1 TABLET ORAL at 07:55

## 2022-12-04 RX ADMIN — OXYCODONE AND ACETAMINOPHEN 1 TABLET: 5; 325 TABLET ORAL at 21:11

## 2022-12-04 RX ADMIN — SODIUM CHLORIDE 1 G: 1 TABLET ORAL at 16:59

## 2022-12-04 RX ADMIN — NEPHROCAP 1 MG: 1 CAP ORAL at 20:20

## 2022-12-04 RX ADMIN — PETROLATUM: 420 OINTMENT TOPICAL at 20:18

## 2022-12-04 RX ADMIN — NEPHROCAP 1 MG: 1 CAP ORAL at 07:55

## 2022-12-04 RX ADMIN — NEPHROCAP 1 MG: 1 CAP ORAL at 13:19

## 2022-12-04 RX ADMIN — MIDODRINE HYDROCHLORIDE 15 MG: 5 TABLET ORAL at 07:55

## 2022-12-04 RX ADMIN — FLUDROCORTISONE ACETATE 0.1 MG: 0.1 TABLET ORAL at 07:55

## 2022-12-04 RX ADMIN — OXYCODONE AND ACETAMINOPHEN 1 TABLET: 5; 325 TABLET ORAL at 13:20

## 2022-12-04 RX ADMIN — OXYCODONE AND ACETAMINOPHEN 1 TABLET: 5; 325 TABLET ORAL at 16:59

## 2022-12-04 RX ADMIN — SODIUM CHLORIDE 1 G: 1 TABLET ORAL at 11:46

## 2022-12-04 RX ADMIN — SODIUM CHLORIDE 1 G: 1 TABLET ORAL at 07:55

## 2022-12-04 RX ADMIN — OXYCODONE AND ACETAMINOPHEN 1 TABLET: 5; 325 TABLET ORAL at 01:04

## 2022-12-04 RX ADMIN — MICONAZOLE NITRATE: 20.6 POWDER TOPICAL at 20:24

## 2022-12-04 RX ADMIN — SENNOSIDES 8.6 MG: 8.6 TABLET, FILM COATED ORAL at 20:21

## 2022-12-04 RX ADMIN — OXYCODONE AND ACETAMINOPHEN 1 TABLET: 5; 325 TABLET ORAL at 05:39

## 2022-12-04 RX ADMIN — HEPARIN SODIUM 5000 UNITS: 10000 INJECTION INTRAVENOUS; SUBCUTANEOUS at 13:20

## 2022-12-04 ASSESSMENT — PAIN SCALES - GENERAL
PAINLEVEL_OUTOF10: 7
PAINLEVEL_OUTOF10: 7
PAINLEVEL_OUTOF10: 6
PAINLEVEL_OUTOF10: 6
PAINLEVEL_OUTOF10: 7
PAINLEVEL_OUTOF10: 7
PAINLEVEL_OUTOF10: 6

## 2022-12-04 ASSESSMENT — PAIN DESCRIPTION - DESCRIPTORS
DESCRIPTORS: ACHING;THROBBING
DESCRIPTORS: ACHING;DISCOMFORT;SORE
DESCRIPTORS: ACHING;DISCOMFORT;SORE
DESCRIPTORS: ACHING;DISCOMFORT
DESCRIPTORS: ACHING;THROBBING

## 2022-12-04 ASSESSMENT — PAIN DESCRIPTION - ORIENTATION
ORIENTATION: LEFT;MID
ORIENTATION: LEFT;MID
ORIENTATION: MID
ORIENTATION: LEFT;MID
ORIENTATION: LEFT;LOWER
ORIENTATION: LEFT;LOWER

## 2022-12-04 ASSESSMENT — PAIN DESCRIPTION - LOCATION
LOCATION: BACK
LOCATION: KNEE;BACK

## 2022-12-04 ASSESSMENT — PAIN - FUNCTIONAL ASSESSMENT
PAIN_FUNCTIONAL_ASSESSMENT: ACTIVITIES ARE NOT PREVENTED

## 2022-12-04 NOTE — PLAN OF CARE
Problem: Discharge Planning  Goal: Discharge to home or other facility with appropriate resources  12/4/2022 0741 by Heath Vee RN  Outcome: Progressing  12/3/2022 1941 by Delaney Finn RN  Outcome: Progressing     Problem: ABCDS Injury Assessment  Goal: Absence of physical injury  12/4/2022 0741 by Heath Vee RN  Outcome: Progressing  Flowsheets (Taken 12/4/2022 0741)  Absence of Physical Injury: Implement safety measures based on patient assessment  12/3/2022 1941 by Delaney Finn RN  Outcome: Progressing     Problem: Safety - Adult  Goal: Free from fall injury  12/4/2022 0741 by Heath Vee RN  Outcome: Progressing  12/3/2022 1941 by Delaney Finn RN  Outcome: Progressing     Problem: Pain  Goal: Verbalizes/displays adequate comfort level or baseline comfort level  12/4/2022 0741 by Heath Vee RN  Outcome: Progressing  12/3/2022 1941 by Delaney Finn RN  Outcome: Progressing     Problem: Skin/Tissue Integrity  Goal: Absence of new skin breakdown  Description: 1. Monitor for areas of redness and/or skin breakdown  2. Assess vascular access sites hourly  3. Every 4-6 hours minimum:  Change oxygen saturation probe site  4. Every 4-6 hours:  If on nasal continuous positive airway pressure, respiratory therapy assess nares and determine need for appliance change or resting period.   12/4/2022 0741 by Heath Vee RN  Outcome: Progressing  12/3/2022 1941 by Delaney Finn RN  Outcome: Progressing     Problem: Chronic Conditions and Co-morbidities  Goal: Patient's chronic conditions and co-morbidity symptoms are monitored and maintained or improved  12/4/2022 0741 by Heath Vee RN  Outcome: Progressing  12/3/2022 1941 by Delaney Finn RN  Outcome: Progressing

## 2022-12-04 NOTE — PROGRESS NOTES
Occupational Therapy  OCCUPATIONAL THERAPY DAILY NOTE    Date:2022  Patient Name: Sonido Mg  MRN: [id number]  : 1970  Room: [address] [place], [place] [address]     Referring Physician: Sterling Lawson MD  Diagnosis:  S/P Fall with Compression fractures of L2  L3, L5 lumbar vertebra  Surgery/Procedure: None this admission    Past Medical History:  has a past medical history of Anemia, Diabetes mellitus ([address] Gastric Sleeve    Precautions: LSO for comfort (L2, L3, L5 compression fx's), spinal precautions, monitor BP (orthostatic hypotension),     Functional Assessment:   Date Status AE  Comments   Feeding 22 Independent       Grooming 22 Mod I   Standing  Pt completed hand washing standing at kitchen sink using ww and without in her room. Oral Care 22 Mod I      Bathing 22 SBA      UB Dressing 22 Set up      LB Dressing 22  SBA  Reacher     Footwear 22 Min A  Sock aid     Toileting 22  SBA  Simulated toilet hygiene/clothing mgmt in front of 3:1 commode at w. Walker level following back precautions. No dizziness reported. Homemaking 22  SBA ww Pt educated with back precautions when removing & placing bowl using bilateral skills, pulling rack using wooden tool to prevent bending over and opening/closing oven door. Pt needed one cue for body mechanics/ww placement while following back precautions. No dizziness reported. Functional Transfers / Balance:   Date Status DME  Comments   Sit Balance 22  Sup   Demo'd up in w/c and on 3:1 commode    Stand Balance 22  SBA/CGA Ww  No device  Demo'd during mobility with walker and without, hmkg, toileting and transfers. No dizziness reported. [x] Tub    [x] Shower   Transfer 22 Min A     Min A   Ww, ETB     Grab rail, shower seat         Commode   Transfer 22  SBA Ww, 3 in 1  Ww<>3:1 commode transfers  in order to follow back precautions.     Functional   Mobility 22  SBA to CGA  Ww CGA using no device to ambulate over to sink to wash hands. SBA using w. Marcelo Logan in [employer]. No dizziness reported. Other:SPT w/c to bed     Sit to stand transfer w/c <> ww       Bed mobility : supine to sit    On/off firm recliner     On/off standard queen bed  12/2/22 12/4/22 12/2/22 12/1/22 12/1/22 SBA       SBA         SBA       CGA     Min A  Ww      Ww              ww       For safety and back precautions. Functional Exercises / Activity:  Red estela bar exercises to increase forearm/ strength 3 reps of 15ea in various planes seated up in w/c. Sensory / Neuromuscular Re-Education:      Cognitive Skills:   Status Comments   Problem   Solving fair + Demo'd during hmkg, sit to stand transfers, mobility. Memory fair + \"   Sequencing fair + \"   Safety fair + \"     Visual Perception:    Education:  Pt educated with spinal precautions during kitchen mobility/hmkg tasks at w. Walker level to increase awareness. [] Family teach completed on:    Pain Level: 4/10  L knee. Additional Notes:   11/25/22 Pt's BP am seated after activity 97/67                  Pt's BP following commode and w/c transfers 101/69     11/26/22 Pt's BP supine 82/53 and following activity increased 92/66. Pt requested a fan in her room due to being so hot at times and nurse informed. Patient has made good  progress during treatment sessions toward set goals. Therapy emphasis to obtain goals:Plan of Care: 5-7 tx/wk for 3-4 weeks.   [x]ADL retraining: adapted techniques and AE recommendations to increase independence within precautions                  [x]Energy conservation techniques to improve tolerance for self care routine   [x]Functional transfer/mobility training for fall prevention & DME recommendations         [x]Patient/family education to increase safety and functional independence            [x]Environmental modifications for safe mobility and completion of ADLs [x]Cognitive retraining ex's to improve problem solving skills & safe participation in ADLs/IADLs     [x]Therapeutic activity to improve functional skills                                         [x]Therapeutic exercise to improve functional engagement of daily activities. [x]Balance retraining ex's for postural control with dynamic challenges  [x]Neuromuscular re-education exercises                                              [x] Continue with current OT Plan of care. [] Prepare for Discharge    Long Term Goals: 3-4 weeks  Time Frame for Long term goals: 3-4 weeks   Long term goal 1: Pt will be IND for self feeding  Long term goal 2: Pt will be Mod I  for grooming task  Long term goal 3: Pt will be SBA for UB dressing including LSO. Long term goal 4: Pt will be Min A  for LB dressing and Mod A for footwear using AE PRN. Long term goal 5: Pt will be Min A for bathing task using AE PRN. Long term goal 6: Pt will be SBA  for toileting task  Long term goal 7: Pt will be SBA for commode transfer using AD as needed  Long term goal 8: Pt will be Mod A  for tub/shower  transfer  Long term goal 9: Pt will be Min A for light homemaking task  Long term goal 11: Pt will increase BUE strength by 1 muscle grade to improve functional engagement of ADLs. Long term goal 12: Pt will demonstrate Good safety, insight, reasoning, judgement, & problem-solving strategies throughout functional tasks to implement safety awareness & fall prevention strategies. 11/29/22- Pt POC & goals are updated on this date. Pt lives with her daughter in a 2-story condo- 1 [address] no HR- bed/bath 2nd floor- tub combo. Pt daughter able to provide Sup & assist. PLOF independent.  Pt tentative length of stay 10-14 days [place]al OTR/L [id number]   DISCHARGE RECOMMENDATIONS  Recommended DME:    Post Discharge Care:   []Home Independently  []Home with 24hr Care / Supervision [x]Home with Partial Supervision []Home with Home Health OT []Home with Out Pt OT []Other: ___   Comments:         Time in Time out Tx Time Breakdown  Variance:   First Session   10:40am   11:25am [x] Individual Tx-45min  [] Concurrent Tx -  [] Co-Tx -   [] Group Tx -   [] Time Missed -      Second Session    [] Individual Tx-  [] Concurrent Tx -  [] Co-Tx -   [] Group Tx -   [] Time Missed -     Third Session    [] Individual Tx-   [] Concurrent Tx -  [] Co-Tx -   [] Group Tx -   [] Time Missed -         Total Tx Time:  45 mins    Diane MCFARLANE/JESICA [id number]

## 2022-12-05 LAB
METER GLUCOSE: 114 MG/DL (ref 74–99)
METER GLUCOSE: 213 MG/DL (ref 74–99)

## 2022-12-05 PROCEDURE — 82962 GLUCOSE BLOOD TEST: CPT

## 2022-12-05 PROCEDURE — 1280000000 HC REHAB R&B

## 2022-12-05 PROCEDURE — 97110 THERAPEUTIC EXERCISES: CPT

## 2022-12-05 PROCEDURE — 97535 SELF CARE MNGMENT TRAINING: CPT

## 2022-12-05 PROCEDURE — 6370000000 HC RX 637 (ALT 250 FOR IP): Performed by: PHYSICAL MEDICINE & REHABILITATION

## 2022-12-05 PROCEDURE — 97530 THERAPEUTIC ACTIVITIES: CPT

## 2022-12-05 PROCEDURE — 6360000002 HC RX W HCPCS: Performed by: STUDENT IN AN ORGANIZED HEALTH CARE EDUCATION/TRAINING PROGRAM

## 2022-12-05 PROCEDURE — 6370000000 HC RX 637 (ALT 250 FOR IP): Performed by: STUDENT IN AN ORGANIZED HEALTH CARE EDUCATION/TRAINING PROGRAM

## 2022-12-05 RX ORDER — ONDANSETRON 4 MG/1
4 TABLET, ORALLY DISINTEGRATING ORAL EVERY 6 HOURS PRN
Status: DISCONTINUED | OUTPATIENT
Start: 2022-12-05 | End: 2022-12-07 | Stop reason: HOSPADM

## 2022-12-05 RX ORDER — LANOLIN ALCOHOL/MO/W.PET/CERES
3 CREAM (GRAM) TOPICAL NIGHTLY PRN
Status: DISCONTINUED | OUTPATIENT
Start: 2022-12-05 | End: 2022-12-07 | Stop reason: HOSPADM

## 2022-12-05 RX ADMIN — PANTOPRAZOLE SODIUM 40 MG: 40 TABLET, DELAYED RELEASE ORAL at 05:54

## 2022-12-05 RX ADMIN — SODIUM CHLORIDE 1 G: 1 TABLET ORAL at 12:40

## 2022-12-05 RX ADMIN — MIDODRINE HYDROCHLORIDE 15 MG: 5 TABLET ORAL at 08:20

## 2022-12-05 RX ADMIN — HEPARIN SODIUM 5000 UNITS: 10000 INJECTION INTRAVENOUS; SUBCUTANEOUS at 12:40

## 2022-12-05 RX ADMIN — NEPHROCAP 1 MG: 1 CAP ORAL at 20:59

## 2022-12-05 RX ADMIN — ACETAMINOPHEN 650 MG: 325 TABLET ORAL at 08:20

## 2022-12-05 RX ADMIN — HEPARIN SODIUM 5000 UNITS: 10000 INJECTION INTRAVENOUS; SUBCUTANEOUS at 05:54

## 2022-12-05 RX ADMIN — NEPHROCAP 1 MG: 1 CAP ORAL at 08:21

## 2022-12-05 RX ADMIN — OXYCODONE AND ACETAMINOPHEN 1 TABLET: 5; 325 TABLET ORAL at 20:59

## 2022-12-05 RX ADMIN — OXYCODONE AND ACETAMINOPHEN 1 TABLET: 5; 325 TABLET ORAL at 01:11

## 2022-12-05 RX ADMIN — FOLIC ACID 1 MG: 1 TABLET ORAL at 08:21

## 2022-12-05 RX ADMIN — MELATONIN 3 MG ORAL TABLET 3 MG: 3 TABLET ORAL at 20:59

## 2022-12-05 RX ADMIN — HEPARIN SODIUM 5000 UNITS: 10000 INJECTION INTRAVENOUS; SUBCUTANEOUS at 21:00

## 2022-12-05 RX ADMIN — PETROLATUM: 420 OINTMENT TOPICAL at 23:00

## 2022-12-05 RX ADMIN — SENNOSIDES 8.6 MG: 8.6 TABLET, FILM COATED ORAL at 20:59

## 2022-12-05 RX ADMIN — OXYCODONE AND ACETAMINOPHEN 1 TABLET: 5; 325 TABLET ORAL at 13:30

## 2022-12-05 RX ADMIN — OXYCODONE AND ACETAMINOPHEN 1 TABLET: 5; 325 TABLET ORAL at 09:33

## 2022-12-05 RX ADMIN — SODIUM CHLORIDE 1 G: 1 TABLET ORAL at 08:21

## 2022-12-05 RX ADMIN — SODIUM CHLORIDE 1 G: 1 TABLET ORAL at 17:27

## 2022-12-05 RX ADMIN — MIDODRINE HYDROCHLORIDE 15 MG: 5 TABLET ORAL at 17:27

## 2022-12-05 RX ADMIN — MICONAZOLE NITRATE: 20.6 POWDER TOPICAL at 08:23

## 2022-12-05 RX ADMIN — MIDODRINE HYDROCHLORIDE 15 MG: 5 TABLET ORAL at 12:41

## 2022-12-05 RX ADMIN — OXYCODONE AND ACETAMINOPHEN 1 TABLET: 5; 325 TABLET ORAL at 05:53

## 2022-12-05 RX ADMIN — OXYCODONE AND ACETAMINOPHEN 1 TABLET: 5; 325 TABLET ORAL at 17:27

## 2022-12-05 RX ADMIN — FLUDROCORTISONE ACETATE 0.1 MG: 0.1 TABLET ORAL at 08:21

## 2022-12-05 RX ADMIN — MICONAZOLE NITRATE: 20.6 POWDER TOPICAL at 23:01

## 2022-12-05 RX ADMIN — NEPHROCAP 1 MG: 1 CAP ORAL at 12:40

## 2022-12-05 ASSESSMENT — PAIN DESCRIPTION - DESCRIPTORS
DESCRIPTORS: ACHING;DISCOMFORT
DESCRIPTORS: ACHING;DISCOMFORT;SORE
DESCRIPTORS: ACHING;DISCOMFORT;SORE
DESCRIPTORS: ACHING;DISCOMFORT;NAGGING

## 2022-12-05 ASSESSMENT — PAIN SCALES - GENERAL
PAINLEVEL_OUTOF10: 7
PAINLEVEL_OUTOF10: 5
PAINLEVEL_OUTOF10: 5
PAINLEVEL_OUTOF10: 8

## 2022-12-05 ASSESSMENT — PAIN DESCRIPTION - LOCATION
LOCATION: BACK
LOCATION: BACK;HEAD
LOCATION: BACK
LOCATION: BACK

## 2022-12-05 ASSESSMENT — PAIN - FUNCTIONAL ASSESSMENT
PAIN_FUNCTIONAL_ASSESSMENT: ACTIVITIES ARE NOT PREVENTED
PAIN_FUNCTIONAL_ASSESSMENT: ACTIVITIES ARE NOT PREVENTED

## 2022-12-05 ASSESSMENT — PAIN DESCRIPTION - ORIENTATION
ORIENTATION: LEFT;LOWER
ORIENTATION: LOWER;MID
ORIENTATION: LOWER;LEFT
ORIENTATION: LOWER

## 2022-12-05 NOTE — PROGRESS NOTES
Occupational Therapy  OCCUPATIONAL THERAPY DAILY NOTE    Date:2022  Patient Name: Meg Maya  MRN: [id number]  : 1970  Room: [address] [place], [place] [address]     Referring Physician: Sheba Rosales MD  Diagnosis:  S/P Fall with Compression fractures of L2  L3, L5 lumbar vertebra  Surgery/Procedure: None this admission    Past Medical History:  has a past medical history of Anemia, Diabetes mellitus ([address] Gastric Sleeve    Precautions: LSO for comfort (L2, L3, L5 compression fx's), spinal precautions, monitor BP (orthostatic hypotension),     Functional Assessment:   Date Status AE  Comments   Feeding 22  Independent       Grooming 22  Mod I   Standing  Pt washed hands and styled hair standing at sink with no AD          Oral Care 22  Mod I      Bathing 22  Set up  Shower seat  Bathing completed in shower    UB Dressing 22  Mod I   Pt donned gown and LSO brace    LB Dressing 22  Mod I  Reacher  Pt donned depends and pants with reacher    Footwear 22  SUP  Sock aid  Assist to don thigh high joey hose.  Pt able to don slipper socks with sock aide    Toileting 22  Mod I      Homemaking 22  SBA ww      Functional Transfers / Balance:   Date Status DME  Comments   Sit Balance 22  Sup      Stand Balance 22  SBA/CGA Ww  No device     [x] Tub    [x] Shower   Transfer 22 Min A     SBA   Ww, ETB     Grab rail, shower seat      Transfer in and out of walk in shower    Commode   Transfer 22   Mod I  Ww, 3 in 1     Functional   Mobility 22  SUP  Ww and without AD  Pt ambulated throughout therapy apt setting over carpet and tile floor surfaces without an AD    Other:SPT w/c to bed     Sit to stand transfer w/c <> ww       Bed mobility : supine to sit edge of bed     On/off firm recliner,couch and kitchen chair with arm rests     On/off standard queen bed  22 SBA       SBA         Mod I SBA           Min A  Ww      Ww and without AD               No device       Functional Exercises / Activity:  BUE strength exercises: 2 # weighted ball 3 sets 10 reps in all planes      Sensory / Neuromuscular Re-Education:      Cognitive Skills:   Status Comments   Problem   Solving fair + Demo'd during hmkg, sit to stand transfers, mobility. Memory fair + \"   Sequencing fair + \"   Safety fair + \"     Visual Perception:    Education:  Pt educated on safe functional transfers and mobility without an assistive device     [] Family teach completed on:    Pain Level: 4/10  L knee. Additional Notes:   11/25/22 Pt's BP am seated after activity 97/67                  Pt's BP following commode and w/c transfers 101/69     11/26/22 Pt's BP supine 82/53 and following activity increased 92/66. Pt requested a fan in her room due to being so hot at times and nurse informed. Patient has made good  progress during treatment sessions toward set goals. Therapy emphasis to obtain goals:Plan of Care: 5-7 tx/wk for 3-4 weeks. [x]ADL retraining: adapted techniques and AE recommendations to increase independence within precautions                  [x]Energy conservation techniques to improve tolerance for self care routine   [x]Functional transfer/mobility training for fall prevention & DME recommendations         [x]Patient/family education to increase safety and functional independence            [x]Environmental modifications for safe mobility and completion of ADLs                            [x]Cognitive retraining ex's to improve problem solving skills & safe participation in ADLs/IADLs     [x]Therapeutic activity to improve functional skills                                         [x]Therapeutic exercise to improve functional engagement of daily activities.    [x]Balance retraining ex's for postural control with dynamic challenges  [x]Neuromuscular re-education exercises [x] Continue with current OT Plan of care. [] Prepare for Discharge    Long Term Goals: 3-4 weeks  Time Frame for Long term goals: 3-4 weeks   Long term goal 1: Pt will be IND for self feeding  Long term goal 2: Pt will be Mod I  for grooming task  Long term goal 3: Pt will be SBA for UB dressing including LSO. Long term goal 4: Pt will be Min A  for LB dressing and Mod A for footwear using AE PRN. Long term goal 5: Pt will be Min A for bathing task using AE PRN. Long term goal 6: Pt will be SBA  for toileting task  Long term goal 7: Pt will be SBA for commode transfer using AD as needed  Long term goal 8: Pt will be Mod A  for tub/shower  transfer  Long term goal 9: Pt will be Min A for light homemaking task  Long term goal 11: Pt will increase BUE strength by 1 muscle grade to improve functional engagement of ADLs. Long term goal 12: Pt will demonstrate Good safety, insight, reasoning, judgement, & problem-solving strategies throughout functional tasks to implement safety awareness & fall prevention strategies. 11/29/22- Pt POC & goals are updated on this date. Pt lives with her daughter in a 2-story condo- [address] no HR- bed/bath 2nd floor- tub combo. Pt daughter able to provide Sup & assist. PLOF independent.  Pt tentative length of stay 10-14 days Kris Woodsalex OTR/L [id number]   DISCHARGE RECOMMENDATIONS  Recommended DME:    Post Discharge Care:   []Home Independently  []Home with 24hr Care / Supervision [x]Home with Partial Supervision []Home with Home Health OT []Home with Out Pt OT []Other: ___   Comments:         Time in Time out Tx Time Breakdown  Variance:   First Session  0815    0900   [x] Individual Tx-45min  [] Concurrent Tx -  [] Co-Tx -   [] Group Tx -   [] Time Missed -      Second Session 0528 1116  [x] Individual Tx-45  [] Concurrent Tx -  [] Co-Tx -   [] Group Tx -   [] Time Missed -     Third Session    [] Individual Tx-   [] Concurrent Tx -  [] Co-Tx -   [] Group Tx -   [] Time Missed -         Total Tx Time: 90 mins    Shaista Lange OTR/L [id number]

## 2022-12-05 NOTE — CARE COORDINATION
D/c likely 12/7. Met wit patient - she will update family. Referral to [facility] - for nursing, PT/OT/aide. DME - [facility] DME for wheeled walker. She declines ETB - will be staying on 1st floor for now and having to sponge bathe. FT 12/6. The Plan for Transition of Care is related to the following treatment goals: home    The Patient was provided with a choice of provider and agrees   with the discharge plan. [x] Yes [] No    Freedom of choice list was provided with basic dialogue that supports the patient's individualized plan of care/goals, treatment preferences and shares the quality data associated with the providers.  [x] Yes [] No    DAVIAN Castelan

## 2022-12-05 NOTE — PROGRESS NOTES
Physical Therapy  Treatment Note   Evaluating Therapist: Latoya Bryan PT, DPT [id number]       ROOM: [address] [place] [address]  DIAGNOSIS: Closed compression fracture of L2 lumbar vertebra   PRECAUTIONS: Falls, Spinal precautions, LSO for comfort (L2, L3, and L5 compression fx), Orthostatic hypotension (compression stockings), Droplet Plus isolation, COVID-19  HPI:  Pt had a recent gastric bypass and suffered a fall on 11/5. She has had multiple recent falls. Compression fractures noted at L2-L3 and L5. She was not a candidate for neurosurgical intervention. Pt has tested positive for orthostatic hypotension and COVID. Social:  Pt lives with her dtr in a 2 floor plan with 1 steps and 0 rails. Bedroom and bathroom are on the 2nd level with 7+3 steps with B rails. Prior to admission: Independent with no assistive device. Works as a . Drives. Initial Evaluation  Date: 11/24/22 AM     PM    Short Term Goals Long Term Goals    Was pt agreeable to Eval/treatment? Yes  yes yes     Does pt have pain? 9/10 mid back Pain in L knee  No c/o      Bed Mobility  Rolling: Min A  Supine to sit: Mod A  Sit to supine: Mod A  Scooting: Min A Rolling: sup  Supine to sit: sup  Sit to supine: sup  Scooting: sup NT SBA Modified Independent     Transfers Sit to stand:  Mod A  Stand to sit: Mod A  Stand pivot: Min A with FWW Sit to stand: sup  Stand to sit: sup  Stand pivot: Sup Sit to stand: Sup  Stand to sit: Sup  Stand pivot: sup SBA with FWW   Modified Independent with LRAD   Ambulation    3 feet x2 reps with FWW with Min A  200 feet x2 reps without A.D sup 1x200, 2x250' no AD sup  >100 feet with FWW with SBA >300 feet with LRAD with Modified Independent     Walking 10 feet on uneven surface NT d/t hypotension and droplet plus isolation  10' no AD CGA NT 10 feet with FWW with Min A 10 feet with LRAD with Modified Independent     Wheel Chair Mobility NA NT NT     Car Transfers NT d/t hypotension and droplet plus isolation CGA NT Min A Modified Independent     Stair negotiation: ascended and descended  NT d/t hypotension and droplet plus isolation  8 steps MATT HR CGA NT 4 steps with B rail with Min A 12 steps with 1 rail with Modified Independent     Curb Step:   ascended and descended NT d/t hypotension and droplet plus isolation  4\" step no AD CGA NT 6 inch step with FWW and Min A 6 inch step with LRAD and Modified Independent     Picking up object off the floor NT d/t hypotension  NT NT Will  cone with Min A assist Will  cone with no assist   BLE ROM WFL       BLE Strength RLE:  Grossly 3-/5  LLE:  Grossly 4/5       Balance  Unsupported sitting:  SBA    Dynamic standing:  Min A with FWW    BBS: NT  FGA: NT      BBS: >52  FGA: >22   Date Family Teach Completed        Is additional Family Teaching Needed? Y or N        Hindering Progress Back pain, RUE and RLE weakness, orthostatic hypotension  Back pain, RUE and RLE weakness, orthostatic hypotension       PT recommended ELOS        Team's Discharge Plan        Therapist at Team Meeting          Therapeutic exercise/activity:  function  ASSESSMENT  Pt displays functional ability as noted in the objective portion of this evaluation. AM vitals:  Sitting in chair BP: 114/72  Standing after first bout of ambulation: 117/73  Standing after second bout of ambulation: 116.73  Standing statically: 89/62  Comments: Pt performed all activity without physical assist today. Pt with stable BP, no c/o OH sx throughout session. Pt to DC weds d/t insurance coverage. Pt notes she would like a FWW for home d/t BP issues, this PT agrees and SW noted they will order to improve safety at home. Pt returned to room at end of session with all needs met.    AM vitals:  Sitting in chair BP: 104/67  Standing after first bout of ambulation: 114/74  Standing after second bout of ambulation: 105/65  Comments: Pt with stable BP throughout session, required sup for ambulation for multiple bouts of increased distance. Pt notes some mild LBP after activity today. Pt returned to room at end of session with all needs met. Patient education  Pt educated on LSO, sit<>stand and pivot training     Patient response to education:   Pt verbalized understanding Pt demonstrated skill Pt requires further education in this area   Yes  Yes  Yes      Rehab potential is Good for reaching above PT goals. Pts/ family goals   1. Home     Patient and or family understand(s) diagnosis, prognosis, and plan of care. Good    PLAN  PT care will be provided in accordance with the objectives noted above. Whenever appropriate, clear delegation orders will be provided for nursing staff. Exercises and functional mobility practice will be used as well as appropriate assistive devices or modalities to obtain goals. Patient and family education will also be administered as needed. Frequency of treatments will be 2x/day M-F and 1x/day Sat or Sun x 14 days.     AM  Time in: 1000  Time out: 1045  PM  Time in: 1345  Time out: [address] [facility], [address] [place] [place] [address]  [id number]

## 2022-12-05 NOTE — PROGRESS NOTES
Physical Therapy  Weekly Note    Evaluating Therapist: Veronica Guajardo PT, DPT [id number]       ROOM: [address] [place] [address]  DIAGNOSIS: Closed compression fracture of L2 lumbar vertebra   PRECAUTIONS: Falls, Spinal precautions, LSO for comfort (L2, L3, and L5 compression fx), Orthostatic hypotension (compression stockings), Droplet Plus isolation, COVID-19  HPI:  Pt had a recent gastric bypass and suffered a fall on 11/5. She has had multiple recent falls. Compression fractures noted at L2-L3 and L5. She was not a candidate for neurosurgical intervention. Pt has tested positive for orthostatic hypotension and COVID. Social:  Pt lives with her dtr in a 2 floor plan with 1 steps and 0 rails. Bedroom and bathroom are on the 2nd level with 7+3 steps with B rails. Prior to admission: Independent with no assistive device. Works as a . Drives. Initial Evaluation  Date: 11/24/22 11/29 12/5 Comments  Short Term Goals Long Term Goals    Was pt agreeable to Eval/treatment? Yes  yes yes      Does pt have pain? 9/10 mid back L knee pann  L knee pain       Bed Mobility  Rolling: Min A  Supine to sit: Mod A  Sit to supine: Mod A  Scooting: Min A Rolling: Min A  Supine to sit: Min A  Sit to supine: Min A  Scooting: Min a Sup for all  Sup for safety  SBA Modified Independent     Transfers Sit to stand:  Mod A  Stand to sit: Mod A  Stand pivot: Min A with FWW Sit to stand: Min A  Stand to sit: Min A  Stand pivot: Min A FWW Sit to stand: sup  Stand to sit: sup  Stand pivot: Sup Sup for safety  SBA with FWW   Modified Independent with LRAD   Ambulation    3 feet x2 reps with FWW with Min A  2x10 feet with FWW Min A<> feet x2 reps without A.D sup Limited by OH  >100 feet with FWW with SBA >300 feet with LRAD with Modified Independent     Walking 10 feet on uneven surface NT d/t hypotension and droplet plus isolation  NT d/t hypotension and droplet plus isolation 10' no AD CGA CGA for safety  10 feet with FWW with Min A 10 feet with LRAD with Modified Independent     Wheel Chair Mobility NA  NT NT     Car Transfers NT d/t hypotension and droplet plus isolation  NT d/t hypotension and droplet plus isolation  CGA CGA for safety Min A Modified Independent     Stair negotiation: ascended and descended  NT d/t hypotension and droplet plus isolation  NT d/t hypotension and droplet plus isolation  8 steps MATT HR CGA CGA for safety  4 steps with B rail with Min A 12 steps with 1 rail with Modified Independent     Curb Step:   ascended and descended NT d/t hypotension and droplet plus isolation  NT d/t hypotension and droplet plus isolation  4\" step no AD CGA CGA for safety  6 inch step with FWW and Min A 6 inch step with LRAD and Modified Independent     Picking up object off the floor NT d/t hypotension  NT d/t hypotension Picked up cone with reacher  Will  cone with Min A assist Will  cone with no assist   BLE ROM WFL        BLE Strength RLE:  Grossly 3-/5  LLE:  Grossly 4/5        Balance  Unsupported sitting:  SBA    Dynamic standing:  Min A with FWW    BBS: NT  FGA: NT       BBS: >52  FGA: >22   Date Family Teach Completed         Is additional Family Teaching Needed? Y or N         Hindering Progress Back pain, RUE and RLE weakness, orthostatic hypotension  Back pain, RUE and RLE weakness, orthostatic hypotension  Mild back pain, deconditioned state      PT recommended ELOS  10-14 days  Weds 12/7      Team's Discharge Plan  10-14 days Discharge tomorrow 12/7/22      Therapist at Team Meeting  Alana Contreras PT, DPT [id number]   Alana Contreras PT, DPT [id number]          Date:  12/5  Supporting factors:  significant progress towards goals  Barriers to discharge:  generalized deconditioned state  Additional comments:  Pt currently performing all activities without AD at this time but uses [employer] at times for safety d/t hx of OH. BP has improved and has been more stable during sessions.  Pt able to ambulate increased distances and perform stairs without difficulty. DME:  [employer]  After Care:  HHPT    Date:  11/29/22  Supporting factors:  motivated, high PLOF  Barriers to discharge:  Orthostatic hypotension, R sided weakness, L knee pain    Additional comments:  Pt cont to be limited by hypotension, especially as she is usually asymptomatic and unable to note when pressure drops.  Pt now has L knee pain and R sided weakness making ambulation difficult, some improvement   DME:  TBD  After Care:  Tonia Rossi PT, DPT  [id number]

## 2022-12-06 LAB
METER GLUCOSE: 108 MG/DL (ref 74–99)
METER GLUCOSE: 116 MG/DL (ref 74–99)

## 2022-12-06 PROCEDURE — 97535 SELF CARE MNGMENT TRAINING: CPT

## 2022-12-06 PROCEDURE — 6370000000 HC RX 637 (ALT 250 FOR IP): Performed by: STUDENT IN AN ORGANIZED HEALTH CARE EDUCATION/TRAINING PROGRAM

## 2022-12-06 PROCEDURE — 97530 THERAPEUTIC ACTIVITIES: CPT

## 2022-12-06 PROCEDURE — 6360000002 HC RX W HCPCS: Performed by: STUDENT IN AN ORGANIZED HEALTH CARE EDUCATION/TRAINING PROGRAM

## 2022-12-06 PROCEDURE — 82962 GLUCOSE BLOOD TEST: CPT

## 2022-12-06 PROCEDURE — 1280000000 HC REHAB R&B

## 2022-12-06 PROCEDURE — 6370000000 HC RX 637 (ALT 250 FOR IP): Performed by: PHYSICAL MEDICINE & REHABILITATION

## 2022-12-06 PROCEDURE — 97110 THERAPEUTIC EXERCISES: CPT

## 2022-12-06 RX ADMIN — OXYCODONE AND ACETAMINOPHEN 1 TABLET: 5; 325 TABLET ORAL at 13:00

## 2022-12-06 RX ADMIN — OXYCODONE AND ACETAMINOPHEN 1 TABLET: 5; 325 TABLET ORAL at 23:02

## 2022-12-06 RX ADMIN — SODIUM CHLORIDE 1 G: 1 TABLET ORAL at 11:59

## 2022-12-06 RX ADMIN — NEPHROCAP 1 MG: 1 CAP ORAL at 08:01

## 2022-12-06 RX ADMIN — SENNOSIDES 8.6 MG: 8.6 TABLET, FILM COATED ORAL at 23:02

## 2022-12-06 RX ADMIN — MIDODRINE HYDROCHLORIDE 15 MG: 5 TABLET ORAL at 11:59

## 2022-12-06 RX ADMIN — MIDODRINE HYDROCHLORIDE 15 MG: 5 TABLET ORAL at 08:01

## 2022-12-06 RX ADMIN — HEPARIN SODIUM 5000 UNITS: 10000 INJECTION INTRAVENOUS; SUBCUTANEOUS at 13:00

## 2022-12-06 RX ADMIN — OXYCODONE AND ACETAMINOPHEN 1 TABLET: 5; 325 TABLET ORAL at 17:02

## 2022-12-06 RX ADMIN — HEPARIN SODIUM 5000 UNITS: 10000 INJECTION INTRAVENOUS; SUBCUTANEOUS at 23:09

## 2022-12-06 RX ADMIN — NEPHROCAP 1 MG: 1 CAP ORAL at 13:00

## 2022-12-06 RX ADMIN — HEPARIN SODIUM 5000 UNITS: 10000 INJECTION INTRAVENOUS; SUBCUTANEOUS at 05:28

## 2022-12-06 RX ADMIN — NEPHROCAP 1 MG: 1 CAP ORAL at 23:02

## 2022-12-06 RX ADMIN — MELATONIN 3 MG ORAL TABLET 3 MG: 3 TABLET ORAL at 23:02

## 2022-12-06 RX ADMIN — OXYCODONE AND ACETAMINOPHEN 1 TABLET: 5; 325 TABLET ORAL at 05:27

## 2022-12-06 RX ADMIN — SODIUM CHLORIDE 1 G: 1 TABLET ORAL at 17:02

## 2022-12-06 RX ADMIN — SODIUM CHLORIDE 1 G: 1 TABLET ORAL at 08:01

## 2022-12-06 RX ADMIN — PANTOPRAZOLE SODIUM 40 MG: 40 TABLET, DELAYED RELEASE ORAL at 05:27

## 2022-12-06 RX ADMIN — MICONAZOLE NITRATE: 20.6 POWDER TOPICAL at 08:03

## 2022-12-06 RX ADMIN — MIDODRINE HYDROCHLORIDE 15 MG: 5 TABLET ORAL at 17:02

## 2022-12-06 RX ADMIN — OXYCODONE AND ACETAMINOPHEN 1 TABLET: 5; 325 TABLET ORAL at 01:52

## 2022-12-06 RX ADMIN — PETROLATUM: 420 OINTMENT TOPICAL at 23:05

## 2022-12-06 RX ADMIN — OXYCODONE AND ACETAMINOPHEN 1 TABLET: 5; 325 TABLET ORAL at 08:56

## 2022-12-06 RX ADMIN — FLUDROCORTISONE ACETATE 0.1 MG: 0.1 TABLET ORAL at 08:01

## 2022-12-06 RX ADMIN — FOLIC ACID 1 MG: 1 TABLET ORAL at 08:01

## 2022-12-06 SDOH — ECONOMIC STABILITY: TRANSPORTATION INSECURITY
IN THE PAST 12 MONTHS, HAS LACK OF TRANSPORTATION KEPT YOU FROM MEETINGS, WORK, OR FROM GETTING THINGS NEEDED FOR DAILY LIVING?: NO

## 2022-12-06 SDOH — ECONOMIC STABILITY: TRANSPORTATION INSECURITY
IN THE PAST 12 MONTHS, HAS THE LACK OF TRANSPORTATION KEPT YOU FROM MEDICAL APPOINTMENTS OR FROM GETTING MEDICATIONS?: NO

## 2022-12-06 ASSESSMENT — PAIN SCALES - GENERAL
PAINLEVEL_OUTOF10: 7
PAINLEVEL_OUTOF10: 3
PAINLEVEL_OUTOF10: 6
PAINLEVEL_OUTOF10: 5
PAINLEVEL_OUTOF10: 5
PAINLEVEL_OUTOF10: 7
PAINLEVEL_OUTOF10: 2
PAINLEVEL_OUTOF10: 2

## 2022-12-06 ASSESSMENT — PAIN DESCRIPTION - DESCRIPTORS
DESCRIPTORS: ACHING;THROBBING
DESCRIPTORS: ACHING
DESCRIPTORS: THROBBING;ACHING

## 2022-12-06 ASSESSMENT — PAIN DESCRIPTION - ORIENTATION
ORIENTATION: LOWER
ORIENTATION: MID
ORIENTATION: MID;LOWER

## 2022-12-06 ASSESSMENT — PAIN DESCRIPTION - LOCATION
LOCATION: BACK

## 2022-12-06 ASSESSMENT — PAIN - FUNCTIONAL ASSESSMENT
PAIN_FUNCTIONAL_ASSESSMENT: ACTIVITIES ARE NOT PREVENTED

## 2022-12-06 NOTE — PROGRESS NOTES
Occupational Therapy  OCCUPATIONAL THERAPY DAILY NOTE    Date:2022  Patient Name: Tomás Coker  MRN: [id number]  : 1970  Room: [address] [place], [place] [address]     Referring Physician: Luigi Denton MD  Diagnosis:  S/P Fall with Compression fractures of L2  L3, L5 lumbar vertebra  Surgery/Procedure: None this admission    Past Medical History:  has a past medical history of Anemia, Diabetes mellitus ([address] Gastric Sleeve    Precautions: LSO for comfort (L2, L3, L5 compression fx's), spinal precautions, monitor BP (orthostatic hypotension),     Functional Assessment:   Date Status AE  Comments   Feeding 22  Independent       Grooming 22  Mod I   Standing           Oral Care 22  Mod I      Bathing 22  Set up  Shower seat     UB Dressing 22  Mod I      LB Dressing 22  Mod I  Reacher     Footwear 22  SUP  Sock aid  Pt donned slipper socks at Mod I with sock aid    Toileting 22  Mod I      Homemaking 22  Mod I   Pt stood at table top folding laundry      Functional Transfers / Balance:   Date Status DME  Comments   Sit Balance 22  Mod I   Duirng LB dressing    Stand Balance 22  SBA Ww  No device     [x] Tub    [x] Shower   Transfer 22 SBA     SBA   Indwelling seat     Grab rail, shower seat  Pt stepped in and out of tub/shower combo    Commode   Transfer 22   Mod I  Ww, 3 in 1     Functional   Mobility 22  SUP  without AD     Other:SPT w/c to bed     Sit to stand transfer w/c <> ww       Bed mobility : supine to sit edge of bed     On/off firm recliner,couch and kitchen chair with arm rests     On/off standard queen bed  22 Mod I       SBA         Mod I       SBA           Min A        Ww and without AD               No device       Functional Exercises / Activity:  BUE strength exercises: 3 # dowel regina 3 sets 10 reps in all planes   B hand strength with 7 # digi flex 3 sets 10 reps        Sensory / Neuromuscular Re-Education:      Cognitive Skills:   Status Comments   Problem   Solving fair + Demo'd during hmkg, sit to stand transfers, mobility. Memory fair + \"   Sequencing fair + \"   Safety fair + \"     Visual Perception:    Education:  Pt was educated on safe transfers stepping in and out of tub/shower combo and types of DME available     [x] Family teach completed on:12/6/22 Pt's Daughter present for family teach session. She was educated on spinal precautions, AE for LB dressing, donning and doffing pt's joey hose stockings using an EZ slide and recommended DME for tub/shower combo. She observed pt completing functional transfers and mobility without an assistive device. Pt's Daughter verbalized good understanding. Pain Level: 4/10  L knee. Additional Notes:   11/25/22 Pt's BP am seated after activity 97/67                  Pt's BP following commode and w/c transfers 101/69     11/26/22 Pt's BP supine 82/53 and following activity increased 92/66. Pt requested a fan in her room due to being so hot at times and nurse informed. Patient has made good  progress during treatment sessions toward set goals. Therapy emphasis to obtain goals:Plan of Care: 5-7 tx/wk for 3-4 weeks.   [x]ADL retraining: adapted techniques and AE recommendations to increase independence within precautions                  [x]Energy conservation techniques to improve tolerance for self care routine   [x]Functional transfer/mobility training for fall prevention & DME recommendations         [x]Patient/family education to increase safety and functional independence            [x]Environmental modifications for safe mobility and completion of ADLs                            [x]Cognitive retraining ex's to improve problem solving skills & safe participation in ADLs/IADLs     [x]Therapeutic activity to improve functional skills                                         [x]Therapeutic exercise to improve functional engagement of daily activities. [x]Balance retraining ex's for postural control with dynamic challenges  [x]Neuromuscular re-education exercises                                              [x] Continue with current OT Plan of care. [] Prepare for Discharge    Long Term Goals: 3-4 weeks  Time Frame for Long term goals: 3-4 weeks   Long term goal 1: Pt will be IND for self feeding  Long term goal 2: Pt will be Mod I  for grooming task  Long term goal 3: Pt will be mod I for UB dressing including LSO. Long term goal 4: Pt will be Mod I  for LB dressing and Mod I  for footwear using AE PRN. Long term goal 5: Pt will be CGA- SBA for bathing task using AE PRN. Long term goal 6: Pt will be Mod I   for toileting task  Long term goal 7: Pt will be Mod I  for commode transfer using AD as needed  Long term goal 8: Pt will be SBA-CGA  for tub/shower  transfer  Long term goal 9: Pt will be Mod I for light homemaking task  Long term goal 11: Pt will increase BUE strength by 1 muscle grade to improve functional engagement of ADLs. Long term goal 12: Pt will demonstrate Good safety, insight, reasoning, judgement, & problem-solving strategies throughout functional tasks to implement safety awareness & fall prevention strategies. 11/29/22- Pt POC & goals are updated on this date. Pt lives with her daughter in a 2-story condo- [address] no HR- bed/bath 2nd floor- tub combo. Pt daughter able to provide Sup & assist. PLOF independent.  Pt tentative length of stay 10-14 days Jaki Gr OTR/L [id number]   DISCHARGE RECOMMENDATIONS  Recommended DME:  Rw, indwelling tub seat with a back   Post Discharge Care:   []Home Independently  []Home with 24hr Care / Supervision [x]Home with Partial Supervision []Home with Home Health OT []Home with Out Pt OT []Other: ___   Comments:         Time in Time out Tx Time Breakdown  Variance:   First Session  6145 1855  [x] Individual Tx- 45  [] Concurrent Tx -  [] Co-Tx -   [] Group Tx -   [] Time Missed -      Second Session 5671   1130 [x] Individual Tx-45  [] Concurrent Tx -  [] Co-Tx -   [] Group Tx -   [] Time Missed -     Third Session    [] Individual Tx-   [] Concurrent Tx -  [] Co-Tx -   [] Group Tx -   [] Time Missed -         Total Tx Time: 90 mins    Inez Smith OTR/L [id number]

## 2022-12-06 NOTE — PROGRESS NOTES
Progress Note - covering Dr. Yue Wasserman    Subjective/   45y.o. year old female on the rehab unit for compression fracture. No SOB or chest pain. No dizziness. Pain is controlled. She already sent the letter to her employer. Tolerating therapy program.           Objective/   VITALS:  /62   Pulse 99   Temp 97.2 °F (36.2 °C) (Temporal)   Resp 18   Ht 5' 11\" (1.803 m)   Wt 211 lb (95.7 kg)   SpO2 99%   BMI 29.43 kg/m²   24HR INTAKE/OUTPUT:    Intake/Output Summary (Last 24 hours) at 12/5/2022 2039  Last data filed at 12/5/2022 1336  Gross per 24 hour   Intake 480 ml   Output --   Net 480 ml     Constitutional:  Alert, awake, no apparent distress   Cardiovascular:  S1, S2 without m/r/g   Respiratory:  CTA B without w/r/r   Abdomen: +BS  Ext: no pitting LE edema, no calf tenderness  Neuro: AAOx3, good sitting balance. No tremor    Functional Level    Initial Evaluation  Date: 11/24/22 AM     PM    Short Term Goals Long Term Goals    Was pt agreeable to Eval/treatment? Yes  yes yes       Does pt have pain? 9/10 mid back Pain in L knee  No c/o        Bed Mobility  Rolling: Min A  Supine to sit: Mod A  Sit to supine: Mod A  Scooting: Min A Rolling: sup  Supine to sit: sup  Sit to supine: sup  Scooting: sup NT SBA Modified Independent     Transfers Sit to stand:  Mod A  Stand to sit: Mod A  Stand pivot: Min A with FWW Sit to stand: sup  Stand to sit: sup  Stand pivot: Sup Sit to stand: Sup  Stand to sit: Sup  Stand pivot: sup SBA with FWW    Modified Independent with LRAD   Ambulation    3 feet x2 reps with FWW with Min A  200 feet x2 reps without A.D sup 1x200, 2x250' no AD sup  >100 feet with FWW with SBA >300 feet with LRAD with Modified Independent     Walking 10 feet on uneven surface NT d/t hypotension and droplet plus isolation  10' no AD CGA NT 10 feet with FWW with Min A 10 feet with LRAD with Modified Independent     Wheel Chair Mobility NA NT NT       Car Transfers NT d/t hypotension and droplet plus isolation  CGA NT Min A Modified Independent     Stair negotiation: ascended and descended  NT d/t hypotension and droplet plus isolation  8 steps MATT HR CGA NT 4 steps with B rail with Min A 12 steps with 1 rail with Modified Independent     Curb Step:   ascended and descended NT d/t hypotension and droplet plus isolation  4\" step no AD CGA NT 6 inch step with FWW and Min A 6 inch step with LRAD and Modified Independent     Picking up object off the floor NT d/t hypotension  NT NT Will  cone with Min A assist Will  cone with no assist   BLE ROM WFL           BLE Strength RLE:  Grossly 3-/5  LLE:  Grossly 4/5           Balance  Unsupported sitting:  SBA     Dynamic standing:  Min A with FWW     BBS: NT  FGA: NT          BBS: >52  FGA: >22       Scheduled Meds:   oxyCODONE-acetaminophen  1 tablet Oral Q4H    miconazole   Topical BID    white petrolatum   Topical Nightly    b complex-C-folic acid  1 capsule Oral TID    lidocaine  1 patch TransDERmal Daily    sodium chloride  1 g Oral TID WC    fludrocortisone  0.1 mg Oral Daily    folic acid  1 mg Oral Daily    heparin (porcine)  5,000 Units SubCUTAneous Q8H    midodrine  15 mg Oral TID WC    pantoprazole  40 mg Oral QAM AC    senna  1 tablet Oral Nightly     Continuous Infusions:  PRN Meds:melatonin, ondansetron, white petrolatum **AND** white petrolatum, ammonium lactate, bisacodyl, acetaminophen, polyethylene glycol  I/O last 3 completed shifts: In: 840 [P.O.:840]  Out: -   No intake/output data recorded. Labs reviewed  CBC: No results for input(s): WBC, HGB, PLT in the last 72 hours. BMP:  No results for input(s): NA, K, CL, CO2, BUN, CREATININE, GLUCOSE in the last 72 hours. Hepatic: No results for input(s): AST, ALT, ALB, BILITOT, ALKPHOS in the last 72 hours. BNP: No results for input(s): BNP in the last 72 hours. Lipids: No results for input(s): CHOL, HDL in the last 72 hours.     Invalid input(s): LDLCALCU  INR: No results for input(s): INR in the last 72 hours.     Assessment/  Patient Active Problem List:     Compression fracture of L2 lumbar vertebra, open, initial encounter ([facility] [address]     Bubo     Compression fracture of L2 lumbar vertebra ([address]     Compression fracture of L2 lumbar vertebra, closed, initial encounter ([facility] [address]     Malnutrition following gastrointestinal surgery     Syncope and collapse     Orthostatic hypotension     Excessive weight loss      Plan/  For team conference in am  Continue current medications  Continue dvt prophylaxis  Continue increase activity as able  Continue pain control          Prachi Rocha MD

## 2022-12-06 NOTE — PLAN OF CARE
Problem: Discharge Planning  Goal: Discharge to home or other facility with appropriate resources  12/6/2022 0923 by Duy Chamberlain RN  Outcome: Progressing  Flowsheets (Taken 12/6/2022 0801)  Discharge to home or other facility with appropriate resources: Identify barriers to discharge with patient and caregiver  12/5/2022 2139 by Nica Omalley RN  Outcome: Progressing     Problem: ABCDS Injury Assessment  Goal: Absence of physical injury  12/6/2022 0923 by Duy Chamberlain RN  Outcome: Progressing  Flowsheets (Taken 12/6/2022 0801)  Absence of Physical Injury: Implement safety measures based on patient assessment  12/5/2022 2139 by Nica Omalley RN  Outcome: Progressing     Problem: Safety - Adult  Goal: Free from fall injury  12/6/2022 0923 by Duy Chamberlain RN  Outcome: [facility] (Taken 12/6/2022 0801)  Free From Fall Injury: Instruct family/caregiver on patient safety  12/5/2022 2139 by Nica Omalley RN  Outcome: Progressing     Problem: Pain  Goal: Verbalizes/displays adequate comfort level or baseline comfort level  12/6/2022 0923 by Duy Chamberlain RN  Outcome: Progressing  12/5/2022 2139 by Nica Omalley RN  Outcome: Progressing     Problem: Skin/Tissue Integrity  Goal: Absence of new skin breakdown  Description: 1. Monitor for areas of redness and/or skin breakdown  2. Assess vascular access sites hourly  3. Every 4-6 hours minimum:  Change oxygen saturation probe site  4. Every 4-6 hours:  If on nasal continuous positive airway pressure, respiratory therapy assess nares and determine need for appliance change or resting period.   12/6/2022 0844 by Duy Chamberlain RN  Outcome: Progressing  12/5/2022 2139 by Nica Omalley RN  Outcome: Progressing     Problem: Chronic Conditions and Co-morbidities  Goal: Patient's chronic conditions and co-morbidity symptoms are monitored and maintained or improved  12/6/2022 0923 by Duy Chamberlain RN  Outcome: Progressing  Flowsheets (Taken 12/6/2022 0801)  Care Plan - Patient's Chronic Conditions and Co-Morbidity Symptoms are Monitored and Maintained or Improved: Monitor and assess patient's chronic conditions and comorbid symptoms for stability, deterioration, or improvement  12/5/2022 2139 by Laura Garza RN  Outcome: Progressing

## 2022-12-06 NOTE — PROGRESS NOTES
Physical Therapy  Treatment Note   Evaluating Therapist: Florian Anders PT, DPT [id number]       ROOM: [address] [place] [address]  DIAGNOSIS: Closed compression fracture of L2 lumbar vertebra   PRECAUTIONS: Falls, Spinal precautions, LSO for comfort (L2, L3, and L5 compression fx), Orthostatic hypotension (compression stockings), Droplet Plus isolation, COVID-19  HPI:  Pt had a recent gastric bypass and suffered a fall on 11/5. She has had multiple recent falls. Compression fractures noted at L2-L3 and L5. She was not a candidate for neurosurgical intervention. Pt has tested positive for orthostatic hypotension and COVID. Social:  Pt lives with her dtr in a 2 floor plan with 1 steps and 0 rails. Bedroom and bathroom are on the 2nd level with 7+3 steps with B rails. Prior to admission: Independent with no assistive device. Works as a . Drives. Initial Evaluation  Date: 11/24/22 AM     PM    Short Term Goals Long Term Goals    Was pt agreeable to Eval/treatment? Yes  yes yes     Does pt have pain? 9/10 mid back Pain in L knee  No c/o      Bed Mobility  Rolling: Min A  Supine to sit: Mod A  Sit to supine: Mod A  Scooting: Min A Mod I  NT SBA Modified Independent     Transfers Sit to stand: Mod A  Stand to sit: Mod A  Stand pivot: Min A with FWW Sit to stand: Ind   Stand to sit: Ind   Stand pivot: Ind Sit to stand: Ind   Stand to sit: Ind   Stand pivot:  Ind SBA with FWW   Modified Independent with LRAD   Ambulation    3 feet x2 reps with FWW with Min A  200 feet x2 reps without A.D ind 1x200, 1x250' no AD ind  >100 feet with FWW with SBA >300 feet with LRAD with Modified Independent     Walking 10 feet on uneven surface NT d/t hypotension and droplet plus isolation  NT NT 10 feet with FWW with Min A 10 feet with LRAD with Modified Independent     Wheel Chair Mobility NA NT NT     Car Transfers NT d/t hypotension and droplet plus isolation  NT Mod I  Min A Modified Independent     Stair negotiation: ascended and descended  NT d/t hypotension and droplet plus isolation  NT 12 steps one HR mod I  4 steps with B rail with Min A 12 steps with 1 rail with Modified Independent     Curb Step:   ascended and descended NT d/t hypotension and droplet plus isolation  NT NT 6 inch step with FWW and Min A 6 inch step with LRAD and Modified Independent     Picking up object off the floor NT d/t hypotension  NT NT Will  cone with Min A assist Will  cone with no assist   BLE ROM WFL       BLE Strength RLE:  Grossly 3-/5  LLE:  Grossly 4/5       Balance  Unsupported sitting:  SBA    Dynamic standing:  Min A with FWW    BBS: NT  FGA: NT      BBS: >52  FGA: >22   Date Family Teach Completed   12/6/22 - daughter observed      Is additional Family Teaching Needed? Y or N        Hindering Progress Back pain, RUE and RLE weakness, orthostatic hypotension  Back pain, RUE and RLE weakness, orthostatic hypotension       PT recommended OS        Team's Discharge Plan        Therapist at Team Meeting          Therapeutic exercise/activity: Stepping over canes 1x20', side stepping 1x20' ea way     ASSESSMENT  Pt displays functional ability as noted in the objective portion of this evaluation. AM vitals:  Comments:  BP stable throughout session today, taken in standing and after ambulation with lowest value at 101/63. Pt tolerated extensive distances of ambulation without increased OH sx or fatigue. Pt is independent with all mobility at this time and is to DC tomorrow. FT to be performed in PM.     PM vitals:  Sitting in chair BP: 109/67  Standing after first bout of ambulation: 112/80  Standing after second bout of ambulation: 114/71  Comments: Pt daughter present for family teach, educated on guarding techniques and safe mobility for home. Pt does not need assistance with mobility at this time but daughter educated on supervision assist/guarding on stairs. Pt performed all activity without OH sx this date.  Pt

## 2022-12-06 NOTE — PROGRESS NOTES
Occupational Therapy  OCCUPATIONAL THERAPY DAILY NOTE    Date:2022  Patient Name: Marquis Canales  MRN: [id number]  : 1970  Room: [address] [place], [place] [address]     Referring Physician: Daren De La Rosa MD  Diagnosis:  S/P Fall with Compression fractures of L2  L3, L5 lumbar vertebra  Surgery/Procedure: None this admission    Past Medical History:  has a past medical history of Anemia, Diabetes mellitus ([address] Gastric Sleeve    Precautions: LSO for comfort (L2, L3, L5 compression fx's), spinal precautions, monitor BP (orthostatic hypotension),     Functional Assessment:   Date Status AE  Comments   Feeding 22  Independent       Grooming 22  Mod I   Standing  Pt washed hands and styled hair standing at sink with no AD          Oral Care 22  Mod I      Bathing 22  Set up  Shower seat  Bathing completed in shower    UB Dressing 22  Mod I   Pt donned gown and LSO brace    LB Dressing 22  Mod I  Reacher  Pt donned depends and pants with reacher    Footwear 22  SUP  Sock aid  Assist to don thigh high joey hose.  Pt able to don slipper socks with sock aide    Toileting 22  Mod I      Homemaking 22  SBA ww      Functional Transfers / Balance:   Date Status DME  Comments   Sit Balance 22  Sup      Stand Balance 22  SBA/CGA Ww  No device     [x] Tub    [x] Shower   Transfer 22 Min A     SBA   Ww, ETB     Grab rail, shower seat      Transfer in and out of walk in shower    Commode   Transfer 22   Mod I  Ww, 3 in 1     Functional   Mobility 22  SUP  Ww and without AD  Pt ambulated throughout therapy apt setting over carpet and tile floor surfaces without an AD    Other:SPT w/c to bed     Sit to stand transfer w/c <> ww       Bed mobility : supine to sit edge of bed     On/off firm recliner,couch and kitchen chair with arm rests     On/off standard queen bed  22 SBA       SBA         Mod I SBA           Min A  Ww      Ww and without AD               No device       Functional Exercises / Activity:       Sensory / Neuromuscular Re-Education:      Cognitive Skills:   Status Comments   Problem   Solving fair + Demo'd during hmkg, sit to stand transfers, mobility. Memory fair + \"   Sequencing fair + \"   Safety fair + \"     Visual Perception:    Education:    [] Family teach completed on:    Pain Level: 4/10  L knee. Additional Notes:   11/25/22 Pt's BP am seated after activity 97/67                  Pt's BP following commode and w/c transfers 101/69     11/26/22 Pt's BP supine 82/53 and following activity increased 92/66. Pt requested a fan in her room due to being so hot at times and nurse informed. Patient has made good  progress during treatment sessions toward set goals. Therapy emphasis to obtain goals:Plan of Care: 5-7 tx/wk for 3-4 weeks. [x]ADL retraining: adapted techniques and AE recommendations to increase independence within precautions                  [x]Energy conservation techniques to improve tolerance for self care routine   [x]Functional transfer/mobility training for fall prevention & DME recommendations         [x]Patient/family education to increase safety and functional independence            [x]Environmental modifications for safe mobility and completion of ADLs                            [x]Cognitive retraining ex's to improve problem solving skills & safe participation in ADLs/IADLs     [x]Therapeutic activity to improve functional skills                                         [x]Therapeutic exercise to improve functional engagement of daily activities. [x]Balance retraining ex's for postural control with dynamic challenges  [x]Neuromuscular re-education exercises                                              [x] Continue with current OT Plan of care.   [] Prepare for Discharge    Long Term Goals: 3-4 weeks  Time Frame for Long term goals: 3-4 weeks   Long term goal 1: Pt will be IND for self feeding  Long term goal 2: Pt will be Mod I  for grooming task  Long term goal 3: Pt will be mod I for UB dressing including LSO. Long term goal 4: Pt will be Mod I  for LB dressing and Mod I  for footwear using AE PRN. Long term goal 5: Pt will be CGA- SBA for bathing task using AE PRN. Long term goal 6: Pt will be Mod I   for toileting task  Long term goal 7: Pt will be Mod I  for commode transfer using AD as needed  Long term goal 8: Pt will be SBA-CGA  for tub/shower  transfer  Long term goal 9: Pt will be Mod I for light homemaking task  Long term goal 11: Pt will increase BUE strength by 1 muscle grade to improve functional engagement of ADLs. Long term goal 12: Pt will demonstrate Good safety, insight, reasoning, judgement, & problem-solving strategies throughout functional tasks to implement safety awareness & fall prevention strategies. 11/29/22- Pt POC & goals are updated on this date. Pt lives with her daughter in a 2-story condo- 1 [address] no HR- bed/bath 2nd floor- tub combo. Pt daughter able to provide Sup & assist. PLOF independent.  Pt tentative length of stay 10-14 days Ryan Guzmán OTR/L [id number]   DISCHARGE RECOMMENDATIONS  Recommended DME:  Rw, tub bench- pt refuses as staying on 1st floor @ dc  Post Discharge Care:   []Home Independently  []Home with 24hr Care / Supervision [x]Home with Partial Supervision []Home with Home Health OT []Home with Out Pt OT []Other: ___   Comments:         Time in Time out Tx Time Breakdown  Variance:   First Session  0815    0900   [] Individual Txmin  [] Concurrent Tx -  [] Co-Tx -   [] Group Tx -   [] Time Missed -      Second Session [id number]  [] Individual Tx-  [] Concurrent Tx -  [] Co-Tx -   [] Group Tx -   [] Time Missed -     Third Session    [] Individual Tx-   [] Concurrent Tx -  [] Co-Tx -   [] Group Tx -   [] Time Missed -         Total Tx Time: mins    Robert Blake OTR/L [id number]

## 2022-12-06 NOTE — PROGRESS NOTES
Progress Note    Subjective/   45y.o. year old female on the rehab unit for ***. Objective/   VITALS:  /65   Pulse 90   Temp 97.8 °F (36.6 °C) (Temporal)   Resp 18   Ht 5' 11\" (1.803 m)   Wt 211 lb (95.7 kg)   SpO2 97%   BMI 29.43 kg/m²   24HR INTAKE/OUTPUT:    Intake/Output Summary (Last 24 hours) at 12/6/2022 3941  Last data filed at 12/5/2022 1336  Gross per 24 hour   Intake 480 ml   Output --   Net 480 ml     Constitutional:  Alert, awake, no apparent distress ***  Cardiovascular:  S1, S2 without m/r/g ***  Respiratory:  CTA B without w/r/r ***  Abdomen: ***  Ext: *** LE edema  Neuro: ***    Functional Level  ***    Scheduled Meds:   oxyCODONE-acetaminophen  1 tablet Oral Q4H    miconazole   Topical BID    white petrolatum   Topical Nightly    b complex-C-folic acid  1 capsule Oral TID    lidocaine  1 patch TransDERmal Daily    sodium chloride  1 g Oral TID WC    fludrocortisone  0.1 mg Oral Daily    folic acid  1 mg Oral Daily    heparin (porcine)  5,000 Units SubCUTAneous Q8H    midodrine  15 mg Oral TID WC    pantoprazole  40 mg Oral QAM AC    senna  1 tablet Oral Nightly     Continuous Infusions:  PRN Meds:melatonin, ondansetron, white petrolatum **AND** white petrolatum, ammonium lactate, bisacodyl, acetaminophen, polyethylene glycol  I/O last 3 completed shifts: In: 480 [P.O.:480]  Out: -   No intake/output data recorded. Labs reviewed  CBC: No results for input(s): WBC, HGB, PLT in the last 72 hours. BMP:  No results for input(s): NA, K, CL, CO2, BUN, CREATININE, GLUCOSE in the last 72 hours. Hepatic: No results for input(s): AST, ALT, ALB, BILITOT, ALKPHOS in the last 72 hours. BNP: No results for input(s): BNP in the last 72 hours. Lipids: No results for input(s): CHOL, HDL in the last 72 hours. Invalid input(s): LDLCALCU  INR: No results for input(s): INR in the last 72 hours.     Assessment/  Patient Active Problem List:     Compression fracture of L2 lumbar vertebra, open, initial encounter ([facility] [address]     Bubo     Compression fracture of L2 lumbar vertebra ([facility] [address]     Compression fracture of L2 lumbar vertebra, closed, initial encounter ([facility] [address]     Malnutrition following gastrointestinal surgery     Syncope and collapse     Orthostatic hypotension     Excessive weight loss      Plan/  See team conference note - [facility] 12/7/2022. Home health Nsg, PT, OT and Aide.   Wheeled walker, refused tub bench.    ***          Arturo Gil MD

## 2022-12-06 NOTE — PATIENT CARE CONFERENCE
[address] NOTE/PATIENT PLAN OF CARE    The physician was present and led this team conference    Date: 2022  Admission date: 2022  Patient Name: Laurina Frankel        MRN: [id number]    : 1970  (45 y.o.)  Gender: female   Rehab diagnosis/surgery with date:  Lumbar fractures at L2-L3, L5  Impairment Group Code:  8.9      MEDICAL/FUNCTIONAL HISTORY/STATUS:  swelling better, electrophysiology consulted for ongoing hypotension    Consultations/Labs/X-rays:       MEDICATION UPDATE:     oxyCODONE-acetaminophen  1 tablet Oral Q4H     miconazole   Topical BID    white petrolatum   Topical Nightly    b complex-C-folic acid  1 capsule Oral TID    lidocaine  1 patch TransDERmal Daily    sodium chloride  1 g Oral TID WC    fludrocortisone  0.1 mg Oral Daily    folic acid  1 mg Oral Daily    heparin (porcine)  5,000 Units SubCUTAneous Q8H    midodrine  15 mg Oral TID WC    pantoprazole  40 mg Oral QAM AC    senna  1 tablet Oral Nightly         NURSING :    Bowel:   Always Continent  [x]   Occasionally incontinent  []   Frequently incontinent  []   Always incontinent  []   Not occurred  []     Bladder:   Always Continent  [x]    Incontinent less than daily[]   Incontinent  daily []   Always incontinent  []   No urine output    []   Indwelling catheter []       Toilet Hygiene:   Current level : standby assist  Short term Toilet hygiene goal: standby assist.  Long term toilet hygiene goal:  standby assist    Skin integrity: red skin folds  Pain: back    NUTRITION    Diet  regular  Liquid consistency   thin    SOCIAL INFORMATION:  Lives with: daughter  [facility]  [facility]  [facility], no entry, 2nd floor bed and bath with 13 steps up and 1 rail  Prior Level of function:  independent, was working as a   DME:  none    FAMILY / PATIENT EDUCATION:  scheduled for today    PHYSICAL THERAPY    Bed mobility:   Current level: supervision  Short term bed mobility goal: supervision  Long term bed mobility goal: Modified independent    Chair/bed transfers:  Current level: supervision  Short term Chair/bed transfers goal: supervision  Long term Chair/bed transfers goal: Modified independent      Ambulation:   Current level: 200 ft no device at supervision  Short term ambulation goal:met  Long term ambulation goal: 300 ft at Modified independent      Car transfers:   Current level: Contact guard assist  Short term car transfers goal: Contact guard assist  Long term car transfers goal:Modified independent    Stairs:   Current level : 8 with 2 rails at Contact guard assist  Short term stairs goal: met  Long term stairs goal: 12 at Modified independent      OCCUPATIONAL THERAPY:      Tub/shower:   Current level: standby assist  Short term tub/shower goal: standby assist.  Long term tub/shower goal: Contact guard assist-standby assist      Feeding:  Current level: independent  Short term feeding goal: independent. Long term feeding goal: independent    Grooming:   Current level: Modified independent  Short term grooming goal: Modified independent. Long term grooming goal: Modified independent    Bathing:  Current level: setup  Short term bathing goal: met  Long term bathing goal: met and exceeded    Homemaking:   Current level: standby assist  Short term homemaking goal: Modified independent. Long term homemaking goal: Modified independent    Upper body dressing:  Current level: independent, includes LSO brace  Short term upper body dressing goal: met  Long term upper body dressing goal: met    Lower body dressing:                                                                          Current level: Modified independent             Short term lower body dressing goal: Modified independent.           Long term lower body dressing goal: exceeded            Footwear  Current level: supervision with a sock aide  Short term goal: Modified independent. Long term goal:Modified independent      Toilet transfer:   Current level: with a wheeled walker to 3 in 1 is Modified independent  Short term toilet transfer goal: Modified independent. Long term toilet transfer goal: Modified independent      Patient/family's personal goals: return home  Factors supporting goal achievement:  made gains, supportive family  Factors hindering goal achievement:  hypotension      Discharge Plan   Estimated Length of Stay: 12/7            Destination: home  Services at Discharge: home health  for nursing, aid, PT, OT  Equipment at Discharge: wheeled walker. INTERDISCIPLINARY TEAM/PHYSICIAN RECOMMENDATION AND/OR REVISIONS OF PLAN OF CARE:  prepare for discharge on 12/07/22      I approve the established interdisciplinary plan of care as documented within the medical record of [facility]. Electronically signed by Kendrick Jin RN on 12/6/2022 at 8:38 AM  The following interdisciplinary team members were present:  Clemmie Meigs, RN Kathalene Pac, [facility].  [place], PT, DPT  BETO Pena

## 2022-12-07 ENCOUNTER — TELEPHONE (OUTPATIENT)
Dept: NEUROSURGERY | Age: 52
End: 2022-12-07

## 2022-12-07 VITALS
DIASTOLIC BLOOD PRESSURE: 60 MMHG | HEART RATE: 85 BPM | HEIGHT: 71 IN | WEIGHT: 211 LBS | BODY MASS INDEX: 29.54 KG/M2 | RESPIRATION RATE: 18 BRPM | TEMPERATURE: 97.6 F | SYSTOLIC BLOOD PRESSURE: 97 MMHG | OXYGEN SATURATION: 97 %

## 2022-12-07 DIAGNOSIS — S32.050D CLOSED COMPRESSION FRACTURE OF L5 LUMBAR VERTEBRA WITH ROUTINE HEALING, SUBSEQUENT ENCOUNTER: ICD-10-CM

## 2022-12-07 DIAGNOSIS — S32.030D CLOSED COMPRESSION FRACTURE OF L3 LUMBAR VERTEBRA WITH ROUTINE HEALING, SUBSEQUENT ENCOUNTER: ICD-10-CM

## 2022-12-07 DIAGNOSIS — S32.020D CLOSED COMPRESSION FRACTURE OF L2 LUMBAR VERTEBRA WITH ROUTINE HEALING, SUBSEQUENT ENCOUNTER: Primary | ICD-10-CM

## 2022-12-07 PROBLEM — G47.20 DISORDER OF SLEEP-WAKE CYCLE: Status: ACTIVE | Noted: 2022-12-07

## 2022-12-07 PROBLEM — R26.9 ABNORMALITY OF GAIT AND MOBILITY: Status: ACTIVE | Noted: 2022-12-07

## 2022-12-07 PROBLEM — Z78.9 SELF-CARE DEFICIT: Status: ACTIVE | Noted: 2022-12-07

## 2022-12-07 LAB
METER GLUCOSE: 100 MG/DL (ref 74–99)
METER GLUCOSE: 172 MG/DL (ref 74–99)

## 2022-12-07 PROCEDURE — 97110 THERAPEUTIC EXERCISES: CPT

## 2022-12-07 PROCEDURE — 97530 THERAPEUTIC ACTIVITIES: CPT

## 2022-12-07 PROCEDURE — 6360000002 HC RX W HCPCS: Performed by: STUDENT IN AN ORGANIZED HEALTH CARE EDUCATION/TRAINING PROGRAM

## 2022-12-07 PROCEDURE — 6370000000 HC RX 637 (ALT 250 FOR IP): Performed by: STUDENT IN AN ORGANIZED HEALTH CARE EDUCATION/TRAINING PROGRAM

## 2022-12-07 PROCEDURE — 82962 GLUCOSE BLOOD TEST: CPT

## 2022-12-07 PROCEDURE — 6370000000 HC RX 637 (ALT 250 FOR IP): Performed by: PHYSICAL MEDICINE & REHABILITATION

## 2022-12-07 PROCEDURE — 97535 SELF CARE MNGMENT TRAINING: CPT

## 2022-12-07 RX ORDER — AMMONIUM LACTATE 12 G/100G
LOTION TOPICAL
Qty: 225 G | Refills: 1 | Status: SHIPPED | OUTPATIENT
Start: 2022-12-07

## 2022-12-07 RX ORDER — FLUDROCORTISONE ACETATE 0.1 MG/1
0.1 TABLET ORAL DAILY
Qty: 30 TABLET | Refills: 1 | Status: SHIPPED | OUTPATIENT
Start: 2022-12-08 | End: 2023-01-07

## 2022-12-07 RX ORDER — BISACODYL 10 MG
10 SUPPOSITORY, RECTAL RECTAL DAILY PRN
Qty: 10 SUPPOSITORY | Refills: 0 | Status: SHIPPED | OUTPATIENT
Start: 2022-12-07 | End: 2023-01-06

## 2022-12-07 RX ORDER — LANOLIN ALCOHOL/MO/W.PET/CERES
3 CREAM (GRAM) TOPICAL NIGHTLY PRN
Refills: 3 | COMMUNITY
Start: 2022-12-07

## 2022-12-07 RX ORDER — SENNA PLUS 8.6 MG/1
1 TABLET ORAL NIGHTLY
Qty: 30 TABLET | Refills: 0 | COMMUNITY
Start: 2022-12-07 | End: 2023-01-06

## 2022-12-07 RX ORDER — MIDODRINE HYDROCHLORIDE 5 MG/1
15 TABLET ORAL
Qty: 90 TABLET | Refills: 3 | Status: SHIPPED | OUTPATIENT
Start: 2022-12-07

## 2022-12-07 RX ORDER — PANTOPRAZOLE SODIUM 40 MG/1
40 TABLET, DELAYED RELEASE ORAL
Qty: 30 TABLET | Refills: 3 | Status: SHIPPED | OUTPATIENT
Start: 2022-12-08

## 2022-12-07 RX ORDER — OXYCODONE HYDROCHLORIDE AND ACETAMINOPHEN 5; 325 MG/1; MG/1
1 TABLET ORAL EVERY 4 HOURS PRN
Qty: 42 TABLET | Refills: 0 | Status: SHIPPED | OUTPATIENT
Start: 2022-12-07 | End: 2022-12-14

## 2022-12-07 RX ORDER — SODIUM CHLORIDE 1000 MG
1 TABLET, SOLUBLE MISCELLANEOUS
Qty: 90 TABLET | Refills: 3 | Status: SHIPPED | OUTPATIENT
Start: 2022-12-07

## 2022-12-07 RX ORDER — LIDOCAINE 4 G/G
1 PATCH TOPICAL DAILY
Qty: 30 EACH | Refills: 5 | Status: SHIPPED | OUTPATIENT
Start: 2022-12-08

## 2022-12-07 RX ORDER — POLYETHYLENE GLYCOL 3350 17 G/17G
17 POWDER, FOR SOLUTION ORAL DAILY PRN
Qty: 527 G | Refills: 1 | COMMUNITY
Start: 2022-12-07 | End: 2023-01-06

## 2022-12-07 RX ORDER — CHOLECALCIFEROL (VITAMIN D3) 10 MCG
1 TABLET ORAL 3 TIMES DAILY
Qty: 30 CAPSULE | Refills: 3 | Status: SHIPPED | OUTPATIENT
Start: 2022-12-07

## 2022-12-07 RX ADMIN — OXYCODONE AND ACETAMINOPHEN 1 TABLET: 5; 325 TABLET ORAL at 13:20

## 2022-12-07 RX ADMIN — SODIUM CHLORIDE 1 G: 1 TABLET ORAL at 12:34

## 2022-12-07 RX ADMIN — MIDODRINE HYDROCHLORIDE 15 MG: 5 TABLET ORAL at 17:04

## 2022-12-07 RX ADMIN — HEPARIN SODIUM 5000 UNITS: 10000 INJECTION INTRAVENOUS; SUBCUTANEOUS at 05:54

## 2022-12-07 RX ADMIN — SODIUM CHLORIDE 1 G: 1 TABLET ORAL at 07:54

## 2022-12-07 RX ADMIN — MICONAZOLE NITRATE: 20.6 POWDER TOPICAL at 07:56

## 2022-12-07 RX ADMIN — FOLIC ACID 1 MG: 1 TABLET ORAL at 07:54

## 2022-12-07 RX ADMIN — SODIUM CHLORIDE 1 G: 1 TABLET ORAL at 17:04

## 2022-12-07 RX ADMIN — MIDODRINE HYDROCHLORIDE 15 MG: 5 TABLET ORAL at 07:53

## 2022-12-07 RX ADMIN — PANTOPRAZOLE SODIUM 40 MG: 40 TABLET, DELAYED RELEASE ORAL at 05:54

## 2022-12-07 RX ADMIN — HEPARIN SODIUM 5000 UNITS: 10000 INJECTION INTRAVENOUS; SUBCUTANEOUS at 13:20

## 2022-12-07 RX ADMIN — FLUDROCORTISONE ACETATE 0.1 MG: 0.1 TABLET ORAL at 07:54

## 2022-12-07 RX ADMIN — MIDODRINE HYDROCHLORIDE 15 MG: 5 TABLET ORAL at 12:34

## 2022-12-07 RX ADMIN — NEPHROCAP 1 MG: 1 CAP ORAL at 07:54

## 2022-12-07 RX ADMIN — OXYCODONE AND ACETAMINOPHEN 1 TABLET: 5; 325 TABLET ORAL at 09:21

## 2022-12-07 RX ADMIN — NEPHROCAP 1 MG: 1 CAP ORAL at 13:20

## 2022-12-07 RX ADMIN — OXYCODONE AND ACETAMINOPHEN 1 TABLET: 5; 325 TABLET ORAL at 04:32

## 2022-12-07 RX ADMIN — OXYCODONE AND ACETAMINOPHEN 1 TABLET: 5; 325 TABLET ORAL at 17:04

## 2022-12-07 ASSESSMENT — PAIN DESCRIPTION - ORIENTATION
ORIENTATION: MID

## 2022-12-07 ASSESSMENT — PAIN DESCRIPTION - DESCRIPTORS
DESCRIPTORS: ACHING;DISCOMFORT
DESCRIPTORS: ACHING
DESCRIPTORS: ACHING;DISCOMFORT
DESCRIPTORS: ACHING;DISCOMFORT

## 2022-12-07 ASSESSMENT — PAIN DESCRIPTION - LOCATION
LOCATION: BACK

## 2022-12-07 ASSESSMENT — PAIN SCALES - GENERAL
PAINLEVEL_OUTOF10: 6
PAINLEVEL_OUTOF10: 7
PAINLEVEL_OUTOF10: 7
PAINLEVEL_OUTOF10: 6
PAINLEVEL_OUTOF10: 8

## 2022-12-07 NOTE — PROGRESS NOTES
CLINICAL PHARMACY NOTE: MEDS TO BEDS    Total # of Prescriptions Filled: 10   The following medications were delivered to the patient:  Midodrine 5mg  Lidocaine 4%patch  Virt-caps 1mg  Sodium chloride 1gm  Antifungal 2% powder  Fludrocortisone 0.1mg  Ammonium lactate 12% lotion  Protonix 40mg  Petroleum jelly gel  Percocet 5-325mg    Additional Documentation:  Delivered to patient 12-7-22 @3:25pm

## 2022-12-07 NOTE — DISCHARGE SUMMARY
Rehabilitation Discharge Summary     Patient Identification  Roldan Chavez is a 45 y.o. female. :  1970  Admit Date:  2022  Discharge date and time: No discharge date for patient encounter.    Attending Provider: Baron Gwendolyn MD                                     Admission Diagnoses:   Compression fracture of L2 lumbar vertebra, closed, initial encounter [facility]) [S32.020A]    Discharge Diagnoses:   Patient Active Problem List   Diagnosis    Compression fracture of L2 lumbar vertebra, open, initial encounter ([facility] [address]    Bubo    Compression fracture of L2 lumbar vertebra ([facility] [address]    Compression fracture of L2 lumbar vertebra, closed, initial encounter ([facility] [address]    Malnutrition following gastrointestinal surgery    Syncope and collapse    Orthostatic hypotension    Excessive weight loss    Abnormality of gait and mobility    Self-care deficit    Disorder of sleep-wake cycle        Discharge Physician: []    Admission Diagnoses: []    Discharge Diagnoses: []    Admission Functional Status:  {functional status:83358}    Discharge Functional Status:  {functional status:22094}     Indication for Admission: []    Rehabilitation Course: []    Consults: {consultation:98466}    Significant Diagnostic Studies: {diagnostics:41174}    Treatments: {Tx:84654}    Discharge Exam:  Vitals:    22 0745   BP: (!) 97/55   Pulse: 88   Resp: 16   Temp: 97.6 °F (36.4 °C)   SpO2: 97%         Disposition: []    Condition: []    Patient Instructions:   []      Activity: {discharge activity:43387}  Diet: {diet:89667}  Wound Care: {wound care:24667}  DME Orders after Discharge: []    Follow-up:  []

## 2022-12-07 NOTE — PLAN OF CARE
Problem: Discharge Planning  Goal: Discharge to home or other facility with appropriate resources  12/7/2022 1737 by Jayne Aguirre RN  Outcome: Completed  Flowsheets (Taken 12/7/2022 0830)  Discharge to home or other facility with appropriate resources: Identify barriers to discharge with patient and caregiver  12/7/2022 0827 by Jayne Aguirre RN  Outcome: Progressing     Problem: ABCDS Injury Assessment  Goal: Absence of physical injury  12/7/2022 1737 by Jayne Aguirre RN  Outcome: Completed  12/7/2022 0827 by Jayne Aguirre RN  Outcome: Progressing  Flowsheets (Taken 12/7/2022 3862)  Absence of Physical Injury: Implement safety measures based on patient assessment     Problem: Safety - Adult  Goal: Free from fall injury  12/7/2022 1737 by Jayne Aguirre RN  Outcome: Completed  12/7/2022 0827 by Jayne Aguirre RN  Outcome: Progressing     Problem: Pain  Goal: Verbalizes/displays adequate comfort level or baseline comfort level  12/7/2022 1737 by Jayne Aguirre RN  Outcome: Completed  12/7/2022 0827 by Jayne Aguirre RN  Outcome: Progressing     Problem: Skin/Tissue Integrity  Goal: Absence of new skin breakdown  Description: 1. Monitor for areas of redness and/or skin breakdown  2. Assess vascular access sites hourly  3. Every 4-6 hours minimum:  Change oxygen saturation probe site  4. Every 4-6 hours:  If on nasal continuous positive airway pressure, respiratory therapy assess nares and determine need for appliance change or resting period.   12/7/2022 1737 by Jayne Aguirre RN  Outcome: Completed  12/7/2022 0827 by Jayne Aguirre RN  Outcome: Progressing     Problem: Chronic Conditions and Co-morbidities  Goal: Patient's chronic conditions and co-morbidity symptoms are monitored and maintained or improved  12/7/2022 1737 by Jayne Aguirre RN  Outcome: Completed  12/7/2022 0827 by Jayne Aguirre RN  Outcome: Progressing

## 2022-12-07 NOTE — PLAN OF CARE
Problem: Discharge Planning  Goal: Discharge to home or other facility with appropriate resources  12/7/2022 0827 by Reyes Steven RN  Outcome: Progressing  12/7/2022 0259 by Jackie Jones RN  Outcome: Progressing     Problem: ABCDS Injury Assessment  Goal: Absence of physical injury  12/7/2022 0827 by Reyes Steven RN  Outcome: Progressing  Flowsheets (Taken 12/7/2022 0826)  Absence of Physical Injury: Implement safety measures based on patient assessment  12/7/2022 0259 by Jackie Jones RN  Outcome: Progressing     Problem: Safety - Adult  Goal: Free from fall injury  12/7/2022 0827 by Reyes Steven RN  Outcome: Progressing  12/7/2022 0259 by Jackie Jones RN  Outcome: Progressing     Problem: Pain  Goal: Verbalizes/displays adequate comfort level or baseline comfort level  12/7/2022 0827 by Reyes Steven RN  Outcome: Progressing  12/7/2022 0259 by Jackie Jones RN  Outcome: Progressing     Problem: Skin/Tissue Integrity  Goal: Absence of new skin breakdown  Description: 1. Monitor for areas of redness and/or skin breakdown  2. Assess vascular access sites hourly  3. Every 4-6 hours minimum:  Change oxygen saturation probe site  4. Every 4-6 hours:  If on nasal continuous positive airway pressure, respiratory therapy assess nares and determine need for appliance change or resting period.   12/7/2022 0827 by Reyes Steven RN  Outcome: Progressing  12/7/2022 0259 by Jackie Jones RN  Outcome: Progressing     Problem: Chronic Conditions and Co-morbidities  Goal: Patient's chronic conditions and co-morbidity symptoms are monitored and maintained or improved  12/7/2022 0827 by Reyes Steven RN  Outcome: Progressing  12/7/2022 0259 by Jackie Jones RN  Outcome: Progressing

## 2022-12-07 NOTE — PROGRESS NOTES
Physical Therapy  Treatment Note   Evaluating Therapist: Delroy Smith, PT, DPT [id number]       ROOM: [address] [place] [address]  DIAGNOSIS: Closed compression fracture of L2 lumbar vertebra   PRECAUTIONS: Falls, Spinal precautions, LSO for comfort (L2, L3, and L5 compression fx), Orthostatic hypotension (compression stockings), Droplet Plus isolation, COVID-19  HPI:  Pt had a recent gastric bypass and suffered a fall on 11/5. She has had multiple recent falls. Compression fractures noted at L2-L3 and L5. She was not a candidate for neurosurgical intervention. Pt has tested positive for orthostatic hypotension and COVID. Social:  Pt lives with her dtr in a 2 floor plan with 1 steps and 0 rails. Bedroom and bathroom are on the 2nd level with 7+3 steps with B rails. Prior to admission: Independent with no assistive device. Works as a . Drives. Initial Evaluation  Date: 11/24/22 AM     PM    Short Term Goals Long Term Goals    Was pt agreeable to Eval/treatment? Yes  yes DC home      Does pt have pain? 9/10 mid back Pain in L knee       Bed Mobility  Rolling: Min A  Supine to sit: Mod A  Sit to supine: Mod A  Scooting: Min A Mod I   SBA Modified Independent     Transfers Sit to stand: Mod A  Stand to sit: Mod A  Stand pivot: Min A with FWW Sit to stand: Ind   Stand to sit: Ind   Stand pivot:  Ind  SBA with FWW   Modified Independent with LRAD   Ambulation    3 feet x2 reps with FWW with Min A  2x200' no AD ind  >100 feet with FWW with SBA >300 feet with LRAD with Modified Independent     Walking 10 feet on uneven surface NT d/t hypotension and droplet plus isolation  10' no AD ind  10 feet with FWW with Min A 10 feet with LRAD with Modified Independent     Wheel Chair Mobility NA NT      Car Transfers NT d/t hypotension and droplet plus isolation  NT  Min A Modified Independent     Stair negotiation: ascended and descended  NT d/t hypotension and droplet plus isolation  NT  4 steps with B rail with Min A 12 steps with 1 rail with Modified Independent     Curb Step:   ascended and descended NT d/t hypotension and droplet plus isolation  4\" curb steps no AD ind   6 inch step with FWW and Min A 6 inch step with LRAD and Modified Independent     Picking up object off the floor NT d/t hypotension  Picked up cone with reacher mod I   Will  cone with Min A assist Will  cone with no assist   BLE ROM WFL       BLE Strength RLE:  Grossly 3-/5  LLE:  Grossly 4/5       Balance  Unsupported sitting:  SBA    Dynamic standing:  Min A with FWW    BBS: NT  FGA: NT      BBS: >52  FGA: >22   Date Family Teach Completed   12/6/22 - daughter observed      Is additional Family Teaching Needed? Y or N        Hindering Progress Back pain, RUE and RLE weakness, orthostatic hypotension  Back pain, RUE and RLE weakness, orthostatic hypotension       PT recommended ELOS        Team's Discharge Plan        Therapist at Team Meeting          Therapeutic exercise/activity: NT    ASSESSMENT  Pt displays functional ability as noted in the objective portion of this evaluation. AM vitals: BP - 111/74  Comments:  Pt in chair on arrival and agreeable to PT tx. Pt BP stable throughout recent sessions and no sx of OH noted. Pt is independent with all mobility without a device at this time but is to DC with [employer] d/t hx of OH and lumbar fx. Pt is ready to DC at this time from PT standpoint. PM: [address]    Patient education  Pt educated on LSO, sit<>stand and pivot training     Patient response to education:   Pt verbalized understanding Pt demonstrated skill Pt requires further education in this area   Yes  Yes  Yes      Rehab potential is Good for reaching above PT goals. Pts/ family goals   1. Home     Patient and or family understand(s) diagnosis, prognosis, and plan of care. Good    PLAN  PT care will be provided in accordance with the objectives noted above.   Whenever appropriate, clear delegation orders will be provided for nursing staff. Exercises and functional mobility practice will be used as well as appropriate assistive devices or modalities to obtain goals. Patient and family education will also be administered as needed. Frequency of treatments will be 2x/day M-F and 1x/day Sat or Sun x 14 days.     AM  Time in: 1000  Time out: Tyrone Skinner PT, DPT  [id number]

## 2022-12-07 NOTE — PROGRESS NOTES
Physical Therapy  Discharge Summary  Evaluating Therapist: Ananya Colorado PT, DPT [id number]       ROOM: [address] [place] [address]  DIAGNOSIS: Closed compression fracture of L2 lumbar vertebra   PRECAUTIONS: Falls, Spinal precautions, LSO for comfort (L2, L3, and L5 compression fx), Orthostatic hypotension (compression stockings), Droplet Plus isolation, COVID-19  HPI:  Pt had a recent gastric bypass and suffered a fall on 11/5. She has had multiple recent falls. Compression fractures noted at L2-L3 and L5. She was not a candidate for neurosurgical intervention. Pt has tested positive for orthostatic hypotension and COVID. Social:  Pt lives with her dtr in a 2 floor plan with 1 steps and 0 rails. Bedroom and bathroom are on the 2nd level with 7+3 steps with B rails. Prior to admission: Independent with no assistive device. Works as a . Drives. Initial Evaluation  Date: 11/24/22 AM     Short Term Goals Long Term Goals    Was pt agreeable to Eval/treatment? Yes  yes     Does pt have pain? 9/10 mid back Pain in L knee      Bed Mobility  Rolling: Min A  Supine to sit: Mod A  Sit to supine: Mod A  Scooting: Min A Mod I  SBA Modified Independent     Transfers Sit to stand: Mod A  Stand to sit: Mod A  Stand pivot: Min A with FWW Sit to stand: Ind   Stand to sit: Ind   Stand pivot:  Ind SBA with FWW   Modified Independent with LRAD   Ambulation    3 feet x2 reps with FWW with Min A  2x200' no AD ind >100 feet with FWW with SBA >300 feet with LRAD with Modified Independent     Walking 10 feet on uneven surface NT d/t hypotension and droplet plus isolation  10' no AD CGA 10 feet with FWW with Min A 10 feet with LRAD with Modified Independent     Wheel Chair Mobility NA NT     Car Transfers NT d/t hypotension and droplet plus isolation  Mod I  Min A Modified Independent     Stair negotiation: ascended and descended  NT d/t hypotension and droplet plus isolation  12 steps one HR mod I 4 steps with B rail with Min A 12 steps with 1 rail with Modified Independent     Curb Step:   ascended and descended NT d/t hypotension and droplet plus isolation  4\" curb steps no AD ind  6 inch step with FWW and Min A 6 inch step with LRAD and Modified Independent     Picking up object off the floor NT d/t hypotension  Picked up cone with reacher mod I  Will  cone with Min A assist Will  cone with no assist   BLE ROM WFL      BLE Strength RLE:  Grossly 3-/5  LLE:  Grossly 4/5      Balance  Unsupported sitting:  SBA    Dynamic standing:  Min A with FWW    BBS: NT  FGA: NT     BBS: >52  FGA: >22   Date Family Teach Completed       Is additional Family Teaching Needed? Y or N       Hindering Progress Back pain, RUE and RLE weakness, orthostatic hypotension  Back pain, RUE and RLE weakness, orthostatic hypotension      PT recommended ELOS       Team's Discharge Plan       Therapist at Team Meeting         Comments:  Pt in chair on arrival and agreeable to PT tx. Pt BP stable throughout recent sessions and no sx of OH noted. Pt is independent with all mobility without a device at this time but is to DC with [employer] d/t hx of OH and lumbar fx. Pt daughter attended FT on 12/6 and observed all activities. Pt is ready to DC at this time from PT standpoint. Pt will DC home with NARCISO CHAPMANPT.      Kendrick Coleman, PT, DPT  [id number]

## 2022-12-07 NOTE — PROGRESS NOTES
Occupational Therapy  OCCUPATIONAL THERAPY DAILY NOTE/DISCHARGE SUMMARY     Date:2022  Patient Name: Marquis Canales  MRN: [id number]  : 1970  Room: [address] [place], [place] [address]     Referring Physician: Daren De La Rosa MD  Diagnosis:  S/P Fall with Compression fractures of L2  L3, L5 lumbar vertebra  Surgery/Procedure: None this admission    Past Medical History:  has a past medical history of Anemia, Diabetes mellitus ([address] Gastric Sleeve    Precautions: LSO for comfort (L2, L3, L5 compression fx's), spinal precautions, monitor BP (orthostatic hypotension),     Functional Assessment:   Date Status AE  Comments   Feeding 22  Independent       Grooming 22  Mod I   Standing           Oral Care 22  Mod I   Pt completed oral care standing at sink    Bathing 22  Set up  [address] completed in shower seated on shower seat    UB Dressing 22  Mod I   Pt donned shirt and LSO brace    LB Dressing 22  Mod I  Reacher  Pt donned depends and pants with reacher. Pt at Mod I to stand and pull up pants    Footwear 22  SUP  Sock aid  Assist to don joey hose. Pt at Mod I to don slipper socks    Toileting 22  Mod I      Homemaking 22  Mod I        Functional Transfers / Balance:   Date Status DME  Comments   Sit Balance 22  Mod I      Stand Balance 22  SBA Ww  No device     [x] Tub    [x] Shower   Transfer 22  SBA     SBA   Indwelling seat     Grab rail, shower seat        Transfer in and out of walk in shower    Commode   Transfer 22   Mod I  Ww, 3 in 1     Functional   Mobility 22  Mod I  without AD  Back and forth to the bathroom    Other:SPT w/c to bed     Sit to stand transfer w/c <> ww       Bed mobility : supine to sit edge of bed     On/off firm recliner,couch and kitchen chair with arm rests    22            Mod I       Mod I         Mod I       SBA                  Ww and without AD No device       Functional Exercises / Activity:  BUE strength exercises: christiano box with 8 # wt 3 sets 10 reps in all planes on table top surface                                           2 # weighted ball 3 sets 10 reps in all planes         Sensory / Neuromuscular Re-Education:      Cognitive Skills:   Status Comments   Problem   Solving fair + Demo'd during hmkg, sit to stand transfers, mobility. Memory fair + \"   Sequencing fair + \"   Safety fair + \"     Visual Perception:    Education:  Reviewed home safety in preparation of pt being discharged home on this date     [x] Family teach completed on:12/6/22 Pt's Daughter present for family teach session. She was educated on spinal precautions, AE for LB dressing, donning and doffing pt's joey hose stockings using an EZ slide and recommended DME for tub/shower combo. She observed pt completing functional transfers and mobility without an assistive device. Pt's Daughter verbalized good understanding. Pain Level: 5/10  L knee. Additional Notes:   11/25/22 Pt's BP am seated after activity 97/67                  Pt's BP following commode and w/c transfers 101/69     11/26/22 Pt's BP supine 82/53 and following activity increased 92/66. Pt requested a fan in her room due to being so hot at times and nurse informed. Patient has made good  progress during treatment sessions toward set goals. Therapy emphasis to obtain goals:Plan of Care: 5-7 tx/wk for 3-4 weeks.   [x]ADL retraining: adapted techniques and AE recommendations to increase independence within precautions                  [x]Energy conservation techniques to improve tolerance for self care routine   [x]Functional transfer/mobility training for fall prevention & DME recommendations         [x]Patient/family education to increase safety and functional independence            [x]Environmental modifications for safe mobility and completion of ADLs                            [x]Cognitive retraining ex's to improve problem solving skills & safe participation in ADLs/IADLs     [x]Therapeutic activity to improve functional skills                                         [x]Therapeutic exercise to improve functional engagement of daily activities. [x]Balance retraining ex's for postural control with dynamic challenges  [x]Neuromuscular re-education exercises                                              [x] Continue with current OT Plan of care. [] Prepare for Discharge    Long Term Goals: 3-4 weeks  Time Frame for Long term goals: 3-4 weeks   Long term goal 1: Pt will be IND for self feeding  Long term goal 2: Pt will be Mod I  for grooming task  Long term goal 3: Pt will be mod I for UB dressing including LSO. Long term goal 4: Pt will be Mod I  for LB dressing and Mod I  for footwear using AE PRN. Long term goal 5: Pt will be CGA- SBA for bathing task using AE PRN. Long term goal 6: Pt will be Mod I   for toileting task  Long term goal 7: Pt will be Mod I  for commode transfer using AD as needed  Long term goal 8: Pt will be SBA-CGA  for tub/shower  transfer  Long term goal 9: Pt will be Mod I for light homemaking task  Long term goal 11: Pt will increase BUE strength by 1 muscle grade to improve functional engagement of ADLs. Long term goal 12: Pt will demonstrate Good safety, insight, reasoning, judgement, & problem-solving strategies throughout functional tasks to implement safety awareness & fall prevention strategies. 11/29/22- Pt POC & goals are updated on this date. Pt lives with her daughter in a 2-story condo- [address] no HR- bed/bath 2nd floor- tub combo. Pt daughter able to provide Sup & assist. PLOF independent. Pt tentative length of stay 10-14 days Gus Mcadams OTR/L [id number]   DISCHARGE RECOMMENDATIONS  OCCUPATIONAL THERAPY DISCHARGE SUMMARY    Discontinue Occupational Therapy intervention. Pt has:   [] met all set goals. [x] made good progress toward set goals.    [] has made slow progress toward goals and would benefit from rehab setting change.  [] had a medical decline and therefore was transferred off the Rehab unit.          The Patient has received education on:  [x]Transfer Safety [x]Compensatory tech for ADLs [x]AE training [x]DME training [x]Energy Conservation [x]Home / Kitchen Safety  [x]Fall Prevention  []Other:    Family training was completed: yes    Recommendations: Home Health OT       Recommended DME:  Rw, indwelling tub seat with a back   Post Discharge Care:   []Home Independently  []Home with 24hr Care / Supervision [x]Home with Partial Supervision []Home with Home Health OT []Home with Out Pt OT []Other: ___   Comments:         Time in Time out Tx Time Breakdown  Variance:   First Session  0815  0900  [x] Individual Tx- 45  [] Concurrent Tx -  [] Co-Tx -   [] Group Tx -   [] Time Missed -      Second Session 9030  6560  [x] Individual Tx-45  [] Concurrent Tx -  [] Co-Tx -   [] Group Tx -   [] Time Missed -     Third Session    [] Individual Tx-   [] Concurrent Tx -  [] Co-Tx -   [] Group Tx -   [] Time Missed -         Total Tx Time: 90 mins    Belle Baugh OTR/L [id number]

## 2022-12-07 NOTE — DISCHARGE INSTRUCTIONS
Please follow up with your physician, with neurosurgery and with cardiology and with rehab medicine (Dr. Teri Hartman). Call with any questions. Maintain precautions as instructed. You will need to get refills from your physicians. Use the lowest dose of Percocet necessary to control your pain.     Eugenio Venegas MD ([phone])

## 2022-12-08 ENCOUNTER — TELEPHONE (OUTPATIENT)
Dept: ADMINISTRATIVE | Age: 52
End: 2022-12-08

## 2022-12-08 NOTE — PROGRESS NOTES
RN provided patient and family with all discharge instructions regarding medications and follow up appointments. RN verbalized the importance of following up with cardiology, neurology, and primary care physician. Patient verbalized understanding and was discharged to home with all medications and belongings.

## 2022-12-09 ENCOUNTER — TELEPHONE (OUTPATIENT)
Dept: BARIATRICS/WEIGHT MGMT | Age: 52
End: 2022-12-09

## 2022-12-09 NOTE — TELEPHONE ENCOUNTER
Echo was okay. I was not able to treat the orthostatic hypotension and I referred to electrophysiology for further recommendations and management for this. Can follow-up with general cardiology PRN.

## 2022-12-09 NOTE — TELEPHONE ENCOUNTER
Jolie Alan called and stated she was recently seen at [address] and Dr Arvin Barillas had consulted Dr Poornima Jefferson regarding her recent [address] [facility]. I had actually taken the call at the time from Dr Arvin Barillas and Parth Hernandez had given Dr Poornima Jefferson the message. She [facility]. Dr Poornima Jefferson had told her that he would follow her here [facility]. An appt was made with Dr Poornima Jefferson for 23.

## 2022-12-09 NOTE — TELEPHONE ENCOUNTER
Patient notified of echo results and Dr. Juan White recommendations. She is awaiting a call from Dr. Geraldo Lou office.

## 2022-12-13 ENCOUNTER — OFFICE VISIT (OUTPATIENT)
Dept: FAMILY MEDICINE CLINIC | Age: 52
End: 2022-12-13
Payer: COMMERCIAL

## 2022-12-13 ENCOUNTER — TELEPHONE (OUTPATIENT)
Dept: CARDIOLOGY CLINIC | Age: 52
End: 2022-12-13

## 2022-12-13 VITALS
RESPIRATION RATE: 18 BRPM | TEMPERATURE: 98.3 F | SYSTOLIC BLOOD PRESSURE: 101 MMHG | WEIGHT: 215 LBS | HEIGHT: 71 IN | DIASTOLIC BLOOD PRESSURE: 65 MMHG | BODY MASS INDEX: 30.1 KG/M2 | OXYGEN SATURATION: 98 % | HEART RATE: 76 BPM

## 2022-12-13 DIAGNOSIS — I95.1 ORTHOSTATIC HYPOTENSION: ICD-10-CM

## 2022-12-13 DIAGNOSIS — S32.000D COMPRESSION FRACTURE OF LUMBAR VERTEBRA WITH ROUTINE HEALING, UNSPECIFIED LUMBAR VERTEBRAL LEVEL, SUBSEQUENT ENCOUNTER: ICD-10-CM

## 2022-12-13 DIAGNOSIS — E27.40 ADRENAL INSUFFICIENCY (HCC): ICD-10-CM

## 2022-12-13 DIAGNOSIS — E87.1 HYPONATREMIA: Primary | ICD-10-CM

## 2022-12-13 PROCEDURE — 3017F COLORECTAL CA SCREEN DOC REV: CPT

## 2022-12-13 PROCEDURE — G8484 FLU IMMUNIZE NO ADMIN: HCPCS

## 2022-12-13 PROCEDURE — 99213 OFFICE O/P EST LOW 20 MIN: CPT

## 2022-12-13 PROCEDURE — 1036F TOBACCO NON-USER: CPT

## 2022-12-13 PROCEDURE — G8427 DOCREV CUR MEDS BY ELIG CLIN: HCPCS

## 2022-12-13 PROCEDURE — G8417 CALC BMI ABV UP PARAM F/U: HCPCS

## 2022-12-13 PROCEDURE — 1111F DSCHRG MED/CURRENT MED MERGE: CPT

## 2022-12-13 ASSESSMENT — ENCOUNTER SYMPTOMS
SHORTNESS OF BREATH: 0
CONSTIPATION: 1
COUGH: 0
CHEST TIGHTNESS: 0

## 2022-12-13 NOTE — PROGRESS NOTES
S: 45 y.o. female with   Chief Complaint   Patient presents with    [facility]     11/5-12/7 Compression Fracture of L2 lumbar vertebra       Fall 11/5, L2/3/5 compression fractures  COVID-19 while in hospital, 2 weeks  Rehab, discharged 12/7/22  Continues with lumbar pain, achy/sore, 8/10 at worst, percocet 5/325 every 4 hours  Heating pad helps some  No radiation of pain  No numbness/tingling  Regular BM, no urinary symptoms  Pain worsens with standing straight  Wearing back brace  F/u neurosurgery tomorrow  Starting home PT    O: VS:  height is 5' 11\" (1.803 m) and weight is 215 lb (97.5 kg). Her temporal temperature is 98.3 °F (36.8 °C). Her blood pressure is 101/65 and her pulse is 76. Her respiration is 18 and oxygen saturation is 98%. BP Readings from Last 3 Encounters:   12/13/22 101/65   12/07/22 97/60   11/23/22 90/62     See resident note      Impression/Plan:   1) Lumbar compression fracture: F/u with neurosurgery as scheduled, pain meds as per neurosurgery, PT/OT HHC ordered, bowel regimen    F/u 1 month with PCP for chronic issues      Health Maintenance Due   Topic Date Due    Diabetic foot exam  Never done    Diabetic microalbuminuria test  Never done    Diabetic retinal exam  Never done    Colorectal Cancer Screen  Never done    Breast cancer screen  Never done    Shingles vaccine (2 of 3) 05/08/2020    Cervical cancer screen  05/04/2021    COVID-19 Vaccine (2 - Booster for Robby series) 05/05/2021         Attending Physician Statement  I have discussed the case, including pertinent history and exam findings with the resident. I agree with the documented assessment and plan.       Vidya Mclain MD

## 2022-12-13 NOTE — PROGRESS NOTES
[facility]  Department of Family Medicine  Family Medicine Residency Program      Patient: Israel Thompson  1970 45 y.o. female     Date of Service: 12/13/2022      Chief complaint:   Chief Complaint   [facility]     11/5-12/7 Compression Fracture of L2 lumbar vertebra       HISTORY OF PRESENTING ILLNESS     Israel Thompson is a 45 y.o. female significant past medical history of hypertension, diabetes and gastric bypass surgery in August presented to the clinic for a  follow up for hospital stay 2/2 L2 Compression Fracture   Patient had a fall 11/05/2022 was admitted to [facility] and was found to have compression fractures at L2-L3 and L5, orthostatic hypotension and COVID. Patient worked with PT and OT throughout admission and continued for 2 additional weeks after at rehab. During hospitalization, she was followed by many specialist:     Patient was seen by neurosurgery as well and he reviewed the CT lumbar spine which showed the compression deformity at L2, L3, L5 with spinal alignment intact. No neurosurgical intervention was required.-Lumbar sacral orthosis was ordered for patient's comfort. And pain control. For patient's orthostatic hypotension with frequent falls patient high-dose midodrine and Florinef, continue using support stockings. Patient was seen by Dr. Artem Diego bariatric surgery who found patient may have some vitamin and protein malnutrition-recommend high-protein dense nutrition goal of 100 g a day, B complex vitamins, folic acid, B1, 6 and 12 leading to decreased intravascular oncotic pressure. Patient was seen by neurology for her syncope, dizziness and neck pain and was found to have orthostatic syncope, due to diabetic autonomic neuropathy. Occipital neuralgia more prominent on the right than left with significant bilateral sternocleidomastoid muscle tension.    Patient to consider occipital nerve blocks in the future, baclofen 10 mg PO PRN. MRI was ordered and showed no evidence of recent infarct, acute intracranial hemorrhage, mass-effect or hydrocephalus. Since discharge, patient is feeling much better. Pain continued but improving. Able to ambulate, sits in wheelchair when pain becomes too strong. Went to a rehab facility for 2 weeks to gain strength. Has home health coming once a week, has PT coming tomorrow. Neurosurgery and imaging tomorrow, will follow up with them for pain control as well. Set up with cardiology Dr Melva Capellan and Rolene Franc - Dr. Yaneth Farrell. Pain:  Location Lower, middle back  Intensity 8/10  Quality aching, sore  Onset , from fall  Progression similar, slightly improved  Radiation Some what to legs with movement, no shooting pains   Alleviating Tylenol, Oxycodone 5. Heating pad helped a bit. Aggrevating standing straight   Associated symptoms NA    Reviewed the medication list - patient is taking all her medication since discharge and is tolerating them well. Doesn't need the enema.            Past Medical History:      Diagnosis Date    Anemia     Diabetes mellitus ([facility] [address]     Papanicolaou smear of cervix with atypical squamous cells of undetermined significance ([facility]-US)          Screening for human papillomavirus (HPV)     4/10/07      Past Surgical History:        Procedure Laterality Date    COLPOSCOPY       : outside [address] [place] [place] [address]      2022    IN  DELIVERY+POSTPARTUM CARE      1991 :      IN  DELIVERY+POSTPARTUM CARE      1998 :      IN CONIZATION CERVIX,KNIFE/LASER      2001 : outside [facility]      Allergies:    Colchicine and Darvon [propoxyphene]  Family History:       Problem Relation Age of Onset    Diabetes Mother     Hypertension Mother     Diabetes Father     Hypertension Father     Gout Father     Heart Disease None     Ovarian Cancer None     Uterine Cancer None     Breast Cancer None Review of Systems:   Review of Systems   Constitutional:  Negative for appetite change, chills and fever. Respiratory:  Negative for cough, chest tightness and shortness of breath. Cardiovascular:  Negative for chest pain, palpitations and leg swelling. Gastrointestinal:  Positive for constipation (improving with senna). Musculoskeletal:  Positive for back pain and gait problem. Neurological:  Negative for dizziness, light-headedness, numbness and headaches. PHYSICAL EXAM   Vitals: /65   Pulse 76   Temp 98.3 °F (36.8 °C) (Temporal)   Resp 18   Ht 5' 11\" (1.803 m)   Wt 215 lb (97.5 kg)   SpO2 98%   BMI 29.99 kg/m²   Physical Exam  Vitals reviewed. Constitutional:       Appearance: Normal appearance. HENT:      Head: Normocephalic and atraumatic. Nose: Nose normal. No congestion or rhinorrhea. Mouth/Throat:      Mouth: Mucous membranes are moist.      Pharynx: Oropharynx is clear. Eyes:      General: No scleral icterus. Extraocular Movements: Extraocular movements intact. Conjunctiva/sclera: Conjunctivae normal.      Pupils: Pupils are equal, round, and reactive to light. Neck:      Vascular: No carotid bruit. Cardiovascular:      Rate and Rhythm: Normal rate and regular rhythm. Heart sounds: Normal heart sounds. No murmur heard. No gallop. Pulmonary:      Effort: Pulmonary effort is normal.      Breath sounds: Normal breath sounds. No wheezing or rales. Abdominal:      General: Bowel sounds are normal. There is no distension. Palpations: Abdomen is soft. Tenderness: There is no abdominal tenderness. There is no guarding. Musculoskeletal:      Cervical back: Normal range of motion and neck supple. No tenderness (much improved from before). Right lower leg: No edema. Left lower leg: No edema. Comments: Minimal movement on spine, patient is wearing back brace   Skin:     Coloration: Skin is not jaundiced.       Findings: No rash. Neurological:      General: No focal deficit present. Mental Status: She is alert and oriented to person, place, and time. Cranial Nerves: No cranial nerve deficit. Motor: Weakness (stronger than before) present. Lab Results   Component Value Date    LABA1C 6.8 (H) 2022     No results found for: EAG     BP Readings from Last 3 Encounters:   22 101/65   22 97/60   22 90/62        ASSESSMENT AND PLAN   Mandy Conklin was seen today for the followin. Hyponatremia  Continue to monitor for next visit patient has been taking sodium chloride 1 g tablets 3 times a day with meals  - Basic Metabolic Panel; Future    2. Adrenal insufficiency (HCC)  Continue to take Florinef 0.1 mg orally    3. Orthostatic hypotension  Continue to drink enough water, midodrine 5 mg 3 times daily with meals    4. Compression fracture of lumbar vertebra with routine healing, unspecified lumbar vertebral level, subsequent encounter  Following up with neurosurgery in 2 days plan and pain management  Feels much better after rehab      During patient's hospitalization she was started on many medications including the following. Senokot and Dulcolax for constipation  Fludrocortisone 0.1 mg for adrenal insufficiency  Midodrine 5 mg 3 times daily with meals for low blood pressures      Counseled regarding above diagnosis, including possible risks and complications, especially if left uncontrolled. Counseled regarding the possible side effects, risks, benefits and alternatives to treatment; patient and/or guardian verbalizes understanding, agrees, feels comfortable with and wishes to proceed with above treatment plan. Call or go to ED immediately if symptoms worsen or persist. Advised patient to call with any new medication issues, and, as applicable, read all Rx info from pharmacy to assure aware of all possible risks and side effects of medication before taking.      Patient and/or guardian given opportunity to ask questions/raise concerns. The patient verbalized comfort and understanding of instructions. I encourage further reading and education about your health conditions. Information on many health conditions is provided by the American Academy of Family Physicians: https://familydoctor. org/  Please bring any questions to me at your next visit. Medication List:    Current Outpatient Medications   Medication Sig Dispense Refill    ammonium lactate (LAC-HYDRIN) 12 % lotion Apply topically as needed. 225 g 1    b complex-C-folic acid (NEPHROCAPS) 1 MG capsule Take 1 capsule by mouth 3 times daily 30 capsule 3    fludrocortisone (FLORINEF) 0.1 MG tablet Take 1 tablet by mouth daily 30 tablet 1    lidocaine 4 % external patch Place 1 patch onto the skin daily 30 each 5    melatonin 3 MG TABS tablet Take 1 tablet by mouth nightly as needed (insomnia)  3    miconazole (MICOTIN) 2 % powder Apply topically 2 times daily. 45 g 1    midodrine (PROAMATINE) 5 MG tablet Take 3 tablets by mouth 3 times daily (with meals) 90 tablet 3    oxyCODONE-acetaminophen (PERCOCET) 5-325 MG per tablet Take 1 tablet by mouth every 4 hours as needed for Pain for up to 7 days.  42 tablet 0    pantoprazole (PROTONIX) 40 MG tablet Take 1 tablet by mouth every morning (before breakfast) 30 tablet 3    polyethylene glycol (GLYCOLAX) 17 g packet Take 17 g by mouth daily as needed for Constipation 527 g 1    senna (SENOKOT) 8.6 MG tablet Take 1 tablet by mouth nightly 30 tablet 0    sodium chloride 1 g tablet Take 1 tablet by mouth 3 times daily (with meals) 90 tablet 3    white petrolatum OINT ointment Apply topically 2 times daily as needed (skin irritation) 500 g 0    Multiple Vitamin (MULTIVITAMINS PO) Take 3 tablets by mouth daily      CALCIUM PO Take 2 tablets by mouth daily      folic acid (FOLVITE) 1 MG tablet Take 1 mg by mouth daily      bisacodyl (DULCOLAX) 10 MG suppository Place 1 suppository rectally daily as needed for Constipation (Patient not taking: Reported on 12/13/2022) 10 suppository 0     No current facility-administered medications for this visit. Return to Office: 1 month for diabetes. This document may have been prepared at least partially through the use of voice recognition software. Although effort is taken to assure the accuracy of this document, it is possible that grammatical, syntax,  or spelling errors may occur.     Socorro Narvaez MD   Family Medicine Resident Physician PGY-2

## 2022-12-14 ENCOUNTER — HOSPITAL ENCOUNTER (OUTPATIENT)
Age: 52
Discharge: HOME | End: 2022-12-14
Payer: COMMERCIAL

## 2022-12-14 ENCOUNTER — HOSPITAL ENCOUNTER (OUTPATIENT)
Dept: GENERAL RADIOLOGY | Age: 52
Discharge: HOME | End: 2022-12-16
Payer: COMMERCIAL

## 2022-12-14 ENCOUNTER — HOSPITAL ENCOUNTER (OUTPATIENT)
Age: 52
Discharge: HOME | End: 2022-12-16
Payer: COMMERCIAL

## 2022-12-14 ENCOUNTER — OFFICE VISIT (OUTPATIENT)
Dept: NEUROSURGERY | Age: 52
End: 2022-12-14
Payer: COMMERCIAL

## 2022-12-14 VITALS
RESPIRATION RATE: 16 BRPM | OXYGEN SATURATION: 98 % | SYSTOLIC BLOOD PRESSURE: 113 MMHG | WEIGHT: 215 LBS | HEIGHT: 71 IN | HEART RATE: 69 BPM | DIASTOLIC BLOOD PRESSURE: 69 MMHG | TEMPERATURE: 97 F | BODY MASS INDEX: 30.1 KG/M2

## 2022-12-14 DIAGNOSIS — S32.030D CLOSED COMPRESSION FRACTURE OF L3 LUMBAR VERTEBRA WITH ROUTINE HEALING, SUBSEQUENT ENCOUNTER: Primary | ICD-10-CM

## 2022-12-14 DIAGNOSIS — S32.050D CLOSED COMPRESSION FRACTURE OF L5 LUMBAR VERTEBRA WITH ROUTINE HEALING, SUBSEQUENT ENCOUNTER: ICD-10-CM

## 2022-12-14 DIAGNOSIS — S32.020D CLOSED COMPRESSION FRACTURE OF L2 LUMBAR VERTEBRA WITH ROUTINE HEALING, SUBSEQUENT ENCOUNTER: ICD-10-CM

## 2022-12-14 DIAGNOSIS — R26.9 ABNORMALITY OF GAIT AND MOBILITY: ICD-10-CM

## 2022-12-14 DIAGNOSIS — E87.1 HYPONATREMIA: ICD-10-CM

## 2022-12-14 DIAGNOSIS — S32.020D COMPRESSION FRACTURE OF L2 VERTEBRA WITH ROUTINE HEALING, SUBSEQUENT ENCOUNTER: ICD-10-CM

## 2022-12-14 LAB
ANION GAP SERPL CALCULATED.3IONS-SCNC: 13 MMOL/L (ref 7–16)
BUN BLDV-MCNC: 6 MG/DL (ref 6–20)
CALCIUM SERPL-MCNC: 10 MG/DL (ref 8.6–10.2)
CHLORIDE BLD-SCNC: 102 MMOL/L (ref 98–107)
CO2: 24 MMOL/L (ref 22–29)
CREAT SERPL-MCNC: 0.5 MG/DL (ref 0.5–1)
GFR SERPL CREATININE-BSD FRML MDRD: >60 ML/MIN/1.73
GLUCOSE BLD-MCNC: 117 MG/DL (ref 74–99)
POTASSIUM SERPL-SCNC: 3.6 MMOL/L (ref 3.5–5)
SODIUM BLD-SCNC: 139 MMOL/L (ref 132–146)

## 2022-12-14 PROCEDURE — 3017F COLORECTAL CA SCREEN DOC REV: CPT | Performed by: STUDENT IN AN ORGANIZED HEALTH CARE EDUCATION/TRAINING PROGRAM

## 2022-12-14 PROCEDURE — G8484 FLU IMMUNIZE NO ADMIN: HCPCS | Performed by: STUDENT IN AN ORGANIZED HEALTH CARE EDUCATION/TRAINING PROGRAM

## 2022-12-14 PROCEDURE — 99212 OFFICE O/P EST SF 10 MIN: CPT

## 2022-12-14 PROCEDURE — 80048 BASIC METABOLIC PNL TOTAL CA: CPT

## 2022-12-14 PROCEDURE — 1111F DSCHRG MED/CURRENT MED MERGE: CPT | Performed by: STUDENT IN AN ORGANIZED HEALTH CARE EDUCATION/TRAINING PROGRAM

## 2022-12-14 PROCEDURE — G8417 CALC BMI ABV UP PARAM F/U: HCPCS | Performed by: STUDENT IN AN ORGANIZED HEALTH CARE EDUCATION/TRAINING PROGRAM

## 2022-12-14 PROCEDURE — 99213 OFFICE O/P EST LOW 20 MIN: CPT | Performed by: STUDENT IN AN ORGANIZED HEALTH CARE EDUCATION/TRAINING PROGRAM

## 2022-12-14 PROCEDURE — 1036F TOBACCO NON-USER: CPT | Performed by: STUDENT IN AN ORGANIZED HEALTH CARE EDUCATION/TRAINING PROGRAM

## 2022-12-14 PROCEDURE — 36415 COLL VENOUS BLD VENIPUNCTURE: CPT

## 2022-12-14 PROCEDURE — G8427 DOCREV CUR MEDS BY ELIG CLIN: HCPCS | Performed by: STUDENT IN AN ORGANIZED HEALTH CARE EDUCATION/TRAINING PROGRAM

## 2022-12-14 PROCEDURE — 72100 X-RAY EXAM L-S SPINE 2/3 VWS: CPT

## 2022-12-14 RX ORDER — OXYCODONE HYDROCHLORIDE AND ACETAMINOPHEN 5; 325 MG/1; MG/1
1 TABLET ORAL EVERY 4 HOURS PRN
Qty: 42 TABLET | Refills: 0 | Status: SHIPPED | OUTPATIENT
Start: 2022-12-14 | End: 2022-12-21

## 2022-12-14 NOTE — PROGRESS NOTES
Hospital Follow-up     This is a 45year old female who presents to the office for a 1 month follow-up s/p L2, L3 and L5 compression fractures     Subjective: Patient states overall she is doing better than when she was in the hospital. She does admit to some back pain that is controlled well with brace and pain medication. Denies any pain down her legs, although admits to some weakness secondary to pain. No numbness. No loss of bowel or bladder incontinence. XR reviewed. Physical Exam:              WDWN, no apparent distress              Non-labored breathing               Vitals Stable              Alert and oriented x3              CN 3-12 intact              PERRL              EOMI              LARSON well              Motor strength symmetric              Sensation to LT intact bilaterally   In LSO brace                  Imagin2022 XR Lumbar Spine  L2, L3 and L5 compression fractures stable-final read pending. Assessment: This is a 45 y.o.  female presenting for a 1 month follow-up s/p L2, L3 and L5 compression fractures. Stable. Plan:  -Pain control and expectations discussed  -Continue brace and restrictions   -OARRS report reviewed   -Follow-up in neurosurgery clinic in 2 months with repeat Lumbar XR  -Call or return to neurosurgery office sooner if symptoms worsen or if new issues arise in the interim.     Electronically signed by Adam Nunes PA-C on 2022 at 1:37 PM

## 2022-12-14 NOTE — LETTER
[facility] [address] [place] [address]  Phone: [phone]  Fax: [phone]    Tammy Valladares PA-C        December 14, 2022     Patient: Herve Black   YOB: 1970   Date of Visit: 12/14/2022       To Whom It May Concern: It is my medical opinion that Liseth Hansen may return to work on 2/20/2023. If you have any questions or concerns, please don't hesitate to call.     Sincerely,        Tammy Valladares PA-C

## 2022-12-15 NOTE — TELEPHONE ENCOUNTER
I did this 1 before. She has orthostatic hypotension that I could not treat and I referred her to electrophysiology for that. I will defer further management of that to electrophysiology. She does not have any cardiology issues. She can follow-up with cardiology as needed.

## 2022-12-16 ENCOUNTER — TELEPHONE (OUTPATIENT)
Dept: NEUROSURGERY | Age: 52
End: 2022-12-16

## 2022-12-16 ASSESSMENT — ENCOUNTER SYMPTOMS: BACK PAIN: 1

## 2022-12-22 ENCOUNTER — TELEPHONE (OUTPATIENT)
Dept: NEUROSURGERY | Age: 52
End: 2022-12-22

## 2022-12-22 DIAGNOSIS — S32.020D CLOSED COMPRESSION FRACTURE OF L2 LUMBAR VERTEBRA WITH ROUTINE HEALING, SUBSEQUENT ENCOUNTER: Primary | ICD-10-CM

## 2022-12-22 NOTE — TELEPHONE ENCOUNTER
Patient requesting a handicap parking placard due to her fractures. States she would just need it short term.

## 2022-12-27 ENCOUNTER — TELEPHONE (OUTPATIENT)
Dept: NEUROSURGERY | Age: 52
End: 2022-12-27

## 2022-12-27 DIAGNOSIS — R26.9 ABNORMALITY OF GAIT AND MOBILITY: ICD-10-CM

## 2022-12-27 DIAGNOSIS — S32.020D COMPRESSION FRACTURE OF L2 VERTEBRA WITH ROUTINE HEALING, SUBSEQUENT ENCOUNTER: ICD-10-CM

## 2022-12-27 RX ORDER — OXYCODONE HYDROCHLORIDE AND ACETAMINOPHEN 5; 325 MG/1; MG/1
1 TABLET ORAL EVERY 4 HOURS PRN
Qty: 42 TABLET | Refills: 0 | Status: SHIPPED | OUTPATIENT
Start: 2022-12-27 | End: 2023-01-03

## 2023-01-04 ENCOUNTER — TELEPHONE (OUTPATIENT)
Dept: NEUROSURGERY | Age: 53
End: 2023-01-04

## 2023-01-04 ENCOUNTER — TELEPHONE (OUTPATIENT)
Dept: FAMILY MEDICINE CLINIC | Age: 53
End: 2023-01-04

## 2023-01-04 DIAGNOSIS — S22.080D COMPRESSION FRACTURE OF T12 VERTEBRA WITH ROUTINE HEALING, SUBSEQUENT ENCOUNTER: Primary | ICD-10-CM

## 2023-01-04 RX ORDER — HYDROCODONE BITARTRATE AND ACETAMINOPHEN 5; 325 MG/1; MG/1
1 TABLET ORAL EVERY 6 HOURS PRN
Qty: 28 TABLET | Refills: 0 | Status: SHIPPED | OUTPATIENT
Start: 2023-01-04 | End: 2023-01-11

## 2023-01-04 NOTE — TELEPHONE ENCOUNTER
Sodium chloride refilled while patient in the hospital, however, the pharmacy advised her that her insurance will not cover. Spoke with pharmacist; there is nothing comparable that will be covered. Patient can purchase for appx $15     Patient stated that she cannot afford that as she is unemployed. But she states she needs this as well. She states it was prescribed for   orthostatic hypotension with syncope. Is there something else that can be prescribed? She will be out before her appt next week and does not want to wait until then.

## 2023-01-04 NOTE — TELEPHONE ENCOUNTER
Pt calling for pain meds since she isn't going back to work yet her so primary insurance is lapsed and her understanding is that medicaid doesn't pay for Oxycodone so she will need something else sent to pharmacy.

## 2023-01-05 DIAGNOSIS — E87.1 HYPONATREMIA: Primary | ICD-10-CM

## 2023-01-05 RX ORDER — SODIUM BICARBONATE 325 MG/1
325 TABLET ORAL 2 TIMES DAILY
Qty: 60 TABLET | Refills: 11 | Status: SHIPPED | OUTPATIENT
Start: 2023-01-05 | End: 2024-01-05

## 2023-01-05 NOTE — PROGRESS NOTES
Patient has been taking her Na daily since discharge. Repeat BMP ordered and a different Na tablet provided incase it is needed.

## 2023-01-05 NOTE — TELEPHONE ENCOUNTER
The sodium chloride is ordered already, it is not covered by insurance.  Is there an alternative that can be sent in?

## 2023-01-06 ENCOUNTER — INITIAL CONSULT (OUTPATIENT)
Dept: BARIATRICS/WEIGHT MGMT | Age: 53
End: 2023-01-06

## 2023-01-06 VITALS — HEIGHT: 70 IN | BODY MASS INDEX: 30.49 KG/M2 | WEIGHT: 213 LBS

## 2023-01-06 VITALS
DIASTOLIC BLOOD PRESSURE: 92 MMHG | SYSTOLIC BLOOD PRESSURE: 136 MMHG | TEMPERATURE: 97.7 F | BODY MASS INDEX: 30.49 KG/M2 | WEIGHT: 213 LBS | RESPIRATION RATE: 20 BRPM | HEART RATE: 81 BPM | HEIGHT: 70 IN

## 2023-01-06 DIAGNOSIS — Z00.8 NUTRITIONAL ASSESSMENT: Primary | ICD-10-CM

## 2023-01-06 DIAGNOSIS — K91.2 MALNUTRITION FOLLOWING GASTROINTESTINAL SURGERY: Primary | ICD-10-CM

## 2023-01-06 DIAGNOSIS — Z71.3 NUTRITIONAL COUNSELING: ICD-10-CM

## 2023-01-06 NOTE — PATIENT INSTRUCTIONS
[facility]  Dietary Follow-up Appointment Instructions    Criselda Allison   Date: 1/6/2023     The RD / LD reviewed the following instructions with the patient and handouts have been given. Pt. is able to verbalize instruction and has been instructed to call with any problems or complications. If patient is not able to comply with the dietary advancement or dietary or supplement instructions given pt. is instructed to call the [facility] at [phone]. If [facility] is closed and problems occur patient is instructed to go to [id number] [facility] and have his / her surgeon paged. Current Diet Instruction: Handouts Given  Pt instructed he / she must keep daily food records and bring to all follow-up appointments. Pt. has been instructed to slow down eating habits and chew food 30 - 35 times per bite. Rd / Ld reviewed with patient that eating too fast can cause food to get stuck or create nausea and vomiting after bariatric surgery. Rd / Ld highly encouraged patient to set a timer and really count bites to help with slowing down eating habits this will also create more of a sensation of fullness and satiety. Patient was instructed on the importance of increasing water intake to 48 - 64 oz. of water total daily. Pt. was also instructed he / she is allowed an additional 30 oz. of sugar free caffeine free clear liquid beverages for a total of 90 oz. of fluid total daily. Pt. was able to verbalize how he / she can get more water and fluids within the diet. Pt. verbalized understanding.      Bariatric soft diet introducing raw fruit, raw vegetables, rice, protein bars, multivitamin, calcium, protein supplements        Care Plan:  [id number] [facility]   Stool Softener / Fiber Supplement  / Surgeons Bowel Protocol After Weight Loss Surgery  Written By: Margarette Ziegler  Definition:  Constipation is the passage of small amounts of hard, dry bowel movements, usually fewer than three times a week. People who are constipated may find it difficult and painful to have a bowel movement. Other symptoms of constipation include feeling bloated, uncomfortable, and sluggish. What Causes Constipation? To understand constipation, it helps to know how the colon (large intestine) works. As food moves through it, the colon absorbs water while forming waste products, or stool. Muscle contractions in the colon push the stool toward the rectum. By the time stool reaches the rectum, it is solid because most of the water has been absorbed. The hard and dry stools of constipation occur when the colon absorbs too much water. This happens because the colon's muscle contractions are slow or sluggish causing the stool to move through the colon too slowly causing too much water being able to be absorbed and a hard stool forming. Why Does This Happen After Bariatric Surgery? Most patients do not drink enough Water. That's right. You need to be drinking at the minimum 64 ounces of just plain water a day and additional 30 oz. of other non-caloric beverages. All other beverages do not count as water intake and will cause constipation. Adding water to the diet adds fluid to the colon and bulk to the stools, making bowel movements softer and easier to pass. Avoid all other beverages besides water especially coffee and tea when you are constipated. Most patients do not get enough fiber in the diet. We recommend at least 25 - 35 grams of fiber daily from your diet starting 3 months post-op. The most common cause of constipation is a diet low in fiber found in vegetables, fruits, and whole grains and high in fats found in cheese, eggs, and meats. People who eat plenty of high-fiber foods are less likely to become constipated.   Refer to your high fiber food's handout - this is based on what stage of the diet you may be in and what foods are tolerated at this stage. Lack of exercise can lead to constipation - Regular activity especially walking helps push food through the intestines and helps to push waste through the colon. Please be sure to be following your exercise guidelines. If constipated eliminate Cheese, Eggs, Applesauce, Apples, Bananas, Rice and Bread from the diet you can resume in small amounts once the constipation resolves and these foods have been incorporated into your diet stage. If you are 2 -6 weeks post bariatric surgery you may add high fiber foods such as oatmeal, oat bran at this time. If your diet has progressed and you are 12 weeks post bariatric surgery you may add other high fiber foods such as fresh fruits, fresh vegetables, and whole grains. Track your fiber intake daily the goal is to have 25 - 35 grams of fiber total daily starting at your 3 month follow-up appointment. Remember half the fiber intake can come from your fiber supplement the other half should come from dietary intake. Refer to the High Fiber Foods Handout. Surgeon Bowel Instruction:    Your surgeon has instructed you on the following:    Start by following the [address] [place] [address] [facility] recommendations of taking a stool softener -  Colace or Docusate 100mg gel tablet once at breakfast and once before bed. The day you are discharged from the hospital until your 6 week follow-up appointment. Start by adding a Sugar Free Chewable Fiber Supplement at your 2 week follow-up appointment lifelong -  Per the surgeons protocol you should be taking a fiber supplement lifelong since your two week follow-up appointment. Your surgeon recommends a daily approved Fiber supplement 15 grams total daily.   If you are just introducing a Fiber supplement the 15 grams should be introduced gradually - 5 grams of a fiber supplement daily the 1st week, 10 grams of a fiber supplement daily the 2nd week and 15 grams of a fiber supplement daily the third week. Pt is instructed to continue the 15 grams of a fiber supplement daily. Madi Dill reviewed. Pt verbalized understanding. Select One from Below     Vitafusion Sugar Free Fiber Well Chewable Fiber Gummies - Follow the [facility] instructions of taking 2 Fiber Gummies 3 times Daily. (15 grams)    -or-    Benefiber Original - Follow the [facility] instructions of taking 1tablespoon 3 times daily mixed in water or sugar free beverage of choice. (13.5 grams)     -or-    Metamucil Sugar Free Original - Follow the [facility] instruction of taking 1 rounded tablespoon 2 times daily mixed in water or sugar free beverage of choice. (18 grams)    You need to be drinking at the minimum 64 ounces of just plain water a day and an additional 30 oz. of other non-caloric, non-carbonated, non-caffeinated beverages. This will make a total of 90oz or greater daily. Unless you are on a fluid restriction. You can add a Probiotic. The [address] [place] [address] [facility]  recommends a Probiotic that contains 15 billion cfu's in one capsule and the first  ingredient needs to be Lactobacillus Acidophilus. The instructions are to take 1 capsule daily if you have no results increase to 2 capsules daily. Increase your activity and walk as tolerated daily. Follow your exercise guidelines. If you find that you are still having trouble with constipation after trying the  suggestions mentioned above please contact the [address] [place] [address] [facility] Center [phone].                                                                   [facility] Staff Stool Softener and Fiber Instruction Education for Patients:       D/C from Hospital 2-week F/U Appt: 6-week F/U Appt: 3 Month F/U Appt: 6 -Month - On   Water Intake 48 - 64 oz Water 64 oz of water plus 30 oz other SF / FF/ NC / CF Beverages 64 oz of water plus 30 oz other SF / FF/ NC / CF Beverages 64 oz of water plus 30 oz other SF / FF/ NC / CF Beverages 64 oz of water plus 30 oz other SF / FF/ NC / CF Beverages   Colace 100 mgs 2 times daily 100 mgs 2 times daily 100 mgs 2 times daily. Pt should be instructed to stop the Colace Pt should be off the Colace Pt should be off the Colace   Fiber Supplement   ____________ Add Fiber Supplement - Pt needs educated at 2 weeks Appt Continue Fiber Supplement -Continue Pt Education Continue Fiber Supplement -Continue Pt Education Continue Fiber Supplement -Continue Pt Education   Fiber Foods   ____________     ____________     ____________   Goal is 25 - 35 grams of Fiber Total Daily - Pt needs Educated Goal is 25 - 35 grams of Fiber Total Daily - Pt needs Educated                                     Vitamin and Supplement Instruction:  Vitamin and Mineral Supplementation   After Gastric Bypass and Sleeve Gastrectomy Surgery      Patient is able to verbalize the above supplements must be taken daily in order to insure proper health and nutrition, and prevent nutrient deficiencies. The patient is aware that not complying can lead to the patient placing him/her self at risk for complications. RD / LD reviewed with patient the importance of dietary supplements. Pt. verbalized understanding. Vitamins:   Bariatric Advantage Chewable Multi-Formula (1 tablet 2 times daily) - and - Bariatric Advantage Chewable Iron 29 mg (1 tablet daily)    Other Vitamin Deficiencies:    Vitamin D - Dry Vitamin D3 5,000iu every day. Calcium Supplements:  Calcium Supplement: Total daily intake should be 1500 mg from calcium citrate and 800 - 1000 iu Vitamin D daily. Plus three servings of low-fat dairy for a total daily intake of 1800 - 2200 mg of Calcium. Bariatric Advantage Calcium 500 Chews (1 tablet 3 times daily) - contains Calories    Pt. given copy.

## 2023-01-06 NOTE — PATIENT INSTRUCTIONS
What is the next step to proceed with weight loss surgery? Please be aware that any co-pays or deductibles may be requested prior to testing and / or procedures. You will need to schedule a psychological evaluation for weight loss surgery. Patients will be required to complete all psychological testing as required by the mental health provider. Patients must also follow all of the provider's recommendations before weight loss surgery can be scheduled. The evaluation must be done a standard way for weight loss surgery. We strongly recommend that you contact one of our preferred providers listed below to arrange this:      Layla Cantu, [facility]  [address], [place]   [phone]    Clara Escobar and [address]   [phone]    Dr. Joe Newman, PhD    [facility]. [place], [place]    [phone]      You will also need to plan on attending a 2 hour nutrition class at [facility] prior to your surgery. We will schedule this for you when we schedule your surgery. Please remember to have your labs drawn 10 days prior to your first scheduled dietary appointment. Please remember, that while we will submit your case to insurance for surgery authorization, it is your responsibility to know if your plan covers weight loss surgery and keep up-to-date with changes to your insurance coverage. We will do everything possible to help you get approved for weight loss surgery, but cannot guarantee an approval.     Please note that you will not be submitted to your insurance company until all pre-operative testing requirements are met.

## 2023-01-06 NOTE — PROGRESS NOTES
Medical Nutrition Therapy (MNT) Assessment   Pt is aware this phone call conversation will be documented and is in agreement to the documentation: Pt verbalized - Yes    Pt. Name: Erasmo Childers   Date:1/6/2023  F/U Appt: Maria Del [facility]en Type  - 6 Month LSG F/U Appt      Ht 5' 10\" (1.778 m)   Wt 213 lb (96.6 kg)   BMI 30.56 kg/m²  Height: 5' 10\" (1.778 m) Weight: 213 lb (96.6 kg)   IBW:ideal body weight   173 lbs % EBWL: 66%     Wt Readings from Last 3 Encounters:   01/06/23 213 lb (96.6 kg)   01/06/23 213 lb (96.6 kg)   12/14/22 215 lb (97.5 kg)                     Protein supplements: Pt. is currently using the following protein supplement Fair Life, Gatorade Zero with Protein and Premiere protein and consuming the following grams of protein 60 from her supplements plus protein foods. Rd / Ld reviewed with patient based on 1.0 gram per kg of IBW patient needs 79 - 89 grams of protein total daily.     Subjective:                   Current MNT:       Bariatric soft diet introducing raw fruit, raw vegetables, rice, protein bars, multivitamin, calcium, protein supplements     MNT Advanced to:     Bariatric soft diet introducing raw fruit, raw vegetables, rice, protein bars, multivitamin, calcium, protein supplements      Allergies and Food Allergies and Food Intolerances:None    Nutritional Data  No - Poor appetite more than 5 days     No - NPO or clear liquid more than 3 days     No - Problem Chewing      No - Problem Swallowing      No - Problem Mouth Pain      No - Problem Denture  No - Missing Teeth         No - Pressure Sore    No - Open Wound    No - Surgical Wound  No - Documented: Sepsis or Infection    No - Nausea  No - Vomiting    [facility] Bariatric Surgeons Bowel Protocol:  Patient states he / she has the following bowel movements per week  - Twice a week  no - Diarrhea  No - Steatorrhea  No - Constipation - Pt denies  When was your last bowel movement  - Wed    How much plain water are you drinking daily  - 64 oz  What other beverages / fluids are you drinking daily and the amount  - SF Gatorade, Crystal Light    No - Are you taking Colace daily  What amount of Colace are you taking daily  - N/A    Pt has problems with back and is on pain medication which contributes to the slow stool. Pt takes Senikot at night and Miralax PRN. No - Are you taking Sugar Free Chewable Fiber Gummies / Supplements  What amount of Sugar Free Chewable Fiber Gummies are you taking daily  - Pt was instructed she can add this to what she takes daily. How much water were you instructed to take daily? Madi Luna reviewed today -  Patient was instructed by Madi Luna on the importance of increasing water intake to 48 - 64 oz. of water total daily. Pt. was also instructed he / she is allowed an additional 30 oz. of sugar free caffeine free clear liquid beverages for a total of 90 oz. of fluid total daily. Pt. was able to verbalize how he / she can get more water and fluids within the diet. Pt. verbalized understanding. How much Colace did your surgeon instruct you to take? Madi Luna reviewed today - Per the surgeons protocol you should be taking since the day of your surgery Colace - 100 mgs 1 tablet 2 times daily until your 6 week F/U appointment. Madi LUNA reviewed. See After Visit Summary. How much Fiber Gummies did your surgeon instruct you to take? Madi Luna reviewed today Per the surgeons protocol you should be taking a fiber supplement lifelong since your two week follow-up appointment. Your surgeon recommends a daily approved Fiber supplement 15 grams total daily. If you are just introducing a Fiber supplement the 15 grams should be introduced 5 grams of a fiber supplement daily the 1st week, 10 grams of a fiber supplement daily the 2nd week and 15 grams of a fiber supplement daily the third week. Pt is instructed to continue the 15 grams of a fiber supplement daily. Madi Luna reviewed. See After Visits Summary.      Did your surgeon discuss any other medications / supplements to take daily to move your bowels and avoid constipation? N/A    Yes -  Patient was provided today the Constipation Handout - Madi Dill reviewed Handout - Pt. verbalized understanding. See AVS    No Madi Dill reinforced pt needs to consume the following - Water Intake should be 64 - 90 oz, Fiber Chews 15 grams, Dietary Fiber Intake 25 - 35 grams        Yes - Hair loss   No - Weight regain       No - Acid Reflux  No - Dumping Syndrome      No - Food gets stuck  Yes - Are you eating solids - should not be eating solids until 6 weeks post-op. not applicable - Night Time Coughing  not applicable -  B Ping Noted  Yes - Are you chewing thoroughly  - Does not take effect until 6 weeks post-op  No - Are you hungry after eating     How many meals a day 3 / Portions Sizes of Meals are  - Kids plate size         Labs: No lab's    Supplements: Bariatric Advantage Multi-Vitamin 1 tablet 2 times Daily,  Pt is not taking an iron supplement pt was instructed on adding Bariatric Advantage 29 mgs Iron 1 tablet Daily, Bariatric Advantage Calcium Lozenges 1 tablet 3 times Daily, pt is not taking pt was instructed to start Dry Vitamin D3 5,000iu every day. Pt purchased both the Fe and the Vitamin D supplement today. Estimated Daily Nutritional Needs: Based on Bariatric procedure for Wt. Loss  Energy: Will be calculated at Maintenance Stage    Protein: Average 60 - 80 gms Daily - See individual needs listed above based on IBW    MNT Plan and Additional Education: Pt has sx done at [address] [place] [address]. Steven Dill spent time with pt reviewing the [facility] diet instruction information that Dr. Zelda Toney wants pt to follow. Madi Dill also spent time going over what  bariatric supplements she should take daily. Pt is going to try to work on adding dietary fiber and pushing more fluids to resolve constipation. See AVS and also pt received Patient Guide To Bariatric Surgery which was reviewed.  Pt denies dehydration but increasing fluids may help with syncopal episodes and constipation pt is experiencing. Yes 1. Pt is consuming his / her recommended amount of protein within the diet based on patients Ideal Body Weight. .    Yes 2. Pt is using the recommended Bariatric approved protein supplement and taking in the correct amount of protein daily. Pt is able to verbalize how to mix his / her protein supplement correctly to meet pts needs. Pt verbalized understanding. Yes and No see above 3. Pt is using the recommended Bariatric approved Multi-Vitamin, Bariatric approved Calcium, Bariatric approved Iron supplement or Bariatric approved Vitamin D and taking the correct dose. Pt was educated per his / her Bariatric Surgeons protocol he / she needs the following daily -  Bariatric Advantage Multi-Vitamin 1 tablet 2 times Daily, Bariatric Advantage 29 mgs Iron 1 tablet Daily, Bariatric Advantage Calcium Lozenges 1 tablet 3 times Daily , Dry Vitamin D3 5,000iu every day. Pt verbalized understanding. No 4. Pt kept daily food records. Pt is aware food records need to be kept life-long daily and brought to all follow-up appointments in order to show compliancy after weight loss surgery. Pt verbalized understanding. No 5. Pt is taking the correct stool softener for the first 6 weeks with the correct dose. Pt is also taking the correct fiber supplement with the correct dose starting at his / her 2 week f/u appointment. Pt verbalized understanding. See above instruction and AVS.     Yes 6. Pt is drinking 64 - 90 oz of plain water daily. Patient was instructed on the importance of increasing water intake to 48 - 64 oz. of water total daily. Pt. was also instructed he / she is allowed an additional 30 oz. of sugar free caffeine free clear liquid beverages for a total of 90 oz. of fluid total daily. Pt. was able to verbalize how he / she can get more water and fluids within the diet.   Per bariatric surgeons protocol after weight loss surgery. Pt. verbalized understanding. Yes 7. Pt achieved his / her percentage of excess body weight loss at his / her f/u appointments and is on track to reach his / her weight loss goal.    Yes 8. Pt is weighing and measuring all foods using a food scale and measuring cups. Pt reports portion sizes are 3 to 4 oz in size. Portion sizes are not exceeding 6 ounces total per meal lifelong. Pt verbalized understanding. .     No 9. Pt is not experiencing Dumping Syndrome from food / beverage consumption. Yes 10. Pt is complying with all stages and consistencies of the bariatric diet and is not eating or drinking foods / beverages that is not listed on the diet at this stage. No (Staff FYI) - Pt is not drinking carbonated beverages, alcoholic beverages or juices at this time. Compliancy Scale  - After Weight Loss Surgery :  9 Good - Dietary Compliance - This is based on patient following the Medical Nutrition Therapy protocol after Weight Loss Surgery  7 to 10 Points - Good (70% - to 100%) -  Pt is following what he / she has been instructed on at the time of this visit. 4 to 7 Points - Fair (40% to 70%) - Pt has areas that he / she needs to work on in order to be compliant with the bariatric protocol after weight loss surgery. 0 to 4 Points - Poor (0% - 40%) - Pt is failing to follow the instructions given to the pt. Madi Dill has concerns pt may be creating harm. Pt was offered additional dietary counseling appointments to help pt make the necessary lifestyle changes to show compliancy after weight loss surgery.      MEDICAL NUTRITION THERAPY (MNT) - Nutrition Care Plan   (The patient has been educated and given written education material that reinforces the following dietary guidelines for Bariatric Surgery)  Goal:  Patient able to verbalize the following dietary concepts:  3 to 4 ounce portions per meal     6 small meals daily      60 to 80 grams of protein on average see individual protein needs 48 to 64 ounces of fluid daily (water)     Always consume protein item first with all meals   Pt is aware wt loss is pt controlled. Slow Meals - 30-35 chews per bite / Meals 30 - 45 minutes long            The RD / LD reviewed with the patient that the dietary goals of the bariatric patient is to ensure that patient is able to meet established nutritional needs for a Bariatric Surgery Patient at this time in order to promote healing, prevent significant weight changes, prevent skin breakdown and abnormal lab values, prevent complications, and tolerance to diet and texture of foods. Pt is able to identify proper food choices and needed changes and is able to explain proper food preparation. RD / LD reviewed compliance with assigned diet stages, volume of food and drink consumed, timing of meals, amount of protein and energy intake, daily vitamin and mineral supplementation, identify food cravings and any adverse reactions associated with intake of food and drink. RD / LD uses behavioral tools such as goal setting, MI, and self-monitoring, to reinforce the anatomic and physiologic effects of the surgery, so that the described behaviors become more of a habit not simply a reaction to the altered anatomy. Care Plan:   Yes - Rd/Ld Addressed Food Records or 24-Hour Recall  Yes - Rd / Ld encouraged patient to exercise at least 30 minutes daily  Yes - Rd / Ld instructed patient on how to increase oral protein intake within the diet. Pt. can verbalize his / her individual protein needs at this visit. Yes - Rd / Ld instructed the patient on how to increase the use of protein supplements within the diet if appropriate at this time. Yes - Pt. was instructed on how to increase water intake. Patient will need to consume 48 - 64 oz. of just plain water in addition to 30 oz. of non-caloric beverages.   Yes - Handouts given to patient - Also see After Visit Summary in addition to paper copy diet instruction. Yes - RD / LD encouraged oral intake    Yes -  RD / LD encouraged pt. to keep food records daily.       Yes - Pt. is able to verbalize diet concepts      Yes - Constipation Handout Given and Reviewed - Part of surgeons Bowel Protocol - See After Visit Summary    Yes - High Fiber Handout Given and Reviewed - Part of surgeons Bowel Protocol - See After Visit Summary        No - Ulcer Handout Given and Reviewed          No - Pt. was instructed to continue current MNT   Yes - Pt. diet was advanced to the following stage ( See above)   Yes - Supplements: initiated (See list below  - Pt. given instruction)     No - Supplemental foods:______________________  No - Pt. was placed on a altered meal schedule

## 2023-01-06 NOTE — PROGRESS NOTES
Ricardo Melgar  1/6/2023  Laparoscopic Hamilton-en- Y Gastric Bypass  5 months Post-Operative Follow-up. Subjective:   Ricardo Melgar is a 45 y.o. female six months post Laparoscopic Sleeve in [facility] in Aug 4 2022 with [place] repair. Doing well after spine fx. Hypotension improved  She is not having swallowing difficulty, is compliant most of the time with the multivitamins and calcium + Vit D. She is meeting fluid recommendations of at least 64 ounces per day and is meeting protein recommendations. Exercise: no regular exercise. Weight=213 lb (96.6 kg)  Today's weight represents a weight loss to date of 80 pounds. SW-293lbs  LW 209lbs    Allergies: Colchicine and Darvon [propoxyphene]   Prior to Admission medications    Medication Sig Start Date End Date Taking? Authorizing Provider   sodium bicarbonate 325 MG tablet Take 1 tablet by mouth 2 times daily 1/5/23 1/5/24 Yes Jaydon Dai MD   HYDROcodone-acetaminophen (NORCO) 5-325 MG per tablet Take 1 tablet by mouth every 6 hours as needed for Pain for up to 7 days. Intended supply: 7 days. Take lowest dose possible to manage pain Max Daily Amount: 4 tablets 1/4/23 1/11/23 Yes SHAMA Maravilla   Handicap Placard MISC by Does not apply route 12/22/22  Yes SHAMA Powers   ammonium lactate (LAC-HYDRIN) 12 % lotion Apply topically as needed.  12/7/22  Yes Krystle Plasencia MD   b complex-C-folic acid (NEPHROCAPS) 1 MG capsule Take 1 capsule by mouth 3 times daily 12/7/22  Yes Krystle Plasencia MD   bisacodyl (DULCOLAX) 10 MG suppository Place 1 suppository rectally daily as needed for Constipation 12/7/22 1/6/23 Yes Krystle Plasencia MD   fludrocortisone (FLORINEF) 0.1 MG tablet Take 1 tablet by mouth daily 12/8/22 1/7/23 Yes Krystle Plasencia MD   lidocaine 4 % external patch Place 1 patch onto the skin daily 12/8/22  Yes Krystle Plasencia MD   melatonin 3 MG TABS tablet Take 1 tablet by mouth nightly as needed (insomnia) 12/7/22  Yes Celia Valenzuela MD   miconazole (MICOTIN) 2 % powder Apply topically 2 times daily.  22  Yes Celia Valenzuela MD   midodrine (PROAMATINE) 5 MG tablet Take 3 tablets by mouth 3 times daily (with meals) 22  Yes Celia Valenzuela MD   pantoprazole (PROTONIX) 40 MG tablet Take 1 tablet by mouth every morning (before breakfast) 22  Yes Celia Valenzuela MD   polyethylene glycol (GLYCOLAX) 17 g packet Take 17 g by mouth daily as needed for Constipation 22 Yes Celia Valenzuela MD   senna (SENOKOT) 8.6 MG tablet Take 1 tablet by mouth nightly 22 Yes Celia Valenzuela MD   sodium chloride 1 g tablet Take 1 tablet by mouth 3 times daily (with meals) 22  Yes Celia Valenzuela MD   white petrolatum OINT ointment Apply topically 2 times daily as needed (skin irritation) 22  Yes Celia Valenzuela MD   Multiple Vitamin (MULTIVITAMINS PO) Take 3 tablets by mouth daily   Yes Historical Provider, MD   CALCIUM PO Take 2 tablets by mouth daily   Yes Historical Provider, MD   folic acid (FOLVITE) 1 MG tablet Take 1 mg by mouth daily 10/3/22  Yes Historical Provider, MD           Past Medical History:   Diagnosis Date    Anemia     Diabetes mellitus ([facility] [address]     Papanicolaou smear of cervix with atypical squamous cells of undetermined significance (ASC-US)          Screening for human papillomavirus (HPV)     4/10/07        Past Surgical History:   Procedure Laterality Date    COLPOSCOPY      2001 : outside [address] [place] [address]      2022    SD  DELIVERY ONLY W/POSTPARTUM CARE      1991 :      SD  DELIVERY ONLY W/POSTPARTUM CARE      1998 :      SD CONIZATION CERVIX W/WO D&C RPR KNIFE/LASER      2001 : outside [place]        Current Outpatient Medications   Medication Sig Dispense Refill    sodium bicarbonate 325 MG tablet Take 1 tablet by mouth 2 times daily 60 tablet 11    HYDROcodone-acetaminophen (NORCO) 5-325 MG per tablet Take 1 tablet by mouth every 6 hours as needed for Pain for up to 7 days. Intended supply: 7 days. Take lowest dose possible to manage pain Max Daily Amount: 4 tablets 28 tablet 0    Handicap Placard MISC by Does not apply route 1 each 0    ammonium lactate (LAC-HYDRIN) 12 % lotion Apply topically as needed. 225 g 1    b complex-C-folic acid (NEPHROCAPS) 1 MG capsule Take 1 capsule by mouth 3 times daily 30 capsule 3    bisacodyl (DULCOLAX) 10 MG suppository Place 1 suppository rectally daily as needed for Constipation 10 suppository 0    fludrocortisone (FLORINEF) 0.1 MG tablet Take 1 tablet by mouth daily 30 tablet 1    lidocaine 4 % external patch Place 1 patch onto the skin daily 30 each 5    melatonin 3 MG TABS tablet Take 1 tablet by mouth nightly as needed (insomnia)  3    miconazole (MICOTIN) 2 % powder Apply topically 2 times daily. 45 g 1    midodrine (PROAMATINE) 5 MG tablet Take 3 tablets by mouth 3 times daily (with meals) 90 tablet 3    pantoprazole (PROTONIX) 40 MG tablet Take 1 tablet by mouth every morning (before breakfast) 30 tablet 3    polyethylene glycol (GLYCOLAX) 17 g packet Take 17 g by mouth daily as needed for Constipation 527 g 1    senna (SENOKOT) 8.6 MG tablet Take 1 tablet by mouth nightly 30 tablet 0    sodium chloride 1 g tablet Take 1 tablet by mouth 3 times daily (with meals) 90 tablet 3    white petrolatum OINT ointment Apply topically 2 times daily as needed (skin irritation) 500 g 0    Multiple Vitamin (MULTIVITAMINS PO) Take 3 tablets by mouth daily      CALCIUM PO Take 2 tablets by mouth daily      folic acid (FOLVITE) 1 MG tablet Take 1 mg by mouth daily       No current facility-administered medications for this visit.        Allergies   Allergen Reactions    Colchicine     Darvon [Propoxyphene] Other (See Comments)     GI Upset       Family History   Problem Relation Age of Onset    Diabetes Mother     Hypertension Mother     Diabetes Father     Hypertension Father     Gout Father Heart Disease None     Ovarian Cancer None     Uterine Cancer None     Breast Cancer None        Social History     Socioeconomic History    Marital status: Single     Spouse name: Not on file    Number of children: Not on file    Years of education: Not on file    Highest education level: Not on file   Occupational History    Not on file   Tobacco Use    Smoking status: Former     Packs/day: 0.50     Years: 1.00     Pack years: 0.50     Types: Cigarettes     Quit date:      Years since quittin.0    Smokeless tobacco: Never    Tobacco comments:     Quit smokin2002   Vaping Use    Vaping Use: Never used   Substance and Sexual Activity    Alcohol use: Not on file    Drug use: No    Sexual activity: Not Currently   Other Topics Concern    Not on file   Social History Narrative    Not on file     Social Determinants of Health     Financial Resource Strain: Low Risk     Difficulty of Paying Living Expenses: Not hard at all   Food Insecurity: No Food Insecurity    Worried About [address] in the Last Year: Never true    [address]  [employer] in the Last Year: Never true   Transportation Needs: No Transportation Needs    Lack of Transportation (Medical): No    Lack of Transportation (Non-Medical): No   Physical Activity: Not on file   Stress: Not on file   Social Connections: Not on file   Intimate Partner Violence: Not on file   Housing Stability: Not on file           A complete 10 system review was performed and are otherwise negative unless mentioned in the above HPI. Specific negatives are listed below but may not include all those reviewed.     General ROS: negative obtundation, AMS  ENT ROS: negative rhinorrhea, epistaxis  Allergy and Immunology ROS: negative itchy/watery eyes or nasal congestion  Hematological and Lymphatic ROS: negative spontaneous bleeding or bruising  Endocrine ROS: negative  lethargy, mood swings, palpitations or polydipsia/polyuria  Respiratory ROS: negative sputum changes, stridor, tachypnea or wheezing  Cardiovascular ROS: negative for - loss of consciousness, murmur or orthopnea  Gastrointestinal ROS: negative for - hematochezia or hematemesis  Genito-Urinary ROS: negative for -  genital discharge or hematuria  Musculoskeletal ROS: negative for - focal weakness, gangrene  Psych/Neuro ROS: negative for - visual or auditory hallucinations, suicidal ideation    Physical exam:   BP (!) 136/92 (Site: Right Lower Arm, Position: Sitting, Cuff Size: Medium Adult)   Pulse 81   Temp 97.7 °F (36.5 °C) (Temporal)   Resp 20   Ht 5' 10\" (1.778 m)   Wt 213 lb (96.6 kg)   BMI 30.56 kg/m²   General appearance: alert, appears stated age and cooperative  Head: Normocephalic, without obvious abnormality, atraumatic  Neck: no adenopathy, supple, symmetrical, trachea midline   Lungs: clear to auscultation bilaterally  Heart: regular rate and rhythm  Abdomen: soft, non-tender; bowel sounds normal; no masses,  no organomegaly  Extremities: extremities normal, atraumatic, no cyanosis or edema    Assessment:  6 months post Sleeve in [facility], recent fall and spine fx, now improved, hypotension in hospital improved on midodrine. She does not complain of GERD,  does not have sleep apnea,  does not have diabetes,  does not have hypertension off medical treatment. Plan:  Doing well. Continue to eat small portions very slowly and chew well before swallowing. High protein, low calorie diet. Drink plenty of water. Try to exercise more, maintain adequate variety and balance. Follow up in 6 months. Always call the clinic if you have any medical problems. Stop the Omeprazole or other acid reducing medicine if possible. If ulcer pain or acid reflux symptoms start, restart the Omeprazole and call the clinic.     Follow up as needed      Physician Signature: Electronically signed by Dr. Amol Elias MD

## 2023-01-10 ENCOUNTER — HOSPITAL ENCOUNTER (OUTPATIENT)
Dept: GENERAL RADIOLOGY | Age: 53
Discharge: HOME | End: 2023-01-12
Payer: MEDICAID

## 2023-01-10 ENCOUNTER — HOSPITAL ENCOUNTER (OUTPATIENT)
Age: 53
Discharge: HOME | End: 2023-01-12
Payer: MEDICAID

## 2023-01-10 ENCOUNTER — TELEPHONE (OUTPATIENT)
Dept: NEUROSURGERY | Age: 53
End: 2023-01-10

## 2023-01-10 ENCOUNTER — OFFICE VISIT (OUTPATIENT)
Dept: FAMILY MEDICINE CLINIC | Age: 53
End: 2023-01-10

## 2023-01-10 VITALS
BODY MASS INDEX: 30.78 KG/M2 | HEART RATE: 75 BPM | RESPIRATION RATE: 14 BRPM | HEIGHT: 70 IN | SYSTOLIC BLOOD PRESSURE: 120 MMHG | TEMPERATURE: 97.9 F | OXYGEN SATURATION: 98 % | WEIGHT: 215 LBS | DIASTOLIC BLOOD PRESSURE: 72 MMHG

## 2023-01-10 DIAGNOSIS — Z76.0 MEDICATION REFILL: ICD-10-CM

## 2023-01-10 DIAGNOSIS — I95.1 ORTHOSTATIC HYPOTENSION: ICD-10-CM

## 2023-01-10 DIAGNOSIS — S32.030D CLOSED COMPRESSION FRACTURE OF L3 LUMBAR VERTEBRA WITH ROUTINE HEALING, SUBSEQUENT ENCOUNTER: ICD-10-CM

## 2023-01-10 DIAGNOSIS — Z12.31 BREAST CANCER SCREENING BY MAMMOGRAM: ICD-10-CM

## 2023-01-10 DIAGNOSIS — S32.030D CLOSED COMPRESSION FRACTURE OF L3 LUMBAR VERTEBRA WITH ROUTINE HEALING, SUBSEQUENT ENCOUNTER: Primary | ICD-10-CM

## 2023-01-10 DIAGNOSIS — F41.9 ANXIETY: Primary | ICD-10-CM

## 2023-01-10 DIAGNOSIS — E27.40 ADRENAL INSUFFICIENCY (HCC): ICD-10-CM

## 2023-01-10 PROCEDURE — 72100 X-RAY EXAM L-S SPINE 2/3 VWS: CPT

## 2023-01-10 RX ORDER — DIAPER,BRIEF,ADULT, DISPOSABLE
30 EACH MISCELLANEOUS 3 TIMES DAILY PRN
Qty: 60 EACH | Refills: 3 | Status: SHIPPED | OUTPATIENT
Start: 2023-01-10

## 2023-01-10 RX ORDER — MIDODRINE HYDROCHLORIDE 5 MG/1
15 TABLET ORAL
Qty: 90 TABLET | Refills: 3 | Status: SHIPPED | OUTPATIENT
Start: 2023-01-10

## 2023-01-10 RX ORDER — FLUDROCORTISONE ACETATE 0.1 MG/1
0.1 TABLET ORAL DAILY
Qty: 30 TABLET | Refills: 3 | Status: SHIPPED | OUTPATIENT
Start: 2023-01-10 | End: 2023-05-10

## 2023-01-10 ASSESSMENT — ANXIETY QUESTIONNAIRES
1. FEELING NERVOUS, ANXIOUS, OR ON EDGE: 3
5. BEING SO RESTLESS THAT IT IS HARD TO SIT STILL: 0
6. BECOMING EASILY ANNOYED OR IRRITABLE: 1
IF YOU CHECKED OFF ANY PROBLEMS ON THIS QUESTIONNAIRE, HOW DIFFICULT HAVE THESE PROBLEMS MADE IT FOR YOU TO DO YOUR WORK, TAKE CARE OF THINGS AT HOME, OR GET ALONG WITH OTHER PEOPLE: VERY DIFFICULT
GAD7 TOTAL SCORE: 14
7. FEELING AFRAID AS IF SOMETHING AWFUL MIGHT HAPPEN: 3
3. WORRYING TOO MUCH ABOUT DIFFERENT THINGS: 3
4. TROUBLE RELAXING: 1
2. NOT BEING ABLE TO STOP OR CONTROL WORRYING: 3

## 2023-01-10 ASSESSMENT — PATIENT HEALTH QUESTIONNAIRE - PHQ9
SUM OF ALL RESPONSES TO PHQ QUESTIONS 1-9: 1
1. LITTLE INTEREST OR PLEASURE IN DOING THINGS: 0
SUM OF ALL RESPONSES TO PHQ9 QUESTIONS 1 & 2: 1
SUM OF ALL RESPONSES TO PHQ QUESTIONS 1-9: 1
2. FEELING DOWN, DEPRESSED OR HOPELESS: 1
SUM OF ALL RESPONSES TO PHQ QUESTIONS 1-9: 1
SUM OF ALL RESPONSES TO PHQ QUESTIONS 1-9: 1

## 2023-01-10 NOTE — PROGRESS NOTES
S: 52 y.o. female with   Chief Complaint   Patient presents with    1 Month Follow-Up     Hyponatremia, orthostatic hypotension, adrenal insufficiency, compression fracture of L2    Flank Pain     L side, times 3-4 days       Hyponatremia, salt tabs 1g TID  Florinef daily  Orthostatic hypotension, midodrine 15mg TID  Anxiety, zoloft in the past (stopped d/t low BP), off work until March (makes worse), hobbies help, heart racing, mind-racing, trouble falling asleep, no panic attacks, QOL OK, willing to see counselor, no HI/SI  GAD7 of 14, PHQ2 of 0  Recent lumbar fractures, +persistent pain, +neurosurgery, +opiates    O: VS:  height is 5' 10\" (1.778 m) and weight is 215 lb (97.5 kg). Her temporal temperature is 97.9 °F (36.6 °C). Her blood pressure is 120/72 and her pulse is 75. Her respiration is 14 and oxygen saturation is 98%.   BP Readings from Last 3 Encounters:   01/10/23 120/72   01/06/23 (!) 136/92   12/14/22 113/69     See resident note      Impression/Plan:   1) Anxiety: Moderate, uncontrolled, trial of zoloft, recommend counseling  2) Adrenal insufficiency: +orthostatic hypotension and hyponatremia, monitor sodium, continue salt tabs/florinef/midodrine  3) Lumbar compression fractures: F/u neurosurgery      Health Maintenance Due   Topic Date Due    Colorectal Cancer Screen  Never done    Breast cancer screen  Never done    Shingles vaccine (2 of 3) 05/08/2020    Cervical cancer screen  05/04/2021    COVID-19 Vaccine (2 - Booster for Robby series) 05/05/2021         Attending Physician Statement  I have discussed the case, including pertinent history and exam findings with the resident.  I agree with the documented assessment and plan.      Virginie Jarquin MD

## 2023-01-10 NOTE — TELEPHONE ENCOUNTER
Patient called stating she was prescribed Norco because her insurance will not cover the Percocet. She states the Umesh Stark is not helping her. She wants to know what she should do.   Pt#  [phone]

## 2023-01-10 NOTE — PROGRESS NOTES
[facility]  Department of Family Medicine  Family Medicine Residency Program      Patient: Israel Thompson 45 y.o. female     Date of Service: 1/10/23      Chief complaint:   Chief Complaint   Patient presents with    1 Month Follow-Up     Hyponatremia, orthostatic hypotension, adrenal insufficiency, compression fracture of L2    Flank Pain     L side, times 3-4 days       HISTORY OF PRESENTING ILLNESS     45 y.o. female presented to the clinic with anxiety and/or depression. This is a/an chronic problem. This has been going on for  years since 2014. Exacerbating factors include fractures, off work till march  . Alleviating factors include listen to music, write. Treatment in the past includes sertraline. Anxiety symptoms include heart is racing, so much on her mind. Depressive symptoms include Sadness and Irritability. Patient is having issues falling or staying asleep. Patient is not  having associated panic attacks. The above is not interfering with quality of life. Patient has not seen a Counselor before,is open to seeing a counselor  Patient does not use alcohol and/or drugs. No thoughts of SI, HI, intension     Hyponatremia - still following with Dieticain and Bariatrics , all labs ordered     MYRA-7 SCREENING 1/10/2023   Feeling nervous, anxious, or on edge Nearly every day   Not being able to stop or control worrying Nearly every day   Worrying too much about different things Nearly every day   Trouble relaxing Several days   Being so restless that it is hard to sit still Not at all   Becoming easily annoyed or irritable Several days   Feeling afraid as if something awful might happen Nearly every day   MYRA-7 Total Score 14   How difficult have these problems made it for you to do your work, take care of things at home, or get along with other people?  Very difficult       PHQ-9 Total Score: 1 (1/10/2023  2:06 PM)      Past Medical History:      Diagnosis Date    Anemia Diabetes mellitus ([facility] [address]     Papanicolaou smear of cervix with atypical squamous cells of undetermined significance (ASC-US)          Screening for human papillomavirus (HPV)     4/10/07      Past Surgical History:        Procedure Laterality Date    COLPOSCOPY      2001 : outside [address] [place] [address]      [place]      [place]      [place] W/WO D&C RPR KNIFE/LASER      2001 : outside [place]      Allergies:    Colchicine and Darvon [propoxyphene]  Social History:   Social History     Socioeconomic History    Marital status: Single     Spouse name: Not on file    Number of children: Not on file    Years of education: Not on file    Highest education level: Not on file   Occupational History    Not on file   Tobacco Use    Smoking status: Former     Packs/day: 0.50     Years: 1.00     Pack years: 0.50     Types: Cigarettes     Quit date:      Years since quittin.0    Smokeless tobacco: Never    Tobacco comments:     Quit smokin2002   Vaping Use    Vaping Use: Never used   Substance and Sexual Activity    Alcohol use: Not on file    Drug use: No    Sexual activity: Not Currently   Other Topics Concern    Not on file   Social History Narrative    Not on file     Social Determinants of Health     Financial Resource Strain: Low Risk     Difficulty of Paying Living Expenses: Not hard at all   Food Insecurity: No Food Insecurity    Worried About Running Out of Food in the Last Year: Never true    [address] in the Last Year: Never true   Transportation Needs: No Transportation Needs    Lack of Transportation (Medical): No    Lack of Transportation (Non-Medical):  No   Physical Activity: Not on file   Stress: Not on file   Social Connections: Not on file   Intimate Partner Violence: Not on file   Housing Stability: Not on file      Family History: Problem Relation Age of Onset    Diabetes Mother     Hypertension Mother     Diabetes Father     Hypertension Father     Gout Father     Heart Disease None     Ovarian Cancer None     Uterine Cancer None     Breast Cancer None      Review of Systems:   Review of Systems   Constitutional:  Negative for fatigue, fever and unexpected weight change. HENT:  Negative for ear pain, sinus pressure and sinus pain. Eyes:  Negative for pain, discharge, redness and visual disturbance. Respiratory:  Negative for cough, chest tightness and shortness of breath. Cardiovascular:  Negative for chest pain and leg swelling. Gastrointestinal:  Negative for abdominal pain, blood in stool, diarrhea, nausea and vomiting. Genitourinary:  Negative for difficulty urinating. Musculoskeletal:  Positive for back pain and neck pain. Negative for arthralgias, gait problem, myalgias and neck stiffness. Skin:  Negative for wound. Neurological:  Negative for dizziness, light-headedness, numbness and headaches. Hematological: Negative. Psychiatric/Behavioral:  Negative for sleep disturbance and suicidal ideas. The patient is nervous/anxious. Decreased mood     PHYSICAL EXAM   Vitals: /72   Pulse 75   Temp 97.9 °F (36.6 °C) (Temporal)   Resp 14   Ht 5' 10\" (1.778 m)   Wt 215 lb (97.5 kg)   SpO2 98%   BMI 30.85 kg/m²   Physical Exam  Vitals reviewed. Constitutional:       Appearance: Normal appearance. HENT:      Head: Normocephalic and atraumatic. Nose: Nose normal. No congestion or rhinorrhea. Mouth/Throat:      Mouth: Mucous membranes are moist.      Pharynx: Oropharynx is clear. Eyes:      General: No scleral icterus. Extraocular Movements: Extraocular movements intact. Conjunctiva/sclera: Conjunctivae normal.      Pupils: Pupils are equal, round, and reactive to light. Neck:      Vascular: No carotid bruit.    Cardiovascular:      Rate and Rhythm: Normal rate and regular rhythm. Heart sounds: Normal heart sounds. No murmur heard. No gallop. Pulmonary:      Effort: Pulmonary effort is normal.      Breath sounds: Normal breath sounds. No wheezing or rales. Abdominal:      General: Bowel sounds are normal. There is no distension. Palpations: Abdomen is soft. Tenderness: There is no abdominal tenderness. There is no guarding. Musculoskeletal:      Cervical back: Normal range of motion and neck supple. Right lower leg: No edema. Left lower leg: No edema. Comments: Decreased movement - back brace in place from healing spinal fractures    Skin:     Coloration: Skin is not jaundiced. Neurological:      General: No focal deficit present. Mental Status: She is alert and oriented to person, place, and time. Cranial Nerves: No cranial nerve deficit. Psychiatric:         Attention and Perception: Attention normal.         Mood and Affect: Mood is anxious and depressed. Speech: Speech normal.         Thought Content: Thought content does not include suicidal ideation. Thought content does not include suicidal plan. ASSESSMENT AND PLAN     1. Anxiety  -Encouraged patient to talk to counselor and list provided to patient   -Encouraged to do one pleasurable activity a day   -Discussed relaxation techniques with patient including - box breathing, increasing physical activity as able/walking, music, and meditation.   -Continue to monitor for specific triggers   -Continue to take medication as prescribed  -Patient understood and agrees with plan. - sertraline (ZOLOFT) 50 MG tablet; Take 1 tablet by mouth daily  Dispense: 30 tablet; Refill: 0 - trial, close follow up    2. Medication refill  Adrenal insufficiency (HCC)  - fludrocortisone (FLORINEF) 0.1 MG tablet; Take 1 tablet by mouth daily  Dispense: 30 tablet; Refill: 3  Orthostatic hypotension  - midodrine (PROAMATINE) 5 MG tablet;  Take 3 tablets by mouth 3 times daily (with meals)  Dispense: 90 tablet; Refill: 3    3. Breast cancer screening by mammogram  - [facility] DIGITAL SCREEN BILATERAL PER PROTOCOL; Future    Patient states that she is still in pain, she was on the phone with her when I first entered the room with her neurosurgical team who ordered some imaging and increased pain medications for her. Health Maintenance:   Encouraged and counseled Brielle Robison  health maintenance      [facility]   Cervical screening -full hysterectomy 2015 - abdominal , due to fibroids   Breast cancer screening 2021 - no family hisotry of breast cancer       Counseled regarding above diagnosis, including possible risks and complications, especially if left uncontrolled. Counseled regarding the possible side effects, risks, benefits and alternatives to treatment; patient and/or guardian verbalizes understanding, agrees, feels comfortable with and wishes to proceed with above treatment plan. Call or go to ED immediately if symptoms worsen or persist. Advised patient to call with any new medication issues, and, as applicable, read all Rx info from pharmacy to assure aware of all possible risks and side effects of medication before taking. Patient and/or guardian given opportunity to ask questions/raise concerns. The patient verbalized comfort and understanding of instructions.     Medication List:    Current Outpatient Medications   Medication Sig Dispense Refill    fludrocortisone (FLORINEF) 0.1 MG tablet Take 1 tablet by mouth daily 30 tablet 3    Incontinence Supply Disposable (PREVAIL EXTRA PLUS) PADS 30 each by Does not apply route 3 times daily as needed (change as needed) 60 each 3    midodrine (PROAMATINE) 5 MG tablet Take 3 tablets by mouth 3 times daily (with meals) 90 tablet 3    sertraline (ZOLOFT) 50 MG tablet Take 1 tablet by mouth daily 30 tablet 0    Handicap Placard MISC by Does not apply route 1 each 0    ammonium lactate (LAC-HYDRIN) 12 % lotion Apply topically as needed. 225 g 1    b complex-C-folic acid (NEPHROCAPS) 1 MG capsule Take 1 capsule by mouth 3 times daily 30 capsule 3    lidocaine 4 % external patch Place 1 patch onto the skin daily 30 each 5    melatonin 3 MG TABS tablet Take 1 tablet by mouth nightly as needed (insomnia)  3    miconazole (MICOTIN) 2 % powder Apply topically 2 times daily. 45 g 1    sodium chloride 1 g tablet Take 1 tablet by mouth 3 times daily (with meals) 90 tablet 3    white petrolatum OINT ointment Apply topically 2 times daily as needed (skin irritation) 500 g 0    Multiple Vitamin (MULTIVITAMINS PO) Take 3 tablets by mouth daily      CALCIUM PO Take 2 tablets by mouth daily      folic acid (FOLVITE) 1 MG tablet Take 1 mg by mouth daily      ibuprofen (IBU) 600 MG tablet Take 1 tablet by mouth every 6 hours as needed for Pain 120 tablet 3    pantoprazole (PROTONIX) 40 MG tablet Take 1 tablet by mouth every morning (before breakfast) (Patient not taking: Reported on 1/10/2023) 30 tablet 3     No current facility-administered medications for this visit. Return to Office:  6 weeks - follow up on anxiety and medication use    This document may have been prepared at least partially through the use of voice recognition software. Although effort is taken to assure the accuracy of this document, it is possible that grammatical, syntax,  or spelling errors may occur.     Gisselle Merino MD

## 2023-01-11 ENCOUNTER — TELEPHONE (OUTPATIENT)
Dept: NEUROSURGERY | Age: 53
End: 2023-01-11

## 2023-01-11 RX ORDER — IBUPROFEN 600 MG/1
600 TABLET ORAL EVERY 6 HOURS PRN
Qty: 120 TABLET | Refills: 3 | Status: SHIPPED | OUTPATIENT
Start: 2023-01-11

## 2023-01-11 NOTE — TELEPHONE ENCOUNTER
Patient had xrays at [facility]. Renu Green had ordered them. She wants to know if he can review and call her back.

## 2023-01-12 ASSESSMENT — ENCOUNTER SYMPTOMS
EYE DISCHARGE: 0
EYE PAIN: 0
ABDOMINAL PAIN: 0
NAUSEA: 0
BACK PAIN: 1
CHEST TIGHTNESS: 0
EYE REDNESS: 0
SHORTNESS OF BREATH: 0
COUGH: 0
DIARRHEA: 0
VOMITING: 0
BLOOD IN STOOL: 0
SINUS PAIN: 0
SINUS PRESSURE: 0

## 2023-01-13 ENCOUNTER — TELEPHONE (OUTPATIENT)
Dept: NEUROSURGERY | Age: 53
End: 2023-01-13

## 2023-01-13 DIAGNOSIS — S22.080D COMPRESSION FRACTURE OF T12 VERTEBRA WITH ROUTINE HEALING, SUBSEQUENT ENCOUNTER: ICD-10-CM

## 2023-01-13 RX ORDER — HYDROCODONE BITARTRATE AND ACETAMINOPHEN 5; 325 MG/1; MG/1
1 TABLET ORAL EVERY 6 HOURS PRN
Qty: 28 TABLET | Refills: 0 | Status: SHIPPED | OUTPATIENT
Start: 2023-01-13 | End: 2023-01-20

## 2023-01-19 ENCOUNTER — TELEPHONE (OUTPATIENT)
Dept: NEUROSURGERY | Age: 53
End: 2023-01-19

## 2023-01-19 DIAGNOSIS — S32.030D CLOSED COMPRESSION FRACTURE OF L3 LUMBAR VERTEBRA WITH ROUTINE HEALING, SUBSEQUENT ENCOUNTER: ICD-10-CM

## 2023-01-19 DIAGNOSIS — S32.020D CLOSED COMPRESSION FRACTURE OF L2 LUMBAR VERTEBRA WITH ROUTINE HEALING, SUBSEQUENT ENCOUNTER: Primary | ICD-10-CM

## 2023-01-19 RX ORDER — OXYCODONE HYDROCHLORIDE 5 MG/1
5 TABLET ORAL EVERY 4 HOURS PRN
Qty: 42 TABLET | Refills: 0 | Status: SHIPPED | OUTPATIENT
Start: 2023-01-19 | End: 2023-01-26

## 2023-01-19 NOTE — TELEPHONE ENCOUNTER
Patient called with complaints of hydrocodone and ibuprofen not doing anything at all for her pain  the pain is waking her up  she would like a call back to discuss this matter

## 2023-01-23 NOTE — TELEPHONE ENCOUNTER
Submitted PA on [employer], received fax for more info, filled out form and faxed back to fax number on paper.

## 2023-01-26 ENCOUNTER — TELEPHONE (OUTPATIENT)
Dept: NEUROSURGERY | Age: 53
End: 2023-01-26

## 2023-01-26 DIAGNOSIS — S32.030D CLOSED COMPRESSION FRACTURE OF L3 LUMBAR VERTEBRA WITH ROUTINE HEALING, SUBSEQUENT ENCOUNTER: ICD-10-CM

## 2023-01-26 DIAGNOSIS — S32.020D CLOSED COMPRESSION FRACTURE OF L2 LUMBAR VERTEBRA WITH ROUTINE HEALING, SUBSEQUENT ENCOUNTER: ICD-10-CM

## 2023-01-26 RX ORDER — OXYCODONE HYDROCHLORIDE 5 MG/1
5 TABLET ORAL EVERY 4 HOURS PRN
Qty: 42 TABLET | Refills: 0 | Status: SHIPPED | OUTPATIENT
Start: 2023-01-26 | End: 2023-02-02

## 2023-01-26 NOTE — TELEPHONE ENCOUNTER
Patient would like refill for Oxycodone 5 mg immediate release sent to [facility] on [address] in [facility].

## 2023-01-31 ENCOUNTER — TELEPHONE (OUTPATIENT)
Dept: NEUROSURGERY | Age: 53
End: 2023-01-31

## 2023-01-31 DIAGNOSIS — S32.030A CLOSED COMPRESSION FRACTURE OF L3 VERTEBRA, INITIAL ENCOUNTER (HCC): ICD-10-CM

## 2023-01-31 DIAGNOSIS — S32.030D CLOSED COMPRESSION FRACTURE OF L3 LUMBAR VERTEBRA WITH ROUTINE HEALING, SUBSEQUENT ENCOUNTER: ICD-10-CM

## 2023-01-31 DIAGNOSIS — S32.020D CLOSED COMPRESSION FRACTURE OF L2 LUMBAR VERTEBRA WITH ROUTINE HEALING, SUBSEQUENT ENCOUNTER: ICD-10-CM

## 2023-01-31 DIAGNOSIS — S32.020D COMPRESSION FRACTURE OF L2 VERTEBRA WITH ROUTINE HEALING, SUBSEQUENT ENCOUNTER: Primary | ICD-10-CM

## 2023-01-31 RX ORDER — OXYCODONE HYDROCHLORIDE 5 MG/1
5 TABLET ORAL EVERY 4 HOURS PRN
Qty: 42 TABLET | Refills: 0 | Status: SHIPPED | OUTPATIENT
Start: 2023-01-31 | End: 2023-02-07

## 2023-01-31 NOTE — TELEPHONE ENCOUNTER
Patient requesting to speak with Patricia Toney, [phone] regarding her pain and pain medication.

## 2023-02-07 ENCOUNTER — HOSPITAL ENCOUNTER (OUTPATIENT)
Dept: MRI IMAGING | Age: 53
Discharge: HOME | End: 2023-02-09
Payer: MEDICAID

## 2023-02-07 ENCOUNTER — TELEPHONE (OUTPATIENT)
Dept: NEUROSURGERY | Age: 53
End: 2023-02-07

## 2023-02-07 DIAGNOSIS — S32.020D CLOSED COMPRESSION FRACTURE OF L2 LUMBAR VERTEBRA WITH ROUTINE HEALING, SUBSEQUENT ENCOUNTER: ICD-10-CM

## 2023-02-07 DIAGNOSIS — S32.030D CLOSED COMPRESSION FRACTURE OF L3 LUMBAR VERTEBRA WITH ROUTINE HEALING, SUBSEQUENT ENCOUNTER: ICD-10-CM

## 2023-02-07 DIAGNOSIS — S32.030A CLOSED COMPRESSION FRACTURE OF L3 VERTEBRA, INITIAL ENCOUNTER (HCC): ICD-10-CM

## 2023-02-07 DIAGNOSIS — S32.020D COMPRESSION FRACTURE OF L2 VERTEBRA WITH ROUTINE HEALING, SUBSEQUENT ENCOUNTER: ICD-10-CM

## 2023-02-07 PROCEDURE — 72148 MRI LUMBAR SPINE W/O DYE: CPT

## 2023-02-07 RX ORDER — OXYCODONE HYDROCHLORIDE 5 MG/1
5 TABLET ORAL EVERY 4 HOURS PRN
Qty: 42 TABLET | Refills: 0 | Status: SHIPPED
Start: 2023-02-07 | End: 2023-02-08

## 2023-02-07 NOTE — TELEPHONE ENCOUNTER
Patient called stating she had MRI done today and has appt with Pain [facility] tomorrow. Patient has appt with SHAMA Stoner on Friday. Patient requesting refill on Oxycodone to [facility].

## 2023-02-08 ENCOUNTER — OFFICE VISIT (OUTPATIENT)
Dept: PAIN MANAGEMENT | Age: 53
End: 2023-02-08
Payer: MEDICAID

## 2023-02-08 VITALS
DIASTOLIC BLOOD PRESSURE: 65 MMHG | HEART RATE: 92 BPM | BODY MASS INDEX: 27.44 KG/M2 | OXYGEN SATURATION: 95 % | TEMPERATURE: 97.6 F | HEIGHT: 71 IN | WEIGHT: 196 LBS | RESPIRATION RATE: 16 BRPM | SYSTOLIC BLOOD PRESSURE: 100 MMHG

## 2023-02-08 DIAGNOSIS — M48.061 SPINAL STENOSIS OF LUMBAR REGION, UNSPECIFIED WHETHER NEUROGENIC CLAUDICATION PRESENT: ICD-10-CM

## 2023-02-08 DIAGNOSIS — S32.000S COMPRESSION FRACTURE OF LUMBAR VERTEBRA, UNSPECIFIED LUMBAR VERTEBRAL LEVEL, SEQUELA: ICD-10-CM

## 2023-02-08 DIAGNOSIS — Z79.891 ENCOUNTER FOR LONG-TERM OPIATE ANALGESIC USE: ICD-10-CM

## 2023-02-08 DIAGNOSIS — M51.36 DDD (DEGENERATIVE DISC DISEASE), LUMBAR: Primary | ICD-10-CM

## 2023-02-08 DIAGNOSIS — M47.817 LUMBOSACRAL SPONDYLOSIS WITHOUT MYELOPATHY: ICD-10-CM

## 2023-02-08 PROBLEM — S32.000A COMPRESSION OF LUMBAR VERTEBRA (HCC): Status: ACTIVE | Noted: 2022-11-23

## 2023-02-08 PROBLEM — M51.369 DDD (DEGENERATIVE DISC DISEASE), LUMBAR: Status: ACTIVE | Noted: 2023-02-08

## 2023-02-08 PROCEDURE — 99205 OFFICE O/P NEW HI 60 MIN: CPT | Performed by: ANESTHESIOLOGY

## 2023-02-08 PROCEDURE — 99204 OFFICE O/P NEW MOD 45 MIN: CPT | Performed by: ANESTHESIOLOGY

## 2023-02-08 RX ORDER — OXYCODONE HYDROCHLORIDE AND ACETAMINOPHEN 5; 325 MG/1; MG/1
1 TABLET ORAL EVERY 8 HOURS PRN
Qty: 30 TABLET | Refills: 0 | Status: SHIPPED | OUTPATIENT
Start: 2023-02-08 | End: 2023-02-23

## 2023-02-08 NOTE — PROGRESS NOTES
Patient:  Alessio Amos,  1970  Date of Service:  23      Do you currently have any of the following:    Fever: No  Headache:  No  Cough: No  Shortness of breath: No  Exposed to anyone with these symptoms: No       Patient presents to [facility] with complaints of lower back pain that started 4 months ago and has been getting worse. She states the pain began following a fall    Pain is constant and is described as aching, throbbing, shooting, stabbing, and sharp. She rates the pain as a 10/10 on her worst day , 6/10 on her best day, and a 9/10 on average on the VAS scale. Pain does radiate around to the abdomen. She  does not have numbness, tingling, weakness. Alleviating factors include: nothing. Aggravating factors include:  walking, standing, sitting, bending. She states that the pain does keep her from sleeping at night. She took her last dose of Motrin and oxycodone today. She is  on NSAIDS and  is not on anticoagulation medications. Previous treatments: Physical Therapy and medications. .      Personal Expectations from this treatment: increase activity and decrease pain    Resp 16   Ht 5' 11\" (1.803 m)   Wt 196 lb (88.9 kg)   BMI 27.34 kg/m²     No LMP recorded. Patient has had a hysterectomy.

## 2023-02-08 NOTE — H&P (VIEW-ONLY)
[facility] Pain Management        [address] [place] [address]  Dept: [phone]          Consult Note      Patient:  MINAL Rivera 1970    Date of Service:  23     Requesting Physician:  Yaneth Ferguson, [id number]    Reason for Consult:      Patient presents with complaints of low back pain    HISTORY OF PRESENT ILLNESS:      Ms. Titi Kumar is a 45 y.o. female presented today to [facility] for evaluation of  low back pain since 2022. H/o fall and has low back pain. Has lumbar VCF - evaluated by NSG- conservative management with bracing and analgesics. Low back pain predominelty axial in nature. Had recent MRI of LS spine. Pain is constant and is described as aching and throbbing. Patient does not have bladder or bowel dysfunction. Alleviating factors include: rest.  Aggravating factors include: movement, bending, lifting. Pain causes functional limitations/ limits Adl's : Yes    Nursing notes and details of the pain history reviewed. Please see intake notes for details. S/p Gastric sleeve in Aug 2022 has lost significant weight following the surgery. Previous treatments:   Physical Therapy/ HEP : yes,     Medications: - NSAID's : yes             - Membrane stabilizers : yes - gabapentin- had side effects            - Opioids : yes,             - Adjuvants or Others : yes,    Spine Surgeries: no    She has not been on anticoagulation medications     H/O Smoking: no  H/O alcohol abuse : no  H/O Illicit drug use : denies    Employment: currently off work    Imaging:     MRI of LS spine: 2023:  Impression   1. Fairly old compression fractures of L2 through L5 as noted above with up   to 50% loss of height at L3 and 45% loss of height at L2. No acute fracture   seen.    2. Degenerative change with multiple disc bulges and multilevel central canal   stenosis the, most prominent (moderate) at L3-4.   3. Multilevel neural foraminal stenosis as noted above. Xray LS spine: 2023:  Impression   Stable mild compression injuries at L2 and L3. Degenerative lumbar   spondylosis as before. Past Medical History:   Diagnosis Date    Anemia     Diabetes mellitus ([address]     Papanicolaou smear of cervix with atypical squamous cells of undetermined significance (ASC-US)          Screening for human papillomavirus (HPV)     4/10/07        Past Surgical History:   Procedure Laterality Date    COLPOSCOPY      2001 : outside  [facility]      2022    ME  DELIVERY ONLY W/POSTPARTUM CARE      1991 :      ME  DELIVERY ONLY W/POSTPARTUM CARE      1998 :      ME CONIZATION CERVIX W/WO D&C RPR KNIFE/LASER      2001 : outside  [address]        Prior to Admission medications    Medication Sig Start Date End Date Taking? Authorizing Provider   oxyCODONE (ROXICODONE) 5 MG immediate release tablet Take 1 tablet by mouth every 4 hours as needed for Pain for up to 7 days. Intended supply: 7 days. Take lowest dose possible to manage pain Max Daily Amount: 30 mg 23 Yes SHAMA Mc   ibuprofen (IBU) 600 MG tablet Take 1 tablet by mouth every 6 hours as needed for Pain 23  Yes SHAMA Paul   fludrocortisone (FLORINEF) 0.1 MG tablet Take 1 tablet by mouth daily 1/10/23 5/10/23 Yes Fidel Flynn MD   Incontinence Supply Disposable (PREVAIL EXTRA PLUS) PADS 30 each by Does not apply route 3 times daily as needed (change as needed) 1/10/23  Yes Fidel Flynn MD   midodrine (PROAMATINE) 5 MG tablet Take 3 tablets by mouth 3 times daily (with meals) 1/10/23  Yes Fidel Flynn MD   sertraline (ZOLOFT) 50 MG tablet Take 1 tablet by mouth daily 1/10/23  Yes Fidel Flynn MD   Handicap Placard MISC by Does not apply route 22  Yes Reece Mcdonough PA   ammonium lactate (LAC-HYDRIN) 12 % lotion Apply topically as needed.  22  Yes MD tuan Lemon complex-C-folic acid (NEPHROCAPS) 1 MG capsule Take 1 capsule by mouth 3 times daily 22  Yes Beth Osuna MD   lidocaine 4 % external patch Place 1 patch onto the skin daily 22  Yes Beth Osuna MD   melatonin 3 MG TABS tablet Take 1 tablet by mouth nightly as needed (insomnia) 22  Yes Beth Osuna MD   miconazole (MICOTIN) 2 % powder Apply topically 2 times daily.  22  Yes Beth Osuna MD   sodium chloride 1 g tablet Take 1 tablet by mouth 3 times daily (with meals) 22  Yes Beth Osuna MD   white petrolatum OINT ointment Apply topically 2 times daily as needed (skin irritation) 22  Yes Beth Osuna MD   Multiple Vitamin (MULTIVITAMINS PO) Take 3 tablets by mouth daily   Yes Historical Provider, MD   CALCIUM PO Take 2 tablets by mouth daily   Yes Historical Provider, MD   folic acid (FOLVITE) 1 MG tablet Take 1 mg by mouth daily 10/3/22  Yes Historical Provider, MD   pantoprazole (PROTONIX) 40 MG tablet Take 1 tablet by mouth every morning (before breakfast)  Patient not taking: Reported on 2023   Beth Osuna MD       Allergies   Allergen Reactions    Colchicine     Darvon [Propoxyphene] Other (See Comments)     GI Upset       Social History     Socioeconomic History    Marital status: Single     Spouse name: Not on file    Number of children: Not on file    Years of education: Not on file    Highest education level: Not on file   Occupational History    Not on file   Tobacco Use    Smoking status: Former     Packs/day: 0.50     Years: 1.00     Pack years: 0.50     Types: Cigarettes     Quit date:      Years since quittin.1    Smokeless tobacco: Never    Tobacco comments:     Quit smokin2002   Vaping Use    Vaping Use: Never used   Substance and Sexual Activity    Alcohol use: Not Currently    Drug use: No    Sexual activity: Not Currently   Other Topics Concern    Not on file   Social History Narrative    Not on file     Social Determinants of Health     Financial Resource Strain: Low Risk     Difficulty of Paying Living Expenses: Not hard at all   Food Insecurity: No Food Insecurity    Worried About [employer] [employer] in the Last Year: Never true    [facility] in the Last Year: Never true   Transportation Needs: No Transportation Needs    Lack of Transportation (Medical): No    Lack of Transportation (Non-Medical): No   Physical Activity: Not on file   Stress: Not on file   Social Connections: Not on file   Intimate Partner Violence: Not on file   Housing Stability: Not on file       Family History   Problem Relation Age of Onset    Diabetes Mother     Hypertension Mother     Diabetes Father     Hypertension Father     Gout Father     Heart Disease None     Ovarian Cancer None     Uterine Cancer None     Breast Cancer None        REVIEW OF SYSTEMS:     Patient specifically denies fever/chills, chest pain, shortness of breath, new bowel or bladder complaints. All other review of systems was negative. Review of Systems - documented reviewed. PHYSICAL EXAMINATION:      /65   Pulse 92   Temp 97.6 °F (36.4 °C) (Infrared)   Resp 16   Ht 5' 11\" (1.803 m)   Wt 196 lb (88.9 kg)   SpO2 95%   BMI 27.34 kg/m²     General:      General appearance:  Pleasant and well-hydrated, in no distress and A & O x 3  Build:normal  Function: Rises from seated position easily    HEENT:    Head:normocephalic, atraumatic  Pupils:regular, round, equal  Sclera: icterus absent    Lungs:    Breathing:normal breathing pattern     CVS:     RRR    Abdomen:    Shape:non-distended and normal  Tenderness:none  Guarding:none    Cervical spine:    Inspection:normal  Palpation:tenderness paravertebral muscles, tenderness trapezium, left, right and positive.   Range of motion:NO pain    Thoracic spine:     Spine inspection:normal   Palpation:No tenderness over the midline and paraspinal area, bilaterally  Range of motion:normal in flexion, extension rotation bilateral and is not painful. Lumbar spine:    Spine inspection: Normal   Palpation: Tenderness over the lumbar spine +, Tenderness paravertebral muscles Yes bilaterally  Range of motion: Decreased, flexion Decreased, Lateral bending, extension and rotation bilaterally reduced is painful. Sacroiliac joint tenderness No bilaterally  FRANDY test: negative   Gaenslen's test:negative    SLR : negative bilaterally    Musculoskeletal:    Trigger points +    Extremities:    Tremors:None bilaterally upper and lower  Edema:no    Neurological:    Sensory: Normal to light touch     Motor:   B/l LE 4/5 - appears to be effort dependent weakness    Reflexes:    B/l LE equal    Dermatology:    Skin:no rashes or lesions noted    Assessment/Plan:     Diagnosis Orders   1. DDD (degenerative disc disease), lumbar        2. Lumbosacral spondylosis without myelopathy        3. Spinal stenosis of lumbar region, unspecified whether neurogenic claudication present        4. Compression fracture of lumbar vertebra, unspecified lumbar vertebral level, sequela          45 y.o. female with H/o low back pain. Lumbar DDD and VCF- healed/ old. Has been evaluated by NSG- conservative treatment. On Oxycodone 5 mg prn. Has been prescribed by NSG. Recent X-ray and MRI of LS spine reviewed personally and discussed the findings with the patient. PLAN:    Will schedule for TFESI under fluoroscopic guidance to help with pain and to assist with PT. Moderate sedation due to H/o anxiety of needles. Change oxycodone to Percocet 5/325 po tid prn. RBA of opioid use discussed. Discussed issues with chronic opioids and recommend to limit dose to achieve moderate pain relief and improve functionality. ZT lido patch for local use. Samples and script given. She has a follow up appointment with NSG later this week. Had side effect to gabapentin- consider adding Lyrica in future if continued pain.     UDS/ Buccal screen today 2/8/2023. Opioid agreement today 2/8/2023-discussed salient features of opioid agreement. Counseling : Patient encouraged to stay active and  to continue Regular home exercise program as tolerated - stretching / strengthening. Treatment plan discussed with the patient including medication and procedure side effects. Controlled Substances Monitoring: OARRS reviewed. We discussed with the patient that combining opioids, benzodiazepines, alcohol, illicit drugs or sleep aids increases the risk of respiratory depression including death. We discussed that these medications may cause drowsiness, sedation or dizziness and have counseled the patient not to drive or operate machinery. We have discussed that these medications will be prescribed only by one provider. We have discussed with the patient about age related risk factors and have thoroughly discussed the importance of taking these medications as prescribed. The patient verbalizes understanding.     Amanda Mccoy MD    CC:    Phu , [address] [place] [address],  [address] [place] [address]     Zuleyma Pepe MD  [address]  [address] [facility] [phone]

## 2023-02-08 NOTE — PROGRESS NOTES
[facility]        [address] [place] [id number]  Dept: [phone]          Consult Note      Patient:  Morteza Landis  1970    Date of Service:  23     Requesting Physician:  Jodi Anton    Reason for Consult:      Patient presents with complaints of low back pain    HISTORY OF PRESENT ILLNESS:      Ms. Morteza Landis is a 45 y.o. female presented today to [facility] for evaluation of  low back pain since 2022. H/o fall and has low back pain. Has lumbar VCF - evaluated by NSG- conservative management with bracing and analgesics. Low back pain predominelty axial in nature. Had recent MRI of LS spine. Pain is constant and is described as aching and throbbing. Patient does not have bladder or bowel dysfunction. Alleviating factors include: rest.  Aggravating factors include: movement, bending, lifting. Pain causes functional limitations/ limits Adl's : Yes    Nursing notes and details of the pain history reviewed. Please see intake notes for details. S/p Gastric sleeve in Aug 2022 has lost significant weight following the surgery. Previous treatments:   Physical Therapy/ HEP : yes,     Medications: - NSAID's : yes             - Membrane stabilizers : yes - gabapentin- had side effects            - Opioids : yes,             - Adjuvants or Others : yes,    Spine Surgeries: no    She has not been on anticoagulation medications     H/O Smoking: no  H/O alcohol abuse : no  H/O Illicit drug use : denies    Employment: currently off work    Imaging:     MRI of LS spine: 2023:  Impression   1. Fairly old compression fractures of L2 through L5 as noted above with up   to 50% loss of height at L3 and 45% loss of height at L2. No acute fracture   seen.    2. Degenerative change with multiple disc bulges and multilevel central canal   stenosis the, most prominent (moderate) at L3-4.   3. Multilevel neural foraminal stenosis as noted above. Xray LS spine: 2023:  Impression   Stable mild compression injuries at L2 and L3. Degenerative lumbar   spondylosis as before. Past Medical History:   Diagnosis Date    Anemia     Diabetes mellitus ([address]     Papanicolaou smear of cervix with atypical squamous cells of undetermined significance (ASC-US)          Screening for human papillomavirus (HPV)     4/10/07        Past Surgical History:   Procedure Laterality Date    COLPOSCOPY      2001 : outside  [facility]      2022    UT  DELIVERY ONLY W/POSTPARTUM CARE      1991 :      UT  DELIVERY ONLY W/POSTPARTUM CARE      1998 :      [facility] CONIZATION CERVIX W/WO D&C RPR KNIFE/LASER      2001 : outside  [address]        Prior to Admission medications    Medication Sig Start Date End Date Taking? Authorizing Provider   oxyCODONE (ROXICODONE) 5 MG immediate release tablet Take 1 tablet by mouth every 4 hours as needed for Pain for up to 7 days. Intended supply: 7 days. Take lowest dose possible to manage pain Max Daily Amount: 30 mg 23 Yes SHAMA Arzola   ibuprofen (IBU) 600 MG tablet Take 1 tablet by mouth every 6 hours as needed for Pain 23  Yes SHAMA Bullock   fludrocortisone (FLORINEF) 0.1 MG tablet Take 1 tablet by mouth daily 1/10/23 5/10/23 Yes Aundria Sever, MD   Incontinence Supply Disposable (PREVAIL EXTRA PLUS) PADS 30 each by Does not apply route 3 times daily as needed (change as needed) 1/10/23  Yes Aundria Sever, MD   midodrine (PROAMATINE) 5 MG tablet Take 3 tablets by mouth 3 times daily (with meals) 1/10/23  Yes Aundria Sever, MD   sertraline (ZOLOFT) 50 MG tablet Take 1 tablet by mouth daily 1/10/23  Yes Aundria Sever, MD   Handicap Placard MISC by Does not apply route 22  Yes SHAMA Bullock   ammonium lactate (LAC-HYDRIN) 12 % lotion Apply topically as needed.  22  Yes MD tuan Rolle complex-C-folic acid (NEPHROCAPS) 1 MG capsule Take 1 capsule by mouth 3 times daily 22  Yes Lorna Mayberry MD   lidocaine 4 % external patch Place 1 patch onto the skin daily 22  Yes Lorna Mayberry MD   melatonin 3 MG TABS tablet Take 1 tablet by mouth nightly as needed (insomnia) 22  Yes Lorna Mayberry MD   miconazole (MICOTIN) 2 % powder Apply topically 2 times daily.  22  Yes Lorna Mayberry MD   sodium chloride 1 g tablet Take 1 tablet by mouth 3 times daily (with meals) 22  Yes Lorna Mayberry MD   white petrolatum OINT ointment Apply topically 2 times daily as needed (skin irritation) 22  Yes Lorna Mayberry MD   Multiple Vitamin (MULTIVITAMINS PO) Take 3 tablets by mouth daily   Yes Historical Provider, MD   CALCIUM PO Take 2 tablets by mouth daily   Yes Historical Provider, MD   folic acid (FOLVITE) 1 MG tablet Take 1 mg by mouth daily 10/3/22  Yes Historical Provider, MD   pantoprazole (PROTONIX) 40 MG tablet Take 1 tablet by mouth every morning (before breakfast)  Patient not taking: Reported on 2023   Lorna Mayberry MD       Allergies   Allergen Reactions    Colchicine     Darvon [Propoxyphene] Other (See Comments)     GI Upset       Social History     Socioeconomic History    Marital status: Single     Spouse name: Not on file    Number of children: Not on file    Years of education: Not on file    Highest education level: Not on file   Occupational History    Not on file   Tobacco Use    Smoking status: Former     Packs/day: 0.50     Years: 1.00     Pack years: 0.50     Types: Cigarettes     Quit date:      Years since quittin.1    Smokeless tobacco: Never    Tobacco comments:     Quit smokin2002   Vaping Use    Vaping Use: Never used   Substance and Sexual Activity    Alcohol use: Not Currently    Drug use: No    Sexual activity: Not Currently   Other Topics Concern    Not on file   Social History Narrative    Not on file     Social Determinants of Health     Financial Resource Strain: Low Risk     Difficulty of Paying Living Expenses: Not hard at all   Food Insecurity: No Food Insecurity    Worried About [employer] [email or web address] in the Last Year: Never true    [facility] in the Last Year: Never true   Transportation Needs: No Transportation Needs    Lack of Transportation (Medical): No    Lack of Transportation (Non-Medical): No   Physical Activity: Not on file   Stress: Not on file   Social Connections: Not on file   Intimate Partner Violence: Not on file   Housing Stability: Not on file       Family History   Problem Relation Age of Onset    Diabetes Mother     Hypertension Mother     Diabetes Father     Hypertension Father     Gout Father     Heart Disease None     Ovarian Cancer None     Uterine Cancer None     Breast Cancer None        REVIEW OF SYSTEMS:     Patient specifically denies fever/chills, chest pain, shortness of breath, new bowel or bladder complaints. All other review of systems was negative. Review of Systems - documented reviewed. PHYSICAL EXAMINATION:      /65   Pulse 92   Temp 97.6 °F (36.4 °C) (Infrared)   Resp 16   Ht 5' 11\" (1.803 m)   Wt 196 lb (88.9 kg)   SpO2 95%   BMI 27.34 kg/m²     General:      General appearance:  Pleasant and well-hydrated, in no distress and A & O x 3  Build:normal  Function: Rises from seated position easily    HEENT:    Head:normocephalic, atraumatic  Pupils:regular, round, equal  Sclera: icterus absent    Lungs:    Breathing:normal breathing pattern     CVS:     RRR    Abdomen:    Shape:non-distended and normal  Tenderness:none  Guarding:none    Cervical spine:    Inspection:normal  Palpation:tenderness paravertebral muscles, tenderness trapezium, left, right and positive.   Range of motion:NO pain    Thoracic spine:     Spine inspection:normal   Palpation:No tenderness over the midline and paraspinal area, bilaterally  Range of motion:normal in flexion, extension rotation bilateral and is not painful. Lumbar spine:    Spine inspection: Normal   Palpation: Tenderness over the lumbar spine +, Tenderness paravertebral muscles Yes bilaterally  Range of motion: Decreased, flexion Decreased, Lateral bending, extension and rotation bilaterally reduced is painful. Sacroiliac joint tenderness No bilaterally  FRANDY test: negative   Gaenslen's test:negative    SLR : negative bilaterally    Musculoskeletal:    Trigger points +    Extremities:    Tremors:None bilaterally upper and lower  Edema:no    Neurological:    Sensory: Normal to light touch     Motor:   B/l LE 4/5 - appears to be effort dependent weakness    Reflexes:    B/l LE equal    Dermatology:    Skin:no rashes or lesions noted    Assessment/Plan:     Diagnosis Orders   1. DDD (degenerative disc disease), lumbar        2. Lumbosacral spondylosis without myelopathy        3. Spinal stenosis of lumbar region, unspecified whether neurogenic claudication present        4. Compression fracture of lumbar vertebra, unspecified lumbar vertebral level, sequela          45 y.o. female with H/o low back pain. Lumbar DDD and VCF- healed/ old. Has been evaluated by NSG- conservative treatment. On Oxycodone 5 mg prn. Has been prescribed by NSG. Recent X-ray and MRI of LS spine reviewed personally and discussed the findings with the patient. PLAN:    Will schedule for TFESI under fluoroscopic guidance to help with pain and to assist with PT. Moderate sedation due to H/o anxiety of needles. Change oxycodone to Percocet 5/325 po tid prn. RBA of opioid use discussed. Discussed issues with chronic opioids and recommend to limit dose to achieve moderate pain relief and improve functionality. ZT lido patch for local use. Samples and script given. She has a follow up appointment with NSG later this week. Had side effect to gabapentin- consider adding Lyrica in future if continued pain.     UDS/ Buccal screen today 2/8/2023. Opioid agreement today 2/8/2023-discussed salient features of opioid agreement. Counseling : Patient encouraged to stay active and  to continue Regular home exercise program as tolerated - stretching / strengthening. Treatment plan discussed with the patient including medication and procedure side effects. Controlled Substances Monitoring: OARRS reviewed. We discussed with the patient that combining opioids, benzodiazepines, alcohol, illicit drugs or sleep aids increases the risk of respiratory depression including death. We discussed that these medications may cause drowsiness, sedation or dizziness and have counseled the patient not to drive or operate machinery. We have discussed that these medications will be prescribed only by one provider. We have discussed with the patient about age related risk factors and have thoroughly discussed the importance of taking these medications as prescribed. The patient verbalizes understanding.     Christiano Alvarado MD    CC:    Steffen Light, [address] [place] [address],  [address] [place] [address]     Ryan Humphrey MD  [address]  [address] [facility] [phone]

## 2023-02-10 ENCOUNTER — HOSPITAL ENCOUNTER (OUTPATIENT)
Dept: MAMMOGRAPHY | Age: 53
End: 2023-02-10
Payer: MEDICAID

## 2023-02-10 VITALS — BODY MASS INDEX: 27.72 KG/M2 | WEIGHT: 198 LBS | HEIGHT: 71 IN

## 2023-02-10 DIAGNOSIS — Z12.31 BREAST CANCER SCREENING BY MAMMOGRAM: ICD-10-CM

## 2023-02-10 PROCEDURE — 77063 BREAST TOMOSYNTHESIS BI: CPT

## 2023-02-11 ENCOUNTER — ANESTHESIA EVENT (OUTPATIENT)
Dept: OPERATING ROOM | Age: 53
End: 2023-02-11
Payer: MEDICAID

## 2023-02-13 ASSESSMENT — LIFESTYLE VARIABLES: SMOKING_STATUS: 0

## 2023-02-13 NOTE — ANESTHESIA PRE PROCEDURE
Department of Anesthesiology  Preprocedure Note       Name:  Mae Calvillo   Age:  52 y.o.  :  1970                                          MRN:  [id number]         Date:  2023      Surgeon: Surgeon(s):  Ben Steinberg MD    Procedure: Procedure(s):  LUMBAR TRANSFORAMINAL EPIDURAL INJECTION RIGHT  L3 & LEFT l4 UNDER FLUOROSCOPIC GUIDANCE    Medications prior to admission:   Prior to Admission medications    Medication Sig Start Date End Date Taking? Authorizing Provider   oxyCODONE-acetaminophen (PERCOCET) 5-325 MG per tablet Take 1 tablet by mouth every 8 hours as needed for Pain for up to 15 days. Max Daily Amount: 3 tablets 23  Ben Steinberg MD   Lidocaine 1.8 % PTCH Apply one patch daily to affected area, on for 12 hours and off for 12 hours 23  Ben Steinberg MD   ibuprofen (IBU) 600 MG tablet Take 1 tablet by mouth every 6 hours as needed for Pain 23   SHAMA Sutherland   fludrocortisone (FLORINEF) 0.1 MG tablet Take 1 tablet by mouth daily 1/10/23 5/10/23  Trinh Abraham MD   Incontinence Supply Disposable (PREVAIL EXTRA PLUS) PADS 30 each by Does not apply route 3 times daily as needed (change as needed) 1/10/23   Trinh Abraham MD   midodrine (PROAMATINE) 5 MG tablet Take 3 tablets by mouth 3 times daily (with meals) 1/10/23   Trinh Abraham MD   sertraline (ZOLOFT) 50 MG tablet Take 1 tablet by mouth daily 1/10/23   Trinh Abraham MD   Handicap Placard MISC by Does not apply route 22   SHAMA Sutherland   ammonium lactate (LAC-HYDRIN) 12 % lotion Apply topically as needed. 22   MD tuan Perry complex-C-folic acid (NEPHROCAPS) 1 MG capsule Take 1 capsule by mouth 3 times daily 22   Phillip Alfaro MD   lidocaine 4 % external patch Place 1 patch onto the skin daily 22   Phillip Alfaro MD   melatonin 3 MG TABS tablet Take 1 tablet by mouth nightly as needed (insomnia) 22   Phillip Alfaro MD  miconazole (MICOTIN) 2 % powder Apply topically 2 times daily. 12/7/22   Giancarlo Levine MD   pantoprazole (PROTONIX) 40 MG tablet Take 1 tablet by mouth every morning (before breakfast)  Patient not taking: Reported on 2/8/2023 12/8/22   Giancarlo Levine MD   sodium chloride 1 g tablet Take 1 tablet by mouth 3 times daily (with meals) 12/7/22   Giancarlo Levine MD   white petrolatum OINT ointment Apply topically 2 times daily as needed (skin irritation) 12/7/22   Giancarlo Levine MD   Multiple Vitamin (MULTIVITAMINS PO) Take 3 tablets by mouth daily    Historical Provider, MD   CALCIUM PO Take 2 tablets by mouth daily    Historical Provider, MD   folic acid (FOLVITE) 1 MG tablet Take 1 mg by mouth daily 10/3/22   Historical Provider, MD       Current medications:    No current facility-administered medications for this encounter. Current Outpatient Medications   Medication Sig Dispense Refill    oxyCODONE-acetaminophen (PERCOCET) 5-325 MG per tablet Take 1 tablet by mouth every 8 hours as needed for Pain for up to 15 days. Max Daily Amount: 3 tablets 30 tablet 0    Lidocaine 1.8 % PTCH Apply one patch daily to affected area, on for 12 hours and off for 12 hours 30 patch 1    ibuprofen (IBU) 600 MG tablet Take 1 tablet by mouth every 6 hours as needed for Pain 120 tablet 3    fludrocortisone (FLORINEF) 0.1 MG tablet Take 1 tablet by mouth daily 30 tablet 3    Incontinence Supply Disposable (PREVAIL EXTRA PLUS) PADS 30 each by Does not apply route 3 times daily as needed (change as needed) 60 each 3    midodrine (PROAMATINE) 5 MG tablet Take 3 tablets by mouth 3 times daily (with meals) 90 tablet 3    sertraline (ZOLOFT) 50 MG tablet Take 1 tablet by mouth daily 30 tablet 0    Handicap Placard MISC by Does not apply route 1 each 0    ammonium lactate (LAC-HYDRIN) 12 % lotion Apply topically as needed.  225 g 1    b complex-C-folic acid (NEPHROCAPS) 1 MG capsule Take 1 capsule by mouth 3 times daily 30 capsule 3    lidocaine 4 % external patch Place 1 patch onto the skin daily 30 each 5    melatonin 3 MG TABS tablet Take 1 tablet by mouth nightly as needed (insomnia)  3    miconazole (MICOTIN) 2 % powder Apply topically 2 times daily. 45 g 1    pantoprazole (PROTONIX) 40 MG tablet Take 1 tablet by mouth every morning (before breakfast) (Patient not taking: Reported on 2/8/2023) 30 tablet 3    sodium chloride 1 g tablet Take 1 tablet by mouth 3 times daily (with meals) 90 tablet 3    white petrolatum OINT ointment Apply topically 2 times daily as needed (skin irritation) 500 g 0    Multiple Vitamin (MULTIVITAMINS PO) Take 3 tablets by mouth daily      CALCIUM PO Take 2 tablets by mouth daily      folic acid (FOLVITE) 1 MG tablet Take 1 mg by mouth daily         Allergies:     Allergies   Allergen Reactions    Colchicine     Darvon [Propoxyphene] Other (See Comments)     GI Upset       Problem List:    Patient Active Problem List   Diagnosis Code    [facility]ression [facility] L2 lumbar vertebra, [facility] S32.020B    Bubo I88.8    Compression fracture of L2 lumbar vertebra ([facility] S32.020A    Compression of lumbar vertebra ([facility] S32.000A    Malnutrition following gastrointestinal surgery K91.2    Syncope and collapse R55    Orthostatic hypotension I95.1    Excessive weight loss R63.4    Abnormality of gait and mobility R26.9    Self-care deficit Z78.9    Disorder of sleep-wake cycle G47.20    DDD (degenerative disc disease), lumbar M51.36    Spinal stenosis of lumbar region M48.061       Past Medical History:        Diagnosis Date    Anemia     stable at this time  2023    COVID 05/2022 11/2022-    Diabetes mellitus ([facility]     History of blood transfusion 2012    Papanicolaou smear of cervix with atypical squamous cells of undetermined significance (ASC-US)     1995     Screening for human papillomavirus (HPV)     4/10/07        Past Surgical History: Procedure Laterality Date    COLPOSCOPY      2001 : outside [address] [place] [address] [address]      2022    HYSTERECTOMY (CERVIX STATUS UNKNOWN)      NM  DELIVERY ONLY W/POSTPARTUM CARE       :      NM  DELIVERY ONLY W/POSTPARTUM CARE       :      NM CONIZATION CERVIX W/WO D&C RPR KNIFE/LASER      2001 : outside [facility] History:    Social History     Tobacco Use    Smoking status: Former     Packs/day: 0.50     Years: 1.00     Pack years: 0.50     Types: Cigarettes     Quit date:      Years since quittin.1    Smokeless tobacco: Never    Tobacco comments:     Quit smokin2002   Substance Use Topics    Alcohol use: Not Currently                                Counseling given: Not Answered  Tobacco comments: Quit smokin2002      Vital Signs (Current):   Vitals:    23 1601   Weight: 196 lb (88.9 kg)   Height: 5' 11\" (1.803 m)                                              BP Readings from Last 3 Encounters:   23 100/65   01/10/23 120/72   23 (!) 136/92       NPO Status:  >8.H                                                                               BMI:   Wt Readings from Last 3 Encounters:   02/10/23 198 lb (89.8 kg)   23 196 lb (88.9 kg)   01/10/23 215 lb (97.5 kg)     Body mass index is 27.34 kg/m².     CBC:   Lab Results   Component Value Date/Time    WBC 5.9 2022 10:37 AM    RBC 3.64 2022 10:37 AM    HGB 9.9 2022 10:37 AM    HCT 32.1 2022 10:37 AM    MCV 88.2 2022 10:37 AM    RDW 15.6 2022 10:37 AM     2022 10:37 AM       CMP:   Lab Results   Component Value Date/Time     2022 01:55 PM    K 3.6 2022 01:55 PM    K 4.3 2022 05:57 AM     2022 01:55 PM    CO2 24 2022 01:55 PM    BUN 6 2022 01:55 PM    CREATININE 0.5 2022 01:55 PM    GFRAA >60 10/17/2022 01:11 PM    LABGLOM >60 2022 01:55 PM GLUCOSE 117 12/14/2022 01:55 PM    PROT 8.5 11/28/2022 10:37 AM    CALCIUM 10.0 12/14/2022 01:55 PM    BILITOT 0.3 11/28/2022 10:37 AM    ALKPHOS 160 11/28/2022 10:37 AM    AST 17 11/28/2022 10:37 AM    ALT 14 11/28/2022 10:37 AM       POC Tests: No results for input(s): POCGLU, POCNA, POCK, POCCL, POCBUN, POCHEMO, POCHCT in the last 72 hours. Coags: No results found for: PROTIME, INR, APTT    HCG (If Applicable): No results found for: PREGTESTUR, PREGSERUM, HCG, HCGQUANT     ABGs: No results found for: PHART, PO2ART, SNS9OCA, EHH5WSD, BEART, J8PYUXNX     Type & Screen (If Applicable):  No results found for: LABABO, LABRH    Drug/Infectious Status (If Applicable):  No results found for: HIV, HEPCAB    COVID-19 Screening (If Applicable):   Lab Results   Component Value Date/Time    COVID19 DETECTED 11/23/2022 12:53 PM           Anesthesia Evaluation  Patient summary reviewed no history of anesthetic complications:   Airway: Mallampati: III  TM distance: >3 FB   Neck ROM: full  Mouth opening: > = 3 FB   Dental:          Pulmonary: breath sounds clear to auscultation      (-) not a current smoker (ex 1/2 pk yr smoker)                           Cardiovascular:  Exercise tolerance: good (>4 METS),           Rhythm: regular  Rate: normal                    Neuro/Psych:               GI/Hepatic/Renal:            ROS comment: gastric banding. Endo/Other:    (+) DiabetesType II DM, , malignancy/cancer (abn. Pap). ROS comment: Covid 11/2022 Abdominal:         (-) obese       Vascular:           ROS comment: Blood trans. Hx anemia. Other Findings:           Anesthesia Plan      MAC     ASA 2       Induction: intravenous. Anesthetic plan and risks discussed with patient. Plan discussed with CRNA.                     Army Sandra MD   2/13/2023

## 2023-02-14 ENCOUNTER — ANESTHESIA (OUTPATIENT)
Dept: OPERATING ROOM | Age: 53
End: 2023-02-14
Payer: MEDICAID

## 2023-02-14 ENCOUNTER — HOSPITAL ENCOUNTER (OUTPATIENT)
Dept: OPERATING ROOM | Age: 53
Setting detail: OUTPATIENT SURGERY
Discharge: HOME | End: 2023-02-14
Attending: ANESTHESIOLOGY
Payer: MEDICAID

## 2023-02-14 ENCOUNTER — HOSPITAL ENCOUNTER (OUTPATIENT)
Age: 53
Setting detail: OUTPATIENT SURGERY
Discharge: HOME | End: 2023-02-14
Attending: ANESTHESIOLOGY | Admitting: ANESTHESIOLOGY
Payer: MEDICAID

## 2023-02-14 VITALS
HEART RATE: 64 BPM | SYSTOLIC BLOOD PRESSURE: 124 MMHG | DIASTOLIC BLOOD PRESSURE: 85 MMHG | OXYGEN SATURATION: 99 % | TEMPERATURE: 98 F | BODY MASS INDEX: 28.14 KG/M2 | HEIGHT: 71 IN | WEIGHT: 201 LBS | RESPIRATION RATE: 14 BRPM

## 2023-02-14 DIAGNOSIS — M48.061 NEUROFORAMINAL STENOSIS OF LUMBAR SPINE: ICD-10-CM

## 2023-02-14 PROCEDURE — 64483 NJX AA&/STRD TFRM EPI L/S 1: CPT | Performed by: ANESTHESIOLOGY

## 2023-02-14 PROCEDURE — 2500000003 HC RX 250 WO HCPCS: Performed by: ANESTHESIOLOGY

## 2023-02-14 PROCEDURE — 7100000010 HC PHASE II RECOVERY - FIRST 15 MIN: Performed by: ANESTHESIOLOGY

## 2023-02-14 PROCEDURE — 64484 NJX AA&/STRD TFRM EPI L/S EA: CPT | Performed by: ANESTHESIOLOGY

## 2023-02-14 PROCEDURE — 2580000003 HC RX 258: Performed by: ANESTHESIOLOGY

## 2023-02-14 PROCEDURE — 6360000002 HC RX W HCPCS: Performed by: ANESTHESIOLOGY

## 2023-02-14 PROCEDURE — 3209999900 FLUORO FOR SURGICAL PROCEDURES

## 2023-02-14 PROCEDURE — 6360000002 HC RX W HCPCS: Performed by: NURSE ANESTHETIST, CERTIFIED REGISTERED

## 2023-02-14 PROCEDURE — 2709999900 HC NON-CHARGEABLE SUPPLY: Performed by: ANESTHESIOLOGY

## 2023-02-14 PROCEDURE — 3600000005 HC SURGERY LEVEL 5 BASE: Performed by: ANESTHESIOLOGY

## 2023-02-14 PROCEDURE — 7100000011 HC PHASE II RECOVERY - ADDTL 15 MIN: Performed by: ANESTHESIOLOGY

## 2023-02-14 PROCEDURE — 82962 GLUCOSE BLOOD TEST: CPT | Performed by: ANESTHESIOLOGY

## 2023-02-14 PROCEDURE — 3700000000 HC ANESTHESIA ATTENDED CARE: Performed by: ANESTHESIOLOGY

## 2023-02-14 PROCEDURE — 6360000004 HC RX CONTRAST MEDICATION: Performed by: ANESTHESIOLOGY

## 2023-02-14 RX ORDER — PROCHLORPERAZINE EDISYLATE 5 MG/ML
5 INJECTION INTRAMUSCULAR; INTRAVENOUS
Status: DISCONTINUED | OUTPATIENT
Start: 2023-02-14 | End: 2023-02-14 | Stop reason: HOSPADM

## 2023-02-14 RX ORDER — SODIUM CHLORIDE 0.9 % (FLUSH) 0.9 %
5-40 SYRINGE (ML) INJECTION EVERY 12 HOURS SCHEDULED
Status: DISCONTINUED | OUTPATIENT
Start: 2023-02-14 | End: 2023-02-14 | Stop reason: HOSPADM

## 2023-02-14 RX ORDER — MIDAZOLAM HYDROCHLORIDE 1 MG/ML
INJECTION INTRAMUSCULAR; INTRAVENOUS PRN
Status: DISCONTINUED | OUTPATIENT
Start: 2023-02-14 | End: 2023-02-14 | Stop reason: SDUPTHER

## 2023-02-14 RX ORDER — MEPERIDINE HYDROCHLORIDE 25 MG/ML
12.5 INJECTION INTRAMUSCULAR; INTRAVENOUS; SUBCUTANEOUS ONCE
Status: DISCONTINUED | OUTPATIENT
Start: 2023-02-14 | End: 2023-02-14 | Stop reason: HOSPADM

## 2023-02-14 RX ORDER — SODIUM CHLORIDE 0.9 % (FLUSH) 0.9 %
5-40 SYRINGE (ML) INJECTION PRN
Status: DISCONTINUED | OUTPATIENT
Start: 2023-02-14 | End: 2023-02-14 | Stop reason: HOSPADM

## 2023-02-14 RX ORDER — SODIUM CHLORIDE, SODIUM LACTATE, POTASSIUM CHLORIDE, CALCIUM CHLORIDE 600; 310; 30; 20 MG/100ML; MG/100ML; MG/100ML; MG/100ML
INJECTION, SOLUTION INTRAVENOUS CONTINUOUS
Status: DISCONTINUED | OUTPATIENT
Start: 2023-02-14 | End: 2023-02-14 | Stop reason: HOSPADM

## 2023-02-14 RX ORDER — SODIUM CHLORIDE 9 MG/ML
INJECTION, SOLUTION INTRAVENOUS PRN
Status: DISCONTINUED | OUTPATIENT
Start: 2023-02-14 | End: 2023-02-14 | Stop reason: HOSPADM

## 2023-02-14 RX ORDER — LIDOCAINE HYDROCHLORIDE 5 MG/ML
INJECTION, SOLUTION INFILTRATION; INTRAVENOUS PRN
Status: DISCONTINUED | OUTPATIENT
Start: 2023-02-14 | End: 2023-02-14 | Stop reason: ALTCHOICE

## 2023-02-14 RX ORDER — DIPHENHYDRAMINE HYDROCHLORIDE 50 MG/ML
12.5 INJECTION INTRAMUSCULAR; INTRAVENOUS
Status: DISCONTINUED | OUTPATIENT
Start: 2023-02-14 | End: 2023-02-14 | Stop reason: HOSPADM

## 2023-02-14 RX ORDER — FENTANYL CITRATE 50 UG/ML
INJECTION, SOLUTION INTRAMUSCULAR; INTRAVENOUS PRN
Status: DISCONTINUED | OUTPATIENT
Start: 2023-02-14 | End: 2023-02-14 | Stop reason: SDUPTHER

## 2023-02-14 RX ORDER — METHYLPREDNISOLONE ACETATE 40 MG/ML
INJECTION, SUSPENSION INTRA-ARTICULAR; INTRALESIONAL; INTRAMUSCULAR; SOFT TISSUE PRN
Status: DISCONTINUED | OUTPATIENT
Start: 2023-02-14 | End: 2023-02-14 | Stop reason: ALTCHOICE

## 2023-02-14 RX ADMIN — FENTANYL CITRATE 50 MCG: 50 INJECTION INTRAMUSCULAR; INTRAVENOUS at 10:20

## 2023-02-14 RX ADMIN — FENTANYL CITRATE 50 MCG: 50 INJECTION INTRAMUSCULAR; INTRAVENOUS at 10:22

## 2023-02-14 RX ADMIN — SODIUM CHLORIDE, POTASSIUM CHLORIDE, SODIUM LACTATE AND CALCIUM CHLORIDE: 600; 310; 30; 20 INJECTION, SOLUTION INTRAVENOUS at 09:46

## 2023-02-14 RX ADMIN — MIDAZOLAM 2 MG: 1 INJECTION INTRAMUSCULAR; INTRAVENOUS at 10:19

## 2023-02-14 ASSESSMENT — PAIN - FUNCTIONAL ASSESSMENT
PAIN_FUNCTIONAL_ASSESSMENT: PREVENTS OR INTERFERES SOME ACTIVE ACTIVITIES AND ADLS
PAIN_FUNCTIONAL_ASSESSMENT: 0-10

## 2023-02-14 ASSESSMENT — PAIN DESCRIPTION - DESCRIPTORS: DESCRIPTORS: BURNING;ACHING;NAGGING

## 2023-02-14 NOTE — ANESTHESIA POSTPROCEDURE EVALUATION
Department of Anesthesiology  Postprocedure Note    Patient: Titi Kumar  MRN: [id number]  YOB: 1970  Date of evaluation: 2/14/2023      Procedure Summary     Date: 02/14/23 Room / Location: [address] [place] [address] 04 / [address]    Anesthesia Start: 1018 Anesthesia Stop: 1034    Procedure: LUMBAR TRANSFORAMINAL EPIDURAL INJECTION RIGHT  L3 & LEFT l4 UNDER FLUOROSCOPIC GUIDANCE Diagnosis:       Lumbar stenosis      (Lumbar stenosis [M48.061])    Surgeons: Stephanie Wolf MD Responsible Provider: Bacilio Pineda MD    Anesthesia Type: MAC ASA Status: 2          Anesthesia Type: MAC    Martinez Phase I: Martinez Score: 10    Martinez Phase II: Martinez Score: 10      Anesthesia Post Evaluation    Patient location during evaluation: PACU  Patient participation: complete - patient participated  Level of consciousness: awake and alert  Airway patency: patent  Nausea & Vomiting: no nausea and no vomiting  Complications: no  Cardiovascular status: hemodynamically stable  Respiratory status: room air and spontaneous ventilation  Hydration status: stable

## 2023-02-14 NOTE — OP NOTE
Operative Note      Patient: Kenna White  YOB: 1970  MRN: [id number]    Date of Procedure: 2023    Pre-Op Diagnosis: Lumbar stenosis [M48.061]    Post-Op Diagnosis: Same       Procedure(s):  LUMBAR TRANSFORAMINAL EPIDURAL INJECTION RIGHT  L3 & LEFT l4 UNDER FLUOROSCOPIC GUIDANCE    Surgeon(s):  Bella Stone MD    Assistant:   * No surgical staff found *    Anesthesia: Monitor Anesthesia Care    Estimated Blood Loss (mL): Minimal    Complications: None    Specimens:   * No specimens in log *    Implants:  * No implants in log *      Drains: * No LDAs found *    Findings: good needle placement    Detailed Description of Procedure:   2023    Patient: Kenna White  :  1970  Age:  45 y.o. Sex:  female     PRE-OPERATIVE DIAGNOSIS: Lumbar disc displacement, lumbar neural foraminal stenosis, lumbar radiculopathy. POST-OPERATIVE DIAGNOSIS: Same. PROCEDURE: Right L3 & Left l4 Transforaminal epidural steroid injection under fluoroscopic guidance     SURGEON: Bella Stone MD    ANESTHESIA: MAC    ESTIMATED BLOOD LOSS: None.  ______________________________________________________________________  BRIEF HISTORY: Kenna White comes in today for the first lumbar transforaminal epidural steroid injection under fluoroscopic guidance. The potential complications of this procedure were discussed with her again today. She has elected to undergo the aforementioned procedure. Hannah complete History & Physical examination were reviewed in depth, a copy of which is in the chart. DESCRIPTION OF PROCEDURE:    After confirming written and informed consent, a time-out was performed and Hannah name and date of birth, the procedure to be performed as well as the plan for the location of the needle insertion were confirmed. The patient was brought into the procedure room and placed in the prone position on the fluoroscopy table.  Standard monitors were placed and vital signs were observed throughout the procedure. The area of the lumbar spine was prepped with chloraprep and draped in a sterile manner. The vertebral body was identified with AP fluoroscopy. An oblique view was obtained to better visualize the inferior junction of the pedicle and transverse process . The 6 o'clock position of the pedicle was marked and identified. The skin and subcutaneous tissue were anesthetized with 0.5% lidocaine. TWO # 22 gauge 3.5 inch pencil point needle was directed toward the targeted point under fluoroscopy until bone was contacted. The needle was then walked inferiorly until the neural foramen was entered . A lateral fluoroscopic view was then used to place the needle tip in the middle of the foramen. Negative aspiration was confirmed for blood and CSF and 0.5-1 cc of Isovue M 300 contrast was injected at each level under live fluoroscopy. Appropriate neurograms were observed under AP fluoroscopy. Then after negative aspiration, a solution of the 3 cc of 0.5% lidocaine and 30 mg DepoMedrol was easily injected at each level. The needles were removed with constant aspiration technique. The patient back was cleaned and a bandage was placed over the needle insertion points    Disposition the patient tolerated the procedure well and there were no complications . Vital signs remained stable throughout the procedure. The patient was escorted to the recovery area where they remained until discharge and written discharge instructions for the procedure were given. Plan: Brie Miller will return to our pain management center as scheduled.      Kim Napier MD

## 2023-02-14 NOTE — DISCHARGE INSTRUCTIONS
[facility]) Pain Management Department  [phone]   Post-Pain Block/ Radiofrequency Home Going Instructions    1-Go home, rest for the remainder of the day  2-Please do not lift over 20 pounds the day of the injection  3-If you received sedation No: alcohol, driving, operating lawn mowers, plows, tractors or other dangerous equipment until next morning. Do not make important decisions or sign legal documents for 24 hours. You may experience light headedness, dizziness, nausea or sleepiness after sedation. Do not stay alone. A responsible adult must be with you for 24 hours. You could be nauseated from the medications you have received. Your IV site may be sore and bruised. 4-No dietary restrictions     5-Resume all medications the same day, blood thinners to be resumed 24 hours after injection    6-Keep the surgical site clean and dry, you may shower the next morning and remove the      dressing. 7- No sitz baths, tub baths or hot tubs/swimming for 24 hours. 8- If you have any pain at the injection site(s), application of an ice pack to the area should be       helpful, 20 minutes on/20 minutes off for next 48 hours. 9- Call [facility] at if you develop.   Fever greater than 100.4 F  Have bleeding or drainage from the puncture site  Have progressive Leg/arm numbness and or weakness  Loss of control of bowel and or bladder (wet/soil yourself)  Severe headache with inability to lift head  10-You may return to work the next day

## 2023-02-14 NOTE — INTERVAL H&P NOTE
Update History & Physical    The patient's History and Physical of February 8, 2023 was reviewed with the patient and I examined the patient. There was no change. The surgical site was confirmed by the patient and me. Plan: Lumbar TFESI. The risks, benefits, expected outcome, and alternative to the recommended procedure have been discussed with the patient. Patient understands and wants to proceed with the procedure.      Electronically signed by Dina Mujica MD on 2/14/2023

## 2023-02-15 ENCOUNTER — TELEPHONE (OUTPATIENT)
Dept: NEUROSURGERY | Age: 53
End: 2023-02-15

## 2023-02-15 NOTE — TELEPHONE ENCOUNTER
Patient was here in December and notes show to get xrays before appt tomorrow. Patient says she had MRI this month and wants to make sure she still needs to do x-rays?

## 2023-02-16 ENCOUNTER — OFFICE VISIT (OUTPATIENT)
Dept: NEUROSURGERY | Age: 53
End: 2023-02-16
Payer: MEDICAID

## 2023-02-16 VITALS
TEMPERATURE: 97.9 F | SYSTOLIC BLOOD PRESSURE: 138 MMHG | DIASTOLIC BLOOD PRESSURE: 88 MMHG | RESPIRATION RATE: 18 BRPM | OXYGEN SATURATION: 95 % | HEART RATE: 91 BPM

## 2023-02-16 DIAGNOSIS — S32.020D COMPRESSION FRACTURE OF L2 VERTEBRA WITH ROUTINE HEALING, SUBSEQUENT ENCOUNTER: Primary | ICD-10-CM

## 2023-02-16 DIAGNOSIS — S32.050D CLOSED COMPRESSION FRACTURE OF L5 LUMBAR VERTEBRA WITH ROUTINE HEALING, SUBSEQUENT ENCOUNTER: ICD-10-CM

## 2023-02-16 DIAGNOSIS — S32.030D CLOSED COMPRESSION FRACTURE OF L3 LUMBAR VERTEBRA WITH ROUTINE HEALING, SUBSEQUENT ENCOUNTER: ICD-10-CM

## 2023-02-16 PROCEDURE — 99212 OFFICE O/P EST SF 10 MIN: CPT

## 2023-02-16 PROCEDURE — 99213 OFFICE O/P EST LOW 20 MIN: CPT | Performed by: STUDENT IN AN ORGANIZED HEALTH CARE EDUCATION/TRAINING PROGRAM

## 2023-02-16 NOTE — LETTER
[facility] [address] [place] [address]  Phone: [phone]  Fax: [phone]    Skylar Julien PA-C        February 16, 2023     Patient: Kwame Brochure   YOB: 1970   Date of Visit: 2/16/2023       To Whom It May Concern: It is my medical opinion that Mendy Alarcon should remain out of work until 3/16/2023. If you have any questions or concerns, please don't hesitate to call.     Sincerely,        Skylar Julien PA-C

## 2023-02-16 NOTE — PROGRESS NOTES
Office Follow-up     This is a 45year old female who presents to the office for a 3 month follow-up s/p L2, L3 and L5 compression fractures     Subjective: Patient states she is doing well. She states her back pain has improved. She denies any pain down her legs. No new numbness or weakness. She is currently following with Dr. Oleg Chadwick and has received a TFESI which has helped with her pain. She denies any bowel or bladder incontinence. MRI reviewed with patient. Physical Exam:              WDWN, no apparent distress              Non-labored breathing               Vitals Stable              Alert and oriented x3              CN 3-12 intact              PERRL              EOMI              LARSON well              Motor strength symmetric              Sensation to LT intact bilaterally   In LSO brace                  Imagin2023 MRI Lumbar Spine   Impression   1. Fairly old compression fractures of L2 through L5 as noted above with up   to 50% loss of height at L3 and 45% loss of height at L2. No acute fracture   seen. 2. Degenerative change with multiple disc bulges and multilevel central canal   stenosis the, most prominent (moderate) at L3-4.   3. Multilevel neural foraminal stenosis as noted above. Assessment: This is a 45 y.o.  female presenting for a 3 month follow-up s/p L2, L3 and L5 compression fractures. Stable. Plan:  -Pain control and expectations discussed, continue with pain management  -Can discontinue brace and restrictions   -OARRS report reviewed   -Follow-up in neurosurgery clinic prn  -Call or return to neurosurgery office sooner if symptoms worsen or if new issues arise in the interim.     Electronically signed by Aliza Villasenor PA-C on 2023 at 6:08 PM

## 2023-02-17 RX ORDER — CHOLECALCIFEROL (VITAMIN D3) 10 MCG
1 TABLET ORAL 3 TIMES DAILY
Qty: 30 CAPSULE | Refills: 3 | Status: SHIPPED | OUTPATIENT
Start: 2023-02-17

## 2023-02-17 NOTE — RESULT ENCOUNTER NOTE
Thank patient for completing her mammogram.  He was found to have a negative mammogram and was advised to get a repeat in 1 year.

## 2023-02-17 NOTE — TELEPHONE ENCOUNTER
Patient is calling for a refill on a medication that was prescribed while she was in the hospital - Tricarocaps which she takes three times daily.       Christiano on [employer].

## 2023-02-22 ENCOUNTER — OFFICE VISIT (OUTPATIENT)
Dept: FAMILY MEDICINE CLINIC | Age: 53
End: 2023-02-22

## 2023-02-22 VITALS
TEMPERATURE: 97 F | OXYGEN SATURATION: 97 % | WEIGHT: 188 LBS | BODY MASS INDEX: 26.32 KG/M2 | DIASTOLIC BLOOD PRESSURE: 84 MMHG | SYSTOLIC BLOOD PRESSURE: 138 MMHG | HEART RATE: 91 BPM | HEIGHT: 71 IN | RESPIRATION RATE: 16 BRPM

## 2023-02-22 DIAGNOSIS — S39.011A STRAIN OF ABDOMINAL MUSCLE, INITIAL ENCOUNTER: Primary | ICD-10-CM

## 2023-02-22 RX ORDER — CYCLOBENZAPRINE HCL 5 MG
5 TABLET ORAL NIGHTLY PRN
Qty: 14 TABLET | Refills: 0 | Status: SHIPPED | OUTPATIENT
Start: 2023-02-22 | End: 2023-03-08

## 2023-02-22 RX ORDER — LIDOCAINE 50 MG/G
PATCH TOPICAL
COMMUNITY
Start: 2023-01-31

## 2023-02-22 RX ORDER — MELOXICAM 15 MG/1
15 TABLET ORAL DAILY
Qty: 14 TABLET | Refills: 0 | Status: SHIPPED | OUTPATIENT
Start: 2023-02-22 | End: 2023-03-08

## 2023-02-22 SDOH — ECONOMIC STABILITY: FOOD INSECURITY: WITHIN THE PAST 12 MONTHS, THE FOOD YOU BOUGHT JUST DIDN'T LAST AND YOU DIDN'T HAVE MONEY TO GET MORE.: NEVER TRUE

## 2023-02-22 SDOH — ECONOMIC STABILITY: HOUSING INSECURITY
IN THE LAST 12 MONTHS, WAS THERE A TIME WHEN YOU DID NOT HAVE A STEADY PLACE TO SLEEP OR SLEPT IN A SHELTER (INCLUDING NOW)?: NO

## 2023-02-22 SDOH — ECONOMIC STABILITY: INCOME INSECURITY: HOW HARD IS IT FOR YOU TO PAY FOR THE VERY BASICS LIKE FOOD, HOUSING, MEDICAL CARE, AND HEATING?: SOMEWHAT HARD

## 2023-02-22 SDOH — ECONOMIC STABILITY: FOOD INSECURITY: WITHIN THE PAST 12 MONTHS, YOU WORRIED THAT YOUR FOOD WOULD RUN OUT BEFORE YOU GOT MONEY TO BUY MORE.: NEVER TRUE

## 2023-02-22 ASSESSMENT — ENCOUNTER SYMPTOMS
DIARRHEA: 0
COUGH: 0
RHINORRHEA: 0
ABDOMINAL PAIN: 0
CONSTIPATION: 0
VOMITING: 0
NAUSEA: 0
SORE THROAT: 0
CHEST TIGHTNESS: 0
SHORTNESS OF BREATH: 0

## 2023-02-22 NOTE — PROGRESS NOTES
Pain in left flank area after reaching in refrigerator  No radiation or weakness  No urinary or GI symptoms  Examination  Blood pressure 138/84, pulse 91, temperature 97 °F (36.1 °C), temperature source Temporal, resp. rate 16, height 5' 11\" (1.803 m), weight 188 lb (85.3 kg), SpO2 97 %. Mild muscular tenderness  A/P  Strain  NSAIDs  Attending Physician Statement  I have discussed the case, including pertinent history and exam findings with the resident. I agree with the documented assessment and plan.

## 2023-02-22 NOTE — PROGRESS NOTES
[facility]  Department of Family Medicine  Family Medicine Residency Program      Patient:  Trav Crabtree 45 y.o. female     Date of Service: 23      Chief complaint:   Chief Complaint   Patient presents with    Muscle Pain     Left hip         History of Present Illness   The patient is a 45 y.o. female  presented to the clinic with complaints as above.     Left hip  Started 2 days ago  Reaching down to grab something from refrigerator  Felt a pull  Taking 500mg Tylenol and ibuprofen 600mg, ibuprofen helping tylenol did not  Worsened over last 2 days, nothing worsening but time  Pain with sitting, pain with laying flat  Recently had epidural but this is different pain  Denies numbness, weakness, pain with urination, increased urinary, back pain, fever, chills      Past Medical History:      Diagnosis Date    Anemia     stable at this time      COVID 2022-    Diabetes mellitus ([facility] [address]     History of blood transfusion     Papanicolaou smear of cervix with atypical squamous cells of undetermined significance (ASC-US)          Screening for human papillomavirus (HPV)     4/10/07        Past Surgical History:        Procedure Laterality Date    COLPOSCOPY      2001 : outside [address] [place] [address]      2022    [address] (CERVIX STATUS UNKNOWN)      PAIN MANAGEMENT PROCEDURE N/A 2023    LUMBAR TRANSFORAMINAL EPIDURAL INJECTION RIGHT  L3 & LEFT l4 UNDER FLUOROSCOPIC GUIDANCE performed by Maddi Saul MD at . [facility] :      [place]  DELIVERY ONLY W/POSTPARTUM CARE      1998 :      [place] CONIZATION CERVIX W/WO D&C RPR KNIFE/LASER      2001 : outside [place]        Allergies:    Colchicine and Darvon [propoxyphene]    Social History:   Social History     Socioeconomic History    Marital status: Single     Spouse name: Not on file    Number of children: Not on file    Years of education: Not on file    Highest education level: Not on file   Occupational History    Not on file   Tobacco Use    Smoking status: Former     Packs/day: 0.50     Years: 1.00     Pack years: 0.50     Types: Cigarettes     Quit date:      Years since quittin.1    Smokeless tobacco: Never    Tobacco comments:     Quit smokin2002   Vaping Use    Vaping Use: Never used   Substance and Sexual Activity    Alcohol use: Not Currently    Drug use: No    Sexual activity: Not Currently   Other Topics Concern    Not on file   Social History Narrative    Not on file     Social Determinants of Health     Financial Resource Strain: Medium Risk    Difficulty of Paying Living Expenses: Somewhat hard   Food Insecurity: No Food Insecurity    Worried About Running Out of Food in the Last Year: Never true    Ran Out of Food in the Last Year: Never true   Transportation Needs: No Transportation Needs    Lack of Transportation (Medical): No    Lack of Transportation (Non-Medical): No   Physical Activity: Not on file   Stress: Not on file   Social Connections: Not on file   Intimate Partner Violence: Not on file   Housing Stability: Unknown    Unable to Pay for Housing in the Last Year: Not on file    Number of Places Lived in the Last Year: Not on file    Unstable Housing in the Last Year: No        Family History:       Problem Relation Age of Onset    Diabetes Mother     Hypertension Mother     Diabetes Father     Hypertension Father     Gout Father     Heart Disease None     Ovarian Cancer None     Uterine Cancer None     Breast Cancer None        Review of Systems:   Review of Systems   Constitutional:  Negative for chills, fatigue and fever. HENT:  Negative for congestion, rhinorrhea and sore throat. Respiratory:  Negative for cough, chest tightness and shortness of breath. Cardiovascular:  Negative for chest pain and palpitations.    Gastrointestinal:  Negative for abdominal pain, constipation, diarrhea, nausea and vomiting. Genitourinary:  Negative for dysuria and frequency. Musculoskeletal:  Positive for back pain (Chronic) and myalgias (Left lower flank). Negative for arthralgias and neck stiffness. Skin:  Negative for rash. Neurological:  Negative for dizziness and light-headedness. All other systems reviewed and are negative. Physical Exam   Vitals: /84   Pulse 91   Temp 97 °F (36.1 °C) (Temporal)   Resp 16   Ht 5' 11\" (1.803 m)   Wt 188 lb (85.3 kg)   SpO2 97%   BMI 26.22 kg/m²     BP Readings from Last 3 Encounters:   02/22/23 138/84   02/16/23 138/88   02/14/23 124/85       Physical Exam  Constitutional:       General: She is not in acute distress. Appearance: Normal appearance. HENT:      Head: Normocephalic and atraumatic. Mouth/Throat:      Mouth: Mucous membranes are moist.      Pharynx: Oropharynx is clear. Eyes:      Extraocular Movements: Extraocular movements intact. Conjunctiva/sclera: Conjunctivae normal.   Cardiovascular:      Rate and Rhythm: Normal rate and regular rhythm. Pulses: Normal pulses. Heart sounds: Normal heart sounds. No murmur heard. Pulmonary:      Effort: Pulmonary effort is normal.      Breath sounds: Normal breath sounds. No wheezing. Abdominal:      Tenderness: There is no right CVA tenderness or left CVA tenderness. Musculoskeletal:      Cervical back: Normal range of motion. Right lower leg: No edema. Left lower leg: No edema. Comments: Tenderness to palpation over left lower flank and core muscles   Skin:     General: Skin is warm and dry. Findings: No lesion or rash. Neurological:      General: No focal deficit present. Mental Status: She is alert and oriented to person, place, and time. Psychiatric:         Attention and Perception: Attention normal.         Mood and Affect: Mood normal.           Assessment and Plan       1.  Strain of abdominal muscle, initial encounter  Patient with tenderness to palpation over left sided core muscles  No CVA tenderness, rash, fever, chills  Treat his abdominal muscular strain with NSAIDs and Flexeril  Discussed clinical course with patient  - meloxicam (MOBIC) 15 MG tablet; Take 1 tablet by mouth daily for 14 days  Dispense: 14 tablet; Refill: 0  - cyclobenzaprine (FLEXERIL) 5 MG tablet; Take 1 tablet by mouth nightly as needed for Muscle spasms  Dispense: 14 tablet; Refill: 0    Counseled regarding above diagnosis, including possible risks and complications,  especially if left uncontrolled. Counseled regarding the possible side effects, risks, benefits and alternatives to treatment; patient and/or guardian verbalizes understanding, agrees, feels comfortable with and wishes to proceed with above treatment plan. Call or go to ED immediately if symptoms worsen or persist. Advised patient to call with any new medication issues, and, as applicable, read all Rx info from pharmacy to assure aware of all possible risks and side effects of medication before taking. Patient and/or guardian given opportunity to ask questions/raise concerns. The patient verbalized comfort and understanding of instructions. I encourage further reading and education about your health conditions. Information on many health conditions is provided by the American Academy of Family [employer]icians: https://familydoctor. org/  Please bring any questions to me at your next visit. Return to Office: No follow-ups on file.     Medication List:    Current Outpatient Medications   Medication Sig Dispense Refill    lidocaine (LIDODERM) 5 % PLACE 1 PATCH ONTO SKIN DAILY      meloxicam (MOBIC) 15 MG tablet Take 1 tablet by mouth daily for 14 days 14 tablet 0    cyclobenzaprine (FLEXERIL) 5 MG tablet Take 1 tablet by mouth nightly as needed for Muscle spasms 14 tablet 0    b complex-C-folic acid (NEPHROCAPS) 1 MG capsule Take 1 capsule by mouth 3 times daily 30 capsule 3 oxyCODONE-acetaminophen (PERCOCET) 5-325 MG per tablet Take 1 tablet by mouth every 8 hours as needed for Pain for up to 15 days. Max Daily Amount: 3 tablets 30 tablet 0    Lidocaine 1.8 % PTCH Apply one patch daily to affected area, on for 12 hours and off for 12 hours 30 patch 1    ibuprofen (IBU) 600 MG tablet Take 1 tablet by mouth every 6 hours as needed for Pain 120 tablet 3    fludrocortisone (FLORINEF) 0.1 MG tablet Take 1 tablet by mouth daily 30 tablet 3    Incontinence Supply Disposable (PREVAIL EXTRA PLUS) PADS 30 each by Does not apply route 3 times daily as needed (change as needed) 60 each 3    midodrine (PROAMATINE) 5 MG tablet Take 3 tablets by mouth 3 times daily (with meals) 90 tablet 3    sertraline (ZOLOFT) 50 MG tablet Take 1 tablet by mouth daily 30 tablet 0    Handicap Placard MISC by Does not apply route 1 each 0    ammonium lactate (LAC-HYDRIN) 12 % lotion Apply topically as needed. 225 g 1    lidocaine 4 % external patch Place 1 patch onto the skin daily 30 each 5    melatonin 3 MG TABS tablet Take 1 tablet by mouth nightly as needed (insomnia)  3    miconazole (MICOTIN) 2 % powder Apply topically 2 times daily. 45 g 1    pantoprazole (PROTONIX) 40 MG tablet Take 1 tablet by mouth every morning (before breakfast) 30 tablet 3    sodium chloride 1 g tablet Take 1 tablet by mouth 3 times daily (with meals) 90 tablet 3    white petrolatum OINT ointment Apply topically 2 times daily as needed (skin irritation) 500 g 0    Multiple Vitamin (MULTIVITAMINS PO) Take 3 tablets by mouth daily      CALCIUM PO Take 2 tablets by mouth daily      folic acid (FOLVITE) 1 MG tablet Take 1 mg by mouth daily       No current facility-administered medications for this visit. Leigh Murrieta, DO       This document may have been prepared at least partially through the use of voice recognition software.  Although effort is taken to assure the accuracy of this document, it is possible that grammatical, syntax, or spelling errors may occur.

## 2023-02-23 ASSESSMENT — ENCOUNTER SYMPTOMS: BACK PAIN: 1

## 2023-02-27 ENCOUNTER — OFFICE VISIT (OUTPATIENT)
Dept: PAIN MANAGEMENT | Age: 53
End: 2023-02-27
Payer: MEDICAID

## 2023-02-27 VITALS
WEIGHT: 188 LBS | SYSTOLIC BLOOD PRESSURE: 113 MMHG | HEIGHT: 71 IN | DIASTOLIC BLOOD PRESSURE: 78 MMHG | TEMPERATURE: 97.1 F | OXYGEN SATURATION: 96 % | HEART RATE: 86 BPM | BODY MASS INDEX: 26.32 KG/M2 | RESPIRATION RATE: 18 BRPM

## 2023-02-27 DIAGNOSIS — S32.000S COMPRESSION FRACTURE OF LUMBAR VERTEBRA, UNSPECIFIED LUMBAR VERTEBRAL LEVEL, SEQUELA: ICD-10-CM

## 2023-02-27 DIAGNOSIS — M47.817 LUMBOSACRAL SPONDYLOSIS WITHOUT MYELOPATHY: ICD-10-CM

## 2023-02-27 DIAGNOSIS — M51.36 DDD (DEGENERATIVE DISC DISEASE), LUMBAR: Primary | ICD-10-CM

## 2023-02-27 DIAGNOSIS — M48.061 SPINAL STENOSIS OF LUMBAR REGION, UNSPECIFIED WHETHER NEUROGENIC CLAUDICATION PRESENT: ICD-10-CM

## 2023-02-27 PROCEDURE — 99213 OFFICE O/P EST LOW 20 MIN: CPT | Performed by: ANESTHESIOLOGY

## 2023-02-27 NOTE — PROGRESS NOTES
Do you currently have any of the following:    Fever: No  Headache:  No  Cough: No  Shortness of breath: No  Exposed to anyone with these symptoms: No                                                                                                                Scott Mustafa presents to the [facility] on [facility]/27/2023. Hosey Kocher is complaining of pain in her mid back. The pain is intermittent. The pain is described as shooting and sharp. Pain is rated on her best day at a 0, on her worst day at a 10, and on average at a 3 on the VAS scale. She took her last dose of  mobic and flexeril  today. Hosey Kocher does not have issues with constipation. Any procedures since your last visit: Yes, with 90 % relief. She is  on NSAIDS and  is not on anticoagulation medications to include none and is managed by NA. Pacemaker or defibrillator: No Physician managing device is NA. Medication Contract and Consent for Opioid Use Documents Filed       Patient Documents       Type of Document Status Date Received Received By Description    Medication Contract Received 2/8/2023 10:35 AM [facility] PAIN AGREEMENT                       Resp 18   Ht 5' 11\" (1.803 m)   Wt 188 lb (85.3 kg)   BMI 26.22 kg/m²      No LMP recorded. Patient has had a hysterectomy.

## 2023-02-27 NOTE — PROGRESS NOTES
[facility] Pain Management        [address]  [facility], [address] [facility]  Dept: [phone]      Follow up Note      Noemí Elisa     Date of Visit:  2/27/2023    CC:  Patient presents for follow up   Chief Complaint   Patient presents with    Back Pain    Follow-up     TFESI       HPI:    low back pain since Nov 2022. H/o fall and has low back pain. Has lumbar VCF - evaluated by NSG- conservative management with bracing and analgesics. Pain is better. Change in quality of symptoms:yes - improved. Medication side effects:none. Recent interventional procedures:TFESI, excellent pain relief. Nursing notes and details of the pain history reviewed. Please see intake notes for details. S/p Gastric sleeve in Aug 2022 has lost significant weight following the surgery. Previous treatments:   Physical Therapy/ HEP : yes,      Medications: - NSAID's : yes                        - Membrane stabilizers : yes - gabapentin- had side effects                       - Opioids : yes,                        - Adjuvants or Others : yes,     Spine Surgeries: no     She has not been on anticoagulation medications      H/O Smoking: no  H/O alcohol abuse : no  H/O Illicit drug use : denies     Employment: currently off work     Imaging:      MRI of LS spine: 2/7/2023:  Impression   1. Fairly old compression fractures of L2 through L5 as noted above with up   to 50% loss of height at L3 and 45% loss of height at L2. No acute fracture   seen. 2. Degenerative change with multiple disc bulges and multilevel central canal   stenosis the, most prominent (moderate) at L3-4.   3. Multilevel neural foraminal stenosis as noted above. Xray LS spine: 1/11/2023:  Impression   Stable mild compression injuries at L2 and L3. Degenerative lumbar   spondylosis as before. OARRS report[de-identified] reviewd.     Past Medical History:   Diagnosis Date    Anemia     stable at this time  2023    COVID 05/2022 11/2022- Diabetes mellitus ([facility] [address]     History of blood transfusion     Papanicolaou smear of cervix with atypical squamous cells of undetermined significance (ASC-US)          Screening for human papillomavirus (HPV)     4/10/07        Past Surgical History:   Procedure Laterality Date    COLPOSCOPY      2001 : outside [address]      2022    [address] (CERVIX STATUS UNKNOWN)      PAIN MANAGEMENT PROCEDURE N/A 2023    LUMBAR TRANSFORAMINAL EPIDURAL INJECTION RIGHT  L3 & LEFT l4 UNDER FLUOROSCOPIC GUIDANCE performed by Bea Ramirez MD at . [facility] :      [place]  DELIVERY ONLY W/POSTPARTUM CARE      1998 :      [place] CONIZATION CERVIX W/WO D&C RPR KNIFE/LASER      2001 : outside  [address]        Prior to Admission medications    Medication Sig Start Date End Date Taking?  Authorizing Provider   lidocaine (LIDODERM) 5 % PLACE 1 PATCH ONTO SKIN DAILY 23  Yes Historical Provider, MD   meloxicam (MOBIC) 15 MG tablet Take 1 tablet by mouth daily for 14 days 2/22/23 3/8/23 Yes Alber WALTER DO   cyclobenzaprine (FLEXERIL) 5 MG tablet Take 1 tablet by mouth nightly as needed for Muscle spasms 2/22/23 3/8/23 Yes Alber WALTER DO   b complex-C-folic acid (NEPHROCAPS) 1 MG capsule Take 1 capsule by mouth 3 times daily 23  Yes Ngoc Gaines MD   Lidocaine 1.8 % PTCH Apply one patch daily to affected area, on for 12 hours and off for 12 hours 23 Yes Bea Ramirez MD   fludrocortisone (FLORINEF) 0.1 MG tablet Take 1 tablet by mouth daily 1/10/23 5/10/23 Yes Ngoc Gaines MD   Incontinence Supply Disposable (PREVAIL EXTRA PLUS) PADS 30 each by Does not apply route 3 times daily as needed (change as needed) 1/10/23  Yes Ngoc Gaines MD   midodrine (PROAMATINE) 5 MG tablet Take 3 tablets by mouth 3 times daily (with meals) 1/10/23  Yes Ngoc Gaines MD   sertraline (ZOLOFT) 50 MG tablet Take 1 tablet by mouth daily 1/10/23  Yes Tamra Parada MD   Handicap Placard MISC by Does not apply route 22  Yes SHAMA Robles   ammonium lactate (LAC-HYDRIN) 12 % lotion Apply topically as needed. 22  Yes Froy Villarreal MD   lidocaine 4 % external patch Place 1 patch onto the skin daily 22  Yes Froy Villarreal MD   melatonin 3 MG TABS tablet Take 1 tablet by mouth nightly as needed (insomnia) 22  Yes Froy Villarreal MD   miconazole (MICOTIN) 2 % powder Apply topically 2 times daily.  22  Yes Froy Villarreal MD   pantoprazole (PROTONIX) 40 MG tablet Take 1 tablet by mouth every morning (before breakfast) 22  Yes Froy Villarreal MD   sodium chloride 1 g tablet Take 1 tablet by mouth 3 times daily (with meals) 22  Yes Froy Villarreal MD   white petrolatum OINT ointment Apply topically 2 times daily as needed (skin irritation) 22  Yes Froy Villarreal MD   Multiple Vitamin (MULTIVITAMINS PO) Take 3 tablets by mouth daily   Yes Historical Provider, MD   CALCIUM PO Take 2 tablets by mouth daily   Yes Historical Provider, MD   folic acid (FOLVITE) 1 MG tablet Take 1 mg by mouth daily 10/3/22  Yes Historical Provider, MD   ibuprofen (IBU) 600 MG tablet Take 1 tablet by mouth every 6 hours as needed for Pain  Patient not taking: Reported on 2023   SHAMA Robles       Allergies   Allergen Reactions    Colchicine     Darvon [Propoxyphene] Other (See Comments)     GI Upset       Social History     Socioeconomic History    Marital status: Single     Spouse name: Not on file    Number of children: Not on file    Years of education: Not on file    Highest education level: Not on file   Occupational History    Not on file   Tobacco Use    Smoking status: Former     Packs/day: 0.50     Years: 1.00     Pack years: 0.50     Types: Cigarettes     Quit date:      Years since quittin.1    Smokeless tobacco: Never    Tobacco comments:     Quit smokin2002   Vaping Use    Vaping Use: Never used   Substance and Sexual Activity    Alcohol use: Not Currently    Drug use: No    Sexual activity: Not Currently   Other Topics Concern    Not on file   Social History Narrative    Not on file     Social Determinants of Health     Financial Resource Strain: Medium Risk    Difficulty of Paying Living Expenses: Somewhat hard   Food Insecurity: No Food Insecurity    Worried About Running Out of Food in the Last Year: Never true    Ran Out of Food in the Last Year: Never true   Transportation Needs: No Transportation Needs    Lack of Transportation (Medical): No    Lack of Transportation (Non-Medical): No   Physical Activity: Not on file   Stress: Not on file   Social Connections: Not on file   Intimate Partner Violence: Not on file   Housing Stability: Unknown    Unable to Pay for Housing in the Last Year: Not on file    Number of Places Lived in the Last Year: Not on file    Unstable Housing in the Last Year: No       Family History   Problem Relation Age of Onset    Diabetes Mother     Hypertension Mother     Diabetes Father     Hypertension Father     Gout Father     Heart Disease None     Ovarian Cancer None     Uterine Cancer None     Breast Cancer None        REVIEW OF SYSTEMS:     Armani Velazquez denies fever/chills, chest pain, shortness of breath, new bowel or bladder complaints. All other review of systems was negative.     PHYSICAL EXAMINATION:      /78   Pulse 86   Temp 97.1 °F (36.2 °C) (Infrared)   Resp 18   Ht 5' 11\" (1.803 m)   Wt 188 lb (85.3 kg)   SpO2 96%   BMI 26.22 kg/m²   General:       General appearance:  Pleasant and well-hydrated, in no distress and A & O x 3  Build:normal  Function: Rises from seated position easily     HEENT:     Head:normocephalic, atraumatic    Lungs:     Breathing:normal breathing pattern      CVS:     RRR     Abdomen:     Shape:non-distended and normal     Cervical spine: Inspection:normal     Thoracic spine:                Spine inspection:normal      Lumbar spine:     Spine inspection: Normal   Palpation: Tenderness over the lumbar spine +, Tenderness paravertebral muscles Yes bilaterally  Range of motion: Decreased, flexion Decreased, Lateral bending, extension and rotation bilaterally reduced is painful. Sacroiliac joint tenderness No bilaterally  FRANDY test: negative   Gaenslen's test:negative    SLR : negative bilaterally     Musculoskeletal:     Trigger points +     Extremities:     Tremors:None bilaterally upper and lower  Edema:no     Neurological:     Sensory: Normal to light touch      Motor:   B/l LE 5/5      Dermatology:     Skin:no rashes or lesions noted     Assessment/Plan:       Diagnosis Orders   1. DDD (degenerative disc disease), lumbar          2. Lumbosacral spondylosis without myelopathy          3. Spinal stenosis of lumbar region, unspecified whether neurogenic claudication present          4. Compression fracture of lumbar vertebra, unspecified lumbar vertebral level, sequela             45 y.o.  female with H/o low back pain. Lumbar DDD and VCF- healed/ old. Has been evaluated by NSG- conservative treatment. Was On Oxycodone 5 mg prn- last use: 2/9/2023. Currently off opioids. Recent X-ray and MRI of LS spine reviewed personally and discussed the findings with the patient. Had side effect to gabapentin. S/P TFESI with > 90% pain relief. Over all doing very well    Had f/u with NSG- Notes reviewed. PLAN:    Aquatherapy-  script given. If pain recurs, can repeat TFESI   May need Moderate sedation due to H/o anxiety of needles. ZT lido patch for local use. F/u in 3-4 months. UDS/ Buccal screen 2/8/2023. Opioid agreement  2/8/2023     Counseling : Patient encouraged to stay active and  to continue Regular home exercise program as tolerated - stretching / strengthening.   Treatment plan discussed with the patient including medication and procedure side effects. Controlled Substances Monitoring: OARRS reviewed.     Juanpablo Fuchs MD

## 2023-03-07 ENCOUNTER — OFFICE VISIT (OUTPATIENT)
Dept: FAMILY MEDICINE CLINIC | Age: 53
End: 2023-03-07
Payer: MEDICAID

## 2023-03-07 VITALS
DIASTOLIC BLOOD PRESSURE: 70 MMHG | BODY MASS INDEX: 26.88 KG/M2 | TEMPERATURE: 97.7 F | HEIGHT: 71 IN | SYSTOLIC BLOOD PRESSURE: 107 MMHG | RESPIRATION RATE: 16 BRPM | OXYGEN SATURATION: 98 % | WEIGHT: 192 LBS | HEART RATE: 82 BPM

## 2023-03-07 DIAGNOSIS — S39.011S STRAIN OF ABDOMINAL MUSCLE, SEQUELA: ICD-10-CM

## 2023-03-07 DIAGNOSIS — F41.9 ANXIETY: Primary | ICD-10-CM

## 2023-03-07 PROCEDURE — G8417 CALC BMI ABV UP PARAM F/U: HCPCS

## 2023-03-07 PROCEDURE — 1036F TOBACCO NON-USER: CPT

## 2023-03-07 PROCEDURE — 3017F COLORECTAL CA SCREEN DOC REV: CPT

## 2023-03-07 PROCEDURE — 99213 OFFICE O/P EST LOW 20 MIN: CPT

## 2023-03-07 PROCEDURE — G8484 FLU IMMUNIZE NO ADMIN: HCPCS

## 2023-03-07 PROCEDURE — G8427 DOCREV CUR MEDS BY ELIG CLIN: HCPCS

## 2023-03-07 ASSESSMENT — ANXIETY QUESTIONNAIRES
3. WORRYING TOO MUCH ABOUT DIFFERENT THINGS: 0
1. FEELING NERVOUS, ANXIOUS, OR ON EDGE: 0
IF YOU CHECKED OFF ANY PROBLEMS ON THIS QUESTIONNAIRE, HOW DIFFICULT HAVE THESE PROBLEMS MADE IT FOR YOU TO DO YOUR WORK, TAKE CARE OF THINGS AT HOME, OR GET ALONG WITH OTHER PEOPLE: NOT DIFFICULT AT ALL
4. TROUBLE RELAXING: 0
GAD7 TOTAL SCORE: 0
5. BEING SO RESTLESS THAT IT IS HARD TO SIT STILL: 0
2. NOT BEING ABLE TO STOP OR CONTROL WORRYING: 0
7. FEELING AFRAID AS IF SOMETHING AWFUL MIGHT HAPPEN: 0
6. BECOMING EASILY ANNOYED OR IRRITABLE: 0

## 2023-03-07 NOTE — PROGRESS NOTES
[facility]  Department of Family Medicine  Family Medicine Residency Program      Patient: Charlotte Ulloa 45 y.o. female     Date of Service: 3/7/23      Chief complaint:   Chief Complaint   Patient presents with    Strain     Feeling better           HISTORY OF PRESENTING ILLNESS     45 y.o. female presented to the clinic with anxiety and/or depression. This is a/an chronic problem. This has been going on for a few years  Exacerbating factors loss of her sister but recenetly include pain . Alleviating factors include not having any pain. Anxiety symptoms include none. Depressive symptoms include none  Patient is not having issues falling or staying asleep. Patient is not having associated panic attacks. The above is not interfering with quality of life. Patient has seen a Counselor and states it is going well, last seen [employer] [facility] in [facility] last week. Will be started work soon 20th. MYRA-7 SCREENING 3/7/2023 1/10/2023   Feeling nervous, anxious, or on edge Not at all Nearly every day   Not being able to stop or control worrying Not at all Nearly every day   Worrying too much about different things Not at all Nearly every day   Trouble relaxing Not at all Several days   Being so restless that it is hard to sit still Not at all Not at all   Becoming easily annoyed or irritable Not at all Several days   Feeling afraid as if something awful might happen Not at all Nearly every day   MYRA-7 Total Score 0 14   How difficult have these problems made it for you to do your work, take care of things at home, or get along with other people? Not difficult at all Very difficult       Strain of abdominal muscle Follow up   Feeling a lot better   Patient sates she does have lidocaine patches at home, heating pad, and Advil at home. The mobic helped, flexeril didn't help as much .      Of note- patient had a compression fracture of L2,L3,L5 - which is healing well, finished PT , no longer wearing the back brace. Patient also has degenerative disc disease lumbar - she recently under went lumbar transformaminal epidural injection right L3 and left L4 under fluoroscopic guidance - she states that has made the biggest difference in her pain relief and causing her mood to improve too.            Past Medical History:      Diagnosis Date    Anemia     stable at this time      COVID 2022-    Diabetes mellitus ([address]     History of blood transfusion     Papanicolaou smear of cervix with atypical squamous cells of undetermined significance (ASC-US)          Screening for human papillomavirus (HPV)     4/10/07      Past Surgical History:        Procedure Laterality Date    COLPOSCOPY      2001 : outside [address] [place] [address]      2022    HYSTERECTOMY (CERVIX STATUS UNKNOWN)      PAIN MANAGEMENT PROCEDURE N/A 2023    LUMBAR TRANSFORAMINAL EPIDURAL INJECTION RIGHT  L3 & LEFT l4 UNDER FLUOROSCOPIC GUIDANCE performed by Jessica Vu MD at . [address] :      [place]IV[place] W/POSTPARTUM CARE      1998 :      [place] CONIZATION CERVIX W/WO D&C RPR KNIFE/LASER      2001 : outside [place]      Allergies:    Colchicine and Darvon [propoxyphene]  Social History:   Social History     Socioeconomic History    Marital status: Single     Spouse name: Not on file    Number of children: Not on file    Years of education: Not on file    Highest education level: Not on file   Occupational History    Not on file   Tobacco Use    Smoking status: Former     Packs/day: 0.50     Years: 1.00     Pack years: 0.50     Types: Cigarettes     Quit date:      Years since quittin.1    Smokeless tobacco: Never    Tobacco comments:     Quit smokin2002   Vaping Use    Vaping Use: Never used   Substance and Sexual Activity    Alcohol use: Not Currently    Drug use: No    Sexual activity: Not Currently   Other Topics Concern    Not on file   Social History Narrative    Not on file     Social Determinants of Health     Financial Resource Strain: Medium Risk    Difficulty of Paying Living Expenses: Somewhat hard   Food Insecurity: No Food Insecurity    Worried About Running Out of Food in the Last Year: Never true    Ran Out of Food in the Last Year: Never true   Transportation Needs: No Transportation Needs    Lack of Transportation (Medical): No    Lack of Transportation (Non-Medical): No   Physical Activity: Not on file   Stress: Not on file   Social Connections: Not on file   Intimate Partner Violence: Not on file   Housing Stability: Unknown    Unable to Pay for Housing in the Last Year: Not on file    Number of Places Lived in the Last Year: Not on file    Unstable Housing in the Last Year: No      Family History:       Problem Relation Age of Onset    Diabetes Mother     Hypertension Mother     Diabetes Father     Hypertension Father     Gout Father     Heart Disease None     Ovarian Cancer None     Uterine Cancer None     Breast Cancer None      Review of Systems:   Review of Systems   Constitutional:  Negative for fatigue, fever and unexpected weight change. HENT:  Negative for ear pain, sinus pressure and sinus pain. Eyes:  Negative for pain, discharge, redness and visual disturbance. Respiratory:  Negative for cough, chest tightness and shortness of breath. Cardiovascular:  Negative for chest pain and leg swelling. Gastrointestinal:  Negative for abdominal pain, blood in stool, diarrhea, nausea and vomiting. Genitourinary:  Negative for difficulty urinating. Musculoskeletal:  Negative for arthralgias, gait problem, myalgias, neck pain and neck stiffness. Skin:  Negative for wound. Neurological:  Negative for light-headedness, numbness and headaches. Hematological: Negative. Psychiatric/Behavioral:  Negative for sleep disturbance and suicidal ideas.       PHYSICAL EXAM Vitals: /70   Pulse 82   Temp 97.7 °F (36.5 °C)   Resp 16   Ht 5' 11\" (1.803 m)   Wt 192 lb (87.1 kg)   SpO2 98%   BMI 26.78 kg/m²   Physical Exam  Vitals reviewed. Constitutional:       Appearance: Normal appearance. HENT:      Head: Normocephalic and atraumatic. Nose: Nose normal. No congestion or rhinorrhea. Mouth/Throat:      Mouth: Mucous membranes are moist.      Pharynx: Oropharynx is clear. Eyes:      General: No scleral icterus. Extraocular Movements: Extraocular movements intact. Conjunctiva/sclera: Conjunctivae normal.      Pupils: Pupils are equal, round, and reactive to light. Neck:      Vascular: No carotid bruit. Cardiovascular:      Rate and Rhythm: Normal rate and regular rhythm. Heart sounds: Normal heart sounds. No murmur heard. No gallop. Pulmonary:      Effort: Pulmonary effort is normal.      Breath sounds: Normal breath sounds. No wheezing or rales. Abdominal:      General: Bowel sounds are normal. There is no distension. Palpations: Abdomen is soft. Tenderness: There is no abdominal tenderness. There is no guarding. Comments: No tenderness on palpation along muscle   Musculoskeletal:         General: Normal range of motion. Cervical back: Normal range of motion and neck supple. Right lower leg: No edema. Left lower leg: No edema. Skin:     Coloration: Skin is not jaundiced. Neurological:      General: No focal deficit present. Mental Status: She is alert and oriented to person, place, and time. Psychiatric:         Mood and Affect: Mood normal.         Behavior: Behavior normal.         Thought Content: Thought content normal.         Judgment: Judgment normal.         ASSESSMENT AND PLAN     1.  Anxiety  -stable   -Encouraged patient to talk to counselor , patient is decreasing session every 2 weeks to 4 week, 6 weeks and so on.   -Encouraged to do one pleasurable activity a day   -Discussed relaxation techniques with patient including - box breathing, increasing physical activity as able/walking, music, and meditation.   -Continue to monitor for specific triggers   -Continue to take medication as prescribed  -Patient understood and agrees with plan.   -discussed decreasing or weaning down medications if and when patient would like to stop. - sertraline (ZOLOFT) 50 MG tablet; Take 1 tablet by mouth daily  Dispense: 90 tablet; Refill: 1    2. Strain of abdominal muscle, sequela  Much improved  Discussed supportive care such as heating pad, lidoderm patch first before taking advil secondary to her history of gastric band. Counseled regarding above diagnosis, including possible risks and complications, especially if left uncontrolled. Counseled regarding the possible side effects, risks, benefits and alternatives to treatment; patient and/or guardian verbalizes understanding, agrees, feels comfortable with and wishes to proceed with above treatment plan. Call or go to ED immediately if symptoms worsen or persist. Advised patient to call with any new medication issues, and, as applicable, read all Rx info from pharmacy to assure aware of all possible risks and side effects of medication before taking. Patient and/or guardian given opportunity to ask questions/raise concerns. The patient verbalized comfort and understanding of instructions.     Medication List:    Current Outpatient Medications   Medication Sig Dispense Refill    sertraline (ZOLOFT) 50 MG tablet Take 1 tablet by mouth daily 90 tablet 1    lidocaine (LIDODERM) 5 % PLACE 1 PATCH ONTO SKIN DAILY      cyclobenzaprine (FLEXERIL) 5 MG tablet Take 1 tablet by mouth nightly as needed for Muscle spasms 14 tablet 0    b complex-C-folic acid (NEPHROCAPS) 1 MG capsule Take 1 capsule by mouth 3 times daily 30 capsule 3    Lidocaine 1.8 % PTCH Apply one patch daily to affected area, on for 12 hours and off for 12 hours 30 patch 1 ibuprofen (IBU) 600 MG tablet Take 1 tablet by mouth every 6 hours as needed for Pain 120 tablet 3    fludrocortisone (FLORINEF) 0.1 MG tablet Take 1 tablet by mouth daily 30 tablet 3    Incontinence Supply Disposable (PREVAIL EXTRA PLUS) PADS 30 each by Does not apply route 3 times daily as needed (change as needed) 60 each 3    midodrine (PROAMATINE) 5 MG tablet Take 3 tablets by mouth 3 times daily (with meals) 90 tablet 3    Handicap Placard MISC by Does not apply route 1 each 0    ammonium lactate (LAC-HYDRIN) 12 % lotion Apply topically as needed. 225 g 1    lidocaine 4 % external patch Place 1 patch onto the skin daily 30 each 5    melatonin 3 MG TABS tablet Take 1 tablet by mouth nightly as needed (insomnia)  3    miconazole (MICOTIN) 2 % powder Apply topically 2 times daily. 45 g 1    pantoprazole (PROTONIX) 40 MG tablet Take 1 tablet by mouth every morning (before breakfast) 30 tablet 3    sodium chloride 1 g tablet Take 1 tablet by mouth 3 times daily (with meals) 90 tablet 3    white petrolatum OINT ointment Apply topically 2 times daily as needed (skin irritation) 500 g 0    Multiple Vitamin (MULTIVITAMINS PO) Take 3 tablets by mouth daily      CALCIUM PO Take 2 tablets by mouth daily      folic acid (FOLVITE) 1 MG tablet Take 1 mg by mouth daily       No current facility-administered medications for this visit. Return to Office: Return in about 6 months (around 9/7/2023). This document may have been prepared at least partially through the use of voice recognition software. Although effort is taken to assure the accuracy of this document, it is possible that grammatical, syntax,  or spelling errors may occur.     Zuleyma Pepe MD

## 2023-03-07 NOTE — PROGRESS NOTES
S: 45 y.o. female with   Chief Complaint   Patient presents with    Strain     Feeling better           Abd muscle strain, feeling much better  Anxiety: GAD7 improved to 0, zoloft 50mg, pain improving from recent fractures (great relief from lumbar epidural), +plan for start back working, +seeing counselor    O: VS:  height is 5' 11\" (1.803 m) and weight is 192 lb (87.1 kg). Her temperature is 97.7 °F (36.5 °C). Her blood pressure is 107/70 and her pulse is 82. Her respiration is 16 and oxygen saturation is 98%. BP Readings from Last 3 Encounters:   23 107/70   23 113/78   23 138/84     See resident note      Impression/Plan:   1) Anxiety: Much improved, continue same zoloft, continue counseling  2) Abd wall strain: improved/resolved  3) Lumbar compression fx: Much improvement, s/p epidural, finished PT, plan for return to work      Health Maintenance Due   Topic Date Due    Colorectal Cancer Screen  Never done    Shingles vaccine (2 of 3) 2020    COVID-19 Vaccine (2 - Booster for Robby series) 2021         Attending Physician Statement  I have discussed the case, including pertinent history and exam findings with the resident. I agree with the documented assessment and plan.       Niyah Jefferson MD

## 2023-03-07 NOTE — LETTER
March 7, 2023       Veronica Rae YOB: 1970   [address]  [place] [address] Date of Visit:  3/7/2023       To Whom It May Concern: It is my medical opinion that Gurinder Ayers may return work. Please get an ergonomic friendly chair for the patient to use. Please include lumbar support to assist in her overall well-being. If you have any questions or concerns, please don't hesitate to call.     Sincerely,        Oanh Segal MD

## 2023-03-08 ENCOUNTER — TELEPHONE (OUTPATIENT)
Dept: PAIN MANAGEMENT | Age: 53
End: 2023-03-08

## 2023-03-08 DIAGNOSIS — M48.061 SPINAL STENOSIS OF LUMBAR REGION, UNSPECIFIED WHETHER NEUROGENIC CLAUDICATION PRESENT: ICD-10-CM

## 2023-03-08 DIAGNOSIS — M47.817 LUMBOSACRAL SPONDYLOSIS WITHOUT MYELOPATHY: ICD-10-CM

## 2023-03-08 DIAGNOSIS — M51.36 DDD (DEGENERATIVE DISC DISEASE), LUMBAR: Primary | ICD-10-CM

## 2023-03-08 ASSESSMENT — ENCOUNTER SYMPTOMS
COUGH: 0
DIARRHEA: 0
VOMITING: 0
CHEST TIGHTNESS: 0
EYE DISCHARGE: 0
EYE REDNESS: 0
EYE PAIN: 0
ABDOMINAL PAIN: 0
SINUS PRESSURE: 0
BLOOD IN STOOL: 0
NAUSEA: 0
SHORTNESS OF BREATH: 0
SINUS PAIN: 0

## 2023-03-08 NOTE — TELEPHONE ENCOUNTER
Josi Montrose called said she is having increased lower back pain x4 days. The pain is sharp and she rates it 10/10. She has been taking  mg and that isn't working,. Please advise.

## 2023-03-10 ENCOUNTER — TELEPHONE (OUTPATIENT)
Dept: PAIN MANAGEMENT | Age: 53
End: 2023-03-10

## 2023-03-10 ENCOUNTER — PREP FOR PROCEDURE (OUTPATIENT)
Dept: PAIN MANAGEMENT | Age: 53
End: 2023-03-10

## 2023-03-10 ENCOUNTER — TELEPHONE (OUTPATIENT)
Dept: FAMILY MEDICINE CLINIC | Age: 53
End: 2023-03-10

## 2023-03-10 DIAGNOSIS — M51.36 DDD (DEGENERATIVE DISC DISEASE), LUMBAR: Primary | ICD-10-CM

## 2023-03-10 DIAGNOSIS — S32.000S COMPRESSION FRACTURE OF LUMBAR VERTEBRA, UNSPECIFIED LUMBAR VERTEBRAL LEVEL, SEQUELA: ICD-10-CM

## 2023-03-10 RX ORDER — SODIUM CHLORIDE 9 MG/ML
INJECTION, SOLUTION INTRAVENOUS PRN
OUTPATIENT
Start: 2023-03-10

## 2023-03-10 RX ORDER — SODIUM CHLORIDE 0.9 % (FLUSH) 0.9 %
5-40 SYRINGE (ML) INJECTION PRN
OUTPATIENT
Start: 2023-03-10

## 2023-03-10 RX ORDER — SODIUM CHLORIDE 0.9 % (FLUSH) 0.9 %
5-40 SYRINGE (ML) INJECTION EVERY 12 HOURS SCHEDULED
OUTPATIENT
Start: 2023-03-10

## 2023-03-10 RX ORDER — OXYCODONE HYDROCHLORIDE AND ACETAMINOPHEN 5; 325 MG/1; MG/1
1 TABLET ORAL 2 TIMES DAILY PRN
Qty: 30 TABLET | Refills: 0 | Status: SHIPPED | OUTPATIENT
Start: 2023-03-10 | End: 2023-03-25

## 2023-03-10 NOTE — TELEPHONE ENCOUNTER
Zheng Frost called in today and stated that she called in a couple days ago about her continuing 10/10 pain level and has not heard anything from our office. Please advise.

## 2023-03-10 NOTE — TELEPHONE ENCOUNTER
Polly Yessenia scheduled on 3/30/2023 for procedure. Follow up moved to 4/17/2023. Advised to use pain medication for severe pain. She had asked for a statement that she can not work. She stated neurosurgery released her, but she did not feel she could go. I advised her to check with her PCP as we do not do off work. She stated her PCP said she could go back with lumbar support ergonomics. I advised her to check with PCP and she verbalized understanding.

## 2023-03-10 NOTE — TELEPHONE ENCOUNTER
Pt was here 3/7 and wasn't in pain at time of her visit, is calling now because she is experiencing a lot of pain even after taking the ibuprofen. Pain management Dr is going to repeat procedure on march 30- she is scheduled to go back to work next week but because of pain she's in she'd like to wait till after procedure on the 30th to return to work, but she will need a letter from Dr Juanpablo Avila her from work until after procedure on 30th.  Please let pt know if this is possible

## 2023-03-10 NOTE — TELEPHONE ENCOUNTER
Schedule for repeat TFESI- will use different steroid. Percocet script E-scribed to take only prn severe pain.     Mahendra Selby MD

## 2023-03-10 NOTE — LETTER
March 14, 2023       Tomás Coker YOB: 1970   [address] [place] [address] Date of Visit:  3/10/2023       To Whom It May Concern: It is my medical opinion that Ammy Reid should remain out of work until 4/1/2023. If you have any questions or concerns, please don't hesitate to call.     Sincerely,        Tammy Hurt MD

## 2023-03-14 NOTE — TELEPHONE ENCOUNTER
I can excuse her for this week. Dr. Molina Deal returns on Friday. Because she saw and assessed her most recently we can discuss a further work excuse at that point.

## 2023-03-14 NOTE — TELEPHONE ENCOUNTER
Pt calling again about this letter.  States she is just in so much pain and cannot go back to work until after her procedure

## 2023-03-14 NOTE — TELEPHONE ENCOUNTER
Pt informed  is out and dr Luz Maria Gao covering for dr condon can excuse her for this week and a further work note can be discussed when dr ramos

## 2023-03-14 NOTE — TELEPHONE ENCOUNTER
Spoke with Dr. Maynor Bellamy who was agreeable to extending work excuse until patients upcoming procedure with pain management.  Letter printed for patient /faxing

## 2023-03-16 ENCOUNTER — HOSPITAL ENCOUNTER (OUTPATIENT)
Dept: PHYSICAL THERAPY | Age: 53
Setting detail: THERAPIES SERIES
Discharge: HOME | End: 2023-03-16
Payer: COMMERCIAL

## 2023-03-16 PROCEDURE — 97161 PT EVAL LOW COMPLEX 20 MIN: CPT

## 2023-03-20 ENCOUNTER — TELEPHONE (OUTPATIENT)
Dept: PAIN MANAGEMENT | Age: 53
End: 2023-03-20

## 2023-03-20 DIAGNOSIS — M47.817 LUMBOSACRAL SPONDYLOSIS WITHOUT MYELOPATHY: Primary | ICD-10-CM

## 2023-03-20 DIAGNOSIS — M51.36 DDD (DEGENERATIVE DISC DISEASE), LUMBAR: ICD-10-CM

## 2023-03-20 RX ORDER — OXYCODONE HYDROCHLORIDE AND ACETAMINOPHEN 5; 325 MG/1; MG/1
1 TABLET ORAL 2 TIMES DAILY PRN
Qty: 30 TABLET | Refills: 0 | Status: SHIPPED | OUTPATIENT
Start: 2023-03-26 | End: 2023-04-10

## 2023-03-20 NOTE — TELEPHONE ENCOUNTER
Called to reschedule OR procedure from 3/30/2023 to 4/6/2023. She requested I send Dr. Machelle Brewster a message asking for a handicap placard prescription and also a refill of her Percocet.

## 2023-03-20 NOTE — TELEPHONE ENCOUNTER
Spoke with the patient.    - [id number] to give handicap placard for 2 yrs. ( Please mail it to the patient). - Pain meds not due for few more days. Will post date a script to fill on 3/26/ 2023. Please instruct the patient to use percocet sparingly only or severe pain.     Jessica Vu MD

## 2023-03-22 ENCOUNTER — OFFICE VISIT (OUTPATIENT)
Dept: FAMILY MEDICINE CLINIC | Age: 53
End: 2023-03-22
Payer: COMMERCIAL

## 2023-03-22 VITALS
BODY MASS INDEX: 27.02 KG/M2 | RESPIRATION RATE: 18 BRPM | WEIGHT: 193 LBS | HEIGHT: 71 IN | OXYGEN SATURATION: 98 % | SYSTOLIC BLOOD PRESSURE: 122 MMHG | HEART RATE: 76 BPM | TEMPERATURE: 98.2 F | DIASTOLIC BLOOD PRESSURE: 64 MMHG

## 2023-03-22 DIAGNOSIS — F32.A DEPRESSION, UNSPECIFIED DEPRESSION TYPE: ICD-10-CM

## 2023-03-22 DIAGNOSIS — R51.9 NONINTRACTABLE HEADACHE, UNSPECIFIED CHRONICITY PATTERN, UNSPECIFIED HEADACHE TYPE: Primary | ICD-10-CM

## 2023-03-22 PROCEDURE — G8417 CALC BMI ABV UP PARAM F/U: HCPCS

## 2023-03-22 PROCEDURE — 99213 OFFICE O/P EST LOW 20 MIN: CPT

## 2023-03-22 PROCEDURE — G8484 FLU IMMUNIZE NO ADMIN: HCPCS

## 2023-03-22 PROCEDURE — G8427 DOCREV CUR MEDS BY ELIG CLIN: HCPCS

## 2023-03-22 PROCEDURE — 3017F COLORECTAL CA SCREEN DOC REV: CPT

## 2023-03-22 PROCEDURE — 1036F TOBACCO NON-USER: CPT

## 2023-03-22 RX ORDER — MULTIVIT,TX WITH IRON,MINERALS
250 TABLET, EXTENDED RELEASE ORAL DAILY
Qty: 30 TABLET | Refills: 1 | Status: SHIPPED | OUTPATIENT
Start: 2023-03-22

## 2023-03-22 RX ORDER — MULTIVITAMIN WITH IRON
250 TABLET ORAL DAILY
Refills: 0 | Status: CANCELLED | OUTPATIENT
Start: 2023-03-22

## 2023-03-22 RX ORDER — CAPSAICIN 0.025 %
CREAM (GRAM) TOPICAL
Qty: 120 G | Refills: 1 | Status: SHIPPED | OUTPATIENT
Start: 2023-03-22 | End: 2023-04-21

## 2023-03-22 RX ORDER — DULOXETIN HYDROCHLORIDE 60 MG/1
60 CAPSULE, DELAYED RELEASE ORAL DAILY
Qty: 30 CAPSULE | Refills: 1 | Status: SHIPPED | OUTPATIENT
Start: 2023-03-22

## 2023-03-22 NOTE — PROGRESS NOTES
[facility]  Precepting Note    Subjective: Follow up  Hx of vertebral fx, follows with Neurosurgery  Has surgery scheduled 4/6/23  Worsening HA  Hx of gastric sleeve    ROS otherwise negative     Past medical, surgical, family and social history were reviewed, non-contributory, and unchanged unless otherwise stated. Objective:    /64   Pulse 76   Temp 98.2 °F (36.8 °C) (Temporal)   Resp 18   Ht 5' 11\" (1.803 m)   Wt 193 lb (87.5 kg)   SpO2 98%   BMI 26.92 kg/m²     Exam is as noted by resident with the following changes, additions or corrections:    General:  NAD; alert & oriented x 3   Normal external ears and Tms, mild frontal sinus tenderness  Heart:  RRR, no murmurs, gallops, or rubs. Lungs:  CTA bilaterally, no wheeze, rales or rhonchi  Abd: bowel sounds present, nontender, nondistended, no masses  Extrem:  No clubbing, cyanosis, or edema  No focal deficits    Assessment/Plan:  HA- hx of occipital neuralgia. Advised neurology follow up   Depression- add Cymbalta      Attending Physician Statement  I have reviewed the chart, including any radiology or labs. I have discussed the case, including pertinent history and exam findings with the resident. I agree with the assessment, plan and orders as documented by the resident. Please refer to the resident note for additional information.       Electronically signed by Rafaela Brandon MD on 3/22/2023 at 3:12 PM
Right Ear: Tympanic membrane normal.      Left Ear: Tympanic membrane normal.      Ears:      Comments: Left outer ear tender to slight touch      Nose: Nose normal. No congestion or rhinorrhea. Mouth/Throat:      Mouth: Mucous membranes are moist.      Pharynx: Oropharynx is clear. Eyes:      General: No scleral icterus. Extraocular Movements: Extraocular movements intact. Conjunctiva/sclera: Conjunctivae normal.      Pupils: Pupils are equal, round, and reactive to light. Neck:      Vascular: No carotid bruit. Cardiovascular:      Rate and Rhythm: Normal rate and regular rhythm. Heart sounds: Normal heart sounds. No murmur heard. No gallop. Pulmonary:      Effort: Pulmonary effort is normal.      Breath sounds: Normal breath sounds. No wheezing or rales. Abdominal:      General: Bowel sounds are normal. There is no distension. Palpations: Abdomen is soft. Tenderness: There is no abdominal tenderness. There is no guarding. Musculoskeletal:         General: No swelling. Normal range of motion. Cervical back: Normal range of motion and neck supple. Tenderness present. Right lower leg: No edema. Left lower leg: No edema. Skin:     Coloration: Skin is not jaundiced. Neurological:      General: No focal deficit present. Mental Status: She is alert and oriented to person, place, and time. Cranial Nerves: No cranial nerve deficit. Motor: No weakness. Coordination: Coordination normal.      Gait: Gait abnormal (slow gait due to back pain). Psychiatric:         Mood and Affect: Mood normal.      Comments: Frustrated by the pain        Lab Results   Component Value Date    LABA1C 6.8 (H) 2022     No results found for: EAG     BP Readings from Last 3 Encounters:   23 122/64   23 107/70   23 113/78        ASSESSMENT AND PLAN   Jeffy Guy was seen today for the followin.  Nonintractable headache,

## 2023-03-23 ENCOUNTER — TELEPHONE (OUTPATIENT)
Dept: PAIN MANAGEMENT | Age: 53
End: 2023-03-23

## 2023-03-23 PROBLEM — M54.16 LUMBAR RADICULOPATHY: Status: ACTIVE | Noted: 2023-03-23

## 2023-03-23 NOTE — TELEPHONE ENCOUNTER
Call to Scott Mustafa, left message that procedure was approved for 4/6/2023 and that Barry should call her a few days before for the pre op call and between 2:00 PM and 4:00 PM  the business day before with the arrival time. Instructed Janice Kocher to hold ibuprofen for 24 hours, naprosyn for 4 days and any aspirin containing products or fish oil for 7 days. Instructed to call office back if any questions.      Electronically signed by Tyra Pablo RN on 3/23/2023 at 2:31 PM

## 2023-03-25 ASSESSMENT — ENCOUNTER SYMPTOMS
NAUSEA: 0
SINUS PAIN: 0
EYE DISCHARGE: 0
BACK PAIN: 1
EYE PAIN: 0
VOMITING: 0
DIARRHEA: 0
EYE REDNESS: 0
SINUS PRESSURE: 0
SHORTNESS OF BREATH: 0
COUGH: 0
BLOOD IN STOOL: 0
CHEST TIGHTNESS: 0
ABDOMINAL PAIN: 0

## 2023-03-29 NOTE — TELEPHONE ENCOUNTER
Called patient and let her know the order was placed. She said she would come to the office and pick it up.

## 2023-03-30 ENCOUNTER — TELEPHONE (OUTPATIENT)
Dept: FAMILY MEDICINE CLINIC | Age: 53
End: 2023-03-30

## 2023-03-30 DIAGNOSIS — R51.9 NONINTRACTABLE HEADACHE, UNSPECIFIED CHRONICITY PATTERN, UNSPECIFIED HEADACHE TYPE: Primary | ICD-10-CM

## 2023-03-30 NOTE — TELEPHONE ENCOUNTER
Received call from patient regarding referral   She spoke to [facility] neuro: They will need a CT of head before seeing her  Marisa Tolentino, DO only sees inpatients   They are scheduling in July/Aug     She is in a lot of pain and wants to know what she should do. Should she go to the ED? Have a referral elsewhere? Please advise?

## 2023-04-04 DIAGNOSIS — R51.9 ACUTE INTRACTABLE HEADACHE, UNSPECIFIED HEADACHE TYPE: Primary | ICD-10-CM

## 2023-04-04 NOTE — PROGRESS NOTES
[facility]  INSTRUCTIONS  . .......................................................................................................................................... [x] Parking the day of Surgery is located in the [facility].   Upon entering the door, make immediate right into the surgery reception room    [x]  Bring photo ID and insurance card     [x] You may have a light breakfast day of procedure    [x]  Wear loose comfortable clothing    [x]  Please follow instructions for medications as given per Dr's office    [x] You can expect a call the business day prior to procedure to notify you of your arrival time     [x] Please arrange for     []  Other instructions

## 2023-04-04 NOTE — TELEPHONE ENCOUNTER
Detailed message left for patient informing her to proceed with scheduling with neurology and that CT head was ordered to be done prior to neuro consult.  In the meantime, if pain is persisting she may schedule a same day visit with our office or go to ED if pain is severe

## 2023-04-06 ENCOUNTER — HOSPITAL ENCOUNTER (OUTPATIENT)
Age: 53
Setting detail: OUTPATIENT SURGERY
Discharge: HOME | End: 2023-04-06
Attending: ANESTHESIOLOGY | Admitting: ANESTHESIOLOGY
Payer: COMMERCIAL

## 2023-04-06 ENCOUNTER — HOSPITAL ENCOUNTER (OUTPATIENT)
Dept: GENERAL RADIOLOGY | Age: 53
Discharge: HOME | End: 2023-04-08
Attending: ANESTHESIOLOGY
Payer: COMMERCIAL

## 2023-04-06 VITALS
OXYGEN SATURATION: 99 % | SYSTOLIC BLOOD PRESSURE: 121 MMHG | RESPIRATION RATE: 14 BRPM | HEIGHT: 71 IN | TEMPERATURE: 98 F | WEIGHT: 190 LBS | BODY MASS INDEX: 26.6 KG/M2 | DIASTOLIC BLOOD PRESSURE: 71 MMHG | HEART RATE: 76 BPM

## 2023-04-06 DIAGNOSIS — R52 PAIN MANAGEMENT: ICD-10-CM

## 2023-04-06 DIAGNOSIS — M54.16 LUMBAR RADICULOPATHY: ICD-10-CM

## 2023-04-06 LAB — METER GLUCOSE: 110 MG/DL (ref 74–99)

## 2023-04-06 PROCEDURE — 6360000004 HC RX CONTRAST MEDICATION: Performed by: ANESTHESIOLOGY

## 2023-04-06 PROCEDURE — 3209999900 FLUORO FOR SURGICAL PROCEDURES

## 2023-04-06 PROCEDURE — 7100000010 HC PHASE II RECOVERY - FIRST 15 MIN: Performed by: ANESTHESIOLOGY

## 2023-04-06 PROCEDURE — 7100000011 HC PHASE II RECOVERY - ADDTL 15 MIN: Performed by: ANESTHESIOLOGY

## 2023-04-06 PROCEDURE — 6360000002 HC RX W HCPCS: Performed by: ANESTHESIOLOGY

## 2023-04-06 PROCEDURE — 82962 GLUCOSE BLOOD TEST: CPT

## 2023-04-06 PROCEDURE — 3600000002 HC SURGERY LEVEL 2 BASE: Performed by: ANESTHESIOLOGY

## 2023-04-06 PROCEDURE — 2709999900 HC NON-CHARGEABLE SUPPLY: Performed by: ANESTHESIOLOGY

## 2023-04-06 PROCEDURE — 2500000003 HC RX 250 WO HCPCS: Performed by: ANESTHESIOLOGY

## 2023-04-06 RX ORDER — LIDOCAINE HYDROCHLORIDE 5 MG/ML
INJECTION, SOLUTION INFILTRATION; INTRAVENOUS PRN
Status: DISCONTINUED | OUTPATIENT
Start: 2023-04-06 | End: 2023-04-06 | Stop reason: ALTCHOICE

## 2023-04-06 RX ORDER — DEXAMETHASONE SODIUM PHOSPHATE 10 MG/ML
INJECTION INTRAMUSCULAR; INTRAVENOUS PRN
Status: DISCONTINUED | OUTPATIENT
Start: 2023-04-06 | End: 2023-04-06 | Stop reason: ALTCHOICE

## 2023-04-06 ASSESSMENT — PAIN DESCRIPTION - DESCRIPTORS: DESCRIPTORS: ACHING;DISCOMFORT

## 2023-04-06 ASSESSMENT — PAIN - FUNCTIONAL ASSESSMENT: PAIN_FUNCTIONAL_ASSESSMENT: 0-10

## 2023-04-06 NOTE — H&P
fatigue    RESPIRATORY:  negative for  dry cough, cough with sputum, dyspnea, wheezing and chest pain    CARDIOVASCULAR:  negative for chest pain, dyspnea, palpitations, syncope    GASTROINTESTINAL:  negative for nausea, vomiting, change in bowel habits, diarrhea, constipation and abdominal pain    MUSCULOSKELETAL: negative for muscle weakness    SKIN: negative for itching or rashes. BEHAVIOR/PSYCH:  negative for poor appetite, increased appetite, decreased sleep and poor concentration    All other systems negative      PHYSICAL EXAM:    VITALS:  /68   Pulse 83   Temp 98 °F (36.7 °C) (Temporal)   Resp 18   Ht 5' 11\" (1.803 m)   Wt 190 lb (86.2 kg)   SpO2 98%   BMI 26.50 kg/m²     CONSTITUTIONAL:  awake, alert, cooperative, no apparent distress, and appears stated age    EYES: PERRLA, EOMI    LUNGS:  No increased work of breathing, no audible wheezing    CARDIOVASCULAR:  regular rate and rhythm    ABDOMEN:  Soft non tender non distended     EXTREMITIES: no signs of clubbing or cyanosis. MUSCULOSKELETAL: negative for flaccid muscle tone or spastic movements. SKIN: gross examination reveals no signs of rashes, or diaphoresis. NEURO: Cranial nerves II-XII grossly intact. No signs of agitated mood. Assessment/Plan:    Patient  is here for Lumbar TFESI for low back LE pain.     Fiorella Laurent MD

## 2023-04-06 NOTE — OP NOTE
Operative Note      Patient: Israel Thompson  YOB: 1970  MRN: [id number]    Date of Procedure: 2023    Pre-Op Diagnosis: Lumbar radiculopathy [M54.16]    Post-Op Diagnosis: Same       Procedure(s):  LUMBAR TRANSFORAMINAL EPIDURAL STEROID INJECTION RIGHT L3 & LEFT L4 UNDER FLUOROSCOPIC GUIDANCE    Surgeon(s):  Eleonora Andrade MD    Assistant:   * No surgical staff found *    Anesthesia: Local    Estimated Blood Loss (mL): Minimal    Complications: None    Specimens:   * No specimens in log *    Implants:  * No implants in log *      Drains: * No LDAs found *    Findings: good needle placement    Detailed Description of Procedure:   2023    Patient: Israel Thompson  :  1970  Age:  45 y.o. Sex:  female     PRE-OPERATIVE DIAGNOSIS: Lumbar disc displacement, lumbar neural foraminal stenosis, lumbar radiculopathy. POST-OPERATIVE DIAGNOSIS: Same. PROCEDURE: Right L3 and left L4 Transforaminal epidural steroid injection under fluoroscopic guidance     SURGEON: Eleonora Andrade MD    ANESTHESIA: Local    ESTIMATED BLOOD LOSS: None.  ______________________________________________________________________  BRIEF HISTORY: Israel Thompson comes in today for the lumbar  transforaminal epidural steroid injection under fluoroscopic guidance. The potential complications of this procedure were discussed with her again today. She has elected to undergo the aforementioned procedureAlexei Young complete History & Physical examination were reviewed in depth, a copy of which is in the chart. DESCRIPTION OF PROCEDURE:    After confirming written and informed consent, a time-out was performed and Hannah name and date of birth, the procedure to be performed as well as the plan for the location of the needle insertion were confirmed. The patient was brought into the procedure room and placed in the prone position on the fluoroscopy table.  Standard monitors were

## 2023-04-06 NOTE — DISCHARGE INSTRUCTIONS
Tilman Nani Dr. Bernarda Gottron Dr. Romaine Novak Block/Radiofrequency  Home Going Instructions    1-Go home, rest for the remainder of the day  2-Please do not lift over 20 pounds the day of the injection  3-If you received sedation No: alcohol, driving, operating lawn mowers, plows, tractors or other dangerous equipment until next morning. Do not make important decisions or sign legal documents for 24 hours. You may experience light headedness, dizziness, nausea or sleepiness after sedation. Do not stay alone. A responsible adult must be with you for 24 hours. You could be nauseated from the medications you have received. Your IV site may be sore and bruised. 4-No dietary restrictions     5-Resume all medications the same day, blood thinners to be resumed 24 hours after injection if you were instructed to stop any. 6-Keep the surgical site clean and dry, you may shower the next morning and remove the      dressing. 7- No sitz baths, tub baths or hot tubs/swimming for 24 hours. 8- If you have any pain at the injection site(s), application of an ice pack to the area should be       helpful, 20 minutes on/20 minutes off for next 48 hours. 9- Call [facility] immediately at if you develop.   Fever greater than 100.4 F  Have bleeding or drainage from the puncture site  Have progressive Leg/arm numbness and or weakness  Loss of control of bowel and or bladder (wet/soil yourself)  Severe headache with inability to lift head  10-You may return to work the next day

## 2023-04-13 ENCOUNTER — TELEPHONE (OUTPATIENT)
Dept: FAMILY MEDICINE CLINIC | Age: 53
End: 2023-04-13

## 2023-04-17 ENCOUNTER — OFFICE VISIT (OUTPATIENT)
Dept: PAIN MANAGEMENT | Age: 53
End: 2023-04-17
Payer: COMMERCIAL

## 2023-04-17 VITALS
BODY MASS INDEX: 26.6 KG/M2 | OXYGEN SATURATION: 96 % | SYSTOLIC BLOOD PRESSURE: 145 MMHG | RESPIRATION RATE: 18 BRPM | HEART RATE: 86 BPM | DIASTOLIC BLOOD PRESSURE: 82 MMHG | TEMPERATURE: 97.3 F | HEIGHT: 71 IN | WEIGHT: 190 LBS

## 2023-04-17 DIAGNOSIS — S32.000S COMPRESSION FRACTURE OF LUMBAR VERTEBRA, UNSPECIFIED LUMBAR VERTEBRAL LEVEL, SEQUELA: ICD-10-CM

## 2023-04-17 DIAGNOSIS — M51.36 DDD (DEGENERATIVE DISC DISEASE), LUMBAR: Primary | ICD-10-CM

## 2023-04-17 DIAGNOSIS — M48.061 SPINAL STENOSIS OF LUMBAR REGION, UNSPECIFIED WHETHER NEUROGENIC CLAUDICATION PRESENT: ICD-10-CM

## 2023-04-17 DIAGNOSIS — M47.817 LUMBOSACRAL SPONDYLOSIS WITHOUT MYELOPATHY: ICD-10-CM

## 2023-04-17 PROCEDURE — G8417 CALC BMI ABV UP PARAM F/U: HCPCS | Performed by: ANESTHESIOLOGY

## 2023-04-17 PROCEDURE — 3017F COLORECTAL CA SCREEN DOC REV: CPT | Performed by: ANESTHESIOLOGY

## 2023-04-17 PROCEDURE — G8427 DOCREV CUR MEDS BY ELIG CLIN: HCPCS | Performed by: ANESTHESIOLOGY

## 2023-04-17 PROCEDURE — 1036F TOBACCO NON-USER: CPT | Performed by: ANESTHESIOLOGY

## 2023-04-17 PROCEDURE — 99213 OFFICE O/P EST LOW 20 MIN: CPT | Performed by: ANESTHESIOLOGY

## 2023-04-17 NOTE — PROGRESS NOTES
Jenelle Ward presents to the [facility] on 4/17/2023. Juana Bowling is complaining of pain in her lower back. The pain is constant. The pain is described as aching. Pain is rated on her best day at a 3, on her worst day at a 3, and on average at a 3 on the VAS scale. She took her last dose of Tylenol today. Juana Bowling does not have issues with constipation. Any procedures since your last visit: Yes, with 70% relief. She is  on NSAIDS and  is not on anticoagulation medications to include none and is managed by NA. Pacemaker or defibrillator: No Physician managing device is NA. Medication Contract and Consent for Opioid Use Documents Filed       Patient Documents       Type of Document Status Date Received Received By Description    Medication Contract Received 2/8/2023 10:35 AM [facility] PAIN AGREEMENT                       There were no vitals taken for this visit. No LMP recorded. Patient has had a hysterectomy.
quittin.3    Smokeless tobacco: Never    Tobacco comments:     Quit smokin2002   Vaping Use    Vaping Use: Never used   Substance and Sexual Activity    Alcohol use: Not Currently    Drug use: No    Sexual activity: Not Currently   Other Topics Concern    Not on file   Social History Narrative    Not on file     Social Determinants of Health     Financial Resource Strain: Medium Risk    Difficulty of Paying Living Expenses: Somewhat hard   Food Insecurity: No Food Insecurity    Worried About Running Out of Food in the Last Year: Never true    Ran Out of Food in the Last Year: Never true   Transportation Needs: No Transportation Needs    Lack of Transportation (Medical): No    Lack of Transportation (Non-Medical): No   Physical Activity: Not on file   Stress: Not on file   Social Connections: Not on file   Intimate Partner Violence: Not on file   Housing Stability: Unknown    Unable to Pay for Housing in the Last Year: Not on file    Number of Places Lived in the Last Year: Not on file    Unstable Housing in the Last Year: No       Family History   Problem Relation Age of Onset    Diabetes Mother     Hypertension Mother     Diabetes Father     Hypertension Father     Gout Father     Heart Disease None     Ovarian Cancer None     Uterine Cancer None     Breast Cancer None        REVIEW OF SYSTEMS:     Tiff Zacarias denies fever/chills, chest pain, shortness of breath, new bowel or bladder complaints. All other review of systems was negative.     PHYSICAL EXAMINATION:      BP (!) 145/82   Pulse 86   Temp 97.3 °F (36.3 °C) (Infrared)   Resp 18   Ht 5' 11\" (1.803 m)   Wt 190 lb (86.2 kg)   SpO2 96%   BMI 26.50 kg/m²   General:       General appearance:  Pleasant and well-hydrated, in no distress and A & O x 3  Build:normal  Function: Rises from seated position easily     HEENT:     Head:normocephalic, atraumatic    Lungs:     Breathing:normal breathing pattern      CVS:     RRR     Abdomen:

## 2023-04-18 ENCOUNTER — HOSPITAL ENCOUNTER (OUTPATIENT)
Dept: PHYSICAL THERAPY | Age: 53
Setting detail: THERAPIES SERIES
Discharge: HOME | End: 2023-04-18
Payer: COMMERCIAL

## 2023-04-20 ENCOUNTER — HOSPITAL ENCOUNTER (OUTPATIENT)
Dept: PHYSICAL THERAPY | Age: 53
Setting detail: THERAPIES SERIES
Discharge: HOME | End: 2023-04-20
Payer: COMMERCIAL

## 2023-04-20 ENCOUNTER — OFFICE VISIT (OUTPATIENT)
Dept: FAMILY MEDICINE CLINIC | Age: 53
End: 2023-04-20

## 2023-04-20 VITALS
HEART RATE: 89 BPM | BODY MASS INDEX: 27.94 KG/M2 | TEMPERATURE: 98.7 F | OXYGEN SATURATION: 97 % | RESPIRATION RATE: 14 BRPM | WEIGHT: 199.6 LBS | DIASTOLIC BLOOD PRESSURE: 76 MMHG | HEIGHT: 71 IN | SYSTOLIC BLOOD PRESSURE: 128 MMHG

## 2023-04-20 DIAGNOSIS — R05.8 POST-VIRAL COUGH SYNDROME: Primary | ICD-10-CM

## 2023-04-20 DIAGNOSIS — J30.1 ALLERGIC RHINITIS DUE TO POLLEN, UNSPECIFIED SEASONALITY: ICD-10-CM

## 2023-04-20 RX ORDER — FLUTICASONE PROPIONATE 50 MCG
1 SPRAY, SUSPENSION (ML) NASAL DAILY
Qty: 32 G | Refills: 1 | Status: SHIPPED | OUTPATIENT
Start: 2023-04-20

## 2023-04-20 RX ORDER — BENZONATATE 100 MG/1
100 CAPSULE ORAL 3 TIMES DAILY PRN
Qty: 30 CAPSULE | Refills: 0 | Status: SHIPPED | OUTPATIENT
Start: 2023-04-20 | End: 2023-04-30

## 2023-04-20 ASSESSMENT — ENCOUNTER SYMPTOMS
HEARTBURN: 0
RHINORRHEA: 1
SORE THROAT: 0
WHEEZING: 0
SHORTNESS OF BREATH: 0
COUGH: 1

## 2023-04-20 NOTE — PROGRESS NOTES
[facility]  Precepting Note    Subjective: Follow up cough for several weeks  Slowly improving  Productive     ROS otherwise negative     Past medical, surgical, family and social history were reviewed, non-contributory, and unchanged unless otherwise stated. Objective:    /76   Pulse 89   Temp 98.7 °F (37.1 °C) (Temporal)   Resp 14   Ht 5' 11\" (1.803 m)   Wt 199 lb 9.6 oz (90.5 kg)   SpO2 97%   BMI 27.84 kg/m²     Exam is as noted by resident with the following changes, additions or corrections:    General:  NAD; alert & oriented x 3   Normal Tms, Oropharynx clear  Heart:  RRR, no murmurs, gallops, or rubs. Lungs:  CTA bilaterally, no wheeze, rales or rhonchi  Abd: bowel sounds present, nontender, nondistended, no masses  Extrem:  No clubbing, cyanosis, or edema    Assessment/Plan:  Cough- Flonase, Tessalon perles     Attending Physician Statement  I have reviewed the chart, including any radiology or labs. I have discussed the case, including pertinent history and exam findings with the resident. I agree with the assessment, plan and orders as documented by the resident. Please refer to the resident note for additional information.       Electronically signed by Evelyn Duvall MD on 4/20/2023 at 1:32 PM
medicationbefore taking. Patient and/or guardian given opportunity to ask questions/raise concerns. The patient verbalized comfort and understanding ofinstructions. Reviewed age and gender appropriate health screening exams and vaccinations. Advised patient regarding importance of keeping up with recommended health maintenance and to schedule as soon as possible if overdue, as this is important in assessing for undiagnosed pathology, especially cancer, as well as protecting against potentially harmful/life threatening disease. I encourage further reading and education about your health conditions . Information on many healthconditions is provided by the American Academy of Family Physicians: https://familydoctor. org/  Please bring any questions to me at your next visit. This document may have been prepared at least partially through the use of voice recognition software. Although effort is taken to assure the accuracy of this document, it is possible that grammatical, syntax,  or spelling errors may occur. No follow-ups on file.     Electronically signed by Rodrigo Eubanks MD on 4/20/23 at 1:11 PM EDT

## 2023-04-21 DIAGNOSIS — I95.1 ORTHOSTATIC HYPOTENSION: ICD-10-CM

## 2023-04-21 RX ORDER — MIDODRINE HYDROCHLORIDE 5 MG/1
TABLET ORAL
Qty: 90 TABLET | Refills: 3 | Status: SHIPPED | OUTPATIENT
Start: 2023-04-21

## 2023-04-25 ENCOUNTER — HOSPITAL ENCOUNTER (OUTPATIENT)
Dept: PHYSICAL THERAPY | Age: 53
Setting detail: THERAPIES SERIES
Discharge: HOME | End: 2023-04-25
Payer: COMMERCIAL

## 2023-04-27 ENCOUNTER — HOSPITAL ENCOUNTER (OUTPATIENT)
Dept: PHYSICAL THERAPY | Age: 53
Setting detail: THERAPIES SERIES
End: 2023-04-27
Payer: COMMERCIAL

## 2023-05-02 ENCOUNTER — HOSPITAL ENCOUNTER (OUTPATIENT)
Dept: PHYSICAL THERAPY | Age: 53
Setting detail: THERAPIES SERIES
Discharge: HOME | End: 2023-05-02

## 2023-05-04 ENCOUNTER — HOSPITAL ENCOUNTER (OUTPATIENT)
Dept: PHYSICAL THERAPY | Age: 53
Setting detail: THERAPIES SERIES
Discharge: HOME | End: 2023-05-04

## 2023-05-09 ENCOUNTER — HOSPITAL ENCOUNTER (OUTPATIENT)
Dept: PHYSICAL THERAPY | Age: 53
Setting detail: THERAPIES SERIES
Discharge: HOME | End: 2023-05-09
Payer: COMMERCIAL

## 2023-05-11 ENCOUNTER — HOSPITAL ENCOUNTER (OUTPATIENT)
Dept: PHYSICAL THERAPY | Age: 53
Setting detail: THERAPIES SERIES
Discharge: HOME | End: 2023-05-11
Payer: COMMERCIAL

## 2023-05-16 ENCOUNTER — HOSPITAL ENCOUNTER (OUTPATIENT)
Dept: PHYSICAL THERAPY | Age: 53
Setting detail: THERAPIES SERIES
Discharge: HOME | End: 2023-05-16
Payer: COMMERCIAL

## 2023-05-18 ENCOUNTER — HOSPITAL ENCOUNTER (OUTPATIENT)
Dept: PHYSICAL THERAPY | Age: 53
Setting detail: THERAPIES SERIES
Discharge: HOME | End: 2023-05-18
Payer: COMMERCIAL

## 2023-05-19 ENCOUNTER — PROCEDURE VISIT (OUTPATIENT)
Dept: PHYSICAL MEDICINE AND REHAB | Age: 53
End: 2023-05-19

## 2023-05-19 VITALS
DIASTOLIC BLOOD PRESSURE: 82 MMHG | HEIGHT: 71 IN | WEIGHT: 204 LBS | HEART RATE: 83 BPM | BODY MASS INDEX: 28.56 KG/M2 | SYSTOLIC BLOOD PRESSURE: 146 MMHG

## 2023-05-19 DIAGNOSIS — Z02.71 DISABILITY EXAMINATION: Primary | ICD-10-CM

## 2023-05-19 RX ORDER — ACETAMINOPHEN AND CODEINE PHOSPHATE 300; 30 MG/1; MG/1
TABLET ORAL
COMMUNITY
Start: 2023-05-18

## 2023-05-19 NOTE — PROGRESS NOTES
Vlad Butler D.O. [facility]   [address] [place]  Phone: [phone]  Fax: [phone]      2023    Bart Lamar  :  1970      Bart Lamar was seen in my office today for a Disability Determination Examination. The history was obtained from the claimant who was felt to be reliable. The claimant was identified by photo identification. I explained to the claimant that this examination was for evaluation purposes only and that no physician-patient relationship exists. The claimant expressed understanding and agreement. A release of information was signed and placed in the chart. I advised the claimant not to cause bodily harm or significant pain with any of the requested activities    Bart Lamar is a right hand dominant woman who reports to be disabled due to low back pain . The onset of the disabling condition was with a sudden onset after a fall on 22 due to an episode of dizziness. The work up has included: Xray lumbar; MRI lumbar. Symptoms have been getting worse since onset. Currently, the pain is located in the low back and does not radiate. The pain is described as sharp, aching, throbbing and rated as Pain Score:   9 . The pain is constant and occurs daily. The pain is worse with laying flat, sitting unsupported and better with none. Associated symptoms include none. Prior treatment: Effective medications have included Tylenol, gabapentin, ibuprofen, Cymbalta. Ineffective medications have included none. Records Reviewed  Progress Notes:  Jose Fajardo RN; 23, Mahendra Selby MD; 23, Dann WALTER DO; 23  MR lumbar 23: IMPRESSION:  1. Fairly old compression fractures of L2 through L5 as noted above with up  to 50% loss of height at L3 and 45% loss of height at L2. No acute fracture  seen.   2. Degenerative change with multiple disc bulges and

## 2023-05-23 ENCOUNTER — OFFICE VISIT (OUTPATIENT)
Dept: FAMILY MEDICINE CLINIC | Age: 53
End: 2023-05-23
Payer: COMMERCIAL

## 2023-05-23 ENCOUNTER — TELEPHONE (OUTPATIENT)
Dept: FAMILY MEDICINE CLINIC | Age: 53
End: 2023-05-23

## 2023-05-23 VITALS
RESPIRATION RATE: 16 BRPM | BODY MASS INDEX: 28.98 KG/M2 | WEIGHT: 207 LBS | SYSTOLIC BLOOD PRESSURE: 149 MMHG | OXYGEN SATURATION: 97 % | DIASTOLIC BLOOD PRESSURE: 88 MMHG | TEMPERATURE: 98.4 F | HEIGHT: 71 IN | HEART RATE: 68 BPM

## 2023-05-23 DIAGNOSIS — Z86.39 HX OF ADRENAL INSUFFICIENCY: ICD-10-CM

## 2023-05-23 DIAGNOSIS — Z86.39: ICD-10-CM

## 2023-05-23 DIAGNOSIS — Z86.39: Primary | ICD-10-CM

## 2023-05-23 DIAGNOSIS — R03.0 ELEVATED BP WITHOUT DIAGNOSIS OF HYPERTENSION: ICD-10-CM

## 2023-05-23 LAB
ALBUMIN SERPL-MCNC: 4 G/DL (ref 3.5–5.2)
ALP SERPL-CCNC: 125 U/L (ref 35–104)
ALT SERPL-CCNC: 12 U/L (ref 0–32)
ANION GAP SERPL CALCULATED.3IONS-SCNC: 15 MMOL/L (ref 7–16)
AST SERPL-CCNC: 24 U/L (ref 0–31)
BASOPHILS # BLD: 0.04 E9/L (ref 0–0.2)
BASOPHILS NFR BLD: 0.4 % (ref 0–2)
BILIRUB SERPL-MCNC: 0.5 MG/DL (ref 0–1.2)
BUN SERPL-MCNC: 12 MG/DL (ref 6–20)
CALCIUM SERPL-MCNC: 9.7 MG/DL (ref 8.6–10.2)
CHLORIDE SERPL-SCNC: 102 MMOL/L (ref 98–107)
CO2 SERPL-SCNC: 28 MMOL/L (ref 22–29)
CREAT SERPL-MCNC: 0.5 MG/DL (ref 0.5–1)
EOSINOPHIL # BLD: 0.14 E9/L (ref 0.05–0.5)
EOSINOPHIL NFR BLD: 1.5 % (ref 0–6)
ERYTHROCYTE [DISTWIDTH] IN BLOOD BY AUTOMATED COUNT: 16.7 FL (ref 11.5–15)
GLUCOSE SERPL-MCNC: 126 MG/DL (ref 74–99)
HCT VFR BLD AUTO: 41 % (ref 34–48)
HGB BLD-MCNC: 12.5 G/DL (ref 11.5–15.5)
IMM GRANULOCYTES # BLD: 0.02 E9/L
IMM GRANULOCYTES NFR BLD: 0.2 % (ref 0–5)
LYMPHOCYTES # BLD: 1.66 E9/L (ref 1.5–4)
LYMPHOCYTES NFR BLD: 18.2 % (ref 20–42)
MAGNESIUM SERPL-MCNC: 1.7 MG/DL (ref 1.6–2.6)
MCH RBC QN AUTO: 28.9 PG (ref 26–35)
MCHC RBC AUTO-ENTMCNC: 30.5 % (ref 32–34.5)
MCV RBC AUTO: 94.9 FL (ref 80–99.9)
MONOCYTES # BLD: 0.54 E9/L (ref 0.1–0.95)
MONOCYTES NFR BLD: 5.9 % (ref 2–12)
NEUTROPHILS # BLD: 6.71 E9/L (ref 1.8–7.3)
NEUTS SEG NFR BLD: 73.8 % (ref 43–80)
PLATELET # BLD AUTO: 267 E9/L (ref 130–450)
PMV BLD AUTO: 11 FL (ref 7–12)
POTASSIUM SERPL-SCNC: 3.7 MMOL/L (ref 3.5–5)
PROT SERPL-MCNC: 8.3 G/DL (ref 6.4–8.3)
RBC # BLD AUTO: 4.32 E12/L (ref 3.5–5.5)
SODIUM SERPL-SCNC: 145 MMOL/L (ref 132–146)
WBC # BLD: 9.1 E9/L (ref 4.5–11.5)

## 2023-05-23 PROCEDURE — 3017F COLORECTAL CA SCREEN DOC REV: CPT

## 2023-05-23 PROCEDURE — 1036F TOBACCO NON-USER: CPT

## 2023-05-23 PROCEDURE — G8417 CALC BMI ABV UP PARAM F/U: HCPCS

## 2023-05-23 PROCEDURE — 99213 OFFICE O/P EST LOW 20 MIN: CPT

## 2023-05-23 PROCEDURE — G8427 DOCREV CUR MEDS BY ELIG CLIN: HCPCS

## 2023-05-23 RX ORDER — PNV NO.95/FERROUS FUM/FOLIC AC 28MG-0.8MG
TABLET ORAL
Qty: 30 TABLET | OUTPATIENT
Start: 2023-05-23

## 2023-05-23 RX ORDER — NEPHROCAP 1 MG
CAP ORAL
Qty: 30 CAPSULE | Refills: 3 | OUTPATIENT
Start: 2023-05-23

## 2023-05-23 RX ORDER — FLUDROCORTISONE ACETATE 0.1 MG/1
TABLET ORAL
COMMUNITY
Start: 2023-05-22

## 2023-05-23 NOTE — TELEPHONE ENCOUNTER
Pt called and said she needed to call and let  know that she is still taking fludrocortisone 0.1mg. this was discussed at todays visit and she couldn't remember if she was taking it or not.

## 2023-05-25 ENCOUNTER — OFFICE VISIT (OUTPATIENT)
Dept: PAIN MANAGEMENT | Age: 53
End: 2023-05-25
Payer: COMMERCIAL

## 2023-05-25 ENCOUNTER — PREP FOR PROCEDURE (OUTPATIENT)
Dept: PAIN MANAGEMENT | Age: 53
End: 2023-05-25

## 2023-05-25 VITALS
SYSTOLIC BLOOD PRESSURE: 152 MMHG | HEART RATE: 94 BPM | DIASTOLIC BLOOD PRESSURE: 83 MMHG | OXYGEN SATURATION: 96 % | BODY MASS INDEX: 28.98 KG/M2 | HEIGHT: 71 IN | WEIGHT: 207 LBS

## 2023-05-25 DIAGNOSIS — M48.061 SPINAL STENOSIS OF LUMBAR REGION, UNSPECIFIED WHETHER NEUROGENIC CLAUDICATION PRESENT: ICD-10-CM

## 2023-05-25 DIAGNOSIS — S32.020B: Primary | ICD-10-CM

## 2023-05-25 DIAGNOSIS — M51.36 DDD (DEGENERATIVE DISC DISEASE), LUMBAR: Primary | ICD-10-CM

## 2023-05-25 DIAGNOSIS — S32.000S COMPRESSION FRACTURE OF LUMBAR VERTEBRA, UNSPECIFIED LUMBAR VERTEBRAL LEVEL, SEQUELA: ICD-10-CM

## 2023-05-25 DIAGNOSIS — M47.817 LUMBOSACRAL SPONDYLOSIS WITHOUT MYELOPATHY: ICD-10-CM

## 2023-05-25 PROCEDURE — G8417 CALC BMI ABV UP PARAM F/U: HCPCS | Performed by: ANESTHESIOLOGY

## 2023-05-25 PROCEDURE — 99213 OFFICE O/P EST LOW 20 MIN: CPT | Performed by: ANESTHESIOLOGY

## 2023-05-25 PROCEDURE — 3017F COLORECTAL CA SCREEN DOC REV: CPT | Performed by: ANESTHESIOLOGY

## 2023-05-25 PROCEDURE — 1036F TOBACCO NON-USER: CPT | Performed by: ANESTHESIOLOGY

## 2023-05-25 PROCEDURE — G8427 DOCREV CUR MEDS BY ELIG CLIN: HCPCS | Performed by: ANESTHESIOLOGY

## 2023-05-25 RX ORDER — SODIUM CHLORIDE 9 MG/ML
INJECTION, SOLUTION INTRAVENOUS PRN
Status: CANCELLED | OUTPATIENT
Start: 2023-05-25

## 2023-05-25 RX ORDER — SODIUM CHLORIDE 0.9 % (FLUSH) 0.9 %
5-40 SYRINGE (ML) INJECTION EVERY 12 HOURS SCHEDULED
Status: CANCELLED | OUTPATIENT
Start: 2023-05-25

## 2023-05-25 RX ORDER — SODIUM CHLORIDE 0.9 % (FLUSH) 0.9 %
5-40 SYRINGE (ML) INJECTION PRN
Status: CANCELLED | OUTPATIENT
Start: 2023-05-25

## 2023-05-25 ASSESSMENT — ENCOUNTER SYMPTOMS
EYE PAIN: 0
BACK PAIN: 1
VOMITING: 0
SINUS PAIN: 0
EYE REDNESS: 0
SHORTNESS OF BREATH: 0
EYE DISCHARGE: 0
CHEST TIGHTNESS: 0
DIARRHEA: 0
COUGH: 0
BLOOD IN STOOL: 0
NAUSEA: 0
SINUS PRESSURE: 0
ABDOMINAL PAIN: 0

## 2023-05-25 NOTE — PROGRESS NOTES
Steve Cordova presents to the [facility] on 5/25/2023. Ashutosh Clayton is complaining of pain in back. The pain is constant. The pain is described as aching and burning. Pain is rated on her best day at a 9, on her worst day at a 10, and on average at a 9 on the VAS scale. She took her last dose of Tylenol today. Any procedures since your last visit: No.    Pacemaker or defibrillator: No.    She is not on NSAIDS and is not on anticoagulation medications to include none and is managed by none. Medication Contract and Consent for Opioid Use Documents Filed       Patient Documents       Type of Document Status Date Received Received By Description    Medication Contract Received 2/8/2023 10:35 AM [facility] PAIN AGREEMENT                    Ht 5' 11\" (1.803 m)   Wt 207 lb (93.9 kg)   BMI 28.87 kg/m²      No LMP recorded. Patient has had a hysterectomy.
Diabetes mellitus ([facility] [address]     diet controlled    History of blood transfusion     Lumbar pain        Past Surgical History:   Procedure Laterality Date    COLPOSCOPY      2001 : outside [address] [facility]      2022    [address] (CERVIX STATUS UNKNOWN)      PAIN MANAGEMENT PROCEDURE N/A 2023    LUMBAR TRANSFORAMINAL EPIDURAL INJECTION RIGHT  L3 & LEFT l4 UNDER FLUOROSCOPIC GUIDANCE performed by Seema [facility] MD at [address] [facility] 2023    LUMBAR TRANSFORAMINAL EPIDURAL STEROID INJECTION RIGHT L3 & LEFT L4 UNDER FLUOROSCOPIC GUIDANCE performed by Seema [facility] MD at [address] [facility] W/POSTPARTUM CARE      1991 :      [place] W/POSTPART[place] CARE       :      [place] CON[place] CERVIX W/WO D&C RPR KNIFE/LASER      2001 : outside [address] [facility]         Prior to Admission medications    Medication Sig Start Date End Date Taking?  Authorizing Provider   B Complex-C-Folic Acid TABS Take 1 mg by mouth daily 23  Yes Annmarie Vieyra MD   Magnesium Oxide (MAGNESIUM-OXIDE) 250 MG TABS tablet Take 1 tablet by mouth daily 23  Yes Annmarie Vieyra MD   Riboflavin 400 MG CAPS Take 400 mg by mouth daily 23  Yes Annmarie Vieyra MD   fludrocortisone (FLORINEF) 0.1 MG tablet  23  Yes Historical Provider, MD   acetaminophen-codeine (TYLENOL #3) 300-30 MG per tablet  23  Yes Historical Provider, MD   midodrine (PROAMATINE) 5 MG tablet TAKE 3 TABLETS BY MOUTH THREE TIMES DAILY WITH MEALS 23  Yes Annmarie Vieyra MD   fluticasone (FLONASE) 50 MCG/ACT nasal spray 1 spray by Each Nostril route daily 23  Yes Bg Murray MD   Magnesium Gluconate 250 MG TABS Take 250 mg by mouth daily 3/22/23  Yes Annmarie Vieyra MD   DULoxetine (CYMBALTA) 60 MG extended release capsule Take 1 capsule by mouth daily 3/22/23  Yes Annmarie Vieyra MD   lidocaine (LIDODERM) 5 % PLACE 1

## 2023-05-26 ENCOUNTER — TELEPHONE (OUTPATIENT)
Dept: FAMILY MEDICINE CLINIC | Age: 53
End: 2023-05-26

## 2023-05-26 LAB — ALDOST SERPL-MCNC: <3 NG/DL

## 2023-05-29 ENCOUNTER — TELEPHONE (OUTPATIENT)
Dept: FAMILY MEDICINE CLINIC | Age: 53
End: 2023-05-29

## 2023-05-29 DIAGNOSIS — E27.40 ADRENAL INSUFFICIENCY (HCC): Primary | ICD-10-CM

## 2023-05-29 RX ORDER — FLUDROCORTISONE ACETATE 0.1 MG/1
0.1 TABLET ORAL DAILY
Qty: 30 TABLET | Refills: 3 | Status: SHIPPED | OUTPATIENT
Start: 2023-05-29 | End: 2023-09-26

## 2023-05-29 NOTE — TELEPHONE ENCOUNTER
Patient called and notified about all lab results including sodium tablets sodium is within normal limits on the higher end appropriate to decrease salt tablets at this time. We will monitor with a BMP in a month. Patient also notified about lower levels of aldosterone Florinef refilled and sent to her pharmacy. Called to see how patient is feeling by lowering the midodrine levels and reminded her to come in for a BP check, as she gradually decreases Midodrine. Fall precautions discussed.     [facility] Resident, PGY-2

## 2023-06-14 ENCOUNTER — NURSE ONLY (OUTPATIENT)
Dept: FAMILY MEDICINE CLINIC | Age: 53
End: 2023-06-14

## 2023-06-14 VITALS — HEART RATE: 75 BPM | DIASTOLIC BLOOD PRESSURE: 79 MMHG | SYSTOLIC BLOOD PRESSURE: 144 MMHG

## 2023-06-26 RX ORDER — NEPHROCAP 1 MG
CAP ORAL
Qty: 90 CAPSULE | Refills: 2 | Status: SHIPPED | OUTPATIENT
Start: 2023-06-26

## 2023-07-10 ENCOUNTER — OFFICE VISIT (OUTPATIENT)
Dept: FAMILY MEDICINE CLINIC | Age: 53
End: 2023-07-10

## 2023-07-10 VITALS
HEART RATE: 77 BPM | HEIGHT: 71 IN | TEMPERATURE: 97.9 F | DIASTOLIC BLOOD PRESSURE: 80 MMHG | RESPIRATION RATE: 16 BRPM | SYSTOLIC BLOOD PRESSURE: 151 MMHG | WEIGHT: 220 LBS | BODY MASS INDEX: 30.8 KG/M2 | OXYGEN SATURATION: 98 %

## 2023-07-10 DIAGNOSIS — F41.9 ANXIETY: ICD-10-CM

## 2023-07-10 DIAGNOSIS — I10 PRIMARY HYPERTENSION: Primary | ICD-10-CM

## 2023-07-10 RX ORDER — LISINOPRIL 10 MG/1
10 TABLET ORAL DAILY
Qty: 30 TABLET | Refills: 1 | Status: SHIPPED | OUTPATIENT
Start: 2023-07-10

## 2023-07-10 RX ORDER — DULOXETIN HYDROCHLORIDE 60 MG/1
60 CAPSULE, DELAYED RELEASE ORAL DAILY
Qty: 90 CAPSULE | Refills: 1 | Status: SHIPPED | OUTPATIENT
Start: 2023-07-10

## 2023-07-10 ASSESSMENT — PATIENT HEALTH QUESTIONNAIRE - PHQ9
10. IF YOU CHECKED OFF ANY PROBLEMS, HOW DIFFICULT HAVE THESE PROBLEMS MADE IT FOR YOU TO DO YOUR WORK, TAKE CARE OF THINGS AT HOME, OR GET ALONG WITH OTHER PEOPLE: 0
SUM OF ALL RESPONSES TO PHQ9 QUESTIONS 1 & 2: 0
6. FEELING BAD ABOUT YOURSELF - OR THAT YOU ARE A FAILURE OR HAVE LET YOURSELF OR YOUR FAMILY DOWN: 0
2. FEELING DOWN, DEPRESSED OR HOPELESS: 0
SUM OF ALL RESPONSES TO PHQ QUESTIONS 1-9: 0
8. MOVING OR SPEAKING SO SLOWLY THAT OTHER PEOPLE COULD HAVE NOTICED. OR THE OPPOSITE, BEING SO FIGETY OR RESTLESS THAT YOU HAVE BEEN MOVING AROUND A LOT MORE THAN USUAL: 0
7. TROUBLE CONCENTRATING ON THINGS, SUCH AS READING THE NEWSPAPER OR WATCHING TELEVISION: 0
SUM OF ALL RESPONSES TO PHQ QUESTIONS 1-9: 0
1. LITTLE INTEREST OR PLEASURE IN DOING THINGS: 0
SUM OF ALL RESPONSES TO PHQ QUESTIONS 1-9: 0
4. FEELING TIRED OR HAVING LITTLE ENERGY: 0
SUM OF ALL RESPONSES TO PHQ QUESTIONS 1-9: 0
3. TROUBLE FALLING OR STAYING ASLEEP: 0
5. POOR APPETITE OR OVEREATING: 0

## 2023-07-10 ASSESSMENT — ANXIETY QUESTIONNAIRES
6. BECOMING EASILY ANNOYED OR IRRITABLE: 0
3. WORRYING TOO MUCH ABOUT DIFFERENT THINGS: 1
4. TROUBLE RELAXING: 0
1. FEELING NERVOUS, ANXIOUS, OR ON EDGE: 0
IF YOU CHECKED OFF ANY PROBLEMS ON THIS QUESTIONNAIRE, HOW DIFFICULT HAVE THESE PROBLEMS MADE IT FOR YOU TO DO YOUR WORK, TAKE CARE OF THINGS AT HOME, OR GET ALONG WITH OTHER PEOPLE: NOT DIFFICULT AT ALL
GAD7 TOTAL SCORE: 3
7. FEELING AFRAID AS IF SOMETHING AWFUL MIGHT HAPPEN: 1
5. BEING SO RESTLESS THAT IT IS HARD TO SIT STILL: 0
2. NOT BEING ABLE TO STOP OR CONTROL WORRYING: 1

## 2023-07-10 NOTE — PROGRESS NOTES
Manav Muhammad  Department of Family Medicine  Family Medicine Residency Program      Patient: Sheridan Hightower 45 y.o. female     Date of Service: 7/10/23      Chief complaint:   Chief Complaint   Patient presents with    Anxiety       HISTORY OF PRESENTING ILLNESS     45 y.o. female presented to the clinic with anxiety and/or depression. This is a/an chronic problem. This has been going on for years . Exacerbating factors include back pain. Alleviating factors include music , shopping, spending time with her family. Treatment in the past includes zoloft . Anxiety symptoms include - just worrying no physical ssx . Depressive symptoms include  none . Patient is  having issues falling or staying asleep. Patient is not having associated panic attacks. The above is not interfering with quality of life. Patient has seen a Counselor before, stopped in May. Patient does not use alcohol and/or drugs. Family history includes   NO SI, thoughts or plans    Patient was on zoloft and now on Cymbalta , wanted to try it because she was still on pain. PHQ-9 Total Score: 0 (7/10/2023  3:00 PM)    MYRA-7 SCREENING 7/10/2023 3/7/2023 1/10/2023   Feeling nervous, anxious, or on edge Not at all Not at all Nearly every day   Not being able to stop or control worrying Several days Not at all Nearly every day   Worrying too much about different things Several days Not at all Nearly every day   Trouble relaxing Not at all Not at all Several days   Being so restless that it is hard to sit still Not at all Not at all Not at all   Becoming easily annoyed or irritable Not at all Not at all Several days   Feeling afraid as if something awful might happen Several days Not at all Nearly every day   MYRA-7 Total Score 3 0 14   How difficult have these problems made it for you to do your work, take care of things at home, or get along with other people?  Not difficult at all Not difficult at all Very difficult

## 2023-07-11 ASSESSMENT — ENCOUNTER SYMPTOMS
BLOOD IN STOOL: 0
EYE DISCHARGE: 0
EYE PAIN: 0
DIARRHEA: 0
CHEST TIGHTNESS: 0
ABDOMINAL PAIN: 0
EYE REDNESS: 0
NAUSEA: 0
SHORTNESS OF BREATH: 0
COUGH: 0
VOMITING: 0
SINUS PAIN: 0
BACK PAIN: 1
SINUS PRESSURE: 0

## 2023-08-04 ENCOUNTER — OFFICE VISIT (OUTPATIENT)
Dept: BARIATRICS/WEIGHT MGMT | Age: 53
End: 2023-08-04
Payer: COMMERCIAL

## 2023-08-04 VITALS
RESPIRATION RATE: 20 BRPM | SYSTOLIC BLOOD PRESSURE: 144 MMHG | WEIGHT: 216 LBS | BODY MASS INDEX: 30.92 KG/M2 | HEIGHT: 70 IN | HEART RATE: 95 BPM | TEMPERATURE: 97.3 F | DIASTOLIC BLOOD PRESSURE: 84 MMHG

## 2023-08-04 DIAGNOSIS — K91.2 MALNUTRITION FOLLOWING GASTROINTESTINAL SURGERY: Primary | ICD-10-CM

## 2023-08-04 PROCEDURE — 3017F COLORECTAL CA SCREEN DOC REV: CPT | Performed by: SURGERY

## 2023-08-04 PROCEDURE — 1036F TOBACCO NON-USER: CPT | Performed by: SURGERY

## 2023-08-04 PROCEDURE — G8427 DOCREV CUR MEDS BY ELIG CLIN: HCPCS | Performed by: SURGERY

## 2023-08-04 PROCEDURE — 99213 OFFICE O/P EST LOW 20 MIN: CPT | Performed by: SURGERY

## 2023-08-04 PROCEDURE — G8417 CALC BMI ABV UP PARAM F/U: HCPCS | Performed by: SURGERY

## 2023-08-04 NOTE — PATIENT INSTRUCTIONS
Please continue to take your vitamin and mineral supplements as instructed. If you received a blood work prescription today for laboratory monitoring due prior to your next routine follow-up visit, please have this blood work obtained 10 to 14 days prior to your next visit. It is important to fast for 12 hours prior to routine weight loss surgery blood work, EXCEPT for drinking water, to ensure accuracy of results. Please report nausea, vomiting, abdominal pain, or any other problems you experience to your surgeon. For problems related to weight loss surgery, it is best to go to [facility] and have your surgeon paged.

## 2023-08-04 NOTE — PROGRESS NOTES
treatment. Cholesterol and triglycerides are normal.    Plan:  Doing well. Continue to eat a high protein, low calorie diet, eat small portions very slowly and chew well before swallowing. Drink plenty of water and fluids. Make sure to use fiber to keep the bowels regular. Try to exercise 7 days per week, maintain adequate variety and balance. Always notify the clinic if you have any medical problems. Follow up in 12 months.       Physician Signature: Electronically signed by Dr. Lexi Lion MD

## 2023-08-31 ENCOUNTER — OFFICE VISIT (OUTPATIENT)
Dept: FAMILY MEDICINE CLINIC | Age: 53
End: 2023-08-31
Payer: COMMERCIAL

## 2023-08-31 VITALS
WEIGHT: 221 LBS | OXYGEN SATURATION: 98 % | HEART RATE: 83 BPM | HEIGHT: 70 IN | DIASTOLIC BLOOD PRESSURE: 64 MMHG | TEMPERATURE: 97.5 F | BODY MASS INDEX: 31.64 KG/M2 | SYSTOLIC BLOOD PRESSURE: 110 MMHG | RESPIRATION RATE: 14 BRPM

## 2023-08-31 DIAGNOSIS — Z00.00 ENCOUNTER FOR WELL ADULT EXAM WITHOUT ABNORMAL FINDINGS: Primary | ICD-10-CM

## 2023-08-31 DIAGNOSIS — F41.9 ANXIETY: ICD-10-CM

## 2023-08-31 DIAGNOSIS — S32.000D COMPRESSION FRACTURE OF LUMBAR VERTEBRA WITH ROUTINE HEALING, UNSPECIFIED LUMBAR VERTEBRAL LEVEL, SUBSEQUENT ENCOUNTER: ICD-10-CM

## 2023-08-31 DIAGNOSIS — I10 PRIMARY HYPERTENSION: ICD-10-CM

## 2023-08-31 PROCEDURE — 3078F DIAST BP <80 MM HG: CPT

## 2023-08-31 PROCEDURE — 3074F SYST BP LT 130 MM HG: CPT

## 2023-08-31 PROCEDURE — 99396 PREV VISIT EST AGE 40-64: CPT

## 2023-08-31 RX ORDER — RIBOFLAVIN (VITAMIN B2) 400 MG
400 TABLET ORAL DAILY
COMMUNITY
Start: 2023-08-27 | End: 2023-08-31 | Stop reason: SDUPTHER

## 2023-08-31 RX ORDER — MULTIVITAMIN WITH IRON
250 TABLET ORAL ONCE
COMMUNITY
Start: 2023-08-14 | End: 2023-08-31 | Stop reason: SDUPTHER

## 2023-08-31 RX ORDER — LIDOCAINE 50 MG/G
1 PATCH TOPICAL DAILY
Qty: 30 PATCH | Refills: 0 | Status: SHIPPED | OUTPATIENT
Start: 2023-08-31

## 2023-08-31 ASSESSMENT — ENCOUNTER SYMPTOMS
COUGH: 0
ABDOMINAL PAIN: 0
VOMITING: 0
BLOOD IN STOOL: 0
CHEST TIGHTNESS: 0
SINUS PRESSURE: 0
SINUS PAIN: 0
DIARRHEA: 0
SHORTNESS OF BREATH: 0
EYE DISCHARGE: 0
NAUSEA: 0
EYE PAIN: 0
EYE REDNESS: 0

## 2023-08-31 NOTE — PATIENT INSTRUCTIONS
information. Well Visit, Women 48 to 72: Care Instructions  Overview     Well visits can help you stay healthy. Your doctor has checked your overall health and may have suggested ways to take good care of yourself. Your doctor also may have recommended tests. At home, you can help prevent illness with healthy eating, regular exercise, and other steps. Follow-up care is a key part of your treatment and safety. Be sure to make and go to all appointments, and call your doctor if you are having problems. It's also a good idea to know your test results and keep a list of the medicines you take. How can you care for yourself at home? Get screening tests that you and your doctor decide on. Screening helps find diseases before any symptoms appear. Eat healthy foods. Choose fruits, vegetables, whole grains, protein, and low-fat dairy foods. Limit fat, especially saturated fat. Reduce salt in your diet. Limit alcohol. Have no more than 1 drink a day or 7 drinks a week. Get at least 30 minutes of exercise on most days of the week. Walking is a good choice. You also may want to do other activities, such as running, swimming, cycling, or playing tennis or team sports. Reach and stay at a healthy weight. This will lower your risk for many problems, such as obesity, diabetes, heart disease, and high blood pressure. Do not smoke. Smoking can make health problems worse. If you need help quitting, talk to your doctor about stop-smoking programs and medicines. These can increase your chances of quitting for good. Care for your mental health. It is easy to get weighed down by worry and stress. Learn strategies to manage stress, like deep breathing and mindfulness, and stay connected with your family and community. If you find you often feel sad or hopeless, talk with your doctor. Treatment can help. Talk to your doctor about whether you have any risk factors for sexually transmitted infections (STIs).  You can help

## 2023-09-21 DIAGNOSIS — I10 PRIMARY HYPERTENSION: ICD-10-CM

## 2023-09-22 RX ORDER — LISINOPRIL 10 MG/1
10 TABLET ORAL DAILY
Qty: 30 TABLET | Refills: 1 | Status: SHIPPED | OUTPATIENT
Start: 2023-09-22

## 2023-09-22 NOTE — TELEPHONE ENCOUNTER
Last Appointment   8/31/2023  Next Appointment  Visit date not found    Return in about 6 months (around 2/29/2024).

## 2023-09-25 ENCOUNTER — OFFICE VISIT (OUTPATIENT)
Dept: PAIN MANAGEMENT | Age: 53
End: 2023-09-25
Payer: COMMERCIAL

## 2023-09-25 VITALS
TEMPERATURE: 97.6 F | WEIGHT: 221 LBS | BODY MASS INDEX: 31.64 KG/M2 | DIASTOLIC BLOOD PRESSURE: 75 MMHG | HEIGHT: 70 IN | OXYGEN SATURATION: 96 % | HEART RATE: 87 BPM | RESPIRATION RATE: 16 BRPM | SYSTOLIC BLOOD PRESSURE: 129 MMHG

## 2023-09-25 DIAGNOSIS — M47.817 LUMBOSACRAL SPONDYLOSIS WITHOUT MYELOPATHY: ICD-10-CM

## 2023-09-25 DIAGNOSIS — M48.061 SPINAL STENOSIS OF LUMBAR REGION, UNSPECIFIED WHETHER NEUROGENIC CLAUDICATION PRESENT: ICD-10-CM

## 2023-09-25 DIAGNOSIS — S32.000D COMPRESSION FRACTURE OF LUMBAR VERTEBRA WITH ROUTINE HEALING, UNSPECIFIED LUMBAR VERTEBRAL LEVEL, SUBSEQUENT ENCOUNTER: ICD-10-CM

## 2023-09-25 DIAGNOSIS — M54.16 LUMBAR RADICULOPATHY: Primary | ICD-10-CM

## 2023-09-25 DIAGNOSIS — M51.36 DDD (DEGENERATIVE DISC DISEASE), LUMBAR: ICD-10-CM

## 2023-09-25 PROCEDURE — 1036F TOBACCO NON-USER: CPT | Performed by: ANESTHESIOLOGY

## 2023-09-25 PROCEDURE — G8428 CUR MEDS NOT DOCUMENT: HCPCS | Performed by: ANESTHESIOLOGY

## 2023-09-25 PROCEDURE — 99213 OFFICE O/P EST LOW 20 MIN: CPT | Performed by: ANESTHESIOLOGY

## 2023-09-25 PROCEDURE — 3017F COLORECTAL CA SCREEN DOC REV: CPT | Performed by: ANESTHESIOLOGY

## 2023-09-25 PROCEDURE — G8417 CALC BMI ABV UP PARAM F/U: HCPCS | Performed by: ANESTHESIOLOGY

## 2023-09-25 NOTE — PROGRESS NOTES
[facility]        [address] [place] [address]  Dept: [phone]      Follow up Note      Val Linn     Date of Visit:  9/25/2023    CC:  Patient presents for follow up   Chief Complaint   Patient presents with    Lower Back Pain    Follow Up After Procedure     LUMBAR EPIDURAL STEROID INJECTION UNDER FLUOROSCOPIC GUIDANCE AT L3-L4       HPI:    Low back pain since Nov 2022. H/o fall and has low back pain. Has lumbar VCF - evaluated by NSG- conservative management with bracing and analgesics. Pain is better. Change in quality of symptoms:yes - improved. Medication side effects:none. Recent interventional procedures: YFN, excellent pain relief. Nursing notes and details of the pain history reviewed. Please see intake notes for details. S/p Gastric sleeve in Aug 2022 has lost significant weight following the surgery. Previous treatments:   Physical Therapy/ HEP : yes,      Medications: - NSAID's : yes                        - Membrane stabilizers : yes - gabapentin- had side effects                       - Opioids : yes,                        - Adjuvants or Others : yes,     Spine Surgeries: no     She has not been on anticoagulation medications      H/O Smoking: no  H/O alcohol abuse : no  H/O Illicit drug use : denies     Employment: currently off work     Imaging:      MRI of LS spine: 2/7/2023:  Impression   1. Fairly old compression fractures of L2 through L5 as noted above with up   to 50% loss of height at L3 and 45% loss of height at L2. No acute fracture   seen. 2. Degenerative change with multiple disc bulges and multilevel central canal   stenosis the, most prominent (moderate) at L3-4.   3. Multilevel neural foraminal stenosis as noted above. Xray LS spine: 1/11/2023:  Impression   Stable mild compression injuries at L2 and L3. Degenerative lumbar   spondylosis as before. OARRS report[de-identified] reviewd.     Past Medical History:

## 2023-11-01 ENCOUNTER — TELEMEDICINE (OUTPATIENT)
Dept: FAMILY MEDICINE CLINIC | Age: 53
End: 2023-11-01
Payer: COMMERCIAL

## 2023-11-01 DIAGNOSIS — J06.9 VIRAL URI: ICD-10-CM

## 2023-11-01 DIAGNOSIS — J30.1 ALLERGIC RHINITIS DUE TO POLLEN, UNSPECIFIED SEASONALITY: Primary | ICD-10-CM

## 2023-11-01 PROCEDURE — G8427 DOCREV CUR MEDS BY ELIG CLIN: HCPCS

## 2023-11-01 PROCEDURE — 99213 OFFICE O/P EST LOW 20 MIN: CPT

## 2023-11-01 PROCEDURE — 3017F COLORECTAL CA SCREEN DOC REV: CPT

## 2023-11-01 RX ORDER — FLUTICASONE PROPIONATE 50 MCG
2 SPRAY, SUSPENSION (ML) NASAL DAILY
Qty: 16 G | Refills: 1 | Status: SHIPPED | OUTPATIENT
Start: 2023-11-01

## 2023-11-01 RX ORDER — BENZONATATE 200 MG/1
200 CAPSULE ORAL 3 TIMES DAILY PRN
Qty: 30 CAPSULE | Refills: 0 | Status: SHIPPED | OUTPATIENT
Start: 2023-11-01 | End: 2023-11-11

## 2023-11-01 ASSESSMENT — ENCOUNTER SYMPTOMS
COUGH: 1
EYE REDNESS: 0
SINUS PRESSURE: 0
EYE PAIN: 0
SHORTNESS OF BREATH: 0
EYE DISCHARGE: 0
NAUSEA: 0
BLOOD IN STOOL: 0
CHEST TIGHTNESS: 0
SINUS PAIN: 0
VOMITING: 0
ABDOMINAL PAIN: 0
DIARRHEA: 0

## 2023-11-01 NOTE — PROGRESS NOTES
Kimberly New, was evaluated through a synchronous (real-time) audio-video encounter. The patient (or guardian if applicable) is aware that this is a billable service, which includes applicable co-pays. This Virtual Visit was conducted with patient's (and/or legal guardian's) consent. Patient identification was verified, and a caregiver was present when appropriate. The patient was located at Home: [place] [id number]  Provider was located at Facility (Appt Dept): [address]  [address] [facility],  [address] [facility]    Kimberly New (:  1970) is a Established patient, presenting virtually for evaluation of the following:    Assessment & Plan   Below is the assessment and plan developed based on review of pertinent history, physical exam, labs, studies, and medications. 1. Allergic rhinitis due to pollen, unspecified seasonality  2. Viral URI  -     benzonatate (TESSALON) 200 MG capsule; Take 1 capsule by mouth 3 times daily as needed for Cough, Disp-30 capsule, R-0Normal  -     fluticasone (FLONASE) 50 MCG/ACT nasal spray; 2 sprays by Each Nostril route daily, Disp-16 g, R-1Normal  Continue with supportive care - To prevent dehydration, drink plenty of fluids. Get plenty of rest.Use a vaporizer or humidifier to add moisture to your bedroom. Good hand hygiene to prevent getting sick. Continue to monitor symptoms, advised about calling back or rescheduling if symptoms worsen or progress. Return if symptoms worsen or fail to improve. Subjective   HPI  Review of Systems   Constitutional:  Negative for fatigue, fever and unexpected weight change. HENT:  Positive for congestion. Negative for ear pain, sinus pressure and sinus pain. Eyes:  Negative for pain, discharge, redness and visual disturbance. Respiratory:  Positive for cough. Negative for chest tightness and shortness of breath. Cardiovascular:  Negative for chest pain and leg swelling.

## 2023-11-01 NOTE — PROGRESS NOTES
S: 52 y.o. female with   Chief Complaint   Patient presents with    Pharyngitis    Cough    Drainage       Cough  -new issue  -started Saturday  -has drainage and sore throat    Has lumbar radiculopathy, needs letter for ergonomic chair    O: VS:  vitals were not taken for this visit. BP Readings from Last 3 Encounters:   09/25/23 129/75   08/31/23 110/64   08/04/23 (!) 144/84     See resident note    Impression/Plan:   1) Viral URI - supportive care   2) Lumbar radic - letter for ergonomic chair      Health Maintenance Due   Topic Date Due    Shingles vaccine (3 of 3) 10/26/2023    Lipids  11/02/2023    A1C test (Diabetic or Prediabetic)  11/06/2023         Attending Physician Statement  I have discussed the case, including pertinent history and exam findings with the resident. I agree with the documented assessment and plan.       Matt Rainey, DO

## 2023-11-01 NOTE — PROGRESS NOTES
TeleMedicine Patient Consent    This visit was performed as a virtual video visit using a synchronous, two-way, audio-video telehealth technology platform. Patient identification was verified at the start of the visit, including the patient's telephone number and physical location. I discussed with the patient the nature of our telehealth visits, that:     Due to the nature of an audio- video modality, the only components of a physical exam that could be done are the elements supported by direct observation. The provider will evaluate the patient and recommend diagnostics and treatments based on their assessment. If it was felt that the patient should be evaluated in clinic or an emergency room setting, then they would be directed there. Our sessions are not being recorded and that personal health information is protected. Our team would provide follow up care in person if/when the patient needs it. Patient does agree to proceed with telemedicine consultation. Patient location: home address in [place]    Physician location: regular office location    This visit was completed virtually using My Chart/Haiku/Stephanie    This visit was performed during the 9877 public health crisis and COVID-19 pandemic.   *Add 95 modifier to all Video Visits*

## 2023-11-10 ENCOUNTER — HOSPITAL ENCOUNTER (EMERGENCY)
Age: 53
Discharge: HOME | End: 2023-11-10
Attending: EMERGENCY MEDICINE
Payer: COMMERCIAL

## 2023-11-10 ENCOUNTER — APPOINTMENT (OUTPATIENT)
Dept: CT IMAGING | Age: 53
End: 2023-11-10
Attending: EMERGENCY MEDICINE
Payer: COMMERCIAL

## 2023-11-10 VITALS
DIASTOLIC BLOOD PRESSURE: 93 MMHG | SYSTOLIC BLOOD PRESSURE: 159 MMHG | TEMPERATURE: 97.3 F | WEIGHT: 209 LBS | RESPIRATION RATE: 16 BRPM | BODY MASS INDEX: 29.99 KG/M2 | HEART RATE: 90 BPM | OXYGEN SATURATION: 100 %

## 2023-11-10 DIAGNOSIS — S20.229A CONTUSION OF BACK, UNSPECIFIED LATERALITY, INITIAL ENCOUNTER: Primary | ICD-10-CM

## 2023-11-10 PROCEDURE — 99284 EMERGENCY DEPT VISIT MOD MDM: CPT

## 2023-11-10 PROCEDURE — 72131 CT LUMBAR SPINE W/O DYE: CPT

## 2023-11-10 ASSESSMENT — PAIN DESCRIPTION - ORIENTATION
ORIENTATION: LOWER
ORIENTATION: LOWER;MID

## 2023-11-10 ASSESSMENT — PAIN DESCRIPTION - PAIN TYPE: TYPE: ACUTE PAIN

## 2023-11-10 ASSESSMENT — PAIN SCALES - GENERAL
PAINLEVEL_OUTOF10: 10
PAINLEVEL_OUTOF10: 10

## 2023-11-10 ASSESSMENT — LIFESTYLE VARIABLES
HOW MANY STANDARD DRINKS CONTAINING ALCOHOL DO YOU HAVE ON A TYPICAL DAY: 1 OR 2
HOW OFTEN DO YOU HAVE A DRINK CONTAINING ALCOHOL: 2-4 TIMES A MONTH

## 2023-11-10 ASSESSMENT — PAIN - FUNCTIONAL ASSESSMENT
PAIN_FUNCTIONAL_ASSESSMENT: 0-10
PAIN_FUNCTIONAL_ASSESSMENT: 0-10

## 2023-11-10 ASSESSMENT — PAIN DESCRIPTION - DESCRIPTORS
DESCRIPTORS: ACHING;SHARP;DULL
DESCRIPTORS: ACHING;SHARP;TIGHTNESS

## 2023-11-10 ASSESSMENT — PAIN DESCRIPTION - LOCATION
LOCATION: BACK
LOCATION: BACK

## 2023-11-16 ENCOUNTER — OFFICE VISIT (OUTPATIENT)
Dept: FAMILY MEDICINE CLINIC | Age: 53
End: 2023-11-16

## 2023-11-16 VITALS
SYSTOLIC BLOOD PRESSURE: 123 MMHG | DIASTOLIC BLOOD PRESSURE: 82 MMHG | HEART RATE: 94 BPM | OXYGEN SATURATION: 95 % | HEIGHT: 70 IN | WEIGHT: 239.2 LBS | BODY MASS INDEX: 34.24 KG/M2 | RESPIRATION RATE: 16 BRPM | TEMPERATURE: 97 F

## 2023-11-16 DIAGNOSIS — I10 PRIMARY HYPERTENSION: ICD-10-CM

## 2023-11-16 DIAGNOSIS — E11.9 TYPE 2 DIABETES MELLITUS WITHOUT COMPLICATION, WITHOUT LONG-TERM CURRENT USE OF INSULIN (HCC): ICD-10-CM

## 2023-11-16 DIAGNOSIS — M54.50 CHRONIC LOW BACK PAIN WITHOUT SCIATICA, UNSPECIFIED BACK PAIN LATERALITY: ICD-10-CM

## 2023-11-16 DIAGNOSIS — J06.9 VIRAL URI: ICD-10-CM

## 2023-11-16 DIAGNOSIS — G89.29 CHRONIC LOW BACK PAIN WITHOUT SCIATICA, UNSPECIFIED BACK PAIN LATERALITY: ICD-10-CM

## 2023-11-16 DIAGNOSIS — S20.229D CONTUSION OF BACK, UNSPECIFIED LATERALITY, SUBSEQUENT ENCOUNTER: Primary | ICD-10-CM

## 2023-11-16 DIAGNOSIS — S32.000D COMPRESSION FRACTURE OF LUMBAR VERTEBRA WITH ROUTINE HEALING, UNSPECIFIED LUMBAR VERTEBRAL LEVEL, SUBSEQUENT ENCOUNTER: ICD-10-CM

## 2023-11-16 LAB — HBA1C MFR BLD: 6.7 %

## 2023-11-16 RX ORDER — DIAPER,BRIEF,ADULT, DISPOSABLE
30 EACH MISCELLANEOUS 3 TIMES DAILY PRN
Qty: 60 EACH | Refills: 3 | Status: SHIPPED | OUTPATIENT
Start: 2023-11-16

## 2023-11-16 RX ORDER — LIDOCAINE 50 MG/G
1 PATCH TOPICAL DAILY
Qty: 30 PATCH | Refills: 0 | Status: SHIPPED | OUTPATIENT
Start: 2023-11-16

## 2023-11-16 RX ORDER — DULOXETIN HYDROCHLORIDE 30 MG/1
30 CAPSULE, DELAYED RELEASE ORAL DAILY
Qty: 90 CAPSULE | Refills: 1 | Status: SHIPPED | OUTPATIENT
Start: 2023-11-16

## 2023-11-16 RX ORDER — LISINOPRIL 10 MG/1
10 TABLET ORAL DAILY
Qty: 30 TABLET | Refills: 1 | Status: SHIPPED | OUTPATIENT
Start: 2023-11-16

## 2023-11-16 RX ORDER — LANOLIN ALCOHOL/MO/W.PET/CERES
3 CREAM (GRAM) TOPICAL NIGHTLY PRN
Qty: 30 TABLET | Refills: 1 | Status: SHIPPED | OUTPATIENT
Start: 2023-11-16

## 2023-11-16 RX ORDER — FLUTICASONE PROPIONATE 50 MCG
2 SPRAY, SUSPENSION (ML) NASAL DAILY
Qty: 16 G | Refills: 1 | Status: SHIPPED | OUTPATIENT
Start: 2023-11-16

## 2023-11-16 RX ORDER — CYCLOBENZAPRINE HCL 5 MG
5 TABLET ORAL 2 TIMES DAILY PRN
Qty: 30 TABLET | Refills: 0 | Status: SHIPPED | OUTPATIENT
Start: 2023-11-16 | End: 2023-12-16

## 2023-11-16 ASSESSMENT — ENCOUNTER SYMPTOMS
SINUS PAIN: 0
SINUS PRESSURE: 0
EYE PAIN: 0
ABDOMINAL PAIN: 0
SHORTNESS OF BREATH: 0
VOMITING: 0
BACK PAIN: 1
CHEST TIGHTNESS: 0
EYE DISCHARGE: 0
EYE REDNESS: 0
COUGH: 0
NAUSEA: 0
BLOOD IN STOOL: 0
DIARRHEA: 0

## 2023-11-20 ENCOUNTER — TELEPHONE (OUTPATIENT)
Dept: FAMILY MEDICINE CLINIC | Age: 53
End: 2023-11-20

## 2023-11-20 NOTE — TELEPHONE ENCOUNTER
----- Message from John Flanagan sent at 11/20/2023  9:05 AM EST -----  Subject: Message to Provider    QUESTIONS  Information for Provider? Graeme Medina from [facility] would like the [employer]   and office notes from pts 11/16 appt. Please fax to [phone].  ---------------------------------------------------------------------------  --------------  Samuel Kong INFO  [phone]; OK to leave message on voicemail  ---------------------------------------------------------------------------  --------------  SCRIPT ANSWERS  Relationship to Patient? Covered Entity  Covered Entity Type? Health Insurance? Representative Name?  Graeme Medina

## 2023-11-20 NOTE — TELEPHONE ENCOUNTER
Called & spoke with [employer] who states the patient was to return to work already but was delayed due to injury on 11/10. They need records and restriction information for patient ASAP.  I called patient & obtained verbal permission from patient to release records to [facility] (11/20/23 @ 11:57am)

## 2023-11-22 ENCOUNTER — OFFICE VISIT (OUTPATIENT)
Dept: FAMILY MEDICINE CLINIC | Age: 53
End: 2023-11-22
Payer: COMMERCIAL

## 2023-11-22 VITALS
DIASTOLIC BLOOD PRESSURE: 92 MMHG | RESPIRATION RATE: 16 BRPM | HEART RATE: 88 BPM | BODY MASS INDEX: 34.16 KG/M2 | SYSTOLIC BLOOD PRESSURE: 156 MMHG | OXYGEN SATURATION: 98 % | HEIGHT: 71 IN | TEMPERATURE: 98.2 F | WEIGHT: 244 LBS

## 2023-11-22 DIAGNOSIS — Z92.29: ICD-10-CM

## 2023-11-22 DIAGNOSIS — Z76.89 ENCOUNTER FOR WEIGHT MANAGEMENT: ICD-10-CM

## 2023-11-22 DIAGNOSIS — E11.9 TYPE 2 DIABETES MELLITUS WITHOUT COMPLICATION, WITHOUT LONG-TERM CURRENT USE OF INSULIN (HCC): Primary | ICD-10-CM

## 2023-11-22 DIAGNOSIS — Z23 NEED FOR IMMUNIZATION AGAINST INFLUENZA: ICD-10-CM

## 2023-11-22 PROCEDURE — 1036F TOBACCO NON-USER: CPT

## 2023-11-22 PROCEDURE — 3044F HG A1C LEVEL LT 7.0%: CPT

## 2023-11-22 PROCEDURE — G8417 CALC BMI ABV UP PARAM F/U: HCPCS

## 2023-11-22 PROCEDURE — G8482 FLU IMMUNIZE ORDER/ADMIN: HCPCS

## 2023-11-22 PROCEDURE — 3017F COLORECTAL CA SCREEN DOC REV: CPT

## 2023-11-22 PROCEDURE — 90471 IMMUNIZATION ADMIN: CPT | Performed by: FAMILY MEDICINE

## 2023-11-22 PROCEDURE — 90677 PCV20 VACCINE IM: CPT | Performed by: FAMILY MEDICINE

## 2023-11-22 PROCEDURE — 2022F DILAT RTA XM EVC RTNOPTHY: CPT

## 2023-11-22 PROCEDURE — G8427 DOCREV CUR MEDS BY ELIG CLIN: HCPCS

## 2023-11-22 PROCEDURE — 99213 OFFICE O/P EST LOW 20 MIN: CPT

## 2023-11-22 RX ORDER — GLIPIZIDE 5 MG/1
5 TABLET, FILM COATED, EXTENDED RELEASE ORAL DAILY
Qty: 30 TABLET | Refills: 3 | Status: SHIPPED | OUTPATIENT
Start: 2023-11-22

## 2023-11-22 RX ORDER — GLIPIZIDE 5 MG/1
5 TABLET ORAL DAILY
Qty: 60 TABLET | Refills: 3 | Status: SHIPPED
Start: 2023-11-22 | End: 2023-11-22

## 2023-11-22 ASSESSMENT — ENCOUNTER SYMPTOMS
SINUS PAIN: 0
ABDOMINAL PAIN: 0
EYE PAIN: 0
EYE REDNESS: 0
COUGH: 0
CHEST TIGHTNESS: 0
EYE DISCHARGE: 0
VOMITING: 0
SHORTNESS OF BREATH: 0
NAUSEA: 0
DIARRHEA: 0
BLOOD IN STOOL: 0
SINUS PRESSURE: 0

## 2023-11-22 NOTE — PROGRESS NOTES
[facility]  Department of Family Medicine  Family Medicine Residency Program      Patient: Reuben Sanders  1970 52 y.o. female     Date of Service: 11/22/2023      Chief complaint:   Chief Complaint   Patient presents with    Diabetes    Weight Management     Interested in weight loss medication       HISTORY OF PRESENTING ILLNESS     Reuben Sanders is a 52 y.o. female presented to the clinic with complaints of     Patient has a history of gastric sleeve august 4th 2022   Follows with [facility] - sees them next year     Patient states she is only eating fast food once a month   Minimal calories   Eggs, fruits, veggies, proteins , denies eating lots of sweets or carbs   Coke zero sugars     1) Diabetes  Checking sugars at home? Yes   Fasting glucose readings? 200   Post-prandial glucose readings? 150   What medications are you on? None    Any hypoglycemic events? None   Any numbness or tingling in hands or feet? None   Any noticeable wounds on your feet? None   Have you had an eye exam in the last year?  Going 30th Nov  Exercise: exercise bike at home     hbA1c 6.7   Doesn't want to try metformin - knows too many people with complications from it   Bydureun in the past   Has  a history of Hba1c in 13 range presurgery   Patient was on the glipizde in the past and tolerated that well         Past Medical History:      Diagnosis Date    Anemia     stable at this time  2023    Arthritis     COVID 05/2022 11/2022-    Diabetes mellitus ([address])     diet controlled    History of blood transfusion 2012    Lumbar pain      Past Surgical History:        Procedure Laterality Date    COLPOSCOPY      2001 : outside [address]      august 2022    José Miguel (CERVIX STATUS UNKNOWN)      PAIN MANAGEMENT PROCEDURE N/A 02/14/2023    LUMBAR TRANSFORAMINAL EPIDURAL INJECTION RIGHT  L3 & LEFT l4 UNDER FLUOROSCOPIC GUIDANCE performed by Aida Hernandez MD at [address] [place] [address]

## 2023-11-28 ENCOUNTER — TELEPHONE (OUTPATIENT)
Dept: FAMILY MEDICINE CLINIC | Age: 53
End: 2023-11-28

## 2023-11-28 NOTE — TELEPHONE ENCOUNTER
----- Message from Lisandro Shetty sent at 11/27/2023  4:20 PM EST -----  Subject: Message to Provider    QUESTIONS  Information for Provider? Patient is requesting that no further medical   information should be released to [facility]   ---------------------------------------------------------------------------  --------------  [address]  [phone]; OK to leave message on voicemail  ---------------------------------------------------------------------------  --------------  SCRIPT ANSWERS  Relationship to Patient?  Self

## 2024-01-09 RX ORDER — RIBOFLAVIN (VITAMIN B2) 400 MG
1 TABLET ORAL DAILY
Qty: 30 TABLET | Refills: 3 | Status: SHIPPED | OUTPATIENT
Start: 2024-01-09

## 2024-01-22 ENCOUNTER — TELEPHONE (OUTPATIENT)
Dept: FAMILY MEDICINE CLINIC | Age: 54
End: 2024-01-22

## 2024-01-22 DIAGNOSIS — Z12.31 ENCOUNTER FOR SCREENING MAMMOGRAM FOR MALIGNANT NEOPLASM OF BREAST: Primary | ICD-10-CM

## 2024-01-22 NOTE — TELEPHONE ENCOUNTER
Incontinence supplies were not ordered because there is no supporting documentation for such items (see media scan dated 12/19/2023)

## 2024-01-22 NOTE — TELEPHONE ENCOUNTER
Patient is due for a routine screening mammogram after 2/10/24 and would like an order faxed to Caroline Andersen at [phone].  Also, patient wonders if any incontinence supplies were ordered recently.

## 2024-02-06 ENCOUNTER — TELEPHONE (OUTPATIENT)
Dept: BARIATRICS/WEIGHT MGMT | Age: 54
End: 2024-02-06

## 2024-02-09 ENCOUNTER — OFFICE VISIT (OUTPATIENT)
Dept: FAMILY MEDICINE CLINIC | Age: 54
End: 2024-02-09
Payer: COMMERCIAL

## 2024-02-09 VITALS
HEIGHT: 71 IN | RESPIRATION RATE: 16 BRPM | SYSTOLIC BLOOD PRESSURE: 148 MMHG | HEART RATE: 95 BPM | TEMPERATURE: 98.7 F | DIASTOLIC BLOOD PRESSURE: 92 MMHG | BODY MASS INDEX: 34.88 KG/M2 | WEIGHT: 249.12 LBS | OXYGEN SATURATION: 97 %

## 2024-02-09 DIAGNOSIS — E78.2 MIXED HYPERLIPIDEMIA: ICD-10-CM

## 2024-02-09 DIAGNOSIS — E66.1 CLASS 1 DRUG-INDUCED OBESITY WITH SERIOUS COMORBIDITY AND BODY MASS INDEX (BMI) OF 34.0 TO 34.9 IN ADULT: ICD-10-CM

## 2024-02-09 DIAGNOSIS — I10 PRIMARY HYPERTENSION: ICD-10-CM

## 2024-02-09 DIAGNOSIS — R60.9 DEPENDENT EDEMA: ICD-10-CM

## 2024-02-09 DIAGNOSIS — E27.40 ADRENAL INSUFFICIENCY (HCC): ICD-10-CM

## 2024-02-09 DIAGNOSIS — E11.9 TYPE 2 DIABETES MELLITUS WITHOUT COMPLICATION, WITHOUT LONG-TERM CURRENT USE OF INSULIN (HCC): Primary | ICD-10-CM

## 2024-02-09 LAB — HBA1C MFR BLD: 6.2 %

## 2024-02-09 PROCEDURE — 83036 HEMOGLOBIN GLYCOSYLATED A1C: CPT

## 2024-02-09 PROCEDURE — 3080F DIAST BP >= 90 MM HG: CPT

## 2024-02-09 PROCEDURE — 99214 OFFICE O/P EST MOD 30 MIN: CPT

## 2024-02-09 PROCEDURE — G8482 FLU IMMUNIZE ORDER/ADMIN: HCPCS

## 2024-02-09 PROCEDURE — G8417 CALC BMI ABV UP PARAM F/U: HCPCS

## 2024-02-09 PROCEDURE — 3017F COLORECTAL CA SCREEN DOC REV: CPT

## 2024-02-09 PROCEDURE — 3077F SYST BP >= 140 MM HG: CPT

## 2024-02-09 PROCEDURE — 1036F TOBACCO NON-USER: CPT

## 2024-02-09 PROCEDURE — 2022F DILAT RTA XM EVC RTNOPTHY: CPT

## 2024-02-09 PROCEDURE — 3044F HG A1C LEVEL LT 7.0%: CPT

## 2024-02-09 PROCEDURE — 36415 COLL VENOUS BLD VENIPUNCTURE: CPT

## 2024-02-09 PROCEDURE — G8427 DOCREV CUR MEDS BY ELIG CLIN: HCPCS

## 2024-02-09 RX ORDER — GLIPIZIDE 5 MG/1
5 TABLET, FILM COATED, EXTENDED RELEASE ORAL DAILY
Qty: 30 TABLET | Refills: 2 | Status: CANCELLED | OUTPATIENT
Start: 2024-02-09

## 2024-02-09 RX ORDER — ATORVASTATIN CALCIUM 20 MG/1
20 TABLET, FILM COATED ORAL DAILY
Qty: 30 TABLET | Refills: 3 | Status: SHIPPED | OUTPATIENT
Start: 2024-02-09

## 2024-02-09 RX ORDER — LISINOPRIL 20 MG/1
20 TABLET ORAL DAILY
Qty: 30 TABLET | Refills: 1 | Status: SHIPPED | OUTPATIENT
Start: 2024-02-09

## 2024-02-09 RX ORDER — METFORMIN HYDROCHLORIDE 500 MG/1
500 TABLET, EXTENDED RELEASE ORAL
Qty: 30 TABLET | Refills: 1 | Status: SHIPPED | OUTPATIENT
Start: 2024-02-09

## 2024-02-09 NOTE — PROGRESS NOTES
[facility]  Department of Family Medicine  Family Medicine Residency Program      Patient: Mae Calvillo  1970 53 y.o. female     Date of Service: 11/22/2023      Chief complaint:   Chief Complaint   Patient presents with    Diabetes    Hypertension     BP running high for about a week or so       HISTORY OF PRESENTING ILLNESS     Mae Calvillo is a 53 y.o. female presented to the clinic with complaints of   HTN, Dm     HTN   Patient is here for follow up chronic hypertension.  This is not generally controlled on current medication regimen- on lisinopril 10mg.  BP today is 148/92.  Patient is  monitoring BP at home, they are recorded as 167 systolic , 138/ 80. Takes meds as directed and tolerates them well.    No symptoms from htn standpoint per ROS.  Patient is  compliant with lifestyle modifications. Low salt diet , exercise - stationary bike , diet  - not a lot of sweets, eating carbs ,minimal calories,  Patient does not smoke.    worse before the gastric sleeve     Diabetes  Checking sugars at home? No - once a while    Fasting glucose readings? 108  What medications are you on? Glipizide   Didn't want the metformin because friends are on it an states it affected their kidney   Any hypoglycemic events? None   Any numbness or tingling in hands or feet? None   Any noticeable wounds on your feet? None   Have you had an eye exam in the last year?  Nov 30th - was good   Exercise: exercise bike at home     Hemoglobin A1C   Date Value Ref Range Status   02/09/2024 6.2 % Final         The 10-year ASCVD risk score (Heri GOLDSTEIN, et al., 2019) is: 24.5%    Values used to calculate the score:      Age: 53 years      Sex: Female      Is Non- : Yes      Diabetic: Yes      Tobacco smoker: No      Systolic Blood Pressure: 148 mmHg      Is BP treated: Yes      HDL Cholesterol: 37 mg/dL      Total Cholesterol: 188 mg/dL    Currently not on a statin and is

## 2024-02-09 NOTE — PROGRESS NOTES
S: 53 y.o. female with   Chief Complaint   Patient presents with    Diabetes    Hypertension     BP running high for about a week or so     Pt is here because of her DM and her HTN.  Her BP has been running high in the last week.  She has not been eating quite as well recently.  Her Hba1c was 6.2 today.  She is taking her glipizide.      O: VS:  height is 1.803 m (5' 11\") and weight is 113 kg (249 lb 1.9 oz). Her temporal temperature is 98.7 °F (37.1 °C). Her blood pressure is 148/92 (abnormal) and her pulse is 95. Her respiration is 16 and oxygen saturation is 97%.   BP Readings from Last 3 Encounters:   02/09/24 (!) 148/92   11/22/23 (!) 156/92   11/16/23 123/82     See resident note    Impression/Plan:   1) DM- stop glipizide.  Pt has had weight gain.  Try metformin.    2) HTN - increase lisniopril to 20.  Labs placed today.   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  This was a hospital diagnosis. Pt is stable on no meds.       Health Maintenance Due   Topic Date Due    Diabetic foot exam  Never done    Diabetic Alb to Cr ratio (uACR) test  Never done    Diabetic retinal exam  Never done    Lipids  11/02/2023         Attending Physician Statement  I have discussed the case, including pertinent history and exam findings with the resident. I also have seen the patient and performed key portions of the examination.  I agree with the documented assessment and plan.      Deb Witt MD

## 2024-02-12 ENCOUNTER — TELEPHONE (OUTPATIENT)
Dept: FAMILY MEDICINE CLINIC | Age: 54
End: 2024-02-12

## 2024-02-12 LAB
ALBUMIN SERPL-MCNC: 4.3 G/DL (ref 3.5–5.2)
ALP BLD-CCNC: 103 U/L (ref 35–104)
ALT SERPL-CCNC: 17 U/L (ref 0–32)
ANION GAP SERPL CALCULATED.3IONS-SCNC: 14 MMOL/L (ref 7–16)
AST SERPL-CCNC: 31 U/L (ref 0–31)
BILIRUB SERPL-MCNC: 0.5 MG/DL (ref 0–1.2)
BUN BLDV-MCNC: 21 MG/DL (ref 6–20)
CALCIUM SERPL-MCNC: 9.9 MG/DL (ref 8.6–10.2)
CHLORIDE BLD-SCNC: 98 MMOL/L (ref 98–107)
CHOLESTEROL: 315 MG/DL
CO2: 27 MMOL/L (ref 22–29)
CREAT SERPL-MCNC: 0.8 MG/DL (ref 0.5–1)
CREATININE URINE: 148.6 MG/DL (ref 29–226)
GLUCOSE BLD-MCNC: 64 MG/DL (ref 74–99)
HDLC SERPL-MCNC: 75 MG/DL
LDL CHOLESTEROL: 209 MG/DL
MICROALBUMIN/CREAT 24H UR: 81 MG/L (ref 0–19)
MICROALBUMIN/CREAT UR-RTO: 54 MCG/MG CREAT (ref 0–30)
POTASSIUM SERPL-SCNC: 3.8 MMOL/L (ref 3.5–5)
SODIUM BLD-SCNC: 139 MMOL/L (ref 132–146)
TOTAL PROTEIN: 8.3 G/DL (ref 6.4–8.3)
TRIGL SERPL-MCNC: 156 MG/DL
VLDLC SERPL CALC-MCNC: 31 MG/DL

## 2024-02-12 NOTE — TELEPHONE ENCOUNTER
Patient is requesting a referral to podiatry for routine diabetic foot care. She has no preference other than close to/in [place] please

## 2024-02-12 NOTE — RESULT ENCOUNTER NOTE
Please let patient know that her cholesterol level is elevated please continue with the Lipitor 20 mg this may be increased at the next visit, she does have protein in the urine however is already on the lisinopril.  Please continue increasing exercise as able and healthy eating habits and diet as discussed in office.

## 2024-02-14 DIAGNOSIS — E11.9 TYPE 2 DIABETES MELLITUS WITHOUT COMPLICATION, WITHOUT LONG-TERM CURRENT USE OF INSULIN (HCC): Primary | ICD-10-CM

## 2024-02-21 ENCOUNTER — TELEPHONE (OUTPATIENT)
Dept: FAMILY MEDICINE CLINIC | Age: 54
End: 2024-02-21

## 2024-02-21 NOTE — TELEPHONE ENCOUNTER
Last Appointment   2/9/2024  Next Appointment  2/29/2024  Mae is calling to let us know the duloxetine is making her feel very nervous, not sure if you can change the medication, but does not want to be on sertraline either it does the same thing.   And the lisinopril is making her cough. Please advise on both of these.

## 2024-02-21 NOTE — TELEPHONE ENCOUNTER
Duloxetine was started 10/12/2022 to help with pain and depression, but patient reports having trouble with it since then. She feels symptoms are semi-controlled, but she restarted the Sertraline 3 days ago at 50 mg. Since then patient states she stopped the Duloxetine but is still feeling quite nervous/jittery.     Lisinopril was increased at recent visit on 2/9/2024. The cough started about 5 days after, and is only present at night. BP is controlled on the higher dose. Patient is agreeable to continuing it until she sees PCP on 2/29/2024.

## 2024-02-26 ENCOUNTER — OFFICE VISIT (OUTPATIENT)
Dept: FAMILY MEDICINE CLINIC | Age: 54
End: 2024-02-26
Payer: COMMERCIAL

## 2024-02-26 VITALS
DIASTOLIC BLOOD PRESSURE: 81 MMHG | SYSTOLIC BLOOD PRESSURE: 138 MMHG | WEIGHT: 251.32 LBS | HEART RATE: 92 BPM | TEMPERATURE: 97.8 F | RESPIRATION RATE: 19 BRPM | OXYGEN SATURATION: 95 % | BODY MASS INDEX: 35.05 KG/M2

## 2024-02-26 DIAGNOSIS — J06.9 VIRAL URI: Primary | ICD-10-CM

## 2024-02-26 DIAGNOSIS — I10 PRIMARY HYPERTENSION: ICD-10-CM

## 2024-02-26 DIAGNOSIS — F41.9 ANXIETY: ICD-10-CM

## 2024-02-26 PROCEDURE — 3017F COLORECTAL CA SCREEN DOC REV: CPT | Performed by: STUDENT IN AN ORGANIZED HEALTH CARE EDUCATION/TRAINING PROGRAM

## 2024-02-26 PROCEDURE — G8482 FLU IMMUNIZE ORDER/ADMIN: HCPCS | Performed by: STUDENT IN AN ORGANIZED HEALTH CARE EDUCATION/TRAINING PROGRAM

## 2024-02-26 PROCEDURE — G8427 DOCREV CUR MEDS BY ELIG CLIN: HCPCS | Performed by: STUDENT IN AN ORGANIZED HEALTH CARE EDUCATION/TRAINING PROGRAM

## 2024-02-26 PROCEDURE — G8417 CALC BMI ABV UP PARAM F/U: HCPCS | Performed by: STUDENT IN AN ORGANIZED HEALTH CARE EDUCATION/TRAINING PROGRAM

## 2024-02-26 PROCEDURE — 99214 OFFICE O/P EST MOD 30 MIN: CPT | Performed by: STUDENT IN AN ORGANIZED HEALTH CARE EDUCATION/TRAINING PROGRAM

## 2024-02-26 PROCEDURE — 3075F SYST BP GE 130 - 139MM HG: CPT | Performed by: STUDENT IN AN ORGANIZED HEALTH CARE EDUCATION/TRAINING PROGRAM

## 2024-02-26 PROCEDURE — 1036F TOBACCO NON-USER: CPT | Performed by: STUDENT IN AN ORGANIZED HEALTH CARE EDUCATION/TRAINING PROGRAM

## 2024-02-26 PROCEDURE — 3079F DIAST BP 80-89 MM HG: CPT | Performed by: STUDENT IN AN ORGANIZED HEALTH CARE EDUCATION/TRAINING PROGRAM

## 2024-02-26 RX ORDER — CITALOPRAM HYDROBROMIDE 10 MG/1
10 TABLET ORAL DAILY
Qty: 30 TABLET | Refills: 3 | Status: CANCELLED | OUTPATIENT
Start: 2024-02-26

## 2024-02-26 RX ORDER — SERTRALINE HYDROCHLORIDE 100 MG/1
100 TABLET, FILM COATED ORAL DAILY
Qty: 30 TABLET | Refills: 0 | Status: SHIPPED | OUTPATIENT
Start: 2024-02-26

## 2024-02-26 RX ORDER — HYDROXYZINE PAMOATE 25 MG/1
25 CAPSULE ORAL 3 TIMES DAILY PRN
Qty: 42 CAPSULE | Refills: 0 | Status: SHIPPED | OUTPATIENT
Start: 2024-02-26 | End: 2024-03-11

## 2024-02-26 RX ORDER — VALSARTAN 160 MG/1
160 TABLET ORAL DAILY
Qty: 30 TABLET | Refills: 0 | Status: SHIPPED | OUTPATIENT
Start: 2024-02-26

## 2024-02-26 RX ORDER — BENZONATATE 200 MG/1
200 CAPSULE ORAL 3 TIMES DAILY PRN
Qty: 30 CAPSULE | Refills: 0 | Status: SHIPPED | OUTPATIENT
Start: 2024-02-26 | End: 2024-03-04

## 2024-02-26 RX ORDER — FLUTICASONE PROPIONATE 50 MCG
2 SPRAY, SUSPENSION (ML) NASAL DAILY
Qty: 16 G | Refills: 1 | Status: SHIPPED | OUTPATIENT
Start: 2024-02-26

## 2024-02-26 NOTE — PROGRESS NOTES
S: 53 y.o. female with   Chief Complaint   Patient presents with    Medication Problem     Believes lisinopril is making her cough a lot  Duloxetine is making her shaky/nervous       Cough  -concerns this started when they increased her lisinopril   -does have hx of allergies  -cough is dry and only present at night  -having some rhinorrhea   -denies any fever or chills   -not taking flonase and zyrtec every day     Mood  -f/u  -restarted zoloft at 50 mg and has been feeling increased anxiety since then  -starting therapy today     O: VS:  weight is 114 kg (251 lb 5.2 oz). Her temporal temperature is 97.8 °F (36.6 °C). Her blood pressure is 138/81 and her pulse is 92. Her respiration is 19 and oxygen saturation is 95%.   BP Readings from Last 3 Encounters:   02/26/24 138/81   02/09/24 (!) 148/92   11/22/23 (!) 156/92     See resident note  Post nasal drainage     Impression/Plan:   1) Cough - allergies vs other (med side effect), encourage compliance with flonase with restarting antihistamine, switch lisinopril to valsartan   2) Mood disorder - starting therapy today, increase zoloft        Health Maintenance Due   Topic Date Due    Diabetic foot exam  Never done    Diabetic retinal exam  Never done         Attending Physician Statement  I have discussed the case, including pertinent history and exam findings with the resident. I also have seen the patient and performed key portions of the examination.  I agree with the documented assessment and plan.      Rambo Cuenca, DO  
10/17/2012, 10/09/2021    Influenza, AFLURIA (age 3 yrs+), FLUZONE, (age 6 mo+), MDV, 0.5mL 11/11/2015    Influenza, FLUARIX, FLULAVAL, FLUZONE (age 6 mo+) AND AFLURIA, (age 3 y+), PF, 0.5mL 10/22/2014, 02/22/2018, 09/18/2018    Influenza, FLUCELVAX, (age 6 mo+), MDCK, PF, 0.5mL 09/26/2019, 09/27/2019, 09/14/2022, 08/27/2023    Influenza, FLUMIST, (age 2-49 y), Live, Intranasal, 0.2mL 11/11/2015    Influenza, FLUZONE (age 65 y+), High Dose, 0.7mL 09/18/2018    PPD Test 07/17/2006    Pneumococcal, PCV20, PREVNAR 20, (age 6w+), IM, 0.5mL 11/22/2023    Pneumococcal, PPSV23, PNEUMOVAX 23, (age 2y+), SC/IM, 0.5mL 08/16/2010    TDaP, ADACEL (age 10y-64y), BOOSTRIX (age 10y+), IM, 0.5mL 12/05/2014    Zoster Live (Zostavax) 03/13/2020    Zoster Recombinant (Shingrix) 08/31/2023, 11/16/2023        Internal Administration   First Dose COVID-19, J&J, (age 18y+), IM, 0.5 mL  03/10/2021   Second Dose COVID-19, MODERNA BLUE border, Primary or Immunocompromised, (age 12y+), IM, 100 mcg/0.5mL   12/29/2021       Last COVID Lab SARS-CoV-2, PCR (no units)   Date Value   11/23/2022 DETECTED (A)     SARS-CoV-2, NAAT (no units)   Date Value   11/18/2022 DETECTED (A)            Review of Systems:  Review of Systems   Constitutional:  Positive for fatigue. Negative for chills and fever.   HENT:  Positive for congestion, postnasal drip and rhinorrhea. Negative for sinus pain and sore throat.    Eyes:  Negative for pain and visual disturbance.   Respiratory:  Positive for cough. Negative for shortness of breath and wheezing.    Cardiovascular:  Negative for chest pain, palpitations and leg swelling.   Gastrointestinal:  Negative for abdominal pain, nausea and vomiting.   Genitourinary:  Negative for frequency, hematuria and urgency.   Musculoskeletal:  Negative for arthralgias and joint swelling.   Skin:  Negative for rash and wound.   Neurological:  Negative for dizziness, syncope, light-headedness and numbness.   Psychiatric/Behavioral:

## 2024-02-29 ENCOUNTER — HOSPITAL ENCOUNTER (OUTPATIENT)
Dept: MAMMOGRAPHY | Age: 54
Discharge: HOME | End: 2024-03-02
Payer: COMMERCIAL

## 2024-02-29 VITALS — WEIGHT: 245 LBS | BODY MASS INDEX: 34.3 KG/M2 | HEIGHT: 71 IN

## 2024-02-29 DIAGNOSIS — Z12.31 ENCOUNTER FOR SCREENING MAMMOGRAM FOR MALIGNANT NEOPLASM OF BREAST: ICD-10-CM

## 2024-02-29 PROCEDURE — 77063 BREAST TOMOSYNTHESIS BI: CPT

## 2024-03-01 ASSESSMENT — ENCOUNTER SYMPTOMS
VOMITING: 0
NAUSEA: 0
COUGH: 1
SHORTNESS OF BREATH: 0
ABDOMINAL PAIN: 0
WHEEZING: 0
SINUS PAIN: 0
SORE THROAT: 0
EYE PAIN: 0
RHINORRHEA: 1

## 2024-03-01 NOTE — RESULT ENCOUNTER NOTE
Please inform patient that there is  no mammographic evidence of malignancy, on her mammogram and  annual screening mammography is recommended.

## 2024-04-16 ENCOUNTER — OFFICE VISIT (OUTPATIENT)
Dept: FAMILY MEDICINE CLINIC | Age: 54
End: 2024-04-16
Payer: COMMERCIAL

## 2024-04-16 VITALS
OXYGEN SATURATION: 97 % | HEART RATE: 82 BPM | WEIGHT: 244.6 LBS | BODY MASS INDEX: 34.24 KG/M2 | TEMPERATURE: 97.9 F | SYSTOLIC BLOOD PRESSURE: 132 MMHG | HEIGHT: 71 IN | DIASTOLIC BLOOD PRESSURE: 86 MMHG | RESPIRATION RATE: 16 BRPM

## 2024-04-16 DIAGNOSIS — E78.2 MIXED HYPERLIPIDEMIA: ICD-10-CM

## 2024-04-16 DIAGNOSIS — E11.9 TYPE 2 DIABETES MELLITUS WITHOUT COMPLICATION, WITHOUT LONG-TERM CURRENT USE OF INSULIN (HCC): ICD-10-CM

## 2024-04-16 DIAGNOSIS — G47.00 INSOMNIA, UNSPECIFIED TYPE: ICD-10-CM

## 2024-04-16 DIAGNOSIS — L30.4 INTERTRIGO: ICD-10-CM

## 2024-04-16 DIAGNOSIS — I10 PRIMARY HYPERTENSION: ICD-10-CM

## 2024-04-16 DIAGNOSIS — S32.000D COMPRESSION FRACTURE OF LUMBAR VERTEBRA WITH ROUTINE HEALING, UNSPECIFIED LUMBAR VERTEBRAL LEVEL, SUBSEQUENT ENCOUNTER: ICD-10-CM

## 2024-04-16 DIAGNOSIS — J30.89 SEASONAL ALLERGIC RHINITIS DUE TO OTHER ALLERGIC TRIGGER: ICD-10-CM

## 2024-04-16 DIAGNOSIS — J06.9 VIRAL URI: ICD-10-CM

## 2024-04-16 DIAGNOSIS — F41.9 ANXIETY: Primary | ICD-10-CM

## 2024-04-16 PROCEDURE — 3044F HG A1C LEVEL LT 7.0%: CPT | Performed by: STUDENT IN AN ORGANIZED HEALTH CARE EDUCATION/TRAINING PROGRAM

## 2024-04-16 PROCEDURE — 99213 OFFICE O/P EST LOW 20 MIN: CPT | Performed by: STUDENT IN AN ORGANIZED HEALTH CARE EDUCATION/TRAINING PROGRAM

## 2024-04-16 PROCEDURE — 3079F DIAST BP 80-89 MM HG: CPT | Performed by: STUDENT IN AN ORGANIZED HEALTH CARE EDUCATION/TRAINING PROGRAM

## 2024-04-16 PROCEDURE — 3075F SYST BP GE 130 - 139MM HG: CPT | Performed by: STUDENT IN AN ORGANIZED HEALTH CARE EDUCATION/TRAINING PROGRAM

## 2024-04-16 RX ORDER — NEPHROCAP 1 MG
1 CAP ORAL 3 TIMES DAILY
Qty: 90 CAPSULE | Refills: 2 | Status: SHIPPED | OUTPATIENT
Start: 2024-04-16

## 2024-04-16 RX ORDER — SERTRALINE HYDROCHLORIDE 100 MG/1
100 TABLET, FILM COATED ORAL DAILY
Qty: 30 TABLET | Refills: 1 | Status: SHIPPED | OUTPATIENT
Start: 2024-04-16

## 2024-04-16 RX ORDER — FLUTICASONE PROPIONATE 50 MCG
2 SPRAY, SUSPENSION (ML) NASAL DAILY
Qty: 16 G | Refills: 2 | Status: SHIPPED | OUTPATIENT
Start: 2024-04-16

## 2024-04-16 RX ORDER — AZELASTINE 1 MG/ML
2 SPRAY, METERED NASAL 2 TIMES DAILY
Qty: 120 ML | Refills: 1 | Status: SHIPPED | OUTPATIENT
Start: 2024-04-16

## 2024-04-16 RX ORDER — VALSARTAN 160 MG/1
160 TABLET ORAL DAILY
Qty: 30 TABLET | Refills: 2 | Status: SHIPPED | OUTPATIENT
Start: 2024-04-16

## 2024-04-16 RX ORDER — RIBOFLAVIN (VITAMIN B2) 400 MG
1 TABLET ORAL DAILY
Qty: 30 TABLET | Refills: 2 | Status: SHIPPED | OUTPATIENT
Start: 2024-04-16

## 2024-04-16 RX ORDER — ATORVASTATIN CALCIUM 20 MG/1
20 TABLET, FILM COATED ORAL DAILY
Qty: 30 TABLET | Refills: 2 | Status: SHIPPED | OUTPATIENT
Start: 2024-04-16

## 2024-04-16 RX ORDER — METFORMIN HYDROCHLORIDE 500 MG/1
500 TABLET, EXTENDED RELEASE ORAL
Qty: 30 TABLET | Refills: 2 | Status: SHIPPED | OUTPATIENT
Start: 2024-04-16

## 2024-04-16 RX ORDER — DIAPER,BRIEF,ADULT, DISPOSABLE
30 EACH MISCELLANEOUS 3 TIMES DAILY PRN
Qty: 60 EACH | Refills: 3 | Status: SHIPPED | OUTPATIENT
Start: 2024-04-16

## 2024-04-16 RX ORDER — LANOLIN ALCOHOL/MO/W.PET/CERES
3 CREAM (GRAM) TOPICAL NIGHTLY PRN
Qty: 30 TABLET | Refills: 2 | Status: SHIPPED | OUTPATIENT
Start: 2024-04-16

## 2024-04-16 RX ORDER — LIDOCAINE 50 MG/G
1 PATCH TOPICAL DAILY
Qty: 30 PATCH | Refills: 2 | Status: SHIPPED | OUTPATIENT
Start: 2024-04-16

## 2024-04-16 SDOH — ECONOMIC STABILITY: FOOD INSECURITY: WITHIN THE PAST 12 MONTHS, THE FOOD YOU BOUGHT JUST DIDN'T LAST AND YOU DIDN'T HAVE MONEY TO GET MORE.: NEVER TRUE

## 2024-04-16 SDOH — ECONOMIC STABILITY: INCOME INSECURITY: HOW HARD IS IT FOR YOU TO PAY FOR THE VERY BASICS LIKE FOOD, HOUSING, MEDICAL CARE, AND HEATING?: VERY HARD

## 2024-04-16 NOTE — PROGRESS NOTES
S: 53 y.o. female with   Chief Complaint   Patient presents with    Anxiety     Still feeling anxious/shaky since med start.   Wants to discuss options before refilling/continuing Zoloft    Hypertension    Discuss Medications     Patient would like to be placed on/have zyrtec ordered       52 yo female. Did not keep her 1 month fu.  Having breakthrough anxiety.  Allergic rhinnitis causing her cough.    O: VS:  height is 1.803 m (5' 11\") and weight is 110.9 kg (244 lb 9.6 oz). Her temperature is 97.9 °F (36.6 °C). Her blood pressure is 132/86 and her pulse is 82. Her respiration is 16 and oxygen saturation is 97%.   BP Readings from Last 3 Encounters:   04/16/24 132/86   02/26/24 138/81   02/09/24 (!) 148/92     See resident note      Impression/Plan:   1) Anxiety - tolerating zoloft - increase zoloft to 150 mg daily.  2) Alleric rhinnitis - Tx w astelin  3) FU with PCP in 1 month.      Health Maintenance Due   Topic Date Due    Diabetic foot exam  Never done    Diabetic retinal exam  Never done         Attending Physician Statement  I have discussed the case, including pertinent history and exam findings with the resident.  I agree with the documented assessment and plan.      Ilan Castro MD  
tablet 1    sertraline (ZOLOFT) 50 MG tablet Take 1 tablet by mouth daily 30 tablet 1    Incontinence Supply Disposable (PREVAIL EXTRA PLUS) PADS 30 each by Does not apply route 3 times daily as needed (change as needed) 60 each 3    ammonium lactate (LAC-HYDRIN) 12 % lotion Apply topically as needed. 225 g 1    Multiple Vitamin (MULTIVITAMINS PO) Take 3 tablets by mouth daily      CALCIUM PO Take 2 tablets by mouth daily      folic acid (FOLVITE) 1 MG tablet Take 1 tablet by mouth daily      Compression Stockings MISC by Does not apply route Edema b/l legs 1 each 0    Handicap Placard MISC by Does not apply route 1 each 0     No current facility-administered medications for this visit.           Benjamin Henriquez D.O.  Family Medicine   PGY-3

## 2024-04-17 RX ORDER — CETIRIZINE HYDROCHLORIDE 10 MG/1
10 TABLET ORAL DAILY
Qty: 30 TABLET | Refills: 0 | Status: SHIPPED | OUTPATIENT
Start: 2024-04-17

## 2024-04-23 NOTE — PATIENT INSTRUCTIONS
[place] [facility]*  (Call 211 if need more resources.)     [facility]  What they offer: Housing for elderly, disabled, handicapped, moderate and low-income families; rental assistance under Section 8 program (Housing Choice Voucher Program). Call for application information.  [facility] Phone number: [phone]  Website: [employer]  [facility] Phone Number: [phone]  Website: [employer]  [facility] Phone Number: [phone]  Website: [employer]    [facility]:  What they offer: shelter for victims of domestic violence in [facility]  Phone Number: [phone]  Website: [employer]    [facility] DOMESTIC VIOLENCE SERVICES:  What they offer: shelter for victims of domestic violence in [facility]  Phone Number: [phone]  Website: [employer]    HOMELESS PROGRAM [facility]  What they offer: Assists homeless persons or families that lack a fixed or regular nighttime residence; shelter houses homeless from 3-30 days. Extended stay optional depending upon housing situation  PHONE Number: [phone], [phone]  Website: [employer]aa[employer].org      [facility]: [address] [place] [address]  What they offer: Temporary shelter for homeless persons or families  Phone: Women: [phone]; Men: [phone]  Website: [employer]    [facility]:  What they offer: Temporary shelter with hot meals for homeless persons 18 and older and for families.  Phone: [phone]  Website: [employer]  [facility] [employer]  What they offer: Emergency shelter for LGBTQ+ community and other resources  Contact: [phone]; [address] [place] [address]  [facility]:   What they offer: Disaster Relief, Domestic Violence, Emergency Assistance, Family Support, Senior Support  [facility]  Contact: [phone]; [address] [place]

## 2024-04-25 DIAGNOSIS — J06.9 VIRAL URI: ICD-10-CM

## 2024-04-25 DIAGNOSIS — F41.9 ANXIETY: ICD-10-CM

## 2024-04-25 NOTE — TELEPHONE ENCOUNTER
Last Appointment   4/16/2024  Next Appointment  5/1/2024  Patient called in for refill of Hydroxyzine 25 mg to [facility] on [address]

## 2024-04-28 RX ORDER — HYDROXYZINE PAMOATE 25 MG/1
25 CAPSULE ORAL 3 TIMES DAILY PRN
Qty: 90 CAPSULE | Refills: 0 | Status: SHIPPED | OUTPATIENT
Start: 2024-04-28 | End: 2024-05-28

## 2024-05-25 DIAGNOSIS — J30.89 SEASONAL ALLERGIC RHINITIS DUE TO OTHER ALLERGIC TRIGGER: ICD-10-CM

## 2024-05-28 RX ORDER — CETIRIZINE HYDROCHLORIDE 10 MG/1
10 TABLET ORAL DAILY
Qty: 30 TABLET | Refills: 0 | Status: SHIPPED | OUTPATIENT
Start: 2024-05-28

## 2024-06-14 ENCOUNTER — OFFICE VISIT (OUTPATIENT)
Dept: FAMILY MEDICINE CLINIC | Age: 54
End: 2024-06-14
Payer: COMMERCIAL

## 2024-06-14 VITALS
OXYGEN SATURATION: 98 % | SYSTOLIC BLOOD PRESSURE: 133 MMHG | BODY MASS INDEX: 34.92 KG/M2 | HEART RATE: 83 BPM | DIASTOLIC BLOOD PRESSURE: 89 MMHG | HEIGHT: 71 IN | RESPIRATION RATE: 18 BRPM | TEMPERATURE: 97.8 F | WEIGHT: 249.4 LBS

## 2024-06-14 DIAGNOSIS — F41.9 ANXIETY: ICD-10-CM

## 2024-06-14 DIAGNOSIS — K29.60 PILL GASTRITIS: ICD-10-CM

## 2024-06-14 DIAGNOSIS — E11.9 TYPE 2 DIABETES MELLITUS WITHOUT COMPLICATION, WITHOUT LONG-TERM CURRENT USE OF INSULIN (HCC): Primary | ICD-10-CM

## 2024-06-14 DIAGNOSIS — S32.000D COMPRESSION FRACTURE OF LUMBAR VERTEBRA WITH ROUTINE HEALING, UNSPECIFIED LUMBAR VERTEBRAL LEVEL, SUBSEQUENT ENCOUNTER: ICD-10-CM

## 2024-06-14 DIAGNOSIS — T50.905A PILL GASTRITIS: ICD-10-CM

## 2024-06-14 LAB — HBA1C MFR BLD: 6.7 %

## 2024-06-14 PROCEDURE — G8427 DOCREV CUR MEDS BY ELIG CLIN: HCPCS | Performed by: STUDENT IN AN ORGANIZED HEALTH CARE EDUCATION/TRAINING PROGRAM

## 2024-06-14 PROCEDURE — 99213 OFFICE O/P EST LOW 20 MIN: CPT | Performed by: STUDENT IN AN ORGANIZED HEALTH CARE EDUCATION/TRAINING PROGRAM

## 2024-06-14 PROCEDURE — G8417 CALC BMI ABV UP PARAM F/U: HCPCS | Performed by: STUDENT IN AN ORGANIZED HEALTH CARE EDUCATION/TRAINING PROGRAM

## 2024-06-14 PROCEDURE — 2022F DILAT RTA XM EVC RTNOPTHY: CPT | Performed by: STUDENT IN AN ORGANIZED HEALTH CARE EDUCATION/TRAINING PROGRAM

## 2024-06-14 PROCEDURE — 3017F COLORECTAL CA SCREEN DOC REV: CPT | Performed by: STUDENT IN AN ORGANIZED HEALTH CARE EDUCATION/TRAINING PROGRAM

## 2024-06-14 PROCEDURE — 1036F TOBACCO NON-USER: CPT | Performed by: STUDENT IN AN ORGANIZED HEALTH CARE EDUCATION/TRAINING PROGRAM

## 2024-06-14 PROCEDURE — 83036 HEMOGLOBIN GLYCOSYLATED A1C: CPT | Performed by: STUDENT IN AN ORGANIZED HEALTH CARE EDUCATION/TRAINING PROGRAM

## 2024-06-14 PROCEDURE — 3044F HG A1C LEVEL LT 7.0%: CPT | Performed by: STUDENT IN AN ORGANIZED HEALTH CARE EDUCATION/TRAINING PROGRAM

## 2024-06-14 RX ORDER — LIDOCAINE 50 MG/G
1 PATCH TOPICAL DAILY
Qty: 30 PATCH | Refills: 2 | Status: SHIPPED | OUTPATIENT
Start: 2024-06-14

## 2024-06-14 RX ORDER — DIAPER,BRIEF,ADULT, DISPOSABLE
30 EACH MISCELLANEOUS 3 TIMES DAILY PRN
Qty: 60 EACH | Refills: 3 | Status: SHIPPED | OUTPATIENT
Start: 2024-06-14

## 2024-06-14 RX ORDER — PANTOPRAZOLE SODIUM 40 MG/1
40 TABLET, DELAYED RELEASE ORAL
Qty: 30 TABLET | Refills: 0 | Status: SHIPPED | OUTPATIENT
Start: 2024-06-14

## 2024-06-14 RX ORDER — BLOOD-GLUCOSE SENSOR
1 EACH MISCELLANEOUS 4 TIMES DAILY
Qty: 1 EACH | Refills: 4 | Status: SHIPPED | OUTPATIENT
Start: 2024-06-14

## 2024-06-14 RX ORDER — BLOOD-GLUCOSE SENSOR
1 EACH MISCELLANEOUS 4 TIMES DAILY
Qty: 1 EACH | Refills: 0 | Status: SHIPPED | OUTPATIENT
Start: 2024-06-14

## 2024-06-14 RX ORDER — SERTRALINE HYDROCHLORIDE 100 MG/1
100 TABLET, FILM COATED ORAL DAILY
Qty: 30 TABLET | Refills: 1 | Status: SHIPPED | OUTPATIENT
Start: 2024-06-14

## 2024-06-14 SDOH — ECONOMIC STABILITY: INCOME INSECURITY: HOW HARD IS IT FOR YOU TO PAY FOR THE VERY BASICS LIKE FOOD, HOUSING, MEDICAL CARE, AND HEATING?: SOMEWHAT HARD

## 2024-06-14 SDOH — ECONOMIC STABILITY: FOOD INSECURITY: WITHIN THE PAST 12 MONTHS, YOU WORRIED THAT YOUR FOOD WOULD RUN OUT BEFORE YOU GOT MONEY TO BUY MORE.: NEVER TRUE

## 2024-06-14 SDOH — ECONOMIC STABILITY: FOOD INSECURITY: WITHIN THE PAST 12 MONTHS, THE FOOD YOU BOUGHT JUST DIDN'T LAST AND YOU DIDN'T HAVE MONEY TO GET MORE.: NEVER TRUE

## 2024-06-14 NOTE — PROGRESS NOTES
[facility]  Precepting Note    Subjective:  52 yo F here for f/u  Came late to appointment  6.9 today  Lab Results   Component Value Date/Time    LABA1C 6.2 02/09/2024 03:05 PM    LABA1C 6.7 11/16/2023 01:39 PM    LABA1C 6.8 11/06/2022 06:46 AM   On metformin  500 xr daily  It has increased since last visit  Body mass index is 34.78 kg/m².  She has been exercising  Using stationary bike several times a week-  at least 30 minutes  Trying to eat more healthy foods   She is monitoring home sugars more often  H/o gastric sleeve  ROS otherwise negative     Past medical, surgical, family and social history were reviewed, non-contributory, and unchanged unless otherwise stated.    Objective:    /89   Pulse 83   Temp 97.8 °F (36.6 °C)   Resp 18   Ht 1.803 m (5' 11\")   Wt 113.1 kg (249 lb 6.4 oz)   SpO2 98%   BMI 34.78 kg/m²     Exam is as noted by resident   Assessment/Plan:  Diabetes - hemoglobin A1c is up, on metformin daily  Okay to add low dose glp1 for concurrent diabetes and obesity     Attending Physician Statement  I have reviewed the chart, including any radiology or labs. I have discussed the case, including pertinent history and exam findings with the resident.  I agree with the assessment, plan and orders as documented by the resident.  Please refer to the resident note for additional information.      Electronically signed by Elizabeth Melendez MD on 6/14/2024 at 3:40 PM  
Sensor (FREESTYLE LUIS 3 SENSOR) MISC 1 each by Does not apply route 4 times daily 1 each 0    cetirizine (ZYRTEC) 10 MG tablet TAKE 1 TABLET BY MOUTH DAILY 30 tablet 0    atorvastatin (LIPITOR) 20 MG tablet Take 1 tablet by mouth daily 30 tablet 2    B Complex-C-Folic Acid (VIRT-CAPS) 1 MG CAPS Take 1 capsule by mouth 3 times daily 90 capsule 2    fluticasone (FLONASE) 50 MCG/ACT nasal spray 2 sprays by Each Nostril route daily 16 g 2    Magnesium Oxide (MAGNESIUM-OXIDE) 250 MG TABS tablet Take 1 tablet by mouth daily 30 tablet 2    melatonin 3 MG TABS tablet Take 1 tablet by mouth nightly as needed (insomnia) 30 tablet 2    metFORMIN (GLUCOPHAGE-XR) 500 MG extended release tablet Take 1 tablet by mouth daily (with breakfast) 30 tablet 2    miconazole (MICOTIN) 2 % powder Apply topically 2 times daily. 45 g 2    Riboflavin 400 MG TABS Take 1 tablet by mouth daily 30 tablet 2    valsartan (DIOVAN) 160 MG tablet Take 1 tablet by mouth daily 30 tablet 2    white petrolatum OINT ointment Apply topically 2 times daily as needed (skin irritation) 500 g 2    azelastine (ASTELIN) 0.1 % nasal spray 2 sprays by Nasal route 2 times daily Use in each nostril as directed 120 mL 1    Compression Stockings MISC by Does not apply route Edema b/l legs 1 each 0    Handicap Placard MISC by Does not apply route 1 each 0    ammonium lactate (LAC-HYDRIN) 12 % lotion Apply topically as needed. 225 g 1    Multiple Vitamin (MULTIVITAMINS PO) Take 3 tablets by mouth daily      CALCIUM PO Take 2 tablets by mouth daily      folic acid (FOLVITE) 1 MG tablet Take 1 tablet by mouth daily       No current facility-administered medications for this visit.           Benjamin Henriquez D.O.  Family Medicine   PGY-3

## 2024-06-17 RX ORDER — KETOROLAC TROMETHAMINE 30 MG/ML
1 INJECTION, SOLUTION INTRAMUSCULAR; INTRAVENOUS 4 TIMES DAILY
Qty: 1 EACH | Refills: 0 | Status: SHIPPED | OUTPATIENT
Start: 2024-06-17

## 2024-06-20 ENCOUNTER — HOSPITAL ENCOUNTER (INPATIENT)
Age: 54
LOS: 2 days | Discharge: HOME HEALTH | DRG: 420 | End: 2024-06-23
Attending: STUDENT IN AN ORGANIZED HEALTH CARE EDUCATION/TRAINING PROGRAM | Admitting: FAMILY MEDICINE
Payer: COMMERCIAL

## 2024-06-20 ENCOUNTER — APPOINTMENT (OUTPATIENT)
Dept: ULTRASOUND IMAGING | Age: 54
DRG: 420 | End: 2024-06-20
Payer: COMMERCIAL

## 2024-06-20 ENCOUNTER — APPOINTMENT (OUTPATIENT)
Dept: GENERAL RADIOLOGY | Age: 54
DRG: 420 | End: 2024-06-20
Payer: COMMERCIAL

## 2024-06-20 DIAGNOSIS — H16.001 CORNEAL ULCER OF RIGHT EYE: ICD-10-CM

## 2024-06-20 DIAGNOSIS — L03.116 CELLULITIS OF LEFT FOOT: Primary | ICD-10-CM

## 2024-06-20 LAB
ALBUMIN SERPL-MCNC: 3.9 G/DL (ref 3.5–5.2)
ALP SERPL-CCNC: 103 U/L (ref 35–104)
ALT SERPL-CCNC: 21 U/L (ref 0–32)
ANION GAP SERPL CALCULATED.3IONS-SCNC: 14 MMOL/L (ref 7–16)
AST SERPL-CCNC: 40 U/L (ref 0–31)
BASOPHILS # BLD: 0.06 K/UL (ref 0–0.2)
BASOPHILS NFR BLD: 1 % (ref 0–2)
BILIRUB SERPL-MCNC: 0.9 MG/DL (ref 0–1.2)
BUN SERPL-MCNC: 7 MG/DL (ref 6–20)
CALCIUM SERPL-MCNC: 9 MG/DL (ref 8.6–10.2)
CHLORIDE SERPL-SCNC: 98 MMOL/L (ref 98–107)
CO2 SERPL-SCNC: 26 MMOL/L (ref 22–29)
CREAT SERPL-MCNC: 0.6 MG/DL (ref 0.5–1)
EOSINOPHIL # BLD: 0.14 K/UL (ref 0.05–0.5)
EOSINOPHILS RELATIVE PERCENT: 2 % (ref 0–6)
ERYTHROCYTE [DISTWIDTH] IN BLOOD BY AUTOMATED COUNT: 13.5 % (ref 11.5–15)
ERYTHROCYTE [SEDIMENTATION RATE] IN BLOOD BY WESTERGREN METHOD: 60 MM/HR (ref 0–20)
GFR, ESTIMATED: >90 ML/MIN/1.73M2
GLUCOSE SERPL-MCNC: 129 MG/DL (ref 74–99)
HCT VFR BLD AUTO: 38 % (ref 34–48)
HGB BLD-MCNC: 12.4 G/DL (ref 11.5–15.5)
IMM GRANULOCYTES # BLD AUTO: <0.03 K/UL (ref 0–0.58)
IMM GRANULOCYTES NFR BLD: 0 % (ref 0–5)
LACTATE BLDV-SCNC: 2.5 MMOL/L (ref 0.5–2.2)
LYMPHOCYTES NFR BLD: 1.28 K/UL (ref 1.5–4)
LYMPHOCYTES RELATIVE PERCENT: 22 % (ref 20–42)
MCH RBC QN AUTO: 33.8 PG (ref 26–35)
MCHC RBC AUTO-ENTMCNC: 32.6 G/DL (ref 32–34.5)
MCV RBC AUTO: 103.5 FL (ref 80–99.9)
MONOCYTES NFR BLD: 0.38 K/UL (ref 0.1–0.95)
MONOCYTES NFR BLD: 7 % (ref 2–12)
NEUTROPHILS NFR BLD: 68 % (ref 43–80)
NEUTS SEG NFR BLD: 3.98 K/UL (ref 1.8–7.3)
PH VENOUS: 7.41 (ref 7.35–7.45)
PLATELET # BLD AUTO: 156 K/UL (ref 130–450)
PMV BLD AUTO: 10.6 FL (ref 7–12)
POTASSIUM SERPL-SCNC: 3.7 MMOL/L (ref 3.5–5)
PROT SERPL-MCNC: 7.8 G/DL (ref 6.4–8.3)
RBC # BLD AUTO: 3.67 M/UL (ref 3.5–5.5)
SODIUM SERPL-SCNC: 138 MMOL/L (ref 132–146)
WBC OTHER # BLD: 5.9 K/UL (ref 4.5–11.5)

## 2024-06-20 PROCEDURE — 6360000002 HC RX W HCPCS

## 2024-06-20 PROCEDURE — 80053 COMPREHEN METABOLIC PANEL: CPT

## 2024-06-20 PROCEDURE — 2580000003 HC RX 258

## 2024-06-20 PROCEDURE — 96374 THER/PROPH/DIAG INJ IV PUSH: CPT

## 2024-06-20 PROCEDURE — 85652 RBC SED RATE AUTOMATED: CPT

## 2024-06-20 PROCEDURE — 83605 ASSAY OF LACTIC ACID: CPT

## 2024-06-20 PROCEDURE — 99285 EMERGENCY DEPT VISIT HI MDM: CPT

## 2024-06-20 PROCEDURE — 2580000003 HC RX 258: Performed by: NURSE PRACTITIONER

## 2024-06-20 PROCEDURE — 93971 EXTREMITY STUDY: CPT

## 2024-06-20 PROCEDURE — 87040 BLOOD CULTURE FOR BACTERIA: CPT

## 2024-06-20 PROCEDURE — 85025 COMPLETE CBC W/AUTO DIFF WBC: CPT

## 2024-06-20 PROCEDURE — 6370000000 HC RX 637 (ALT 250 FOR IP)

## 2024-06-20 PROCEDURE — 73630 X-RAY EXAM OF FOOT: CPT

## 2024-06-20 PROCEDURE — 82800 BLOOD PH: CPT

## 2024-06-20 RX ORDER — VALSARTAN 80 MG/1
80 TABLET ORAL ONCE
Status: COMPLETED | OUTPATIENT
Start: 2024-06-20 | End: 2024-06-20

## 2024-06-20 RX ORDER — 0.9 % SODIUM CHLORIDE 0.9 %
1000 INTRAVENOUS SOLUTION INTRAVENOUS ONCE
Status: COMPLETED | OUTPATIENT
Start: 2024-06-20 | End: 2024-06-20

## 2024-06-20 RX ADMIN — VALSARTAN 80 MG: 80 TABLET ORAL at 23:55

## 2024-06-20 RX ADMIN — VANCOMYCIN HYDROCHLORIDE 2000 MG: 1 INJECTION, POWDER, LYOPHILIZED, FOR SOLUTION INTRAVENOUS at 23:57

## 2024-06-20 RX ADMIN — SODIUM CHLORIDE 1000 ML: 9 INJECTION, SOLUTION INTRAVENOUS at 17:32

## 2024-06-20 ASSESSMENT — LIFESTYLE VARIABLES
HOW MANY STANDARD DRINKS CONTAINING ALCOHOL DO YOU HAVE ON A TYPICAL DAY: 1 OR 2
HOW OFTEN DO YOU HAVE A DRINK CONTAINING ALCOHOL: MONTHLY OR LESS

## 2024-06-20 ASSESSMENT — PAIN - FUNCTIONAL ASSESSMENT: PAIN_FUNCTIONAL_ASSESSMENT: NONE - DENIES PAIN

## 2024-06-20 NOTE — ED PROVIDER NOTES
(VANCOCIN) 2000 mg in 0.9% sodium chloride 500 mL IVPB (2,000 mg IntraVENous New Bag 6/20/24 2357)   Glucose (TRUEPLUS) oral gel 15 g (has no administration in time range)   sodium chloride 0.9 % bolus 1,000 mL (0 mLs IntraVENous Stopped 6/20/24 1933)   valsartan (DIOVAN) tablet 80 mg (80 mg Oral Given 6/20/24 2355)     ED Course as of 06/21/24 0012   Thu Jun 20, 2024 2041 53-year-old male present emergency department complaint of infection to her left foot.  States symptoms ongoing for the past 5 days.  Does have a large wound with possible abscess noted to the top of her foot with redness going from from her foot and streaking up her leg.  Denies any fevers does have a history of diabetes does have elevated sed rate as well as lactic acid.  Will start broad-spectrum antibiotics admit to the hospital. [CB]   2059 Spoke with Dr. Bonds of family medicine.  Dr. Bonds agrees to admit the patient for inpatient treatment.  Dr. Bonds to present to the emergency department shortly for evaluation. [JL]      ED Course User Index  [CB] Jett Garvey MD  [JL] Wenceslao Pavon, VALERIE - KERON     Consult(s):   none.    Procedure(s):  None    MDM:     Mae Calvillo is a 53 y.o. old female presenting to the emergency department by private vehicle, for non-traumatic Left foot pain which occured 1 day(s) prior to arrival.  The complaint is due to no known cause.  Patient has no prior history of pain/injury with regards to today's visit.  Since onset the symptoms have been gradually worsening with ability to bear weight, but with some pain.  Her pain is aggraveated by pressure on or palpation of painful area and relieved by nothing, as no treatment has been provided prior to this visit.  She denies any numbness, weakness, fever, or chills.  I spoke with Dr. Bonds of family medicine and relayed vital signs, patient condition, and past medical history.  Dr. Bonds agrees to admit the patient to the hospital for further  evaluation and treatment.  Dr. Bonds states he will see the patient and put in further orders including antibiotics.       Imaging was obtained based on moderate suspicion for fracture / bony abnormality, retained foreign body, joint effusion, possible DVT as per history/physical findings.    Plan of Care/Counseling:  VALERIE Neri NP reviewed today's visit with the patient in addition to providing specific details for the plan of care and counseling regarding the diagnosis and prognosis.  Questions are answered at this time and are agreeable with the plan.    Assessment      1. Cellulitis of left foot      Plan   Inpatient Admission to General Medical Floor.  Patient condition is stable    New Medications     New Prescriptions    No medications on file     Electronically signed by VALERIE Neri NP   DD: 6/20/24  **This report was transcribed using voice recognition software. Every effort was made to ensure accuracy; however, inadvertent computerized transcription errors may be present.  END OF ED PROVIDER NOTE

## 2024-06-21 ENCOUNTER — APPOINTMENT (OUTPATIENT)
Dept: CT IMAGING | Age: 54
DRG: 420 | End: 2024-06-21
Payer: COMMERCIAL

## 2024-06-21 PROBLEM — H57.89 REDNESS OF RIGHT EYE: Status: ACTIVE | Noted: 2024-06-21

## 2024-06-21 PROBLEM — L03.90 CELLULITIS: Status: ACTIVE | Noted: 2024-06-21

## 2024-06-21 PROBLEM — E11.8 CONTROLLED TYPE 2 DIABETES MELLITUS WITH COMPLICATION, WITHOUT LONG-TERM CURRENT USE OF INSULIN (HCC): Status: ACTIVE | Noted: 2024-06-21

## 2024-06-21 PROBLEM — H10.31 ACUTE CONJUNCTIVITIS OF RIGHT EYE: Status: ACTIVE | Noted: 2024-06-21

## 2024-06-21 PROBLEM — L03.116 CELLULITIS OF LEFT FOOT: Status: ACTIVE | Noted: 2024-06-21

## 2024-06-21 PROBLEM — L08.9 DIABETIC FOOT INFECTION (HCC): Status: ACTIVE | Noted: 2024-06-21

## 2024-06-21 PROBLEM — S92.412A CLOSED DISPLACED FRACTURE OF PROXIMAL PHALANX OF LEFT GREAT TOE: Status: ACTIVE | Noted: 2024-06-21

## 2024-06-21 PROBLEM — E11.628 DIABETIC FOOT INFECTION (HCC): Status: ACTIVE | Noted: 2024-06-21

## 2024-06-21 LAB
ALBUMIN SERPL-MCNC: 3.5 G/DL (ref 3.5–5.2)
ALP SERPL-CCNC: 92 U/L (ref 35–104)
ALT SERPL-CCNC: 17 U/L (ref 0–32)
ANION GAP SERPL CALCULATED.3IONS-SCNC: 12 MMOL/L (ref 7–16)
AST SERPL-CCNC: 31 U/L (ref 0–31)
BASOPHILS # BLD: 0.05 K/UL (ref 0–0.2)
BASOPHILS NFR BLD: 1 % (ref 0–2)
BILIRUB SERPL-MCNC: 0.8 MG/DL (ref 0–1.2)
BUN SERPL-MCNC: 6 MG/DL (ref 6–20)
CALCIUM SERPL-MCNC: 8.8 MG/DL (ref 8.6–10.2)
CHLORIDE SERPL-SCNC: 101 MMOL/L (ref 98–107)
CHP ED QC CHECK: NORMAL
CHP ED QC CHECK: NORMAL
CO2 SERPL-SCNC: 24 MMOL/L (ref 22–29)
CREAT SERPL-MCNC: 0.6 MG/DL (ref 0.5–1)
CRP SERPL HS-MCNC: 50 MG/L (ref 0–5)
EOSINOPHIL # BLD: 0.16 K/UL (ref 0.05–0.5)
EOSINOPHILS RELATIVE PERCENT: 3 % (ref 0–6)
ERYTHROCYTE [DISTWIDTH] IN BLOOD BY AUTOMATED COUNT: 13.2 % (ref 11.5–15)
GFR, ESTIMATED: >90 ML/MIN/1.73M2
GLUCOSE BLD-MCNC: 137 MG/DL (ref 74–99)
GLUCOSE BLD-MCNC: 141 MG/DL (ref 74–99)
GLUCOSE BLD-MCNC: 164 MG/DL
GLUCOSE BLD-MCNC: NORMAL MG/DL
GLUCOSE SERPL-MCNC: 187 MG/DL (ref 74–99)
HCT VFR BLD AUTO: 36.8 % (ref 34–48)
HGB BLD-MCNC: 12.1 G/DL (ref 11.5–15.5)
IMM GRANULOCYTES # BLD AUTO: <0.03 K/UL (ref 0–0.58)
IMM GRANULOCYTES NFR BLD: 0 % (ref 0–5)
LACTATE BLDV-SCNC: 1.9 MMOL/L (ref 0.5–2.2)
LYMPHOCYTES NFR BLD: 1.3 K/UL (ref 1.5–4)
LYMPHOCYTES RELATIVE PERCENT: 24 % (ref 20–42)
MAGNESIUM SERPL-MCNC: 1.4 MG/DL (ref 1.6–2.6)
MCH RBC QN AUTO: 34.2 PG (ref 26–35)
MCHC RBC AUTO-ENTMCNC: 32.9 G/DL (ref 32–34.5)
MCV RBC AUTO: 104 FL (ref 80–99.9)
MONOCYTES NFR BLD: 0.44 K/UL (ref 0.1–0.95)
MONOCYTES NFR BLD: 8 % (ref 2–12)
NEUTROPHILS NFR BLD: 64 % (ref 43–80)
NEUTS SEG NFR BLD: 3.53 K/UL (ref 1.8–7.3)
PLATELET, FLUORESCENCE: 143 K/UL (ref 130–450)
PMV BLD AUTO: 10.6 FL (ref 7–12)
POTASSIUM SERPL-SCNC: 3.5 MMOL/L (ref 3.5–5)
PROT SERPL-MCNC: 6.9 G/DL (ref 6.4–8.3)
RBC # BLD AUTO: 3.54 M/UL (ref 3.5–5.5)
SODIUM SERPL-SCNC: 137 MMOL/L (ref 132–146)
WBC OTHER # BLD: 5.5 K/UL (ref 4.5–11.5)

## 2024-06-21 PROCEDURE — 87077 CULTURE AEROBIC IDENTIFY: CPT

## 2024-06-21 PROCEDURE — 0Y9N0ZZ DRAINAGE OF LEFT FOOT, OPEN APPROACH: ICD-10-PCS | Performed by: STUDENT IN AN ORGANIZED HEALTH CARE EDUCATION/TRAINING PROGRAM

## 2024-06-21 PROCEDURE — 6360000002 HC RX W HCPCS

## 2024-06-21 PROCEDURE — 2580000003 HC RX 258

## 2024-06-21 PROCEDURE — 73700 CT LOWER EXTREMITY W/O DYE: CPT

## 2024-06-21 PROCEDURE — 6370000000 HC RX 637 (ALT 250 FOR IP): Performed by: STUDENT IN AN ORGANIZED HEALTH CARE EDUCATION/TRAINING PROGRAM

## 2024-06-21 PROCEDURE — 83735 ASSAY OF MAGNESIUM: CPT

## 2024-06-21 PROCEDURE — 87205 SMEAR GRAM STAIN: CPT

## 2024-06-21 PROCEDURE — 86140 C-REACTIVE PROTEIN: CPT

## 2024-06-21 PROCEDURE — 82962 GLUCOSE BLOOD TEST: CPT

## 2024-06-21 PROCEDURE — 99222 1ST HOSP IP/OBS MODERATE 55: CPT | Performed by: FAMILY MEDICINE

## 2024-06-21 PROCEDURE — 80053 COMPREHEN METABOLIC PANEL: CPT

## 2024-06-21 PROCEDURE — 6370000000 HC RX 637 (ALT 250 FOR IP)

## 2024-06-21 PROCEDURE — 87070 CULTURE OTHR SPECIMN AEROBIC: CPT

## 2024-06-21 PROCEDURE — 85025 COMPLETE CBC W/AUTO DIFF WBC: CPT

## 2024-06-21 PROCEDURE — 6360000002 HC RX W HCPCS: Performed by: STUDENT IN AN ORGANIZED HEALTH CARE EDUCATION/TRAINING PROGRAM

## 2024-06-21 PROCEDURE — 87075 CULTR BACTERIA EXCEPT BLOOD: CPT

## 2024-06-21 PROCEDURE — 1200000000 HC SEMI PRIVATE

## 2024-06-21 RX ORDER — ONDANSETRON 2 MG/ML
4 INJECTION INTRAMUSCULAR; INTRAVENOUS EVERY 6 HOURS PRN
Status: DISCONTINUED | OUTPATIENT
Start: 2024-06-21 | End: 2024-06-23 | Stop reason: HOSPADM

## 2024-06-21 RX ORDER — SODIUM CHLORIDE 0.9 % (FLUSH) 0.9 %
5-40 SYRINGE (ML) INJECTION PRN
Status: DISCONTINUED | OUTPATIENT
Start: 2024-06-21 | End: 2024-06-23 | Stop reason: HOSPADM

## 2024-06-21 RX ORDER — ATORVASTATIN CALCIUM 20 MG/1
20 TABLET, FILM COATED ORAL DAILY
Status: DISCONTINUED | OUTPATIENT
Start: 2024-06-21 | End: 2024-06-23 | Stop reason: HOSPADM

## 2024-06-21 RX ORDER — SODIUM CHLORIDE 0.9 % (FLUSH) 0.9 %
5-40 SYRINGE (ML) INJECTION EVERY 12 HOURS SCHEDULED
Status: DISCONTINUED | OUTPATIENT
Start: 2024-06-21 | End: 2024-06-23 | Stop reason: HOSPADM

## 2024-06-21 RX ORDER — INSULIN LISPRO 100 [IU]/ML
0-8 INJECTION, SOLUTION INTRAVENOUS; SUBCUTANEOUS
Status: DISCONTINUED | OUTPATIENT
Start: 2024-06-21 | End: 2024-06-23 | Stop reason: HOSPADM

## 2024-06-21 RX ORDER — CETIRIZINE HYDROCHLORIDE 10 MG/1
10 TABLET ORAL DAILY
Status: DISCONTINUED | OUTPATIENT
Start: 2024-06-21 | End: 2024-06-23 | Stop reason: HOSPADM

## 2024-06-21 RX ORDER — GLUCAGON 1 MG/ML
1 KIT INJECTION PRN
Status: DISCONTINUED | OUTPATIENT
Start: 2024-06-21 | End: 2024-06-23 | Stop reason: HOSPADM

## 2024-06-21 RX ORDER — ACETAMINOPHEN 650 MG/1
650 SUPPOSITORY RECTAL EVERY 6 HOURS PRN
Status: DISCONTINUED | OUTPATIENT
Start: 2024-06-21 | End: 2024-06-23 | Stop reason: HOSPADM

## 2024-06-21 RX ORDER — SODIUM CHLORIDE 9 MG/ML
INJECTION, SOLUTION INTRAVENOUS PRN
Status: DISCONTINUED | OUTPATIENT
Start: 2024-06-21 | End: 2024-06-23 | Stop reason: HOSPADM

## 2024-06-21 RX ORDER — ONDANSETRON 4 MG/1
4 TABLET, ORALLY DISINTEGRATING ORAL EVERY 8 HOURS PRN
Status: DISCONTINUED | OUTPATIENT
Start: 2024-06-21 | End: 2024-06-23 | Stop reason: HOSPADM

## 2024-06-21 RX ORDER — ACETAMINOPHEN 325 MG/1
650 TABLET ORAL EVERY 6 HOURS PRN
Status: DISCONTINUED | OUTPATIENT
Start: 2024-06-21 | End: 2024-06-23 | Stop reason: HOSPADM

## 2024-06-21 RX ORDER — DEXTROSE MONOHYDRATE 100 MG/ML
INJECTION, SOLUTION INTRAVENOUS CONTINUOUS PRN
Status: DISCONTINUED | OUTPATIENT
Start: 2024-06-21 | End: 2024-06-23 | Stop reason: HOSPADM

## 2024-06-21 RX ORDER — MAGNESIUM SULFATE IN WATER 40 MG/ML
2000 INJECTION, SOLUTION INTRAVENOUS ONCE
Status: COMPLETED | OUTPATIENT
Start: 2024-06-21 | End: 2024-06-21

## 2024-06-21 RX ORDER — SERTRALINE HYDROCHLORIDE 100 MG/1
100 TABLET, FILM COATED ORAL DAILY
Status: DISCONTINUED | OUTPATIENT
Start: 2024-06-21 | End: 2024-06-21 | Stop reason: DRUGHIGH

## 2024-06-21 RX ORDER — LANOLIN ALCOHOL/MO/W.PET/CERES
400 CREAM (GRAM) TOPICAL DAILY
Status: DISCONTINUED | OUTPATIENT
Start: 2024-06-21 | End: 2024-06-23 | Stop reason: HOSPADM

## 2024-06-21 RX ORDER — ENOXAPARIN SODIUM 100 MG/ML
30 INJECTION SUBCUTANEOUS 2 TIMES DAILY
Status: DISCONTINUED | OUTPATIENT
Start: 2024-06-21 | End: 2024-06-23 | Stop reason: HOSPADM

## 2024-06-21 RX ORDER — INSULIN LISPRO 100 [IU]/ML
0-4 INJECTION, SOLUTION INTRAVENOUS; SUBCUTANEOUS NIGHTLY
Status: DISCONTINUED | OUTPATIENT
Start: 2024-06-21 | End: 2024-06-23 | Stop reason: HOSPADM

## 2024-06-21 RX ORDER — POLYETHYLENE GLYCOL 3350 17 G/17G
17 POWDER, FOR SOLUTION ORAL DAILY PRN
Status: DISCONTINUED | OUTPATIENT
Start: 2024-06-21 | End: 2024-06-23 | Stop reason: HOSPADM

## 2024-06-21 RX ORDER — PANTOPRAZOLE SODIUM 40 MG/1
40 TABLET, DELAYED RELEASE ORAL
Status: DISCONTINUED | OUTPATIENT
Start: 2024-06-21 | End: 2024-06-23 | Stop reason: HOSPADM

## 2024-06-21 RX ORDER — FLUTICASONE PROPIONATE 50 MCG
2 SPRAY, SUSPENSION (ML) NASAL DAILY
Status: DISCONTINUED | OUTPATIENT
Start: 2024-06-21 | End: 2024-06-23 | Stop reason: HOSPADM

## 2024-06-21 RX ORDER — VALSARTAN 160 MG/1
160 TABLET ORAL DAILY
Status: DISCONTINUED | OUTPATIENT
Start: 2024-06-21 | End: 2024-06-23 | Stop reason: HOSPADM

## 2024-06-21 RX ORDER — SERTRALINE HYDROCHLORIDE 100 MG/1
150 TABLET, FILM COATED ORAL DAILY
Status: DISCONTINUED | OUTPATIENT
Start: 2024-06-21 | End: 2024-06-23 | Stop reason: HOSPADM

## 2024-06-21 RX ORDER — LANOLIN ALCOHOL/MO/W.PET/CERES
3 CREAM (GRAM) TOPICAL NIGHTLY PRN
Status: DISCONTINUED | OUTPATIENT
Start: 2024-06-21 | End: 2024-06-23 | Stop reason: HOSPADM

## 2024-06-21 RX ADMIN — ENOXAPARIN SODIUM 30 MG: 100 INJECTION SUBCUTANEOUS at 22:12

## 2024-06-21 RX ADMIN — MAGNESIUM OXIDE 400 MG (241.3 MG MAGNESIUM) TABLET 400 MG: TABLET at 11:20

## 2024-06-21 RX ADMIN — CIPROFLOXACIN HYDROCHLORIDE 0.5 INCH: 3 OINTMENT OPHTHALMIC at 22:11

## 2024-06-21 RX ADMIN — VALSARTAN 160 MG: 160 TABLET ORAL at 11:20

## 2024-06-21 RX ADMIN — VANCOMYCIN HYDROCHLORIDE 1500 MG: 10 INJECTION, POWDER, LYOPHILIZED, FOR SOLUTION INTRAVENOUS at 22:26

## 2024-06-21 RX ADMIN — CIPROFLOXACIN HYDROCHLORIDE 0.5 INCH: 3 OINTMENT OPHTHALMIC at 15:00

## 2024-06-21 RX ADMIN — FLUTICASONE PROPIONATE 2 SPRAY: 50 SPRAY, METERED NASAL at 11:21

## 2024-06-21 RX ADMIN — PIPERACILLIN AND TAZOBACTAM 4500 MG: 4; .5 INJECTION, POWDER, FOR SOLUTION INTRAVENOUS at 11:23

## 2024-06-21 RX ADMIN — ATORVASTATIN CALCIUM 20 MG: 20 TABLET, FILM COATED ORAL at 11:20

## 2024-06-21 RX ADMIN — PANTOPRAZOLE SODIUM 40 MG: 40 TABLET, DELAYED RELEASE ORAL at 05:57

## 2024-06-21 RX ADMIN — ENOXAPARIN SODIUM 30 MG: 100 INJECTION SUBCUTANEOUS at 11:20

## 2024-06-21 RX ADMIN — Medication 3 MG: at 22:22

## 2024-06-21 RX ADMIN — CIPROFLOXACIN HYDROCHLORIDE 0.5 INCH: 3 OINTMENT OPHTHALMIC at 11:22

## 2024-06-21 RX ADMIN — MAGNESIUM SULFATE HEPTAHYDRATE 2000 MG: 40 INJECTION, SOLUTION INTRAVENOUS at 18:10

## 2024-06-21 RX ADMIN — SODIUM CHLORIDE, PRESERVATIVE FREE 10 ML: 5 INJECTION INTRAVENOUS at 11:24

## 2024-06-21 RX ADMIN — FLUORESCEIN SODIUM 1 MG: 1 STRIP OPHTHALMIC at 16:00

## 2024-06-21 RX ADMIN — SERTRALINE 150 MG: 100 TABLET, FILM COATED ORAL at 11:20

## 2024-06-21 RX ADMIN — VANCOMYCIN HYDROCHLORIDE 1500 MG: 10 INJECTION, POWDER, LYOPHILIZED, FOR SOLUTION INTRAVENOUS at 18:17

## 2024-06-21 RX ADMIN — Medication 3 MG: at 02:31

## 2024-06-21 RX ADMIN — CETIRIZINE HYDROCHLORIDE 10 MG: 10 TABLET, FILM COATED ORAL at 11:20

## 2024-06-21 NOTE — PROGRESS NOTES
[facility]  Progress Note    Chief Complaint   Patient presents with    Leg Swelling     Left foot up to knee, onset today.  Also states she has a \"knot\" on the left foot.  Denies recent travel, no hx of blood clots.  Denies pain         Subjective:    Mae Calvillo was seen this morning lying comfortable in bed.  She states her toe pain is now a 2/10 which is greatly improved ever since they relieved the blister on her foot.  She is tolerating her diet without much difficulty and denies any nausea or vomiting.  She was recently started on Cipro ointment for her right eye pain and is stating improved symptoms.        Review of Systems   All other systems reviewed and are negative.        Objective:    /71   Pulse 91   Temp 97.1 °F (36.2 °C) (Temporal)   Resp 18   Ht 1.778 m (5' 10\")   Wt 117.4 kg (258 lb 13.1 oz)   SpO2 96%   BMI 37.14 kg/m²       Physical Exam  Constitutional:       General: She is not in acute distress.     Appearance: Normal appearance. She is not ill-appearing, toxic-appearing or diaphoretic.   HENT:      Head: Normocephalic and atraumatic.   Eyes:      General: No scleral icterus.        Left eye: No discharge.      Comments: Right eye with conjunctival injection primarily on the medial aspect   Cardiovascular:      Rate and Rhythm: Normal rate and regular rhythm.      Heart sounds: No murmur heard.  Pulmonary:      Effort: No respiratory distress.      Breath sounds: No wheezing, rhonchi or rales.   Abdominal:      General: There is no distension.      Tenderness: There is no abdominal tenderness. There is no guarding.   Musculoskeletal:         General: Swelling, tenderness and deformity present.      Right lower leg: Edema present.      Left lower leg: No edema.      Comments: Right great toe with large blister.  Tender to palpation.  There is surrounding erythema that travels up to the mid lower leg.  Patient is not having any

## 2024-06-21 NOTE — PROGRESS NOTES
Pharmacy Consultation Note  (Antibiotic Dosing and Monitoring)    Initial consult date: 6/21  Consulting physician/provider: Nigel  Drug: Vancomycin  Indication: Skin and soft tissue infection x 7 days    Age/  Gender Height Weight IBW  Allergy Information   53 y.o./female 177.8 cm (5' 10\") 111.1 kg (245 lb)     Ideal body weight: 68.5 kg (151 lb 0.2 oz)  Adjusted ideal body weight: 88.1 kg (194 lb 2.2 oz)   Colchicine and Darvon [propoxyphene]      Renal Function:  Recent Labs     06/20/24  1731 06/21/24  0441   BUN 7 6   CREATININE 0.6 0.6     No intake or output data in the 24 hours ending 06/21/24 1027    Vancomycin Monitoring:  Trough:  No results for input(s): \"VANCOTROUGH\" in the last 72 hours.  Random:  No results for input(s): \"VANCORANDOM\" in the last 72 hours.        Assessment:  Patient is a 53 y.o. female who has been initiated on vancomycin  Estimated Creatinine Clearance: 147 mL/min (based on SCr of 0.6 mg/dL).  No history of vancomycin levels   Patient received vancomycin 2000 mg IV x 1 on 6/20 (2368)    Plan:  Will place standing order for vancomycin 1500 mg IV q12h to begin now for projected AUC/YANCI 400-600.  Will check vancomycin levels when appropriate  Will continue to monitor renal function   Pharmacy to follow      Ivan Puente RPH 6/21/2024 10:27 AM    SEB: [phone]  SEY: [phone]  SJW: [phone]

## 2024-06-21 NOTE — PLAN OF CARE
Problem: Discharge Planning  Goal: Discharge to home or other facility with appropriate resources  Outcome: Not Progressing  Flowsheets (Taken 6/21/2024 1100)  Discharge to home or other facility with appropriate resources: Identify barriers to discharge with patient and caregiver

## 2024-06-21 NOTE — CARE COORDINATION
Introduced my self and provided explanation of CM role to patient.  Patient is awake, alert, and aware of current diagnosis and treatment plan including podiatry consult, pending cultures, pending ct imaging, iv antibiotics.  She voices she resides at home with her son and completes her adl's with independence. She still actively works, drives, etc.  Patient is established with a pcp and denies any issue with retail pharmaceutical coverage.  She has history of using [facility] and desires to utilize them again if services are required. Referral is called to Taya with same.  She is also agreeable to utilizing [facility] infusion center if that serves as appropriate option as well.  Final discharge planning needs tbd based on cultures, imaging, response to current therapy.  Patient will need followed and assisted with discharge planning as necessary.   Nestor Perry, MSN RN  [facility] Case Management  [phone]

## 2024-06-21 NOTE — PROGRESS NOTES
4 Eyes Skin Assessment     NAME:  Mae Calvillo  YOB: 1970  MEDICAL RECORD NUMBER:  [id number]    The patient is being assessed for  Admission    I agree that at least one RN has performed a thorough Head to Toe Skin Assessment on the patient. ALL assessment sites listed below have been assessed.      Areas assessed by both nurses:    Head, Face, Ears, Shoulders, Back, Chest, Arms, Elbows, Hands, Sacrum. Buttock, Coccyx, Ischium, and Legs. Feet and Heels        Does the Patient have a Wound? No noted wound(s)       Zion Prevention initiated by RN: No  Wound Care Orders initiated by RN: No    Pressure Injury (Stage 3,4, Unstageable, DTI, NWPT, and Complex wounds) if present, place Wound referral order by RN under : No    New Ostomies, if present place, Ostomy referral order under : No     Nurse 1 eSignature: Electronically signed by Fozia Medina RN on 6/21/24 at 1:12 AM EDT    **SHARE this note so that the co-signing nurse can place an eSignature**    Nurse 2 eSignature: Electronically signed by Maggie Ventura RN on 6/21/24 at 1:13 AM EDT

## 2024-06-21 NOTE — PROGRESS NOTES
Dr Crews notified of consult via secure text  [facility]    Dr Crews notified this worker that Dr Rivera is on call. Dr Rivera notified of consult via the office  [facility]

## 2024-06-21 NOTE — CONSULTS
Benjamin GARCIAS DO   metFORMIN (GLUCOPHAGE-XR) 500 MG extended release tablet Take 1 tablet by mouth daily (with breakfast) 4/16/24  Yes Benjamin Tena DO   miconazole (MICOTIN) 2 % powder Apply topically 2 times daily. 4/16/24  Yes Benjamin Tena DO   Riboflavin 400 MG TABS Take 1 tablet by mouth daily 4/16/24  Yes Benjamin Tena DO   valsartan (DIOVAN) 160 MG tablet Take 1 tablet by mouth daily 4/16/24  Yes Benjamin Tena DO   azelastine (ASTELIN) 0.1 % nasal spray 2 sprays by Nasal route 2 times daily Use in each nostril as directed 4/16/24  Yes Benjamin Tena DO   Compression Stockings MISC by Does not apply route Edema b/l legs 2/9/24  Yes Trinh Abraham MD   Multiple Vitamin (MULTIVITAMINS PO) Take 3 tablets by mouth daily   Yes Kaylee Josue MD   CALCIUM PO Take 2 tablets by mouth daily   Yes ProviderKaylee MD   folic acid (FOLVITE) 1 MG tablet Take 1 tablet by mouth daily 10/3/22  Yes ProviderKaylee MD   Incontinence Supply Disposable (PREVAIL EXTRA PLUS) PADS 30 each by Does not apply route 3 times daily as needed (change as needed) 6/14/24   Benjamin Tena DO   pantoprazole (PROTONIX) 40 MG tablet Take 1 tablet by mouth daily (with breakfast) 6/14/24   Benjamin Tena DO   Semaglutide,0.25 or 0.5MG/DOS, 2 MG/1.5ML SOPN Inject 0.25 mg into the skin once a week 6/14/24   Benjamin Tena DO   white petrolatum OINT ointment Apply topically 2 times daily as needed (skin irritation) 4/16/24   Benjamin Tena DO   Handicap Placard MISC by Does not apply route 12/22/22   Tulio Shetty PA   ammonium lactate (LAC-HYDRIN) 12 % lotion Apply topically as needed. 12/7/22   Phillip Alfaro MD        Colchicine and Darvon [propoxyphene]         OBJECTIVE:        Vitals:    06/21/24 1100   BP: (!) 132/90   Pulse: 78   Resp: 18   Temp: 97 °F (36.1 °C)   SpO2: 95%              EXAM:        Pt is AAOx3, NAD    Vascular  Exam:  DP and PT pulses are palpable. CFT <5 seconds to digits. Skin temp is warm to warm from proximal to distal.    Neuro Exam:  Light touch sensation is intact    Dermatologic Exam: There is a large, dark, fluid filled blister noted to dorsal left MTPJ. There is mild perla-wound erythema noted. Prior to exam the blister was intact and there was no active purulence or malodor.    MSK: Deferred        Current Facility-Administered Medications   Medication Dose Route Frequency Provider Last Rate Last Admin    atorvastatin (LIPITOR) tablet 20 mg  20 mg Oral Daily Vamshi France MD   20 mg at 06/21/24 1120    cetirizine (ZYRTEC) tablet 10 mg  10 mg Oral Daily Vamshi France MD   10 mg at 06/21/24 1120    fluticasone (FLONASE) 50 MCG/ACT nasal spray 2 spray  2 spray Each Nostril Daily Vamshi France MD   2 spray at 06/21/24 1121    magnesium oxide (MAG-OX) tablet 400 mg  400 mg Oral Daily Vamshi France MD   400 mg at 06/21/24 1120    melatonin tablet 3 mg  3 mg Oral Nightly PRN Vamshi France MD   3 mg at 06/21/24 0231    pantoprazole (PROTONIX) tablet 40 mg  40 mg Oral QAM AC Vamshi France MD   40 mg at 06/21/24 0557    sertraline (ZOLOFT) tablet 150 mg  150 mg Oral Daily Vamshi France MD   150 mg at 06/21/24 1120    valsartan (DIOVAN) tablet 160 mg  160 mg Oral Daily Vamshi France MD   160 mg at 06/21/24 1120    sodium chloride flush 0.9 % injection 5-40 mL  5-40 mL IntraVENous 2 times per day Vamshi France MD   10 mL at 06/21/24 1124    sodium chloride flush 0.9 % injection 5-40 mL  5-40 mL IntraVENous PRN Vamshi France MD        0.9 % sodium chloride infusion   IntraVENous PRN Vamshi France MD        enoxaparin Sodium (LOVENOX) injection 30 mg  30 mg SubCUTAneous BID Vamshi France MD   30 mg at 06/21/24 1120    ondansetron (ZOFRAN-ODT) disintegrating tablet 4 mg  4 mg Oral Q8H PRN Vamshi France MD        Or    ondansetron (ZOFRAN) injection 4 mg  4 mg IntraVENous Q6H PRN Román
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
## 2024-06-21 NOTE — H&P
[facility]  Resident History and Physical      Chief Complaint:    Chief Complaint   Patient presents with    Leg Swelling     Left foot up to knee, onset today.  Also states she has a \"knot\" on the left foot.  Denies recent travel, no hx of blood clots.  Denies pain        History of Present Illness:   Mae Calvillo  is a 53 y.o. female patient of Lore Del Rio MD  with a pertinent PMHx of type 2 diabetes anxiety who presented to the ED from home with chief complaint of leg swelling.  Patient states that she initially had a pinpoint wound on her left fifth digit of lower extremity that started on 6/17/2024.  Since then the spot has continued to grow.  There is no pain.  However today she states that her left lower extremity has grown red and swollen and mildly tender.  She does not recall remember any specific triggers.  She denies any new shoes any injury or thing else that might of caused the initial bullae.  She denies any travel or water exposure.  She denies any fevers chills shortness of breath.    In the ED vitals notable for blood pressure 177/102 heart rate of 108.  However patient has not taken any of her medications today.  Labs notable for a lactic of 2.5 white count of 5.9 sed rate of 60.  X-ray of the foot shows subacute to chronic appearing fracture deformity the mid to distal left large toe proximal phalanx.  Soft tissue swelling of the left large toe and dorsal left foot.  Duplex ultrasound shows no evidence of DVT in the left lower extremity.  Blood cultures were taken.  Patient was admitted for cellulitis.  She is full code.    Past Medical History:   Diagnosis Date    Anemia     stable at this time  2023    Arthritis     COVID 05/2022 11/2022-    Diabetes mellitus (HCC)     diet controlled    History of blood transfusion 2012    Lumbar pain          Past Surgical History:   Procedure Laterality Date    COLPOSCOPY      2001 : outside [place]      GASTRIC BAND      2022    HYSTERECTOMY (CERVIX STATUS UNKNOWN)      PAIN MANAGEMENT PROCEDURE N/A 2023    LUMBAR TRANSFORAMINAL EPIDURAL INJECTION RIGHT  L3 & LEFT l4 UNDER FLUOROSCOPIC GUIDANCE performed by Ben Steinberg MD at [facility] OR    PAIN MANAGEMENT PROCEDURE Bilateral 2023    LUMBAR TRANSFORAMINAL EPIDURAL STEROID INJECTION RIGHT L3 & LEFT L4 UNDER FLUOROSCOPIC GUIDANCE performed by Ben Steinberg MD at [facility] OR    PAIN MANAGEMENT PROCEDURE N/A 6/15/2023    LUMBAR EPIDURAL STEROID INJECTION UNDER FLUOROSCOPIC GUIDANCE AT L3-L4 performed by Ben Steinberg MD at [facility] OR    OK  DELIVERY ONLY W/POSTPARTUM CARE       :      OK  DELIVERY ONLY W/POSTPARTUM CARE       :      OK CONIZATION CERVIX W/WO D&C RPR KNIFE/LASER      2001 : [facility] [place]     [facility]         Medications Prior to Admission:    Prior to Admission medications    Medication Sig Start Date End Date Taking? Authorizing Provider   Continuous Glucose  (FREESTYLE LUIS 3 READER) MAICO 1 each by Does not apply route in the morning, at noon, in the evening, and at bedtime 24  Yes Benjamin Tena, DO   lidocaine (LIDODERM) 5 % Place 1 patch onto the skin daily 12 hours on, 12 hours off. 24  Yes Benjamin Tena, DO   sertraline (ZOLOFT) 100 MG tablet Take 1 tablet by mouth daily 24  Yes Benjamin Tena, DO   sertraline (ZOLOFT) 50 MG tablet Take 1 tablet by mouth daily 24  Yes Benjamin Tena, DO   Continuous Glucose Sensor (FREESTYLE LUIS 3 SENSOR) MISC 1 each by Does not apply route in the morning, at noon, in the evening, and at bedtime 24  Yes Benjamin Tena, DO   Continuous Glucose Sensor (FREESTYLE LUIS 3 SENSOR) MISC 1 each by Does not apply route 4 times daily 24  Yes Benjamin Tena, DO   cetirizine (ZYRTEC) 10 MG tablet TAKE 1 TABLET BY MOUTH DAILY 24  Yes

## 2024-06-22 LAB
ALBUMIN SERPL-MCNC: 3.9 G/DL (ref 3.5–5.2)
ALP SERPL-CCNC: 95 U/L (ref 35–104)
ALT SERPL-CCNC: 15 U/L (ref 0–32)
ANION GAP SERPL CALCULATED.3IONS-SCNC: 10 MMOL/L (ref 7–16)
AST SERPL-CCNC: 24 U/L (ref 0–31)
BASOPHILS # BLD: 0.04 K/UL (ref 0–0.2)
BASOPHILS NFR BLD: 1 % (ref 0–2)
BILIRUB SERPL-MCNC: 0.8 MG/DL (ref 0–1.2)
BUN SERPL-MCNC: 6 MG/DL (ref 6–20)
CALCIUM SERPL-MCNC: 9.1 MG/DL (ref 8.6–10.2)
CHLORIDE SERPL-SCNC: 100 MMOL/L (ref 98–107)
CHP ED QC CHECK: NORMAL
CO2 SERPL-SCNC: 28 MMOL/L (ref 22–29)
CREAT SERPL-MCNC: 0.7 MG/DL (ref 0.5–1)
EOSINOPHIL # BLD: 0.15 K/UL (ref 0.05–0.5)
EOSINOPHILS RELATIVE PERCENT: 3 % (ref 0–6)
ERYTHROCYTE [DISTWIDTH] IN BLOOD BY AUTOMATED COUNT: 13.2 % (ref 11.5–15)
FOLATE SERPL-MCNC: 8.6 NG/ML (ref 4.8–24.2)
GFR, ESTIMATED: >90 ML/MIN/1.73M2
GLUCOSE BLD-MCNC: 210 MG/DL
GLUCOSE SERPL-MCNC: 120 MG/DL (ref 74–99)
HCT VFR BLD AUTO: 38.4 % (ref 34–48)
HGB BLD-MCNC: 12.5 G/DL (ref 11.5–15.5)
IMM GRANULOCYTES # BLD AUTO: <0.03 K/UL (ref 0–0.58)
IMM GRANULOCYTES NFR BLD: 0 % (ref 0–5)
LYMPHOCYTES NFR BLD: 0.99 K/UL (ref 1.5–4)
LYMPHOCYTES RELATIVE PERCENT: 21 % (ref 20–42)
MAGNESIUM SERPL-MCNC: 1.9 MG/DL (ref 1.6–2.6)
MCH RBC QN AUTO: 33.7 PG (ref 26–35)
MCHC RBC AUTO-ENTMCNC: 32.6 G/DL (ref 32–34.5)
MCV RBC AUTO: 103.5 FL (ref 80–99.9)
MONOCYTES NFR BLD: 0.29 K/UL (ref 0.1–0.95)
MONOCYTES NFR BLD: 6 % (ref 2–12)
NEUTROPHILS NFR BLD: 68 % (ref 43–80)
NEUTS SEG NFR BLD: 3.19 K/UL (ref 1.8–7.3)
PLATELET # BLD AUTO: 152 K/UL (ref 130–450)
PMV BLD AUTO: 11.1 FL (ref 7–12)
POTASSIUM SERPL-SCNC: 3.5 MMOL/L (ref 3.5–5)
PROT SERPL-MCNC: 7.7 G/DL (ref 6.4–8.3)
RBC # BLD AUTO: 3.71 M/UL (ref 3.5–5.5)
SODIUM SERPL-SCNC: 138 MMOL/L (ref 132–146)
VIT B12 SERPL-MCNC: 492 PG/ML (ref 211–946)
WBC OTHER # BLD: 4.7 K/UL (ref 4.5–11.5)

## 2024-06-22 PROCEDURE — 2580000003 HC RX 258

## 2024-06-22 PROCEDURE — 6370000000 HC RX 637 (ALT 250 FOR IP)

## 2024-06-22 PROCEDURE — 6360000002 HC RX W HCPCS

## 2024-06-22 PROCEDURE — 99232 SBSQ HOSP IP/OBS MODERATE 35: CPT | Performed by: FAMILY MEDICINE

## 2024-06-22 PROCEDURE — 80053 COMPREHEN METABOLIC PANEL: CPT

## 2024-06-22 PROCEDURE — 85025 COMPLETE CBC W/AUTO DIFF WBC: CPT

## 2024-06-22 PROCEDURE — 82607 VITAMIN B-12: CPT

## 2024-06-22 PROCEDURE — 83735 ASSAY OF MAGNESIUM: CPT

## 2024-06-22 PROCEDURE — 36415 COLL VENOUS BLD VENIPUNCTURE: CPT

## 2024-06-22 PROCEDURE — 82746 ASSAY OF FOLIC ACID SERUM: CPT

## 2024-06-22 PROCEDURE — 1200000000 HC SEMI PRIVATE

## 2024-06-22 RX ORDER — CIPROFLOXACIN HYDROCHLORIDE 3.5 MG/ML
1 SOLUTION/ DROPS TOPICAL
Status: DISCONTINUED | OUTPATIENT
Start: 2024-06-23 | End: 2024-06-23

## 2024-06-22 RX ORDER — OXYCODONE HYDROCHLORIDE AND ACETAMINOPHEN 5; 325 MG/1; MG/1
1 TABLET ORAL EVERY 6 HOURS PRN
Status: DISCONTINUED | OUTPATIENT
Start: 2024-06-22 | End: 2024-06-23 | Stop reason: HOSPADM

## 2024-06-22 RX ORDER — CIPROFLOXACIN HYDROCHLORIDE 3.5 MG/ML
1 SOLUTION/ DROPS TOPICAL
Status: DISCONTINUED | OUTPATIENT
Start: 2024-06-22 | End: 2024-06-23

## 2024-06-22 RX ORDER — CIPROFLOXACIN HYDROCHLORIDE 3.5 MG/ML
1 SOLUTION/ DROPS TOPICAL
Status: DISCONTINUED | OUTPATIENT
Start: 2024-06-22 | End: 2024-06-22 | Stop reason: SDUPTHER

## 2024-06-22 RX ORDER — CIPROFLOXACIN HYDROCHLORIDE 3.5 MG/ML
1 SOLUTION/ DROPS TOPICAL
Status: COMPLETED | OUTPATIENT
Start: 2024-06-22 | End: 2024-06-23

## 2024-06-22 RX ADMIN — INSULIN LISPRO 2 UNITS: 100 INJECTION, SOLUTION INTRAVENOUS; SUBCUTANEOUS at 17:20

## 2024-06-22 RX ADMIN — PIPERACILLIN AND TAZOBACTAM 4500 MG: 4; .5 INJECTION, POWDER, LYOPHILIZED, FOR SOLUTION INTRAVENOUS at 00:33

## 2024-06-22 RX ADMIN — Medication 1 DROP: at 16:20

## 2024-06-22 RX ADMIN — Medication 1 DROP: at 19:19

## 2024-06-22 RX ADMIN — SODIUM CHLORIDE, PRESERVATIVE FREE 10 ML: 5 INJECTION INTRAVENOUS at 09:07

## 2024-06-22 RX ADMIN — PANTOPRAZOLE SODIUM 40 MG: 40 TABLET, DELAYED RELEASE ORAL at 06:12

## 2024-06-22 RX ADMIN — SERTRALINE 150 MG: 100 TABLET, FILM COATED ORAL at 09:07

## 2024-06-22 RX ADMIN — Medication 1 DROP: at 15:04

## 2024-06-22 RX ADMIN — Medication 1 DROP: at 18:24

## 2024-06-22 RX ADMIN — MAGNESIUM OXIDE 400 MG (241.3 MG MAGNESIUM) TABLET 400 MG: TABLET at 09:07

## 2024-06-22 RX ADMIN — ENOXAPARIN SODIUM 30 MG: 100 INJECTION SUBCUTANEOUS at 21:30

## 2024-06-22 RX ADMIN — CIPROFLOXACIN HYDROCHLORIDE 1 DROP: 3.5 SOLUTION/ DROPS TOPICAL at 23:20

## 2024-06-22 RX ADMIN — Medication 1 DROP: at 20:15

## 2024-06-22 RX ADMIN — CIPROFLOXACIN HYDROCHLORIDE 0.5 INCH: 3 OINTMENT OPHTHALMIC at 09:10

## 2024-06-22 RX ADMIN — Medication 1 DROP: at 13:11

## 2024-06-22 RX ADMIN — PIPERACILLIN AND TAZOBACTAM 4500 MG: 4; .5 INJECTION, POWDER, LYOPHILIZED, FOR SOLUTION INTRAVENOUS at 09:10

## 2024-06-22 RX ADMIN — CETIRIZINE HYDROCHLORIDE 10 MG: 10 TABLET, FILM COATED ORAL at 09:07

## 2024-06-22 RX ADMIN — FLUTICASONE PROPIONATE 2 SPRAY: 50 SPRAY, METERED NASAL at 09:10

## 2024-06-22 RX ADMIN — OXYCODONE HYDROCHLORIDE AND ACETAMINOPHEN 1 TABLET: 5; 325 TABLET ORAL at 18:27

## 2024-06-22 RX ADMIN — SODIUM CHLORIDE, PRESERVATIVE FREE 10 ML: 5 INJECTION INTRAVENOUS at 21:53

## 2024-06-22 RX ADMIN — Medication 1 DROP: at 14:11

## 2024-06-22 RX ADMIN — ATORVASTATIN CALCIUM 20 MG: 20 TABLET, FILM COATED ORAL at 09:07

## 2024-06-22 RX ADMIN — ENOXAPARIN SODIUM 30 MG: 100 INJECTION SUBCUTANEOUS at 09:06

## 2024-06-22 RX ADMIN — PIPERACILLIN AND TAZOBACTAM 4500 MG: 4; .5 INJECTION, POWDER, LYOPHILIZED, FOR SOLUTION INTRAVENOUS at 17:24

## 2024-06-22 RX ADMIN — OXYCODONE HYDROCHLORIDE AND ACETAMINOPHEN 1 TABLET: 5; 325 TABLET ORAL at 13:17

## 2024-06-22 RX ADMIN — Medication 1 DROP: at 22:20

## 2024-06-22 RX ADMIN — VANCOMYCIN HYDROCHLORIDE 1500 MG: 10 INJECTION, POWDER, LYOPHILIZED, FOR SOLUTION INTRAVENOUS at 13:18

## 2024-06-22 RX ADMIN — Medication 3 MG: at 21:57

## 2024-06-22 RX ADMIN — VALSARTAN 160 MG: 160 TABLET ORAL at 09:07

## 2024-06-22 RX ADMIN — Medication 1 DROP: at 17:20

## 2024-06-22 RX ADMIN — Medication 1 DROP: at 21:20

## 2024-06-22 ASSESSMENT — PAIN DESCRIPTION - FREQUENCY: FREQUENCY: INTERMITTENT

## 2024-06-22 ASSESSMENT — PAIN DESCRIPTION - LOCATION
LOCATION: EYE

## 2024-06-22 ASSESSMENT — PAIN SCALES - GENERAL
PAINLEVEL_OUTOF10: 0
PAINLEVEL_OUTOF10: 0
PAINLEVEL_OUTOF10: 6
PAINLEVEL_OUTOF10: 6
PAINLEVEL_OUTOF10: 0
PAINLEVEL_OUTOF10: 0
PAINLEVEL_OUTOF10: 5

## 2024-06-22 ASSESSMENT — PAIN DESCRIPTION - ORIENTATION
ORIENTATION: RIGHT

## 2024-06-22 ASSESSMENT — PAIN DESCRIPTION - DESCRIPTORS
DESCRIPTORS: DISCOMFORT
DESCRIPTORS: ACHING
DESCRIPTORS: ACHING;DISCOMFORT

## 2024-06-22 ASSESSMENT — PAIN DESCRIPTION - PAIN TYPE: TYPE: ACUTE PAIN

## 2024-06-22 ASSESSMENT — PAIN - FUNCTIONAL ASSESSMENT: PAIN_FUNCTIONAL_ASSESSMENT: PREVENTS OR INTERFERES SOME ACTIVE ACTIVITIES AND ADLS

## 2024-06-22 NOTE — PROGRESS NOTES
Pharmacy Consultation Note  (Antibiotic Dosing and Monitoring)    Initial consult date: 6/21  Consulting physician/provider: Nigel  Drug: Vancomycin  Indication: Skin and soft tissue infection x 7 days    Age/  Gender Height Weight IBW  Allergy Information   53 y.o./female 177.8 cm (5' 10\") 111.1 kg (245 lb)     Ideal body weight: 68.5 kg (151 lb 0.2 oz)  Adjusted ideal body weight: 88.1 kg (194 lb 2.2 oz)   Colchicine and Darvon [propoxyphene]      Renal Function:  Recent Labs     06/20/24  1731 06/21/24  0441 06/22/24  0654   BUN 7 6 6   CREATININE 0.6 0.6 0.7       No intake or output data in the 24 hours ending 06/22/24 1020    Vancomycin Monitoring:  Trough:  No results for input(s): \"VANCOTROUGH\" in the last 72 hours.  Random:  No results for input(s): \"VANCORANDOM\" in the last 72 hours.     Recent vancomycin administrations                     vancomycin (VANCOCIN) 1,500 mg in sodium chloride 0.9 % 250 mL IVPB (mg) 1,500 mg New Bag 06/21/24 2226     1,500 mg New Bag  1817    vancomycin (VANCOCIN) 2000 mg in 0.9% sodium chloride 500 mL IVPB (mg) 2,000 mg New Bag 06/20/24 2357                  Assessment:  Patient is a 53 y.o. female who has been initiated on vancomycin  Estimated Creatinine Clearance: 126 mL/min (based on SCr of 0.7 mg/dL).    Plan:  Continue vancomycin 1500 mg IV q12h for projected AUC/AYNCI 400-600.  Will check vancomycin levels when appropriate  Will continue to monitor renal function   Pharmacy to follow      Joelle Aguayo, PharmD, BCPS, BCCCP 6/22/2024 10:20 AM

## 2024-06-22 NOTE — CONSULTS
CC:  Blurred vision right eye    2 day history of redness, discomfort, and light sensitivity OD, no change in vision.  No symptoms OS.  Past ocular history unremarkable, was treated for eye infection OD several years ago.  Gets yearly diabetic eye exams, no history of diabetic retinopathy.  Wears daily wear soft contact lenses, last wore yesterday.  Presently receiving Ciloxan ointment TID OD.    Past Medical History:    Past Medical History:   Diagnosis Date    Anemia     stable at this time  2023    Arthritis     COVID 05/2022 11/2022-    Diabetes mellitus (HCC)     diet controlled    History of blood transfusion 2012    Lumbar pain        Current Medications:      Current Facility-Administered Medications:     ciprofloxacin (CILOXAN) 0.3 % ophthalmic solution 1 drop, 1 drop, Right Eye, Q1H, Robin Guerra MD    [START ON 6/23/2024] ciprofloxacin HCl (CILOXAN) 0.3 % ophthalmic ointment 0.5 inch, 0.5 inch, Right Eye, Nightly, Robin Guerra MD    oxyCODONE-acetaminophen (PERCOCET) 5-325 MG per tablet 1 tablet, 1 tablet, Oral, Q6H PRN, Robin Guerra MD    atorvastatin (LIPITOR) tablet 20 mg, 20 mg, Oral, Daily, Vamshi France MD, 20 mg at 06/22/24 0907    cetirizine (ZYRTEC) tablet 10 mg, 10 mg, Oral, Daily, Vamshi France MD, 10 mg at 06/22/24 0907    fluticasone (FLONASE) 50 MCG/ACT nasal spray 2 spray, 2 spray, Each Nostril, Daily, Vamshi France MD, 2 spray at 06/22/24 0910    magnesium oxide (MAG-OX) tablet 400 mg, 400 mg, Oral, Daily, Vamshi France MD, 400 mg at 06/22/24 0907    melatonin tablet 3 mg, 3 mg, Oral, Nightly PRN, Vamshi France MD, 3 mg at 06/21/24 2222    pantoprazole (PROTONIX) tablet 40 mg, 40 mg, Oral, QAM AC, Vamshi France MD, 40 mg at 06/22/24 0612    sertraline (ZOLOFT) tablet 150 mg, 150 mg, Oral, Daily, Vamshi France MD, 150 mg at 06/22/24 0907    valsartan (DIOVAN) tablet 160 mg, 160 mg, Oral, Daily, Vamshi France MD, 160 mg at 06/22/24 0907    sodium chloride

## 2024-06-22 NOTE — PLAN OF CARE
Problem: Discharge Planning  Goal: Discharge to home or other facility with appropriate resources  6/22/2024 0134 by Henny Talavera RN  Outcome: Progressing  6/21/2024 1659 by Vivian Bermudez RN  Outcome: Not Progressing  Flowsheets (Taken 6/21/2024 1100)  Discharge to home or other facility with appropriate resources: Identify barriers to discharge with patient and caregiver     Problem: ABCDS Injury Assessment  Goal: Absence of physical injury  6/22/2024 0134 by Henny Talavera RN  Outcome: Progressing  6/21/2024 1659 by Vivian Bermudez RN  Outcome: Progressing     Problem: Skin/Tissue Integrity - Adult  Goal: Incisions, wounds, or drain sites healing without S/S of infection  6/22/2024 0134 by Henny Talavera RN  Outcome: Progressing  6/21/2024 1659 by Vivian Bermudez RN  Outcome: Progressing     Problem: Metabolic/Fluid and Electrolytes - Adult  Goal: Hemodynamic stability and optimal renal function maintained  6/22/2024 0134 by Henny Talavera RN  Outcome: Progressing  6/21/2024 1659 by Vivian Bermudez RN  Outcome: Progressing     Problem: Discharge Planning  Goal: Discharge to home or other facility with appropriate resources  6/22/2024 0134 by Henny Talavera RN  Outcome: Progressing  6/21/2024 1659 by Vivian Bermudez RN  Outcome: Not Progressing  Flowsheets (Taken 6/21/2024 1100)  Discharge to home or other facility with appropriate resources: Identify barriers to discharge with patient and caregiver

## 2024-06-22 NOTE — PLAN OF CARE
Problem: Discharge Planning  Goal: Discharge to home or other facility with appropriate resources  6/22/2024 1317 by Sven Lopez RN  Outcome: Progressing  6/22/2024 0134 by Henny Talavera RN  Outcome: Progressing     Problem: ABCDS Injury Assessment  Goal: Absence of physical injury  6/22/2024 1317 by Sven Lopez RN  Outcome: Progressing  6/22/2024 0134 by Henny Talavera RN  Outcome: Progressing     Problem: Skin/Tissue Integrity - Adult  Goal: Incisions, wounds, or drain sites healing without S/S of infection  6/22/2024 1317 by Sven Lopez RN  Outcome: Progressing  6/22/2024 0134 by Henny Talavera RN  Outcome: Progressing     Problem: Metabolic/Fluid and Electrolytes - Adult  Goal: Hemodynamic stability and optimal renal function maintained  6/22/2024 1317 by Sven Lopez RN  Outcome: Progressing  6/22/2024 0134 by Henny Talavera RN  Outcome: Progressing

## 2024-06-22 NOTE — PROGRESS NOTES
Department of Podiatry  Progress Note    SUBJECTIVE:  Patient seen bedside for left foot wound. No acute events overnight.  Patient states that her left foot is still sore this morning. Patient denies any N/V/D/F/C/SOB/CP and has no other pedal complaints at this time.     OBJECTIVE:    Scheduled Meds:   atorvastatin  20 mg Oral Daily    cetirizine  10 mg Oral Daily    fluticasone  2 spray Each Nostril Daily    magnesium oxide  400 mg Oral Daily    pantoprazole  40 mg Oral QAM AC    sertraline  150 mg Oral Daily    valsartan  160 mg Oral Daily    sodium chloride flush  5-40 mL IntraVENous 2 times per day    enoxaparin  30 mg SubCUTAneous BID    insulin lispro  0-8 Units SubCUTAneous TID WC    insulin lispro  0-4 Units SubCUTAneous Nightly    ciprofloxacin HCl  0.5 inch Right Eye TID    piperacillin-tazobactam  4,500 mg IntraVENous Q8H    vancomycin  1,500 mg IntraVENous Q12H     Continuous Infusions:   sodium chloride      dextrose       PRN Meds:.melatonin, sodium chloride flush, sodium chloride, ondansetron **OR** ondansetron, polyethylene glycol, acetaminophen **OR** acetaminophen, dextrose bolus **OR** dextrose bolus, glucagon (rDNA), dextrose, Glucose    Allergies   Allergen Reactions    Colchicine     Darvon [Propoxyphene] Other (See Comments)     GI Upset       BP (!) 153/96   Pulse 86   Temp 98.5 °F (36.9 °C) (Oral)   Resp 18   Ht 1.778 m (5' 10\")   Wt 117.4 kg (258 lb 13.1 oz)   SpO2 96%   BMI 37.14 kg/m²       EXAM:  Pt is AAOx3, NAD     Vascular Exam:  DP and PT pulses are palpable. CFT <5 seconds to digits. Skin temp is warm to warm from proximal to distal.     Neuro Exam:  Light touch sensation is intact     Dermatologic Exam: There is a wound to the dorsum of the left foot.  The wound is fully granular in nature after the dead skin from the fluid sac sloughed off.  There is mild drainage coming from the wound at this time.  The wound is negative for malodor.  No other signs of infection noted at    Resp 18   Ht 1.778 m (5' 10\")   Wt 117.4 kg (258 lb 13.1 oz)   SpO2 96%   BMI 37.14 kg/m²     LABS:    Recent Labs     06/20/24  1731 06/21/24  0441 06/22/24  0654   WBC 5.9 5.5 4.7   HGB 12.4 12.1 12.5   HCT 38.0 36.8 38.4     --  152        Recent Labs     06/21/24  0441      K 3.5      CO2 24   BUN 6   CREATININE 0.6      No results for input(s): \"PROT\", \"INR\", \"APTT\" in the last 72 hours.      ASSESSMENT:  - Cellulitis left foot, possible abscess  - Subacute/chronic fracture proximal phalanx left     PLAN:  - Patient was assessed and all findings discussed with the patient  - Wbc 4.7, ESR 60, CRP 50  - Abx per IM/ID  - Cultures: Wound culture shows no growth at this point, final results pending  - XR L foot: Subacute to chronic appearing fracture deformity the mid to distal left large toe proximal phalanx.  Soft tissue swelling of the left large toe and dorsal left foot. Left large toe osteoarthritis.  Dorsal midfoot osteoarthritis.  Calcaneal enthesophytes.  - CT L foot:   1. Minimally comminuted and dorsally angulated fracture of the proximal  phalanx of the 1st digit which appears acute to subacute in age. Please  correlate with clinical history.  2. Prominent oval fluid density lesion on the dorsal surface of the foot  distally near the base of the great toe.  Likely related to dermal blister  formation.  3. No evidence of bone destruction to suggest osteomyelitis.  No evidence of  deep soft tissue abscess.  - Dressings left foot: Xeroform DSD applied to left foot, continue with QOD dressing changes.  -Cam boot ordered to be worn to the left lower extremity at all times when ambulating.  - No surgical intervention per podiatry at this time.  - Will continue to follow while in house  - Patient discussed with:    Rajat Rivera DPM FACFAS  Fellowship-Trained Foot and Ankle Surgeon  Diplomate, American Board of Foot and Ankle Surgeons  [phone]       Thank you for involving podiatry

## 2024-06-23 VITALS
RESPIRATION RATE: 16 BRPM | OXYGEN SATURATION: 97 % | BODY MASS INDEX: 36.62 KG/M2 | WEIGHT: 255.8 LBS | HEART RATE: 70 BPM | TEMPERATURE: 98.3 F | SYSTOLIC BLOOD PRESSURE: 131 MMHG | HEIGHT: 70 IN | DIASTOLIC BLOOD PRESSURE: 83 MMHG

## 2024-06-23 PROBLEM — L03.90 CELLULITIS: Status: RESOLVED | Noted: 2024-06-21 | Resolved: 2024-06-23

## 2024-06-23 PROBLEM — L08.9 DIABETIC FOOT INFECTION (HCC): Status: RESOLVED | Noted: 2024-06-21 | Resolved: 2024-06-23

## 2024-06-23 PROBLEM — L03.116 CELLULITIS OF LEFT FOOT: Status: RESOLVED | Noted: 2024-06-21 | Resolved: 2024-06-23

## 2024-06-23 PROBLEM — E11.628 DIABETIC FOOT INFECTION (HCC): Status: RESOLVED | Noted: 2024-06-21 | Resolved: 2024-06-23

## 2024-06-23 LAB
ALBUMIN SERPL-MCNC: 3.4 G/DL (ref 3.5–5.2)
ALP SERPL-CCNC: 76 U/L (ref 35–104)
ALT SERPL-CCNC: 13 U/L (ref 0–32)
ANION GAP SERPL CALCULATED.3IONS-SCNC: 9 MMOL/L (ref 7–16)
AST SERPL-CCNC: 24 U/L (ref 0–31)
BASOPHILS # BLD: 0.04 K/UL (ref 0–0.2)
BASOPHILS NFR BLD: 1 % (ref 0–2)
BILIRUB SERPL-MCNC: 0.6 MG/DL (ref 0–1.2)
BUN SERPL-MCNC: 7 MG/DL (ref 6–20)
CALCIUM SERPL-MCNC: 8.6 MG/DL (ref 8.6–10.2)
CHLORIDE SERPL-SCNC: 100 MMOL/L (ref 98–107)
CO2 SERPL-SCNC: 28 MMOL/L (ref 22–29)
CREAT SERPL-MCNC: 0.8 MG/DL (ref 0.5–1)
EOSINOPHIL # BLD: 0.18 K/UL (ref 0.05–0.5)
EOSINOPHILS RELATIVE PERCENT: 4 % (ref 0–6)
ERYTHROCYTE [DISTWIDTH] IN BLOOD BY AUTOMATED COUNT: 13 % (ref 11.5–15)
GFR, ESTIMATED: >90 ML/MIN/1.73M2
GLUCOSE SERPL-MCNC: 185 MG/DL (ref 74–99)
HCT VFR BLD AUTO: 35.2 % (ref 34–48)
HGB BLD-MCNC: 11.4 G/DL (ref 11.5–15.5)
IMM GRANULOCYTES # BLD AUTO: <0.03 K/UL (ref 0–0.58)
IMM GRANULOCYTES NFR BLD: 0 % (ref 0–5)
LYMPHOCYTES NFR BLD: 0.99 K/UL (ref 1.5–4)
LYMPHOCYTES RELATIVE PERCENT: 21 % (ref 20–42)
MAGNESIUM SERPL-MCNC: 1.8 MG/DL (ref 1.6–2.6)
MCH RBC QN AUTO: 34.1 PG (ref 26–35)
MCHC RBC AUTO-ENTMCNC: 32.4 G/DL (ref 32–34.5)
MCV RBC AUTO: 105.4 FL (ref 80–99.9)
MICROORGANISM SPEC CULT: ABNORMAL
MICROORGANISM/AGENT SPEC: ABNORMAL
MONOCYTES NFR BLD: 0.29 K/UL (ref 0.1–0.95)
MONOCYTES NFR BLD: 6 % (ref 2–12)
NEUTROPHILS NFR BLD: 67 % (ref 43–80)
NEUTS SEG NFR BLD: 3.12 K/UL (ref 1.8–7.3)
PLATELET, FLUORESCENCE: 132 K/UL (ref 130–450)
PMV BLD AUTO: 10.8 FL (ref 7–12)
POTASSIUM SERPL-SCNC: 3.4 MMOL/L (ref 3.5–5)
PROT SERPL-MCNC: 6.8 G/DL (ref 6.4–8.3)
RBC # BLD AUTO: 3.34 M/UL (ref 3.5–5.5)
SERVICE CMNT-IMP: ABNORMAL
SODIUM SERPL-SCNC: 137 MMOL/L (ref 132–146)
SPECIMEN DESCRIPTION: ABNORMAL
WBC OTHER # BLD: 4.6 K/UL (ref 4.5–11.5)

## 2024-06-23 PROCEDURE — 6370000000 HC RX 637 (ALT 250 FOR IP)

## 2024-06-23 PROCEDURE — 85025 COMPLETE CBC W/AUTO DIFF WBC: CPT

## 2024-06-23 PROCEDURE — 6360000002 HC RX W HCPCS

## 2024-06-23 PROCEDURE — 83735 ASSAY OF MAGNESIUM: CPT

## 2024-06-23 PROCEDURE — 2580000003 HC RX 258

## 2024-06-23 PROCEDURE — 80053 COMPREHEN METABOLIC PANEL: CPT

## 2024-06-23 PROCEDURE — 6370000000 HC RX 637 (ALT 250 FOR IP): Performed by: STUDENT IN AN ORGANIZED HEALTH CARE EDUCATION/TRAINING PROGRAM

## 2024-06-23 PROCEDURE — 99238 HOSP IP/OBS DSCHRG MGMT 30/<: CPT | Performed by: FAMILY MEDICINE

## 2024-06-23 RX ORDER — CIPROFLOXACIN HYDROCHLORIDE 3.5 MG/ML
1 SOLUTION/ DROPS TOPICAL
Status: DISCONTINUED | OUTPATIENT
Start: 2024-06-23 | End: 2024-06-23 | Stop reason: HOSPADM

## 2024-06-23 RX ORDER — CIPROFLOXACIN HYDROCHLORIDE 3.5 MG/ML
1 SOLUTION/ DROPS TOPICAL
Qty: 5 ML | Refills: 1 | Status: SHIPPED | OUTPATIENT
Start: 2024-06-24 | End: 2024-07-04

## 2024-06-23 RX ORDER — CIPROFLOXACIN HYDROCHLORIDE 3.5 MG/ML
1 SOLUTION/ DROPS TOPICAL
Status: DISCONTINUED | OUTPATIENT
Start: 2024-06-23 | End: 2024-06-23

## 2024-06-23 RX ORDER — OXYCODONE HYDROCHLORIDE AND ACETAMINOPHEN 5; 325 MG/1; MG/1
1 TABLET ORAL EVERY 8 HOURS PRN
Qty: 6 TABLET | Refills: 0 | Status: SHIPPED | OUTPATIENT
Start: 2024-06-23 | End: 2024-06-26

## 2024-06-23 RX ORDER — CIPROFLOXACIN HYDROCHLORIDE 3.5 MG/ML
1 SOLUTION/ DROPS TOPICAL
Qty: 5 ML | Refills: 1 | Status: SHIPPED | OUTPATIENT
Start: 2024-06-23 | End: 2024-06-25

## 2024-06-23 RX ORDER — CIPROFLOXACIN HYDROCHLORIDE 3.5 MG/ML
1 SOLUTION/ DROPS TOPICAL
Status: DISCONTINUED | OUTPATIENT
Start: 2024-06-24 | End: 2024-06-23 | Stop reason: HOSPADM

## 2024-06-23 RX ORDER — CIPROFLOXACIN HYDROCHLORIDE 3.5 MG/ML
1 SOLUTION/ DROPS TOPICAL
Qty: 5 ML | Refills: 1 | Status: SHIPPED | OUTPATIENT
Start: 2024-06-23 | End: 2024-06-23 | Stop reason: HOSPADM

## 2024-06-23 RX ADMIN — CIPROFLOXACIN HYDROCHLORIDE 1 DROP: 3.5 SOLUTION/ DROPS TOPICAL at 12:00

## 2024-06-23 RX ADMIN — CIPROFLOXACIN HYDROCHLORIDE 1 DROP: 3.5 SOLUTION/ DROPS TOPICAL at 17:24

## 2024-06-23 RX ADMIN — SODIUM CHLORIDE, PRESERVATIVE FREE 10 ML: 5 INJECTION INTRAVENOUS at 09:19

## 2024-06-23 RX ADMIN — CIPROFLOXACIN HYDROCHLORIDE 1 DROP: 3.5 SOLUTION/ DROPS TOPICAL at 08:00

## 2024-06-23 RX ADMIN — VANCOMYCIN HYDROCHLORIDE 1500 MG: 10 INJECTION, POWDER, LYOPHILIZED, FOR SOLUTION INTRAVENOUS at 02:09

## 2024-06-23 RX ADMIN — ATORVASTATIN CALCIUM 20 MG: 20 TABLET, FILM COATED ORAL at 09:18

## 2024-06-23 RX ADMIN — PIPERACILLIN AND TAZOBACTAM 4500 MG: 4; .5 INJECTION, POWDER, LYOPHILIZED, FOR SOLUTION INTRAVENOUS at 03:52

## 2024-06-23 RX ADMIN — CIPROFLOXACIN HYDROCHLORIDE 1 DROP: 3.5 SOLUTION/ DROPS TOPICAL at 07:00

## 2024-06-23 RX ADMIN — CIPROFLOXACIN HYDROCHLORIDE 1 DROP: 3.5 SOLUTION/ DROPS TOPICAL at 15:07

## 2024-06-23 RX ADMIN — CIPROFLOXACIN HYDROCHLORIDE 1 DROP: 3.5 SOLUTION/ DROPS TOPICAL at 14:00

## 2024-06-23 RX ADMIN — POTASSIUM BICARBONATE 40 MEQ: 782 TABLET, EFFERVESCENT ORAL at 09:20

## 2024-06-23 RX ADMIN — FLUTICASONE PROPIONATE 2 SPRAY: 50 SPRAY, METERED NASAL at 09:19

## 2024-06-23 RX ADMIN — OXYCODONE HYDROCHLORIDE AND ACETAMINOPHEN 1 TABLET: 5; 325 TABLET ORAL at 01:12

## 2024-06-23 RX ADMIN — CETIRIZINE HYDROCHLORIDE 10 MG: 10 TABLET, FILM COATED ORAL at 09:18

## 2024-06-23 RX ADMIN — OXYCODONE HYDROCHLORIDE AND ACETAMINOPHEN 1 TABLET: 5; 325 TABLET ORAL at 09:26

## 2024-06-23 RX ADMIN — CIPROFLOXACIN HYDROCHLORIDE 1 DROP: 3.5 SOLUTION/ DROPS TOPICAL at 13:00

## 2024-06-23 RX ADMIN — SERTRALINE 150 MG: 100 TABLET, FILM COATED ORAL at 09:17

## 2024-06-23 RX ADMIN — OXYCODONE HYDROCHLORIDE AND ACETAMINOPHEN 1 TABLET: 5; 325 TABLET ORAL at 15:48

## 2024-06-23 RX ADMIN — CIPROFLOXACIN HYDROCHLORIDE 1 DROP: 3.5 SOLUTION/ DROPS TOPICAL at 10:08

## 2024-06-23 RX ADMIN — VALSARTAN 160 MG: 160 TABLET ORAL at 09:19

## 2024-06-23 RX ADMIN — CIPROFLOXACIN HYDROCHLORIDE 1 DROP: 3.5 SOLUTION/ DROPS TOPICAL at 16:22

## 2024-06-23 RX ADMIN — ENOXAPARIN SODIUM 30 MG: 100 INJECTION SUBCUTANEOUS at 09:19

## 2024-06-23 RX ADMIN — MAGNESIUM OXIDE 400 MG (241.3 MG MAGNESIUM) TABLET 400 MG: TABLET at 09:19

## 2024-06-23 RX ADMIN — CIPROFLOXACIN HYDROCHLORIDE 1 DROP: 3.5 SOLUTION/ DROPS TOPICAL at 11:00

## 2024-06-23 RX ADMIN — CIPROFLOXACIN HYDROCHLORIDE 1 DROP: 3.5 SOLUTION/ DROPS TOPICAL at 09:16

## 2024-06-23 RX ADMIN — PANTOPRAZOLE SODIUM 40 MG: 40 TABLET, DELAYED RELEASE ORAL at 09:18

## 2024-06-23 RX ADMIN — CIPROFLOXACIN HYDROCHLORIDE 1 DROP: 3.5 SOLUTION/ DROPS TOPICAL at 00:00

## 2024-06-23 ASSESSMENT — PAIN SCALES - GENERAL
PAINLEVEL_OUTOF10: 5
PAINLEVEL_OUTOF10: 7

## 2024-06-23 ASSESSMENT — PAIN DESCRIPTION - LOCATION
LOCATION: EYE
LOCATION: EYE

## 2024-06-23 ASSESSMENT — PAIN DESCRIPTION - ORIENTATION
ORIENTATION: RIGHT
ORIENTATION: RIGHT

## 2024-06-23 ASSESSMENT — PAIN DESCRIPTION - DESCRIPTORS
DESCRIPTORS: ACHING;DISCOMFORT
DESCRIPTORS: ACHING;DISCOMFORT;SORE

## 2024-06-23 NOTE — DISCHARGE SUMMARY
[facility]  Discharge Summary      Patient ID:  Mae Calvillo  [id number]  53 y.o.  1970    Admit date: 6/20/2024    Discharge date and time: 6/23/2024    Location of discharge: [facility]     Admitting Physician: Elizabeth Melendez MD     Discharge Physician: Jimmy Yarbrough MD    Consults: Ophthalmology and podiatry    Admission Diagnoses: Cellulitis [L03.90]  Cellulitis of left foot [L03.116]    Discharge Diagnoses: Principal Problem (Resolved):    Diabetic foot infection (HCC)  Active Problems:    Closed displaced fracture of proximal phalanx of left great toe    Redness of right eye    Acute conjunctivitis of right eye    Controlled type 2 diabetes mellitus with complication, without long-term current use of insulin (HCC)  Resolved Problems:    Cellulitis    Cellulitis of left foot      Hospital Course:     Patient is a 53-year-old female with a pertinent past medical history of type 2 diabetes presented to the emergency department for left leg swelling.  On evaluation she had a very large blister over the left foot.  Patient was unable to recall any trauma to the foot.  X-ray of the foot showed a subacute to chronic appearing fracture of the left large toe.  Ultrasounds negative for DVT.    Family medicine was asked to admit for cellulitis.  Blood and wound cultures were obtained.  Patient was started on Zosyn and vancomycin.  Podiatry was consulted who ordered a CT of the foot which again demonstrated a subacute fracture and no evidence of osteomyelitis.  They did perform debridement of the bulla.     Patient reported right eye pain.  The right eye was injected and a fluorescein stain was performed which did demonstrate some fluorescein dye uptake in the superior limbus between 12:00 and 3:00.  Ophthalmology was consulted who recommended the patient take frequent Ciloxan eyedrops to the affected eye.  It was also recommended that the  patient's stop using eye contacts.    Patient's foot pain and eye pain improved.  Wound and blood cultures have not grown anything and the antibiotics were discontinued and the patient was discharged.      Discharge Exam:   Constitutional:       General: She is not in acute distress.     Appearance: Normal appearance. She is not ill-appearing, toxic-appearing or diaphoretic.   HENT:      Head: Normocephalic and atraumatic.   Eyes:      General: No scleral icterus.        Left eye: No discharge.      Comments: Right eye with conjunctival injection primarily on the medial aspect   Cardiovascular:      Rate and Rhythm: Normal rate and regular rhythm.      Heart sounds: No murmur heard.  Pulmonary:      Effort: No respiratory distress.      Breath sounds: No wheezing, rhonchi or rales.   Abdominal:      General: There is no distension.      Tenderness: There is no abdominal tenderness. There is no guarding.   Musculoskeletal:         General: Swelling, tenderness and deformity present.      Right lower leg: No edema.      Left lower leg: No edema.      Comments: Left foot in Ace wrap   Skin:     General: Skin is warm and dry.      Coloration: Skin is not jaundiced or pale.   Neurological:      Mental Status: She is alert. Mental status is at baseline.   Psychiatric:         Mood and Affect: Mood normal.     Post Discharge Follow Up Issues:   Patient has right eye corneal ulcer.  Ensure patient is not using eye contacts.  Also ensure patient is adequately using Ciloxan eyedrops as prescribed by ophthalmology.  Ensure patient has followed up with ophthalmology    Patient's left foot and a large bulla and was debrided by podiatry.  Ensure patient has had proper dressing changes performed and has followed up with podiatry.    Imaging showed patient had a subacute fracture of the left great toe.  The subacute fracture may need further workup if patient's pain is not improved with resolution of the bulla    Discharged

## 2024-06-23 NOTE — PROGRESS NOTES
[facility]  Progress Note    Chief Complaint   Patient presents with    Leg Swelling     Left foot up to knee, onset today.  Also states she has a \"knot\" on the left foot.  Denies recent travel, no hx of blood clots.  Denies pain         Subjective:    Mae Calvillo was seen this morning lying comfortable in bed.  She states that her right eye pain has improved, her vision is better.  Her foot pain is also improved to 5/10.  She denies any fevers or chills.  No chest pain.  No abdominal pain.  Did get a little nauseous with the Percocet but otherwise has not vomited.  No diarrhea constipation.    Review of Systems   All other systems reviewed and are negative.        Objective:    /83   Pulse 70   Temp 98.3 °F (36.8 °C) (Oral)   Resp 16   Ht 1.778 m (5' 10\")   Wt 116 kg (255 lb 12.8 oz)   SpO2 97%   BMI 36.70 kg/m²       Physical Exam  Constitutional:       General: She is not in acute distress.     Appearance: Normal appearance. She is not ill-appearing, toxic-appearing or diaphoretic.   HENT:      Head: Normocephalic and atraumatic.   Eyes:      General: No scleral icterus.        Left eye: No discharge.      Comments: Right eye with conjunctival injection primarily on the medial aspect   Cardiovascular:      Rate and Rhythm: Normal rate and regular rhythm.      Heart sounds: No murmur heard.  Pulmonary:      Effort: No respiratory distress.      Breath sounds: No wheezing, rhonchi or rales.   Abdominal:      General: There is no distension.      Tenderness: There is no abdominal tenderness. There is no guarding.   Musculoskeletal:         General: Swelling, tenderness and deformity present.      Right lower leg: No edema.      Left lower leg: No edema.      Comments: Left foot in Ace wrap   Skin:     General: Skin is warm and dry.      Coloration: Skin is not jaundiced or pale.   Neurological:      Mental Status: She is alert. Mental status is at baseline.    Psychiatric:         Mood and Affect: Mood normal.               Assessment:    Active Hospital Problems    Diagnosis Date Noted    Cellulitis [L03.90] 06/21/2024    Closed displaced fracture of proximal phalanx of left great toe [S92.412A] 06/21/2024    Diabetic foot infection (HCC) [E11.628, L08.9] 06/21/2024    Redness of right eye [H57.89] 06/21/2024    Acute conjunctivitis of right eye [H10.31] 06/21/2024    Cellulitis of left foot [L03.116] 06/21/2024    Controlled type 2 diabetes mellitus with complication, without long-term current use of insulin (HCC) [E11.8] 06/21/2024       Plan:    Cellulitis   Great toe of the right foot with large blister and surrounding redness that is tender to to palpation.  X-ray demonstrating subacute fracture, no obvious osteomyelitis. -ESR 60 CRP 50 and concurrent fracture.   -Monitor fever and trend white count  -Vancomycin and Zosyn day 4, consider discontinuing as blood and wound cultures have not grown any infectious organisms  -Podiatry following [6/21];   surgical shoe/CAM immobilization  Wound cultures collected on 6/21: NGTD  Blood cx collected 6/20: NGTD  CT foot left without contrast - No OM, no abscess  Doppler US - no DVT  -Consider ID consult     Right eye corneal ulcer  Patient was reporting right eye pain was having difficulty blinking and her vision was reduced more so on that side.  Did a fluorescein stain which did reveal corneal ulcer.  -Ophthalmology consulted, appreciate recommendations  -Ciloxan eyedrops every hour followed by every 2 hours     Type 2 diabetes  Takes 500 mg metformin at home.  Had recently been prescribed Ozempic however has not been able to been approved by insurance.  Last A1c 6/14/24 6.7  Medium dose sliding scale  POCT glucose  Hypoglycemia protocol     Seasonal allergies  Zyrtec  Flonase     Hyperlipidemia  Lipitor 20     GERD  Protonix 40     Mood  Zoloft 150     Hypertension  Diovan 160   consider additional coverage, consider

## 2024-06-23 NOTE — PROGRESS NOTES
Pharmacy Consultation Note  (Antibiotic Dosing and Monitoring)    Initial consult date: 6/21  Consulting physician/provider: Nigel  Drug: Vancomycin  Indication: Skin and soft tissue infection x 7 days    Age/  Gender Height Weight IBW  Allergy Information   53 y.o./female 177.8 cm (5' 10\") 111.1 kg (245 lb)     Ideal body weight: 68.5 kg (151 lb 0.2 oz)  Adjusted ideal body weight: 87.5 kg (192 lb 14.9 oz)   Colchicine and Darvon [propoxyphene]      Renal Function:  Recent Labs     06/21/24  0441 06/22/24  0654 06/23/24  0440   BUN 6 6 7   CREATININE 0.6 0.7 0.8         Intake/Output Summary (Last 24 hours) at 6/23/2024 1320  Last data filed at 6/22/2024 1503  Gross per 24 hour   Intake 949.2 ml   Output --   Net 949.2 ml       Vancomycin Monitoring:  Trough:  No results for input(s): \"VANCOTROUGH\" in the last 72 hours.  Random:  No results for input(s): \"VANCORANDOM\" in the last 72 hours.     Recent vancomycin administrations                     vancomycin (VANCOCIN) 1,500 mg in sodium chloride 0.9 % 250 mL IVPB (mg) 1,500 mg New Bag 06/21/24 2226     1,500 mg New Bag  1817    vancomycin (VANCOCIN) 2000 mg in 0.9% sodium chloride 500 mL IVPB (mg) 2,000 mg New Bag 06/20/24 2357                  Assessment:  Patient is a 53 y.o. female who has been initiated on vancomycin  Estimated Creatinine Clearance: 110 mL/min (based on SCr of 0.8 mg/dL).    Plan:  Continue vancomycin 1500 mg IV q12h for projected AUC/YANCI 400-600.  Will check vancomycin levels when appropriate  Will continue to monitor renal function   Pharmacy to follow      Kimberlee Mojica PharmD, BCPS 6/23/2024 1:20 PM   Ext: [phone]

## 2024-06-23 NOTE — PROGRESS NOTES
Department of Podiatry  Progress Note    SUBJECTIVE:  Patient seen bedside for left foot wound. No acute events overnight.  Patient states that her left foot is still sore this morning.  I answered all patient's questions at bedside.  Patient has been pleased with the care she has been receiving.  Patient denies any N/V/D/F/C/SOB/CP and has no other pedal complaints at this time.     OBJECTIVE:    Scheduled Meds:   [START ON 6/24/2024] ciprofloxacin  1 drop Right Eye Q2H While awake    [START ON 6/24/2024] ciprofloxacin HCl  0.5 inch Right Eye Nightly    ciprofloxacin  1 drop Right Eye Q1H While awake    ciprofloxacin  1 drop Right Eye Q1H    atorvastatin  20 mg Oral Daily    cetirizine  10 mg Oral Daily    fluticasone  2 spray Each Nostril Daily    magnesium oxide  400 mg Oral Daily    pantoprazole  40 mg Oral QAM AC    sertraline  150 mg Oral Daily    valsartan  160 mg Oral Daily    sodium chloride flush  5-40 mL IntraVENous 2 times per day    enoxaparin  30 mg SubCUTAneous BID    insulin lispro  0-8 Units SubCUTAneous TID WC    insulin lispro  0-4 Units SubCUTAneous Nightly    piperacillin-tazobactam  4,500 mg IntraVENous Q8H    vancomycin  1,500 mg IntraVENous Q12H     Continuous Infusions:   sodium chloride      dextrose       PRN Meds:.oxyCODONE-acetaminophen, melatonin, sodium chloride flush, sodium chloride, ondansetron **OR** ondansetron, polyethylene glycol, acetaminophen **OR** acetaminophen, dextrose bolus **OR** dextrose bolus, glucagon (rDNA), dextrose, Glucose    Allergies   Allergen Reactions    Colchicine     Darvon [Propoxyphene] Other (See Comments)     GI Upset       /83   Pulse 70   Temp 98.3 °F (36.8 °C) (Oral)   Resp 16   Ht 1.778 m (5' 10\")   Wt 116 kg (255 lb 12.8 oz)   SpO2 97%   BMI 36.70 kg/m²       EXAM: Dressing is clean, dry and intact.  CFT less than 5 seconds all toes of the left foot.     Previous physical exam:    Vascular Exam:  DP and PT pulses are palpable. CFT <5

## 2024-06-23 NOTE — PLAN OF CARE
Problem: Discharge Planning  Goal: Discharge to home or other facility with appropriate resources  Outcome: Progressing     Problem: ABCDS Injury Assessment  Goal: Absence of physical injury  Outcome: Progressing     Problem: Skin/Tissue Integrity - Adult  Goal: Incisions, wounds, or drain sites healing without S/S of infection  Outcome: Progressing     Problem: Metabolic/Fluid and Electrolytes - Adult  Goal: Hemodynamic stability and optimal renal function maintained  Outcome: Progressing     Problem: Pain  Goal: Verbalizes/displays adequate comfort level or baseline comfort level  Outcome: Progressing

## 2024-06-23 NOTE — DISCHARGE INSTRUCTIONS
Call for worsening redness, pain, diminished vision, otherwise follow up as outpatient with Dr. Muñiz this Tuesday, June 24     No contact lens wear                   -Cam boot to be worn to the left lower extremity at all times when ambulating.  -Patient will need every other day dressing changes, Xeroform, 4 x 4 gauze, Kerlix and an Ace bandage  -Patient stable from podiatric perspective, patient to follow-up with Dr. Rivera in his office in [place] after discharge.

## 2024-06-24 ENCOUNTER — TELEPHONE (OUTPATIENT)
Dept: FAMILY MEDICINE CLINIC | Age: 54
End: 2024-06-24

## 2024-06-24 NOTE — TELEPHONE ENCOUNTER
Previous patient of Dr Abraham who saw Dr Newman last week.She ordered Ozempic for the patient. This has been denied.     Message from the plan;   Coverage is provided when the member meets all the following: Coverage is provided when the member has a history of at least 120 days of therapy with THREE preferred (medication covered by the Plan) medications [ONE of the 120 day trials must be Byetta (5 mcg and 10 mcg), Victoza (18 MG/3 ML PEN) or Trulicity (0.75 mg, 1.5 mg, 3 mg and 4.5 mg)], which include but are not limited to: Farxiga 5 and 10 mg, Invokana 100 mg and 300 mg, Victoza 18 MG/3 ML PEN, and Jardiance 10 and 25 mg. Member has had an inadequate clinical response (the inability to reach A1C goal (less than 7%) (a test result that shows a three-month average of blood sugars) after at least 120 days of current regimen, with use of two or more drugs concomitantly (at the same time) per ADA guidelines (American Diabetes Association) and, Member has documented adherence (taking medications correctly) and appropriate dose escalation (must achieve maximum recommended dose or document that maximum recommended dose is not tolerated or is clinically inappropriate).     Needs to try and FAIL 3 preferred medications. Failure results in A1C above 7. Patient is under 7.     Can try; Byetta, Victoza, Farxia, Jardiance, Invokana etc.

## 2024-06-25 ENCOUNTER — OFFICE VISIT (OUTPATIENT)
Dept: FAMILY MEDICINE CLINIC | Age: 54
End: 2024-06-25
Payer: COMMERCIAL

## 2024-06-25 VITALS
HEART RATE: 94 BPM | DIASTOLIC BLOOD PRESSURE: 83 MMHG | SYSTOLIC BLOOD PRESSURE: 131 MMHG | OXYGEN SATURATION: 95 % | RESPIRATION RATE: 18 BRPM | HEIGHT: 70 IN | WEIGHT: 250.8 LBS | BODY MASS INDEX: 35.9 KG/M2 | TEMPERATURE: 97.9 F

## 2024-06-25 DIAGNOSIS — Z09 HOSPITAL DISCHARGE FOLLOW-UP: Primary | ICD-10-CM

## 2024-06-25 DIAGNOSIS — T50.905A PILL GASTRITIS: ICD-10-CM

## 2024-06-25 DIAGNOSIS — E11.9 TYPE 2 DIABETES MELLITUS WITHOUT COMPLICATION, WITHOUT LONG-TERM CURRENT USE OF INSULIN (HCC): ICD-10-CM

## 2024-06-25 DIAGNOSIS — K29.60 PILL GASTRITIS: ICD-10-CM

## 2024-06-25 DIAGNOSIS — F41.9 ANXIETY: ICD-10-CM

## 2024-06-25 PROCEDURE — 99214 OFFICE O/P EST MOD 30 MIN: CPT

## 2024-06-25 RX ORDER — HYDROXYZINE PAMOATE 25 MG/1
25 CAPSULE ORAL 3 TIMES DAILY PRN
Qty: 30 CAPSULE | Refills: 0 | Status: SHIPPED | OUTPATIENT
Start: 2024-06-25 | End: 2024-07-09

## 2024-06-25 RX ORDER — LIRAGLUTIDE 6 MG/ML
0.6 INJECTION SUBCUTANEOUS DAILY
Qty: 2 ADJUSTABLE DOSE PRE-FILLED PEN SYRINGE | Refills: 3 | Status: SHIPPED | OUTPATIENT
Start: 2024-06-25

## 2024-06-25 RX ORDER — PANTOPRAZOLE SODIUM 40 MG/1
40 TABLET, DELAYED RELEASE ORAL
Qty: 30 TABLET | Refills: 0 | Status: SHIPPED | OUTPATIENT
Start: 2024-06-25

## 2024-06-25 RX ORDER — METFORMIN HYDROCHLORIDE 500 MG/1
500 TABLET, EXTENDED RELEASE ORAL
Qty: 30 TABLET | Refills: 2 | Status: SHIPPED | OUTPATIENT
Start: 2024-06-25

## 2024-06-25 NOTE — PROGRESS NOTES
Post-Discharge Transitional Care  Follow Up      Mae Calvillo   YOB: 1970    Date of Office Visit:  6/25/2024  Date of Hospital Admission: 6/20/24  Date of Hospital Discharge: 6/23/24  Risk of hospital readmission (high >=14%. Medium >=10%) :Readmission Risk Score: 10.9      Care management risk score Rising risk (score 2-5) and Complex Care (Scores >=6): No Risk Score On File     Non face to face  following discharge, date last encounter closed (first attempt may have been earlier): *No documented post hospital discharge outreach found in the last 14 days    Call initiated 2 business days of discharge: *No response recorded in the last 14 days    ASSESSMENT/PLAN:   Hospital discharge follow-up  Type 2 diabetes mellitus without complication, without long-term current use of insulin (HCC)  Patient's last A1c was 6.7 which was increased from 3 months ago, she was recently started on Ozempic, due to insurance issue Ozempic is not covered, stopping the Ozempic.  Advised patient to continue taking metformin and adding Victoza at this point.  Vies patient to take 0.6 mg daily for 1 week and then 1.2 after that.   -     metFORMIN (GLUCOPHAGE-XR) 500 MG extended release tablet; Take 1 tablet by mouth daily (with breakfast), Disp-30 tablet, R-2Normal  -     Liraglutide (VICTOZA) 18 MG/3ML SOPN SC injection; Inject 0.6 mg into the skin daily 0.6 mg for 1 wk daily and then 1.2 daily after that., Disp-2 Adjustable Dose Pre-filled Pen Syringe, R-3Normal  Pill gastritis  -     pantoprazole (PROTONIX) 40 MG tablet; Take 1 tablet by mouth daily (with breakfast), Disp-30 tablet, R-0Normal  Anxiety  Patient is using hydroxyzine as needed on top of her 150 mg Zoloft.  -     hydrOXYzine pamoate (VISTARIL) 25 MG capsule; Take 1 capsule by mouth 3 times daily as needed for Itching, Disp-30 capsule, R-0Normal      Medical Decision Making: moderate complexity  No follow-ups on file.           Subjective:   HPI:

## 2024-06-25 NOTE — PROGRESS NOTES
S: 53 y.o. female with   Chief Complaint   Patient presents with    Follow-Up from Hospital       TCM - corneal ulcer - tx with cipro - improving.  Foot blister possible cellulitis - improving off abx.  Bandaged.    Has home health to help with bandage changes.     O: VS:  height is 1.778 m (5' 10\") and weight is 113.8 kg (250 lb 12.8 oz). Her temporal temperature is 97.9 °F (36.6 °C). Her blood pressure is 131/83 and her pulse is 94. Her respiration is 18 and oxygen saturation is 95%.   BP Readings from Last 3 Encounters:   06/25/24 131/83   06/23/24 131/83   06/14/24 133/89     See resident note      Impression/Plan:   1) TCM  2) corneal ulcer - has optho follow up - messages left - overall  Improving   3) Foot Blister - improving - will see podiatry - in 2 weeks      Health Maintenance Due   Topic Date Due    Diabetic retinal exam  Never done         Attending Physician Statement  I have discussed the case, including pertinent history and exam findings with the resident. I also have seen the patient and performed key portions of the examination.  I agree with the documented assessment and plan.      Jimmy Yarbrough MD

## 2024-06-26 LAB
MICROORGANISM SPEC CULT: NORMAL
MICROORGANISM SPEC CULT: NORMAL
SERVICE CMNT-IMP: NORMAL
SERVICE CMNT-IMP: NORMAL
SPECIMEN DESCRIPTION: NORMAL
SPECIMEN DESCRIPTION: NORMAL

## 2024-06-27 LAB
MICROORGANISM SPEC CULT: NORMAL
SERVICE CMNT-IMP: NORMAL
SPECIMEN DESCRIPTION: NORMAL

## 2024-07-05 ENCOUNTER — OFFICE VISIT (OUTPATIENT)
Dept: FAMILY MEDICINE CLINIC | Age: 54
End: 2024-07-05

## 2024-07-05 VITALS
OXYGEN SATURATION: 97 % | SYSTOLIC BLOOD PRESSURE: 136 MMHG | HEIGHT: 70 IN | RESPIRATION RATE: 18 BRPM | DIASTOLIC BLOOD PRESSURE: 83 MMHG | BODY MASS INDEX: 36.31 KG/M2 | HEART RATE: 94 BPM | TEMPERATURE: 97.3 F | WEIGHT: 253.6 LBS

## 2024-07-05 DIAGNOSIS — N30.01 ACUTE CYSTITIS WITH HEMATURIA: Primary | ICD-10-CM

## 2024-07-05 DIAGNOSIS — E11.9 TYPE 2 DIABETES MELLITUS WITHOUT COMPLICATION, WITHOUT LONG-TERM CURRENT USE OF INSULIN (HCC): ICD-10-CM

## 2024-07-05 DIAGNOSIS — I10 PRIMARY HYPERTENSION: ICD-10-CM

## 2024-07-05 DIAGNOSIS — F41.9 ANXIETY: ICD-10-CM

## 2024-07-05 PROBLEM — S32.020B: Status: RESOLVED | Noted: 2022-11-05 | Resolved: 2024-07-05

## 2024-07-05 LAB
BILIRUBIN, POC: NEGATIVE
BLOOD URINE, POC: NORMAL
CLARITY, POC: NORMAL
COLOR, POC: YELLOW
GLUCOSE URINE, POC: NEGATIVE
KETONES, POC: NEGATIVE
LEUKOCYTE EST, POC: NORMAL
NITRITE, POC: NEGATIVE
PH, POC: 6
PROTEIN, POC: NORMAL
SPECIFIC GRAVITY, POC: 1.02
UROBILINOGEN, POC: NORMAL

## 2024-07-05 RX ORDER — VALSARTAN 160 MG/1
160 TABLET ORAL DAILY
Qty: 90 TABLET | Refills: 0 | Status: SHIPPED | OUTPATIENT
Start: 2024-07-05

## 2024-07-05 RX ORDER — BLOOD-GLUCOSE SENSOR
1 EACH MISCELLANEOUS 4 TIMES DAILY
Qty: 1 EACH | Refills: 4 | Status: SHIPPED | OUTPATIENT
Start: 2024-07-05

## 2024-07-05 RX ORDER — HYDROXYZINE PAMOATE 25 MG/1
25 CAPSULE ORAL 3 TIMES DAILY PRN
Qty: 30 CAPSULE | Refills: 0 | Status: SHIPPED | OUTPATIENT
Start: 2024-07-05

## 2024-07-05 RX ORDER — NITROFURANTOIN 25; 75 MG/1; MG/1
100 CAPSULE ORAL 2 TIMES DAILY
Qty: 10 CAPSULE | Refills: 0 | Status: SHIPPED | OUTPATIENT
Start: 2024-07-05 | End: 2024-07-10

## 2024-07-05 NOTE — PROGRESS NOTES
[facility]  Department of Family Medicine      Patient:  Mae Calvillo 53 y.o. female     Date of Service: 7/5/24      Chief complaint:   Chief Complaint   Patient presents with    Urinary Frequency         History ofPresent Illness   The patient is a 53 y.o. female  presented to the clinic with complaints as above.    Patient has significant past medical history of DM2; treated with metformin 500 mg QD. Urinary incontinence conservative management with Kegel exercises.    Symptoms started 3 days ago increase urinary frequency approximately 8-9, complaint of dysuria, lower abdominal pain. No fever, no nausea, no vomiting,  no diarrhea. No recent history of illness.  Patient states she changed her body wash for bar soap what usually doesn't use for the last 2 months.  No vaginal discharge, no back pain or any other symptoms.  Patient mentioned that the last time had a UTI was 30 years ago and it feels very similar.    No Sexual activity for the last 11 years.        Diagnosis Date    Anemia     stable at this time  2023    Arthritis     COVID 05/2022 11/2022-    Diabetes mellitus (HCC)     diet controlled    History of blood transfusion 2012    Lumbar pain        PastSurgical History:        Procedure Laterality Date    COLPOSCOPY      2001 : outside [facility]      august 2022    HYSTERECTOMY (CERVIX STATUS UNKNOWN)      PAIN MANAGEMENT PROCEDURE N/A 02/14/2023    LUMBAR TRANSFORAMINAL EPIDURAL INJECTION RIGHT  L3 & LEFT l4 UNDER FLUOROSCOPIC GUIDANCE performed by Ben Steinberg MD at [facility] OR    PAIN MANAGEMENT PROCEDURE Bilateral 4/6/2023    LUMBAR TRANSFORAMINAL EPIDURAL STEROID INJECTION RIGHT L3 & LEFT L4 UNDER FLUOROSCOPIC GUIDANCE performed by Ben Steinberg MD at [facility] OR    PAIN MANAGEMENT PROCEDURE N/A 6/15/2023    LUMBAR EPIDURAL STEROID INJECTION UNDER FLUOROSCOPIC GUIDANCE AT L3-L4 performed by Ben PRECIADO

## 2024-07-08 ENCOUNTER — HOSPITAL ENCOUNTER (EMERGENCY)
Age: 54
Discharge: HOME | End: 2024-07-08
Payer: COMMERCIAL

## 2024-07-08 ENCOUNTER — APPOINTMENT (OUTPATIENT)
Dept: GENERAL RADIOLOGY | Age: 54
End: 2024-07-08
Payer: COMMERCIAL

## 2024-07-08 ENCOUNTER — CLINICAL DOCUMENTATION (OUTPATIENT)
Dept: FAMILY MEDICINE CLINIC | Age: 54
End: 2024-07-08

## 2024-07-08 VITALS
RESPIRATION RATE: 16 BRPM | HEART RATE: 95 BPM | DIASTOLIC BLOOD PRESSURE: 89 MMHG | OXYGEN SATURATION: 99 % | TEMPERATURE: 97.6 F | SYSTOLIC BLOOD PRESSURE: 164 MMHG

## 2024-07-08 DIAGNOSIS — S43.402A SPRAIN OF LEFT SHOULDER, INITIAL ENCOUNTER: ICD-10-CM

## 2024-07-08 DIAGNOSIS — S90.811A ABRASION OF RIGHT HEEL, INITIAL ENCOUNTER: ICD-10-CM

## 2024-07-08 DIAGNOSIS — S63.502A LEFT WRIST SPRAIN, INITIAL ENCOUNTER: Primary | ICD-10-CM

## 2024-07-08 DIAGNOSIS — L23.7 ALLERGIC CONTACT DERMATITIS DUE TO PLANTS, EXCEPT FOOD: ICD-10-CM

## 2024-07-08 LAB
CULTURE: ABNORMAL
SPECIMEN DESCRIPTION: ABNORMAL

## 2024-07-08 PROCEDURE — 73030 X-RAY EXAM OF SHOULDER: CPT

## 2024-07-08 PROCEDURE — 73110 X-RAY EXAM OF WRIST: CPT

## 2024-07-08 PROCEDURE — 99283 EMERGENCY DEPT VISIT LOW MDM: CPT

## 2024-07-08 RX ORDER — SULFAMETHOXAZOLE AND TRIMETHOPRIM 800; 160 MG/1; MG/1
1 TABLET ORAL 2 TIMES DAILY
Qty: 6 TABLET | Refills: 0 | Status: SHIPPED | OUTPATIENT
Start: 2024-07-08 | End: 2024-07-11

## 2024-07-08 ASSESSMENT — PAIN DESCRIPTION - LOCATION: LOCATION: HAND;SHOULDER

## 2024-07-08 ASSESSMENT — LIFESTYLE VARIABLES
HOW OFTEN DO YOU HAVE A DRINK CONTAINING ALCOHOL: MONTHLY OR LESS
HOW MANY STANDARD DRINKS CONTAINING ALCOHOL DO YOU HAVE ON A TYPICAL DAY: 1 OR 2

## 2024-07-08 ASSESSMENT — PAIN - FUNCTIONAL ASSESSMENT: PAIN_FUNCTIONAL_ASSESSMENT: 0-10

## 2024-07-08 ASSESSMENT — PAIN DESCRIPTION - ORIENTATION: ORIENTATION: LEFT

## 2024-07-08 ASSESSMENT — PAIN SCALES - GENERAL: PAINLEVEL_OUTOF10: 9

## 2024-07-08 NOTE — DISCHARGE INSTRUCTIONS
Clean the abrasions daily with soap and water and reapply an antibiotic ointment.  Keep covered with a Band-Aid till healed.  Apply hydrocortisone cream to the rash on bilateral arms.  Claritin 10 mg once daily for itching of the arms.  Tylenol over-the-counter as needed for pain.    
Statement Selected
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
## 2024-07-08 NOTE — PROGRESS NOTES
Patient was prescribed Nitrofurantoin, urine cultures results today showing Nitrofurantoin resistance.   Medication changed to Trimethoprim-sulfamethoxazole, patient was advised and counseled about new medical management.  Patient advised to stopped previous antibiotic.        Rita Desouza MD  Family Medicine  PGY-1

## 2024-07-18 ENCOUNTER — OFFICE VISIT (OUTPATIENT)
Dept: FAMILY MEDICINE CLINIC | Age: 54
End: 2024-07-18
Payer: COMMERCIAL

## 2024-07-18 VITALS
SYSTOLIC BLOOD PRESSURE: 119 MMHG | TEMPERATURE: 98 F | DIASTOLIC BLOOD PRESSURE: 72 MMHG | BODY MASS INDEX: 35.3 KG/M2 | WEIGHT: 246 LBS | HEART RATE: 75 BPM | OXYGEN SATURATION: 97 % | RESPIRATION RATE: 15 BRPM

## 2024-07-18 DIAGNOSIS — F41.9 ANXIETY: ICD-10-CM

## 2024-07-18 DIAGNOSIS — F43.10 PTSD (POST-TRAUMATIC STRESS DISORDER): Primary | ICD-10-CM

## 2024-07-18 PROCEDURE — 1111F DSCHRG MED/CURRENT MED MERGE: CPT

## 2024-07-18 PROCEDURE — 3017F COLORECTAL CA SCREEN DOC REV: CPT

## 2024-07-18 PROCEDURE — G8427 DOCREV CUR MEDS BY ELIG CLIN: HCPCS

## 2024-07-18 PROCEDURE — 99214 OFFICE O/P EST MOD 30 MIN: CPT

## 2024-07-18 PROCEDURE — 1036F TOBACCO NON-USER: CPT

## 2024-07-18 PROCEDURE — G8417 CALC BMI ABV UP PARAM F/U: HCPCS

## 2024-07-18 RX ORDER — SERTRALINE HYDROCHLORIDE 100 MG/1
100 TABLET, FILM COATED ORAL DAILY
Qty: 30 TABLET | Refills: 0 | Status: SHIPPED | OUTPATIENT
Start: 2024-07-18

## 2024-07-18 ASSESSMENT — ENCOUNTER SYMPTOMS
CHEST TIGHTNESS: 0
SORE THROAT: 0
DIARRHEA: 0
CONSTIPATION: 0
VOMITING: 0
SHORTNESS OF BREATH: 0
ABDOMINAL PAIN: 0
RHINORRHEA: 0
COUGH: 0
NAUSEA: 0
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
## 2024-07-26 ENCOUNTER — HOSPITAL ENCOUNTER (INPATIENT)
Age: 54
LOS: 4 days | Discharge: HOME | End: 2024-07-31
Attending: EMERGENCY MEDICINE | Admitting: PSYCHIATRY & NEUROLOGY
Payer: COMMERCIAL

## 2024-07-26 DIAGNOSIS — R45.851 SUICIDAL IDEATION: Primary | ICD-10-CM

## 2024-07-26 LAB
ALBUMIN SERPL-MCNC: 4.1 G/DL (ref 3.5–5.2)
ALP SERPL-CCNC: 108 U/L (ref 35–104)
ALT SERPL-CCNC: 32 U/L (ref 0–32)
AMPHET UR QL SCN: NEGATIVE
ANION GAP SERPL CALCULATED.3IONS-SCNC: 17 MMOL/L (ref 7–16)
APAP SERPL-MCNC: <5 UG/ML (ref 10–30)
AST SERPL-CCNC: 73 U/L (ref 0–31)
BARBITURATES UR QL SCN: NEGATIVE
BASOPHILS # BLD: 0.07 K/UL (ref 0–0.2)
BASOPHILS NFR BLD: 1 % (ref 0–2)
BENZODIAZ UR QL: NEGATIVE
BILIRUB SERPL-MCNC: 1 MG/DL (ref 0–1.2)
BUN SERPL-MCNC: 7 MG/DL (ref 6–20)
BUPRENORPHINE UR QL: NEGATIVE
CALCIUM SERPL-MCNC: 9 MG/DL (ref 8.6–10.2)
CANNABINOIDS UR QL SCN: NEGATIVE
CHLORIDE SERPL-SCNC: 97 MMOL/L (ref 98–107)
CO2 SERPL-SCNC: 24 MMOL/L (ref 22–29)
COCAINE UR QL SCN: NEGATIVE
CREAT SERPL-MCNC: 0.7 MG/DL (ref 0.5–1)
EOSINOPHIL # BLD: 0.05 K/UL (ref 0.05–0.5)
EOSINOPHILS RELATIVE PERCENT: 1 % (ref 0–6)
ERYTHROCYTE [DISTWIDTH] IN BLOOD BY AUTOMATED COUNT: 14.8 % (ref 11.5–15)
ETHANOLAMINE SERPL-MCNC: <10 MG/DL (ref 0–0.08)
FENTANYL UR QL: NEGATIVE
GFR, ESTIMATED: >90 ML/MIN/1.73M2
GLUCOSE SERPL-MCNC: 162 MG/DL (ref 74–99)
HCT VFR BLD AUTO: 40.5 % (ref 34–48)
HGB BLD-MCNC: 13.5 G/DL (ref 11.5–15.5)
IMM GRANULOCYTES # BLD AUTO: <0.03 K/UL (ref 0–0.58)
IMM GRANULOCYTES NFR BLD: 0 % (ref 0–5)
LYMPHOCYTES NFR BLD: 1.05 K/UL (ref 1.5–4)
LYMPHOCYTES RELATIVE PERCENT: 21 % (ref 20–42)
MCH RBC QN AUTO: 33.3 PG (ref 26–35)
MCHC RBC AUTO-ENTMCNC: 33.3 G/DL (ref 32–34.5)
MCV RBC AUTO: 100 FL (ref 80–99.9)
METHADONE UR QL: NEGATIVE
MONOCYTES NFR BLD: 0.34 K/UL (ref 0.1–0.95)
MONOCYTES NFR BLD: 7 % (ref 2–12)
NEUTROPHILS NFR BLD: 70 % (ref 43–80)
NEUTS SEG NFR BLD: 3.6 K/UL (ref 1.8–7.3)
OPIATES UR QL SCN: NEGATIVE
OXYCODONE UR QL SCN: NEGATIVE
PCP UR QL SCN: NEGATIVE
PLATELET, FLUORESCENCE: 104 K/UL (ref 130–450)
PMV BLD AUTO: 11.1 FL (ref 7–12)
POTASSIUM SERPL-SCNC: 5.1 MMOL/L (ref 3.5–5)
PROT SERPL-MCNC: 8.5 G/DL (ref 6.4–8.3)
RBC # BLD AUTO: 4.05 M/UL (ref 3.5–5.5)
SALICYLATES SERPL-MCNC: <0.3 MG/DL (ref 0–30)
SODIUM SERPL-SCNC: 138 MMOL/L (ref 132–146)
TEST INFORMATION: NORMAL
TOXIC TRICYCLIC SC,BLOOD: NEGATIVE
WBC OTHER # BLD: 5.1 K/UL (ref 4.5–11.5)

## 2024-07-26 PROCEDURE — 80053 COMPREHEN METABOLIC PANEL: CPT

## 2024-07-26 PROCEDURE — 99285 EMERGENCY DEPT VISIT HI MDM: CPT

## 2024-07-26 PROCEDURE — G0480 DRUG TEST DEF 1-7 CLASSES: HCPCS

## 2024-07-26 PROCEDURE — 6370000000 HC RX 637 (ALT 250 FOR IP): Performed by: EMERGENCY MEDICINE

## 2024-07-26 PROCEDURE — 80307 DRUG TEST PRSMV CHEM ANLYZR: CPT

## 2024-07-26 PROCEDURE — 80143 DRUG ASSAY ACETAMINOPHEN: CPT

## 2024-07-26 PROCEDURE — 85025 COMPLETE CBC W/AUTO DIFF WBC: CPT

## 2024-07-26 PROCEDURE — 93005 ELECTROCARDIOGRAM TRACING: CPT | Performed by: EMERGENCY MEDICINE

## 2024-07-26 PROCEDURE — 80179 DRUG ASSAY SALICYLATE: CPT

## 2024-07-26 RX ORDER — MECOBALAMIN 5000 MCG
5 TABLET,DISINTEGRATING ORAL NIGHTLY
Status: DISCONTINUED | OUTPATIENT
Start: 2024-07-26 | End: 2024-07-27

## 2024-07-26 RX ADMIN — Medication 5 MG: at 22:42

## 2024-07-26 ASSESSMENT — LIFESTYLE VARIABLES
HOW MANY STANDARD DRINKS CONTAINING ALCOHOL DO YOU HAVE ON A TYPICAL DAY: 1 OR 2
HOW OFTEN DO YOU HAVE A DRINK CONTAINING ALCOHOL: 4 OR MORE TIMES A WEEK

## 2024-07-26 NOTE — ED NOTES
Pt assisted to restroom via wheelchair per request. Pt ambulatory w/o assist from chair into bathroom.
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
## 2024-07-26 NOTE — ED PROVIDER NOTES
absent patient’s home medications have been reviewed.    Allergies: Colchicine and Darvon [propoxyphene]    -------------------------------------------------- RESULTS -------------------------------------------------  All laboratory and radiology results have been personally reviewed by myself   LABS:  Results for orders placed or performed during the hospital encounter of 07/26/24   Comprehensive Metabolic Panel   Result Value Ref Range    Sodium 138 132 - 146 mmol/L    Potassium 5.1 (H) 3.5 - 5.0 mmol/L    Chloride 97 (L) 98 - 107 mmol/L    CO2 24 22 - 29 mmol/L    Anion Gap 17 (H) 7 - 16 mmol/L    Glucose 162 (H) 74 - 99 mg/dL    BUN 7 6 - 20 mg/dL    Creatinine 0.7 0.50 - 1.00 mg/dL    Est, Glom Filt Rate >90 >60 mL/min/1.73m2    Calcium 9.0 8.6 - 10.2 mg/dL    Total Protein 8.5 (H) 6.4 - 8.3 g/dL    Albumin 4.1 3.5 - 5.2 g/dL    Total Bilirubin 1.0 0.0 - 1.2 mg/dL    Alkaline Phosphatase 108 (H) 35 - 104 U/L    ALT 32 0 - 32 U/L    AST 73 (H) 0 - 31 U/L   CBC with Auto Differential   Result Value Ref Range    WBC 5.1 4.5 - 11.5 k/uL    RBC 4.05 3.50 - 5.50 m/uL    Hemoglobin 13.5 11.5 - 15.5 g/dL    Hematocrit 40.5 34.0 - 48.0 %    .0 (H) 80.0 - 99.9 fL    MCH 33.3 26.0 - 35.0 pg    MCHC 33.3 32.0 - 34.5 g/dL    RDW 14.8 11.5 - 15.0 %    MPV 11.1 7.0 - 12.0 fL    Platelet, Fluorescence 104 (L) 130 - 450 k/uL    Neutrophils % 70 43.0 - 80.0 %    Lymphocytes % 21 20.0 - 42.0 %    Monocytes % 7 2.0 - 12.0 %    Eosinophils % 1 0 - 6 %    Basophils % 1 0.0 - 2.0 %    Immature Granulocytes % 0 0.0 - 5.0 %    Neutrophils Absolute 3.60 1.80 - 7.30 k/uL    Lymphocytes Absolute 1.05 (L) 1.50 - 4.00 k/uL    Monocytes Absolute 0.34 0.10 - 0.95 k/uL    Eosinophils Absolute 0.05 0.05 - 0.50 k/uL    Basophils Absolute 0.07 0.00 - 0.20 k/uL    Immature Granulocytes Absolute <0.03 0.00 - 0.58 k/uL   Urine Drug Screen   Result Value Ref Range    Amphetamine Screen, Ur NEGATIVE NEGATIVE    Barbiturate Screen, Ur NEGATIVE NEGATIVE     Benzodiazepine Screen, Urine NEGATIVE NEGATIVE    Cocaine Metabolite, Urine NEGATIVE NEGATIVE    Methadone Screen, Urine NEGATIVE NEGATIVE    Opiates, Urine NEGATIVE NEGATIVE    Phencyclidine, Urine NEGATIVE NEGATIVE    Cannabinoid Scrn, Ur NEGATIVE NEGATIVE    Oxycodone Screen, Ur NEGATIVE NEGATIVE    Fentanyl, Ur NEGATIVE NEGATIVE    Buprenorphine Urine NEGATIVE NEGATIVE    Test Information       These drug screen results are for medical purposes only and should not be considered definitive or confirmed.   EKG 12 Lead   Result Value Ref Range    Ventricular Rate 102 BPM    Atrial Rate 102 BPM    P-R Interval 144 ms    QRS Duration 62 ms    Q-T Interval 370 ms    QTc Calculation (Bazett) 482 ms    P Axis 9 degrees    R Axis -9 degrees    T Axis 75 degrees       RADIOLOGY:  Interpreted by Radiologist.  No orders to display       ------------------------- NURSING NOTES AND VITALS REVIEWED ---------------------------   The nursing notes within the ED encounter and vital signs as below have been reviewed.   BP (!) 195/95   Pulse 98   Temp 98 °F (36.7 °C) (Oral)   Resp 16   SpO2 99%   Oxygen Saturation Interpretation: Normal      ---------------------------------------------------PHYSICAL EXAM--------------------------------------      Constitutional/General: Alert and oriented x3, well appearing, non toxic in NAD  Head: Normocephalic and atraumatic  Eyes: PERRL, EOMI  Mouth: Oropharynx clear, handling secretions, no trismus  Neck: Supple, full ROM,   Pulmonary: Lungs clear to auscultation bilaterally, no wheezes, rales, or rhonchi. Not in respiratory distress  Cardiovascular:  Regular rate and rhythm, no murmurs, gallops, or rubs. 2+ distal pulses  Abdomen: Soft, non tender, non distended,   Extremities: Moves all extremities x 4. Warm and well perfused  Skin: warm and dry without rash  Neurologic: GCS 15,  Psych: Normal Affect      ------------------------------ ED COURSE/MEDICAL DECISION

## 2024-07-26 NOTE — ED NOTES
One blue back pack including phone, and one pt belonging bag (both properly labeled) placed in locker 29 in section G.

## 2024-07-26 NOTE — PROGRESS NOTES
Poison Control Recommendations  Pharmacy Note     Date: 7/26/2024    HPI: 55 y/o F with possible co-ingestion of multiple medications, unknown amount.      Current Vitals:  Temp HR BP RR O2   36.7 97 167/94 mmHg 14 97% RA         Acute Ingestion/Toxicity Concerns:      Agent & Amount Ingested    Peak    T1/2 Life Toxicity/  Side Effects as per Poison Control   Monitoring Time  (I.e. Treatment plan)   Hydroxyzine 25mg ~2 hours ~20 hours Tachycardia, urinary retention, Qtc prolongation, seizures. May slow absorption of other medications.  8 hour monitoring and back to baseline   Metformin 500mg ER  6-8 hours ~4-9 hours Lactic acidosis, hypotension, hypoglycemia (unlikely), GI discomfort 12 hour monitoring and back to baseline   Benzonatate 200mg  3-8 hours ~ 1 hour Agitation, seizures, QRS prolongation, hypotension, arrhythmias 6 hours and back to baseline   Sertraline 100mg 4.5-8.4 hours ~27 hours Droesiness, dizziness, tachycardia, hypertension, CNS depression, seizures 8 hour monitoring and back to baseline   Valsartan 160mg  0.7-3.7 hours ~6 hours Acute kidney injury, hyperkalemia 8 hours and back to baseline     *Additional medications patient may have ingested in small quantity are Atorvastatin, Pantoprazole.     Additional Labs to Order:  Acetaminophen level  Salicylate level  EKG    Poison Control Recommendations:  All medications should have peaked prior to ED presentation based on patient report.   Management is supportive care for each medication ingested.      Observe for 12 hours per Poison Control and patient should be back to baseline. Please note, the observation period recommended by Poison Control is only in regards to what the patient had ingested.  Further monitoring should be consider if other co-morbidities are identified or patient presentation declines.     Recommendations discussed with: Kate May MD.     Demetrice Reis, PharmD, BCIDP, BCPS 7/26/2024 2:57 PM  [phone]      Note,
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
## 2024-07-27 PROBLEM — F43.10 PTSD (POST-TRAUMATIC STRESS DISORDER): Status: ACTIVE | Noted: 2024-07-27

## 2024-07-27 PROBLEM — R45.851 SUICIDAL IDEATION: Status: RESOLVED | Noted: 2024-07-27 | Resolved: 2024-07-27

## 2024-07-27 PROBLEM — R45.851 SUICIDAL IDEATION: Status: ACTIVE | Noted: 2024-07-27

## 2024-07-27 PROBLEM — F31.81 MIXED BIPOLAR II DISORDER (HCC): Status: ACTIVE | Noted: 2024-07-27

## 2024-07-27 PROCEDURE — 6370000000 HC RX 637 (ALT 250 FOR IP): Performed by: PSYCHIATRY & NEUROLOGY

## 2024-07-27 PROCEDURE — 90792 PSYCH DIAG EVAL W/MED SRVCS: CPT | Performed by: NURSE PRACTITIONER

## 2024-07-27 PROCEDURE — 6370000000 HC RX 637 (ALT 250 FOR IP): Performed by: NURSE PRACTITIONER

## 2024-07-27 PROCEDURE — 1240000000 HC EMOTIONAL WELLNESS R&B

## 2024-07-27 RX ORDER — LANOLIN ALCOHOL/MO/W.PET/CERES
3 CREAM (GRAM) TOPICAL NIGHTLY PRN
Status: DISCONTINUED | OUTPATIENT
Start: 2024-07-27 | End: 2024-07-27

## 2024-07-27 RX ORDER — HALOPERIDOL 5 MG/ML
5 INJECTION INTRAMUSCULAR EVERY 6 HOURS PRN
Status: DISCONTINUED | OUTPATIENT
Start: 2024-07-27 | End: 2024-07-31 | Stop reason: HOSPADM

## 2024-07-27 RX ORDER — MAGNESIUM HYDROXIDE/ALUMINUM HYDROXICE/SIMETHICONE 120; 1200; 1200 MG/30ML; MG/30ML; MG/30ML
30 SUSPENSION ORAL PRN
Status: DISCONTINUED | OUTPATIENT
Start: 2024-07-27 | End: 2024-07-31 | Stop reason: HOSPADM

## 2024-07-27 RX ORDER — ACETAMINOPHEN 325 MG/1
650 TABLET ORAL EVERY 6 HOURS PRN
Status: DISCONTINUED | OUTPATIENT
Start: 2024-07-27 | End: 2024-07-31 | Stop reason: HOSPADM

## 2024-07-27 RX ORDER — NICOTINE 21 MG/24HR
1 PATCH, TRANSDERMAL 24 HOURS TRANSDERMAL DAILY
Status: DISCONTINUED | OUTPATIENT
Start: 2024-07-27 | End: 2024-07-27

## 2024-07-27 RX ORDER — POLYETHYLENE GLYCOL 3350 17 G
2 POWDER IN PACKET (EA) ORAL
Status: DISCONTINUED | OUTPATIENT
Start: 2024-07-27 | End: 2024-07-27

## 2024-07-27 RX ORDER — ARIPIPRAZOLE 5 MG/1
5 TABLET ORAL DAILY
Status: DISCONTINUED | OUTPATIENT
Start: 2024-07-27 | End: 2024-07-31 | Stop reason: HOSPADM

## 2024-07-27 RX ORDER — HYDROXYZINE PAMOATE 50 MG/1
50 CAPSULE ORAL 3 TIMES DAILY PRN
Status: DISCONTINUED | OUTPATIENT
Start: 2024-07-27 | End: 2024-07-31 | Stop reason: HOSPADM

## 2024-07-27 RX ORDER — DIVALPROEX SODIUM 250 MG/1
250 TABLET, DELAYED RELEASE ORAL EVERY 8 HOURS SCHEDULED
Status: DISCONTINUED | OUTPATIENT
Start: 2024-07-27 | End: 2024-07-31 | Stop reason: HOSPADM

## 2024-07-27 RX ORDER — LANOLIN ALCOHOL/MO/W.PET/CERES
3 CREAM (GRAM) TOPICAL NIGHTLY
Status: DISCONTINUED | OUTPATIENT
Start: 2024-07-27 | End: 2024-07-31 | Stop reason: HOSPADM

## 2024-07-27 RX ORDER — HALOPERIDOL 5 MG/1
5 TABLET ORAL EVERY 6 HOURS PRN
Status: DISCONTINUED | OUTPATIENT
Start: 2024-07-27 | End: 2024-07-31 | Stop reason: HOSPADM

## 2024-07-27 RX ORDER — SERTRALINE HYDROCHLORIDE 100 MG/1
200 TABLET, FILM COATED ORAL DAILY
Status: ON HOLD | COMMUNITY
End: 2024-07-31 | Stop reason: HOSPADM

## 2024-07-27 RX ORDER — SODIUM CHLORIDE 0.9 % (FLUSH) 0.9 %
SYRINGE (ML) INJECTION
Status: DISPENSED
Start: 2024-07-27 | End: 2024-07-28

## 2024-07-27 RX ORDER — PRAZOSIN HYDROCHLORIDE 1 MG/1
1 CAPSULE ORAL NIGHTLY
Status: DISCONTINUED | OUTPATIENT
Start: 2024-07-27 | End: 2024-07-31 | Stop reason: HOSPADM

## 2024-07-27 RX ADMIN — ARIPIPRAZOLE 5 MG: 5 TABLET ORAL at 17:19

## 2024-07-27 RX ADMIN — DIVALPROEX SODIUM 250 MG: 250 TABLET, DELAYED RELEASE ORAL at 21:16

## 2024-07-27 RX ADMIN — ALUMINUM HYDROXIDE, MAGNESIUM HYDROXIDE, AND SIMETHICONE 30 ML: 200; 200; 20 SUSPENSION ORAL at 09:15

## 2024-07-27 RX ADMIN — DIVALPROEX SODIUM 250 MG: 250 TABLET, DELAYED RELEASE ORAL at 17:19

## 2024-07-27 RX ADMIN — PRAZOSIN HYDROCHLORIDE 1 MG: 1 CAPSULE ORAL at 21:13

## 2024-07-27 RX ADMIN — Medication 3 MG: at 21:15

## 2024-07-27 ASSESSMENT — PATIENT HEALTH QUESTIONNAIRE - PHQ9
1. LITTLE INTEREST OR PLEASURE IN DOING THINGS: NEARLY EVERY DAY
SUM OF ALL RESPONSES TO PHQ9 QUESTIONS 1 & 2: 6
7. TROUBLE CONCENTRATING ON THINGS, SUCH AS READING THE NEWSPAPER OR WATCHING TELEVISION: NEARLY EVERY DAY
SUM OF ALL RESPONSES TO PHQ QUESTIONS 1-9: 23
1. LITTLE INTEREST OR PLEASURE IN DOING THINGS: NEARLY EVERY DAY
10. IF YOU CHECKED OFF ANY PROBLEMS, HOW DIFFICULT HAVE THESE PROBLEMS MADE IT FOR YOU TO DO YOUR WORK, TAKE CARE OF THINGS AT HOME, OR GET ALONG WITH OTHER PEOPLE: EXTREMELY DIFFICULT
5. POOR APPETITE OR OVEREATING: NEARLY EVERY DAY
6. FEELING BAD ABOUT YOURSELF - OR THAT YOU ARE A FAILURE OR HAVE LET YOURSELF OR YOUR FAMILY DOWN: MORE THAN HALF THE DAYS
4. FEELING TIRED OR HAVING LITTLE ENERGY: NEARLY EVERY DAY
SUM OF ALL RESPONSES TO PHQ QUESTIONS 1-9: 22
SUM OF ALL RESPONSES TO PHQ QUESTIONS 1-9: 22
SUM OF ALL RESPONSES TO PHQ9 QUESTIONS 1 & 2: 6
7. TROUBLE CONCENTRATING ON THINGS, SUCH AS READING THE NEWSPAPER OR WATCHING TELEVISION: NEARLY EVERY DAY
9. THOUGHTS THAT YOU WOULD BE BETTER OFF DEAD, OR OF HURTING YOURSELF: MORE THAN HALF THE DAYS
6. FEELING BAD ABOUT YOURSELF - OR THAT YOU ARE A FAILURE OR HAVE LET YOURSELF OR YOUR FAMILY DOWN: NOT AT ALL
5. POOR APPETITE OR OVEREATING: SEVERAL DAYS
4. FEELING TIRED OR HAVING LITTLE ENERGY: NEARLY EVERY DAY
SUM OF ALL RESPONSES TO PHQ QUESTIONS 1-9: 21
SUM OF ALL RESPONSES TO PHQ QUESTIONS 1-9: 23
SUM OF ALL RESPONSES TO PHQ QUESTIONS 1-9: 23
8. MOVING OR SPEAKING SO SLOWLY THAT OTHER PEOPLE COULD HAVE NOTICED. OR THE OPPOSITE, BEING SO FIGETY OR RESTLESS THAT YOU HAVE BEEN MOVING AROUND A LOT MORE THAN USUAL: NEARLY EVERY DAY
3. TROUBLE FALLING OR STAYING ASLEEP: NEARLY EVERY DAY
9. THOUGHTS THAT YOU WOULD BE BETTER OFF DEAD, OR OF HURTING YOURSELF: SEVERAL DAYS
3. TROUBLE FALLING OR STAYING ASLEEP: NEARLY EVERY DAY
10. IF YOU CHECKED OFF ANY PROBLEMS, HOW DIFFICULT HAVE THESE PROBLEMS MADE IT FOR YOU TO DO YOUR WORK, TAKE CARE OF THINGS AT HOME, OR GET ALONG WITH OTHER PEOPLE: EXTREMELY DIFFICULT
2. FEELING DOWN, DEPRESSED OR HOPELESS: NEARLY EVERY DAY
SUM OF ALL RESPONSES TO PHQ QUESTIONS 1-9: 21
2. FEELING DOWN, DEPRESSED OR HOPELESS: NEARLY EVERY DAY
8. MOVING OR SPEAKING SO SLOWLY THAT OTHER PEOPLE COULD HAVE NOTICED. OR THE OPPOSITE, BEING SO FIGETY OR RESTLESS THAT YOU HAVE BEEN MOVING AROUND A LOT MORE THAN USUAL: NEARLY EVERY DAY
SUM OF ALL RESPONSES TO PHQ QUESTIONS 1-9: 22

## 2024-07-27 ASSESSMENT — LIFESTYLE VARIABLES
HOW MANY STANDARD DRINKS CONTAINING ALCOHOL DO YOU HAVE ON A TYPICAL DAY: 1 OR 2
HOW MANY STANDARD DRINKS CONTAINING ALCOHOL DO YOU HAVE ON A TYPICAL DAY: 1 OR 2
HOW OFTEN DO YOU HAVE A DRINK CONTAINING ALCOHOL: 4 OR MORE TIMES A WEEK
HOW OFTEN DO YOU HAVE A DRINK CONTAINING ALCOHOL: 4 OR MORE TIMES A WEEK

## 2024-07-27 ASSESSMENT — SLEEP AND FATIGUE QUESTIONNAIRES
AVERAGE NUMBER OF SLEEP HOURS: 4
DO YOU USE A SLEEP AID: YES
DO YOU HAVE DIFFICULTY SLEEPING: YES
DO YOU HAVE DIFFICULTY SLEEPING: YES
AVERAGE NUMBER OF SLEEP HOURS: 2
DO YOU USE A SLEEP AID: NO
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
## 2024-07-27 NOTE — GROUP NOTE
Group Therapy Note    Date: 7/27/2024    Group Start Time: 0945  Group End Time: 1020  Group Topic: Psychoeducation    [facility]    Gale Thomason CTRS    Group Therapy Note    Attendees: 9    Date: 7/27/2024  Start Time: 0945  End Time:  1020  Number of Participants: 9    Type of Group: Psychoeducation    Name:  Stress and Wellbeing    Patient's Goal:  Identify types of stress, symptoms of stress, how stress affects mental health and how to cope with stress.    Notes:  CTRS led educational group discussion on stress and wellbeing. Encouraged patients to share their experiences. Patient was actively engaged in group discussion.    Status After Intervention:  Improved    Participation Level: Active Listener and Interactive    Participation Quality: Appropriate, Attentive, and Sharing      Speech:  normal      Thought Process/Content: Logical  Linear      Affective Functioning: Congruent      Mood:  Appropriate      Level of consciousness:  Alert and Attentive      Response to Learning: Able to verbalize current knowledge/experience, Able to verbalize/acknowledge new learning, Able to retain information, Capable of insight, Able to change behavior, and Progressing to goal      Endings: None Reported    Modes of Intervention: Education, Support, Socialization, Exploration, Clarifying, and Problem-solving      Discipline Responsible: Psychoeducational Specialist      Signature:  GONZALO Moody
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
## 2024-07-27 NOTE — GROUP NOTE
Group Therapy Note    Date: 7/27/2024    Group Start Time: 0930  Group End Time: 0945  Group Topic: Community Meeting    [facility]    Gale Thomason CTRS    Group Therapy Note    Attendees: 9    Date: 7/27/2024  Start Time: 0930  End Time:  0945  Number of Participants: 9    Type of Group: Community Meeting    Was updated on expectations of the unit, staffing, and programming.  Patient left group before verbalizing goal. Patient listened quietly to group while in attendance and asked appropriate questions.    Status After Intervention:  Improved    Participation Level: Active Listener and Interactive    Participation Quality: Appropriate, Attentive, and Sharing      Speech:  normal      Thought Process/Content: Logical  Linear      Affective Functioning: Congruent      Mood:  Appropriate      Level of consciousness:  Alert and Attentive      Response to Learning: Able to verbalize current knowledge/experience, Able to verbalize/acknowledge new learning, Able to retain information, Capable of insight, Able to change behavior, and Progressing to goal      Endings: None Reported    Modes of Intervention: Education, Support, Socialization, Exploration, Clarifying, and Problem-solving      Discipline Responsible: Psychoeducational Specialist      Signature:  GONZALO Moody

## 2024-07-27 NOTE — ED NOTES
Patient was presented to [facility] and will be accepted to [address] [place] [address].

## 2024-07-28 PROCEDURE — 1240000000 HC EMOTIONAL WELLNESS R&B

## 2024-07-28 PROCEDURE — 6370000000 HC RX 637 (ALT 250 FOR IP): Performed by: NURSE PRACTITIONER

## 2024-07-28 PROCEDURE — 99232 SBSQ HOSP IP/OBS MODERATE 35: CPT | Performed by: NURSE PRACTITIONER

## 2024-07-28 RX ADMIN — Medication 3 MG: at 21:04

## 2024-07-28 RX ADMIN — DIVALPROEX SODIUM 250 MG: 250 TABLET, DELAYED RELEASE ORAL at 06:37

## 2024-07-28 RX ADMIN — PRAZOSIN HYDROCHLORIDE 1 MG: 1 CAPSULE ORAL at 21:04

## 2024-07-28 RX ADMIN — ARIPIPRAZOLE 5 MG: 5 TABLET ORAL at 10:23

## 2024-07-28 RX ADMIN — DIVALPROEX SODIUM 250 MG: 250 TABLET, DELAYED RELEASE ORAL at 13:43

## 2024-07-28 RX ADMIN — DIVALPROEX SODIUM 250 MG: 250 TABLET, DELAYED RELEASE ORAL at 21:04

## 2024-07-28 ASSESSMENT — PAIN SCALES - GENERAL
PAINLEVEL_OUTOF10: 0

## 2024-07-28 NOTE — PROGRESS NOTES
Pt awake and up in milieu, initiating interactions with peers and staff.  Pt denied any faintness and said she is feeling \"much better\" this morning.  Pt walking without assistance and with a steady gait.  Remains on close observation staggered q 15 min checks.

## 2024-07-28 NOTE — GROUP NOTE
Group Therapy Note    Date: 7/28/2024    Group Start Time: 0930  Group End Time: 0945  Group Topic: Community Meeting    [facility]    Gale Thomason CTRS    Group Therapy Note    Attendees: 13    Date: 7/28/2024  Start Time: 0930  End Time:  0945  Number of Participants: 13    Type of Group: Community Meeting    Was updated on expectations of the unit, staffing, and programming.  Patient shared goal for today as \"Take my medications.\"    Status After Intervention:  Improved    Participation Level: Active Listener and Interactive    Participation Quality: Appropriate, Attentive, and Sharing      Speech:  normal      Thought Process/Content: Logical  Linear      Affective Functioning: Congruent      Mood:  Appropriate      Level of consciousness:  Alert and Attentive      Response to Learning: Able to verbalize current knowledge/experience, Able to verbalize/acknowledge new learning, Able to retain information, Capable of insight, Able to change behavior, and Progressing to goal      Endings: None Reported    Modes of Intervention: Education, Support, Socialization, Exploration, Clarifying, and Problem-solving      Discipline Responsible: Psychoeducational Specialist      Signature:  GONZALO Moody

## 2024-07-28 NOTE — PROGRESS NOTES
Pt vital signs and pulse ox rechecked.Pt given saltines to eat.   Pt resting on her left side and desires to sleep. Pt said she was no longer light headed and faint.   Pt reminded to use her call light at bedside if faintness return and remain in bed. Pt remains on close observation.

## 2024-07-28 NOTE — PROGRESS NOTES
Pt called per call light with C/O feeling light headed and faint.  Pt was instructed to remain in bed and to put call light on before getting up.  Pt was instructed to change positions slowly.  Pt had been given her evening doses of antihypertensive medications.  Pt lips dry and was given a pitcher of ice water and orange juice to sip on, fluids po encouraged.  Vital signs/pulse ox obtained.  Pt lips and nailbeds pink with rapid capillary refill < 3 sec.  Pt said she had felt this way before after taking antihypertensive medication.  Pt head elevated on pillows.  Pt calmed and said she wanted to try to sleep.  Will continue to observe closely.

## 2024-07-28 NOTE — PROGRESS NOTES
Physical Activity: Not on file   Stress: Not on file   Social Connections: Moderately Isolated (7/28/2024)    Social Connections ([facility] HRSN)     If for any reason you need help with day-to-day activities such as bathing, preparing meals, shopping, managing finances, etc., do you get the help you need?: Not on file   Intimate Partner Violence: Not on file   Housing Stability: Low Risk  (7/27/2024)    Housing Stability Vital Sign     Unable to Pay for Housing in the Last Year: No     Number of Places Lived in the Last Year: 1     Unstable Housing in the Last Year: No           ROS:  [x] All negative/unchanged except if checked. Explain positive(checked items) below:  [] Constitutional  [] Eyes  [] Ear/Nose/Mouth/Throat  [] Respiratory  [] CV  [] GI  []   [] Musculoskeletal  [] Skin/Breast  [] Neurological  [] Endocrine  [] Heme/Lymph  [] Allergic/Immunologic    Explanation:     MEDICATIONS:    Current Facility-Administered Medications:     acetaminophen (TYLENOL) tablet 650 mg, 650 mg, Oral, Q6H PRN, Emma Elizabeth MD    magnesium hydroxide (MILK OF MAGNESIA) 400 MG/5ML suspension 30 mL, 30 mL, Oral, Daily PRN, Emma Elizabeth MD    aluminum & magnesium hydroxide-simethicone (MAALOX) 200-200-20 MG/5ML suspension 30 mL, 30 mL, Oral, PRN, Emma Elizabeth MD, 30 mL at 07/27/24 0915    hydrOXYzine pamoate (VISTARIL) capsule 50 mg, 50 mg, Oral, TID PRN, Emma Elizabeth MD    haloperidol (HALDOL) tablet 5 mg, 5 mg, Oral, Q6H PRN **OR** haloperidol lactate (HALDOL) injection 5 mg, 5 mg, IntraMUSCular, Q6H PRN, Emma Elizabeth MD    divalproex (DEPAKOTE) DR tablet 250 mg, 250 mg, Oral, 3 times per day, Tina Skaggs APRN - CNP, 250 mg at 07/28/24 0637    melatonin tablet 3 mg, 3 mg, Oral, Nightly, Tina Skaggs APRUGO - CNP, 3 mg at 07/27/24 2115    prazosin (MINIPRESS) capsule 1 mg, 1 mg, Oral, Nightly, Tina Skaggs, APRN - CNP, 1 mg at 07/27/24 2113    ARIPiprazole (ABILIFY) tablet 5 mg, 5 mg,  Oral, Daily, Tina Skaggs, APRN - CNP, 5 mg at 07/27/24 8307      Examination:  /72   Pulse 96   Temp 97.9 °F (36.6 °C) (Temporal)   Resp 15   Ht 1.778 m (5' 10\")   Wt 111.1 kg (245 lb)   SpO2 95%   BMI 35.15 kg/m²   Gait - steady  Medication side effects(SE):     Mental Status Examination:    Level of consciousness:  within normal limits   Appearance:  good grooming and good hygiene  Behavior/Motor:  no abnormalities noted  Attitude toward examiner:  cooperative, attentive, and good eye contact  Speech:  normal rate, normal volume, and well articulated   Mood: within normal limits  Affect:  mood congruent  Thought processes:  linear and coherent   Thought content:  denies suicidal ideation  Cognition:  oriented to person, place, and time   Concentration intact  Insight good   Judgement good     ASSESSMENT:   Patient symptoms are:  [x] Well controlled  [x] Improving  [] Worsening  [] No change      Diagnosis:   Principal Problem:    Mixed bipolar II disorder (HCC)  Active Problems:    PTSD (post-traumatic stress disorder)  Resolved Problems:    Suicidal ideation      LABS:    Recent Labs     07/26/24  1333   WBC 5.1   HGB 13.5     Recent Labs     07/26/24  1333      K 5.1*   CL 97*   CO2 24   BUN 7   CREATININE 0.7   GLUCOSE 162*     Recent Labs     07/26/24  1333   BILITOT 1.0   ALKPHOS 108*   AST 73*   ALT 32     Lab Results   Component Value Date/Time    BARBSCNU NEGATIVE 07/26/2024 01:33 PM    LABBENZ NEGATIVE 07/26/2024 01:33 PM    LABMETH NEGATIVE 07/26/2024 01:33 PM    ETOH <10 07/26/2024 04:16 PM     Lab Results   Component Value Date/Time    TSH 1.460 11/28/2022 10:37 AM     No results found for: \"LITHIUM\"  No results found for: \"VALPROATE\", \"CBMZ\"    RISK ASSESSMENT:     Treatment Plan:  Reviewed current Medications with the patient.   Depakote 250 mg twice daily  Abilify 5 mg daily  Prazosin 1 mg at bedtime for nightmares and flashbacks will put parameters on due to orthostatic

## 2024-07-28 NOTE — GROUP NOTE
Group Therapy Note    Date: 7/28/2024    Group Start Time: 1050  Group End Time: 1130  Group Topic: Cognitive Skills    [facility]    Mayra Spencer        Group Therapy Note    Attendees: 14       Patient's Goal:  Pt will be able to verbalize understanding of fight-or-flight response.     Notes:  Pt made connections to group and participated appropriately throughout duration of group.     Status After Intervention:  Unchanged    Participation Level: Interactive    Participation Quality: Appropriate      Speech:  normal      Thought Process/Content: Linear      Affective Functioning: Congruent      Mood: anxious      Level of consciousness:  Alert      Response to Learning: Able to verbalize current knowledge/experience      Endings: None Reported    Modes of Intervention: Education      Discipline Responsible: /Counselor      Signature:  Mayra Spencer

## 2024-07-28 NOTE — BH NOTE
Denies suicidal ideations or thoughts of self harm.  Denies homicidal ideations or thoughts to hurt others.  Denies auditory and visual hallucinations.  Good eye contact, Stable and appropriate affect.  Attended on unit groups today.  Medication compliant.  Up for meals.

## 2024-07-28 NOTE — GROUP NOTE
Group Therapy Note    Date: 7/28/2024    Group Start Time: 1430  Group End Time: 1510  Group Topic: Recreational    [facility]    Gale Thomason CTRS    Group Therapy Note    Attendees: 13    Date: 7/28/2024  Start Time: 1430  End Time:  1510  Number of Participants: 13    Type of Group: Recreational    Name:  [employer]    Patient's Goal:  Increased awareness of mental health topics through [employer] game.    Notes:  Patient was actively engaged in [employer] game and answered questions appropriately.    Status After Intervention:  Improved    Participation Level: Active Listener and Interactive    Participation Quality: Appropriate, Attentive, and Sharing      Speech:  normal      Thought Process/Content: Logical  Linear      Affective Functioning: Congruent      Mood:  Appropriate      Level of consciousness:  Alert and Attentive      Response to Learning: Able to verbalize current knowledge/experience, Able to verbalize/acknowledge new learning, Able to retain information, Capable of insight, and Progressing to goal      Endings: None Reported    Modes of Intervention: Education, Socialization, and Activity      Discipline Responsible: Psychoeducational Specialist      Signature:  GONZALO Moody

## 2024-07-28 NOTE — GROUP NOTE
Group Therapy Note    Date: 7/28/2024    Group Start Time: 0945  Group End Time: 1020  Group Topic: Psychoeducation    [facility]    Gale Thomason CTRS    Group Therapy Note    Attendees: 12    Date: 7/28/2024  Start Time: 0945  End Time:  1020  Number of Participants: 12    Type of Group: Psychoeducation    Name:  Finding Balance    Patient's Goal:  Identify how balance affects mental health and how balance can be improved.    Notes:  CTRS led educational group discussion on finding balance. Encouraged patients to share their experiences. Patient was actively engaged in group discussion.    Status After Intervention:  Improved    Participation Level: Active Listener and Interactive    Participation Quality: Appropriate, Attentive, and Sharing      Speech:  normal      Thought Process/Content: Logical  Linear      Affective Functioning: Congruent      Mood:  Appropriate      Level of consciousness:  Alert and Attentive      Response to Learning: Able to verbalize current knowledge/experience, Able to verbalize/acknowledge new learning, Able to retain information, Capable of insight, Able to change behavior, and Progressing to goal      Endings: None Reported    Modes of Intervention: Education, Support, Socialization, Exploration, Clarifying, and Problem-solving      Discipline Responsible: Psychoeducational Specialist      Signature:  GONZALO Moody

## 2024-07-29 LAB
EKG ATRIAL RATE: 102 BPM
EKG P AXIS: 9 DEGREES
EKG P-R INTERVAL: 144 MS
EKG Q-T INTERVAL: 370 MS
EKG QRS DURATION: 62 MS
EKG QTC CALCULATION (BAZETT): 482 MS
EKG R AXIS: -9 DEGREES
EKG T AXIS: 75 DEGREES
EKG VENTRICULAR RATE: 102 BPM

## 2024-07-29 PROCEDURE — 6370000000 HC RX 637 (ALT 250 FOR IP): Performed by: NURSE PRACTITIONER

## 2024-07-29 PROCEDURE — 99232 SBSQ HOSP IP/OBS MODERATE 35: CPT | Performed by: NURSE PRACTITIONER

## 2024-07-29 PROCEDURE — 1240000000 HC EMOTIONAL WELLNESS R&B

## 2024-07-29 PROCEDURE — 6370000000 HC RX 637 (ALT 250 FOR IP): Performed by: PSYCHIATRY & NEUROLOGY

## 2024-07-29 PROCEDURE — 93010 ELECTROCARDIOGRAM REPORT: CPT | Performed by: INTERNAL MEDICINE

## 2024-07-29 RX ADMIN — DIVALPROEX SODIUM 250 MG: 250 TABLET, DELAYED RELEASE ORAL at 14:28

## 2024-07-29 RX ADMIN — DIVALPROEX SODIUM 250 MG: 250 TABLET, DELAYED RELEASE ORAL at 21:34

## 2024-07-29 RX ADMIN — ARIPIPRAZOLE 5 MG: 5 TABLET ORAL at 09:39

## 2024-07-29 RX ADMIN — HYDROXYZINE PAMOATE 50 MG: 50 CAPSULE ORAL at 20:47

## 2024-07-29 RX ADMIN — PRAZOSIN HYDROCHLORIDE 1 MG: 1 CAPSULE ORAL at 20:47

## 2024-07-29 RX ADMIN — ACETAMINOPHEN 650 MG: 325 TABLET ORAL at 10:46

## 2024-07-29 RX ADMIN — Medication 3 MG: at 20:48

## 2024-07-29 RX ADMIN — DIVALPROEX SODIUM 250 MG: 250 TABLET, DELAYED RELEASE ORAL at 06:45

## 2024-07-29 ASSESSMENT — PAIN DESCRIPTION - ORIENTATION: ORIENTATION: LEFT

## 2024-07-29 ASSESSMENT — PAIN SCALES - GENERAL
PAINLEVEL_OUTOF10: 0
PAINLEVEL_OUTOF10: 0
PAINLEVEL_OUTOF10: 6

## 2024-07-29 ASSESSMENT — PAIN - FUNCTIONAL ASSESSMENT: PAIN_FUNCTIONAL_ASSESSMENT: ACTIVITIES ARE NOT PREVENTED

## 2024-07-29 ASSESSMENT — PAIN DESCRIPTION - LOCATION: LOCATION: FOOT

## 2024-07-29 ASSESSMENT — PAIN DESCRIPTION - DESCRIPTORS: DESCRIPTORS: SORE;SHARP;DISCOMFORT

## 2024-07-29 NOTE — BH NOTE
Pt noted to this RN of feeling dizzy. Pt denies nausea / vomiting.   Pt has negative shortness or breath and is alert to surroundings.     Pt denies suicidal / homicidal ideations.  Pt denies hallucinations.    Negative other immediate distress.
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
## 2024-07-29 NOTE — CARE COORDINATION
Pt approached SW and provided SW with the phone number for her HR dept at work ([phone]). She states that SW will need to call and obtain fax number in order to send them something stating that she is admitted to the hospital so she does not lose her job.     SW contacted pt work ([phone]) and left voicemail.     SW informed pt that SW attempted to call and left a message, that pt can continue to call and provide SW with fax number if she gets ahold of them. She verbalized understanding.

## 2024-07-29 NOTE — HOME CARE
PT IS CURRENT WITH [facility] WILL NEED ACTIVE VARSHA ORDERS ON CHART AT TIME OF DC.    Taya Clements Lpn  Intake Liaison  [facility]

## 2024-07-29 NOTE — PLAN OF CARE
700PM to 730AM shift note: Pt up in milieu, initiating interactions with staff and peers.  Denied suicidal/homicidal ideations and hallucinations. No preoccupation or delusions revealed.  Denied depression and is often smiling and joking with peers.  Pt admits to depression and rates it 5/10.  Ate HS snack.  Maintaining control of behavior.  Support offered.  Remains on close observation staggered q 15 min checks.

## 2024-07-29 NOTE — GROUP NOTE
Shared goal for the day as to practice boundaries.                                                                       Group Therapy Note    Date: 7/29/2024    Group Start Time: 0930  Group End Time: 1000  Group Topic: Community Meeting    [facility]    Sarah Talavera, GONZALO    Type of Group: Community Meeting      Patient's Goal:  Patient will be able to id staffing assignments, expectations of patients, and general information re: floor rules. Will be prompted to share goal for the day.     Notes:  Patient appeared to be an active listener, taking in information presented and was prompted to share goal for the day.    Status After Intervention:  Improved    Participation Level: Active Listener and Interactive    Participation Quality: Appropriate, Attentive, and Sharing      Speech:  normal      Thought Process/Content: Logical      Affective Functioning: Congruent      Mood: euthymic      Level of consciousness:  Alert, Oriented x4, and Attentive      Response to Learning: Able to verbalize/acknowledge new learning and Able to retain information      Endings: None Reported    Modes of Intervention: Support, Socialization, and Clarifying      Discipline Responsible: Psychoeducational Specialist      Signature:  GONZALO Romero

## 2024-07-29 NOTE — GROUP NOTE
Group Therapy Note    Date: 7/29/2024    Group Start Time: 1400  Group End Time: 1440  Group Topic: Cognitive Skills    [facility]    Nena Kelly MSW, DAVIAN        Group Therapy Note    Attendees: 15       Patient's Goal:  Pt will discuss tips for better decision making in order to assist with patterns of overthinking.     Notes:  Pt was an active participant in group discussion.     Status After Intervention:  Improved    Participation Level: Active Listener and Interactive    Participation Quality: Appropriate, Attentive, Sharing, and Supportive      Speech:  normal      Thought Process/Content: Logical  Linear      Affective Functioning: Congruent      Mood: anxious and depressed      Level of consciousness:  Alert, Oriented x4, and Attentive      Response to Learning: Able to verbalize current knowledge/experience, Able to verbalize/acknowledge new learning, Able to retain information, Capable of insight, and Progressing to goal      Endings: None Reported    Modes of Intervention: Education, Support, Socialization, Exploration, Clarifying, and Problem-solving      Discipline Responsible: /Counselor      Signature:  ESTRADA Cevallos LSW

## 2024-07-29 NOTE — PLAN OF CARE
Problem: Self Harm/Suicidality  Goal: Will have no self-injury during hospital stay  Description: INTERVENTIONS:  1.  Ensure constant observer at bedside with Q15M safety checks  2.  Maintain a safe environment  3.  Secure patient belongings  4.  Ensure family/visitors adhere to safety recommendations  5.  Ensure safety tray has been added to patient's diet order  6.  Every shift and PRN: Re-assess suicidal risk via Frequent Screener    7/29/2024 0957 by Anup Fragoso RN  Outcome: Progressing  7/29/2024 0529 by Carrol Colunga RN  Outcome: Progressing     Problem: Depression  Goal: Will be euthymic at discharge  Description: INTERVENTIONS:  1. Administer medication as ordered  2. Provide emotional support via 1:1 interaction with staff  3. Encourage involvement in milieu/groups/activities  4. Monitor for social isolation  7/29/2024 0957 by Anup Fragoso, RN  Outcome: Progressing  7/29/2024 0529 by Carrol Colunga RN  Outcome: Progressing

## 2024-07-29 NOTE — GROUP NOTE
Group Therapy Note    Date: 7/29/2024    Group Start Time: 1000  Group End Time: 1045  Group Topic: Art Therapy     [facility]    Sarah Talavera, CTRS    Date: 7/29/2024  Type of Group: Psychoeducation    Wellness Binder Information  Module Name:  Art Therapy     Patient's Goal:  pt will be able to id the importance of color expression and how it can add to ones mental health.     Notes:  Pleasant and sharing in group. Willing to participate in activity and share after. Accepting of handouts.     Status After Intervention:  Improved    Participation Level: Active Listener and Interactive    Participation Quality: Appropriate, Attentive, and Sharing      Speech:  normal      Thought Process/Content: Logical      Affective Functioning: Congruent      Mood: euthymic      Level of consciousness:  Alert, Oriented x4, and Attentive      Response to Learning: Able to verbalize/acknowledge new learning, Able to retain information, and Progressing to goal      Endings: None Reported    Modes of Intervention: Education, Support, Socialization, and Activity      Discipline Responsible: Psychoeducational Specialist      Signature:  Sarah Talavera, CTRS

## 2024-07-29 NOTE — PROGRESS NOTES
packs/day: 0.00     Average packs/day: 0.5 packs/day for 1 year (0.5 ttl pk-yrs)     Types: Cigarettes     Start date:      Quit date:      Years since quittin.5    Smokeless tobacco: Never    Tobacco comments:     Quit smokin2002   Vaping Use    Vaping Use: Never used   Substance and Sexual Activity    Alcohol use: Yes     Comment: occasionally    Drug use: No    Sexual activity: Not on file   Other Topics Concern    Not on file   Social History Narrative    Not on file     Social Determinants of Health     Financial Resource Strain: Medium Risk (2024)    Overall Financial Resource Strain (CARDIA)     Difficulty of Paying Living Expenses: Somewhat hard   Food Insecurity: No Food Insecurity (2024)    Hunger Vital Sign     Worried About Running Out of Food in the Last Year: Never true     Ran Out of Food in the Last Year: Never true   Transportation Needs: No Transportation Needs (2024)    PRAPARE - Transportation     Lack of Transportation (Medical): No     Lack of Transportation (Non-Medical): No   Physical Activity: Not on file   Stress: Not on file   Social Connections: Moderately Isolated (2024)    Social Connections ([facility] HRSN)     If for any reason you need help with day-to-day activities such as bathing, preparing meals, shopping, managing finances, etc., do you get the help you need?: Not on file   Intimate Partner Violence: Not on file   Housing Stability: Low Risk  (2024)    Housing Stability Vital Sign     Unable to Pay for Housing in the Last Year: No     Number of Places Lived in the Last Year: 1     Unstable Housing in the Last Year: No           ROS:  [x] All negative/unchanged except if checked. Explain positive(checked items) below:  [] Constitutional  [] Eyes  [] Ear/Nose/Mouth/Throat  [] Respiratory  [] CV  [] GI  []   [] Musculoskeletal  [] Skin/Breast  [] Neurological  [] Endocrine  [] Heme/Lymph  [] Allergic/Immunologic    Explanation:      MEDICATIONS:    Current Facility-Administered Medications:     acetaminophen (TYLENOL) tablet 650 mg, 650 mg, Oral, Q6H PRN, Emma Elizabeth MD, 650 mg at 07/29/24 1046    magnesium hydroxide (MILK OF MAGNESIA) 400 MG/5ML suspension 30 mL, 30 mL, Oral, Daily PRN, Emma Elizabeth MD    aluminum & magnesium hydroxide-simethicone (MAALOX) 200-200-20 MG/5ML suspension 30 mL, 30 mL, Oral, PRN, Emma Elizabeth MD, 30 mL at 07/27/24 0915    hydrOXYzine pamoate (VISTARIL) capsule 50 mg, 50 mg, Oral, TID PRN, Emma Elizabeth MD    haloperidol (HALDOL) tablet 5 mg, 5 mg, Oral, Q6H PRN **OR** haloperidol lactate (HALDOL) injection 5 mg, 5 mg, IntraMUSCular, Q6H PRN, Emma Elizabeth MD    divalproex (DEPAKOTE) DR tablet 250 mg, 250 mg, Oral, 3 times per day, Sharifa Tina B, APRN - CNP, 250 mg at 07/29/24 0645    melatonin tablet 3 mg, 3 mg, Oral, Nightly, Dellick, Tina B, APRN - CNP, 3 mg at 07/28/24 2104    prazosin (MINIPRESS) capsule 1 mg, 1 mg, Oral, Nightly, Dellick, Tina B, APRN - CNP, 1 mg at 07/28/24 2104    ARIPiprazole (ABILIFY) tablet 5 mg, 5 mg, Oral, Daily, Dellick, Tina B, APRN - CNP, 5 mg at 07/29/24 0939      Examination:  /62   Pulse (!) 102   Temp 98.1 °F (36.7 °C) (Temporal)   Resp 16   Ht 1.778 m (5' 10\")   Wt 111.1 kg (245 lb)   SpO2 97%   BMI 35.15 kg/m²   Gait - steady  Medication side effects(SE):     Mental Status Examination:    Level of consciousness:  within normal limits   Appearance:  fair grooming and fair hygiene  Behavior/Motor:  no abnormalities noted  Attitude toward examiner:  cooperative  Speech:  spontaneous, normal rate and normal volume   Mood: \" My mood is good.\"  Affect: appropriate and pleasant  Thought processes: Linear without flights of ideas loose associations  Thought content: Devoid of any auditory visual hallucinations delusions or other perceptual normalities.  Denies SI/HI intent or plan   Language: able to name objects and repeate

## 2024-07-30 LAB
DATE LAST DOSE: NORMAL
GLUCOSE BLD-MCNC: 218 MG/DL (ref 74–99)
TME LAST DOSE: NORMAL H
VALPROATE SERPL-MCNC: 75 UG/ML (ref 50–100)
VANCOMYCIN DOSE: NORMAL MG

## 2024-07-30 PROCEDURE — 99232 SBSQ HOSP IP/OBS MODERATE 35: CPT | Performed by: NURSE PRACTITIONER

## 2024-07-30 PROCEDURE — 82962 GLUCOSE BLOOD TEST: CPT

## 2024-07-30 PROCEDURE — 6370000000 HC RX 637 (ALT 250 FOR IP): Performed by: NURSE PRACTITIONER

## 2024-07-30 PROCEDURE — 1240000000 HC EMOTIONAL WELLNESS R&B

## 2024-07-30 PROCEDURE — 6370000000 HC RX 637 (ALT 250 FOR IP): Performed by: PSYCHIATRY & NEUROLOGY

## 2024-07-30 PROCEDURE — 36415 COLL VENOUS BLD VENIPUNCTURE: CPT

## 2024-07-30 PROCEDURE — 80164 ASSAY DIPROPYLACETIC ACD TOT: CPT

## 2024-07-30 RX ORDER — PANTOPRAZOLE SODIUM 40 MG/1
40 TABLET, DELAYED RELEASE ORAL
Status: DISCONTINUED | OUTPATIENT
Start: 2024-07-30 | End: 2024-07-31 | Stop reason: HOSPADM

## 2024-07-30 RX ORDER — CETIRIZINE HYDROCHLORIDE 10 MG/1
10 TABLET ORAL DAILY
Status: DISCONTINUED | OUTPATIENT
Start: 2024-07-30 | End: 2024-07-31 | Stop reason: HOSPADM

## 2024-07-30 RX ORDER — ATORVASTATIN CALCIUM 40 MG/1
20 TABLET, FILM COATED ORAL DAILY
Status: DISCONTINUED | OUTPATIENT
Start: 2024-07-30 | End: 2024-07-31 | Stop reason: HOSPADM

## 2024-07-30 RX ORDER — AMMONIUM LACTATE 12 G/100G
LOTION TOPICAL PRN
Status: DISCONTINUED | OUTPATIENT
Start: 2024-07-30 | End: 2024-07-31 | Stop reason: HOSPADM

## 2024-07-30 RX ORDER — VALSARTAN 160 MG/1
160 TABLET ORAL DAILY
Status: DISCONTINUED | OUTPATIENT
Start: 2024-07-30 | End: 2024-07-31 | Stop reason: HOSPADM

## 2024-07-30 RX ORDER — LIRAGLUTIDE 6 MG/ML
1.2 INJECTION SUBCUTANEOUS DAILY
Status: DISCONTINUED | OUTPATIENT
Start: 2024-07-30 | End: 2024-07-31 | Stop reason: HOSPADM

## 2024-07-30 RX ORDER — FLUTICASONE PROPIONATE 50 MCG
2 SPRAY, SUSPENSION (ML) NASAL DAILY
Status: DISCONTINUED | OUTPATIENT
Start: 2024-07-30 | End: 2024-07-31 | Stop reason: HOSPADM

## 2024-07-30 RX ADMIN — PANTOPRAZOLE SODIUM 40 MG: 40 TABLET, DELAYED RELEASE ORAL at 10:00

## 2024-07-30 RX ADMIN — CETIRIZINE HYDROCHLORIDE 10 MG: 10 TABLET, FILM COATED ORAL at 10:00

## 2024-07-30 RX ADMIN — Medication 3 MG: at 20:57

## 2024-07-30 RX ADMIN — FLUTICASONE PROPIONATE 2 SPRAY: 50 SPRAY, METERED NASAL at 10:00

## 2024-07-30 RX ADMIN — HYDROXYZINE PAMOATE 50 MG: 50 CAPSULE ORAL at 20:57

## 2024-07-30 RX ADMIN — Medication: at 10:01

## 2024-07-30 RX ADMIN — PRAZOSIN HYDROCHLORIDE 1 MG: 1 CAPSULE ORAL at 20:56

## 2024-07-30 RX ADMIN — ATORVASTATIN CALCIUM 20 MG: 40 TABLET, FILM COATED ORAL at 10:00

## 2024-07-30 RX ADMIN — ARIPIPRAZOLE 5 MG: 5 TABLET ORAL at 08:49

## 2024-07-30 RX ADMIN — DIVALPROEX SODIUM 250 MG: 250 TABLET, DELAYED RELEASE ORAL at 06:48

## 2024-07-30 RX ADMIN — DIVALPROEX SODIUM 250 MG: 250 TABLET, DELAYED RELEASE ORAL at 20:56

## 2024-07-30 RX ADMIN — VALSARTAN 160 MG: 160 TABLET, FILM COATED ORAL at 10:00

## 2024-07-30 RX ADMIN — DIVALPROEX SODIUM 250 MG: 250 TABLET, DELAYED RELEASE ORAL at 13:36

## 2024-07-30 NOTE — PROGRESS NOTES
Mae J Rajinder was ordered Victoza which is a nonformulary medication.  This medication will need to be supplied by the patient as the pharmacy does not carry this non-formulary medication.    If the medication has not been administered by 1400 on the following day from the time the order was placed, a pharmacist will follow-up with the nurse of the patient to assess the capability of the patient to bring in the medication.    If it is determined that the patient cannot supply the medication and it is not available to be dispensed from the pharmacy, the provider will be notified.    Nadir Ventura PharmD, BCPS 7/30/2024 9:40 AM

## 2024-07-30 NOTE — GROUP NOTE
Group Therapy Note    Date: 7/30/2024    Group Start Time: 1010  Group End Time: 1040  Group Topic: Psychoeducation    [facility]    Sarah Talavera, CTRS    Date: 7/30/2024  Module Name:  dealing with change     Patient's Goal:  pt will be able to id positive steps to give one a sense of control during uncertain times.     Notes:  Pleasant and sharing when prompted, able to id challenges one is currently processing. Accepting of handout.     Status After Intervention:  Improved    Participation Level: Active Listener and Interactive    Participation Quality: Appropriate, Attentive, and Sharing      Speech:  normal      Thought Process/Content: Logical      Affective Functioning: Congruent      Mood: euthymic      Level of consciousness:  Alert, Oriented x4, and Attentive      Response to Learning: Able to verbalize/acknowledge new learning, Able to retain information, and Progressing to goal      Endings: None Reported    Modes of Intervention: Education, Support, Clarifying, and Problem-solving      Discipline Responsible: Psychoeducational Specialist      Signature:  Sarah Talavera, CTRS

## 2024-07-30 NOTE — GROUP NOTE
Shared goal for the day as to remain positive.                                                                       Group Therapy Note    Date: 7/30/2024    Group Start Time: 0945  Group End Time: 1010  Group Topic: Community Meeting    [facility]    Sarah Talavera, JENS    Type of Group: Community Meeting      Patient's Goal:  Patient will be able to id staffing assignments, expectations of patients, and general information re: floor rules. Will be prompted to share goal for the day.     Notes:  Patient appeared to be an active listener, taking in information presented and was prompted to share goal for the day.    Status After Intervention:  Improved    Participation Level: Active Listener and Interactive    Participation Quality: Appropriate and Attentive      Speech:  normal      Thought Process/Content: Logical      Affective Functioning: Congruent      Mood: euthymic      Level of consciousness:  Alert, Oriented x4, and Attentive      Response to Learning: Able to verbalize/acknowledge new learning and Able to retain information      Endings: None Reported    Modes of Intervention: Support and Clarifying      Discipline Responsible: Psychoeducational Specialist      Signature:  GONZALO Romero

## 2024-07-30 NOTE — PLAN OF CARE
Problem: Chronic Conditions and Co-morbidities  Goal: Patient's chronic conditions and co-morbidity symptoms are monitored and maintained or improved  Outcome: Progressing     Problem: Self Harm/Suicidality  Goal: Will have no self-injury during hospital stay  Description: INTERVENTIONS:  1.  Ensure constant observer at bedside with Q15M safety checks  2.  Maintain a safe environment  3.  Secure patient belongings  4.  Ensure family/visitors adhere to safety recommendations  5.  Ensure safety tray has been added to patient's diet order  6.  Every shift and PRN: Re-assess suicidal risk via Frequent Screener    Outcome: Progressing     Problem: Depression  Goal: Will be euthymic at discharge  Description: INTERVENTIONS:  1. Administer medication as ordered  2. Provide emotional support via 1:1 interaction with staff  3. Encourage involvement in milieu/groups/activities  4. Monitor for social isolation  7/30/2024 1234 by Maryjo Dao RN  Outcome: Progressing  7/30/2024 0023 by Rambo Mariee RN  Outcome: Progressing     Problem: Psychosis  Goal: Will report no hallucinations or delusions  Description: INTERVENTIONS:  1. Administer medication as  ordered  2. Assist with reality testing to support increasing orientation  3. Assess if patient's hallucinations or delusions are encouraging self harm or harm to others and intervene as appropriate  7/30/2024 1234 by Maryjo Dao RN  Outcome: Progressing  7/30/2024 0023 by Rambo Mariee RN  Outcome: Progressing     Problem: Behavior  Goal: Pt/Family maintain appropriate behavior and adhere to behavioral management agreement, if implemented  Description: INTERVENTIONS:  1. Assess patient/family's coping skills and  non-compliant behavior (including use of illegal substances)  2. Notify security of behavior or suspected illegal substances which indicate the need for search of the family and/or belongings  3. Encourage verbalization of thoughts and concerns in a

## 2024-07-30 NOTE — PROGRESS NOTES
Spiritual Health Assessment/Progress Note  [facility]    Loneliness/Social Isolation,  ,  , Initial Encounter    Name: Mae Calvillo MRN: [id number]    Age: 54 y.o.     Sex: female   Language: English   Denominational: None   Mixed bipolar II disorder (HCC)     Date: 7/30/2024                           Spiritual Assessment began in [facility]        Referral/Consult From: Rounding   Encounter Overview/Reason: Loneliness/Social Isolation  Service Provided For: Patient    Tenisha, Belief, Meaning:   Patient identifies as spiritual  Family/Friends identify as spiritual      Importance and Influence:  Patient has spiritual/personal beliefs that influence decisions regarding their health  Family/Friends no family/friends present    Community:  Patient is connected with a spiritual community and feels well-supported. Support system includes: Children and Friends  Family/Friends Other: No family present    Assessment and Plan of Care:     Patient Interventions include: Facilitated expression of thoughts and feelings, Explored spiritual coping/struggle/distress and theological reflection, Affirmed coping skills/support systems, and Engaged in life review and/or legacy  Family/Friends Interventions include: Other: No family present    Patient Plan of Care: No spiritual needs identified for follow-up  Family/Friends Plan of Care: Other: No family present    Electronically signed by Chaplain Иван on 7/30/2024 at 3:41 PM

## 2024-07-30 NOTE — PLAN OF CARE
Pt out in the common area, social with peers and watching TV. Pt denies SI, HI and AVH. Pt has no questions or concerns att. Pt is making needs known.    Problem: Depression  Goal: Will be euthymic at discharge  Description: INTERVENTIONS:  1. Administer medication as ordered  2. Provide emotional support via 1:1 interaction with staff  3. Encourage involvement in milieu/groups/activities  4. Monitor for social isolation  Outcome: Progressing     Problem: Psychosis  Goal: Will report no hallucinations or delusions  Description: INTERVENTIONS:  1. Administer medication as  ordered  2. Assist with reality testing to support increasing orientation  3. Assess if patient's hallucinations or delusions are encouraging self harm or harm to others and intervene as appropriate  Outcome: Progressing     Problem: Behavior  Goal: Pt/Family maintain appropriate behavior and adhere to behavioral management agreement, if implemented  Description: INTERVENTIONS:  1. Assess patient/family's coping skills and  non-compliant behavior (including use of illegal substances)  2. Notify security of behavior or suspected illegal substances which indicate the need for search of the family and/or belongings  3. Encourage verbalization of thoughts and concerns in a socially appropriate manner  4. Utilize positive, consistent limit setting strategies supporting safety of patient, staff and others  5. Encourage participation in the decision making process about the behavioral management agreement  6. If a visitor's behavior poses a threat to safety call refer to organization policy.  7. Initiate consult with , Psychosocial CNS, Spiritual Care as appropriate  Outcome: Progressing

## 2024-07-30 NOTE — PROGRESS NOTES
BEHAVIORAL HEALTH FOLLOW-UP NOTE     7/30/2024     Patient was seen and examined in person, Chart reviewed   Patient's case discussed with staff/team    Chief Complaint: \"I am feeling better.\"    Interim History:   Patient seen this morning in her room she is pleasant and cooperative tells me that she is feeling better.  She agrees to sign a release of information for her son so we can obtain collateral information.  She denies suicidal ideations intent or plan denies auditory or visual hallucinations denies any dizziness feeling.  She is supposed to be on big toes however this is nonformulary patient's blood sugar was checked this morning after she ate breakfast slightly elevated patient states this is only because she had just eaten.  She denies any feelings of hyper or hypoglycemia.  She is pleasant and cooperative eating well sleeping well and no neurovegetative signs depression and no overt or covert side psychosis she is been attending group social select peers appreciate help that she is receiving here      Appetite:  [x] Normal/Unchanged  [] Increased  [] Decreased      Sleep:       [x] Normal/Unchanged  [] Fair       [] Poor              Energy:    [x] Normal/Unchanged  [] Increased  [] Decreased        SI [] Present  [x] Absent    HI  []Present  [x] Absent     Aggression:  [] yes  [x] no    Patient is [x] able  [] unable to CONTRACT FOR SAFETY     PAST MEDICAL/PSYCHIATRIC HISTORY:   Past Medical History:   Diagnosis Date    Anemia     stable at this time  2023    Arthritis     COVID 05/2022 11/2022-    Diabetes mellitus (HCC)     diet controlled    History of blood transfusion 2012    Lumbar pain        FAMILY/SOCIAL HISTORY:  Family History   Problem Relation Age of Onset    Diabetes Mother     Hypertension Mother     Diabetes Father     Hypertension Father     Gout Father     Heart Disease None     Ovarian Cancer None     Uterine Cancer None     Breast Cancer None      Social History     Socioeconomic  Date/Time    BARBSCNU NEGATIVE 07/26/2024 01:33 PM    LABBENZ NEGATIVE 07/26/2024 01:33 PM    LABMETH NEGATIVE 07/26/2024 01:33 PM    ETOH <10 07/26/2024 04:16 PM     Lab Results   Component Value Date/Time    TSH 1.460 11/28/2022 10:37 AM     No results found for: \"LITHIUM\"  Lab Results   Component Value Date    VALPROATE 75 07/30/2024       RISK ASSESSMENT:     Treatment Plan:  Reviewed current Medications with the patient.   Depakote 250 mg twice daily  Abilify 5 mg daily  Prazosin 1 mg at bedtime for nightmares and flashbacks will put parameters on due to orthostatic hypotension  Risks, benefits, side effects, drug-to-drug interactions and alternatives to treatment were discussed.  Collateral information:   CD evaluation  Encourage patient to attend group and other milieu activities.  Discharge planning discussed with the patient and treatment team.    PSYCHOTHERAPY/COUNSELING:  [x] Therapeutic interview  [x] Supportive  [] CBT  [] Ongoing  [] Other    [x] Patient continues to need, on a daily basis, active treatment furnished directly by or requiring the supervision of inpatient psychiatric personnel      Anticipated Length of stay: 3 to 7 days based on stability             Electronically signed by VALERIE Stroud CNP on 7/30/2024 at 11:24 AM

## 2024-07-30 NOTE — GROUP NOTE
Group Therapy Note    Date: 7/30/2024    Group Start Time: 1540  Group End Time: 1615  Group Topic: Recreational    [facility]    Sarah Talavera, CTRS    Date: 7/30/2024    Module Name:  random trivia     Patient's Goal:  pt will be able to participate in trivia with other pts.    Notes:  engaged in group, observed laughing and answering with others to problem solve answers.     Status After Intervention:  Improved    Participation Level: Active Listener and Interactive    Participation Quality: Appropriate, Attentive, and Sharing      Speech:  normal      Thought Process/Content: Logical      Affective Functioning: Congruent      Mood: euthymic      Level of consciousness:  Alert, Oriented x4, and Attentive      Response to Learning: Able to verbalize/acknowledge new learning, Able to retain information, and Progressing to goal      Endings: None Reported    Modes of Intervention: Education, Support, Socialization, and Problem-solving      Discipline Responsible: Psychoeducational Specialist      Signature:  Sarah Talavera, CTRS

## 2024-07-30 NOTE — CARE COORDINATION
MARC met with pt to discuss discharge plan. Pt stated that when she is discharged she will be returning home with her son and she is unsure how she will be getting there. Pt stated that she is [facility] with [facility] [place] and denied any guns/weapons in her home. Pt stated that she wants to know if her work has been contacts. SW informed pt that SW called and left a message to obtain the fax and her work can be called again. Pt was receptive of this information. Pt denied any depression and rated her anxiety a 4/10. Pt denied SI, HI, AVH and is hopeful to be discharged soon. Pt signed an FAZAL for her son Pablo [phone] (FAZAL signed).    MARC contacted pt work ([phone]) to obtain fax number to state that the pt is currently in the hospital. SW left a voicemail requesting a call back.      MARC called pt's son Pablo [phone] (FAZAL signed) to obtain collateral information and discuss discharge planning.  MARC spoke with son who stated that he has not been talking to her so he is unsure how she is doing and he is hopeful that the pt gets the help that she needs. Pablo stated that they live together and she is able to return home. Pablo denied access to any guns/weapons. Pablo stated that he is unsure how the pt will get home as he does not have transportation but thinks the pt's insurance will be able to transport her. MARC confirmed address as [address] [place] [address] [address] [address].

## 2024-07-31 VITALS
WEIGHT: 245 LBS | SYSTOLIC BLOOD PRESSURE: 110 MMHG | OXYGEN SATURATION: 97 % | HEART RATE: 76 BPM | BODY MASS INDEX: 35.07 KG/M2 | DIASTOLIC BLOOD PRESSURE: 61 MMHG | RESPIRATION RATE: 16 BRPM | TEMPERATURE: 97.6 F | HEIGHT: 70 IN

## 2024-07-31 LAB — GLUCOSE BLD-MCNC: 121 MG/DL (ref 74–99)

## 2024-07-31 PROCEDURE — 99239 HOSP IP/OBS DSCHRG MGMT >30: CPT | Performed by: NURSE PRACTITIONER

## 2024-07-31 PROCEDURE — 82962 GLUCOSE BLOOD TEST: CPT

## 2024-07-31 PROCEDURE — 6370000000 HC RX 637 (ALT 250 FOR IP): Performed by: NURSE PRACTITIONER

## 2024-07-31 RX ORDER — PRAZOSIN HYDROCHLORIDE 1 MG/1
1 CAPSULE ORAL NIGHTLY
Qty: 30 CAPSULE | Refills: 0 | Status: SHIPPED | OUTPATIENT
Start: 2024-07-31 | End: 2024-08-30

## 2024-07-31 RX ORDER — HYDROXYZINE PAMOATE 50 MG/1
50 CAPSULE ORAL 3 TIMES DAILY PRN
Qty: 36 CAPSULE | Refills: 0 | Status: SHIPPED | OUTPATIENT
Start: 2024-07-31 | End: 2024-08-14

## 2024-07-31 RX ORDER — DIVALPROEX SODIUM 250 MG/1
250 TABLET, DELAYED RELEASE ORAL EVERY 8 HOURS SCHEDULED
Qty: 90 TABLET | Refills: 0 | Status: SHIPPED | OUTPATIENT
Start: 2024-07-31 | End: 2024-08-30

## 2024-07-31 RX ORDER — ARIPIPRAZOLE 5 MG/1
5 TABLET ORAL DAILY
Qty: 30 TABLET | Refills: 0 | Status: SHIPPED | OUTPATIENT
Start: 2024-07-31 | End: 2024-08-30

## 2024-07-31 RX ORDER — LANOLIN ALCOHOL/MO/W.PET/CERES
3 CREAM (GRAM) TOPICAL NIGHTLY
Refills: 3 | COMMUNITY
Start: 2024-07-31

## 2024-07-31 RX ADMIN — DIVALPROEX SODIUM 250 MG: 250 TABLET, DELAYED RELEASE ORAL at 14:52

## 2024-07-31 RX ADMIN — CETIRIZINE HYDROCHLORIDE 10 MG: 10 TABLET, FILM COATED ORAL at 08:33

## 2024-07-31 RX ADMIN — ATORVASTATIN CALCIUM 20 MG: 40 TABLET, FILM COATED ORAL at 08:33

## 2024-07-31 RX ADMIN — DIVALPROEX SODIUM 250 MG: 250 TABLET, DELAYED RELEASE ORAL at 06:58

## 2024-07-31 RX ADMIN — VALSARTAN 160 MG: 160 TABLET, FILM COATED ORAL at 08:33

## 2024-07-31 RX ADMIN — FLUTICASONE PROPIONATE 2 SPRAY: 50 SPRAY, METERED NASAL at 08:34

## 2024-07-31 RX ADMIN — Medication: at 08:34

## 2024-07-31 RX ADMIN — ARIPIPRAZOLE 5 MG: 5 TABLET ORAL at 08:33

## 2024-07-31 RX ADMIN — PANTOPRAZOLE SODIUM 40 MG: 40 TABLET, DELAYED RELEASE ORAL at 08:33

## 2024-07-31 ASSESSMENT — PAIN SCALES - GENERAL
PAINLEVEL_OUTOF10: 0
PAINLEVEL_OUTOF10: 0

## 2024-07-31 NOTE — PROGRESS NOTES
CLINICAL PHARMACY NOTE: MEDS TO BEDS    Total # of Prescriptions Filled: 3   The following medications were delivered to the patient:  Aripiprazole 5mg  Prazosin 1mg  Divalproex 250mg    Additional Documentation:  Delivered to JER[facility] Sibley

## 2024-07-31 NOTE — GROUP NOTE
Shared goal for the day as to stay productive.                                                                      Group Therapy Note    Date: 7/31/2024    Group Start Time: 0950  Group End Time: 1015  Group Topic: Community Meeting    [facility]    Sarah Talavera, GONZALO    Type of Group: Community Meeting      Patient's Goal:  Patient will be able to id staffing assignments, expectations of patients, and general information re: floor rules. Will be prompted to share goal for the day.     Notes:  Patient appeared to be an active listener, taking in information presented and was prompted to share goal for the day.    Status After Intervention:  Improved    Participation Level: Active Listener and Interactive    Participation Quality: Appropriate, Attentive, and Sharing      Speech:  normal      Thought Process/Content: Logical      Affective Functioning: Congruent      Mood: euthymic      Level of consciousness:  Alert, Oriented x4, and Attentive      Response to Learning: Able to verbalize/acknowledge new learning and Able to retain information      Endings: None Reported    Modes of Intervention: Education and Support      Discipline Responsible: Psychoeducational Specialist      Signature:  GONZALO Romero
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
## 2024-07-31 NOTE — CARE COORDINATION
MARC called pt's insurance [phone] and spoke with Kira to schedule pt's ride home. MARC stated to call the RN station upon arrival and to go to the main entrance on [facility]. Trip #[id number] ETA is 1:53 PM-3:53 PM

## 2024-07-31 NOTE — CARE COORDINATION
SW met with pt in treatment team today. Pt stated that she is feeling good and she is ready to leave the hospital. Pt stated that she is feeling and sleeping better pt stated that she will return back home and she will need an insurance ride to get there. Pt stated that she is looking forward to seeing her therapist and she has been considering going to law school or getting her CDL. Pt stated that she is also working to manage her panic/anxiety. Pt denied any SI, HI, AVH.

## 2024-07-31 NOTE — PROGRESS NOTES
CLINICAL PHARMACY NOTE: MEDS TO BEDS    Total # of Prescriptions Filled: 1   The following medications were delivered to the patient:  Hydroxyzine 50mg    Additional Documentation:  Delivered to JEROME Sibley

## 2024-07-31 NOTE — DISCHARGE INSTRUCTIONS
Follow up for Tobacco Cessation at:    [facility]                                 Date:  Friday 8/2 at 10am              [address]    [place] [address]   (Inside [facility]    take B elevators to 7th floor)   Phone: [phone]   Fax: [phone]

## 2024-07-31 NOTE — PLAN OF CARE
Problem: Self Harm/Suicidality  Goal: Will have no self-injury during hospital stay  Description: INTERVENTIONS:  1.  Ensure constant observer at bedside with Q15M safety checks  2.  Maintain a safe environment  3.  Secure patient belongings  4.  Ensure family/visitors adhere to safety recommendations  5.  Ensure safety tray has been added to patient's diet order  6.  Every shift and PRN: Re-assess suicidal risk via Frequent Screener    7/30/2024 2207 by Bridgette Sheikh, RN  Outcome: Progressing     Problem: Anxiety  Goal: Will report anxiety at manageable levels  Description: INTERVENTIONS:  1. Administer medication as ordered  2. Teach and rehearse alternative coping skills  3. Provide emotional support with 1:1 interaction with staff  7/30/2024 2207 by Bridgette Sheikh, RN  Outcome: Progressing

## 2024-07-31 NOTE — CARE COORDINATION
MARC and pt called [facility] to schedule an appointment. Pt scheduled a counseling appointment on 8/1 at 11:15 AM telehalth with Shanna.     [facility]   [address] [place] [address]   Phone: [phone]   Fax: [phone]     MARC met with pt who stated that she would like to go to [facility] for medication management services. SW called [facility] to schedule an appointment and was informed that they are unable to see the pt for only medication management.     [facility]   [address], [place] [address]   Phone: [phone]   Fax: [phone]     MARC spoke with pt who stated that she is agreeable to go to [facility] for medication management services. MARC called [facility] and scheduled an appointment 8/15 at 2 PM for medication management with Uma.     [facility]   [address], [place] [address]   Phone: [phone]   Fax: [phone]     MARC contacted pt work ([phone]) to obtain fax number to state that the pt is currently in the hospital. MARC spoke with Tina in  who provided the fax [phone].      MARC called pt's son Pablo [phone] (FAZAL signed) to discuss discharge for today. MARC spoke with Pablo who denied having any questions or concern for pt to be discharged today. MARC informed Pablo that the pt will need an insurance ride and this will be scheduled.     MARC spoke with NP who stated that it is approved to provide pt with a work excuse giving pt 2 weeks off work. Pt stated that she will follow up with her outpatient provider if she needs off more time mentally and she is unable to get FMLA due to not working at the company long enough.     In order to ensure appropriate transition and discharge planning is in place, the following documents have been transmitted to [facility], as the new outpatient provider:    The d/c diagnosis was transmitted to

## 2024-07-31 NOTE — PROGRESS NOTES
Patient denies suicidal ideation, homicidal ideations and AVH.  Pt reports anxiety is 7/10 d/t issues on the unit today. Pt denies depression. Presents calm and cooperative during assessment.  Patient is out on the unit and is social with peers.  Medications taken without issue.  No complaints or concerns verbalized at this time.  No unit problems reported.  Will continue to observe and support.

## 2024-07-31 NOTE — GROUP NOTE
Group Therapy Note    Date: 7/31/2024    Group Start Time: 0200  Group End Time: 0245  Group Topic: Recovery    [facility]    Sarah Talavera, CTRS    Date: 7/31/2024  Module Name:  Peer Recovery     Patient's Goal:  Pt will be able to id local resources for addiction, and mental health treatment.     Notes:  Able to listen to staffs inspiring story of recovery, willing to share when prompted and accepting of info.     Status After Intervention:  Improved    Participation Level: Active Listener and Interactive    Participation Quality: Appropriate and Attentive      Speech:  normal      Thought Process/Content: Logical      Affective Functioning: Congruent      Mood: euthymic      Level of consciousness:  Alert, Oriented x4, and Attentive      Response to Learning: Able to verbalize/acknowledge new learning      Endings: None Reported    Modes of Intervention: Education, Support, and Exploration      Discipline Responsible: Psychoeducational Specialist      Signature:  JEN RomeroS

## 2024-08-09 ENCOUNTER — PATIENT MESSAGE (OUTPATIENT)
Dept: FAMILY MEDICINE CLINIC | Age: 54
End: 2024-08-09

## 2024-08-09 ENCOUNTER — OFFICE VISIT (OUTPATIENT)
Dept: FAMILY MEDICINE CLINIC | Age: 54
End: 2024-08-09
Payer: COMMERCIAL

## 2024-08-09 VITALS
HEART RATE: 82 BPM | SYSTOLIC BLOOD PRESSURE: 127 MMHG | HEIGHT: 70 IN | DIASTOLIC BLOOD PRESSURE: 73 MMHG | WEIGHT: 257 LBS | TEMPERATURE: 97.6 F | OXYGEN SATURATION: 97 % | RESPIRATION RATE: 16 BRPM | BODY MASS INDEX: 36.79 KG/M2

## 2024-08-09 DIAGNOSIS — F41.9 ANXIETY: ICD-10-CM

## 2024-08-09 DIAGNOSIS — E11.9 TYPE 2 DIABETES MELLITUS WITHOUT COMPLICATION, WITHOUT LONG-TERM CURRENT USE OF INSULIN (HCC): ICD-10-CM

## 2024-08-09 DIAGNOSIS — F43.10 PTSD (POST-TRAUMATIC STRESS DISORDER): Primary | ICD-10-CM

## 2024-08-09 PROCEDURE — 3044F HG A1C LEVEL LT 7.0%: CPT

## 2024-08-09 PROCEDURE — G8427 DOCREV CUR MEDS BY ELIG CLIN: HCPCS

## 2024-08-09 PROCEDURE — 99213 OFFICE O/P EST LOW 20 MIN: CPT

## 2024-08-09 PROCEDURE — 3017F COLORECTAL CA SCREEN DOC REV: CPT

## 2024-08-09 PROCEDURE — G8417 CALC BMI ABV UP PARAM F/U: HCPCS

## 2024-08-09 PROCEDURE — 1111F DSCHRG MED/CURRENT MED MERGE: CPT

## 2024-08-09 PROCEDURE — 2022F DILAT RTA XM EVC RTNOPTHY: CPT

## 2024-08-09 PROCEDURE — 1036F TOBACCO NON-USER: CPT

## 2024-08-09 RX ORDER — METFORMIN HYDROCHLORIDE 500 MG/1
500 TABLET, EXTENDED RELEASE ORAL DAILY
COMMUNITY
Start: 2024-08-02

## 2024-08-09 RX ORDER — AZELASTINE 1 MG/ML
SPRAY, METERED NASAL
COMMUNITY
Start: 2024-07-31

## 2024-08-09 NOTE — PROGRESS NOTES
[facility]  Department of Family Medicine  Family Medicine Residency Program      Patient: Mae Calvillo 54 y.o. female     Date of Service: 24      Chief complaint:   Chief Complaint   Patient presents with    PTSD       HISTORY OF PRESENTING ILLNESS     54 y.o. female presented to the clinic      PTSD:  - [facility] , was admitted secondary to suicidal ideations.  - seeing a counselor , saw this morning.   - will see a psych in November.   - is on Abilify 5 mg, Depakote 250 daily, prazocin 1 mg, hydroxyzine.   - still have night light, improved/   - getting better/   - son still lives with her.    Health Maintenance:  Health Maintenance Due   Topic Date Due    Diabetic retinal exam  Never done    Flu vaccine (1) 2024     Past Medical History:      Diagnosis Date    Anemia     stable at this time      Arthritis     COVID 2022-    Diabetes mellitus (HCC)     diet controlled    History of blood transfusion     Lumbar pain      Past Surgical History:        Procedure Laterality Date    COLPOSCOPY      2001 : outside [facility]      2022    HYSTERECTOMY (CERVIX STATUS UNKNOWN)      PAIN MANAGEMENT PROCEDURE N/A 2023    LUMBAR TRANSFORAMINAL EPIDURAL INJECTION RIGHT  L3 & LEFT l4 UNDER FLUOROSCOPIC GUIDANCE performed by Ben Steinberg MD at [facility] OR    PAIN MANAGEMENT PROCEDURE Bilateral 2023    LUMBAR TRANSFORAMINAL EPIDURAL STEROID INJECTION RIGHT L3 & LEFT L4 UNDER FLUOROSCOPIC GUIDANCE performed by Ben Steinberg MD at [facility] OR    PAIN MANAGEMENT PROCEDURE N/A 6/15/2023    LUMBAR EPIDURAL STEROID INJECTION UNDER FLUOROSCOPIC GUIDANCE AT L3-L4 performed by Ben Steinberg MD at [facility] OR    MS  DELIVERY ONLY W/POSTPARTUM CARE      1991 :      MS  DELIVERY ONLY W/POSTPARTUM CARE      1998 :      MS CONIZATION CERVIX W/WO D&C RPR KNIFE/LASER

## 2024-08-09 NOTE — PROGRESS NOTES
[facility]  Precepting Note    Subjective:  Mood disorder, Bipolar disorder, PTSD, recent hospital admission for SI  Following with psych, counselor  Compliant with meds    ROS otherwise negative     Past medical, surgical, family and social history were reviewed, non-contributory, and unchanged unless otherwise stated.    Objective:    /73   Pulse 82   Temp 97.6 °F (36.4 °C) (Temporal)   Resp 16   Ht 1.778 m (5' 10\")   Wt 116.6 kg (257 lb)   SpO2 97%   BMI 36.88 kg/m²     Exam is as noted by resident with the following changes, additions or corrections:    General:  NAD; alert & oriented x 3   Heart:  RRR, no murmurs, gallops, or rubs.  Lungs:  CTA bilaterally, no wheeze, rales or rhonchi  Abd: bowel sounds present, nontender, nondistended, no masses  Extrem:  No clubbing, cyanosis, or edema    Assessment/Plan:  Mood disorder  PTSD  Continue current meds  Dm2: controlled  Follow with psych     Attending Physician Statement  I have reviewed the chart, including any radiology or labs. I have discussed the case, including pertinent history and exam findings with the resident.  I agree with the assessment, plan and orders as documented by the resident.  Please refer to the resident note for additional information.      Electronically signed by AVIVA RAI MD on 8/9/2024 at 11:07 AM

## 2024-08-12 ASSESSMENT — ENCOUNTER SYMPTOMS
RHINORRHEA: 0
CHEST TIGHTNESS: 0
CONSTIPATION: 0
SHORTNESS OF BREATH: 0
NAUSEA: 0
DIARRHEA: 0
ABDOMINAL PAIN: 0
COUGH: 0
SORE THROAT: 0
VOMITING: 0

## 2024-08-12 NOTE — TELEPHONE ENCOUNTER
Last Appointment   8/9/2024  Next Appointment  Visit date not found      Return in about 3 months (around 11/9/2024) for mood follow up.

## 2024-08-13 ENCOUNTER — TELEPHONE (OUTPATIENT)
Dept: FAMILY MEDICINE CLINIC | Age: 54
End: 2024-08-13

## 2024-08-13 RX ORDER — LANOLIN ALCOHOL/MO/W.PET/CERES
CREAM (GRAM) TOPICAL
Qty: 30 TABLET | Refills: 2 | Status: SHIPPED | OUTPATIENT
Start: 2024-08-13

## 2024-08-13 NOTE — TELEPHONE ENCOUNTER
Last Appointment   8/9/2024  Next Appointment  Visit date not found    Patient called stating that her Victoza is on back order. She would like something else be called in to the pharmacy.

## 2024-08-14 DIAGNOSIS — E11.9 TYPE 2 DIABETES MELLITUS WITHOUT COMPLICATION, WITHOUT LONG-TERM CURRENT USE OF INSULIN (HCC): Primary | ICD-10-CM

## 2024-08-14 RX ORDER — ORAL SEMAGLUTIDE 3 MG/1
3 TABLET ORAL DAILY
Qty: 30 TABLET | Refills: 1 | Status: SHIPPED | OUTPATIENT
Start: 2024-08-14

## 2024-08-26 ENCOUNTER — PATIENT MESSAGE (OUTPATIENT)
Dept: FAMILY MEDICINE CLINIC | Age: 54
End: 2024-08-26

## 2024-08-26 DIAGNOSIS — J30.89 SEASONAL ALLERGIC RHINITIS DUE TO OTHER ALLERGIC TRIGGER: ICD-10-CM

## 2024-08-26 RX ORDER — DIVALPROEX SODIUM 250 MG/1
250 TABLET, DELAYED RELEASE ORAL EVERY 8 HOURS SCHEDULED
Qty: 90 TABLET | Refills: 0 | Status: SHIPPED | OUTPATIENT
Start: 2024-08-26 | End: 2024-09-25

## 2024-08-26 RX ORDER — ARIPIPRAZOLE 5 MG/1
5 TABLET ORAL DAILY
Qty: 30 TABLET | Refills: 0 | Status: SHIPPED | OUTPATIENT
Start: 2024-08-26 | End: 2024-09-25

## 2024-08-26 RX ORDER — CETIRIZINE HYDROCHLORIDE 10 MG/1
10 TABLET ORAL DAILY
Qty: 30 TABLET | Refills: 0 | Status: SHIPPED | OUTPATIENT
Start: 2024-08-26

## 2024-08-26 RX ORDER — PRAZOSIN HYDROCHLORIDE 1 MG/1
1 CAPSULE ORAL NIGHTLY
Qty: 30 CAPSULE | Refills: 0 | Status: SHIPPED | OUTPATIENT
Start: 2024-08-26 | End: 2024-09-25

## 2024-08-26 RX ORDER — HYDROXYZINE PAMOATE 50 MG/1
50 CAPSULE ORAL 3 TIMES DAILY PRN
Qty: 90 CAPSULE | Refills: 0 | Status: SHIPPED | OUTPATIENT
Start: 2024-08-26 | End: 2024-09-25

## 2024-08-28 ENCOUNTER — OFFICE VISIT (OUTPATIENT)
Dept: FAMILY MEDICINE CLINIC | Age: 54
End: 2024-08-28
Payer: COMMERCIAL

## 2024-08-28 VITALS
TEMPERATURE: 97.4 F | WEIGHT: 256.17 LBS | BODY MASS INDEX: 36.67 KG/M2 | SYSTOLIC BLOOD PRESSURE: 95 MMHG | RESPIRATION RATE: 16 BRPM | HEART RATE: 98 BPM | HEIGHT: 70 IN | DIASTOLIC BLOOD PRESSURE: 68 MMHG | OXYGEN SATURATION: 98 %

## 2024-08-28 DIAGNOSIS — F43.10 PTSD (POST-TRAUMATIC STRESS DISORDER): ICD-10-CM

## 2024-08-28 DIAGNOSIS — M54.50 ACUTE BILATERAL LOW BACK PAIN WITHOUT SCIATICA: Primary | ICD-10-CM

## 2024-08-28 PROCEDURE — G8427 DOCREV CUR MEDS BY ELIG CLIN: HCPCS

## 2024-08-28 PROCEDURE — 99213 OFFICE O/P EST LOW 20 MIN: CPT

## 2024-08-28 PROCEDURE — G8417 CALC BMI ABV UP PARAM F/U: HCPCS

## 2024-08-28 PROCEDURE — 1111F DSCHRG MED/CURRENT MED MERGE: CPT

## 2024-08-28 PROCEDURE — 3017F COLORECTAL CA SCREEN DOC REV: CPT

## 2024-08-28 PROCEDURE — 1036F TOBACCO NON-USER: CPT

## 2024-08-28 RX ORDER — LIDOCAINE 50 MG/G
1 PATCH TOPICAL DAILY
Qty: 30 PATCH | Refills: 0 | Status: SHIPPED | OUTPATIENT
Start: 2024-08-28 | End: 2024-09-27
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
## 2024-08-28 NOTE — PROGRESS NOTES
[facility]  Precepting Note    Subjective:  Back pain, hx of cervical compression fracture, L5-S1 stenosis  Followed wit pain management for injections last year   Lower back pain radiating to thighs   No improvement with Tylenol, no NSAIDs due to gastric bypass  Hx of PTSD and anxiety, needs psychiatry referral    ROS otherwise negative     Past medical, surgical, family and social history were reviewed, non-contributory, and unchanged unless otherwise stated.    Objective:    BP 95/68   Pulse 98   Temp 97.4 °F (36.3 °C) (Temporal)   Resp 16   Ht 1.778 m (5' 10\")   Wt 116.2 kg (256 lb 2.8 oz)   SpO2 98%   BMI 36.76 kg/m²     Exam is as noted by resident with the following changes, additions or corrections:    General:  NAD; alert & oriented x 3   Heart:  RRR, no murmurs, gallops, or rubs.  Lungs:  CTA bilaterally, no wheeze, rales or rhonchi  Abd: bowel sounds present, nontender, nondistended, no masses  Extrem:  No clubbing, cyanosis, or edema  Mildline lumbar spinal tenderness, SLR negative bilaterally     Assessment/Plan:  Chronic back pain- to see pain management, Lidocaine. Refer to PT  Hx of anxiety of PTSD- referral to psychiatry     Attending Physician Statement  I have reviewed the chart, including any radiology or labs. I have discussed the case, including pertinent history and exam findings with the resident.  I agree with the assessment, plan and orders as documented by the resident.  Please refer to the resident note for additional information.      Electronically signed by Rosalie Vargas MD on 8/28/2024 at 3:23 PM

## 2024-08-29 ENCOUNTER — TELEPHONE (OUTPATIENT)
Dept: FAMILY MEDICINE CLINIC | Age: 54
End: 2024-08-29

## 2024-08-29 ASSESSMENT — ENCOUNTER SYMPTOMS
ABDOMINAL PAIN: 0
SHORTNESS OF BREATH: 0
VOMITING: 0
CONSTIPATION: 0
COUGH: 0
BACK PAIN: 1
CHEST TIGHTNESS: 0
DIARRHEA: 0
NAUSEA: 0

## 2024-08-29 NOTE — TELEPHONE ENCOUNTER
Pt calling in to let office know that she has found somewhere to go for PT- she is going to be going to [facility]. They also do Chiropractics and pt is wondering if PCP feels that would benefit her? And if so, if she'll place referral for chiropractics to send to them as well.     Please advise

## 2024-08-30 NOTE — TELEPHONE ENCOUNTER
Spoke with KEON, referrals for the chiropractor is not needed if patient has a referral for PT. They can determine if patient would benefit from seeing the chiropractor as well.     Patient notified and verbalizes understanding.

## 2024-09-12 ENCOUNTER — OFFICE VISIT (OUTPATIENT)
Dept: FAMILY MEDICINE CLINIC | Age: 54
End: 2024-09-12
Payer: COMMERCIAL

## 2024-09-12 VITALS
SYSTOLIC BLOOD PRESSURE: 106 MMHG | OXYGEN SATURATION: 97 % | BODY MASS INDEX: 38.22 KG/M2 | DIASTOLIC BLOOD PRESSURE: 54 MMHG | RESPIRATION RATE: 16 BRPM | TEMPERATURE: 97.2 F | HEART RATE: 79 BPM | WEIGHT: 267 LBS | HEIGHT: 70 IN

## 2024-09-12 DIAGNOSIS — F32.A DEPRESSION, UNSPECIFIED DEPRESSION TYPE: Primary | ICD-10-CM

## 2024-09-12 DIAGNOSIS — Z00.00 ENCOUNTER FOR WELL ADULT EXAM WITHOUT ABNORMAL FINDINGS: ICD-10-CM

## 2024-09-12 DIAGNOSIS — Z00.00 ANNUAL PHYSICAL EXAM: ICD-10-CM

## 2024-09-12 PROCEDURE — 99396 PREV VISIT EST AGE 40-64: CPT

## 2024-09-12 RX ORDER — BLOOD-GLUCOSE SENSOR
EACH MISCELLANEOUS
COMMUNITY
Start: 2024-09-10

## 2024-09-12 ASSESSMENT — PATIENT HEALTH QUESTIONNAIRE - PHQ9
5. POOR APPETITE OR OVEREATING: NEARLY EVERY DAY
SUM OF ALL RESPONSES TO PHQ QUESTIONS 1-9: 13
7. TROUBLE CONCENTRATING ON THINGS, SUCH AS READING THE NEWSPAPER OR WATCHING TELEVISION: SEVERAL DAYS
3. TROUBLE FALLING OR STAYING ASLEEP: SEVERAL DAYS
6. FEELING BAD ABOUT YOURSELF - OR THAT YOU ARE A FAILURE OR HAVE LET YOURSELF OR YOUR FAMILY DOWN: SEVERAL DAYS
4. FEELING TIRED OR HAVING LITTLE ENERGY: SEVERAL DAYS
9. THOUGHTS THAT YOU WOULD BE BETTER OFF DEAD, OR OF HURTING YOURSELF: NOT AT ALL
1. LITTLE INTEREST OR PLEASURE IN DOING THINGS: NEARLY EVERY DAY
SUM OF ALL RESPONSES TO PHQ QUESTIONS 1-9: 13
SUM OF ALL RESPONSES TO PHQ QUESTIONS 1-9: 13
SUM OF ALL RESPONSES TO PHQ9 QUESTIONS 1 & 2: 5
SUM OF ALL RESPONSES TO PHQ QUESTIONS 1-9: 13
8. MOVING OR SPEAKING SO SLOWLY THAT OTHER PEOPLE COULD HAVE NOTICED. OR THE OPPOSITE, BEING SO FIGETY OR RESTLESS THAT YOU HAVE BEEN MOVING AROUND A LOT MORE THAN USUAL: SEVERAL DAYS
10. IF YOU CHECKED OFF ANY PROBLEMS, HOW DIFFICULT HAVE THESE PROBLEMS MADE IT FOR YOU TO DO YOUR WORK, TAKE CARE OF THINGS AT HOME, OR GET ALONG WITH OTHER PEOPLE: SOMEWHAT DIFFICULT
2. FEELING DOWN, DEPRESSED OR HOPELESS: MORE THAN HALF THE DAYS

## 2024-09-13 ENCOUNTER — OFFICE VISIT (OUTPATIENT)
Dept: BARIATRICS/WEIGHT MGMT | Age: 54
End: 2024-09-13
Payer: COMMERCIAL

## 2024-09-13 VITALS
SYSTOLIC BLOOD PRESSURE: 100 MMHG | HEIGHT: 70 IN | WEIGHT: 270 LBS | DIASTOLIC BLOOD PRESSURE: 62 MMHG | BODY MASS INDEX: 38.65 KG/M2 | HEART RATE: 62 BPM

## 2024-09-13 DIAGNOSIS — E66.01 MORBID OBESITY WITH BMI OF 45.0-49.9, ADULT (HCC): ICD-10-CM

## 2024-09-13 DIAGNOSIS — K91.2 MALNUTRITION FOLLOWING GASTROINTESTINAL SURGERY: Primary | ICD-10-CM

## 2024-09-13 PROCEDURE — 99211 OFF/OP EST MAY X REQ PHY/QHP: CPT | Performed by: NURSE PRACTITIONER

## 2024-09-13 PROCEDURE — 1036F TOBACCO NON-USER: CPT | Performed by: NURSE PRACTITIONER

## 2024-09-13 PROCEDURE — G8417 CALC BMI ABV UP PARAM F/U: HCPCS | Performed by: NURSE PRACTITIONER

## 2024-09-13 PROCEDURE — 99213 OFFICE O/P EST LOW 20 MIN: CPT | Performed by: NURSE PRACTITIONER

## 2024-09-13 PROCEDURE — G8427 DOCREV CUR MEDS BY ELIG CLIN: HCPCS | Performed by: NURSE PRACTITIONER

## 2024-09-13 PROCEDURE — 3017F COLORECTAL CA SCREEN DOC REV: CPT | Performed by: NURSE PRACTITIONER

## 2024-09-13 RX ORDER — M-VIT,TX,IRON,MINS/CALC/FOLIC 27MG-0.4MG
1 TABLET ORAL DAILY
Qty: 30 TABLET | Refills: 2 | Status: SHIPPED | OUTPATIENT
Start: 2024-09-13 | End: 2025-09-13

## 2024-09-13 RX ORDER — TOPIRAMATE 25 MG/1
25 TABLET, FILM COATED ORAL NIGHTLY
Qty: 30 TABLET | Refills: 2 | Status: SHIPPED | OUTPATIENT
Start: 2024-09-13 | End: 2024-12-12

## 2024-09-16 DIAGNOSIS — I10 PRIMARY HYPERTENSION: ICD-10-CM

## 2024-09-16 RX ORDER — VALSARTAN 160 MG/1
160 TABLET ORAL DAILY
Qty: 90 TABLET | Refills: 1 | Status: SHIPPED | OUTPATIENT
Start: 2024-09-16

## 2024-09-18 ENCOUNTER — LAB (OUTPATIENT)
Dept: FAMILY MEDICINE CLINIC | Age: 54
End: 2024-09-18

## 2024-09-18 ENCOUNTER — PREP FOR PROCEDURE (OUTPATIENT)
Dept: PAIN MANAGEMENT | Age: 54
End: 2024-09-18

## 2024-09-18 ENCOUNTER — OFFICE VISIT (OUTPATIENT)
Dept: PAIN MANAGEMENT | Age: 54
End: 2024-09-18
Payer: COMMERCIAL

## 2024-09-18 VITALS
RESPIRATION RATE: 16 BRPM | WEIGHT: 270 LBS | DIASTOLIC BLOOD PRESSURE: 72 MMHG | OXYGEN SATURATION: 95 % | TEMPERATURE: 98.3 F | HEIGHT: 71 IN | HEART RATE: 88 BPM | SYSTOLIC BLOOD PRESSURE: 119 MMHG | BODY MASS INDEX: 37.8 KG/M2

## 2024-09-18 DIAGNOSIS — M54.16 LUMBAR RADICULAR PAIN: Primary | ICD-10-CM

## 2024-09-18 DIAGNOSIS — M54.16 LUMBAR RADICULAR PAIN: ICD-10-CM

## 2024-09-18 DIAGNOSIS — M48.061 SPINAL STENOSIS OF LUMBAR REGION, UNSPECIFIED WHETHER NEUROGENIC CLAUDICATION PRESENT: ICD-10-CM

## 2024-09-18 DIAGNOSIS — E66.9 OBESITY, UNSPECIFIED CLASSIFICATION, UNSPECIFIED OBESITY TYPE, UNSPECIFIED WHETHER SERIOUS COMORBIDITY PRESENT: ICD-10-CM

## 2024-09-18 DIAGNOSIS — M47.817 LUMBOSACRAL SPONDYLOSIS WITHOUT MYELOPATHY: ICD-10-CM

## 2024-09-18 DIAGNOSIS — M51.36 DDD (DEGENERATIVE DISC DISEASE), LUMBAR: Primary | ICD-10-CM

## 2024-09-18 DIAGNOSIS — Z00.00 ANNUAL PHYSICAL EXAM: ICD-10-CM

## 2024-09-18 LAB
CHOLESTEROL, TOTAL: 195 MG/DL
HBA1C MFR BLD: 7.2 % (ref 4–5.6)
HDLC SERPL-MCNC: 66 MG/DL
LDL CHOLESTEROL: 101 MG/DL
TRIGL SERPL-MCNC: 141 MG/DL
VLDLC SERPL CALC-MCNC: 28 MG/DL

## 2024-09-18 PROCEDURE — 3017F COLORECTAL CA SCREEN DOC REV: CPT | Performed by: ANESTHESIOLOGY

## 2024-09-18 PROCEDURE — 1036F TOBACCO NON-USER: CPT | Performed by: ANESTHESIOLOGY

## 2024-09-18 PROCEDURE — 99214 OFFICE O/P EST MOD 30 MIN: CPT | Performed by: ANESTHESIOLOGY

## 2024-09-18 PROCEDURE — G8417 CALC BMI ABV UP PARAM F/U: HCPCS | Performed by: ANESTHESIOLOGY

## 2024-09-18 PROCEDURE — G8427 DOCREV CUR MEDS BY ELIG CLIN: HCPCS | Performed by: ANESTHESIOLOGY

## 2024-09-18 PROCEDURE — 99214 OFFICE O/P EST MOD 30 MIN: CPT

## 2024-09-18 RX ORDER — SODIUM CHLORIDE 0.9 % (FLUSH) 0.9 %
5-40 SYRINGE (ML) INJECTION PRN
Status: CANCELLED | OUTPATIENT
Start: 2024-09-18

## 2024-09-18 RX ORDER — SODIUM CHLORIDE 9 MG/ML
INJECTION, SOLUTION INTRAVENOUS PRN
Status: CANCELLED | OUTPATIENT
Start: 2024-09-18

## 2024-09-18 RX ORDER — SODIUM CHLORIDE 0.9 % (FLUSH) 0.9 %
5-40 SYRINGE (ML) INJECTION EVERY 12 HOURS SCHEDULED
Status: CANCELLED | OUTPATIENT
Start: 2024-09-18

## 2024-09-18 NOTE — H&P (VIEW-ONLY)
unspecified whether neurogenic claudication present          4. Compression fracture of lumbar vertebra, unspecified lumbar vertebral level, sequela             54 y.o.  female with H/o low back pain.     Lumbar DDD and VCF- healed/ old.     Has been evaluated by NSG- conservative treatment.     X-ray and MRI of LS spine reviewed.    Had side effect to gabapentin.     NSG- Notes reviewed.    S/P TFESI on 4/6/2023 with > 70% pain relief.     S/p LESI L3-4 on 6/15/2023 had helped very well with > 80-90% pain relief. Over all doing well.    Uses TENS unit as needed - helps when she uses it.    Had noted recent exacerbation of pain recurrence of similar pain.    Has been doing PT / HEP. Has been evaluated by Dr. Enrique at [facility].     PLAN:    LESI L3-4 right paramedian under.  Guidance.  IV discussed patient agreed proceed.    Also has  significant facet tenderness. If continued axial pain, consider lumbar facet MBNB in future.    PT/ HEP. Aqua therapy- completed    TENS unit for permanent use.     ZT lido patch for local use.      Note: Has been evaluated by Dr. Dempsey for disability eval on 5/19/2023    UDS/ Buccal screen 2/8/2023- REVIEWED    [place] reviewed.     Counseling : Patient encouraged to stay active and  to continue Regular home exercise program as tolerated - stretching / strengthening.  Treatment plan discussed with the patient including medication and procedure side effects.     Controlled Substances Monitoring: [place] reviewed.    Ben Steinberg MD

## 2024-09-23 ENCOUNTER — OFFICE VISIT (OUTPATIENT)
Dept: FAMILY MEDICINE CLINIC | Age: 54
End: 2024-09-23
Payer: COMMERCIAL

## 2024-09-23 VITALS
WEIGHT: 275.2 LBS | DIASTOLIC BLOOD PRESSURE: 87 MMHG | RESPIRATION RATE: 16 BRPM | HEART RATE: 70 BPM | SYSTOLIC BLOOD PRESSURE: 126 MMHG | TEMPERATURE: 98.2 F | HEIGHT: 71 IN | BODY MASS INDEX: 38.53 KG/M2 | OXYGEN SATURATION: 100 %

## 2024-09-23 DIAGNOSIS — M54.50 ACUTE BILATERAL LOW BACK PAIN WITHOUT SCIATICA: ICD-10-CM

## 2024-09-23 DIAGNOSIS — F32.A DEPRESSION, UNSPECIFIED DEPRESSION TYPE: Primary | ICD-10-CM

## 2024-09-23 DIAGNOSIS — E11.9 TYPE 2 DIABETES MELLITUS WITHOUT COMPLICATION, WITHOUT LONG-TERM CURRENT USE OF INSULIN (HCC): ICD-10-CM

## 2024-09-23 PROCEDURE — 2022F DILAT RTA XM EVC RTNOPTHY: CPT

## 2024-09-23 PROCEDURE — 99213 OFFICE O/P EST LOW 20 MIN: CPT

## 2024-09-23 PROCEDURE — 3017F COLORECTAL CA SCREEN DOC REV: CPT

## 2024-09-23 PROCEDURE — 1036F TOBACCO NON-USER: CPT

## 2024-09-23 PROCEDURE — G8417 CALC BMI ABV UP PARAM F/U: HCPCS

## 2024-09-23 PROCEDURE — 3051F HG A1C>EQUAL 7.0%<8.0%: CPT

## 2024-09-23 PROCEDURE — G8427 DOCREV CUR MEDS BY ELIG CLIN: HCPCS

## 2024-09-23 RX ORDER — PRAZOSIN HYDROCHLORIDE 1 MG/1
1 CAPSULE ORAL NIGHTLY
Qty: 30 CAPSULE | Refills: 0 | Status: CANCELLED | OUTPATIENT
Start: 2024-09-23 | End: 2024-10-23

## 2024-09-23 RX ORDER — LIDOCAINE 50 MG/G
1 PATCH TOPICAL DAILY
Qty: 30 PATCH | Refills: 0 | Status: SHIPPED | OUTPATIENT
Start: 2024-09-23 | End: 2024-10-23

## 2024-09-23 RX ORDER — ARIPIPRAZOLE 5 MG/1
5 TABLET ORAL DAILY
Qty: 30 TABLET | Refills: 0 | Status: CANCELLED | OUTPATIENT
Start: 2024-09-23 | End: 2024-10-23

## 2024-09-23 RX ORDER — DIVALPROEX SODIUM 250 MG/1
250 TABLET, DELAYED RELEASE ORAL EVERY 8 HOURS SCHEDULED
Qty: 90 TABLET | Refills: 0 | Status: CANCELLED | OUTPATIENT
Start: 2024-09-23 | End: 2024-10-23

## 2024-09-23 ASSESSMENT — PATIENT HEALTH QUESTIONNAIRE - PHQ9
9. THOUGHTS THAT YOU WOULD BE BETTER OFF DEAD, OR OF HURTING YOURSELF: NOT AT ALL
4. FEELING TIRED OR HAVING LITTLE ENERGY: SEVERAL DAYS
3. TROUBLE FALLING OR STAYING ASLEEP: NEARLY EVERY DAY
10. IF YOU CHECKED OFF ANY PROBLEMS, HOW DIFFICULT HAVE THESE PROBLEMS MADE IT FOR YOU TO DO YOUR WORK, TAKE CARE OF THINGS AT HOME, OR GET ALONG WITH OTHER PEOPLE: EXTREMELY DIFFICULT
SUM OF ALL RESPONSES TO PHQ QUESTIONS 1-9: 11
1. LITTLE INTEREST OR PLEASURE IN DOING THINGS: SEVERAL DAYS
SUM OF ALL RESPONSES TO PHQ9 QUESTIONS 1 & 2: 2
SUM OF ALL RESPONSES TO PHQ QUESTIONS 1-9: 11
8. MOVING OR SPEAKING SO SLOWLY THAT OTHER PEOPLE COULD HAVE NOTICED. OR THE OPPOSITE, BEING SO FIGETY OR RESTLESS THAT YOU HAVE BEEN MOVING AROUND A LOT MORE THAN USUAL: SEVERAL DAYS
7. TROUBLE CONCENTRATING ON THINGS, SUCH AS READING THE NEWSPAPER OR WATCHING TELEVISION: NOT AT ALL
2. FEELING DOWN, DEPRESSED OR HOPELESS: SEVERAL DAYS
SUM OF ALL RESPONSES TO PHQ QUESTIONS 1-9: 11
SUM OF ALL RESPONSES TO PHQ QUESTIONS 1-9: 11
6. FEELING BAD ABOUT YOURSELF - OR THAT YOU ARE A FAILURE OR HAVE LET YOURSELF OR YOUR FAMILY DOWN: SEVERAL DAYS
5. POOR APPETITE OR OVEREATING: NEARLY EVERY DAY

## 2024-09-23 ASSESSMENT — ENCOUNTER SYMPTOMS
COUGH: 0
DIARRHEA: 0
SHORTNESS OF BREATH: 0
NAUSEA: 0
ABDOMINAL PAIN: 0
CHEST TIGHTNESS: 0
VOMITING: 0
CONSTIPATION: 0

## 2024-09-24 ENCOUNTER — TELEPHONE (OUTPATIENT)
Dept: PAIN MANAGEMENT | Age: 54
End: 2024-09-24

## 2024-09-24 NOTE — TELEPHONE ENCOUNTER
Call to advise Mae that her procedure was denied by [facility]. She reports that her pain does shoot down her right leg. She reports she had about 90% relief from her last epidural on 6/15/2023 until recently when it has returned. Will set up a peer to peer for the denial.

## 2024-09-25 ENCOUNTER — PATIENT MESSAGE (OUTPATIENT)
Dept: FAMILY MEDICINE CLINIC | Age: 54
End: 2024-09-25

## 2024-09-26 ENCOUNTER — TELEPHONE (OUTPATIENT)
Dept: PAIN MANAGEMENT | Age: 54
End: 2024-09-26

## 2024-09-26 DIAGNOSIS — M54.16 LUMBAR RADICULOPATHY: ICD-10-CM

## 2024-10-01 NOTE — PROGRESS NOTES
[facility] PAIN MANAGEMENT  INSTRUCTIONS  ...........................................................................................................................................     [x] Parking the day of Surgery is located in the Main Entrance lot.  Upon entering the door, make immediate right into the surgery reception room    [x]  Bring photo ID and insurance card     [x] You may have a light breakfast day of procedure    [x]  Wear loose comfortable clothing    [x]  Please follow instructions for medications as given per Dr's office    [x] You can expect a call the business day prior to procedure to notify you of your arrival time     [x] Please arrange for     []  Other instructions

## 2024-10-03 ENCOUNTER — HOSPITAL ENCOUNTER (OUTPATIENT)
Dept: GENERAL RADIOLOGY | Age: 54
Discharge: HOME | End: 2024-10-05
Attending: ANESTHESIOLOGY
Payer: COMMERCIAL

## 2024-10-03 ENCOUNTER — HOSPITAL ENCOUNTER (OUTPATIENT)
Age: 54
Setting detail: OUTPATIENT SURGERY
Discharge: HOME | End: 2024-10-03
Attending: ANESTHESIOLOGY | Admitting: ANESTHESIOLOGY
Payer: COMMERCIAL

## 2024-10-03 VITALS
DIASTOLIC BLOOD PRESSURE: 68 MMHG | RESPIRATION RATE: 18 BRPM | WEIGHT: 275 LBS | BODY MASS INDEX: 39.37 KG/M2 | HEIGHT: 70 IN | OXYGEN SATURATION: 100 % | HEART RATE: 90 BPM | SYSTOLIC BLOOD PRESSURE: 112 MMHG

## 2024-10-03 DIAGNOSIS — R52 PAIN MANAGEMENT: ICD-10-CM

## 2024-10-03 LAB — GLUCOSE BLD-MCNC: 168 MG/DL (ref 74–99)

## 2024-10-03 PROCEDURE — 82962 GLUCOSE BLOOD TEST: CPT

## 2024-10-03 PROCEDURE — 6360000004 HC RX CONTRAST MEDICATION: Performed by: ANESTHESIOLOGY

## 2024-10-03 PROCEDURE — 2500000003 HC RX 250 WO HCPCS: Performed by: ANESTHESIOLOGY

## 2024-10-03 PROCEDURE — 3600000002 HC SURGERY LEVEL 2 BASE: Performed by: ANESTHESIOLOGY

## 2024-10-03 PROCEDURE — 62323 NJX INTERLAMINAR LMBR/SAC: CPT | Performed by: ANESTHESIOLOGY

## 2024-10-03 PROCEDURE — 3600000012 HC SURGERY LEVEL 2 ADDTL 15MIN: Performed by: ANESTHESIOLOGY

## 2024-10-03 PROCEDURE — 7100000010 HC PHASE II RECOVERY - FIRST 15 MIN: Performed by: ANESTHESIOLOGY

## 2024-10-03 PROCEDURE — 7100000011 HC PHASE II RECOVERY - ADDTL 15 MIN: Performed by: ANESTHESIOLOGY

## 2024-10-03 PROCEDURE — 6360000002 HC RX W HCPCS: Performed by: ANESTHESIOLOGY

## 2024-10-03 PROCEDURE — 2709999900 HC NON-CHARGEABLE SUPPLY: Performed by: ANESTHESIOLOGY

## 2024-10-03 RX ORDER — LIDOCAINE HYDROCHLORIDE 5 MG/ML
INJECTION, SOLUTION INFILTRATION; INTRAVENOUS PRN
Status: DISCONTINUED | OUTPATIENT
Start: 2024-10-03 | End: 2024-10-03 | Stop reason: ALTCHOICE

## 2024-10-03 RX ORDER — SODIUM CHLORIDE 0.9 % (FLUSH) 0.9 %
5-40 SYRINGE (ML) INJECTION EVERY 12 HOURS SCHEDULED
Status: DISCONTINUED | OUTPATIENT
Start: 2024-10-03 | End: 2024-10-03 | Stop reason: HOSPADM

## 2024-10-03 RX ORDER — SODIUM CHLORIDE 9 MG/ML
INJECTION, SOLUTION INTRAVENOUS PRN
Status: DISCONTINUED | OUTPATIENT
Start: 2024-10-03 | End: 2024-10-03 | Stop reason: HOSPADM

## 2024-10-03 RX ORDER — IOPAMIDOL 612 MG/ML
INJECTION, SOLUTION INTRATHECAL PRN
Status: DISCONTINUED | OUTPATIENT
Start: 2024-10-03 | End: 2024-10-03 | Stop reason: ALTCHOICE

## 2024-10-03 RX ORDER — SODIUM CHLORIDE 0.9 % (FLUSH) 0.9 %
5-40 SYRINGE (ML) INJECTION PRN
Status: DISCONTINUED | OUTPATIENT
Start: 2024-10-03 | End: 2024-10-03 | Stop reason: HOSPADM

## 2024-10-03 RX ORDER — METHYLPREDNISOLONE ACETATE 40 MG/ML
INJECTION, SUSPENSION INTRA-ARTICULAR; INTRALESIONAL; INTRAMUSCULAR; SOFT TISSUE PRN
Status: DISCONTINUED | OUTPATIENT
Start: 2024-10-03 | End: 2024-10-03 | Stop reason: ALTCHOICE

## 2024-10-03 ASSESSMENT — PAIN - FUNCTIONAL ASSESSMENT: PAIN_FUNCTIONAL_ASSESSMENT: 0-10

## 2024-10-03 ASSESSMENT — PAIN DESCRIPTION - DESCRIPTORS: DESCRIPTORS: DISCOMFORT

## 2024-10-03 NOTE — INTERVAL H&P NOTE
Update History & Physical    The patient's History and Physical of September 18, 2024 was reviewed with the patient and I examined the patient. There was no change. The surgical site was confirmed by the patient and me.     Plan: The risks, benefits, expected outcome, and alternative to the recommended procedure have been discussed with the patient. Patient understands and wants to proceed with the procedure.     Electronically signed by Ben Steinberg MD on 10/3/2024        [id number] Detailed

## 2024-10-03 NOTE — DISCHARGE INSTRUCTIONS
[facility] Pain Management Department  [place] [phone]  Dr. Idris Garza   Post-Pain Block/Radiofrequency  Home Going Instructions    1-Go home, rest for the remainder of the day  2-Please do not lift over 20 pounds the day of the injection  3-If you received sedation No: alcohol, driving, operating lawn mowers, plows, tractors or other dangerous equipment until next morning. Do not make important decisions or sign legal documents for 24 hours. You may experience light headedness, dizziness, nausea or sleepiness after sedation. Do not stay alone. A responsible adult must be with you for 24 hours. You could be nauseated from the medications you have received. Your IV site may be sore and bruised.    4-No dietary restrictions     5-Resume all medications the same day, blood thinners to be resumed 24 hours after injection if you were instructed to stop any.    6-Keep the surgical site clean and dry, you may shower the next morning and remove the      dressing.     7- No sitz baths, tub baths or hot tubs/swimming for 24 hours.       8- If you have any pain at the injection site(s), application of an ice pack to the area should be       helpful, 20 minutes on/20 minutes off for next 48 hours.  9- Call [facility] Pain Management immediately at if you develop.  Fever greater than 100.4 F  Have bleeding or drainage from the puncture site  Have progressive Leg/arm numbness and or weakness  Loss of control of bowel and or bladder (wet/soil yourself)  Severe headache with inability to lift head  10-You may return to work the next day

## 2024-10-03 NOTE — OP NOTE
Operative Note      Patient: Mae Calvillo  YOB: 1970  MRN: [id number]    Date of Procedure: 10/3/2024    Pre-Op Diagnosis Codes:      * Lumbar radiculopathy [M54.16]    Post-Op Diagnosis: Same       Procedure(s):  LUMBAR EPIDURAL STEROID INJECTION L5-S1 UNDER FLUOROSCOPIC GUIDANCE    Surgeon(s):  Ben Steinberg MD    Assistant:   * No surgical staff found *    Anesthesia: Local    Estimated Blood Loss (mL): Minimal    Complications: None    Specimens:   * No specimens in log *    Implants:  * No implants in log *      Drains: * No LDAs found *    Findings:  Infection Present At Time Of Surgery (PATOS) (choose all levels that have infection present):  No infection present  Other Findings: good needle placement    Detailed Description of Procedure:   10/3/2024    Patient: Mae Calvillo  :  1970  Age:  54 y.o.  Sex:  female     PRE-OPERATIVE DIAGNOSIS: Lumbar disc displacement, lumbar radiculopathy.    POST-OPERATIVE DIAGNOSIS: Same.    PROCEDURE: Fluoroscopic guided therapeutic lumbar epidural steroid injection at L5-S1 level.    SURGEON: Ben Steinberg MD    ANESTHESIA: Local    ESTIMATED BLOOD LOSS: None.  ______________________________________________________________________    BRIEF HISTORY:  Mae Calvillo comes in today for  lumbar epidural injection at L5-S1 level. The potential complications of this procedure were discussed with her again today.  She has elected to undergo the aforementioned procedure.    Mae’s complete History & Physical examination were reviewed in depth, a copy of which is in the chart.      DESCRIPTION OF PROCEDURE:    After confirming written and informed consent, a time-out was performed and Mae’s name and date of birth, the procedure to be performed as well as the plan for the location of the needle insertion were confirmed.    The patient was brought into the procedure room and placed in the prone position on the

## 2024-10-11 ENCOUNTER — TELEMEDICINE (OUTPATIENT)
Dept: FAMILY MEDICINE CLINIC | Age: 54
End: 2024-10-11
Payer: COMMERCIAL

## 2024-10-11 DIAGNOSIS — F43.10 PTSD (POST-TRAUMATIC STRESS DISORDER): ICD-10-CM

## 2024-10-11 DIAGNOSIS — R05.1 ACUTE COUGH: Primary | ICD-10-CM

## 2024-10-11 DIAGNOSIS — E11.9 TYPE 2 DIABETES MELLITUS WITHOUT COMPLICATION, WITHOUT LONG-TERM CURRENT USE OF INSULIN (HCC): ICD-10-CM

## 2024-10-11 PROCEDURE — 99213 OFFICE O/P EST LOW 20 MIN: CPT

## 2024-10-11 PROCEDURE — G8427 DOCREV CUR MEDS BY ELIG CLIN: HCPCS

## 2024-10-11 PROCEDURE — 2022F DILAT RTA XM EVC RTNOPTHY: CPT

## 2024-10-11 PROCEDURE — 3051F HG A1C>EQUAL 7.0%<8.0%: CPT

## 2024-10-11 PROCEDURE — 3017F COLORECTAL CA SCREEN DOC REV: CPT

## 2024-10-11 RX ORDER — BENZONATATE 100 MG/1
100 CAPSULE ORAL 3 TIMES DAILY PRN
Qty: 30 CAPSULE | Refills: 0 | Status: SHIPPED | OUTPATIENT
Start: 2024-10-11 | End: 2024-10-21

## 2024-10-11 NOTE — PROGRESS NOTES
S: 54 y.o. female with   Chief Complaint   Patient presents with    Cough     About a week    Nasal Congestion     Pt is here for a video visit    Pt has viral illness.  She is having a worsening cough.  Other things are getting better. Tessalon Perles have helped in the past.    O: VS:  vitals were not taken for this visit.   BP Readings from Last 3 Encounters:   10/03/24 112/68   09/23/24 126/87   09/18/24 119/72     See resident note      Impression/Plan:   1) post-viral cough - pt ed on ways to decrease cough.  Tessalon Perles given.        Health Maintenance Due   Topic Date Due    Diabetic retinal exam  Never done         Attending Physician Statement  I have discussed the case, including pertinent history and exam findings with the resident.  I agree with the documented assessment and plan.      Deb Witt MD

## 2024-10-11 NOTE — PROGRESS NOTES
[facility]  Department of Family Medicine  Family Medicine Residency Program      Patient: Mae Calvillo 54 y.o. female     Date of Service: 10/11/24      Chief complaint:   Chief Complaint   Patient presents with    Cough     About a week    Nasal Congestion       HISTORY OF PRESENTING ILLNESS     54 y.o. female Documentation:    Mae Calvillo was evaluated through a synchronous (real-time) audio encounter. Patient identification was verified at the start of the visit. She (or guardian if applicable) is aware that this is a billable service, which includes applicable co-pays. This visit was conducted with the patient's (and/or legal guardian's) verbal consent. She has not had a related appointment within my department in the past 7 days or scheduled within the next 24 hours.   The patient was located at Home: [address] [place] [address]   [place] [id number].  The provider was located at Facility (Appt Dept): [address] [place] [phone].  Confirm you are appropriately licensed, registered, or certified to deliver care in the [facility] where the patient is located as indicated above. If you are not or unsure, please re-schedule the visit: Yes, I confirm.     Note: not billable if this call serves to triage the patient into an appointment for the relevant concern    Mae Calvillo is a 54 y.o. female evaluated via telephone on 10/11/2024 for Cough (About a week) and Nasal Congestion  .      Cough:  - for few days.  - intermittent--> continuous.  - anosmia initially, better now.   - rhinorrhea.  - had chill and tactile fever few days ago, better now  - denies body aches, fever, chills, sore throat.  - son is sick as well.   - using tylenol---> helping  - COVID test negative at home.       DM:- taking metformin 500  mg BID.       Health Maintenance:  Health Maintenance Due   Topic Date Due    Diabetic retinal exam  Never done     Past Medical History:

## 2024-10-14 ENCOUNTER — OFFICE VISIT (OUTPATIENT)
Dept: BARIATRICS/WEIGHT MGMT | Age: 54
End: 2024-10-14
Payer: COMMERCIAL

## 2024-10-14 VITALS
BODY MASS INDEX: 39.94 KG/M2 | TEMPERATURE: 98.2 F | WEIGHT: 279 LBS | HEIGHT: 70 IN | SYSTOLIC BLOOD PRESSURE: 152 MMHG | RESPIRATION RATE: 20 BRPM | DIASTOLIC BLOOD PRESSURE: 87 MMHG | HEART RATE: 106 BPM

## 2024-10-14 DIAGNOSIS — K91.2 MALNUTRITION FOLLOWING GASTROINTESTINAL SURGERY: Primary | ICD-10-CM

## 2024-10-14 DIAGNOSIS — E66.01 MORBID OBESITY WITH BMI OF 45.0-49.9, ADULT: ICD-10-CM

## 2024-10-14 PROCEDURE — 99213 OFFICE O/P EST LOW 20 MIN: CPT | Performed by: NURSE PRACTITIONER

## 2024-10-14 PROCEDURE — 3017F COLORECTAL CA SCREEN DOC REV: CPT | Performed by: NURSE PRACTITIONER

## 2024-10-14 PROCEDURE — G8427 DOCREV CUR MEDS BY ELIG CLIN: HCPCS | Performed by: NURSE PRACTITIONER

## 2024-10-14 PROCEDURE — G8482 FLU IMMUNIZE ORDER/ADMIN: HCPCS | Performed by: NURSE PRACTITIONER

## 2024-10-14 PROCEDURE — 99211 OFF/OP EST MAY X REQ PHY/QHP: CPT

## 2024-10-14 PROCEDURE — 1036F TOBACCO NON-USER: CPT | Performed by: NURSE PRACTITIONER

## 2024-10-14 PROCEDURE — G8417 CALC BMI ABV UP PARAM F/U: HCPCS | Performed by: NURSE PRACTITIONER

## 2024-10-14 RX ORDER — BUPROPION HYDROCHLORIDE 150 MG/1
150 TABLET ORAL EVERY MORNING
Qty: 30 TABLET | Refills: 3 | Status: SHIPPED | OUTPATIENT
Start: 2024-10-14

## 2024-10-14 ASSESSMENT — ENCOUNTER SYMPTOMS
WHEEZING: 0
VOMITING: 0
SHORTNESS OF BREATH: 0
COUGH: 0
CONSTIPATION: 0
DIARRHEA: 0
NAUSEA: 0

## 2024-10-14 NOTE — PATIENT INSTRUCTIONS
Please continue to take your vitamin and mineral supplements as instructed.      If you received a blood work prescription today for laboratory monitoring due prior to your next routine follow-up visit, please have this blood work obtained 10 to 14 days prior to your next visit.  It is important to fast for 12 hours prior to routine weight loss surgery blood work, EXCEPT for drinking water, to ensure accuracy of results.    Please report nausea, vomiting, abdominal pain, or any other problems you experience to your surgeon.  For problems related to weight loss surgery, it is best to go to [facility] Emergency Department and have your surgeon paged.    Last Surgical Weight Loss:       No data to display

## 2024-10-14 NOTE — PROGRESS NOTES
BMI 40.03 kg/m²     Patient's medical, social, and family history reviewed      Physical Exam  Physical Exam  Constitutional:       Appearance: She is well-developed.   HENT:      Head: Normocephalic.      Mouth/Throat:      Mouth: Mucous membranes are moist.   Eyes:      Pupils: Pupils are equal, round, and reactive to light.   Neck:      Thyroid: No thyromegaly.   Cardiovascular:      Rate and Rhythm: Normal rate and regular rhythm.   Pulmonary:      Effort: Pulmonary effort is normal.      Breath sounds: Normal breath sounds.   Abdominal:      General: Bowel sounds are normal.      Palpations: Abdomen is soft.      Tenderness: There is no abdominal tenderness.   Musculoskeletal:         General: Normal range of motion.      Cervical back: Normal range of motion and neck supple.   Lymphadenopathy:      Cervical: No cervical adenopathy.   Skin:     General: Skin is warm and dry.   Neurological:      Mental Status: She is alert and oriented to person, place, and time.   Psychiatric:         Behavior: Behavior normal.           Assessment/Plan:    1. Malnutrition following gastrointestinal surgery  Follow up as scheduled  - buPROPion (WELLBUTRIN XL) 150 MG extended release tablet; Take 1 tablet by mouth every morning  Dispense: 30 tablet; Refill: 3    2. Morbid obesity with BMI of 45.0-49.9, adult  Patient given written education on wellbutrin  She would like to try this today  Consider phentermine as last resort if this is not effective  - buPROPion (WELLBUTRIN XL) 150 MG extended release tablet; Take 1 tablet by mouth every morning  Dispense: 30 tablet; Refill: 3    Return in about 1 month (around 11/14/2024), or if symptoms worsen or fail to improve.        Rose Gonzales, APRN - CNP

## 2024-10-15 DIAGNOSIS — J30.89 SEASONAL ALLERGIC RHINITIS DUE TO OTHER ALLERGIC TRIGGER: ICD-10-CM

## 2024-10-15 RX ORDER — CETIRIZINE HYDROCHLORIDE 10 MG/1
10 TABLET ORAL DAILY
Qty: 30 TABLET | Refills: 1 | Status: SHIPPED | OUTPATIENT
Start: 2024-10-15

## 2024-10-15 NOTE — TELEPHONE ENCOUNTER
Last Appointment   10/11/2024  Next Appointment  Visit date not found      Return in about 2 months (around 12/11/2024) for diabetes follow up.

## 2024-10-16 ENCOUNTER — PATIENT MESSAGE (OUTPATIENT)
Dept: FAMILY MEDICINE CLINIC | Age: 54
End: 2024-10-16

## 2024-10-18 NOTE — TELEPHONE ENCOUNTER
PA Case Created via [facility], [facility]-[id number]

## 2024-10-18 NOTE — TELEPHONE ENCOUNTER
Patient Assistance     Eligible patients pay as a little as $10 for a 30-day prescription     Two ways for your patients to get savings and support     1. TEXT READY TO 49990     Patients will receive co-pay savings and text messages to help them start and stay on RYBELSUS®  2. VISIT Vigo     Patients can download a savings card at www.BIMA and receive personalized email support.  For commercially insured patients only. Additional eligibility and restrictions apply.     Message and data rates may apply. Tell patients to check with their mobile service provider. See Terms of Use & Conditions at [email or web address].     Learn more at [email or web address].

## 2024-10-30 ENCOUNTER — OFFICE VISIT (OUTPATIENT)
Dept: PAIN MANAGEMENT | Age: 54
End: 2024-10-30
Payer: COMMERCIAL

## 2024-10-30 VITALS
HEIGHT: 70 IN | HEART RATE: 88 BPM | TEMPERATURE: 97 F | RESPIRATION RATE: 16 BRPM | WEIGHT: 258 LBS | OXYGEN SATURATION: 94 % | DIASTOLIC BLOOD PRESSURE: 86 MMHG | SYSTOLIC BLOOD PRESSURE: 148 MMHG | BODY MASS INDEX: 36.94 KG/M2

## 2024-10-30 DIAGNOSIS — S32.000S COMPRESSION FRACTURE OF LUMBAR VERTEBRA, UNSPECIFIED LUMBAR VERTEBRAL LEVEL, SEQUELA: ICD-10-CM

## 2024-10-30 DIAGNOSIS — M51.369 DEGENERATION OF INTERVERTEBRAL DISC OF LUMBAR REGION, UNSPECIFIED WHETHER PAIN PRESENT: ICD-10-CM

## 2024-10-30 DIAGNOSIS — M47.817 LUMBOSACRAL SPONDYLOSIS WITHOUT MYELOPATHY: Primary | ICD-10-CM

## 2024-10-30 DIAGNOSIS — M48.061 SPINAL STENOSIS OF LUMBAR REGION, UNSPECIFIED WHETHER NEUROGENIC CLAUDICATION PRESENT: ICD-10-CM

## 2024-10-30 PROCEDURE — 3017F COLORECTAL CA SCREEN DOC REV: CPT | Performed by: ANESTHESIOLOGY

## 2024-10-30 PROCEDURE — 99213 OFFICE O/P EST LOW 20 MIN: CPT

## 2024-10-30 PROCEDURE — G8427 DOCREV CUR MEDS BY ELIG CLIN: HCPCS | Performed by: ANESTHESIOLOGY

## 2024-10-30 PROCEDURE — G8417 CALC BMI ABV UP PARAM F/U: HCPCS | Performed by: ANESTHESIOLOGY

## 2024-10-30 PROCEDURE — 99213 OFFICE O/P EST LOW 20 MIN: CPT | Performed by: ANESTHESIOLOGY

## 2024-10-30 PROCEDURE — 1036F TOBACCO NON-USER: CPT | Performed by: ANESTHESIOLOGY

## 2024-10-30 PROCEDURE — G8482 FLU IMMUNIZE ORDER/ADMIN: HCPCS | Performed by: ANESTHESIOLOGY

## 2024-10-30 NOTE — PROGRESS NOTES
[facility]        [address] [place] [id number]  Dept: [phone]      Follow up Note      Mae Calvillo     Date of Visit:  10/30/2024    CC:  Patient presents for follow up   Chief Complaint   Patient presents with    Follow-up     LUMBAR EPIDURAL STEROID INJECTION L5-S1 UNDER FLUOROSCOPIC GUIDANCE       HPI:  Chronic Low back pain.     H/o fall and has low back pain. Has lumbar VCF - evaluated by NSG- conservative management with bracing and analgesics.    Prior interventional procedures: YFN, excellent pain relief.     Nursing notes and details of the pain history reviewed. Please see intake notes for details.     S/p Gastric sleeve in Aug 2022 has lost significant weight following the surgery.     Previous treatments:   Physical Therapy/ HEP : yes,      Medications: - NSAID's : yes                        - Membrane stabilizers : yes - gabapentin- had side effects                       - Opioids : yes,                        - Adjuvants or Others : yes,     She has not been on anticoagulation medications      H/O Smoking: no  H/O alcohol abuse : no  H/O Illicit drug use : denies     Employment: currently off work     Imaging:    MRI of LS spine: 2/7/2023:  Impression   1. Fairly old compression fractures of L2 through L5 as noted above with up   to 50% loss of height at L3 and 45% loss of height at L2.  No acute fracture   seen.   2. Degenerative change with multiple disc bulges and multilevel central canal   stenosis the, most prominent (moderate) at L3-4.   3. Multilevel neural foraminal stenosis as noted above.      Xray LS spine: 1/11/2023:  Impression   Stable mild compression injuries at L2 and L3.  Degenerative lumbar   spondylosis as before.     OARRS report:: reviewed.    Past Medical History:   Diagnosis Date    Anemia     stable at this time  2023    Arthritis     COVID 05/2022 11/2022-    Diabetes mellitus (HCC)     diet controlled    History of blood

## 2024-10-30 NOTE — PROGRESS NOTES
Mae Calvillo presents to the [facility] on 10/30/2024. Mae and states she is pain free.  Pain is rated on her best day at a 0, on her worst day at a 0, and on average at a 0 on the VAS scale.     Any procedures since your last visit: Yes, with 100 % relief.    Pacemaker or defibrillator: No     She is not on NSAIDS and is not on anticoagulation medications.    Medication Contract and Consent for Opioid Use Documents Filed       Patient Documents       Type of Document Status Date Received Received By Description    Medication Contract Received 2/8/2023 10:35 AM [facility]                    BP (!) 148/86   Pulse 88   Temp 97 °F (36.1 °C) (Infrared)   Resp 16   Ht 1.778 m (5' 10\")   Wt 117 kg (258 lb)   SpO2 94%   BMI 37.02 kg/m²      No LMP recorded. Patient has had a hysterectomy.

## 2024-11-13 ENCOUNTER — TELEPHONE (OUTPATIENT)
Dept: BARIATRICS/WEIGHT MGMT | Age: 54
End: 2024-11-13

## 2024-11-13 NOTE — TELEPHONE ENCOUNTER
Pt cancelled appt for 11/14. She states she has to get new insurance and she will call back to reschedule.

## 2024-12-23 RX ORDER — HYDROCHLOROTHIAZIDE 12.5 MG/1
1 CAPSULE ORAL
Qty: 1 EACH | Refills: 5 | Status: SHIPPED | OUTPATIENT
Start: 2024-12-23

## 2024-12-23 NOTE — TELEPHONE ENCOUNTER
Name of Medication(s) Requested:  Requested Prescriptions     Pending Prescriptions Disp Refills    Continuous Glucose Sensor (FREESTYLE LUIS 3 PLUS SENSOR) MISC         Medication is on current medication list Yes    Dosage and directions were verified? Yes    Quantity verified: 30 day supply     Pharmacy Verified?  Yes    Last Appointment:  10/11/2024    Future appts:  Future Appointments   Date Time Provider Department Center   1/3/2025  2:00 PM Lore Del Rio MD [place] [facility] ECC DEP   4/30/2025  3:00 PM Ben Steinberg MD [facility] PAIN MAR [facility]   9/12/2025  2:30 PM Rose Gonzales, APRN - CNP Surg Weight [facility]        (If no appt send self scheduling link. .REFILLAPPT)  Scheduling request sent?     [] Yes  [x] No    Does patient need updated?  [] Yes  [x] No

## 2025-01-03 ENCOUNTER — OFFICE VISIT (OUTPATIENT)
Dept: FAMILY MEDICINE CLINIC | Age: 55
End: 2025-01-03
Payer: COMMERCIAL

## 2025-01-03 ENCOUNTER — TELEPHONE (OUTPATIENT)
Dept: FAMILY MEDICINE CLINIC | Age: 55
End: 2025-01-03

## 2025-01-03 VITALS
SYSTOLIC BLOOD PRESSURE: 132 MMHG | RESPIRATION RATE: 15 BRPM | WEIGHT: 263 LBS | BODY MASS INDEX: 37.65 KG/M2 | TEMPERATURE: 97.1 F | HEIGHT: 70 IN | OXYGEN SATURATION: 94 % | HEART RATE: 99 BPM | DIASTOLIC BLOOD PRESSURE: 84 MMHG

## 2025-01-03 DIAGNOSIS — J30.89 SEASONAL ALLERGIC RHINITIS DUE TO OTHER ALLERGIC TRIGGER: ICD-10-CM

## 2025-01-03 DIAGNOSIS — E78.2 MIXED HYPERLIPIDEMIA: ICD-10-CM

## 2025-01-03 DIAGNOSIS — I10 PRIMARY HYPERTENSION: ICD-10-CM

## 2025-01-03 DIAGNOSIS — F31.81 BIPOLAR II DISORDER (HCC): ICD-10-CM

## 2025-01-03 DIAGNOSIS — E11.9 TYPE 2 DIABETES MELLITUS WITHOUT COMPLICATION, WITHOUT LONG-TERM CURRENT USE OF INSULIN (HCC): ICD-10-CM

## 2025-01-03 LAB
ALBUMIN: 3.9 G/DL (ref 3.5–5.2)
ALP BLD-CCNC: 124 U/L (ref 35–104)
ALT SERPL-CCNC: 36 U/L (ref 0–32)
ANION GAP SERPL CALCULATED.3IONS-SCNC: 15 MMOL/L (ref 7–16)
AST SERPL-CCNC: 72 U/L (ref 0–31)
BACTERIA: ABNORMAL
BILIRUB SERPL-MCNC: 0.8 MG/DL (ref 0–1.2)
BILIRUBIN, URINE: NEGATIVE
BUN BLDV-MCNC: 18 MG/DL (ref 6–20)
CALCIUM SERPL-MCNC: 9.7 MG/DL (ref 8.6–10.2)
CHLORIDE BLD-SCNC: 96 MMOL/L (ref 98–107)
CO2: 22 MMOL/L (ref 22–29)
COLOR, UA: YELLOW
CREAT SERPL-MCNC: 0.9 MG/DL (ref 0.5–1)
FOLATE: >20 NG/ML (ref 4.8–24.2)
GFR, ESTIMATED: 78 ML/MIN/1.73M2
GLUCOSE BLD-MCNC: 184 MG/DL (ref 74–99)
GLUCOSE URINE: NEGATIVE MG/DL
HBA1C MFR BLD: 10.2 %
HCT VFR BLD CALC: 40.7 % (ref 34–48)
HEMOGLOBIN: 13.6 G/DL (ref 11.5–15.5)
KETONES, URINE: NEGATIVE MG/DL
LEUKOCYTE ESTERASE, URINE: ABNORMAL
MCH RBC QN AUTO: 31.9 PG (ref 26–35)
MCHC RBC AUTO-ENTMCNC: 33.4 G/DL (ref 32–34.5)
MCV RBC AUTO: 95.5 FL (ref 80–99.9)
NITRITE, URINE: POSITIVE
PDW BLD-RTO: 14.5 % (ref 11.5–15)
PH, URINE: 6 (ref 5–9)
PLATELET, FLUORESCENCE: 130 K/UL (ref 130–450)
PMV BLD AUTO: 12.1 FL (ref 7–12)
POTASSIUM SERPL-SCNC: 4.2 MMOL/L (ref 3.5–5)
PROTEIN UA: NEGATIVE MG/DL
RBC # BLD: 4.26 M/UL (ref 3.5–5.5)
RBC UA: ABNORMAL /HPF
SODIUM BLD-SCNC: 133 MMOL/L (ref 132–146)
SPECIFIC GRAVITY UA: 1.02 (ref 1–1.03)
TOTAL PROTEIN: 8 G/DL (ref 6.4–8.3)
TURBIDITY: ABNORMAL
URINE HGB: ABNORMAL
UROBILINOGEN, URINE: 0.2 EU/DL (ref 0–1)
VITAMIN B-12: 576 PG/ML (ref 211–946)
WBC # BLD: 5.8 K/UL (ref 4.5–11.5)
WBC UA: ABNORMAL /HPF

## 2025-01-03 PROCEDURE — 1036F TOBACCO NON-USER: CPT

## 2025-01-03 PROCEDURE — 3079F DIAST BP 80-89 MM HG: CPT

## 2025-01-03 PROCEDURE — 3017F COLORECTAL CA SCREEN DOC REV: CPT

## 2025-01-03 PROCEDURE — G8417 CALC BMI ABV UP PARAM F/U: HCPCS

## 2025-01-03 PROCEDURE — G8427 DOCREV CUR MEDS BY ELIG CLIN: HCPCS

## 2025-01-03 PROCEDURE — 3046F HEMOGLOBIN A1C LEVEL >9.0%: CPT

## 2025-01-03 PROCEDURE — 3075F SYST BP GE 130 - 139MM HG: CPT

## 2025-01-03 PROCEDURE — 83036 HEMOGLOBIN GLYCOSYLATED A1C: CPT

## 2025-01-03 PROCEDURE — 2022F DILAT RTA XM EVC RTNOPTHY: CPT

## 2025-01-03 PROCEDURE — 99214 OFFICE O/P EST MOD 30 MIN: CPT

## 2025-01-03 RX ORDER — CETIRIZINE HYDROCHLORIDE 10 MG/1
10 TABLET ORAL DAILY
Qty: 30 TABLET | Refills: 1 | Status: SHIPPED | OUTPATIENT
Start: 2025-01-03

## 2025-01-03 RX ORDER — VALSARTAN 160 MG/1
160 TABLET ORAL DAILY
Qty: 90 TABLET | Refills: 1 | Status: SHIPPED | OUTPATIENT
Start: 2025-01-03

## 2025-01-03 RX ORDER — ATORVASTATIN CALCIUM 20 MG/1
20 TABLET, FILM COATED ORAL DAILY
Qty: 30 TABLET | Refills: 2 | Status: SHIPPED | OUTPATIENT
Start: 2025-01-03

## 2025-01-03 RX ORDER — ORAL SEMAGLUTIDE 3 MG/1
3 TABLET ORAL DAILY
Qty: 30 TABLET | Refills: 1 | Status: SHIPPED | OUTPATIENT
Start: 2025-01-03

## 2025-01-03 RX ORDER — LIRAGLUTIDE 6 MG/ML
1.8 INJECTION SUBCUTANEOUS DAILY
Qty: 2 ADJUSTABLE DOSE PRE-FILLED PEN SYRINGE | Refills: 3 | Status: SHIPPED | OUTPATIENT
Start: 2025-01-03

## 2025-01-03 RX ORDER — ACYCLOVIR 800 MG/1
1 TABLET ORAL 4 TIMES DAILY
Qty: 1 EACH | Refills: 0 | Status: SHIPPED | OUTPATIENT
Start: 2025-01-03

## 2025-01-03 ASSESSMENT — PATIENT HEALTH QUESTIONNAIRE - PHQ9
SUM OF ALL RESPONSES TO PHQ QUESTIONS 1-9: 10
4. FEELING TIRED OR HAVING LITTLE ENERGY: SEVERAL DAYS
3. TROUBLE FALLING OR STAYING ASLEEP: NEARLY EVERY DAY
SUM OF ALL RESPONSES TO PHQ9 QUESTIONS 1 & 2: 2
6. FEELING BAD ABOUT YOURSELF - OR THAT YOU ARE A FAILURE OR HAVE LET YOURSELF OR YOUR FAMILY DOWN: SEVERAL DAYS
2. FEELING DOWN, DEPRESSED OR HOPELESS: MORE THAN HALF THE DAYS
7. TROUBLE CONCENTRATING ON THINGS, SUCH AS READING THE NEWSPAPER OR WATCHING TELEVISION: NEARLY EVERY DAY
8. MOVING OR SPEAKING SO SLOWLY THAT OTHER PEOPLE COULD HAVE NOTICED. OR THE OPPOSITE, BEING SO FIGETY OR RESTLESS THAT YOU HAVE BEEN MOVING AROUND A LOT MORE THAN USUAL: NOT AT ALL
SUM OF ALL RESPONSES TO PHQ QUESTIONS 1-9: 10
1. LITTLE INTEREST OR PLEASURE IN DOING THINGS: NOT AT ALL
SUM OF ALL RESPONSES TO PHQ QUESTIONS 1-9: 10
5. POOR APPETITE OR OVEREATING: NOT AT ALL
9. THOUGHTS THAT YOU WOULD BE BETTER OFF DEAD, OR OF HURTING YOURSELF: NOT AT ALL
SUM OF ALL RESPONSES TO PHQ QUESTIONS 1-9: 10

## 2025-01-03 NOTE — TELEPHONE ENCOUNTER
The Rybelsus is not covered     Non-Insulin: Has the patient used ONE of the following preferred medications,    BYETTA, VICTOZA (Brand Preferred), or TRULICITY, and was unable to reach the A1C goal while meeting ALL the following?     A. The medication was used for at least 120 days, while using 2 or more medications together per the American Diabetes Association (ADA) guidelines, AND     B. Had documented adherence with medications, AND C. Doses were increased to the maximum recommended (as tolerated).     Please note: Plan prefers BRAND Victoza.     I see that you also started her on Victoza. She will have to fail that after 120 days of use PLUS have to use 2 other medications. (Metformin, glipizide etc)

## 2025-01-03 NOTE — PROGRESS NOTES
[facility]  Precepting Note    Subjective:  53 yo F here for diabetes f/u  Her diabetes control has worsened  Lab Results   Component Value Date/Time    LABA1C 7.2 09/18/2024 10:53 AM    LABA1C 6.7 06/14/2024 03:49 PM    LABA1C 6.2 02/09/2024 03:05 PM     She has been on victoza and metformin   Would like to start semaglutide (rybelsus)  A1c today is 10.2   Was off medication for a time due to mood issues  ROS otherwise as per resident note     Past medical, surgical, family and social history were reviewed, non-contributory, and unchanged unless otherwise stated.    Objective:    /84   Pulse 99   Temp 97.1 °F (36.2 °C) (Temporal)   Resp 15   Ht 1.778 m (5' 10\")   Wt 119.3 kg (263 lb)   SpO2 94%   BMI 37.74 kg/m²     Exam is as noted by resident     Assessment/Plan:  Diabetes, uncontrolled; refer to diabetes education, increase victoza vs change to oral semiglutide; add jardiance; repeat cmp / ua prior to initiating   Shrot term f/u.        Attending Physician Statement  I have reviewed the chart, including any radiology or labs. I have discussed the case, including pertinent history and exam findings with the resident. I have seen and examined the patient. I agree with the assessment, plan and orders as documented by the resident.  Please refer to the resident note for additional information.      Electronically signed by Elizabeth Melendez MD on 1/3/2025 at 2:47 PM  
  Medication Sig Dispense Refill    atorvastatin (LIPITOR) 20 MG tablet Take 1 tablet by mouth daily 30 tablet 2    cetirizine (ZYRTEC) 10 MG tablet Take 1 tablet by mouth daily 30 tablet 1    Liraglutide (VICTOZA) 18 MG/3ML SOPN SC injection Inject 1.8 mg into the skin daily 2 Adjustable Dose Pre-filled Pen Syringe 3    melatonin 3 MG TABS tablet Take 1 tablet by mouth nightly as needed (sleep) 30 tablet 2    Semaglutide (RYBELSUS) 3 MG TABS Take 3 mg by mouth daily 30 tablet 1    valsartan (DIOVAN) 160 MG tablet Take 1 tablet by mouth daily 90 tablet 1    Insulin Pen Needle 31G X 8 MM MISC 1 each by Does not apply route daily 100 each 3    Continuous Glucose Sensor (FREESTYLE LUIS 3 SENSOR) MISC 1 each by Does not apply route 4 times daily 1 each 0    empagliflozin (JARDIANCE) 25 MG tablet Take 1 tablet by mouth daily 90 tablet 1    Continuous Glucose Sensor (FREESTYLE LUIS 3 PLUS SENSOR) MISC Place 1 application  onto the skin every 14 days 1 each 5    buPROPion (WELLBUTRIN XL) 150 MG extended release tablet Take 1 tablet by mouth every morning 30 tablet 3    FLUoxetine (PROZAC) 20 MG capsule Take 1 capsule by mouth daily      Multiple Vitamins-Minerals (THERAPEUTIC MULTIVITAMIN-MINERALS) tablet Take 1 tablet by mouth daily 30 tablet 2    metFORMIN (GLUCOPHAGE-XR) 500 MG extended release tablet Take 1 tablet by mouth in the morning and at bedtime      azelastine (ASTELIN) 0.1 % nasal spray USE 2 SPRAYS IN EACH NOSTRIL TWICE DAILY AS DIRECTED      fluticasone (FLONASE) 50 MCG/ACT nasal spray 2 sprays by Each Nostril route daily 16 g 2    miconazole (MICOTIN) 2 % powder Apply topically 2 times daily. 45 g 2    ammonium lactate (LAC-HYDRIN) 12 % lotion Apply topically as needed. 225 g 1    CALCIUM PO Take 2 tablets by mouth daily      folic acid (FOLVITE) 1 MG tablet Take 1 tablet by mouth daily      prazosin (MINIPRESS) 1 MG capsule Take 1 capsule by mouth nightly 30 capsule 0     No current facility-administered

## 2025-01-04 NOTE — ED NOTES
ED to Inpatient Handoff Report    Notified [facility] that electronic handoff available and patient ready for transport to room 423.    Safety Risks: None identified    Patient in Restraints: no    Constant Observer or Patient : no    Telemetry Monitoring Ordered: Yes          Order to transfer to unit without monitor: NO    Last MEWS: 1 Time completed: 0020    Deterioration Index: 17.41    Vitals:    06/20/24 2319 06/20/24 2355 06/21/24 0021 06/21/24 0022   BP: (!) 152/93 (!) 152/93 (!) 149/93 (!) 149/93   Pulse: 83  82 82   Resp: 16  16 16   Temp: 98.2 °F (36.8 °C)  98.3 °F (36.8 °C) 98.3 °F (36.8 °C)   TempSrc: Oral  Oral Oral   SpO2: 98%  100% 100%   Weight:       Height:           Opportunity for questions and clarification was provided.    
Patient in waiting room updated on invertions and plan of care. No further questions at this time.     
Oriented - self; Oriented - place; Oriented - time

## 2025-01-06 ENCOUNTER — TELEPHONE (OUTPATIENT)
Dept: FAMILY MEDICINE CLINIC | Age: 55
End: 2025-01-06

## 2025-01-06 DIAGNOSIS — N39.0 URINARY TRACT INFECTION WITHOUT HEMATURIA, SITE UNSPECIFIED: Primary | ICD-10-CM

## 2025-01-06 RX ORDER — NITROFURANTOIN 25; 75 MG/1; MG/1
100 CAPSULE ORAL 2 TIMES DAILY
Qty: 10 CAPSULE | Refills: 0 | Status: SHIPPED | OUTPATIENT
Start: 2025-01-06 | End: 2025-01-11

## 2025-01-06 RX ORDER — AZELASTINE 1 MG/ML
1 SPRAY, METERED NASAL 2 TIMES DAILY
Qty: 30 ML | Refills: 1 | Status: SHIPPED | OUTPATIENT
Start: 2025-01-06

## 2025-01-06 RX ORDER — NITROFURANTOIN 25; 75 MG/1; MG/1
100 CAPSULE ORAL 2 TIMES DAILY
Qty: 20 CAPSULE | Refills: 0 | Status: SHIPPED | OUTPATIENT
Start: 2025-01-06 | End: 2025-01-06

## 2025-01-06 NOTE — TELEPHONE ENCOUNTER
Called patient regarding recent labs.  Patient mentioned that she is having somewhat burning sensation when she is urinating.  But denies any blood in that.  Patient recent UA shows positive nitrites and leukocytes.  Prescribing her antibiotics for 5 days.     Patient educated, patient agrees with the plan.

## 2025-01-07 ENCOUNTER — OFFICE VISIT (OUTPATIENT)
Dept: BARIATRICS/WEIGHT MGMT | Age: 55
End: 2025-01-07
Payer: COMMERCIAL

## 2025-01-07 VITALS
DIASTOLIC BLOOD PRESSURE: 82 MMHG | BODY MASS INDEX: 37.37 KG/M2 | HEART RATE: 78 BPM | HEIGHT: 70 IN | WEIGHT: 261 LBS | SYSTOLIC BLOOD PRESSURE: 130 MMHG

## 2025-01-07 DIAGNOSIS — K91.2 MALNUTRITION FOLLOWING GASTROINTESTINAL SURGERY: ICD-10-CM

## 2025-01-07 DIAGNOSIS — E66.01 MORBID OBESITY WITH BMI OF 45.0-49.9, ADULT: ICD-10-CM

## 2025-01-07 PROCEDURE — 99211 OFF/OP EST MAY X REQ PHY/QHP: CPT

## 2025-01-07 RX ORDER — M-VIT,TX,IRON,MINS/CALC/FOLIC 27MG-0.4MG
1 TABLET ORAL DAILY
Qty: 30 TABLET | Refills: 5 | Status: SHIPPED | OUTPATIENT
Start: 2025-01-07 | End: 2026-01-07

## 2025-01-07 RX ORDER — BUPROPION HYDROCHLORIDE 150 MG/1
150 TABLET ORAL EVERY MORNING
Qty: 30 TABLET | Refills: 3 | Status: SHIPPED | OUTPATIENT
Start: 2025-01-07

## 2025-01-07 RX ORDER — METFORMIN HYDROCHLORIDE 500 MG/1
500 TABLET, EXTENDED RELEASE ORAL DAILY
Qty: 90 TABLET | Refills: 0 | Status: SHIPPED
Start: 2025-01-07 | End: 2025-01-07 | Stop reason: SDUPTHER

## 2025-01-07 RX ORDER — METFORMIN HYDROCHLORIDE 500 MG/1
500 TABLET, EXTENDED RELEASE ORAL 2 TIMES DAILY
Qty: 90 TABLET | Refills: 0 | Status: SHIPPED | OUTPATIENT
Start: 2025-01-07

## 2025-01-07 ASSESSMENT — ENCOUNTER SYMPTOMS
DIARRHEA: 0
SHORTNESS OF BREATH: 0
NAUSEA: 0
VOMITING: 0
WHEEZING: 0
CONSTIPATION: 0
COUGH: 0

## 2025-01-07 NOTE — PATIENT INSTRUCTIONS
Please continue to take your vitamin and mineral supplements as instructed.      If you received a blood work prescription today for laboratory monitoring due prior to your next routine follow-up visit, please have this blood work obtained 10 to 14 days prior to your next visit.  It is important to fast for 12 hours prior to routine weight loss surgery blood work, EXCEPT for drinking water, to ensure accuracy of results.    Please report nausea, vomiting, abdominal pain, or any other problems you experience to your surgeon.  For problems related to weight loss surgery, it is best to go to [facility] Emergency Department and have your surgeon paged.    Last Surgical Weight Loss:       No data to display                   Duration Of Freeze Thaw-Cycle (Seconds): 0 Post-Care Instructions: I reviewed with the patient in detail post-care instructions. Patient is to wear sunprotection, and avoid picking at any of the treated lesions. Pt may apply Vaseline to crusted or scabbing areas. Number Of Freeze-Thaw Cycles: 1 freeze-thaw cycle Render Post-Care Instructions In Note?: no Consent: The patient's consent was obtained including but not limited to risks of crusting, scabbing, blistering, scarring, darker or lighter pigmentary change, recurrence, incomplete removal and infection. Detail Level: Detailed

## 2025-01-07 NOTE — TELEPHONE ENCOUNTER
Name of Medication(s) Requested:  Requested Prescriptions     Pending Prescriptions Disp Refills    metFORMIN (GLUCOPHAGE-XR) 500 MG extended release tablet [Pharmacy Med Name: METFORMIN ER 500MG 24HR TABS] 30 tablet      Sig: TAKE 1 TABLET BY MOUTH DAILY WITH BREAKFAST       Medication is on current medication list Yes    Dosage and directions were verified? Yes    Quantity verified: 90 day supply     Pharmacy Verified?  Yes    Last Appointment:  1/3/2025    Future appts:  Future Appointments   Date Time Provider Department Center   1/7/2025  1:15 PM Rose Gonzales APRN - CNP Surg Weight [facility]   1/21/2025  9:00 AM [facility] DIAB ED EDUCATOR [facility]   1/22/2025  9:00 AM [facility] DIAB ED EDUCATOR [facility]   1/23/2025  9:00 AM [facility] DIAB ED EDUCATOR [facility]   4/30/2025  3:00 PM Ben Steinberg MD [facility] PAIN MAR [facility]   9/12/2025  2:30 PM Rose Gonzales APRN - CNP Surg Weight [facility]        (If no appt send self scheduling link. .REFILLAPPT)  Scheduling request sent?     [] Yes  [x] No    Does patient need updated?  [] Yes  [x] No

## 2025-01-07 NOTE — PROGRESS NOTES
Normocephalic.      Mouth/Throat:      Mouth: Mucous membranes are moist.   Eyes:      Pupils: Pupils are equal, round, and reactive to light.   Neck:      Thyroid: No thyromegaly.   Cardiovascular:      Rate and Rhythm: Normal rate and regular rhythm.   Pulmonary:      Effort: Pulmonary effort is normal.      Breath sounds: Normal breath sounds.   Abdominal:      General: Bowel sounds are normal.      Palpations: Abdomen is soft.      Tenderness: There is no abdominal tenderness.   Musculoskeletal:         General: Normal range of motion.      Cervical back: Normal range of motion and neck supple.   Lymphadenopathy:      Cervical: No cervical adenopathy.   Skin:     General: Skin is warm and dry.   Neurological:      Mental Status: She is alert and oriented to person, place, and time.   Psychiatric:         Behavior: Behavior normal.           Assessment/Plan:    1. Morbid obesity with BMI of 45.0-49.9, adult  Will continue on current dose of Wellbutrin  - buPROPion (WELLBUTRIN XL) 150 MG extended release tablet; Take 1 tablet by mouth every morning  Dispense: 30 tablet; Refill: 3    2. Malnutrition following gastrointestinal surgery  Follow up as scheduled for yearly follow up  - buPROPion (WELLBUTRIN XL) 150 MG extended release tablet; Take 1 tablet by mouth every morning  Dispense: 30 tablet; Refill: 3  - Multiple Vitamins-Minerals (THERAPEUTIC MULTIVITAMIN-MINERALS) tablet; Take 1 tablet by mouth daily  Dispense: 30 tablet; Refill: 5    Return in about 3 months (around 4/7/2025), or if symptoms worsen or fail to improve.      Rose Gonzales, APRN - CNP

## 2025-01-20 ENCOUNTER — TELEPHONE (OUTPATIENT)
Dept: FAMILY MEDICINE CLINIC | Age: 55
End: 2025-01-20

## 2025-01-20 DIAGNOSIS — E11.9 TYPE 2 DIABETES MELLITUS WITHOUT COMPLICATION, WITHOUT LONG-TERM CURRENT USE OF INSULIN (HCC): ICD-10-CM

## 2025-01-20 DIAGNOSIS — E11.9 TYPE 2 DIABETES MELLITUS WITHOUT COMPLICATION, WITHOUT LONG-TERM CURRENT USE OF INSULIN (HCC): Primary | ICD-10-CM

## 2025-01-20 NOTE — TELEPHONE ENCOUNTER
Pharmacy called pt and informed her that Victoza  has medication on back order- unsure when it'll be replenished.     Pt spoke to insurance and they told her to have PCP try prescribing Trulicity. Ins. Company told pt that trulicity does not require prior auth.       SHANNAN @ [facility]

## 2025-02-03 DIAGNOSIS — K91.2 MALNUTRITION FOLLOWING GASTROINTESTINAL SURGERY: ICD-10-CM

## 2025-02-03 DIAGNOSIS — E66.01 MORBID OBESITY WITH BMI OF 45.0-49.9, ADULT: ICD-10-CM

## 2025-02-03 RX ORDER — M-VIT,TX,IRON,MINS/CALC/FOLIC 27MG-0.4MG
1 TABLET ORAL DAILY
Qty: 30 TABLET | Refills: 5 | Status: SHIPPED | OUTPATIENT
Start: 2025-02-03 | End: 2026-02-03

## 2025-02-03 RX ORDER — BUPROPION HYDROCHLORIDE 150 MG/1
150 TABLET ORAL EVERY MORNING
Qty: 30 TABLET | Refills: 3 | Status: SHIPPED | OUTPATIENT
Start: 2025-02-03

## 2025-02-07 ENCOUNTER — PATIENT MESSAGE (OUTPATIENT)
Dept: FAMILY MEDICINE CLINIC | Age: 55
End: 2025-02-07

## 2025-02-09 DIAGNOSIS — E11.9 TYPE 2 DIABETES MELLITUS WITHOUT COMPLICATION, WITHOUT LONG-TERM CURRENT USE OF INSULIN (HCC): ICD-10-CM

## 2025-02-10 ENCOUNTER — PATIENT MESSAGE (OUTPATIENT)
Dept: PAIN MANAGEMENT | Age: 55
End: 2025-02-10

## 2025-02-10 DIAGNOSIS — M51.369 DEGENERATION OF INTERVERTEBRAL DISC OF LUMBAR REGION, UNSPECIFIED WHETHER PAIN PRESENT: ICD-10-CM

## 2025-02-10 DIAGNOSIS — M48.061 SPINAL STENOSIS OF LUMBAR REGION, UNSPECIFIED WHETHER NEUROGENIC CLAUDICATION PRESENT: ICD-10-CM

## 2025-02-10 DIAGNOSIS — M47.817 LUMBOSACRAL SPONDYLOSIS WITHOUT MYELOPATHY: Primary | ICD-10-CM

## 2025-02-10 DIAGNOSIS — Z12.39 BREAST SCREENING: Primary | ICD-10-CM

## 2025-02-10 RX ORDER — DULAGLUTIDE 1.5 MG/.5ML
INJECTION, SOLUTION SUBCUTANEOUS
Qty: 2 ML | Refills: 0 | Status: SHIPPED | OUTPATIENT
Start: 2025-02-10

## 2025-02-10 NOTE — TELEPHONE ENCOUNTER
Name of Medication(s) Requested:  Requested Prescriptions     Pending Prescriptions Disp Refills    TRULICITY 1.5 MG/0.5ML SC injection [Pharmacy Med Name: Trulicity Subcutaneous Solution Auto-injector 1.5 MG/0.5ML] 2 mL 0     Sig: Inject 0.5 mLs into the skin every 7 days       Medication is on current medication list Yes    Dosage and directions were verified? Yes    Quantity verified: 90 day supply     Pharmacy Verified?  Yes    Last Appointment:  1/3/2025    Future appts:  Future Appointments   Date Time Provider Department Center   4/8/2025  1:00 PM Rose Gonzales APRN - CNP Surg Weight [facility]   4/30/2025  3:00 PM Ben Steinberg MD BDM PAIN MAR [facility]   9/12/2025  2:30 PM Rose Gonzales APRN - CNP Surg Weight [facility]        (If no appt send self scheduling link. .REFILLAPPT)  Scheduling request sent?     [] Yes  [x] No    Does patient need updated?  [] Yes  [x] No

## 2025-02-12 ENCOUNTER — PATIENT MESSAGE (OUTPATIENT)
Dept: FAMILY MEDICINE CLINIC | Age: 55
End: 2025-02-12

## 2025-02-13 DIAGNOSIS — E11.9 TYPE 2 DIABETES MELLITUS WITHOUT COMPLICATION, WITHOUT LONG-TERM CURRENT USE OF INSULIN (HCC): ICD-10-CM

## 2025-02-13 RX ORDER — DULAGLUTIDE 1.5 MG/.5ML
INJECTION, SOLUTION SUBCUTANEOUS
Qty: 2 ML | Refills: 0 | OUTPATIENT
Start: 2025-02-13

## 2025-02-17 DIAGNOSIS — E11.9 TYPE 2 DIABETES MELLITUS WITHOUT COMPLICATION, WITHOUT LONG-TERM CURRENT USE OF INSULIN (HCC): ICD-10-CM

## 2025-02-17 RX ORDER — DULAGLUTIDE 1.5 MG/.5ML
1.5 INJECTION, SOLUTION SUBCUTANEOUS WEEKLY
Qty: 2 ML | Refills: 0 | Status: SHIPPED | OUTPATIENT
Start: 2025-02-17

## 2025-03-03 DIAGNOSIS — E11.9 TYPE 2 DIABETES MELLITUS WITHOUT COMPLICATION, WITHOUT LONG-TERM CURRENT USE OF INSULIN (HCC): ICD-10-CM

## 2025-03-03 DIAGNOSIS — L30.4 INTERTRIGO: ICD-10-CM

## 2025-03-03 RX ORDER — ACYCLOVIR 800 MG/1
1 TABLET ORAL 4 TIMES DAILY
Qty: 1 EACH | Refills: 0 | Status: SHIPPED | OUTPATIENT
Start: 2025-03-03

## 2025-03-03 RX ORDER — AZELASTINE 1 MG/ML
1 SPRAY, METERED NASAL 2 TIMES DAILY
Qty: 30 ML | Refills: 1 | Status: SHIPPED | OUTPATIENT
Start: 2025-03-03

## 2025-03-03 NOTE — TELEPHONE ENCOUNTER
Name of Medication(s) Requested:  Requested Prescriptions     Pending Prescriptions Disp Refills    Continuous Glucose Sensor (FREESTYLE LUIS 3 SENSOR) MISC 1 each 0     Si each by Does not apply route 4 times daily    azelastine (ASTELIN) 0.1 % nasal spray 30 mL 1     Si spray by Nasal route 2 times daily Use in each nostril as directed       Medication is on current medication list Yes    Dosage and directions were verified? Yes    Quantity verified: 30 day supply     Pharmacy Verified?  Yes    Last Appointment:  1/3/2025    Future appts:  Future Appointments   Date Time Provider Department Center   3/4/2025  2:00 PM SEB MAMMO RM 1 [facility] MAMMO [facility]   2025  1:00 PM Rose Gonzales, APRN - CNP Surg Weight [facility]   2025  3:00 PM Ben Steinberg MD BDM PAIN MAR [facility]   2025  9:00 AM SEBZ DIAB ED MOB SCHEDULE [facility] [place] [facility]   2025  9:00 AM SEBZ DIAB ED MOB SCHEDULE [facility] DE [place] HOD   2025  9:00 AM SEBZ DIAB ED MOB SCHEDULE [facility] [place] H[facility]   2025  2:30 PM Rose Gonzales APRN - CNP Surg Weight [facility]        (If no appt send self scheduling link. .REFILLAPPT)  Scheduling request sent?     [] Yes  [x] No    Does patient need updated?  [] Yes  [x] No

## 2025-03-03 NOTE — TELEPHONE ENCOUNTER
Name of Medication(s) Requested:  Requested Prescriptions     Pending Prescriptions Disp Refills    miconazole (MICOTIN) 2 % powder 45 g 2     Sig: Apply topically 2 times daily.       Medication is on current medication list Yes    Dosage and directions were verified? Yes    Quantity verified: 30 day supply     Pharmacy Verified?  Yes    Last Appointment:  1/3/2025    Future appts:  Future Appointments   Date Time Provider Department Center   3/4/2025  2:00 PM SEB MAMMO RM 1 SEBZ MAMMO SE[facility] Radiolog   4/8/2025  1:00 PM Rose Gonzales APRN - CNP Surg Weight [facility]   4/30/2025  3:00 PM Ben Steinberg MD BDM PAIN MAR [facility]   5/20/2025  9:00 AM [facility]   5/21/2025  9:00 AM [facility]   5/22/2025  9:00 AM SCAR [facility]   9/12/2025  2:30 PM Rose Gonzales APRN - CNP Surg Weight [facility]        (If no appt send self scheduling link. .REFILLAPPT)  Scheduling request sent?     [] Yes  [x] No    Does patient need updated?  [] Yes  [x] No

## 2025-03-04 ENCOUNTER — HOSPITAL ENCOUNTER (OUTPATIENT)
Dept: MAMMOGRAPHY | Age: 55
Discharge: HOME | End: 2025-03-06
Payer: COMMERCIAL

## 2025-03-04 VITALS — HEIGHT: 71 IN | BODY MASS INDEX: 35 KG/M2 | WEIGHT: 250 LBS

## 2025-03-04 DIAGNOSIS — Z12.39 BREAST SCREENING: ICD-10-CM

## 2025-03-04 PROCEDURE — 77063 BREAST TOMOSYNTHESIS BI: CPT

## 2025-03-07 LAB — DIABETIC RETINOPATHY: NEGATIVE

## 2025-03-09 DIAGNOSIS — E11.9 TYPE 2 DIABETES MELLITUS WITHOUT COMPLICATION, WITHOUT LONG-TERM CURRENT USE OF INSULIN (HCC): ICD-10-CM

## 2025-03-10 RX ORDER — DULAGLUTIDE 1.5 MG/.5ML
1.5 INJECTION, SOLUTION SUBCUTANEOUS WEEKLY
Qty: 2 ML | Refills: 0 | Status: SHIPPED | OUTPATIENT
Start: 2025-03-10

## 2025-03-10 NOTE — TELEPHONE ENCOUNTER
Name of Medication(s) Requested:  Requested Prescriptions     Pending Prescriptions Disp Refills    TRULICITY 1.5 MG/0.5ML SC injection [Pharmacy Med Name: Trulicity Subcutaneous Solution Auto-injector 1.5 MG/0.5ML] 2 mL 0     Sig: Inject 0.5 mLs into the skin once a week       Medication is on current medication list Yes    Dosage and directions were verified? Yes    Quantity verified: 30 day supply     Pharmacy Verified?  Yes    Last Appointment:  1/3/2025    Future appts:  Future Appointments   Date Time Provider Department Center   4/8/2025  1:00 PM Rose Gonzales, APRN - CNP Surg Weight [facility]   4/30/2025  3:00 PM Ben Steinberg MD BDM PAIN MAR [facility]   5/20/2025  9:00 AM SEB DIAB ED MOB SCHEDULE [facility]   5/21/2025  9:00 AM [facility] ED MOB SCHEDULE [facility]   5/22/2025  9:00 AM [facility]BUNM [facility] ED MOB SCHEDULE [facility]   9/12/2025  2:30 PM Rose Gonzales, APRN - CNP Surg Weight [facility]        (If no appt send self scheduling link. .REFILLAPPT)  Scheduling request sent?     [] Yes  [x] No    Does patient need updated?  [] Yes  [x] No

## 2025-04-03 ENCOUNTER — PATIENT MESSAGE (OUTPATIENT)
Dept: FAMILY MEDICINE CLINIC | Age: 55
End: 2025-04-03

## 2025-04-03 DIAGNOSIS — E11.9 TYPE 2 DIABETES MELLITUS WITHOUT COMPLICATION, WITHOUT LONG-TERM CURRENT USE OF INSULIN: ICD-10-CM

## 2025-04-03 NOTE — TELEPHONE ENCOUNTER
Name of Medication(s) Requested:  Requested Prescriptions     Pending Prescriptions Disp Refills    dulaglutide (TRULICITY) 1.5 MG/0.5ML SC injection 2 mL 0     Sig: Inject 0.5 mLs into the skin once a week    Continuous Glucose Sensor (FREESTYLE LUIS 3 SENSOR) MISC 1 each 0     Si each by Does not apply route 4 times daily       Medication is on current medication list Yes    Dosage and directions were verified? Yes    Quantity verified: 30 day supply     Pharmacy Verified?  Yes    Last Appointment:  1/3/2025    Future appts:  Future Appointments   Date Time Provider Department Center   2025  1:00 PM Rose Gonzales APRN - CNP Surg Weight [facility]   4/10/2025  2:00 PM Lore Del Rio MD [facility] ECC DEP   2025  3:00 PM Ben Steinberg MD BD PAIN MAR [facility]   2025  9:00 AM [facility] [place]   2025  9:00 AM SEBZ DIAB ED MOB SCHEDULE [facility] [place] HOD   2025  9:00 AM SEBZ DIAB ED MOB SCHEDULE SE[facility] DE [place] HOD   2025  2:30 PM Rose Goznales APRN - CNP Surg Weight [facility]        (If no appt send self scheduling link. .REFILLAPPT)  Scheduling request sent?     [] Yes  [x] No    Does patient need updated?  [] Yes  [x] No

## 2025-04-04 RX ORDER — ACYCLOVIR 800 MG/1
1 TABLET ORAL 4 TIMES DAILY
Qty: 1 EACH | Refills: 0 | Status: SHIPPED | OUTPATIENT
Start: 2025-04-04

## 2025-04-04 RX ORDER — DULAGLUTIDE 1.5 MG/.5ML
1.5 INJECTION, SOLUTION SUBCUTANEOUS WEEKLY
Qty: 2 ML | Refills: 0 | Status: SHIPPED | OUTPATIENT
Start: 2025-04-04

## 2025-04-07 ENCOUNTER — APPOINTMENT (OUTPATIENT)
Dept: CT IMAGING | Age: 55
End: 2025-04-07
Payer: COMMERCIAL

## 2025-04-07 ENCOUNTER — HOSPITAL ENCOUNTER (EMERGENCY)
Age: 55
Discharge: HOME | End: 2025-04-08
Attending: EMERGENCY MEDICINE
Payer: COMMERCIAL

## 2025-04-07 ENCOUNTER — APPOINTMENT (OUTPATIENT)
Dept: GENERAL RADIOLOGY | Age: 55
End: 2025-04-07
Payer: COMMERCIAL

## 2025-04-07 DIAGNOSIS — R19.7 NAUSEA VOMITING AND DIARRHEA: Primary | ICD-10-CM

## 2025-04-07 DIAGNOSIS — R79.89 ELEVATED LFTS: ICD-10-CM

## 2025-04-07 DIAGNOSIS — E87.6 HYPOKALEMIA: ICD-10-CM

## 2025-04-07 DIAGNOSIS — D72.819 LEUKOPENIA, UNSPECIFIED TYPE: ICD-10-CM

## 2025-04-07 DIAGNOSIS — R11.2 NAUSEA VOMITING AND DIARRHEA: Primary | ICD-10-CM

## 2025-04-07 DIAGNOSIS — K46.9 ABDOMINAL HERNIA WITHOUT OBSTRUCTION AND WITHOUT GANGRENE, RECURRENCE NOT SPECIFIED, UNSPECIFIED HERNIA TYPE: ICD-10-CM

## 2025-04-07 DIAGNOSIS — E86.0 DEHYDRATION: ICD-10-CM

## 2025-04-07 LAB
ALBUMIN SERPL-MCNC: 3.4 G/DL (ref 3.5–5.2)
ALP SERPL-CCNC: 98 U/L (ref 35–104)
ALT SERPL-CCNC: 44 U/L (ref 0–32)
ANION GAP SERPL CALCULATED.3IONS-SCNC: 15 MMOL/L (ref 7–16)
AST SERPL-CCNC: 153 U/L (ref 0–31)
BASOPHILS # BLD: 0.03 K/UL (ref 0–0.2)
BASOPHILS NFR BLD: 1 % (ref 0–2)
BILIRUB SERPL-MCNC: 1.7 MG/DL (ref 0–1.2)
BUN SERPL-MCNC: 16 MG/DL (ref 6–20)
CALCIUM SERPL-MCNC: 9.3 MG/DL (ref 8.6–10.2)
CHLORIDE SERPL-SCNC: 91 MMOL/L (ref 98–107)
CO2 SERPL-SCNC: 25 MMOL/L (ref 22–29)
CREAT SERPL-MCNC: 0.9 MG/DL (ref 0.5–1)
EOSINOPHIL # BLD: 0.04 K/UL (ref 0.05–0.5)
EOSINOPHILS RELATIVE PERCENT: 1 % (ref 0–6)
ERYTHROCYTE [DISTWIDTH] IN BLOOD BY AUTOMATED COUNT: 15.9 % (ref 11.5–15)
GFR, ESTIMATED: 75 ML/MIN/1.73M2
GLUCOSE SERPL-MCNC: 125 MG/DL (ref 74–99)
HCT VFR BLD AUTO: 43.4 % (ref 34–48)
HGB BLD-MCNC: 14.7 G/DL (ref 11.5–15.5)
IMM GRANULOCYTES # BLD AUTO: <0.03 K/UL (ref 0–0.58)
IMM GRANULOCYTES NFR BLD: 1 % (ref 0–5)
LACTATE BLDV-SCNC: 2.1 MMOL/L (ref 0.5–2.2)
LIPASE SERPL-CCNC: 55 U/L (ref 13–60)
LYMPHOCYTES NFR BLD: 0.63 K/UL (ref 1.5–4)
LYMPHOCYTES RELATIVE PERCENT: 19 % (ref 20–42)
MCH RBC QN AUTO: 32.5 PG (ref 26–35)
MCHC RBC AUTO-ENTMCNC: 33.9 G/DL (ref 32–34.5)
MCV RBC AUTO: 96 FL (ref 80–99.9)
MONOCYTES NFR BLD: 0.32 K/UL (ref 0.1–0.95)
MONOCYTES NFR BLD: 10 % (ref 2–12)
NEUTROPHILS NFR BLD: 68 % (ref 43–80)
NEUTS SEG NFR BLD: 2.24 K/UL (ref 1.8–7.3)
PLATELET # BLD AUTO: 100 K/UL (ref 130–450)
PMV BLD AUTO: 12 FL (ref 7–12)
POTASSIUM SERPL-SCNC: 2.9 MMOL/L (ref 3.5–5)
PROT SERPL-MCNC: 7.8 G/DL (ref 6.4–8.3)
RBC # BLD AUTO: 4.52 M/UL (ref 3.5–5.5)
RBC # BLD: ABNORMAL 10*6/UL
SARS-COV-2 RDRP RESP QL NAA+PROBE: NOT DETECTED
SODIUM SERPL-SCNC: 131 MMOL/L (ref 132–146)
SPECIMEN DESCRIPTION: NORMAL
WBC OTHER # BLD: 3.3 K/UL (ref 4.5–11.5)

## 2025-04-07 PROCEDURE — 87635 SARS-COV-2 COVID-19 AMP PRB: CPT

## 2025-04-07 PROCEDURE — 85025 COMPLETE CBC W/AUTO DIFF WBC: CPT

## 2025-04-07 PROCEDURE — 83690 ASSAY OF LIPASE: CPT

## 2025-04-07 PROCEDURE — 83605 ASSAY OF LACTIC ACID: CPT

## 2025-04-07 PROCEDURE — 96375 TX/PRO/DX INJ NEW DRUG ADDON: CPT

## 2025-04-07 PROCEDURE — 80053 COMPREHEN METABOLIC PANEL: CPT

## 2025-04-07 PROCEDURE — 6360000002 HC RX W HCPCS: Performed by: EMERGENCY MEDICINE

## 2025-04-07 PROCEDURE — 99284 EMERGENCY DEPT VISIT MOD MDM: CPT

## 2025-04-07 PROCEDURE — 2580000003 HC RX 258: Performed by: EMERGENCY MEDICINE

## 2025-04-07 PROCEDURE — 96374 THER/PROPH/DIAG INJ IV PUSH: CPT

## 2025-04-07 PROCEDURE — 96361 HYDRATE IV INFUSION ADD-ON: CPT

## 2025-04-07 PROCEDURE — 71046 X-RAY EXAM CHEST 2 VIEWS: CPT

## 2025-04-07 RX ORDER — ONDANSETRON 2 MG/ML
4 INJECTION INTRAMUSCULAR; INTRAVENOUS ONCE
Status: COMPLETED | OUTPATIENT
Start: 2025-04-07 | End: 2025-04-07

## 2025-04-07 RX ORDER — KETOROLAC TROMETHAMINE 15 MG/ML
15 INJECTION, SOLUTION INTRAMUSCULAR; INTRAVENOUS ONCE
Status: COMPLETED | OUTPATIENT
Start: 2025-04-07 | End: 2025-04-07

## 2025-04-07 RX ORDER — POTASSIUM CHLORIDE 1500 MG/1
40 TABLET, EXTENDED RELEASE ORAL ONCE
Status: COMPLETED | OUTPATIENT
Start: 2025-04-07 | End: 2025-04-08

## 2025-04-07 RX ORDER — IOPAMIDOL 755 MG/ML
75 INJECTION, SOLUTION INTRAVASCULAR
Status: DISCONTINUED | OUTPATIENT
Start: 2025-04-07 | End: 2025-04-08 | Stop reason: HOSPADM

## 2025-04-07 RX ORDER — 0.9 % SODIUM CHLORIDE 0.9 %
1000 INTRAVENOUS SOLUTION INTRAVENOUS ONCE
Status: COMPLETED | OUTPATIENT
Start: 2025-04-07 | End: 2025-04-08

## 2025-04-07 RX ADMIN — FAMOTIDINE 20 MG: 10 INJECTION, SOLUTION INTRAVENOUS at 21:50

## 2025-04-07 RX ADMIN — KETOROLAC TROMETHAMINE 15 MG: 15 INJECTION, SOLUTION INTRAMUSCULAR; INTRAVENOUS at 21:50

## 2025-04-07 RX ADMIN — ONDANSETRON 4 MG: 2 INJECTION, SOLUTION INTRAMUSCULAR; INTRAVENOUS at 21:50

## 2025-04-07 RX ADMIN — SODIUM CHLORIDE 1000 ML: 0.9 INJECTION, SOLUTION INTRAVENOUS at 21:49

## 2025-04-07 ASSESSMENT — ENCOUNTER SYMPTOMS
VOMITING: 1
ABDOMINAL PAIN: 1
NAUSEA: 1
DIARRHEA: 1

## 2025-04-07 ASSESSMENT — PAIN SCALES - GENERAL: PAINLEVEL_OUTOF10: 8

## 2025-04-07 ASSESSMENT — PAIN - FUNCTIONAL ASSESSMENT: PAIN_FUNCTIONAL_ASSESSMENT: 0-10

## 2025-04-07 ASSESSMENT — PAIN DESCRIPTION - LOCATION: LOCATION: ABDOMEN

## 2025-04-08 ENCOUNTER — APPOINTMENT (OUTPATIENT)
Dept: CT IMAGING | Age: 55
End: 2025-04-08
Payer: COMMERCIAL

## 2025-04-08 ENCOUNTER — OFFICE VISIT (OUTPATIENT)
Dept: BARIATRICS/WEIGHT MGMT | Age: 55
End: 2025-04-08
Payer: COMMERCIAL

## 2025-04-08 VITALS
OXYGEN SATURATION: 100 % | TEMPERATURE: 96.4 F | DIASTOLIC BLOOD PRESSURE: 76 MMHG | HEIGHT: 71 IN | HEART RATE: 80 BPM | BODY MASS INDEX: 33.6 KG/M2 | WEIGHT: 240 LBS | RESPIRATION RATE: 17 BRPM | SYSTOLIC BLOOD PRESSURE: 110 MMHG

## 2025-04-08 VITALS
DIASTOLIC BLOOD PRESSURE: 64 MMHG | BODY MASS INDEX: 36.54 KG/M2 | HEIGHT: 71 IN | HEART RATE: 80 BPM | SYSTOLIC BLOOD PRESSURE: 100 MMHG | WEIGHT: 261 LBS

## 2025-04-08 DIAGNOSIS — E66.01 MORBID OBESITY WITH BMI OF 45.0-49.9, ADULT: ICD-10-CM

## 2025-04-08 DIAGNOSIS — K91.2 MALNUTRITION FOLLOWING GASTROINTESTINAL SURGERY: Primary | ICD-10-CM

## 2025-04-08 PROCEDURE — 96361 HYDRATE IV INFUSION ADD-ON: CPT

## 2025-04-08 PROCEDURE — 99213 OFFICE O/P EST LOW 20 MIN: CPT | Performed by: NURSE PRACTITIONER

## 2025-04-08 PROCEDURE — 3017F COLORECTAL CA SCREEN DOC REV: CPT | Performed by: NURSE PRACTITIONER

## 2025-04-08 PROCEDURE — G8427 DOCREV CUR MEDS BY ELIG CLIN: HCPCS | Performed by: NURSE PRACTITIONER

## 2025-04-08 PROCEDURE — G8417 CALC BMI ABV UP PARAM F/U: HCPCS | Performed by: NURSE PRACTITIONER

## 2025-04-08 PROCEDURE — 1036F TOBACCO NON-USER: CPT | Performed by: NURSE PRACTITIONER

## 2025-04-08 PROCEDURE — 99211 OFF/OP EST MAY X REQ PHY/QHP: CPT

## 2025-04-08 PROCEDURE — 74177 CT ABD & PELVIS W/CONTRAST: CPT

## 2025-04-08 PROCEDURE — 6370000000 HC RX 637 (ALT 250 FOR IP): Performed by: EMERGENCY MEDICINE

## 2025-04-08 RX ORDER — DICYCLOMINE HYDROCHLORIDE 10 MG/1
10 CAPSULE ORAL 3 TIMES DAILY PRN
Qty: 30 CAPSULE | Refills: 0 | Status: SHIPPED | OUTPATIENT
Start: 2025-04-08

## 2025-04-08 RX ORDER — ONDANSETRON 4 MG/1
4 TABLET, FILM COATED ORAL EVERY 8 HOURS PRN
Qty: 30 TABLET | Refills: 0 | Status: SHIPPED | OUTPATIENT
Start: 2025-04-08

## 2025-04-08 RX ORDER — HYDROXYZINE PAMOATE 50 MG/1
50 CAPSULE ORAL 3 TIMES DAILY PRN
COMMUNITY

## 2025-04-08 RX ORDER — BUPROPION HYDROCHLORIDE 300 MG/1
300 TABLET ORAL EVERY MORNING
Qty: 30 TABLET | Refills: 5 | Status: SHIPPED | OUTPATIENT
Start: 2025-04-08

## 2025-04-08 RX ORDER — FAMOTIDINE 20 MG/1
20 TABLET, FILM COATED ORAL 2 TIMES DAILY
Qty: 14 TABLET | Refills: 0 | Status: SHIPPED | OUTPATIENT
Start: 2025-04-08 | End: 2025-04-15

## 2025-04-08 RX ADMIN — POTASSIUM CHLORIDE 40 MEQ: 1500 TABLET, EXTENDED RELEASE ORAL at 00:32

## 2025-04-08 ASSESSMENT — ENCOUNTER SYMPTOMS
COUGH: 0
WHEEZING: 0
CONSTIPATION: 0
NAUSEA: 0
DIARRHEA: 0
VOMITING: 0
SHORTNESS OF BREATH: 0

## 2025-04-08 NOTE — PROGRESS NOTES
Panel; Future  - Ferritin; Future  - Zinc; Future  - Vitamin B12; Future  - Folate; Future  - Vitamin B1; Future  - Vitamin D 25 Hydroxy; Future  - Copper, Serum; Future  - Vitamin A; Future  - Prealbumin; Future  - Lipid Panel; Future    2. Morbid obesity with BMI of 45.0-49.9, adult    - buPROPion (WELLBUTRIN XL) 300 MG extended release tablet; Take 1 tablet by mouth every morning  Dispense: 30 tablet; Refill: 5    Patient to follow up in 6 months as scheduled, sooner if needed    Rose Gonzales, APRN - CNP

## 2025-04-08 NOTE — DISCHARGE INSTRUCTIONS
Call family doctor and specialist to schedule an appointment    Have your doctor obtain copies of all results and records and re-review them with you    Please take your papers with you to all followup appointments    If symptoms reoccur or worsen or if you believe you need emergency services for any other reason return [facility]    CT ABDOMEN PELVIS W IV CONTRAST Additional Contrast? None   Final Result   1. No acute intra-abdominal or pelvic process.   2. Moderate, complex periumbilical hernia containing mesenteric fat and   uncomplicated knuckle of transverse colon.   3. Fatty infiltration of the moderately enlarged liver.   4. Chronic mild superior endplate compression deformities T10 and L2 through   L5.         XR CHEST (2 VW)   Final Result   No acute process.

## 2025-04-08 NOTE — PATIENT INSTRUCTIONS
Please continue to take your vitamin and mineral supplements as instructed.      If you received a blood work prescription today for laboratory monitoring due prior to your next routine follow-up visit, please have this blood work obtained 10 to 14 days prior to your next visit.  It is important to fast for 12 hours prior to routine weight loss surgery blood work, EXCEPT for drinking water, to ensure accuracy of results.    Please report nausea, vomiting, abdominal pain, or any other problems you experience to your surgeon.  For problems related to weight loss surgery, it is best to go to [facility] Emergency Department and have your surgeon paged.    Last Surgical Weight Loss:       No data to display

## 2025-04-08 NOTE — ED PROVIDER NOTES
[facility] [address]  [phone]    Schedule an appointment as soon as possible for a visit in 2 days      Ari Solitario MD  [address]  [place] [id number]  [phone]    Schedule an appointment as soon as possible for a visit   general surgery for hernia      DISCHARGE MEDICATIONS:  New Prescriptions    BISMUTH SUBSALICYLATE (PEPTO-BISMOL) 262 MG/15ML SUSPENSION    Take 15 mLs by mouth every 6 hours as needed for Indigestion    DICYCLOMINE (BENTYL) 10 MG CAPSULE    Take 1 capsule by mouth 3 times daily as needed (pain)    FAMOTIDINE (PEPCID) 20 MG TABLET    Take 1 tablet by mouth 2 times daily for 7 days    ONDANSETRON (ZOFRAN) 4 MG TABLET    Take 1 tablet by mouth every 8 hours as needed for Nausea or Vomiting       DISCONTINUED MEDICATIONS:  Discontinued Medications    No medications on file              (Please note that portions of this note were completed with a voice recognition program.  Efforts were made to edit the dictations but occasionally words are mis-transcribed.)    Blas Johnson DO (electronically signed)           Blas Johnson DO  04/08/25 0055

## 2025-04-10 ENCOUNTER — OFFICE VISIT (OUTPATIENT)
Dept: FAMILY MEDICINE CLINIC | Age: 55
End: 2025-04-10
Payer: COMMERCIAL

## 2025-04-10 ENCOUNTER — TELEPHONE (OUTPATIENT)
Dept: FAMILY MEDICINE CLINIC | Age: 55
End: 2025-04-10

## 2025-04-10 VITALS
DIASTOLIC BLOOD PRESSURE: 61 MMHG | TEMPERATURE: 98 F | HEIGHT: 71 IN | SYSTOLIC BLOOD PRESSURE: 109 MMHG | HEART RATE: 84 BPM | RESPIRATION RATE: 18 BRPM | OXYGEN SATURATION: 97 % | WEIGHT: 260 LBS | BODY MASS INDEX: 36.4 KG/M2

## 2025-04-10 DIAGNOSIS — I10 PRIMARY HYPERTENSION: ICD-10-CM

## 2025-04-10 DIAGNOSIS — E11.9 TYPE 2 DIABETES MELLITUS WITHOUT COMPLICATION, WITHOUT LONG-TERM CURRENT USE OF INSULIN: ICD-10-CM

## 2025-04-10 DIAGNOSIS — E78.2 MIXED HYPERLIPIDEMIA: ICD-10-CM

## 2025-04-10 DIAGNOSIS — R05.1 ACUTE COUGH: ICD-10-CM

## 2025-04-10 DIAGNOSIS — R05.8 POST-VIRAL COUGH SYNDROME: ICD-10-CM

## 2025-04-10 DIAGNOSIS — D72.819 LEUKOPENIA, UNSPECIFIED TYPE: Primary | ICD-10-CM

## 2025-04-10 PROCEDURE — 3078F DIAST BP <80 MM HG: CPT

## 2025-04-10 PROCEDURE — 3017F COLORECTAL CA SCREEN DOC REV: CPT

## 2025-04-10 PROCEDURE — 2022F DILAT RTA XM EVC RTNOPTHY: CPT

## 2025-04-10 PROCEDURE — 3046F HEMOGLOBIN A1C LEVEL >9.0%: CPT

## 2025-04-10 PROCEDURE — 1036F TOBACCO NON-USER: CPT

## 2025-04-10 PROCEDURE — 99213 OFFICE O/P EST LOW 20 MIN: CPT

## 2025-04-10 PROCEDURE — G8417 CALC BMI ABV UP PARAM F/U: HCPCS

## 2025-04-10 PROCEDURE — 3074F SYST BP LT 130 MM HG: CPT

## 2025-04-10 PROCEDURE — G8427 DOCREV CUR MEDS BY ELIG CLIN: HCPCS

## 2025-04-10 RX ORDER — DULAGLUTIDE 1.5 MG/.5ML
1.5 INJECTION, SOLUTION SUBCUTANEOUS WEEKLY
Qty: 2 ML | Refills: 2 | Status: SHIPPED | OUTPATIENT
Start: 2025-04-10

## 2025-04-10 RX ORDER — HYDROXYZINE HYDROCHLORIDE 50 MG/1
50 TABLET, FILM COATED ORAL NIGHTLY PRN
COMMUNITY
Start: 2025-04-03

## 2025-04-10 RX ORDER — AZELASTINE 1 MG/ML
1 SPRAY, METERED NASAL 2 TIMES DAILY
Qty: 30 ML | Refills: 1 | Status: SHIPPED | OUTPATIENT
Start: 2025-04-10

## 2025-04-10 RX ORDER — ATORVASTATIN CALCIUM 20 MG/1
20 TABLET, FILM COATED ORAL DAILY
Qty: 30 TABLET | Refills: 2 | Status: SHIPPED | OUTPATIENT
Start: 2025-04-10

## 2025-04-10 RX ORDER — ACYCLOVIR 800 MG/1
1 TABLET ORAL 4 TIMES DAILY
Qty: 1 EACH | Refills: 3 | Status: SHIPPED | OUTPATIENT
Start: 2025-04-10

## 2025-04-10 RX ORDER — FLUOXETINE HYDROCHLORIDE 40 MG/1
40 CAPSULE ORAL DAILY
COMMUNITY
Start: 2025-04-03

## 2025-04-10 RX ORDER — VALSARTAN 160 MG/1
160 TABLET ORAL DAILY
Qty: 90 TABLET | Refills: 1 | Status: SHIPPED | OUTPATIENT
Start: 2025-04-10

## 2025-04-10 SDOH — ECONOMIC STABILITY: FOOD INSECURITY: WITHIN THE PAST 12 MONTHS, THE FOOD YOU BOUGHT JUST DIDN'T LAST AND YOU DIDN'T HAVE MONEY TO GET MORE.: NEVER TRUE

## 2025-04-10 SDOH — ECONOMIC STABILITY: FOOD INSECURITY: WITHIN THE PAST 12 MONTHS, YOU WORRIED THAT YOUR FOOD WOULD RUN OUT BEFORE YOU GOT MONEY TO BUY MORE.: NEVER TRUE

## 2025-04-10 NOTE — PROGRESS NOTES
[facility]  Department of Family Medicine  Family Medicine Residency Program      Patient: Mae Calvillo 54 y.o. female     Date of Service: 4/10/25      Chief complaint:   Chief Complaint   Patient presents with    Foot Swelling    Cough     Persistent for about a week     Medication Refill       HISTORY OF PRESENTING ILLNESS     54 y.o. female presented to the clinic     -Her frnd passed away 2 wks ago.   - feeling sad about that.,  - going to counseling sessions regularly.   - meds are working for her.   - last counselor was on Friday,. Usually every 2 wks.     Er FU:  - went to Er on 04/07/2025.   - was having diarrhea, NV. In Er had hypokalemia and leukopenia  - K was repleted.   - since then having coughing.      DM:   - rybelsus and victoza and ozempic was refused.   - is on jardiance 25 mg daily,   - stopped taking metformin 2/2 diarrhea 2 wks.   - is on Trulicity 1.5 mg once wkly.   A1c 7.3 today  Lab Results   Component Value Date/Time    LABA1C 10.2 01/03/2025 02:55 PM    LABA1C 7.2 09/18/2024 10:53 AM    LABA1C 6.7 06/14/2024 03:49 PM   - had eye exam in March 2025.       Health Maintenance:  Health Maintenance Due   Topic Date Due    Diabetic retinal exam  Never done    DTaP/Tdap/Td vaccine (2 - Td or Tdap) 12/05/2024    Diabetic Alb to Cr ratio (uACR) test  02/09/2025    A1C test (Diabetic or Prediabetic)  04/03/2025     Past Medical History:      Diagnosis Date    Anemia     stable at this time  2023    Arthritis     COVID 05/2022 11/2022-    Diabetes mellitus (HCC)     diet controlled    History of blood transfusion 2012    Hyperlipidemia     Hypertension     Lumbar pain      Past Surgical History:        Procedure Laterality Date    COLONOSCOPY      COLPOSCOPY      2001 : outside [place]     ESOPHAGOGASTRODUODENOSCOPY      GASTRIC BAND      august 2022    HYSTERECTOMY (CERVIX STATUS UNKNOWN) Bilateral     total hysterectomy due to fibroids    PAIN MANAGEMENT

## 2025-04-10 NOTE — PROGRESS NOTES
[facility] include 234 beds at the [facility]  Precepting Note    Subjective:  N/V/D seen in ER. Triggered by possible food poisoning   On Jardiance and Metformin, stopped metformin 2 weeks ago due to diarrhea  Taking Jardiance and Trulicity, HbA1c 7.3  Eye exam UTD       ROS otherwise negative     Past medical, surgical, family and social history were reviewed, non-contributory, and unchanged unless otherwise stated.    Objective:    /61   Pulse 84   Temp 98 °F (36.7 °C) (Temporal)   Resp 18   Ht 1.803 m (5' 11\")   Wt 117.9 kg (260 lb)   SpO2 97%   BMI 36.26 kg/m²     Exam is as noted by resident with the following changes, additions or corrections:    Assessment/Plan:  N/V/D- resolved  DMII- cont current regimen   Leukopenia- repeat CBC  Follow up one month      Attending Physician Statement  I have reviewed the chart, including any radiology or labs. I have discussed the case, including pertinent history and exam findings with the resident.  I agree with the assessment, plan and orders as documented by the resident.  Please refer to the resident note for additional information.      Electronically signed by Rosalie Vargas MD on 4/10/2025 at 2:25 PM

## 2025-04-11 RX ORDER — BENZONATATE 100 MG/1
100 CAPSULE ORAL 3 TIMES DAILY PRN
Qty: 30 CAPSULE | Refills: 0 | Status: SHIPPED | OUTPATIENT
Start: 2025-04-11 | End: 2025-04-21

## 2025-04-11 NOTE — TELEPHONE ENCOUNTER
Name of Medication(s) Requested:  Requested Prescriptions     Pending Prescriptions Disp Refills    benzonatate (TESSALON) 100 MG capsule 30 capsule 0     Sig: Take 1 capsule by mouth 3 times daily as needed for Cough       Medication is on current medication list Yes    Dosage and directions were verified? Yes    Quantity verified: 30 day supply     Pharmacy Verified?  Yes    Last Appointment:  4/10/2025    Future appts:  Future Appointments   Date Time Provider Department Center   4/30/2025  3:00 PM Ben Steinberg MD BDM PAIN MAR [facility]   5/20/2025  9:00 AM [facility] ED MOB SCHEDULE [facility] HOD   [facility]21[facility]20[facility]  [facility]   5/22/2025  9:00 AM [facility] ED MOB SCHEDULE [facility] HOD   7/10/2025  2:00 PM [facility]   9/12/2025  2:30 PM Rose Gonzales, APRN - CNP Surg Weight [facility]        (If no appt send self scheduling link. .REFILLAPPT)  Scheduling request sent?     [] Yes  [x] No    Does patient need updated?  [x] Yes  [] No

## 2025-04-30 ENCOUNTER — OFFICE VISIT (OUTPATIENT)
Dept: PAIN MANAGEMENT | Age: 55
End: 2025-04-30
Payer: COMMERCIAL

## 2025-04-30 VITALS
SYSTOLIC BLOOD PRESSURE: 135 MMHG | OXYGEN SATURATION: 94 % | HEART RATE: 82 BPM | HEIGHT: 70 IN | DIASTOLIC BLOOD PRESSURE: 74 MMHG | WEIGHT: 236 LBS | TEMPERATURE: 97.5 F | BODY MASS INDEX: 33.79 KG/M2 | RESPIRATION RATE: 16 BRPM

## 2025-04-30 DIAGNOSIS — M51.369 DEGENERATION OF INTERVERTEBRAL DISC OF LUMBAR REGION, UNSPECIFIED WHETHER PAIN PRESENT: Primary | ICD-10-CM

## 2025-04-30 DIAGNOSIS — M48.061 SPINAL STENOSIS OF LUMBAR REGION, UNSPECIFIED WHETHER NEUROGENIC CLAUDICATION PRESENT: ICD-10-CM

## 2025-04-30 DIAGNOSIS — M47.817 LUMBOSACRAL SPONDYLOSIS WITHOUT MYELOPATHY: ICD-10-CM

## 2025-04-30 DIAGNOSIS — S32.000S COMPRESSION FRACTURE OF LUMBAR VERTEBRA, UNSPECIFIED LUMBAR VERTEBRAL LEVEL, SEQUELA: ICD-10-CM

## 2025-04-30 PROCEDURE — 1036F TOBACCO NON-USER: CPT | Performed by: ANESTHESIOLOGY

## 2025-04-30 PROCEDURE — 3017F COLORECTAL CA SCREEN DOC REV: CPT | Performed by: ANESTHESIOLOGY

## 2025-04-30 PROCEDURE — G8417 CALC BMI ABV UP PARAM F/U: HCPCS | Performed by: ANESTHESIOLOGY

## 2025-04-30 PROCEDURE — G8427 DOCREV CUR MEDS BY ELIG CLIN: HCPCS | Performed by: ANESTHESIOLOGY

## 2025-04-30 PROCEDURE — 99213 OFFICE O/P EST LOW 20 MIN: CPT | Performed by: ANESTHESIOLOGY

## 2025-04-30 PROCEDURE — 99213 OFFICE O/P EST LOW 20 MIN: CPT

## 2025-04-30 NOTE — PROGRESS NOTES
Mae Calvillo presents to the [facility] on 4/30/2025. Mae states that sameera is pain free. Pain is rated on her best day at a 0, on her worst day at a 0, and on average at a 0 on the VAS scale.     Any procedures since your last visit: No    Pacemaker or defibrillator: No     She is not on NSAIDS and is not on anticoagulation medications.    Do you want someone present when the provider examines you? No    Medication Contract and Consent for Opioid Use Documents Filed       Patient Documents       Type of Document Status Date Received Received By Description    Medication Contract Received 2/8/2023 10:35 AM [facility] PAIN AGREEMENT                    /74   Pulse 82   Temp 97.5 °F (36.4 °C) (Infrared)   Resp 16   Ht 1.778 m (5' 10\")   Wt 107 kg (236 lb)   SpO2 94%   BMI 33.86 kg/m²      No LMP recorded. Patient has had a hysterectomy.

## 2025-04-30 NOTE — PROGRESS NOTES
[facility]        [address] [place] [id number]  Dept: [phone]      Follow up Note      Mae Calvillo     Date of Visit:  4/30/2025    CC:  Patient presents for follow up   Chief Complaint   Patient presents with    Follow-up     Low back pain      HPI:  Chronic Low back pain.     H/o fall and has low back pain. Has lumbar VCF - evaluated by NSG- conservative management with bracing and analgesics.    Prior interventional procedures: YFN, excellent pain relief.     Nursing notes and details of the pain history reviewed. Please see intake notes for details.     S/p Gastric sleeve in Aug 2022 has lost significant weight following the surgery.     Previous treatments:   Physical Therapy/ HEP : yes,      Medications: - NSAID's : yes                        - Membrane stabilizers : yes - gabapentin- had side effects                       - Opioids : yes,                        - Adjuvants or Others : yes,     She has not been on anticoagulation medications      H/O Smoking: no  H/O alcohol abuse : no  H/O Illicit drug use : denies     Employment: currently off work     Imaging:    MRI of LS spine: 2/7/2023:  Impression   1. Fairly old compression fractures of L2 through L5 as noted above with up   to 50% loss of height at L3 and 45% loss of height at L2.  No acute fracture   seen.   2. Degenerative change with multiple disc bulges and multilevel central canal   stenosis the, most prominent (moderate) at L3-4.   3. Multilevel neural foraminal stenosis as noted above.      Xray LS spine: 1/11/2023:  Impression   Stable mild compression injuries at L2 and L3.  Degenerative lumbar   spondylosis as before.     OARRS report:: reviewed.    Past Medical History:   Diagnosis Date    Anemia     stable at this time  2023    Arthritis     COVID 05/2022 11/2022-    Diabetes mellitus (HCC)     diet controlled    History of blood transfusion 2012    Hyperlipidemia     Hypertension

## 2025-05-09 ENCOUNTER — LAB (OUTPATIENT)
Dept: FAMILY MEDICINE CLINIC | Age: 55
End: 2025-05-09
Payer: COMMERCIAL

## 2025-05-09 ENCOUNTER — HOSPITAL ENCOUNTER (OUTPATIENT)
Age: 55
Discharge: HOME | End: 2025-05-09
Payer: COMMERCIAL

## 2025-05-09 DIAGNOSIS — E11.9 TYPE 2 DIABETES MELLITUS WITHOUT COMPLICATION, WITHOUT LONG-TERM CURRENT USE OF INSULIN (HCC): ICD-10-CM

## 2025-05-09 DIAGNOSIS — I10 PRIMARY HYPERTENSION: ICD-10-CM

## 2025-05-09 LAB
25(OH)D3 SERPL-MCNC: 48.3 NG/ML (ref 30–100)
ALBUMIN SERPL-MCNC: 3.7 G/DL (ref 3.5–5.2)
ALBUMIN: 3.7 G/DL (ref 3.5–5.2)
ALP BLD-CCNC: 110 U/L (ref 35–104)
ALP SERPL-CCNC: 111 U/L (ref 35–104)
ALT SERPL-CCNC: 25 U/L (ref 0–32)
ALT SERPL-CCNC: 29 U/L (ref 0–35)
ANION GAP SERPL CALCULATED.3IONS-SCNC: 13 MMOL/L (ref 7–16)
ANION GAP SERPL CALCULATED.3IONS-SCNC: 16 MMOL/L (ref 7–16)
AST SERPL-CCNC: 49 U/L (ref 0–31)
AST SERPL-CCNC: 61 U/L (ref 0–35)
BILIRUB SERPL-MCNC: 0.8 MG/DL (ref 0–1.2)
BILIRUB SERPL-MCNC: 0.8 MG/DL (ref 0–1.2)
BUN BLDV-MCNC: 8 MG/DL (ref 6–20)
BUN SERPL-MCNC: 8 MG/DL (ref 6–20)
CALCIUM SERPL-MCNC: 9.4 MG/DL (ref 8.6–10.2)
CALCIUM SERPL-MCNC: 9.6 MG/DL (ref 8.6–10)
CHLORIDE BLD-SCNC: 102 MMOL/L (ref 98–107)
CHLORIDE SERPL-SCNC: 100 MMOL/L (ref 98–107)
CHOLEST SERPL-MCNC: 152 MG/DL
CO2 SERPL-SCNC: 23 MMOL/L (ref 22–29)
CO2: 22 MMOL/L (ref 22–29)
CREAT SERPL-MCNC: 0.9 MG/DL (ref 0.5–1)
CREAT SERPL-MCNC: 1 MG/DL (ref 0.5–1)
CREAT UR-MCNC: 73.2 MG/DL (ref 29–226)
ERYTHROCYTE [DISTWIDTH] IN BLOOD BY AUTOMATED COUNT: 14.8 % (ref 11.5–15)
FERRITIN SERPL-MCNC: 306 NG/ML
FOLATE SERPL-MCNC: 12.6 NG/ML (ref 4.6–34.8)
GFR, ESTIMATED: 70 ML/MIN/1.73M2
GFR, ESTIMATED: 75 ML/MIN/1.73M2
GLUCOSE BLD-MCNC: 124 MG/DL (ref 74–99)
GLUCOSE SERPL-MCNC: 123 MG/DL (ref 74–99)
HBA1C MFR BLD: 7.4 % (ref 4–5.6)
HCT VFR BLD AUTO: 41.7 % (ref 34–48)
HCT VFR BLD CALC: 41.3 % (ref 34–48)
HDLC SERPL-MCNC: 47 MG/DL
HEMOGLOBIN: 13.7 G/DL (ref 11.5–15.5)
HGB BLD-MCNC: 13.6 G/DL (ref 11.5–15.5)
LDLC SERPL CALC-MCNC: 88 MG/DL
MCH RBC QN AUTO: 31.4 PG (ref 26–35)
MCH RBC QN AUTO: 32.2 PG (ref 26–35)
MCHC RBC AUTO-ENTMCNC: 32.6 G/DL (ref 32–34.5)
MCHC RBC AUTO-ENTMCNC: 33.2 G/DL (ref 32–34.5)
MCV RBC AUTO: 96.3 FL (ref 80–99.9)
MCV RBC AUTO: 97.2 FL (ref 80–99.9)
MICROALBUMIN UR-MCNC: 51 MG/L (ref 0–20)
MICROALBUMIN/CREAT UR-RTO: 70 MCG/MG CREAT (ref 0–30)
PDW BLD-RTO: 14.9 % (ref 11.5–15)
PLATELET # BLD AUTO: 212 K/UL (ref 130–450)
PLATELET # BLD: 215 K/UL (ref 130–450)
PMV BLD AUTO: 10.3 FL (ref 7–12)
PMV BLD AUTO: 10.8 FL (ref 7–12)
POTASSIUM SERPL-SCNC: 3.9 MMOL/L (ref 3.5–5)
POTASSIUM SERPL-SCNC: 3.9 MMOL/L (ref 3.5–5.1)
PREALB SERPL-MCNC: 15.4 MG/DL (ref 20–40)
PROT SERPL-MCNC: 8.2 G/DL (ref 6.4–8.3)
RBC # BLD AUTO: 4.33 M/UL (ref 3.5–5.5)
RBC # BLD: 4.25 M/UL (ref 3.5–5.5)
SODIUM BLD-SCNC: 140 MMOL/L (ref 136–145)
SODIUM SERPL-SCNC: 136 MMOL/L (ref 132–146)
TOTAL PROTEIN: 8.3 G/DL (ref 6.4–8.3)
TRIGL SERPL-MCNC: 85 MG/DL
VIT B12 SERPL-MCNC: 690 PG/ML (ref 232–1245)
VLDLC SERPL CALC-MCNC: 17 MG/DL
WBC # BLD: 5 K/UL (ref 4.5–11.5)
WBC OTHER # BLD: 5.2 K/UL (ref 4.5–11.5)

## 2025-05-09 PROCEDURE — 82570 ASSAY OF URINE CREATININE: CPT

## 2025-05-09 PROCEDURE — 82306 VITAMIN D 25 HYDROXY: CPT

## 2025-05-09 PROCEDURE — 84630 ASSAY OF ZINC: CPT

## 2025-05-09 PROCEDURE — 82746 ASSAY OF FOLIC ACID SERUM: CPT

## 2025-05-09 PROCEDURE — 84134 ASSAY OF PREALBUMIN: CPT

## 2025-05-09 PROCEDURE — 85027 COMPLETE CBC AUTOMATED: CPT

## 2025-05-09 PROCEDURE — 82043 UR ALBUMIN QUANTITATIVE: CPT

## 2025-05-09 PROCEDURE — 82525 ASSAY OF COPPER: CPT

## 2025-05-09 PROCEDURE — 82728 ASSAY OF FERRITIN: CPT

## 2025-05-09 PROCEDURE — 36415 COLL VENOUS BLD VENIPUNCTURE: CPT | Performed by: FAMILY MEDICINE

## 2025-05-09 PROCEDURE — 82607 VITAMIN B-12: CPT

## 2025-05-09 PROCEDURE — 36415 COLL VENOUS BLD VENIPUNCTURE: CPT

## 2025-05-09 PROCEDURE — 84590 ASSAY OF VITAMIN A: CPT

## 2025-05-09 PROCEDURE — 84425 ASSAY OF VITAMIN B-1: CPT

## 2025-05-09 PROCEDURE — 80053 COMPREHEN METABOLIC PANEL: CPT

## 2025-05-09 PROCEDURE — 80061 LIPID PANEL: CPT

## 2025-05-11 LAB — ZINC SERPL-MCNC: 68 UG/DL (ref 60–120)

## 2025-05-12 LAB — COPPER SERPL-MCNC: 118.2 UG/DL (ref 80–155)

## 2025-05-13 LAB
RETINYL PALMITATE: <0.02 MG/L (ref 0–0.1)
VITAMIN A LEVEL: 0.33 MG/L (ref 0.3–1.2)
VITAMIN A, INTERP: NORMAL

## 2025-05-14 ENCOUNTER — HOSPITAL ENCOUNTER (OUTPATIENT)
Dept: PHYSICAL THERAPY | Age: 55
Setting detail: THERAPIES SERIES
Discharge: HOME | End: 2025-05-14
Attending: ANESTHESIOLOGY
Payer: COMMERCIAL

## 2025-05-14 LAB — VIT B1 PYROPHOSHATE BLD-SCNC: 179 NMOL/L (ref 70–180)

## 2025-05-14 PROCEDURE — 97161 PT EVAL LOW COMPLEX 20 MIN: CPT | Performed by: PHYSICAL THERAPIST

## 2025-05-14 ASSESSMENT — PAIN SCALES - GENERAL: PAINLEVEL_OUTOF10: 5

## 2025-05-14 ASSESSMENT — PAIN DESCRIPTION - LOCATION: LOCATION: BACK

## 2025-05-14 ASSESSMENT — PAIN DESCRIPTION - ORIENTATION: ORIENTATION: LOWER

## 2025-05-14 ASSESSMENT — PAIN DESCRIPTION - PAIN TYPE: TYPE: CHRONIC PAIN

## 2025-05-14 NOTE — PROGRESS NOTES
Physical Therapy    Physical Therapy: Initial Evaluation    Patient: Mae Calvillo (54 y.o. female)   Examination Date: 2025  Plan of Care Certification Period: 2025 to        :  1970 ;    Confirmed: Yes MRN: [id number]  CSN: [id number]   Insurance: Payor: [facility] / Plan: [facility] [place] MEDICAID / Product Type: *No Product type* /   Insurance ID: [id number] - (Medicaid Managed) Secondary Insurance (if applicable):    Referring Physician: Ben Steinberg MD     PCP: Lore Del Rio MD Visits to Date/Visits Approved: 1 /      No Show/Cancelled Appts:   /       Medical Diagnosis: Degeneration of intervertebral disc of lumbar region, unspecified whether pain present [M51.369]  Lumbosacral spondylosis without myelopathy [M47.817]  Spinal stenosis of lumbar region, unspecified whether neurogenic claudication present [M48.061]  Compression fracture of lumbar vertebra, unspecified lumbar vertebral level, sequela [S32.000S]    Treatment Diagnosis:       PERTINENT MEDICAL HISTORY           Medical History: Chart Reviewed: Yes   Past Medical History:   Diagnosis Date    Anemia     stable at this time      Arthritis     COVID 2022-    Diabetes mellitus (HCC)     diet controlled    History of blood transfusion     Hyperlipidemia     Hypertension     Lumbar pain      Surgical History:   Past Surgical History:   Procedure Laterality Date    COLONOSCOPY      COLPOSCOPY       : outside [place]     ESOPHAGOGASTRODUODENOSCOPY      GASTRIC BAND      2022    HYSTERECTOMY (CERVIX STATUS UNKNOWN) Bilateral     total hysterectomy due to fibroids    PAIN MANAGEMENT PROCEDURE N/A 2023    LUMBAR TRANSFORAMINAL EPIDURAL INJECTION RIGHT  L3 & LEFT l4 UNDER FLUOROSCOPIC GUIDANCE performed by Ben Steinberg MD at [facility] OR    PAIN MANAGEMENT PROCEDURE Bilateral 2023    LUMBAR TRANSFORAMINAL EPIDURAL STEROID INJECTION RIGHT L3 & LEFT L4 UNDER FLUOROSCOPIC

## 2025-05-22 ENCOUNTER — APPOINTMENT (OUTPATIENT)
Dept: PHYSICAL THERAPY | Age: 55
End: 2025-05-22
Attending: ANESTHESIOLOGY
Payer: COMMERCIAL

## 2025-05-22 ENCOUNTER — TELEPHONE (OUTPATIENT)
Dept: PHYSICAL THERAPY | Age: 55
End: 2025-05-22

## 2025-06-16 ENCOUNTER — HOSPITAL ENCOUNTER (INPATIENT)
Age: 55
LOS: 11 days | Discharge: ANOTHER HOSPITAL | End: 2025-06-27
Attending: STUDENT IN AN ORGANIZED HEALTH CARE EDUCATION/TRAINING PROGRAM | Admitting: PSYCHIATRY & NEUROLOGY
Payer: COMMERCIAL

## 2025-06-16 DIAGNOSIS — I10 PRIMARY HYPERTENSION: ICD-10-CM

## 2025-06-16 DIAGNOSIS — E11.8 CONTROLLED TYPE 2 DIABETES MELLITUS WITH COMPLICATION, WITHOUT LONG-TERM CURRENT USE OF INSULIN (HCC): ICD-10-CM

## 2025-06-16 DIAGNOSIS — E78.2 MIXED HYPERLIPIDEMIA: ICD-10-CM

## 2025-06-16 DIAGNOSIS — R45.851 SUICIDAL IDEATION: Primary | ICD-10-CM

## 2025-06-16 PROBLEM — F31.9 BIPOLAR DISORDER (HCC): Status: ACTIVE | Noted: 2025-06-16

## 2025-06-16 LAB
ALBUMIN SERPL-MCNC: 3.9 G/DL (ref 3.5–5.2)
ALP SERPL-CCNC: 118 U/L (ref 35–104)
ALT SERPL-CCNC: 24 U/L (ref 0–35)
AMPHET UR QL SCN: NEGATIVE
ANION GAP SERPL CALCULATED.3IONS-SCNC: 18 MMOL/L (ref 7–16)
APAP SERPL-MCNC: <5 UG/ML (ref 10–30)
AST SERPL-CCNC: 55 U/L (ref 0–35)
BARBITURATES UR QL SCN: NEGATIVE
BASOPHILS # BLD: 0 K/UL (ref 0–0.2)
BASOPHILS NFR BLD: 0 % (ref 0–2)
BENZODIAZ UR QL: NEGATIVE
BILIRUB SERPL-MCNC: 0.7 MG/DL (ref 0–1.2)
BUN SERPL-MCNC: 7 MG/DL (ref 6–20)
BUPRENORPHINE UR QL: NEGATIVE
CALCIUM SERPL-MCNC: 9.2 MG/DL (ref 8.6–10)
CANNABINOIDS UR QL SCN: NEGATIVE
CHLORIDE SERPL-SCNC: 101 MMOL/L (ref 98–107)
CK SERPL-CCNC: 101 U/L (ref 0–170)
CO2 SERPL-SCNC: 23 MMOL/L (ref 22–29)
COCAINE UR QL SCN: NEGATIVE
CREAT SERPL-MCNC: 0.7 MG/DL (ref 0.5–1)
EOSINOPHIL # BLD: 0.15 K/UL (ref 0.05–0.5)
EOSINOPHILS RELATIVE PERCENT: 4 % (ref 0–6)
ERYTHROCYTE [DISTWIDTH] IN BLOOD BY AUTOMATED COUNT: 17.2 % (ref 11.5–15)
ETHANOLAMINE SERPL-MCNC: 137 MG/DL (ref 0–0.08)
FENTANYL UR QL: NEGATIVE
GFR, ESTIMATED: >90 ML/MIN/1.73M2
GLUCOSE BLD-MCNC: 239 MG/DL (ref 74–99)
GLUCOSE SERPL-MCNC: 138 MG/DL (ref 74–99)
HCT VFR BLD AUTO: 38.7 % (ref 34–48)
HGB BLD-MCNC: 12.9 G/DL (ref 11.5–15.5)
LYMPHOCYTES NFR BLD: 1.99 K/UL (ref 1.5–4)
LYMPHOCYTES RELATIVE PERCENT: 45 % (ref 20–42)
MAGNESIUM SERPL-MCNC: 1.8 MG/DL (ref 1.6–2.6)
MCH RBC QN AUTO: 32.2 PG (ref 26–35)
MCHC RBC AUTO-ENTMCNC: 33.3 G/DL (ref 32–34.5)
MCV RBC AUTO: 96.5 FL (ref 80–99.9)
METHADONE UR QL: NEGATIVE
MONOCYTES NFR BLD: 0.27 K/UL (ref 0.1–0.95)
MONOCYTES NFR BLD: 6 % (ref 2–12)
NEUTROPHILS NFR BLD: 45 % (ref 43–80)
NEUTS SEG NFR BLD: 1.99 K/UL (ref 1.8–7.3)
OPIATES UR QL SCN: NEGATIVE
OXYCODONE UR QL SCN: NEGATIVE
PCP UR QL SCN: NEGATIVE
PLATELET # BLD AUTO: 144 K/UL (ref 130–450)
PMV BLD AUTO: 9.6 FL (ref 7–12)
POTASSIUM SERPL-SCNC: 3.3 MMOL/L (ref 3.5–5.1)
PROT SERPL-MCNC: 7.8 G/DL (ref 6.4–8.3)
RBC # BLD AUTO: 4.01 M/UL (ref 3.5–5.5)
RBC # BLD: ABNORMAL 10*6/UL
SALICYLATES SERPL-MCNC: <0.5 MG/DL (ref 0–30)
SODIUM SERPL-SCNC: 141 MMOL/L (ref 136–145)
TEST INFORMATION: NORMAL
TOXIC TRICYCLIC SC,BLOOD: NEGATIVE
WBC OTHER # BLD: 4.4 K/UL (ref 4.5–11.5)

## 2025-06-16 PROCEDURE — 82962 GLUCOSE BLOOD TEST: CPT

## 2025-06-16 PROCEDURE — 1240000000 HC EMOTIONAL WELLNESS R&B

## 2025-06-16 PROCEDURE — 83735 ASSAY OF MAGNESIUM: CPT

## 2025-06-16 PROCEDURE — 6370000000 HC RX 637 (ALT 250 FOR IP): Performed by: PSYCHIATRY & NEUROLOGY

## 2025-06-16 PROCEDURE — 93005 ELECTROCARDIOGRAM TRACING: CPT | Performed by: STUDENT IN AN ORGANIZED HEALTH CARE EDUCATION/TRAINING PROGRAM

## 2025-06-16 PROCEDURE — 90791 PSYCH DIAGNOSTIC EVALUATION: CPT

## 2025-06-16 PROCEDURE — 80307 DRUG TEST PRSMV CHEM ANLYZR: CPT

## 2025-06-16 PROCEDURE — 82550 ASSAY OF CK (CPK): CPT

## 2025-06-16 PROCEDURE — 99285 EMERGENCY DEPT VISIT HI MDM: CPT

## 2025-06-16 PROCEDURE — 81001 URINALYSIS AUTO W/SCOPE: CPT

## 2025-06-16 PROCEDURE — 93005 ELECTROCARDIOGRAM TRACING: CPT | Performed by: EMERGENCY MEDICINE

## 2025-06-16 PROCEDURE — 80179 DRUG ASSAY SALICYLATE: CPT

## 2025-06-16 PROCEDURE — 85025 COMPLETE CBC W/AUTO DIFF WBC: CPT

## 2025-06-16 PROCEDURE — G0480 DRUG TEST DEF 1-7 CLASSES: HCPCS

## 2025-06-16 PROCEDURE — 80053 COMPREHEN METABOLIC PANEL: CPT

## 2025-06-16 PROCEDURE — 80143 DRUG ASSAY ACETAMINOPHEN: CPT

## 2025-06-16 PROCEDURE — 36415 COLL VENOUS BLD VENIPUNCTURE: CPT

## 2025-06-16 RX ORDER — NICOTINE 21 MG/24HR
1 PATCH, TRANSDERMAL 24 HOURS TRANSDERMAL DAILY
Status: DISCONTINUED | OUTPATIENT
Start: 2025-06-16 | End: 2025-06-17

## 2025-06-16 RX ORDER — MAGNESIUM HYDROXIDE/ALUMINUM HYDROXICE/SIMETHICONE 120; 1200; 1200 MG/30ML; MG/30ML; MG/30ML
30 SUSPENSION ORAL PRN
Status: DISCONTINUED | OUTPATIENT
Start: 2025-06-16 | End: 2025-06-27 | Stop reason: HOSPADM

## 2025-06-16 RX ORDER — HALOPERIDOL 5 MG/1
5 TABLET ORAL EVERY 6 HOURS PRN
Status: DISCONTINUED | OUTPATIENT
Start: 2025-06-16 | End: 2025-06-27 | Stop reason: HOSPADM

## 2025-06-16 RX ORDER — HYDROXYZINE HYDROCHLORIDE 50 MG/1
50 TABLET, FILM COATED ORAL 3 TIMES DAILY PRN
Status: DISCONTINUED | OUTPATIENT
Start: 2025-06-16 | End: 2025-06-27 | Stop reason: HOSPADM

## 2025-06-16 RX ORDER — CHLORDIAZEPOXIDE HYDROCHLORIDE 25 MG/1
25 CAPSULE, GELATIN COATED ORAL EVERY 4 HOURS PRN
Status: DISCONTINUED | OUTPATIENT
Start: 2025-06-16 | End: 2025-06-20

## 2025-06-16 RX ORDER — METFORMIN HYDROCHLORIDE 500 MG/1
500 TABLET, EXTENDED RELEASE ORAL
Status: DISCONTINUED | OUTPATIENT
Start: 2025-06-16 | End: 2025-06-27 | Stop reason: HOSPADM

## 2025-06-16 RX ORDER — HALOPERIDOL 5 MG/ML
5 INJECTION INTRAMUSCULAR EVERY 6 HOURS PRN
Status: DISCONTINUED | OUTPATIENT
Start: 2025-06-16 | End: 2025-06-27 | Stop reason: HOSPADM

## 2025-06-16 RX ORDER — MUPIROCIN 2 %
1 OINTMENT (GRAM) TOPICAL 2 TIMES DAILY
COMMUNITY

## 2025-06-16 RX ORDER — PRAZOSIN HYDROCHLORIDE 1 MG/1
1 CAPSULE ORAL NIGHTLY
Status: ON HOLD | COMMUNITY
End: 2025-06-27 | Stop reason: HOSPADM

## 2025-06-16 RX ORDER — MIRTAZAPINE 15 MG/1
15 TABLET, FILM COATED ORAL NIGHTLY
Status: ON HOLD | COMMUNITY
End: 2025-06-27 | Stop reason: HOSPADM

## 2025-06-16 RX ORDER — ACETAMINOPHEN 325 MG/1
650 TABLET ORAL EVERY 6 HOURS PRN
Status: DISCONTINUED | OUTPATIENT
Start: 2025-06-16 | End: 2025-06-27 | Stop reason: HOSPADM

## 2025-06-16 RX ORDER — CICLOPIROX OLAMINE 7.7 MG/100ML
1 SUSPENSION TOPICAL 2 TIMES DAILY
COMMUNITY

## 2025-06-16 RX ADMIN — HALOPERIDOL 5 MG: 5 TABLET ORAL at 23:59

## 2025-06-16 RX ADMIN — Medication 3 MG: at 21:02

## 2025-06-16 RX ADMIN — HYDROXYZINE HYDROCHLORIDE 50 MG: 50 TABLET ORAL at 21:02

## 2025-06-16 ASSESSMENT — PATIENT HEALTH QUESTIONNAIRE - PHQ9
2. FEELING DOWN, DEPRESSED OR HOPELESS: NEARLY EVERY DAY
7. TROUBLE CONCENTRATING ON THINGS, SUCH AS READING THE NEWSPAPER OR WATCHING TELEVISION: NEARLY EVERY DAY
4. FEELING TIRED OR HAVING LITTLE ENERGY: NEARLY EVERY DAY
10. IF YOU CHECKED OFF ANY PROBLEMS, HOW DIFFICULT HAVE THESE PROBLEMS MADE IT FOR YOU TO DO YOUR WORK, TAKE CARE OF THINGS AT HOME, OR GET ALONG WITH OTHER PEOPLE: EXTREMELY DIFFICULT
5. POOR APPETITE OR OVEREATING: NEARLY EVERY DAY
8. MOVING OR SPEAKING SO SLOWLY THAT OTHER PEOPLE COULD HAVE NOTICED. OR THE OPPOSITE, BEING SO FIGETY OR RESTLESS THAT YOU HAVE BEEN MOVING AROUND A LOT MORE THAN USUAL: NEARLY EVERY DAY
SUM OF ALL RESPONSES TO PHQ QUESTIONS 1-9: 27
1. LITTLE INTEREST OR PLEASURE IN DOING THINGS: NEARLY EVERY DAY
SUM OF ALL RESPONSES TO PHQ QUESTIONS 1-9: 24
9. THOUGHTS THAT YOU WOULD BE BETTER OFF DEAD, OR OF HURTING YOURSELF: NEARLY EVERY DAY
3. TROUBLE FALLING OR STAYING ASLEEP: NEARLY EVERY DAY
SUM OF ALL RESPONSES TO PHQ QUESTIONS 1-9: 27
SUM OF ALL RESPONSES TO PHQ QUESTIONS 1-9: 27
6. FEELING BAD ABOUT YOURSELF - OR THAT YOU ARE A FAILURE OR HAVE LET YOURSELF OR YOUR FAMILY DOWN: NEARLY EVERY DAY

## 2025-06-16 ASSESSMENT — PAIN - FUNCTIONAL ASSESSMENT: PAIN_FUNCTIONAL_ASSESSMENT: NONE - DENIES PAIN

## 2025-06-16 ASSESSMENT — SLEEP AND FATIGUE QUESTIONNAIRES
SLEEP PATTERN: DIFFICULTY FALLING ASLEEP;RESTLESSNESS;NIGHTMARES/TERRORS
DO YOU USE A SLEEP AID: YES
DO YOU HAVE DIFFICULTY SLEEPING: YES
AVERAGE NUMBER OF SLEEP HOURS: 2

## 2025-06-16 ASSESSMENT — LIFESTYLE VARIABLES
HOW OFTEN DO YOU HAVE A DRINK CONTAINING ALCOHOL: MONTHLY OR LESS
HOW MANY STANDARD DRINKS CONTAINING ALCOHOL DO YOU HAVE ON A TYPICAL DAY: 1 OR 2
HOW MANY STANDARD DRINKS CONTAINING ALCOHOL DO YOU HAVE ON A TYPICAL DAY: PATIENT DECLINED
HOW OFTEN DO YOU HAVE A DRINK CONTAINING ALCOHOL: MONTHLY OR LESS

## 2025-06-16 NOTE — VIRTUAL HEALTH
Blas Johnson DO   bismuth subsalicylate (PEPTO-BISMOL) 262 MG/15ML suspension Take 15 mLs by mouth every 6 hours as needed for Indigestion 4/8/25   Blas Johnson DO   dicyclomine (BENTYL) 10 MG capsule Take 1 capsule by mouth 3 times daily as needed (pain) 4/8/25   Blas Johnson DO   famotidine (PEPCID) 20 MG tablet Take 1 tablet by mouth 2 times daily for 7 days 4/8/25 4/15/25  Blas Johnson DO   hydrOXYzine pamoate (VISTARIL) 50 MG capsule Take 1 capsule by mouth 3 times daily as needed for Itching    ProviderKaylee MD   buPROPion (WELLBUTRIN XL) 300 MG extended release tablet Take 1 tablet by mouth every morning 4/8/25   Rose Gonzales APRN - CNP   miconazole (MICOTIN) 2 % powder Apply topically 2 times daily. 3/3/25   Lore Del Rio MD   Handicap Placard MISC by Does not apply route Please provide handicap placard fr two years starting on 2-. 2/11/25   Ben Steinberg MD   Multiple Vitamins-Minerals (THERAPEUTIC MULTIVITAMIN-MINERALS) tablet Take 1 tablet by mouth daily 2/3/25 2/3/26  Rose Gonzales APRN - CNP   metFORMIN (GLUCOPHAGE-XR) 500 MG extended release tablet Take 1 tablet by mouth in the morning and at bedtime 1/7/25   Lore Del Rio MD   cetirizine (ZYRTEC) 10 MG tablet Take 1 tablet by mouth daily 1/3/25   Lore Del Rio MD   Insulin Pen Needle 31G X 8 MM MISC 1 each by Does not apply route daily 1/3/25   Lore Del Rio MD   Continuous Glucose Sensor (FREESTYLE LUIS 3 PLUS SENSOR) MISC Place 1 application  onto the skin every 14 days 12/23/24   Eleuterio Manning MD   FLUoxetine (PROZAC) 20 MG capsule Take 1 capsule by mouth daily 9/28/24   ProviderKaylee MD   prazosin (MINIPRESS) 1 MG capsule Take 1 capsule by mouth nightly 8/26/24 9/25/24  Lore Del Rio MD   fluticasone (FLONASE) 50 MCG/ACT nasal spray 2 sprays by Each Nostril route daily 4/16/24   Benjamin Tena DO   ammonium lactate (LAC-HYDRIN) 12 % lotion Apply topically as  needed. 12/7/22   Phillip Alfaro MD   CALCIUM PO Take 2 tablets by mouth daily    ProviderKaylee MD   folic acid (FOLVITE) 1 MG tablet Take 1 tablet by mouth daily 10/3/22   ProviderKaylee MD        Labs:  UDS: negative  ETOH: 137 @0537  HCG: na      Mental Status Exam:  Level of consciousness:  within normal limits   Appearance:  hospital attire.  Does appear stated age. No acute distress.  Behavior/Motor:  no abnormalities noted  Attitude toward examiner:  evasive and guarded  SI/HI:endorses SI, denies HI  Speech:  pressured , Tone: hostile   Mood: depressed and irritable  Affect: blunted  Thought Processes:  coherent.   Thought Content: Suicidal Ideation:  active  Hallucinations:  Hallucinations: Denies AVOT-H  Cognition:  oriented to person, place, and time   Concentration: intact  Memory: intact, though not formally tested.  Insight: fair   Judgement: fair   Fund of Knowledge: adequate      Risk Assessment:  C-SSRS Score       6/16/2025     9:31 AM   C-SSRS Suicide Screening   1) Within the past month, have you wished you were dead or wished you could go to sleep and not wake up?  Yes   2) Have you actually had any thoughts of killing yourself?  Yes   3) Have you been thinking about how you might kill yourself?  Yes   4) Have you had these thoughts and had some intention of acting on them?  No   5) Have you started to work out or worked out the details of how to kill yourself? Do you intend to carry out this plan?  No   6) Have you ever done anything, started to do anything, or prepared to do anything to end your life? No    :    Protective Factors:  Protective: Female gender, Does not have access to guns, No active substance abuse, Patient has social or family support, No active psychosis or cognitive dysfunction, Has outpatient services in place, and Compliant with recommended medications  Risk Factors:  Risk Factors: Depressed mood, Active suicidal ideation, Suicide plan, and Recent

## 2025-06-16 NOTE — CARE COORDINATION
Leisure assessment completed.     06/16/25 6358   Activities of Daily Living   Patient Requires assistance with daily self-care activities? Yes   Patient identified needing assistance with: Transportation   Details Pt reported she no longer drives   Person Assisting Other   Person Assisting details Pt reported she utilizes a transportation service   Leisure Activity 1   3 Favorite Leisure Activities Pt denied any hobbies/leisure interests   Barriers to participating    (\"Anxiety, depression, panic attacks.\")   Leisure Activity 2   Favorite Leisure Activities  same as above   Leisure Activity 3   Favorite Leisure Activities  same as above   Social   Patient reports spending the majority of their free time alone   Patient verbalizes a preference for spending free time with a group   Patient’s perception of support system more healthy   Patient’s perception of barriers to socializing with others include(s) lack of comfort;lack of skills;transportation   Social Details Pt identified her therapist as being supportive. Pt reported she lives with her son, has 2 adult children, is a retired  and .   Beliefs & Coping   Has difficulty dealing with feelings   Yes   Internalizes feelings/Keeps feelings in Yes   Externalizes feelings through aggressiveness or poor temper control  No   Feels uncomfortable around others  Yes   Has difficulty talking to others  Yes   Depends on others for direction or decisions No   Difficulty dealing with anger of others  Yes   Difficulty dealing with own anger  No   Difficulty managing stress Yes   Frequently has difficulty with relationships  No   Has recently perceived/experienced loss, disappointment, humiliation or failure  Yes   General perception about self ambivalent   Attitude about abilities feels like a failure much of the time   Locus of Control  never   Belief about recovery Recovery is possible   Patient Identified Strengths  \"Hard working.\"   Patient Identified

## 2025-06-16 NOTE — ED NOTES
Nurse to nurse report given to [facility] RN on [address] which includes patient presentation complaint, involuntary hold, medications, lab results, EKG Qtc, and past psych history and admitting orders.

## 2025-06-16 NOTE — ED NOTES
Patient reports she only drinks socially and every few months.  She denies that she goes through withdrawal if she does not drink.
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
## 2025-06-16 NOTE — ED PROVIDER NOTES
HPI   This is a 54-year-old female patient she presents to emergency department after she had called police for making suicidal statements.  She was placed on involuntary hold order by PD.  She is tearful here on arrival.  She states \"I would be better off dead \"she denies any pain anywhere.  She states that she has had very poor communication with her son which is triggering her to feel this way.  She denies any specific plan.  She states she did drink some alcohol but did not use any drugs.  Review of Systems   Constitutional:  Negative for chills and fever.   HENT:  Negative for congestion.    Respiratory:  Negative for cough and shortness of breath.    Cardiovascular:  Negative for chest pain.   Gastrointestinal:  Negative for abdominal pain, diarrhea, nausea and vomiting.   Genitourinary:  Negative for difficulty urinating, dysuria and hematuria.   Musculoskeletal:  Negative for back pain.   Skin:  Negative for color change.   Psychiatric/Behavioral:          Suicidal thoughts   All other systems reviewed and are negative.       Physical Exam  Vitals and nursing note reviewed.   Constitutional:       Appearance: Normal appearance.   HENT:      Head: Normocephalic and atraumatic.      Nose: Nose normal. No congestion.      Mouth/Throat:      Mouth: Mucous membranes are moist.      Pharynx: Oropharynx is clear.   Eyes:      Conjunctiva/sclera: Conjunctivae normal.      Pupils: Pupils are equal, round, and reactive to light.   Cardiovascular:      Rate and Rhythm: Normal rate and regular rhythm.      Pulses: Normal pulses.      Heart sounds: Normal heart sounds.   Pulmonary:      Effort: Pulmonary effort is normal. No respiratory distress.      Breath sounds: Normal breath sounds.   Abdominal:      General: Bowel sounds are normal. There is no distension.      Tenderness: There is no abdominal tenderness.   Musculoskeletal:         General: Normal range of motion.      Cervical back: Normal range of motion and

## 2025-06-16 NOTE — ED NOTES
Patient states see her chart when attempting to verify medications.  Will shake head yes or no to some questions while refusing to answer others.  Lying in bed with had on her head appears sad this am.

## 2025-06-16 NOTE — BH NOTE
4 Eyes Skin Assessment     NAME:  Mae Calvillo  YOB: 1970  MEDICAL RECORD NUMBER:  [id number]    The patient is being assessed for  Admission    I agree that at least one RN has performed a thorough Head to Toe Skin Assessment on the patient. ALL assessment sites listed below have been assessed.      Areas assessed by both nurses:    Head, Face, Ears, Shoulders, Back, Chest, Arms, Elbows, Hands, Sacrum. Buttock, Coccyx, Ischium, and Legs. Feet and Heels        Does the Patient have a Wound? Yes wound(s) were present on assessment. LDA wound assessment was Initiated and completed by RN       Zion Prevention initiated by RN: Yes  Wound Care Orders initiated by RN: No under the care podiatry    Pressure Injury (Stage 3,4, Unstageable, DTI, NWPT, and Complex wounds) if present, place Wound referral order by RN under : No    New Ostomies, if present place, Ostomy referral order under : No     Nurse 1 eSignature: Electronically signed by Nora Weinstein RN on 6/16/25 at 4:53 PM EDT    **SHARE this note so that the co-signing nurse can place an eSignature**    Nurse 2 eSignature: Electronically signed by Galina Phan RN on 6/16/25 at 6:19 PM EDT

## 2025-06-16 NOTE — ED NOTES
Patient states her provider took her off of Metformin and she is not sure which medication other than Jardiance she is on  but she states that she is on an injection as well as Jardiance.

## 2025-06-16 NOTE — BH NOTE
Patient complaining of shakiness.  \" This is my nerves from son\". She denies any need for withdrawal meds. Blood sugar 239 but she eating dinner and juice.

## 2025-06-16 NOTE — ED NOTES
ED MD aware that patient did not take her Metformin as she reports she is no longer on this medication.

## 2025-06-17 ENCOUNTER — APPOINTMENT (OUTPATIENT)
Dept: GENERAL RADIOLOGY | Age: 55
End: 2025-06-17
Payer: COMMERCIAL

## 2025-06-17 PROBLEM — F10.10 ALCOHOL ABUSE: Status: ACTIVE | Noted: 2025-06-17

## 2025-06-17 LAB
EKG ATRIAL RATE: 89 BPM
EKG ATRIAL RATE: 96 BPM
EKG P AXIS: -13 DEGREES
EKG P AXIS: 6 DEGREES
EKG P-R INTERVAL: 136 MS
EKG P-R INTERVAL: 166 MS
EKG Q-T INTERVAL: 338 MS
EKG Q-T INTERVAL: 412 MS
EKG QRS DURATION: 76 MS
EKG QRS DURATION: 76 MS
EKG QTC CALCULATION (BAZETT): 427 MS
EKG QTC CALCULATION (BAZETT): 501 MS
EKG R AXIS: -12 DEGREES
EKG R AXIS: -25 DEGREES
EKG T AXIS: 134 DEGREES
EKG T AXIS: 42 DEGREES
EKG VENTRICULAR RATE: 89 BPM
EKG VENTRICULAR RATE: 96 BPM
GLUCOSE BLD-MCNC: 201 MG/DL (ref 74–99)

## 2025-06-17 PROCEDURE — 93010 ELECTROCARDIOGRAM REPORT: CPT | Performed by: INTERNAL MEDICINE

## 2025-06-17 PROCEDURE — 1240000000 HC EMOTIONAL WELLNESS R&B

## 2025-06-17 PROCEDURE — 6370000000 HC RX 637 (ALT 250 FOR IP): Performed by: PSYCHIATRY & NEUROLOGY

## 2025-06-17 PROCEDURE — 90792 PSYCH DIAG EVAL W/MED SRVCS: CPT | Performed by: NURSE PRACTITIONER

## 2025-06-17 PROCEDURE — 73630 X-RAY EXAM OF FOOT: CPT

## 2025-06-17 PROCEDURE — 6370000000 HC RX 637 (ALT 250 FOR IP): Performed by: EMERGENCY MEDICINE

## 2025-06-17 PROCEDURE — 87205 SMEAR GRAM STAIN: CPT

## 2025-06-17 PROCEDURE — 87077 CULTURE AEROBIC IDENTIFY: CPT

## 2025-06-17 PROCEDURE — 82962 GLUCOSE BLOOD TEST: CPT

## 2025-06-17 PROCEDURE — 6370000000 HC RX 637 (ALT 250 FOR IP)

## 2025-06-17 PROCEDURE — 6370000000 HC RX 637 (ALT 250 FOR IP): Performed by: NURSE PRACTITIONER

## 2025-06-17 PROCEDURE — 87070 CULTURE OTHR SPECIMN AEROBIC: CPT

## 2025-06-17 RX ORDER — PRAZOSIN HYDROCHLORIDE 1 MG/1
1 CAPSULE ORAL NIGHTLY
Status: DISCONTINUED | OUTPATIENT
Start: 2025-06-17 | End: 2025-06-27 | Stop reason: HOSPADM

## 2025-06-17 RX ORDER — ATORVASTATIN CALCIUM 10 MG/1
20 TABLET, FILM COATED ORAL DAILY
Status: DISCONTINUED | OUTPATIENT
Start: 2025-06-17 | End: 2025-06-27 | Stop reason: HOSPADM

## 2025-06-17 RX ORDER — AMMONIUM LACTATE 12 G/100G
LOTION TOPICAL PRN
Status: DISCONTINUED | OUTPATIENT
Start: 2025-06-17 | End: 2025-06-27 | Stop reason: HOSPADM

## 2025-06-17 RX ORDER — BUPROPION HYDROCHLORIDE 150 MG/1
150 TABLET ORAL DAILY
Status: DISCONTINUED | OUTPATIENT
Start: 2025-06-18 | End: 2025-06-27 | Stop reason: HOSPADM

## 2025-06-17 RX ORDER — VALSARTAN 160 MG/1
160 TABLET ORAL DAILY
Status: DISCONTINUED | OUTPATIENT
Start: 2025-06-17 | End: 2025-06-27 | Stop reason: HOSPADM

## 2025-06-17 RX ORDER — DIVALPROEX SODIUM 250 MG/1
250 TABLET, DELAYED RELEASE ORAL EVERY 12 HOURS SCHEDULED
Status: DISCONTINUED | OUTPATIENT
Start: 2025-06-17 | End: 2025-06-26

## 2025-06-17 RX ADMIN — CHLORDIAZEPOXIDE HYDROCHLORIDE 25 MG: 25 CAPSULE ORAL at 01:00

## 2025-06-17 RX ADMIN — ATORVASTATIN CALCIUM 20 MG: 10 TABLET, FILM COATED ORAL at 21:00

## 2025-06-17 RX ADMIN — Medication 3 MG: at 21:01

## 2025-06-17 RX ADMIN — DIVALPROEX SODIUM 250 MG: 250 TABLET, DELAYED RELEASE ORAL at 21:07

## 2025-06-17 RX ADMIN — VALSARTAN 160 MG: 160 TABLET, FILM COATED ORAL at 16:48

## 2025-06-17 RX ADMIN — METFORMIN HYDROCHLORIDE 500 MG: 500 TABLET, EXTENDED RELEASE ORAL at 09:37

## 2025-06-17 RX ADMIN — HYDROXYZINE HYDROCHLORIDE 50 MG: 50 TABLET ORAL at 21:01

## 2025-06-17 RX ADMIN — PRAZOSIN HYDROCHLORIDE 1 MG: 1 CAPSULE ORAL at 21:07

## 2025-06-17 RX ADMIN — HALOPERIDOL 5 MG: 5 TABLET ORAL at 22:44

## 2025-06-17 ASSESSMENT — PATIENT HEALTH QUESTIONNAIRE - PHQ9
SUM OF ALL RESPONSES TO PHQ QUESTIONS 1-9: 13
9. THOUGHTS THAT YOU WOULD BE BETTER OFF DEAD, OR OF HURTING YOURSELF: SEVERAL DAYS
10. IF YOU CHECKED OFF ANY PROBLEMS, HOW DIFFICULT HAVE THESE PROBLEMS MADE IT FOR YOU TO DO YOUR WORK, TAKE CARE OF THINGS AT HOME, OR GET ALONG WITH OTHER PEOPLE: VERY DIFFICULT
1. LITTLE INTEREST OR PLEASURE IN DOING THINGS: NOT AT ALL
3. TROUBLE FALLING OR STAYING ASLEEP: NEARLY EVERY DAY
8. MOVING OR SPEAKING SO SLOWLY THAT OTHER PEOPLE COULD HAVE NOTICED. OR THE OPPOSITE, BEING SO FIGETY OR RESTLESS THAT YOU HAVE BEEN MOVING AROUND A LOT MORE THAN USUAL: SEVERAL DAYS
5. POOR APPETITE OR OVEREATING: MORE THAN HALF THE DAYS
SUM OF ALL RESPONSES TO PHQ QUESTIONS 1-9: 12
2. FEELING DOWN, DEPRESSED OR HOPELESS: NEARLY EVERY DAY
4. FEELING TIRED OR HAVING LITTLE ENERGY: NOT AT ALL
7. TROUBLE CONCENTRATING ON THINGS, SUCH AS READING THE NEWSPAPER OR WATCHING TELEVISION: MORE THAN HALF THE DAYS
6. FEELING BAD ABOUT YOURSELF - OR THAT YOU ARE A FAILURE OR HAVE LET YOURSELF OR YOUR FAMILY DOWN: SEVERAL DAYS

## 2025-06-17 ASSESSMENT — SLEEP AND FATIGUE QUESTIONNAIRES
DO YOU HAVE DIFFICULTY SLEEPING: YES
SLEEP PATTERN: DIFFICULTY FALLING ASLEEP;DISTURBED/INTERRUPTED SLEEP;NIGHTMARES/TERRORS
DO YOU USE A SLEEP AID: YES
AVERAGE NUMBER OF SLEEP HOURS: 2

## 2025-06-17 ASSESSMENT — LIFESTYLE VARIABLES
HOW OFTEN DO YOU HAVE A DRINK CONTAINING ALCOHOL: 2-4 TIMES A MONTH
HOW MANY STANDARD DRINKS CONTAINING ALCOHOL DO YOU HAVE ON A TYPICAL DAY: 1 OR 2

## 2025-06-17 ASSESSMENT — PAIN SCALES - GENERAL: PAINLEVEL_OUTOF10: 0

## 2025-06-17 NOTE — GROUP NOTE
Shared goal for the day as to remain healthy.                                                                       Group Therapy Note    Date: 6/17/2025    Group Start Time: 0935  Group End Time: 0955  Group Topic: Community Meeting    [facility] ACUTE  1    Sarah Talavera, GONZALO        Type of Group: Community Meeting      Patient's Goal:  Patient will be able to id staffing assignments, expectations of patients, and general information re: floor rules. Will be prompted to share goal for the day.     Notes:  Patient appeared to be an active listener, taking in information presented and was prompted to share goal for the day.    Status After Intervention:  Improved    Participation Level: Active Listener and Interactive    Participation Quality: Appropriate, Attentive, and Sharing      Speech:  normal      Thought Process/Content: Logical      Affective Functioning: Congruent      Mood: euthymic      Level of consciousness:  Alert, Oriented x4, and Attentive      Response to Learning: Able to verbalize/acknowledge new learning, Able to retain information, and Progressing to goal      Endings: None Reported    Modes of Intervention: Support, Socialization, and Clarifying      Discipline Responsible: Psychoeducational Specialist      Signature:  GONZALO Romero
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
## 2025-06-17 NOTE — DISCHARGE INSTRUCTIONS
Follow up for Tobacco Cessation at:    [facility]                                 Date:  Friday 6/27 at 10am              [address]    [place] [address]   (Inside [facility]    take B elevators to 7th floor)   Phone: [phone]   Fax: [phone]     Patient was offered a referral to substance abuse treatment, patient declined referral at this time.

## 2025-06-17 NOTE — PLAN OF CARE
Problem: Risk for Elopement  Goal: Patient will not exit the unit/facility without proper excort  Outcome: Progressing     Problem: Chronic Conditions and Co-morbidities  Goal: Patient's chronic conditions and co-morbidity symptoms are monitored and maintained or improved  Outcome: Progressing     Problem: Safety - Adult  Goal: Free from fall injury  Outcome: Progressing     Problem: Anxiety  Goal: Will report anxiety at manageable levels  Description: INTERVENTIONS:  1. Administer medication as ordered  2. Teach and rehearse alternative coping skills  3. Provide emotional support with 1:1 interaction with staff  6/17/2025 0932 by Maryjo Dao RN  Outcome: Progressing  6/16/2025 2151 by Dylan Nguyen RN  Outcome: Progressing     Problem: Depression/Self Harm  Goal: Effect of psychiatric condition will be minimized and patient will be protected from self harm  Description: INTERVENTIONS:  1. Assess impact of patient's symptoms on level of functioning, self care needs and offer support as indicated  2. Assess patient/family knowledge of depression, impact on illness and need for teaching  3. Provide emotional support, presence and reassurance  4. Assess for possible suicidal thoughts or ideation. If patient expresses suicidal thoughts or statements do not leave alone, initiate Suicide Precautions, move to a room close to the nursing station and obtain sitter  5. Initiate consults as appropriate with Mental Health Professional, Spiritual Care, Psychosocial CNS, and consider a recommendation to the LIP for a Psychiatric Consultation  6/17/2025 0932 by Maryjo Dao RN  Outcome: Progressing  6/16/2025 2151 by Dylan Nguyen RN  Outcome: Progressing     Problem: Self Harm/Suicidality  Goal: Will have no self-injury during hospital stay  Description: INTERVENTIONS:  1.  Ensure constant observer at bedside with Q15M safety checks  2.  Maintain a safe environment  3.  Secure patient belongings  4.  Ensure family/visitors

## 2025-06-17 NOTE — PROGRESS NOTES
Perfectserve message sent to vascular surgery per order from podiatry for evaluation of healing potential on B/L diabetic foot ulcers.

## 2025-06-17 NOTE — CONSULTS
Department of Podiatry   Consult Note        Reason for Consult:  Wounds to feet    CHIEF COMPLAINT: Foot wounds    HISTORY OF PRESENT ILLNESS:    Mae Calvillo is a 54 y.o. female with significant past medical history of diabetes, arthritis, and hypertension.  Patient states that she has a current patient of Dr. Lind and was opposed to have an appointment with him today for the wounds on her left and right foot.  Patient states that she has had these wounds for a while now.  Patient states that she is now noticing drainage from both wounds that is bloody in appearance.  Patient states that she has some pain with the wounds but only when she is walking.  Patient denies any N/V/D/F/C/SOB/CP and has no other pedal complaints at this time.     Past Medical History:        Diagnosis Date    Anemia     stable at this time  2023    Arthritis     COVID 05/2022 11/2022-    Diabetes mellitus (HCC)     diet controlled    History of blood transfusion 2012    Hyperlipidemia     Hypertension     Lumbar pain        Past Surgical History:        Procedure Laterality Date    COLONOSCOPY      COLPOSCOPY      2001 : outside [place]     ESOPHAGOGASTRODUODENOSCOPY      GASTRIC BAND      august 2022    HYSTERECTOMY (CERVIX STATUS UNKNOWN) Bilateral     total hysterectomy due to fibroids    PAIN MANAGEMENT PROCEDURE N/A 02/14/2023    LUMBAR TRANSFORAMINAL EPIDURAL INJECTION RIGHT  L3 & LEFT l4 UNDER FLUOROSCOPIC GUIDANCE performed by Ben Steinberg MD at [facility] OR    PAIN MANAGEMENT PROCEDURE Bilateral 04/06/2023    LUMBAR TRANSFORAMINAL EPIDURAL STEROID INJECTION RIGHT L3 & LEFT L4 UNDER FLUOROSCOPIC GUIDANCE performed by Ben Steinberg MD at [facility] OR    PAIN MANAGEMENT PROCEDURE N/A 06/15/2023    LUMBAR EPIDURAL STEROID INJECTION UNDER FLUOROSCOPIC GUIDANCE AT L3-L4 performed by Ben Steinberg MD at [facility] OR    PAIN MANAGEMENT PROCEDURE Right 10/3/2024    LUMBAR EPIDURAL STEROID INJECTION L5-S1 UNDER

## 2025-06-17 NOTE — GROUP NOTE
Group Therapy Note    Date: 6/17/2025    Group Start Time: 1600  Group End Time: 1630  Group Topic: Nursing    [facility] ACUTE  2    Kassandra Fitzpatrick RN           Patient's Goal:  Identify effective distraction techniques      Status After Intervention:  Improved    Participation Level: Active Listener and Interactive    Participation Quality: Appropriate and Attentive      Speech:  normal      Thought Process/Content: Logical  Linear      Affective Functioning: Congruent      Mood: euthymic      Level of consciousness:  Alert and Oriented x4      Response to Learning: Able to verbalize current knowledge/experience      Endings: None Reported    Modes of Intervention: Education      Discipline Responsible: Registered Nurse      Signature:  Kassandra Fitzpatrick RN

## 2025-06-17 NOTE — BH NOTE
Pt states that she cannot get her mind to stop racing. Pt states that she does not believe that she is having withdrawal symptoms and agreed to take a Haldol. One hour later, patient states that she is still having trouble with her racing thoughts and can't sleep. Pt scored a CIWA of 5 and agreed to take a Librium. Pt is resting in room with eyes closed.

## 2025-06-17 NOTE — PROGRESS NOTES
PT. HAS BEEN UP ON UNIT. REPORTS DROWSINESS FORM LACK OF SLEEP RECENTLY R/T ADMISSION PROCESS. PT. DENIED SUICIDAL IDEATIONS, HOMICIDAL IDEATIONS AND HALLUCINATIONS AND VOICED NO DELUSIONS.     CONSULTS CALLED TO PODIATRY AND WOUND CARE FOR FOOT WOUNDS. R. PLANTAR FOOT ULCER COVERED WITH NON ADHREENT DRESSING.

## 2025-06-17 NOTE — PROGRESS NOTES
Spiritual Health History and Assessment/Progress Note  [facility]    Initial Encounter, Behavioral Health,  ,  , (P) Initial Encounter    Name: Mae Calvillo MRN: [id number]    Age: 54 y.o.     Sex: female   Language: English   Yazidism: None   Bipolar disorder (HCC)     Date: 6/17/2025                           Spiritual Assessment began in [facility]        Referral/Consult From: Nurse   Encounter Overview/Reason: Initial Encounter, Behavioral Health  Service Provided For: Patient    Entered into the patients room, introduced myself and asked if I could speak with her and she said yes, She began sharing her present situation. She lives with her son, she is very concerned about his mental health. She states he put the picture of  because of his mental health issues. She reports being wiling to go to therapy with her son who needs help. She states things became overwhelmed because she was in to receive help. I listened to patient attentively as she explained her present situation. She is not a member of a local [employer] but she is a [employer] who believes her tenisha helps her work through her challenges.    Tenisha, Belief, Meaning:   Patient identifies as spiritual and has beliefs or practices that help with coping during difficult times  Family/Friends No family/friends present      Importance and Influence:  Patient has spiritual/personal beliefs that influence decisions regarding their health  Family/Friends No family/friends present    Community:  Patient feels well-supported. Support system includes: Children  Family/Friends No family/friends present    Assessment and Plan of Care:     Patient Interventions include: Facilitated expression of thoughts and feelings, Explored spiritual coping/struggle/distress, Engaged in theological reflection, Affirmed coping skills/support systems, and Facilitated life review and/ or legacy  Family/Friends Interventions include: No family/friends  present    Patient Plan of Care: Spiritual Care available upon further referral  Family/Friends Plan of Care: No family/friends present    Electronically signed by Chaplain Иван on 6/17/2025 at 2:13 PM

## 2025-06-17 NOTE — GROUP NOTE
Group Therapy Note    Date: 6/17/2025    Group Start Time: 0955  Group End Time: 1021  Group Topic: Psychoeducation    [facility]    Sarah Talavera, JENS    Type of Group: Psychoeducation    Module Name:  over thinking    Patient's Goal:  Pt will be able to id techniques to manage over thinking from consuming his/her lives.     Notes:  pt observed as pleasant and engaged in group, accepting of handout.     Status After Intervention:  Improved    Participation Level: Active Listener    Participation Quality: Appropriate, Attentive, and Sharing      Speech:  normal      Thought Process/Content: Logical      Affective Functioning: Congruent      Mood: euthymic      Level of consciousness:  Alert, Oriented x4, and Attentive      Response to Learning: Able to verbalize/acknowledge new learning, Able to retain information, and Progressing to goal      Endings: None Reported    Modes of Intervention: Education, Support, Socialization, and Problem-solving      Discipline Responsible: Psychoeducational Specialist      Signature:  Sarah Talavera, GONZALO

## 2025-06-17 NOTE — PLAN OF CARE
Problem: Risk for Elopement  Goal: Patient will not exit the unit/facility without proper excort  6/17/2025 1455 by Maryjo Dao RN  Outcome: Progressing  [facility]/17/2025 0932 [facility]  Initial [facility] Treatment Plan NOTE    Review Date & Time:  6/17/25 0900    Patient was in treatment team    Admission Type:   Admission Type: Involuntary    Reason for admission:  Reason for Admission: \"I had enough\"      Estimated Length of Stay Update:   5 days  Estimated Discharge Date Update:  FRIDAY    EDUCATION:   Learner Progress Toward Treatment Goals: Reviewed results and recommendations of this team    Method: Small group    Outcome: Verbalized understanding    PATIENT GOALS: \"REMAIN HEALTHY\"    PLAN/TREATMENT RECOMMENDATIONS UPDATE: SUICIDE RISK ASSESSMENTS, CIWA SCALE ASSESSMENTS,PODIATRY AND WOUND CARE CONSULTS, SUPPORTIVE AND COLLATERAL CARE/INFORMATION, MEDICATIONS, GROUPS AND DISCHARGE PLANNING    GOALS UPDATE:   Time frame for Short-Term Goals:  5 DAYS    Maryjo Dao RN      Outcome: Progressing     Problem: Chronic Conditions and Co-morbidities  Goal: Patient's chronic conditions and co-morbidity symptoms are monitored and maintained or improved  6/17/2025 1455 by Maryjo Dao RN  Outcome: Progressing  6/17/2025 0932 by Maryjo Dao RN  Outcome: Progressing     Problem: Safety - Adult  Goal: Free from fall injury  6/17/2025 1455 by Maryjo Dao RN  Outcome: Progressing  6/17/2025 0932 by Maryjo Dao RN  Outcome: Progressing     Problem: Anxiety  Goal: Will report anxiety at manageable levels  Description: INTERVENTIONS:  1. Administer medication as ordered  2. Teach and rehearse alternative coping skills  3. Provide emotional support with 1:1 interaction with staff  6/17/2025 1455 by Maryjo Dao RN  Outcome: Progressing  6/17/2025 0932 by Maryjo Dao RN  Outcome: Progressing     Problem: Depression/Self Harm  Goal: Effect of psychiatric condition will

## 2025-06-17 NOTE — PLAN OF CARE
Patient is eating meals. Patient denies any suicidal ideations/homicidal ideations/audio or visual hallucinations. Patient exhibits poor eye contact and gives minimal responses. Patient appears worried, sad, and tearful.     Problem: Anxiety  Goal: Will report anxiety at manageable levels  Description: INTERVENTIONS:  1. Administer medication as ordered  2. Teach and rehearse alternative coping skills  3. Provide emotional support with 1:1 interaction with staff  Outcome: Progressing     Problem: Depression/Self Harm  Goal: Effect of psychiatric condition will be minimized and patient will be protected from self harm  Description: INTERVENTIONS:  1. Assess impact of patient's symptoms on level of functioning, self care needs and offer support as indicated  2. Assess patient/family knowledge of depression, impact on illness and need for teaching  3. Provide emotional support, presence and reassurance  4. Assess for possible suicidal thoughts or ideation. If patient expresses suicidal thoughts or statements do not leave alone, initiate Suicide Precautions, move to a room close to the nursing station and obtain sitter  5. Initiate consults as appropriate with Mental Health Professional, Spiritual Care, Psychosocial CNS, and consider a recommendation to the LIP for a Psychiatric Consultation  Outcome: Progressing     Problem: Involuntary Admit  Goal: Will cooperate with staff recommendations and doctor's orders and will demonstrate appropriate behavior  Description: INTERVENTIONS:  1. Treat underlying conditions and offer medication as ordered  2. Educate regarding involuntary admission procedures and rules  3. Contain excessive/inappropriate behavior per unit and hospital policies  Outcome: Progressing

## 2025-06-18 ENCOUNTER — APPOINTMENT (OUTPATIENT)
Dept: MRI IMAGING | Age: 55
End: 2025-06-18
Payer: COMMERCIAL

## 2025-06-18 ENCOUNTER — APPOINTMENT (OUTPATIENT)
Dept: INTERVENTIONAL RADIOLOGY/VASCULAR | Age: 55
End: 2025-06-18
Payer: COMMERCIAL

## 2025-06-18 ENCOUNTER — APPOINTMENT (OUTPATIENT)
Dept: ULTRASOUND IMAGING | Age: 55
End: 2025-06-18
Payer: COMMERCIAL

## 2025-06-18 LAB
CRP SERPL HS-MCNC: 24 MG/L (ref 0–5)
ECHO BSA: 2.3 M2
ERYTHROCYTE [SEDIMENTATION RATE] IN BLOOD BY WESTERGREN METHOD: 45 MM/HR (ref 0–20)
GLUCOSE BLD-MCNC: 113 MG/DL (ref 74–99)
GLUCOSE BLD-MCNC: 138 MG/DL (ref 74–99)
GLUCOSE BLD-MCNC: 147 MG/DL (ref 74–99)
GLUCOSE BLD-MCNC: 169 MG/DL (ref 74–99)
PROCALCITONIN SERPL-MCNC: 0.15 NG/ML (ref 0–0.08)

## 2025-06-18 PROCEDURE — 6370000000 HC RX 637 (ALT 250 FOR IP)

## 2025-06-18 PROCEDURE — 93923 UPR/LXTR ART STDY 3+ LVLS: CPT | Performed by: SURGERY

## 2025-06-18 PROCEDURE — 6370000000 HC RX 637 (ALT 250 FOR IP): Performed by: EMERGENCY MEDICINE

## 2025-06-18 PROCEDURE — 86140 C-REACTIVE PROTEIN: CPT

## 2025-06-18 PROCEDURE — 73718 MRI LOWER EXTREMITY W/O DYE: CPT

## 2025-06-18 PROCEDURE — 99223 1ST HOSP IP/OBS HIGH 75: CPT | Performed by: STUDENT IN AN ORGANIZED HEALTH CARE EDUCATION/TRAINING PROGRAM

## 2025-06-18 PROCEDURE — 1240000000 HC EMOTIONAL WELLNESS R&B

## 2025-06-18 PROCEDURE — 93923 UPR/LXTR ART STDY 3+ LVLS: CPT

## 2025-06-18 PROCEDURE — 99232 SBSQ HOSP IP/OBS MODERATE 35: CPT

## 2025-06-18 PROCEDURE — 84145 PROCALCITONIN (PCT): CPT

## 2025-06-18 PROCEDURE — 85652 RBC SED RATE AUTOMATED: CPT

## 2025-06-18 PROCEDURE — 36415 COLL VENOUS BLD VENIPUNCTURE: CPT

## 2025-06-18 PROCEDURE — 82962 GLUCOSE BLOOD TEST: CPT

## 2025-06-18 PROCEDURE — 6370000000 HC RX 637 (ALT 250 FOR IP): Performed by: NURSE PRACTITIONER

## 2025-06-18 PROCEDURE — 93970 EXTREMITY STUDY: CPT

## 2025-06-18 PROCEDURE — 6370000000 HC RX 637 (ALT 250 FOR IP): Performed by: PSYCHIATRY & NEUROLOGY

## 2025-06-18 RX ADMIN — HYDROXYZINE HYDROCHLORIDE 50 MG: 50 TABLET ORAL at 20:31

## 2025-06-18 RX ADMIN — Medication: at 20:31

## 2025-06-18 RX ADMIN — PRAZOSIN HYDROCHLORIDE 1 MG: 1 CAPSULE ORAL at 20:30

## 2025-06-18 RX ADMIN — DIVALPROEX SODIUM 250 MG: 250 TABLET, DELAYED RELEASE ORAL at 09:14

## 2025-06-18 RX ADMIN — METFORMIN HYDROCHLORIDE 500 MG: 500 TABLET, EXTENDED RELEASE ORAL at 09:14

## 2025-06-18 RX ADMIN — DIVALPROEX SODIUM 250 MG: 250 TABLET, DELAYED RELEASE ORAL at 20:31

## 2025-06-18 RX ADMIN — HYDROXYZINE HYDROCHLORIDE 50 MG: 50 TABLET ORAL at 09:14

## 2025-06-18 RX ADMIN — ATORVASTATIN CALCIUM 20 MG: 10 TABLET, FILM COATED ORAL at 20:30

## 2025-06-18 RX ADMIN — Medication 3 MG: at 20:31

## 2025-06-18 RX ADMIN — BUPROPION HYDROCHLORIDE 150 MG: 150 TABLET, EXTENDED RELEASE ORAL at 09:14

## 2025-06-18 ASSESSMENT — PAIN DESCRIPTION - ORIENTATION
ORIENTATION: LEFT
ORIENTATION: LEFT

## 2025-06-18 ASSESSMENT — PAIN DESCRIPTION - LOCATION
LOCATION: FOOT
LOCATION: FOOT

## 2025-06-18 ASSESSMENT — PAIN DESCRIPTION - DESCRIPTORS
DESCRIPTORS: ACHING
DESCRIPTORS: ACHING

## 2025-06-18 ASSESSMENT — PAIN DESCRIPTION - PAIN TYPE
TYPE: NEUROPATHIC PAIN
TYPE: NEUROPATHIC PAIN

## 2025-06-18 ASSESSMENT — PAIN DESCRIPTION - FREQUENCY
FREQUENCY: INTERMITTENT
FREQUENCY: INTERMITTENT

## 2025-06-18 ASSESSMENT — PAIN SCALES - GENERAL
PAINLEVEL_OUTOF10: 10
PAINLEVEL_OUTOF10: 5

## 2025-06-18 ASSESSMENT — PAIN DESCRIPTION - ONSET
ONSET: ON-GOING
ONSET: ON-GOING

## 2025-06-18 NOTE — PLAN OF CARE
Patient denies SI/HI/AVH. Endorses 10 out of 10 anxiety and depression. Denies racing thoughts. No paranoia or delusions reported or observed. Appears flat but brightens during assessment. Patient appears preoccupied, guarded, and anxious. Reports appetite is good but sleep is poor D/T roommate snoring throughout the night. Patient is taking medications without issues and is attending groups. Remains in control of behaviors.     Problem: Risk for Elopement  Goal: Patient will not exit the unit/facility without proper excort  Outcome: Progressing     Problem: Chronic Conditions and Co-morbidities  Goal: Patient's chronic conditions and co-morbidity symptoms are monitored and maintained or improved  Outcome: Progressing     Problem: Safety - Adult  Goal: Free from fall injury  Outcome: Progressing     Problem: Anxiety  Goal: Will report anxiety at manageable levels  Outcome: Progressing     Problem: Depression/Self Harm  Goal: Effect of psychiatric condition will be minimized and patient will be protected from self harm  Outcome: Progressing     Problem: Self Harm/Suicidality  Goal: Will have no self-injury during hospital stay  Outcome: Progressing     Problem: Sleep Disturbance  Goal: Will exhibit normal sleeping pattern  Outcome: Progressing     Problem: Involuntary Admit  Goal: Will cooperate with staff recommendations and doctor's orders and will demonstrate appropriate behavior  Outcome: Progressing     Problem: Pain  Goal: Verbalizes/displays adequate comfort level or baseline comfort level  Outcome: Progressing

## 2025-06-18 NOTE — PROGRESS NOTES
The patient is not currently under constant observation.    CSSR-S Screening and Risk Assessment completed and discussed with patient who scored Risk of Suicide: Moderate Risk    Current mitigating factors: Patient currently in a ligature resistant environment with every 15-minute safety checks., Patient currently denying suicidal intent, Patient feels safe in new environment due to being removed from triggering factors, and Lacks means at present time    Selena CABRALES was notified of score and discussed risk/protective factors.     The provider recommends Discontinuing constant observation.

## 2025-06-18 NOTE — CARE COORDINATION
Treatment team met with pt. She was flat upon approach. She stated that she still feels depressed. She stated that she is overwhelmed with what is going on with her family.

## 2025-06-18 NOTE — PLAN OF CARE
[facility]  Day 3 Interdisciplinary Treatment Plan NOTE    Review Date & Time: 6/18/25 1200    Patient was in treatment team    Estimated Length of Stay Update:  3-5 days  Estimated Discharge Date Update: 5-7 days    EDUCATION:   Learner Progress Toward Treatment Goals: Reviewed results and recommendations of this team    Method: Group    Outcome: Verbalized understanding    PATIENT GOALS: \"be warm\"    PLAN/TREATMENT RECOMMENDATIONS UPDATE: day 3    GOALS UPDATE:   Time frame for Short-Term Goals: 3-5 days      Agata Duarte RN

## 2025-06-18 NOTE — PROGRESS NOTES
BEHAVIORAL HEALTH FOLLOW-UP NOTE     6/18/2025     Patient was seen and examined in person, Chart reviewed   Patient's case discussed with staff/team    Chief Complaint: Suicidal ideation    Interim History:   Patient was seen out on the unit she is flat, blunted she does continue to endorse some depression.  She states that she just got back from ultrasound ultrasound and MRI ordered today infectious disease consult as well as podiatry on.  She states that she feels overwhelmed states that she has a lot of issues in her family.  She does denies suicidal homicidal ideation intent or plan, denies auditory visual hallucinations.  She has been taking her medication, she has had no behavioral disturbances, she is attending group.  Sleep and appetite reported to be fair    Appetite: [x] Normal/Unchanged  [] Increased  [] Decreased      Sleep:       [x] Normal/Unchanged  [] Fair       [] Poor              Energy:    [x] Normal/Unchanged  [] Increased  [] Decreased        SI [] Present  [x] Absent    HI  []Present  [x] Absent     Aggression:  [] yes  [x] no    Patient is [x] able  [] unable to CONTRACT FOR SAFETY     PAST MEDICAL/PSYCHIATRIC HISTORY:   Past Medical History:   Diagnosis Date    Anemia     stable at this time  2023    Arthritis     COVID 05/2022 11/2022-    Diabetes mellitus (HCC)     diet controlled    History of blood transfusion 2012    Hyperlipidemia     Hypertension     Lumbar pain        FAMILY/SOCIAL HISTORY:  Family History   Problem Relation Age of Onset    Diabetes Mother     Hypertension Mother     Diabetes Father     Hypertension Father     Gout Father     Heart Disease None     Ovarian Cancer None     Uterine Cancer None     Breast Cancer None      Social History     Socioeconomic History    Marital status: Single     Spouse name: Not on file    Number of children: Not on file    Years of education: Not on file    Highest education level: Not on file   Occupational History    Not on file

## 2025-06-18 NOTE — PROGRESS NOTES
Department of Podiatry  Progress Note    SUBJECTIVE:  Mae Calvillo is seen at bedside for B/L diabetic foot wounds. No acute events overnight. Patient denies any N/V/D/F/C/SOB/CP.  Patient states that she cannot be sure of any changes in drainage as she has not seen the wounds since yesterday.  No other pedal complaints at this time.     OBJECTIVE:    Scheduled Meds:   atorvastatin  20 mg Oral Daily    valsartan  160 mg Oral Daily    buPROPion  150 mg Oral Daily    prazosin  1 mg Oral Nightly    divalproex  250 mg Oral 2 times per day    metFORMIN  500 mg Oral Daily with breakfast     Continuous Infusions:  PRN Meds:.ammonium lactate, acetaminophen, magnesium hydroxide, aluminum & magnesium hydroxide-simethicone, hydrOXYzine HCl, haloperidol **OR** haloperidol lactate, melatonin, chlordiazePOXIDE    Allergies   Allergen Reactions    Colchicine     Darvon [Propoxyphene] Other (See Comments)     GI Upset       BP (!) 102/55   Pulse 95   Temp 97.9 °F (36.6 °C) (Temporal)   Resp 16   Ht 1.778 m (5' 10\")   Wt 107 kg (236 lb)   SpO2 98%   BMI 33.86 kg/m²       EXAM:    VASCULAR:  DP and PT pulses are palpable. CFT < 5 seconds B/L.  Warm to warm from the tibial tuberosity to the distal aspect of the digits dorsally. Hair growth not noted to the distal aspects dorsally.     NEUROLOGIC:  Light touch is impaired past the level of the ankle bilaterally     MUSCULOSKELETAL: Deformities are not noted in Bilateral lower extremities.  5/5 Gross Muscle strength in all 4 quadrants.     DERM:  Skin is clean and dry to the bilateral lower extremities. Toenails 1-5 b/l are abnormal in thickness, color and length. Webspaces 1-4 b/l are C/D/I. Edema is mild. Erythema is absent. Wounds are Present on the Bilateral lower extremity.  Wound noted on plantar medial aspect of left hallux.  Wound demonstrates a mixed granular fibrotic base with a hyperkeratotic border.  Minimal drainage noted on exam.  There is another wound on

## 2025-06-18 NOTE — CONSULTS
[facility]  [facility]    Consultation Note     Admit Date: 2025  5:16 AM    Reason for Consult:   Diabetic foot ulcers    Attending Physician:  Emma Elizabeth MD     Chief Complaint: Diabetic foot ulcers    HISTORY OF PRESENT ILLNESS:   54-year-old female with past medical history of bipolar disorder admitted in psychiatry for management of bipolar disease.  Found to have diabetic foot ulcers.  Vascular and podiatry consulted.  ID consulted for diabetic foot ulcers.    Past Medical History:        Diagnosis Date    Anemia     stable at this time      Arthritis     COVID 2022-    Diabetes mellitus (HCC)     diet controlled    History of blood transfusion     Hyperlipidemia     Hypertension     Lumbar pain      Past Surgical History:        Procedure Laterality Date    COLONOSCOPY      COLPOSCOPY       : outside [place]     ESOPHAGOGASTRODUODENOSCOPY      GASTRIC BAND      2022    HYSTERECTOMY (CERVIX STATUS UNKNOWN) Bilateral     total hysterectomy due to fibroids    PAIN MANAGEMENT PROCEDURE N/A 2023    LUMBAR TRANSFORAMINAL EPIDURAL INJECTION RIGHT  L3 & LEFT l4 UNDER FLUOROSCOPIC GUIDANCE performed by Ben Steinberg MD at [facility] OR    PAIN MANAGEMENT PROCEDURE Bilateral 2023    LUMBAR TRANSFORAMINAL EPIDURAL STEROID INJECTION RIGHT L3 & LEFT L4 UNDER FLUOROSCOPIC GUIDANCE performed by Ben Steinberg MD at [facility] OR    PAIN MANAGEMENT PROCEDURE N/A 06/15/2023    LUMBAR EPIDURAL STEROID INJECTION UNDER FLUOROSCOPIC GUIDANCE AT L3-L4 performed by Ben Steinberg MD at [facility] OR    PAIN MANAGEMENT PROCEDURE Right 10/3/2024    LUMBAR EPIDURAL STEROID INJECTION L5-S1 UNDER FLUOROSCOPIC GUIDANCE performed by Ben Steinberg MD at [facility] OR    [place]  DELIVERY ONLY W/POSTPARTUM CARE      1991 :      [place]  DELIVERY [place] W/POSTPARTUM CARE      1998 :      [place] CONIZATION CERVIX W/WO D&C RPR  Per [facility] pharmacy there is a conflict with directions. They are questioning if the patient should be taking 1 daily or 1/2 daily please correct directions and resend to mail order pharmacy.

## 2025-06-18 NOTE — BH NOTE
Patient denies suicidal ideation, homicidal ideations and AVH.  Patient rates anxiety a 8/10 and depression a 6/10. States her mind has racing thoughts all the time. Denies paranoia. Presents flat, sad, calm and cooperative during assessment. Patient does brighten with conversation.  Patient is out on the unit and is social with peers.  Medications taken without issue.  No complaints or concerns verbalized at this time.  No unit problems reported.  Will continue to observe and support.

## 2025-06-18 NOTE — PLAN OF CARE
Problem: Risk for Elopement  Goal: Patient will not exit the unit/facility without proper excort  6/18/2025 0051 by Yoly Ramirez RN  Outcome: Progressing     Problem: Chronic Conditions and Co-morbidities  Goal: Patient's chronic conditions and co-morbidity symptoms are monitored and maintained or improved  6/18/2025 0051 by Yoly Ramirez RN  Outcome: Progressing     Problem: Safety - Adult  Goal: Free from fall injury  6/18/2025 0051 by Yoly Ramirez RN  Outcome: Progressing     Problem: Anxiety  Goal: Will report anxiety at manageable levels  Description: INTERVENTIONS:  1. Administer medication as ordered  2. Teach and rehearse alternative coping skills  3. Provide emotional support with 1:1 interaction with staff  6/18/2025 0051 by Yoly Ramirez RN  Outcome: Progressing     Problem: Depression/Self Harm  Goal: Effect of psychiatric condition will be minimized and patient will be protected from self harm  Description: INTERVENTIONS:  1. Assess impact of patient's symptoms on level of functioning, self care needs and offer support as indicated  2. Assess patient/family knowledge of depression, impact on illness and need for teaching  3. Provide emotional support, presence and reassurance  4. Assess for possible suicidal thoughts or ideation. If patient expresses suicidal thoughts or statements do not leave alone, initiate Suicide Precautions, move to a room close to the nursing station and obtain sitter  5. Initiate consults as appropriate with Mental Health Professional, Spiritual Care, Psychosocial CNS, and consider a recommendation to the LIP for a Psychiatric Consultation  6/18/2025 0051 by Yoly Ramirez RN  Outcome: Progressing

## 2025-06-18 NOTE — GROUP NOTE
Group Therapy Note    Date: 6/18/2025    Group Start Time: 1050  Group End Time: 1125  Group Topic: Psychoeducation    [facility]    Sarah Talavera, JENS    Type of Group: Psychoeducation    Module Name:  grounding techniques     Patient's Goal:  pt will be able to id 8 different grounding exercises to manager his/her own wellness.     Notes:  pleasant and engaged in group, accepting of handout.     Status After Intervention:  Improved    Participation Level: Active Listener and Interactive    Participation Quality: Appropriate, Attentive, and Sharing      Speech:  normal      Thought Process/Content: Logical      Affective Functioning: Congruent      Mood: euthymic      Level of consciousness:  Alert, Oriented x4, and Attentive      Response to Learning: Able to verbalize/acknowledge new learning, Able to retain information, and Progressing to goal      Endings: None Reported    Modes of Intervention: Education, Support, Socialization, and Problem-solving      Discipline Responsible: Psychoeducational Specialist      Signature:  Sarah Talavera, JENS

## 2025-06-18 NOTE — CONSULTS
VASCULAR SURGERY CONSULT NOTE    Reason for Consult: Evaluate foot wound    HPI:    54 y.o. female who presented to the hospital on 6/17 with a chief complaint of suicidal ideation.  On workup in the emergency department she was found to have bilateral foot wounds.  Podiatry and vascular surgery consulted for evaluation of the foot wounds  Patient states the wounds have been there for at least the past month, maybe longer.  Admits to some pain although says she cannot feel them all that well.    No history of tobacco use or drugs.  Patient does consume alcohol daily.  No previous history of vascular disease, and has never seen a vascular surgeon.      ROS: Negative if blank [], Positive if [x]  General Vascular   [] Fevers [] Claudication (Blocks)   [] Chills [] Rest Pain   [] Weight Loss [] Tissue Loss   [] Chest Pain [] Clotting Disorder   [] SOB at rest [] Leg Swelling   [] SOB with exertion [] DVT/PE      [] Nausea    [] Vomiting [] Stroke/TIA   [] Abdominal Pain [] Focal weakness   [] Melena [] Slurred Speech   [] Hematochezia [] Vision Changes   [] Hematuria    [] Dysuria [] Hx of Central Catheters   [] Wears Glasses/Contacts [] Dialysis and If so date initiated   [] Blindness    [] R Hand Dominant   [] Difficulty swallowing        Past Medical History:   Diagnosis Date    Anemia     stable at this time  2023    Arthritis     COVID 05/2022 11/2022-    Diabetes mellitus (HCC)     diet controlled    History of blood transfusion 2012    Hyperlipidemia     Hypertension     Lumbar pain         Past Surgical History:   Procedure Laterality Date    COLONOSCOPY      COLPOSCOPY      2001 : outside [place]     ESOPHAGOGASTRODUODENOSCOPY      GASTRIC BAND      august 2022    HYSTERECTOMY (CERVIX STATUS UNKNOWN) Bilateral     total hysterectomy due to fibroids    PAIN MANAGEMENT PROCEDURE N/A 02/14/2023    LUMBAR TRANSFORAMINAL EPIDURAL INJECTION RIGHT  L3 & LEFT l4 UNDER FLUOROSCOPIC GUIDANCE performed by Ben PRECIADO

## 2025-06-18 NOTE — GROUP NOTE
Shared goal for the day as to be warm.                                                                      Group Therapy Note    Date: 6/18/2025    Group Start Time: 1050  Group End Time: 1105  Group Topic: Community Meeting    [facility]    Sarah Talavera, JENS    Type of Group:Community Meeting    Patient's Goal:  Patient will be able to id staffing assignments, expectations of patients, and general information re: floor rules.   Will be prompted to share goal for the day.     Notes:  Patient appeared to be an active listener, taking in information presented and was prompted to share goal for the day.    Status After Intervention:  improved    Participation Level: active listener    Participation Quality: appropriate    Speech:  normal    Thought Process/Content: logical     Affective Functioning:congruent    Mood: euthymic    Level of consciousness:  alert     Response to Learning: verbalize, retain     Endings:none reported    Modes of Intervention: education, support, clarifying   Discipline Responsible: Psychoeducation       Signature:  GONZALO Romero

## 2025-06-18 NOTE — GROUP NOTE
Group Therapy Note    Date: 6/18/2025    Group Start Time: 1400  Group End Time: 1450  Group Topic: Psychotherapy    [facility]    Sue Garrido MSW, LSW        Group Therapy Note    Attendees: 7       Patient's Goal:  To increase social interaction and improve relationships with others.      Notes:  Pt was attentive in group and was able to identify an agenda. They were also able to verbalize relating to others within the group.      Status After Intervention:  Improved    Participation Level: Active Listener and Interactive    Participation Quality: Appropriate, Attentive, Sharing, and Supportive      Speech:  normal      Thought Process/Content: Logical  Linear      Affective Functioning: Congruent      Mood: anxious      Level of consciousness:  Alert, Oriented x4, and Attentive      Response to Learning: Able to verbalize current knowledge/experience, Able to verbalize/acknowledge new learning, Able to retain information, and Capable of insight      Endings: None Reported    Modes of Intervention: Support, Socialization, and Exploration      Discipline Responsible: /Counselor      Signature:  ESTRADA Bird LSW

## 2025-06-19 PROBLEM — R45.851 SUICIDAL IDEATION: Status: ACTIVE | Noted: 2025-06-19

## 2025-06-19 PROBLEM — E11.628 DIABETIC FOOT INFECTION (HCC): Status: ACTIVE | Noted: 2025-06-19

## 2025-06-19 PROBLEM — L08.9 DIABETIC FOOT INFECTION (HCC): Status: ACTIVE | Noted: 2025-06-19

## 2025-06-19 LAB
BACTERIA URNS QL MICRO: ABNORMAL
BILIRUB UR QL STRIP: NEGATIVE
CLARITY UR: CLEAR
COLOR UR: YELLOW
GLUCOSE BLD-MCNC: 126 MG/DL (ref 74–99)
GLUCOSE BLD-MCNC: 152 MG/DL (ref 74–99)
GLUCOSE UR STRIP-MCNC: >=1000 MG/DL
HGB UR QL STRIP.AUTO: NEGATIVE
KETONES UR STRIP-MCNC: NEGATIVE MG/DL
LEUKOCYTE ESTERASE UR QL STRIP: ABNORMAL
MICROORGANISM SPEC CULT: ABNORMAL
MICROORGANISM/AGENT SPEC: ABNORMAL
NITRITE UR QL STRIP: POSITIVE
PH UR STRIP: 6 [PH] (ref 5–8)
PROT UR STRIP-MCNC: NEGATIVE MG/DL
RBC #/AREA URNS HPF: ABNORMAL /HPF
SERVICE CMNT-IMP: ABNORMAL
SERVICE CMNT-IMP: ABNORMAL
SP GR UR STRIP: <1.005 (ref 1–1.03)
SPECIMEN DESCRIPTION: ABNORMAL
SPECIMEN DESCRIPTION: ABNORMAL
UROBILINOGEN UR STRIP-ACNC: 0.2 EU/DL (ref 0–1)
WBC #/AREA URNS HPF: ABNORMAL /HPF

## 2025-06-19 PROCEDURE — 6370000000 HC RX 637 (ALT 250 FOR IP): Performed by: PSYCHIATRY & NEUROLOGY

## 2025-06-19 PROCEDURE — 6370000000 HC RX 637 (ALT 250 FOR IP)

## 2025-06-19 PROCEDURE — 99232 SBSQ HOSP IP/OBS MODERATE 35: CPT

## 2025-06-19 PROCEDURE — 82962 GLUCOSE BLOOD TEST: CPT

## 2025-06-19 PROCEDURE — 1240000000 HC EMOTIONAL WELLNESS R&B

## 2025-06-19 PROCEDURE — 6370000000 HC RX 637 (ALT 250 FOR IP): Performed by: EMERGENCY MEDICINE

## 2025-06-19 PROCEDURE — 6370000000 HC RX 637 (ALT 250 FOR IP): Performed by: NURSE PRACTITIONER

## 2025-06-19 RX ADMIN — VALSARTAN 160 MG: 160 TABLET, FILM COATED ORAL at 09:13

## 2025-06-19 RX ADMIN — METFORMIN HYDROCHLORIDE 500 MG: 500 TABLET, EXTENDED RELEASE ORAL at 09:13

## 2025-06-19 RX ADMIN — HYDROXYZINE HYDROCHLORIDE 50 MG: 50 TABLET ORAL at 22:03

## 2025-06-19 RX ADMIN — Medication 3 MG: at 22:03

## 2025-06-19 RX ADMIN — DIVALPROEX SODIUM 250 MG: 250 TABLET, DELAYED RELEASE ORAL at 22:03

## 2025-06-19 RX ADMIN — ATORVASTATIN CALCIUM 20 MG: 10 TABLET, FILM COATED ORAL at 22:01

## 2025-06-19 RX ADMIN — CHLORDIAZEPOXIDE HYDROCHLORIDE 25 MG: 25 CAPSULE ORAL at 18:45

## 2025-06-19 RX ADMIN — BUPROPION HYDROCHLORIDE 150 MG: 150 TABLET, EXTENDED RELEASE ORAL at 09:13

## 2025-06-19 RX ADMIN — DIVALPROEX SODIUM 250 MG: 250 TABLET, DELAYED RELEASE ORAL at 09:13

## 2025-06-19 RX ADMIN — HYDROXYZINE HYDROCHLORIDE 50 MG: 50 TABLET ORAL at 11:21

## 2025-06-19 RX ADMIN — PRAZOSIN HYDROCHLORIDE 1 MG: 1 CAPSULE ORAL at 22:01

## 2025-06-19 ASSESSMENT — PAIN DESCRIPTION - PAIN TYPE
TYPE: NEUROPATHIC PAIN
TYPE: NEUROPATHIC PAIN

## 2025-06-19 ASSESSMENT — PAIN DESCRIPTION - ONSET
ONSET: ON-GOING
ONSET: ON-GOING

## 2025-06-19 ASSESSMENT — PAIN DESCRIPTION - FREQUENCY
FREQUENCY: INTERMITTENT
FREQUENCY: INTERMITTENT

## 2025-06-19 ASSESSMENT — PAIN DESCRIPTION - DESCRIPTORS
DESCRIPTORS: ACHING
DESCRIPTORS: ACHING

## 2025-06-19 ASSESSMENT — PAIN SCALES - GENERAL
PAINLEVEL_OUTOF10: 5
PAINLEVEL_OUTOF10: 5

## 2025-06-19 ASSESSMENT — PAIN DESCRIPTION - ORIENTATION
ORIENTATION: LEFT
ORIENTATION: LEFT

## 2025-06-19 ASSESSMENT — PAIN DESCRIPTION - LOCATION
LOCATION: FOOT
LOCATION: FOOT

## 2025-06-19 NOTE — GROUP NOTE
Group Therapy Note    Date: 6/19/2025    Group Start Time: 1045  Group End Time: 1115  Group Topic: Psychoeducation    [facility]    Gale Thomason CTRS    Group Therapy Note    Attendees: 12    Date: 6/19/2025  Start Time: 1045  End Time:  1115  Number of Participants: 12    Type of Group: Psychoeducation    Name:  Sleep Hygiene    Patient's Goal:  Identified how sleep affects mental health and sleep hygiene strategies.    Notes:  CTRS led educational group discussion on sleep hygiene. Encouraged patients to share their experiences. Patient was actively engaged in group discussion and made positive responses.    Status After Intervention:  Improved    Participation Level: Active Listener and Interactive    Participation Quality: Appropriate, Attentive, Sharing, and Supportive      Speech:  normal      Thought Process/Content: Logical  Linear      Affective Functioning: Congruent      Mood: Appropriate      Level of consciousness:  Alert and Attentive      Response to Learning: Able to verbalize current knowledge/experience, Able to verbalize/acknowledge new learning, Able to retain information, Capable of insight, Able to change behavior, and Progressing to goal      Endings: None Reported    Modes of Intervention: Education, Support, Socialization, Exploration, Clarifying, and Problem-solving      Discipline Responsible: Psychoeducational Specialist      Signature:  GONZALO Moody

## 2025-06-19 NOTE — PROGRESS NOTES
Department of Podiatry  Progress Note    SUBJECTIVE:  Mae Calvillo is seen at bedside for B/L diabetic foot wounds . No acute events overnight. Patient denies any N/V/D/F/C/SOB/CP.  Patient states that she has noticed some slight bloody drainage on the left foot wound.  Patient states that she needs her dressing changed again today as the one that was put on earlier this morning got wet in the shower.  No other pedal complaints at this time.     OBJECTIVE:    Scheduled Meds:   atorvastatin  20 mg Oral Daily    valsartan  160 mg Oral Daily    buPROPion  150 mg Oral Daily    prazosin  1 mg Oral Nightly    divalproex  250 mg Oral 2 times per day    metFORMIN  500 mg Oral Daily with breakfast     Continuous Infusions:  PRN Meds:.ammonium lactate, acetaminophen, magnesium hydroxide, aluminum & magnesium hydroxide-simethicone, hydrOXYzine HCl, haloperidol **OR** haloperidol lactate, melatonin, chlordiazePOXIDE    Allergies   Allergen Reactions    Colchicine     Darvon [Propoxyphene] Other (See Comments)     GI Upset       /78   Pulse 86   Temp 97.1 °F (36.2 °C) (Temporal)   Resp 16   Ht 1.778 m (5' 10\")   Wt 107 kg (236 lb)   SpO2 98%   BMI 33.86 kg/m²       EXAM:    VASCULAR:  DP and PT pulses are palpable. CFT < 5 seconds B/L.  Warm to warm from the tibial tuberosity to the distal aspect of the digits dorsally. Hair growth not noted to the distal aspects dorsally.     NEUROLOGIC:  Light touch is impaired past the level of the ankle bilaterally     MUSCULOSKELETAL: Deformities are not noted in Bilateral lower extremities.  5/5 Gross Muscle strength in all 4 quadrants.     DERM:  Skin is clean and dry to the bilateral lower extremities. Toenails 1-5 b/l are abnormal in thickness, color and length. Webspaces 1-4 b/l are C/D/I. Edema is mild. Erythema is absent. Wounds are Present on the Bilateral lower extremity.  Wound noted on plantar medial aspect of left hallux.  Wound demonstrates a mixed granular

## 2025-06-19 NOTE — PROGRESS NOTES
[facility]  [facility]  Progress Note      Chief Complaint   Patient presents with    Psychiatric Evaluation     Patient pink slipped for suicidal ideation.  Patient wants to see someone for help.  Patient tearful during triage.       SUBJECTIVE:  Patient is tolerating medications. No reported adverse drug reactions.  No nausea, vomiting, diarrhea. Up in chair    Review of systems:  As stated above in the chief complaint, otherwise negative.    Medications:  Scheduled Meds:   atorvastatin  20 mg Oral Daily    valsartan  160 mg Oral Daily    buPROPion  150 mg Oral Daily    prazosin  1 mg Oral Nightly    divalproex  250 mg Oral 2 times per day    metFORMIN  500 mg Oral Daily with breakfast     Continuous Infusions:  PRN Meds:ammonium lactate, acetaminophen, magnesium hydroxide, aluminum & magnesium hydroxide-simethicone, hydrOXYzine HCl, haloperidol **OR** haloperidol lactate, melatonin, chlordiazePOXIDE    OBJECTIVE:  /78   Pulse 86   Temp 97.1 °F (36.2 °C) (Temporal)   Resp 16   Ht 1.778 m (5' 10\")   Wt 107 kg (236 lb)   SpO2 98%   BMI 33.86 kg/m²   Temp  Av.1 °F (36.2 °C)  Min: 97.1 °F (36.2 °C)  Max: 97.1 °F (36.2 °C)  Constitutional: The patient is awake, alert, and oriented.   Skin: Warm and dry. No rashes were noted.   HEENT: Round and reactive pupils.  Moist mucous membranes.  No ulcerations or thrush.  Neck: Supple to movements.   Chest: No use of accessory muscles to breathe. Symmetrical expansion.  No wheezing, crackles or rhonchi.  Cardiovascular: S1 and S2 are rhythmic and regular. No murmurs appreciated.   Abdomen: Positive bowel sounds to auscultation. Benign to palpation. No masses felt. No hepatosplenomegaly.  Extremities: No clubbing, no cyanosis, no edema. Bilat feed dressed  Lines: peripheral  Taken                   Laboratory and Tests Review:  Lab Results   Component Value Date    WBC 4.4 (L) 2025    WBC 5.2 2025    WBC 5.0

## 2025-06-19 NOTE — PROGRESS NOTES
Denies SI/HI/AVH. Endorses tactile hallucinations of someone tapping her on the shoulder. Endorses 9 out of 10 anxiety and 5 out of 10 depression. Reports feeling better but another patient shouting on the unit increased her anxiety. Patient is taking medications and attending groups. Remains in control of behaviors.

## 2025-06-19 NOTE — BH NOTE
Patient denies suicidal ideation, homicidal ideations and AVH.patient does report that while in her room today, twice she felt like someone was there tapping her on her shoulder but she was alone.  Patient reports anxiety a 9/10 stating that her upcoming MRI is making her anxious. She rates depression a 7/10 at this time. She does endorse racing thoughts and paranoia.  Presents flat, sad, calm and cooperative during assessment. She does brighten with conversation. Patient is out on the unit and is social with select peers.  Medications taken without issue.  No other complaints or concerns verbalized at this time.  No unit problems reported.  Will continue to observe and support.

## 2025-06-19 NOTE — PLAN OF CARE
Problem: Anxiety  Goal: Will report anxiety at manageable levels  Description: INTERVENTIONS:  1. Administer medication as ordered  2. Teach and rehearse alternative coping skills  3. Provide emotional support with 1:1 interaction with staff  6/18/2025 2144 by Yoly Ramirez RN  Outcome: Not Progressing     Problem: Depression/Self Harm  Goal: Effect of psychiatric condition will be minimized and patient will be protected from self harm  Description: INTERVENTIONS:  1. Assess impact of patient's symptoms on level of functioning, self care needs and offer support as indicated  2. Assess patient/family knowledge of depression, impact on illness and need for teaching  3. Provide emotional support, presence and reassurance  4. Assess for possible suicidal thoughts or ideation. If patient expresses suicidal thoughts or statements do not leave alone, initiate Suicide Precautions, move to a room close to the nursing station and obtain sitter  5. Initiate consults as appropriate with Mental Health Professional, Spiritual Care, Psychosocial CNS, and consider a recommendation to the LIP for a Psychiatric Consultation  6/18/2025 2144 by Yoly Ramirez RN  Outcome: Not Progressing     Problem: Risk for Elopement  Goal: Patient will not exit the unit/facility without proper excort  6/18/2025 2144 by Yoly Ramirez RN  Outcome: Progressing     Problem: Chronic Conditions and Co-morbidities  Goal: Patient's chronic conditions and co-morbidity symptoms are monitored and maintained or improved  6/18/2025 2144 by Yoly Ramirez RN  Outcome: Progressing     Problem: Safety - Adult  Goal: Free from fall injury  6/18/2025 2144 by Yoly Ramirez RN  Outcome: Progressing     Problem: Self Harm/Suicidality  Goal: Will have no self-injury during hospital stay  Description: INTERVENTIONS:  1.  Ensure constant observer at bedside with Q15M safety checks  2.  Maintain a safe environment  3.  Secure patient belongings  4.  Ensure

## 2025-06-19 NOTE — CARE COORDINATION
Treatment team met with pt. She stated that she is feeling better. She stated that she plans to return home at discharge. She did state that she is feeling anxious and asked for medications, team informed nursing staff. She also mentioned that she left like someone was tapping her shoulder yesterday around 6pm.

## 2025-06-19 NOTE — GROUP NOTE
Group Therapy Note    Date: 6/19/2025    Group Start Time: 1030  Group End Time: 1045  Group Topic: Community Meeting    [facility]    Gale Thomason CTRS    Group Therapy Note    Attendees: 12    Date: 6/19/2025  Start Time: 1030  End Time:  1045  Number of Participants: 12    Type of Group: Community Meeting    Patient's Goal:  Increased awareness of expectations of the milieu, daily staffing and programming. Identified goal for the day.    Notes:  Patient was an active listener in group. Patient shared goal for the day as, \"Listen to music, get the air cut down it's extremely cold.\"    Status After Intervention:  Improved    Participation Level: Active Listener and Interactive    Participation Quality: Appropriate, Attentive, and Sharing      Speech:  normal      Thought Process/Content: Logical  Linear      Affective Functioning: Congruent      Mood: Appropriate      Level of consciousness:  Alert and Attentive      Response to Learning: Able to verbalize current knowledge/experience, Able to verbalize/acknowledge new learning, Able to retain information, Capable of insight, Able to change behavior, and Progressing to goal      Endings: None Reported    Modes of Intervention: Education, Support, Socialization, Exploration, Clarifying, and Problem-solving      Discipline Responsible: Psychoeducational Specialist      Signature:  GONZALO Moody

## 2025-06-19 NOTE — PROGRESS NOTES
BEHAVIORAL HEALTH FOLLOW-UP NOTE     6/19/2025     Patient was seen and examined in person, Chart reviewed   Patient's case discussed with staff/team    Chief Complaint: Suicidal ideation    Interim History:   Patient was seen in her room she is lying in bed sleeping she does wake up to speak with  and I she is flat, blunted she does endorse some anxiety.  She states that she was lying in bed last night and felt as if someone was tapping her on her shoulder she states is the first time it happened and she is not really sure what it is about.  She does denies suicidal homicidal ideation intent or plan, denies auditory visual hallucinations.  She states that she does have left foot pain and her right foot is numb.  Podiatry is on.  MRI was completed yesterday.  Patient has been taking her medication, she has had no behavioral disturbances, she is attending select groups.  Sleep and appetite reported to be fair      out on the unit she is flat, blunted she does continue to endorse some depression.  She states that she just got back from ultrasound ultrasound and MRI ordered today infectious disease consult as well as podiatry on.  She states that she feels overwhelmed states that she has a lot of issues in her family.  She does denies suicidal homicidal ideation intent or plan, denies auditory visual hallucinations.  She has been taking her medication, she has had no behavioral disturbances, she is attending group.  Sleep and appetite reported to be fair    Appetite: [x] Normal/Unchanged  [] Increased  [] Decreased      Sleep:       [x] Normal/Unchanged  [] Fair       [] Poor              Energy:    [x] Normal/Unchanged  [] Increased  [] Decreased        SI [] Present  [x] Absent    HI  []Present  [x] Absent     Aggression:  [] yes  [x] no    Patient is [x] able  [] unable to CONTRACT FOR SAFETY     PAST MEDICAL/PSYCHIATRIC HISTORY:   Past Medical History:   Diagnosis Date    Anemia     stable at this time   Diabetic foot infection (HCC)    Suicidal ideation  Resolved Problems:    * No resolved hospital problems. *      LABS:    No results for input(s): \"WBC\", \"HGB\", \"PLT\" in the last 72 hours.    No results for input(s): \"NA\", \"K\", \"CL\", \"CO2\", \"BUN\", \"CREATININE\", \"GLUCOSE\" in the last 72 hours.    No results for input(s): \"BILITOT\", \"ALKPHOS\", \"AST\", \"ALT\" in the last 72 hours.    Lab Results   Component Value Date/Time    BARBSCNU NEGATIVE 06/16/2025 05:37 AM    LABBENZ NEGATIVE 06/16/2025 05:37 AM    LABMETH NEGATIVE 06/16/2025 05:37 AM    ETOH 137 06/16/2025 05:37 AM     Lab Results   Component Value Date/Time    TSH 1.460 11/28/2022 10:37 AM     No results found for: \"LITHIUM\"  Lab Results   Component Value Date    VALPROATE 75 07/30/2024           Treatment Plan:  Reviewed current Medications with the patient.   Risks, benefits, side effects, drug-to-drug interactions and alternatives to treatment were discussed.  Collateral information:   CD evaluation  Encourage patient to attend group and other milieu activities.  Discharge planning discussed with the patient and treatment team.    Wellbutrin XL to 150 mg daily  Prazosin 1 mg at bedtime for nightmares and flashbacks  We will start Depakote and 500 mg twice daily for mood stabilization    PSYCHOTHERAPY/COUNSELING:  [x] Therapeutic interview  [x] Supportive  [] CBT  [] Ongoing  [] Other    [x] Patient continues to need, on a daily basis, active treatment furnished directly by or requiring the supervision of inpatient psychiatric personnel      Anticipated Length of stay: 3 to 7 days based on stability        NOTE: This report was transcribed using voice recognition software. Every effort was made to ensure accuracy; however, inadvertent computerized transcription errors may be present.     Electronically signed by VALERIE Fox CNP on 6/19/2025 at 12:09 PM

## 2025-06-19 NOTE — PLAN OF CARE
Problem: Risk for Elopement  Goal: Patient will not exit the unit/facility without proper excort  Outcome: Progressing     Problem: Chronic Conditions and Co-morbidities  Goal: Patient's chronic conditions and co-morbidity symptoms are monitored and maintained or improved  Outcome: Progressing     Problem: Safety - Adult  Goal: Free from fall injury  Outcome: Progressing     Problem: Anxiety  Goal: Will report anxiety at manageable levels  Outcome: Progressing     Problem: Depression/Self Harm  Goal: Effect of psychiatric condition will be minimized and patient will be protected from self harm  Outcome: Progressing     Problem: Self Harm/Suicidality  Goal: Will have no self-injury during hospital stay  Outcome: Progressing     Problem: Sleep Disturbance  Goal: Will exhibit normal sleeping pattern  Outcome: Progressing     Problem: Involuntary Admit  Goal: Will cooperate with staff recommendations and doctor's orders and will demonstrate appropriate behavior  Outcome: Progressing     Problem: Pain  Goal: Verbalizes/displays adequate comfort level or baseline comfort level  Outcome: Progressing     Problem: Nutrition Deficit:  Goal: Optimize nutritional status  Outcome: Progressing

## 2025-06-19 NOTE — PROGRESS NOTES
Comprehensive Nutrition Assessment    Type and Reason for Visit:  Initial, Wound, Positive nutrition screen    Nutrition Recommendations/Plan:   Continue current diet  Start ONS to optimize wound healing and promote oral intake  Will monitor     Malnutrition Assessment:  Malnutrition Status:  Insufficient data (06/19/25 1225)    Context:  Acute Illness     Findings of the 6 clinical characteristics of malnutrition:  Energy Intake:  Mild decrease in energy intake  Weight Loss:  Unable to assess     Body Fat Loss:  Unable to assess     Muscle Mass Loss:  Unable to assess    Fluid Accumulation:  No fluid accumulation     Strength:  Not Performed    Nutrition Assessment:    pt adm d/t SI/bipolar disorder found w/ DM foot wounds/ulcers; pt endorses poor appetite PTA; PMhx of DM, HTN, ETOH Abuse, PTSD; Will start ONS to optimize wound healing and promote oral intake; will monitor.    Nutrition Related Findings:    hyperglycemia; A&Ox4; abd WNL; +2 edema; LOPEZ I/O Wound Type: Multiple, Diabetic Ulcer (food wounds noted)       Current Nutrition Intake & Therapies:    Average Meal Intake: Unable to assess  Average Supplements Intake: None Ordered  ADULT DIET; Regular; Safety Tray; Safety Tray (Disposables)  ADULT ORAL NUTRITION SUPPLEMENT; Breakfast, Lunch; Wound Healing Oral Supplement  ADULT ORAL NUTRITION SUPPLEMENT; Lunch, Dinner; Low Calorie/High Protein Oral Supplement    Anthropometric Measures:  Height: 177.8 cm (5' 10\")  Ideal Body Weight (IBW): 150 lbs (68 kg)       Current Body Weight: 107 kg (235 lb 14.3 oz) (6/16-estimated), 157.3 % IBW.    Current BMI (kg/m2): 33.8  Usual Body Weight: 116 kg (255 lb 11.7 oz) (6/23/24-BS)     % Weight Change (Calculated): -7.8  Weight Adjustment For: No Adjustment                 BMI Categories: Obese Class 1 (BMI 30.0-34.9)    Estimated Daily Nutrient Needs:  Energy Requirements Based On: Formula  Weight Used for Energy Requirements: Current  Energy (kcal/day): MSJ x 1.2 SF=

## 2025-06-20 LAB
GLUCOSE BLD-MCNC: 120 MG/DL (ref 74–99)
GLUCOSE BLD-MCNC: 127 MG/DL (ref 74–99)

## 2025-06-20 PROCEDURE — 82962 GLUCOSE BLOOD TEST: CPT

## 2025-06-20 PROCEDURE — 6370000000 HC RX 637 (ALT 250 FOR IP): Performed by: NURSE PRACTITIONER

## 2025-06-20 PROCEDURE — 1240000000 HC EMOTIONAL WELLNESS R&B

## 2025-06-20 PROCEDURE — 99232 SBSQ HOSP IP/OBS MODERATE 35: CPT

## 2025-06-20 PROCEDURE — 6370000000 HC RX 637 (ALT 250 FOR IP): Performed by: EMERGENCY MEDICINE

## 2025-06-20 PROCEDURE — 6370000000 HC RX 637 (ALT 250 FOR IP): Performed by: PSYCHIATRY & NEUROLOGY

## 2025-06-20 PROCEDURE — 6370000000 HC RX 637 (ALT 250 FOR IP)

## 2025-06-20 RX ORDER — MECOBALAMIN 5000 MCG
5 TABLET,DISINTEGRATING ORAL NIGHTLY PRN
Status: DISCONTINUED | OUTPATIENT
Start: 2025-06-20 | End: 2025-06-22

## 2025-06-20 RX ORDER — CHLORDIAZEPOXIDE HYDROCHLORIDE 25 MG/1
25 CAPSULE, GELATIN COATED ORAL EVERY 6 HOURS PRN
Status: DISCONTINUED | OUTPATIENT
Start: 2025-06-20 | End: 2025-06-27 | Stop reason: HOSPADM

## 2025-06-20 RX ADMIN — ATORVASTATIN CALCIUM 20 MG: 10 TABLET, FILM COATED ORAL at 22:39

## 2025-06-20 RX ADMIN — BUPROPION HYDROCHLORIDE 150 MG: 150 TABLET, EXTENDED RELEASE ORAL at 09:53

## 2025-06-20 RX ADMIN — HYDROXYZINE HYDROCHLORIDE 50 MG: 50 TABLET ORAL at 22:39

## 2025-06-20 RX ADMIN — PRAZOSIN HYDROCHLORIDE 1 MG: 1 CAPSULE ORAL at 22:38

## 2025-06-20 RX ADMIN — METFORMIN HYDROCHLORIDE 500 MG: 500 TABLET, EXTENDED RELEASE ORAL at 09:53

## 2025-06-20 RX ADMIN — Medication 5 MG: at 22:39

## 2025-06-20 RX ADMIN — ACETAMINOPHEN 650 MG: 325 TABLET ORAL at 06:59

## 2025-06-20 RX ADMIN — DIVALPROEX SODIUM 250 MG: 250 TABLET, DELAYED RELEASE ORAL at 22:39

## 2025-06-20 RX ADMIN — DIVALPROEX SODIUM 250 MG: 250 TABLET, DELAYED RELEASE ORAL at 09:53

## 2025-06-20 ASSESSMENT — PAIN DESCRIPTION - DESCRIPTORS
DESCRIPTORS: ACHING
DESCRIPTORS: ACHING

## 2025-06-20 ASSESSMENT — PAIN SCALES - GENERAL
PAINLEVEL_OUTOF10: 8
PAINLEVEL_OUTOF10: 8
PAINLEVEL_OUTOF10: 0
PAINLEVEL_OUTOF10: 6

## 2025-06-20 ASSESSMENT — PAIN DESCRIPTION - LOCATION
LOCATION: BACK
LOCATION: FOOT

## 2025-06-20 ASSESSMENT — PAIN - FUNCTIONAL ASSESSMENT: PAIN_FUNCTIONAL_ASSESSMENT: PREVENTS OR INTERFERES SOME ACTIVE ACTIVITIES AND ADLS

## 2025-06-20 ASSESSMENT — PAIN DESCRIPTION - FREQUENCY: FREQUENCY: INTERMITTENT

## 2025-06-20 ASSESSMENT — PAIN DESCRIPTION - ONSET: ONSET: ON-GOING

## 2025-06-20 ASSESSMENT — PAIN DESCRIPTION - ORIENTATION: ORIENTATION: LEFT

## 2025-06-20 ASSESSMENT — PAIN DESCRIPTION - PAIN TYPE: TYPE: NEUROPATHIC PAIN

## 2025-06-20 NOTE — GROUP NOTE
Group Therapy Note    Date: 6/20/2025    Group Start Time: 1000  Group End Time: 1027  Group Topic: Psychotherapy    [facility]    Amina Meredith LSW        Group Therapy Note    Attendees: 9     Patient's Goal:  To increase social interaction and improve relationships with others.      Notes:  Pt was attentive in group and was able to identify an agenda. They were also able to verbalize relating to others within the group.      Status After Intervention:  Improved    Participation Level: Active Listener and Interactive    Participation Quality: Appropriate, Attentive, Sharing, and Supportive      Speech:  normal      Thought Process/Content: Logical      Affective Functioning: Congruent      Mood: euthymic      Level of consciousness:  Alert, Oriented x4, and Attentive      Response to Learning: Able to verbalize current knowledge/experience, Able to verbalize/acknowledge new learning, Able to retain information, Capable of insight, and Progressing to goal      Endings: None Reported    Modes of Intervention: Education, Support, Socialization, Exploration, Clarifying, and Problem-solving      Discipline Responsible: /Counselor      Signature:  DAVIAN Ely

## 2025-06-20 NOTE — PROGRESS NOTES
[facility]  [facility]  Progress Note      Chief Complaint   Patient presents with    Psychiatric Evaluation     Patient pink slipped for suicidal ideation.  Patient wants to see someone for help.  Patient tearful during triage.       SUBJECTIVE:  Patient is tolerating medications. No reported adverse drug reactions.  No nausea, vomiting, diarrhea. Up in chair. No fever or chills    Review of systems:  As stated above in the chief complaint, otherwise negative.    Medications:  Scheduled Meds:   atorvastatin  20 mg Oral Daily    valsartan  160 mg Oral Daily    buPROPion  150 mg Oral Daily    prazosin  1 mg Oral Nightly    divalproex  250 mg Oral 2 times per day    metFORMIN  500 mg Oral Daily with breakfast     Continuous Infusions:  PRN Meds:ammonium lactate, acetaminophen, magnesium hydroxide, aluminum & magnesium hydroxide-simethicone, hydrOXYzine HCl, haloperidol **OR** haloperidol lactate, melatonin, chlordiazePOXIDE    OBJECTIVE:  BP (!) 118/58   Pulse 88   Temp 98 °F (36.7 °C) (Temporal)   Resp 16   Ht 1.778 m (5' 10\")   Wt 107 kg (236 lb)   SpO2 100%   BMI 33.86 kg/m²   Temp  Av.4 °F (36.3 °C)  Min: 97.1 °F (36.2 °C)  Max: 98 °F (36.7 °C)  Constitutional: The patient is awake, alert, and oriented.   Skin: Warm and dry. No rashes were noted.   HEENT: Round and reactive pupils.  Moist mucous membranes.  No ulcerations or thrush.  Neck: Supple to movements.   Chest: No use of accessory muscles to breathe. Symmetrical expansion.  No wheezing, crackles or rhonchi.  Cardiovascular: S1 and S2 are rhythmic and regular. No murmurs appreciated.   Abdomen: Positive bowel sounds to auscultation. Benign to palpation. No masses felt. No hepatosplenomegaly.  Extremities: No clubbing, no cyanosis, no edema. Bilat feed dressed  Lines: peripheral  Taken                   Laboratory and Tests Review:  Lab Results   Component Value Date    WBC 4.4 (L) 2025    WBC 5.2  2nd phalanges or may be reactive and the right foot negative for osteo     Plan:    Monitor off antibiotics now  CRP 24, ESR 45, procalcitonin .15  Vascular note reviewed-no further intervention  Per podiatry note-no plans for intervention  ID will sign off       Electronically signed by VALERIE Gimenez CNP on 6/20/2025 at 8:50 AM

## 2025-06-20 NOTE — BH NOTE
Patient denies suicidal ideation, homicidal ideations and AVH.  Pt endorsed tactile hallucinations of \"someone tapping on my shoulder\". Pt reports anxiety 9/10 and depression 7/10. Presents flat, blunt, anxious, depressed, calm and cooperative during assessment.  Patient is out on the unit and is social with select peers.  Medications taken without issue.  No complaints or concerns verbalized at this time.  No unit problems reported.  Will continue to observe and support.

## 2025-06-20 NOTE — PROGRESS NOTES
Pt attended afternoon smoking cessation group. Pt appeared to be an active listener. Pt is able to share appropriately when prompted and asked facilitator relevant questions.  Pt was participant 1 of 8.    Electronically signed by Celestina Benavides on 6/20/2025 at 3:40 PM

## 2025-06-20 NOTE — PROGRESS NOTES
BEHAVIORAL HEALTH FOLLOW-UP NOTE     6/20/2025     Patient was seen and examined in person, Chart reviewed   Patient's case discussed with staff/team    Chief Complaint: Suicidal ideation    Interim History:   Patient was seen in her room she is sitting on her bed she is flat blunted she does brighten slightly she does continue to endorse anxiety.  She states that she does have anxiety regularly but she states that on the unit it can get loud which makes her anxiety worse.  She states that when she wakes up in the morning her anxiety gets worse throughout the day.  She does state that she felt as if someone was tapping her on her shoulder last night she states he is not sure if it was a dream or if she was feeling something.  She states she looked over and saw that her roommate was sleeping so she knew it was not her.  She does denies suicidal homicidal ideation intent or plan, denies auditory visual hallucinations.  Patient has been taking her medication, she has had no behavioral disturbances, she is attending select groups.  Sleep and appetite reported to be fair    Appetite: [x] Normal/Unchanged  [] Increased  [] Decreased      Sleep:       [x] Normal/Unchanged  [] Fair       [] Poor              Energy:    [x] Normal/Unchanged  [] Increased  [] Decreased        SI [] Present  [x] Absent    HI  []Present  [x] Absent     Aggression:  [] yes  [x] no    Patient is [x] able  [] unable to CONTRACT FOR SAFETY     PAST MEDICAL/PSYCHIATRIC HISTORY:   Past Medical History:   Diagnosis Date    Anemia     stable at this time  2023    Arthritis     COVID 05/2022 11/2022-    Diabetes mellitus (HCC)     diet controlled    History of blood transfusion 2012    Hyperlipidemia     Hypertension     Lumbar pain        FAMILY/SOCIAL HISTORY:  Family History   Problem Relation Age of Onset    Diabetes Mother     Hypertension Mother     Diabetes Father     Hypertension Father     Gout Father     Heart Disease None     Ovarian

## 2025-06-20 NOTE — GROUP NOTE
Group Therapy Note    Date: 6/20/2025    Group Start Time: 1030  Group End Time: 1050  Group Topic: Community Meeting    [facility]    Gale Thomason CTRS    Group Therapy Note    Attendees: 11    Date: 6/20/2025  Start Time: 1030  End Time:  1050  Number of Participants: 11    Type of Group: Community Meeting    Patient's Goal:  Increased awareness of expectations of the milieu, daily staffing and programming. Identified goal for the day.    Notes:  Patient was an active listener in group. Patient shared goal for the day as, \"Continue to be positive with my peers, hear some music today, get the air cut down. It's so cold.\"    Status After Intervention:  Improved    Participation Level: Active Listener and Interactive    Participation Quality: Appropriate, Attentive, and Sharing      Speech:  normal      Thought Process/Content: Logical  Linear      Affective Functioning: Congruent      Mood: Appropriate      Level of consciousness:  Alert and Attentive      Response to Learning: Able to verbalize current knowledge/experience, Able to verbalize/acknowledge new learning, Able to retain information, Capable of insight, Able to change behavior, and Progressing to goal      Endings: None Reported    Modes of Intervention: Education, Support, Socialization, Exploration, Clarifying, and Problem-solving      Discipline Responsible: Psychoeducational Specialist      Signature:  GONZALO Moody

## 2025-06-20 NOTE — CARE COORDINATION
Sw met with pt. She was in the common area with other pts. She stated that she is doing good. She denied any questions.

## 2025-06-20 NOTE — PROGRESS NOTES
Department of Podiatry  Progress Note    SUBJECTIVE:  Mae Calvillo is seen at bedside for B/L diabetic foot wounds. No acute events overnight. Patient denies any N/V/D/F/C/SOB/CP.  Patient states no changes in pain or sensation or drainage to bilateral lower extremity.  No other pedal complaints at this time.     OBJECTIVE:    Scheduled Meds:   atorvastatin  20 mg Oral Daily    valsartan  160 mg Oral Daily    buPROPion  150 mg Oral Daily    prazosin  1 mg Oral Nightly    divalproex  250 mg Oral 2 times per day    metFORMIN  500 mg Oral Daily with breakfast     Continuous Infusions:  PRN Meds:.ammonium lactate, acetaminophen, magnesium hydroxide, aluminum & magnesium hydroxide-simethicone, hydrOXYzine HCl, haloperidol **OR** haloperidol lactate, melatonin, chlordiazePOXIDE    Allergies   Allergen Reactions    Colchicine     Darvon [Propoxyphene] Other (See Comments)     GI Upset       BP (!) 118/58   Pulse 88   Temp 98 °F (36.7 °C) (Temporal)   Resp 16   Ht 1.778 m (5' 10\")   Wt 107 kg (236 lb)   SpO2 100%   BMI 33.86 kg/m²       EXAM:    VASCULAR:  DP and PT pulses are palpable. CFT < 5 seconds B/L.  Warm to warm from the tibial tuberosity to the distal aspect of the digits dorsally. Hair growth not noted to the distal aspects dorsally.     NEUROLOGIC:  Light touch is impaired past the level of the ankle bilaterally     MUSCULOSKELETAL: Deformities are not noted in Bilateral lower extremities.  5/5 Gross Muscle strength in all 4 quadrants.     DERM:  Skin is clean and dry to the bilateral lower extremities. Toenails 1-5 b/l are abnormal in thickness, color and length. Webspaces 1-4 b/l are C/D/I. Edema is mild. Erythema is absent. Wounds are Present on the Bilateral lower extremity.  Wound noted on plantar medial aspect of left hallux.  Wound demonstrates a mixed granular fibrotic base with a hyperkeratotic border.  Minimal drainage noted on exam.  There is another wound on the right foot on the

## 2025-06-20 NOTE — PLAN OF CARE
Problem: Anxiety  Goal: Will report anxiety at manageable levels  Description: INTERVENTIONS:  1. Administer medication as ordered  2. Teach and rehearse alternative coping skills  3. Provide emotional support with 1:1 interaction with staff  6/19/2025 2354 by Bridgette Sheikh, RN  Outcome: Progressing     Problem: Depression/Self Harm  Goal: Effect of psychiatric condition will be minimized and patient will be protected from self harm  Description: INTERVENTIONS:  1. Assess impact of patient's symptoms on level of functioning, self care needs and offer support as indicated  2. Assess patient/family knowledge of depression, impact on illness and need for teaching  3. Provide emotional support, presence and reassurance  4. Assess for possible suicidal thoughts or ideation. If patient expresses suicidal thoughts or statements do not leave alone, initiate Suicide Precautions, move to a room close to the nursing station and obtain sitter  5. Initiate consults as appropriate with Mental Health Professional, Spiritual Care, Psychosocial CNS, and consider a recommendation to the [facility] for a Psychiatric Consultation  6/19/2025 2354 by Bridgette Sheikh, RN  Outcome: Progressing

## 2025-06-20 NOTE — PLAN OF CARE
Problem: Risk for Elopement  Goal: Patient will not exit the unit/facility without proper excort  Outcome: Progressing     Problem: Chronic Conditions and Co-morbidities  Goal: Patient's chronic conditions and co-morbidity symptoms are monitored and maintained or improved  Outcome: Progressing     Problem: Safety - Adult  Goal: Free from fall injury  Outcome: Progressing     Problem: Anxiety  Goal: Will report anxiety at manageable levels  Outcome: Progressing     Problem: Depression/Self Harm  Goal: Effect of psychiatric condition will be minimized and patient will be protected from self harm  Outcome: Progressing     Problem: Self Harm/Suicidality  Goal: Will have no self-injury during hospital stay  Outcome: Progressing     Problem: Sleep Disturbance  Goal: Will exhibit normal sleeping pattern  Outcome: Progressing     Problem: Involuntary Admit  Goal: Will cooperate with staff recommendations and doctor's orders and will demonstrate appropriate behavior  Outcome: Progressing     Problem: Pain  Goal: Verbalizes/displays adequate comfort level or baseline comfort level  Outcome: Progressing     Problem: Nutrition Deficit:  Goal: Optimize nutritional status  Outcome: Progressing     Denies SI/HI/AVH. Reports tactile hallucinations of \"feeling someone tugging my blanket twice last night.\" Endorses 8 out of 10 anxiety and 7 out of 10 depression that is the same as at time of admission. Reports having racing thoughts that become worse at night. Endorses generalized paranoia. Patient reports experiencing hyper-alertness and feeling watchful and suspicious of those around her. Appears flat and depressed but is bright and friendly upon assessment. Observed out on the unit, social with peers. Taking medications without issues. Attends groups. Showered after breakfast. Dressing change completed. Assessment documented in LDA. Remains in control of behaviors.

## 2025-06-21 PROBLEM — F31.64 BIPOLAR AFFECTIVE DISORDER, MIXED, SEVERE, WITH PSYCHOTIC BEHAVIOR (HCC): Status: ACTIVE | Noted: 2025-06-21

## 2025-06-21 LAB — GLUCOSE BLD-MCNC: 118 MG/DL (ref 74–99)

## 2025-06-21 PROCEDURE — 6370000000 HC RX 637 (ALT 250 FOR IP)

## 2025-06-21 PROCEDURE — 99232 SBSQ HOSP IP/OBS MODERATE 35: CPT

## 2025-06-21 PROCEDURE — 6370000000 HC RX 637 (ALT 250 FOR IP): Performed by: NURSE PRACTITIONER

## 2025-06-21 PROCEDURE — 82962 GLUCOSE BLOOD TEST: CPT

## 2025-06-21 PROCEDURE — 6370000000 HC RX 637 (ALT 250 FOR IP): Performed by: EMERGENCY MEDICINE

## 2025-06-21 PROCEDURE — 6370000000 HC RX 637 (ALT 250 FOR IP): Performed by: PSYCHIATRY & NEUROLOGY

## 2025-06-21 PROCEDURE — 1240000000 HC EMOTIONAL WELLNESS R&B

## 2025-06-21 RX ORDER — RISPERIDONE 1 MG/1
1 TABLET ORAL NIGHTLY
Status: DISCONTINUED | OUTPATIENT
Start: 2025-06-21 | End: 2025-06-23

## 2025-06-21 RX ADMIN — DIVALPROEX SODIUM 250 MG: 250 TABLET, DELAYED RELEASE ORAL at 20:47

## 2025-06-21 RX ADMIN — VALSARTAN 160 MG: 160 TABLET, FILM COATED ORAL at 08:37

## 2025-06-21 RX ADMIN — ATORVASTATIN CALCIUM 20 MG: 10 TABLET, FILM COATED ORAL at 20:47

## 2025-06-21 RX ADMIN — PRAZOSIN HYDROCHLORIDE 1 MG: 1 CAPSULE ORAL at 20:47

## 2025-06-21 RX ADMIN — Medication 5 MG: at 20:54

## 2025-06-21 RX ADMIN — BUPROPION HYDROCHLORIDE 150 MG: 150 TABLET, EXTENDED RELEASE ORAL at 08:36

## 2025-06-21 RX ADMIN — HYDROXYZINE HYDROCHLORIDE 50 MG: 50 TABLET ORAL at 08:37

## 2025-06-21 RX ADMIN — DIVALPROEX SODIUM 250 MG: 250 TABLET, DELAYED RELEASE ORAL at 08:37

## 2025-06-21 RX ADMIN — METFORMIN HYDROCHLORIDE 500 MG: 500 TABLET, EXTENDED RELEASE ORAL at 08:36

## 2025-06-21 RX ADMIN — HYDROXYZINE HYDROCHLORIDE 50 MG: 50 TABLET ORAL at 20:47

## 2025-06-21 RX ADMIN — RISPERIDONE 1 MG: 1 TABLET, FILM COATED ORAL at 20:47

## 2025-06-21 ASSESSMENT — PAIN SCALES - GENERAL: PAINLEVEL_OUTOF10: 0

## 2025-06-21 NOTE — GROUP NOTE
Group Therapy Note    Date: 6/21/2025    Group Start Time: 1110  Group End Time: 1140  Group Topic: Psychoeducation    [facility]    Sarah Talavera, GONZALO    Type of Group: Psychoeducation    Module Name:  compassion for others    Patient's Goal:  pt will be educated on ways to look for opportunities in helping others.     Notes:  pleasant and engaged, able to be an active participate in discussion.     Status After Intervention:  Improved    Participation Level: Active Listener and Interactive    Participation Quality: Appropriate, Attentive, and Sharing      Speech:  normal      Thought Process/Content: Logical      Affective Functioning: Congruent      Mood: euthymic      Level of consciousness:  Alert, Oriented x4, and Attentive      Response to Learning: Able to verbalize/acknowledge new learning, Able to retain information, and Progressing to goal      Endings: None Reported    Modes of Intervention: Education, Support, Socialization, and Problem-solving      Discipline Responsible: Psychoeducational Specialist      Signature:  GONZALO Romero

## 2025-06-21 NOTE — BH NOTE
Patient awake in day room upon approach.  Patient has good eye contact.    Patient presents worried, somewhat bright with conversation.   Appears well groomed  Patient denies suicidal/homicidal ideations (states \"not today\") and reports hypnopompic tactile and visual hallucinations upon waking of feeling someone tugging at her blanket as well as seeing a silhouette-like person.     Patient reports anxiety 10/10 and depression 9/10 due to racing thoughts regarding a few life events over the last couple years.  Patient denies pain  Patient is  taking  ordered medications without issue. Patient is  attending groups per note review.  Purposeful Q15min rounding continues.
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
## 2025-06-21 NOTE — GROUP NOTE
Group Therapy Note    Date: 6/21/2025    Group Start Time: 1410  Group End Time: 1507  Group Topic: Cognitive Skills    [facility]    Jannie Maldonado        Group Therapy Note    Attendees: 7       Patient's Goal:  Pt will identify when they are over thinking and use strategies to eliminate the over thinking when possible.     Notes:  Pt was cooperative in group and shared appropriately.     Status After Intervention:  Improved    Participation Level: Active Listener and Interactive    Participation Quality: Appropriate, Attentive, and Sharing      Speech:  normal      Thought Process/Content: Logical      Affective Functioning: Congruent      Mood: euthymic      Level of consciousness:  Alert, Oriented x4, and Attentive      Response to Learning: Able to verbalize current knowledge/experience, Able to verbalize/acknowledge new learning, Able to retain information, and Capable of insight      Endings: None Reported    Modes of Intervention: Education, Support, Socialization, Exploration, Clarifying, and Problem-solving      Discipline Responsible: /Counselor      Signature:  Jannie Maldonado

## 2025-06-21 NOTE — PLAN OF CARE
Patient denies suicidal ideation, and homicidal ideation,. When asked about hallucinations patient stated they saw \"a ghost\", when asked to elaborate patient reported they have seen the \"ghost\" multiple times prior to this. Endorses anxiety as 8/10 and depression as 7/10. Denies racing thoughts. Appears flat but brightens with conversation, anxious, cooperative and friendly during assessment. Reports appetite are okay and sleep is \"off and on\". Patient is taking medications without issues at this time. Patient is observed out in the day room an social with peers. Remains in control of behaviors.        Problem: Risk for Elopement  Goal: Patient will not exit the unit/facility without proper excort  Outcome: Progressing     Problem: Safety - Adult  Goal: Free from fall injury  Outcome: Progressing     Problem: Anxiety  Goal: Will report anxiety at manageable levels  Description: INTERVENTIONS:  1. Administer medication as ordered  2. Teach and rehearse alternative coping skills  3. Provide emotional support with 1:1 interaction with staff  Outcome: Progressing     Problem: Depression/Self Harm  Goal: Effect of psychiatric condition will be minimized and patient will be protected from self harm  Description: INTERVENTIONS:  1. Assess impact of patient's symptoms on level of functioning, self care needs and offer support as indicated  2. Assess patient/family knowledge of depression, impact on illness and need for teaching  3. Provide emotional support, presence and reassurance  4. Assess for possible suicidal thoughts or ideation. If patient expresses suicidal thoughts or statements do not leave alone, initiate Suicide Precautions, move to a room close to the nursing station and obtain sitter  5. Initiate consults as appropriate with Mental Health Professional, Spiritual Care, Psychosocial CNS, and consider a recommendation to the [facility] for a Psychiatric Consultation  Outcome: Progressing     Problem: Self

## 2025-06-21 NOTE — GROUP NOTE
Shared goal for the day as to remain positive.                                                                         Group Therapy Note    Date: 6/21/2025    Group Start Time: 1100  Group End Time: 1140  Group Topic: Community Meeting    [facility]    Sarah Talavera, GONZALO    Type of Group:Community Meeting    Patient's Goal:  Patient will be able to id staffing assignments, expectations of patients, and general information re: floor rules.   Will be prompted to share goal for the day.     Notes:  Patient appeared to be an active listener, taking in information presented and was prompted to share goal for the day.    Status After Intervention:  improved    Participation Level: active listener    Participation Quality: appropriate    Speech:  normal    Thought Process/Content: logical     Affective Functioning:congruent    Mood: euthymic    Level of consciousness:  alert     Response to Learning: verbalize, retain     Endings:none reported    Modes of Intervention: education, support, clarifying     Discipline Responsible: Psychoeducation     Signature:  GONZALO Romero

## 2025-06-21 NOTE — PLAN OF CARE
Problem: Anxiety  Goal: Will report anxiety at manageable levels  Description: INTERVENTIONS:  1. Administer medication as ordered  2. Teach and rehearse alternative coping skills  3. Provide emotional support with 1:1 interaction with staff  6/20/2025 2258 by Uma Rocha RN  Outcome: Progressing     Problem: Depression/Self Harm  Goal: Effect of psychiatric condition will be minimized and patient will be protected from self harm  Description: INTERVENTIONS:  1. Assess impact of patient's symptoms on level of functioning, self care needs and offer support as indicated  2. Assess patient/family knowledge of depression, impact on illness and need for teaching  3. Provide emotional support, presence and reassurance  4. Assess for possible suicidal thoughts or ideation. If patient expresses suicidal thoughts or statements do not leave alone, initiate Suicide Precautions, move to a room close to the nursing station and obtain sitter  5. Initiate consults as appropriate with Mental Health Professional, Spiritual Care, Psychosocial CNS, and consider a recommendation to the [facility] for a Psychiatric Consultation  6/20/2025 2258 by Uma Rocha RN  Outcome: Progressing     Problem: Self Harm/Suicidality  Goal: Will have no self-injury during hospital stay  Description: INTERVENTIONS:  1.  Ensure constant observer at bedside with Q15M safety checks  2.  Maintain a safe environment  3.  Secure patient belongings  4.  Ensure family/visitors adhere to safety recommendations  5.  Ensure safety tray has been added to patient's diet order  6.  Every shift and PRN: Re-assess suicidal risk via Frequent Screener    6/20/2025 2258 by Uma Rocha RN  Outcome: Progressing

## 2025-06-22 LAB
GLUCOSE BLD-MCNC: 166 MG/DL (ref 74–99)
GLUCOSE BLD-MCNC: 205 MG/DL (ref 74–99)

## 2025-06-22 PROCEDURE — 1240000000 HC EMOTIONAL WELLNESS R&B

## 2025-06-22 PROCEDURE — 6370000000 HC RX 637 (ALT 250 FOR IP): Performed by: NURSE PRACTITIONER

## 2025-06-22 PROCEDURE — 82962 GLUCOSE BLOOD TEST: CPT

## 2025-06-22 PROCEDURE — 6370000000 HC RX 637 (ALT 250 FOR IP): Performed by: EMERGENCY MEDICINE

## 2025-06-22 PROCEDURE — 6370000000 HC RX 637 (ALT 250 FOR IP)

## 2025-06-22 PROCEDURE — 99232 SBSQ HOSP IP/OBS MODERATE 35: CPT

## 2025-06-22 RX ORDER — TRAZODONE HYDROCHLORIDE 50 MG/1
50 TABLET ORAL NIGHTLY PRN
Status: DISCONTINUED | OUTPATIENT
Start: 2025-06-22 | End: 2025-06-27 | Stop reason: HOSPADM

## 2025-06-22 RX ADMIN — RISPERIDONE 1 MG: 1 TABLET, FILM COATED ORAL at 20:20

## 2025-06-22 RX ADMIN — DIVALPROEX SODIUM 250 MG: 250 TABLET, DELAYED RELEASE ORAL at 08:44

## 2025-06-22 RX ADMIN — PRAZOSIN HYDROCHLORIDE 1 MG: 1 CAPSULE ORAL at 20:20

## 2025-06-22 RX ADMIN — VALSARTAN 160 MG: 160 TABLET, FILM COATED ORAL at 08:44

## 2025-06-22 RX ADMIN — METFORMIN HYDROCHLORIDE 500 MG: 500 TABLET, EXTENDED RELEASE ORAL at 08:44

## 2025-06-22 RX ADMIN — BUPROPION HYDROCHLORIDE 150 MG: 150 TABLET, EXTENDED RELEASE ORAL at 08:44

## 2025-06-22 RX ADMIN — ATORVASTATIN CALCIUM 20 MG: 10 TABLET, FILM COATED ORAL at 20:20

## 2025-06-22 RX ADMIN — DIVALPROEX SODIUM 250 MG: 250 TABLET, DELAYED RELEASE ORAL at 20:20

## 2025-06-22 RX ADMIN — TRAZODONE HYDROCHLORIDE 50 MG: 50 TABLET ORAL at 20:27

## 2025-06-22 ASSESSMENT — PAIN SCALES - GENERAL
PAINLEVEL_OUTOF10: 0
PAINLEVEL_OUTOF10: 0

## 2025-06-22 NOTE — PLAN OF CARE
Patient rates anxiety 8/10, depression 7/10, endorses racing thoughts and endorses visual hallucination of silhouettes of people. Patient denies SI and HI, denies auditory hallucinations. Patient brightens with conversation, appears groom and taking daily scheduled medications.   Problem: Chronic Conditions and Co-morbidities  Goal: Patient's chronic conditions and co-morbidity symptoms are monitored and maintained or improved  Outcome: Progressing     Problem: Anxiety  Goal: Will report anxiety at manageable levels  Description: INTERVENTIONS:  1. Administer medication as ordered  2. Teach and rehearse alternative coping skills  3. Provide emotional support with 1:1 interaction with staff  6/22/2025 0957 by Karine Worley, RN  Outcome: Progressing     Problem: Depression/Self Harm  Goal: Effect of psychiatric condition will be minimized and patient will be protected from self harm  Description: INTERVENTIONS:  1. Assess impact of patient's symptoms on level of functioning, self care needs and offer support as indicated  2. Assess patient/family knowledge of depression, impact on illness and need for teaching  3. Provide emotional support, presence and reassurance  4. Assess for possible suicidal thoughts or ideation. If patient expresses suicidal thoughts or statements do not leave alone, initiate Suicide Precautions, move to a room close to the nursing station and obtain sitter  5. Initiate consults as appropriate with Mental Health Professional, Spiritual Care, Psychosocial CNS, and consider a recommendation to the [facility] for a Psychiatric Consultation  6/22/2025 0957 by Karine Worley, RN  Outcome: Progressing

## 2025-06-22 NOTE — GROUP NOTE
Group Therapy Note    Date: 6/22/2025    Group Start Time: 1050  Group End Time: 1130  Group Topic: Psychoeducation    [facility]    Sarah Talavera, JENS    Type of Group: Psychoeducation    Module Name:  being kind to yourself     Patient's Goal:  pt will be able to id simple ways to make self a priority and, use the bumps  In the road as an opportunity for growth.     Notes:  appeared to be an active listener and engaged in group discussion.     Status After Intervention:  Improved    Participation Level: Active Listener and Interactive    Participation Quality: Appropriate, Attentive, and Sharing      Speech:  normal      Thought Process/Content: Logical      Affective Functioning: Congruent      Mood: euthymic      Level of consciousness:  Alert, Oriented x4, and Attentive      Response to Learning: Able to verbalize/acknowledge new learning, Able to retain information, and Progressing to goal      Endings: None Reported    Modes of Intervention: Education, Support, Socialization, and Exploration      Discipline Responsible: Psychoeducational Specialist      Signature:  GONZALO Romero
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
## 2025-06-22 NOTE — GROUP NOTE
Group Therapy Note    Date: 6/22/2025    Group Start Time: 0945  Group End Time: 1025  Group Topic: Cognitive Skills    [facility]    Jannie Maldonado        Group Therapy Note    Attendees: 8       Patient's Goal:  Pt will identify situations where they should set boundaries and attempt to implement personal boundaries in multiple aspects of daily life.     Notes:  Pt was cooperative during group while sharing and engaging with other group members.     Status After Intervention:  Improved    Participation Level: Active Listener and Interactive    Participation Quality: Appropriate, Attentive, Sharing, and Supportive      Speech:  normal      Thought Process/Content: Logical  Linear      Affective Functioning: Congruent      Mood: euthymic      Level of consciousness:  Alert and Oriented x4      Response to Learning: Able to verbalize current knowledge/experience, Able to verbalize/acknowledge new learning, and Capable of insight      Endings: None Reported    Modes of Intervention: Education, Support, Socialization, Exploration, Clarifying, and Problem-solving      Discipline Responsible: /Counselor      Signature:  Jannie Maldonado

## 2025-06-22 NOTE — GROUP NOTE
Shared goal for the day as to try to be positive and hope for pacers to win.                                                                       Group Therapy Note    Date: 6/22/2025    Group Start Time: 1035  Group End Time: 1050  Group Topic: Community Meeting    [facility]    Sarah Talavera, CTRS    Type of Group:Community Meeting    Patient's Goal:  Patient will be able to id staffing assignments, expectations of patients, and general information re: floor rules.   Will be prompted to share goal for the day.     Notes:  Patient appeared to be an active listener, taking in information presented and was prompted to share goal for the day.    Status After Intervention:  improved    Participation Level: active listener    Participation Quality: appropriate    Speech:  normal    Thought Process/Content: logical     Affective Functioning:congruent    Mood: euthymic    Level of consciousness:  alert     Response to Learning: verbalize, retain     Endings:none reported    Modes of Intervention: education, support, clarifying   Discipline Responsible: Psychoeducation       Signature:  Sarah Talavera, CTRS

## 2025-06-22 NOTE — PLAN OF CARE
Problem: Anxiety  Goal: Will report anxiety at manageable levels  Description: INTERVENTIONS:  1. Administer medication as ordered  2. Teach and rehearse alternative coping skills  3. Provide emotional support with 1:1 interaction with staff  6/21/2025 2147 by Uma Rocha, RN  Outcome: Progressing     Problem: Depression/Self Harm  Goal: Effect of psychiatric condition will be minimized and patient will be protected from self harm  Description: INTERVENTIONS:  1. Assess impact of patient's symptoms on level of functioning, self care needs and offer support as indicated  2. Assess patient/family knowledge of depression, impact on illness and need for teaching  3. Provide emotional support, presence and reassurance  4. Assess for possible suicidal thoughts or ideation. If patient expresses suicidal thoughts or statements do not leave alone, initiate Suicide Precautions, move to a room close to the nursing station and obtain sitter  5. Initiate consults as appropriate with Mental Health Professional, Spiritual Care, Psychosocial CNS, and consider a recommendation to the LIP for a Psychiatric Consultation  6/21/2025 2147 by Uma Rocha RN  Outcome: Progressing     Problem: Sleep Disturbance  Goal: Will exhibit normal sleeping pattern  Description: INTERVENTIONS:  1. Administer medication as ordered  2. Decrease environmental stimuli, including noise, as appropriate  3. Discourage social isolation and naps during the day  6/21/2025 2147 by Uma Rocha, RN  Outcome: Progressing

## 2025-06-22 NOTE — BH NOTE
Patient awake in day room upon approach.  Patient has good eye contact.    Patient presents worried and sad, remains friendly and cooperative.   Appears well groomed  Patient denies suicidal/homicidal ideations and reports visual hallucinations of silhouettes in the dark.     Patient reports anxiety 8/10 and depression 7/10 due to \"a lot of stuff going on\".  Patient denies pain  Patient is  taking  ordered medications without issue. Patient is  attending groups per note review.  Purposeful Q15min rounding continues.

## 2025-06-23 LAB
GLUCOSE BLD-MCNC: 133 MG/DL (ref 74–99)
GLUCOSE BLD-MCNC: 215 MG/DL (ref 74–99)

## 2025-06-23 PROCEDURE — 82962 GLUCOSE BLOOD TEST: CPT

## 2025-06-23 PROCEDURE — 1240000000 HC EMOTIONAL WELLNESS R&B

## 2025-06-23 PROCEDURE — 6370000000 HC RX 637 (ALT 250 FOR IP): Performed by: EMERGENCY MEDICINE

## 2025-06-23 PROCEDURE — 99232 SBSQ HOSP IP/OBS MODERATE 35: CPT

## 2025-06-23 PROCEDURE — 6370000000 HC RX 637 (ALT 250 FOR IP): Performed by: NURSE PRACTITIONER

## 2025-06-23 PROCEDURE — 6370000000 HC RX 637 (ALT 250 FOR IP): Performed by: PSYCHIATRY & NEUROLOGY

## 2025-06-23 PROCEDURE — 6370000000 HC RX 637 (ALT 250 FOR IP)

## 2025-06-23 RX ORDER — RISPERIDONE 1 MG/1
1 TABLET ORAL 2 TIMES DAILY
Status: DISCONTINUED | OUTPATIENT
Start: 2025-06-23 | End: 2025-06-24

## 2025-06-23 RX ADMIN — HYDROXYZINE HYDROCHLORIDE 50 MG: 50 TABLET ORAL at 11:26

## 2025-06-23 RX ADMIN — VALSARTAN 160 MG: 160 TABLET, FILM COATED ORAL at 09:33

## 2025-06-23 RX ADMIN — RISPERIDONE 1 MG: 1 TABLET, FILM COATED ORAL at 20:25

## 2025-06-23 RX ADMIN — ATORVASTATIN CALCIUM 20 MG: 10 TABLET, FILM COATED ORAL at 20:25

## 2025-06-23 RX ADMIN — METFORMIN HYDROCHLORIDE 500 MG: 500 TABLET, EXTENDED RELEASE ORAL at 09:33

## 2025-06-23 RX ADMIN — PRAZOSIN HYDROCHLORIDE 1 MG: 1 CAPSULE ORAL at 20:25

## 2025-06-23 RX ADMIN — BUPROPION HYDROCHLORIDE 150 MG: 150 TABLET, EXTENDED RELEASE ORAL at 09:33

## 2025-06-23 RX ADMIN — DIVALPROEX SODIUM 250 MG: 250 TABLET, DELAYED RELEASE ORAL at 09:33

## 2025-06-23 RX ADMIN — DIVALPROEX SODIUM 250 MG: 250 TABLET, DELAYED RELEASE ORAL at 20:25

## 2025-06-23 RX ADMIN — HYDROXYZINE HYDROCHLORIDE 50 MG: 50 TABLET ORAL at 20:25

## 2025-06-23 RX ADMIN — TRAZODONE HYDROCHLORIDE 50 MG: 50 TABLET ORAL at 20:25

## 2025-06-23 ASSESSMENT — PAIN SCALES - GENERAL: PAINLEVEL_OUTOF10: 0

## 2025-06-23 NOTE — PLAN OF CARE
Patient rates anxiety and depression 7/10, denies SI, HI, AH. Patient endorses visual hallucination of silhouettes and racing thoughts. Patient sometimes feels paranoid when her back is to others. Patient is helpful and social to peers, patient is medication compliant, attending groups and eating meals.       Problem: Anxiety  Goal: Will report anxiety at manageable levels  Description: INTERVENTIONS:  1. Administer medication as order  2. Teach and rehearse alternative coping skills  3. Provide emotional support with 1:1 interaction with staff  Outcome: Progressing     Problem: Depression/Self Harm  Goal: Effect of psychiatric condition will be minimized and patient will be protected from self harm  Description: INTERVENTIONS:  1. Assess impact of patient's symptoms on level of functioning, self care needs and offer support as indicated  2. Assess patient/family knowledge of depression, impact on illness and need for teaching  3. Provide emotional support, presence and reassurance  4. Assess for possible suicidal thoughts or ideation. If patient expresses suicidal thoughts or statements do not leave alone, initiate Suicide Precautions, move to a room close to the nursing station and obtain sitter  5. Initiate consults as appropriate with Mental Health Professional, Spiritual Care, Psychosocial CNS, and consider a recommendation to the [facility]IP for a Psychiatric Consultation  Outcome: Progressing     Problem: Involuntary Admit  Goal: Will cooperate with staff recommendations and doctor's orders and will demonstrate appropriate behavior  Description: INTERVENTIONS:  1. Treat underlying conditions and offer medication as ordered  2. Educate regarding involuntary admission procedures and rules  3. Contain excessive/inappropriate behavior per unit and hospital policies  Outcome: Progressing

## 2025-06-23 NOTE — CARE COORDINATION
Treatment team met with pt. She stated that she is tired. She stated that she did not sleep good. She stated that she had anxiety due to everything going on. We discussed CSU, she was agreeable.

## 2025-06-23 NOTE — PLAN OF CARE
Patient explained that she feels exactly the same as she did this morning. Patient continues to rate anxiety 8/10, depression 7/10. This is not congruent with her affect and behavior. Patient continues to see Siloette's, denies auditory hallucinations. Patient endorses racing thoughts. Patient denies SI and HI. Patient has been out on the unit socializing with peers, encouraging patients and including them in socializing.    Problem: Chronic Conditions and Co-morbidities  Goal: Patient's chronic conditions and co-morbidity symptoms are monitored and maintained or improved  Outcome: Progressing     Problem: Anxiety  Goal: Will report anxiety at manageable levels  Description: INTERVENTIONS:  1. Administer medication as ordered  2. Teach and rehearse alternative coping skills  3. Provide emotional support with 1:1 interaction with staff  6/22/2025 2103 by Karine Worley RN  Outcome: Progressing     Problem: Depression/Self Harm  Goal: Effect of psychiatric condition will be minimized and patient will be protected from self harm  Description: INTERVENTIONS:  1. Assess impact of patient's symptoms on level of functioning, self care needs and offer support as indicated  2. Assess patient/family knowledge of depression, impact on illness and need for teaching  3. Provide emotional support, presence and reassurance  4. Assess for possible suicidal thoughts or ideation. If patient expresses suicidal thoughts or statements do not leave alone, initiate Suicide Precautions, move to a room close to the nursing station and obtain sitter  5. Initiate consults as appropriate with Mental Health Professional, Spiritual Care, Psychosocial CNS, and consider a recommendation to the LIP for a Psychiatric Consultation  6/22/2025 2103 by Karine Worley RN  Outcome: Progressing     Problem: Self Harm/Suicidality  Goal: Will have no self-injury during hospital stay  Description: INTERVENTIONS:  1.  Ensure constant observer at bedside with  Q15M safety checks  2.  Maintain a safe environment  3.  Secure patient belongings  4.  Ensure family/visitors adhere to safety recommendations  5.  Ensure safety tray has been added to patient's diet order  6.  Every shift and PRN: Re-assess suicidal risk via Frequent Screener    Outcome: Progressing

## 2025-06-23 NOTE — PROGRESS NOTES
Department of Podiatry  Progress Note    SUBJECTIVE:  Mae Calvillo is seen at bedside for B/L diabetic foot wounds. No acute events overnight. Patient denies any N/V/D/F/C/SOB/CP.  Patient states that she is having pain at the site of callus on the left foot and has noticed drainage on the right foot.  No other pedal complaints at this time.     OBJECTIVE:    Scheduled Meds:   risperiDONE  1 mg Oral Nightly    atorvastatin  20 mg Oral Daily    valsartan  160 mg Oral Daily    buPROPion  150 mg Oral Daily    prazosin  1 mg Oral Nightly    divalproex  250 mg Oral 2 times per day    metFORMIN  500 mg Oral Daily with breakfast     Continuous Infusions:  PRN Meds:.traZODone, chlordiazePOXIDE, ammonium lactate, acetaminophen, magnesium hydroxide, aluminum & magnesium hydroxide-simethicone, hydrOXYzine HCl, haloperidol **OR** haloperidol lactate    Allergies   Allergen Reactions    Colchicine     Darvon [Propoxyphene] Other (See Comments)     GI Upset       /78   Pulse 72   Temp 97.9 °F (36.6 °C) (Temporal)   Resp 18   Ht 1.778 m (5' 10\")   Wt 107 kg (236 lb)   SpO2 100%   BMI 33.86 kg/m²       EXAM:    VASCULAR:  DP and PT pulses are palpable. CFT < 5 seconds B/L.  Warm to warm from the tibial tuberosity to the distal aspect of the digits dorsally. Hair growth not noted to the distal aspects dorsally.     NEUROLOGIC:  Light touch is impaired past the level of the ankle bilaterally     MUSCULOSKELETAL: Deformities are not noted in Bilateral lower extremities.  5/5 Gross Muscle strength in all 4 quadrants.     DERM:  Skin is clean and dry to the bilateral lower extremities. Toenails 1-5 b/l are abnormal in thickness, color and length. Webspaces 1-4 b/l are C/D/I. Edema is mild. Erythema is absent. Wounds are Present on the Bilateral lower extremity.  Wound noted on plantar medial aspect of left hallux.  Wound demonstrates a mixed granular fibrotic base with a hyperkeratotic border.  Minimal drainage

## 2025-06-23 NOTE — GROUP NOTE
Reiterate my boundaries.                                                                       Group Therapy Note    Date: 6/23/2025    Group Start Time: 0930  Group End Time: 0950  Group Topic: Community Meeting    [facility]    Sarah Talavera, GONZALO    Type of Group:Community Meeting    Patient's Goal:  Patient will be able to id staffing assignments, expectations of patients, and general information re: floor rules.   Will be prompted to share goal for the day.     Notes:  Patient appeared to be an active listener, taking in information presented and was prompted to share goal for the day.    Status After Intervention:  improved    Participation Level: active listener    Participation Quality: appropriate    Speech:  normal    Thought Process/Content: logical     Affective Functioning:congruent    Mood: euthymic    Level of consciousness:  alert     Response to Learning: verbalize, retain     Endings:none reported    Modes of Intervention: education, support, clarifying   Discipline Responsible: Psychoeducation       Signature:  GONZALO Romreo

## 2025-06-23 NOTE — GROUP NOTE
Group Therapy Note    Date: 6/23/2025    Group Start Time: 1030  Group End Time: 1115  Group Topic: Psychoeducation    [facility]    Sarah Talavera, JENS    Type of Group: Psychoeducation    Module Name:  processing big changes     Patient's Goal:  pt will be able to id tips to help one manage change  better.     Notes:  pleasant and engaged in group, sharing and accepting of handout.     Status After Intervention:  Improved    Participation Level: Active Listener and Interactive    Participation Quality: Appropriate, Attentive, and Sharing      Speech:  normal      Thought Process/Content: Logical      Affective Functioning: Congruent      Mood: euthymic      Level of consciousness:  Alert, Oriented x4, and Attentive      Response to Learning: Able to verbalize/acknowledge new learning, Able to retain information, and Progressing to goal      Endings: None Reported    Modes of Intervention: Education, Support, Socialization, and Problem-solving      Discipline Responsible: Psychoeducational Specialist      Signature:  Sarah Talavera, JENS

## 2025-06-23 NOTE — PROGRESS NOTES
BEHAVIORAL HEALTH FOLLOW-UP NOTE     6/23/2025     Patient was seen and examined in person, Chart reviewed   Patient's case discussed with staff/team    Chief Complaint: Suicidal ideation    Interim History:   Patient was seen today and on the unit along with our , she is flat, blunted but does brighten with conversation.  She states that she continues to have anxiety she reports that her anxiety is a little worse than her normal baseline anxiety.  She states that she does not want to be discharged too soon she states that she wants to be at a point where she can function her outside of here even with everything going on in her life and all the stress.  She denies auditory hallucinations, continue to endorse visual hallucinations of silhouettes she does not really elaborate further on them, denies suicidal homicidal ideation intent or plan.  She has been taking her medication, she has had no behavioral disturbances, she is attending group.  Sleep and appetite reported to be fair    Appetite: [x] Normal/Unchanged  [] Increased  [] Decreased      Sleep:       [x] Normal/Unchanged  [] Fair       [] Poor              Energy:    [x] Normal/Unchanged  [] Increased  [] Decreased        SI [] Present  [x] Absent    HI  []Present  [x] Absent     Aggression:  [] yes  [x] no    Patient is [x] able  [] unable to CONTRACT FOR SAFETY     PAST MEDICAL/PSYCHIATRIC HISTORY:   Past Medical History:   Diagnosis Date    Anemia     stable at this time  2023    Arthritis     COVID 05/2022 11/2022-    Diabetes mellitus (HCC)     diet controlled    History of blood transfusion 2012    Hyperlipidemia     Hypertension     Lumbar pain        FAMILY/SOCIAL HISTORY:  Family History   Problem Relation Age of Onset    Diabetes Mother     Hypertension Mother     Diabetes Father     Hypertension Father     Gout Father     Heart Disease None     Ovarian Cancer None     Uterine Cancer None     Breast Cancer None      Social History  Times Moved in the Last Year: 0     Homeless in the Last Year: No           ROS:  [x] All negative/unchanged except if checked. Explain positive(checked items) below:  [] Constitutional  [] Eyes  [] Ear/Nose/Mouth/Throat  [] Respiratory  [] CV  [] GI  []   [] Musculoskeletal  [] Skin/Breast  [] Neurological  [] Endocrine  [] Heme/Lymph  [] Allergic/Immunologic    Explanation:     MEDICATIONS:    Current Facility-Administered Medications:     risperiDONE (RISPERDAL) tablet 1 mg, 1 mg, Oral, BID, Rachel Walters APRN - CNP    traZODone (DESYREL) tablet 50 mg, 50 mg, Oral, Nightly PRN, Rachel Walters APRN - CNP, 50 mg at 06/22/25 2027    chlordiazePOXIDE (LIBRIUM) capsule 25 mg, 25 mg, Oral, Q6H PRN, Rachel Walters APRN - CNP    atorvastatin (LIPITOR) tablet 20 mg, 20 mg, Oral, Daily, Rachel Walters APRN - CNP, 20 mg at 06/22/25 2020    ammonium lactate (LAC-HYDRIN) 12 % lotion, , Topical, PRN, Rachel Walters APRN - CNP, Given at 06/18/25 2031    valsartan (DIOVAN) tablet 160 mg, 160 mg, Oral, Daily, Rachel Walters APRN - CNP, 160 mg at 06/23/25 0933    buPROPion (WELLBUTRIN XL) extended release tablet 150 mg, 150 mg, Oral, Daily, Tina Skaggs APRN - CNP, 150 mg at 06/23/25 0933    prazosin (MINIPRESS) capsule 1 mg, 1 mg, Oral, Nightly, Tina Skaggs APRN - CNP, 1 mg at 06/22/25 2020    divalproex (DEPAKOTE) DR tablet 250 mg, 250 mg, Oral, 2 times per day, Tina Skaggs APRUGO - CNP, 250 mg at 06/23/25 0933    metFORMIN (GLUCOPHAGE-XR) extended release tablet 500 mg, 500 mg, Oral, Daily with breakfast, Ra Clarke MD, 500 mg at 06/23/25 0933    acetaminophen (TYLENOL) tablet 650 mg, 650 mg, Oral, Q6H PRN, Emma Elizabeth MD, 650 mg at 06/20/25 0659    magnesium hydroxide (MILK OF MAGNESIA) 400 MG/5ML suspension 30 mL, 30 mL, Oral, Daily PRN, Emma Elizabeth MD    aluminum & magnesium hydroxide-simethicone (MAALOX PLUS) 200-200-20 MG/5ML suspension 30 mL, 30 mL, Oral, PRN, Clara

## 2025-06-24 LAB
DATE LAST DOSE: ABNORMAL
GLUCOSE BLD-MCNC: 142 MG/DL (ref 74–99)
GLUCOSE BLD-MCNC: 191 MG/DL (ref 74–99)
TME LAST DOSE: ABNORMAL H
VALPROATE SERPL-MCNC: 34 UG/ML (ref 50–100)
VANCOMYCIN DOSE: ABNORMAL MG

## 2025-06-24 PROCEDURE — 6370000000 HC RX 637 (ALT 250 FOR IP)

## 2025-06-24 PROCEDURE — 6370000000 HC RX 637 (ALT 250 FOR IP): Performed by: NURSE PRACTITIONER

## 2025-06-24 PROCEDURE — 1240000000 HC EMOTIONAL WELLNESS R&B

## 2025-06-24 PROCEDURE — 99232 SBSQ HOSP IP/OBS MODERATE 35: CPT

## 2025-06-24 PROCEDURE — 82962 GLUCOSE BLOOD TEST: CPT

## 2025-06-24 PROCEDURE — 36415 COLL VENOUS BLD VENIPUNCTURE: CPT

## 2025-06-24 PROCEDURE — 80164 ASSAY DIPROPYLACETIC ACD TOT: CPT

## 2025-06-24 PROCEDURE — 6370000000 HC RX 637 (ALT 250 FOR IP): Performed by: EMERGENCY MEDICINE

## 2025-06-24 PROCEDURE — 6370000000 HC RX 637 (ALT 250 FOR IP): Performed by: PSYCHIATRY & NEUROLOGY

## 2025-06-24 RX ORDER — RISPERIDONE 2 MG/1
2 TABLET ORAL NIGHTLY
Status: DISCONTINUED | OUTPATIENT
Start: 2025-06-24 | End: 2025-06-26

## 2025-06-24 RX ORDER — RISPERIDONE 1 MG/1
1 TABLET ORAL DAILY
Status: DISCONTINUED | OUTPATIENT
Start: 2025-06-25 | End: 2025-06-26

## 2025-06-24 RX ADMIN — DIVALPROEX SODIUM 250 MG: 250 TABLET, DELAYED RELEASE ORAL at 20:59

## 2025-06-24 RX ADMIN — ATORVASTATIN CALCIUM 20 MG: 10 TABLET, FILM COATED ORAL at 20:59

## 2025-06-24 RX ADMIN — PRAZOSIN HYDROCHLORIDE 1 MG: 1 CAPSULE ORAL at 20:59

## 2025-06-24 RX ADMIN — DIVALPROEX SODIUM 250 MG: 250 TABLET, DELAYED RELEASE ORAL at 08:55

## 2025-06-24 RX ADMIN — ACETAMINOPHEN 650 MG: 325 TABLET ORAL at 09:00

## 2025-06-24 RX ADMIN — VALSARTAN 160 MG: 160 TABLET, FILM COATED ORAL at 08:55

## 2025-06-24 RX ADMIN — TRAZODONE HYDROCHLORIDE 50 MG: 50 TABLET ORAL at 20:59

## 2025-06-24 RX ADMIN — RISPERIDONE 2 MG: 2 TABLET, FILM COATED ORAL at 20:58

## 2025-06-24 RX ADMIN — METFORMIN HYDROCHLORIDE 500 MG: 500 TABLET, EXTENDED RELEASE ORAL at 08:55

## 2025-06-24 RX ADMIN — RISPERIDONE 1 MG: 1 TABLET, FILM COATED ORAL at 08:55

## 2025-06-24 RX ADMIN — BUPROPION HYDROCHLORIDE 150 MG: 150 TABLET, EXTENDED RELEASE ORAL at 08:55

## 2025-06-24 ASSESSMENT — PAIN SCALES - GENERAL
PAINLEVEL_OUTOF10: 0
PAINLEVEL_OUTOF10: 6
PAINLEVEL_OUTOF10: 10

## 2025-06-24 ASSESSMENT — PAIN DESCRIPTION - DESCRIPTORS: DESCRIPTORS: ACHING;THROBBING;SORE

## 2025-06-24 ASSESSMENT — PAIN - FUNCTIONAL ASSESSMENT: PAIN_FUNCTIONAL_ASSESSMENT: ACTIVITIES ARE NOT PREVENTED

## 2025-06-24 ASSESSMENT — PAIN DESCRIPTION - ORIENTATION: ORIENTATION: LOWER

## 2025-06-24 ASSESSMENT — PAIN DESCRIPTION - LOCATION: LOCATION: BACK

## 2025-06-24 NOTE — PROGRESS NOTES
Department of Podiatry  Progress Note    SUBJECTIVE:  Mae Calvillo is seen at bedside for bilateral diabetic foot wounds. No acute events overnight. Patient denies any N/V/D/F/C/SOB/CP.  Patient states that she has not noticed any changes to the drainage on the right foot wound or the pain on the left foot.  No other pedal complaints at this time.     OBJECTIVE:    Scheduled Meds:   risperiDONE  1 mg Oral BID    atorvastatin  20 mg Oral Daily    valsartan  160 mg Oral Daily    buPROPion  150 mg Oral Daily    prazosin  1 mg Oral Nightly    divalproex  250 mg Oral 2 times per day    metFORMIN  500 mg Oral Daily with breakfast     Continuous Infusions:  PRN Meds:.traZODone, chlordiazePOXIDE, ammonium lactate, acetaminophen, magnesium hydroxide, aluminum & magnesium hydroxide-simethicone, hydrOXYzine HCl, haloperidol **OR** haloperidol lactate    Allergies   Allergen Reactions    Colchicine     Darvon [Propoxyphene] Other (See Comments)     GI Upset       /70   Pulse 88   Temp 97.3 °F (36.3 °C) (Temporal)   Resp 16   Ht 1.778 m (5' 10\")   Wt 107 kg (236 lb)   SpO2 98%   BMI 33.86 kg/m²       EXAM:    VASCULAR:  DP and PT pulses are palpable. CFT < 5 seconds B/L.  Warm to warm from the tibial tuberosity to the distal aspect of the digits dorsally. Hair growth not noted to the distal aspects dorsally.     NEUROLOGIC:  Light touch is impaired past the level of the ankle bilaterally     MUSCULOSKELETAL: Deformities are not noted in Bilateral lower extremities.  5/5 Gross Muscle strength in all 4 quadrants.     DERM:  Skin is clean and dry to the bilateral lower extremities. Toenails 1-5 b/l are abnormal in thickness, color and length. Webspaces 1-4 b/l are C/D/I. Edema is mild. Erythema is absent. Wounds are Present on the Bilateral lower extremity.  Wound noted on plantar medial aspect of left hallux.  Wound demonstrates a mixed granular fibrotic base with a hyperkeratotic border.  Minimal drainage
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
## 2025-06-24 NOTE — BH NOTE
Patient denies suicidal ideation, homicidal ideations and AH. Pt endorsed VH of \"silhouettes\". Pt reports anxiety 8/10 and depression 7/10. Presents flat, anxious, depressed, calm and cooperative during assessment.  Patient is out on the unit and is social with peers.  Medications taken without issue.  No complaints or concerns verbalized at this time.  No unit problems reported.  Will continue to observe and support.

## 2025-06-24 NOTE — GROUP NOTE
Group Therapy Note    Date: 6/24/2025    Group Start Time: 1030  Group End Time: 1045  Group Topic: Community Meeting    [facility]    Gale Thomason CTRS    Group Therapy Note    Attendees: 8    Date: 6/24/2025  Start Time: 1030  End Time:  1045  Number of Participants: 8    Type of Group: Community Meeting    Patient's Goal:  Increased awareness of expectations of the milieu, daily staffing and programming. Identified goal for the day.     Notes:  Patient was an active listener in group. Patient shared goal for the day as, \"Find out more about the crisis center.\"    Status After Intervention:  Improved    Participation Level: Active Listener and Interactive    Participation Quality: Appropriate, Attentive, and Sharing      Speech:  normal      Thought Process/Content: Logical  Linear      Affective Functioning: Congruent      Mood: Appropriate      Level of consciousness:  Alert and Attentive      Response to Learning: Able to verbalize current knowledge/experience, Able to verbalize/acknowledge new learning, Able to retain information, Capable of insight, Able to change behavior, and Progressing to goal      Endings: None Reported    Modes of Intervention: Education, Support, Socialization, Exploration, Clarifying, and Problem-solving      Discipline Responsible: Psychoeducational Specialist      Signature:  GONZALO Moody

## 2025-06-24 NOTE — PLAN OF CARE
Problem: Pain  Goal: Verbalizes/displays adequate comfort level or baseline comfort level  6/23/2025 2100 by Bridgette Sheikh RN  Outcome: Progressing     Problem: Anxiety  Goal: Will report anxiety at manageable levels  Description: INTERVENTIONS:  1. Administer medication as ordered  2. Teach and rehearse alternative coping skills  3. Provide emotional support with 1:1 interaction with staff  Outcome: Progressing     Problem: Depression/Self Harm  Goal: Effect of psychiatric condition will be minimized and patient will be protected from self harm  Description: INTERVENTIONS:  1. Assess impact of patient's symptoms on level of functioning, self care needs and offer support as indicated  2. Assess patient/family knowledge of depression, impact on illness and need for teaching  3. Provide emotional support, presence and reassurance  4. Assess for possible suicidal thoughts or ideation. If patient expresses suicidal thoughts or statements do not leave alone, initiate Suicide Precautions, move to a room close to the nursing station and obtain sitter  5. Initiate consults as appropriate with Mental Health Professional, Spiritual Care, Psychosocial CNS, and consider a recommendation to the [facility] for a Psychiatric Consultation  Outcome: Progressing

## 2025-06-24 NOTE — CARE COORDINATION
Treatment team met with pt. She stated that she is doing good and feeling better. We discussed CSU again and she was agreeable to the referral. Sw stated that she will send the referral today.

## 2025-06-24 NOTE — GROUP NOTE
Group Therapy Note    Date: 6/24/2025    Group Start Time: 1045  Group End Time: 1115  Group Topic: Psychoeducation    [facility]    Gale Thomason CTRS    Group Therapy Note    Attendees: 8    Date: 6/24/2025  Start Time: 1045  End Time:  1115  Number of Participants: 8    Type of Group: Psychoeducation    Name:  Values     Patient's Goal:  Identified how values affect mental health, influences of values and current personal values.     Notes:  CTRS led educational group discussion on values. Encouraged patients to share their experiences. Patient was actively engaged in group discussion and made positive responses.    Status After Intervention:  Improved    Participation Level: Active Listener and Interactive    Participation Quality: Appropriate, Attentive, and Sharing      Speech:  normal      Thought Process/Content: Logical  Linear      Affective Functioning: Congruent      Mood: Appropriate      Level of consciousness:  Alert and Attentive      Response to Learning: Able to verbalize current knowledge/experience, Able to verbalize/acknowledge new learning, Able to retain information, Capable of insight, Able to change behavior, and Progressing to goal      Endings: None Reported    Modes of Intervention: Education, Support, Socialization, Exploration, Clarifying, and Problem-solving      Discipline Responsible: Psychoeducational Specialist      Signature:  GONZALO Moody

## 2025-06-24 NOTE — PLAN OF CARE
Problem: Self Harm/Suicidality  Goal: Will have no self-injury during hospital stay  Description: INTERVENTIONS:  1.  Ensure constant observer at bedside with Q15M safety checks  2.  Maintain a safe environment  3.  Secure patient belongings  4.  Ensure family/visitors adhere to safety recommendations  5.  Ensure safety tray has been added to patient's diet order  6.  Every shift and PRN: Re-assess suicidal risk via Frequent Screener    Outcome: Progressing     Problem: Sleep Disturbance  Goal: Will exhibit normal sleeping pattern  Description: INTERVENTIONS:  1. Administer medication as ordered  2. Decrease environmental stimuli, including noise, as appropriate  3. Discourage social isolation and naps during the day  Outcome: Progressing     Problem: Pain  Goal: Verbalizes/displays adequate comfort level or baseline comfort level  Outcome: Progressing     Problem: Nutrition Deficit:  Goal: Optimize nutritional status  Outcome: Progressing

## 2025-06-25 LAB — GLUCOSE BLD-MCNC: 236 MG/DL (ref 74–99)

## 2025-06-25 PROCEDURE — 6370000000 HC RX 637 (ALT 250 FOR IP): Performed by: EMERGENCY MEDICINE

## 2025-06-25 PROCEDURE — 82962 GLUCOSE BLOOD TEST: CPT

## 2025-06-25 PROCEDURE — 6370000000 HC RX 637 (ALT 250 FOR IP)

## 2025-06-25 PROCEDURE — 99232 SBSQ HOSP IP/OBS MODERATE 35: CPT

## 2025-06-25 PROCEDURE — 6370000000 HC RX 637 (ALT 250 FOR IP): Performed by: PSYCHIATRY & NEUROLOGY

## 2025-06-25 PROCEDURE — 6370000000 HC RX 637 (ALT 250 FOR IP): Performed by: NURSE PRACTITIONER

## 2025-06-25 PROCEDURE — 1240000000 HC EMOTIONAL WELLNESS R&B

## 2025-06-25 RX ADMIN — PRAZOSIN HYDROCHLORIDE 1 MG: 1 CAPSULE ORAL at 20:55

## 2025-06-25 RX ADMIN — ACETAMINOPHEN 650 MG: 325 TABLET ORAL at 09:01

## 2025-06-25 RX ADMIN — BUPROPION HYDROCHLORIDE 150 MG: 150 TABLET, EXTENDED RELEASE ORAL at 08:59

## 2025-06-25 RX ADMIN — TRAZODONE HYDROCHLORIDE 50 MG: 50 TABLET ORAL at 20:55

## 2025-06-25 RX ADMIN — RISPERIDONE 2 MG: 2 TABLET, FILM COATED ORAL at 20:55

## 2025-06-25 RX ADMIN — VALSARTAN 160 MG: 160 TABLET, FILM COATED ORAL at 08:59

## 2025-06-25 RX ADMIN — DIVALPROEX SODIUM 250 MG: 250 TABLET, DELAYED RELEASE ORAL at 08:59

## 2025-06-25 RX ADMIN — DIVALPROEX SODIUM 250 MG: 250 TABLET, DELAYED RELEASE ORAL at 20:55

## 2025-06-25 RX ADMIN — ACETAMINOPHEN 650 MG: 325 TABLET ORAL at 21:13

## 2025-06-25 RX ADMIN — METFORMIN HYDROCHLORIDE 500 MG: 500 TABLET, EXTENDED RELEASE ORAL at 08:59

## 2025-06-25 RX ADMIN — HYDROXYZINE HYDROCHLORIDE 50 MG: 50 TABLET ORAL at 21:13

## 2025-06-25 RX ADMIN — HYDROXYZINE HYDROCHLORIDE 50 MG: 50 TABLET ORAL at 09:01

## 2025-06-25 RX ADMIN — ATORVASTATIN CALCIUM 20 MG: 10 TABLET, FILM COATED ORAL at 20:55

## 2025-06-25 RX ADMIN — RISPERIDONE 1 MG: 1 TABLET, FILM COATED ORAL at 09:00

## 2025-06-25 ASSESSMENT — PAIN DESCRIPTION - ORIENTATION: ORIENTATION: LOWER

## 2025-06-25 ASSESSMENT — PAIN SCALES - GENERAL
PAINLEVEL_OUTOF10: 0
PAINLEVEL_OUTOF10: 10

## 2025-06-25 ASSESSMENT — PAIN DESCRIPTION - LOCATION: LOCATION: BACK

## 2025-06-25 NOTE — PROGRESS NOTES
BEHAVIORAL HEALTH FOLLOW-UP NOTE     6/25/2025     Patient was seen and examined in person, Chart reviewed   Patient's case discussed with staff/team    Chief Complaint: Suicidal ideation    Interim History:   Patient was seen today in her room she is lying in bed she is flat but does brighten she is calm, cooperative.  She states that she does feel that the medication is helping she also states that being in groups is helpful for her.  She is agreeable for [facility] crisis unit was on form to  that they will have a bed on Friday she states she is agreeable with that.  She states that she is getting better every day feeling a little more like herself.  She does states she continues to have anxiety but states it is improving.  She does currently denies suicidal homicidal ideation intent or plan, denies auditory visual hallucinations.  She has been taking her medication, she has had no behavioral disturbances, she is attending group.  Sleep and appetite reported to be fair    Appetite: [x] Normal/Unchanged  [] Increased  [] Decreased      Sleep:       [x] Normal/Unchanged  [] Fair       [] Poor              Energy:    [x] Normal/Unchanged  [] Increased  [] Decreased        SI [] Present  [x] Absent    HI  []Present  [x] Absent     Aggression:  [] yes  [x] no    Patient is [x] able  [] unable to CONTRACT FOR SAFETY     PAST MEDICAL/PSYCHIATRIC HISTORY:   Past Medical History:   Diagnosis Date    Anemia     stable at this time  2023    Arthritis     COVID 05/2022 11/2022-    Diabetes mellitus (HCC)     diet controlled    History of blood transfusion 2012    Hyperlipidemia     Hypertension     Lumbar pain        FAMILY/SOCIAL HISTORY:  Family History   Problem Relation Age of Onset    Diabetes Mother     Hypertension Mother     Diabetes Father     Hypertension Father     Gout Father     Heart Disease None     Ovarian Cancer None     Uterine Cancer None     Breast Cancer None      Social History  for input(s): \"WBC\", \"HGB\", \"PLT\" in the last 72 hours.    No results for input(s): \"NA\", \"K\", \"CL\", \"CO2\", \"BUN\", \"CREATININE\", \"GLUCOSE\" in the last 72 hours.    No results for input(s): \"BILITOT\", \"ALKPHOS\", \"AST\", \"ALT\" in the last 72 hours.    Lab Results   Component Value Date/Time    BARBSCNU NEGATIVE 06/16/2025 05:37 AM    LABBENZ NEGATIVE 06/16/2025 05:37 AM    LABMETH NEGATIVE 06/16/2025 05:37 AM    ETOH 137 06/16/2025 05:37 AM     Lab Results   Component Value Date/Time    TSH 1.460 11/28/2022 10:37 AM     No results found for: \"LITHIUM\"  Lab Results   Component Value Date    VALPROATE 34 (L) 06/24/2025           Treatment Plan:  Reviewed current Medications with the patient.   Risks, benefits, side effects, drug-to-drug interactions and alternatives to treatment were discussed.  Collateral information:   CD evaluation  Encourage patient to attend group and other milieu activities.  Discharge planning discussed with the patient and treatment team.    Continue Wellbutrin XL to 150 mg daily at patient request  Prazosin 1 mg at bedtime for nightmares and flashbacks  Depakote and 500 mg twice daily for mood stabilization  Risperdal 1 mg  daily, 2 mg nightly      PSYCHOTHERAPY/COUNSELING:  [x] Therapeutic interview  [x] Supportive  [] CBT  [] Ongoing  [] Other    [x] Patient continues to need, on a daily basis, active treatment furnished directly by or requiring the supervision of inpatient psychiatric personnel      Anticipated Length of stay: 3 to 10 days based on stability        NOTE: This report was transcribed using voice recognition software. Every effort was made to ensure accuracy; however, inadvertent computerized transcription errors may be present.     Electronically signed by VALERIE Fox CNP on 6/25/2025 at 12:04 PM

## 2025-06-25 NOTE — PLAN OF CARE
Problem: Anxiety  Goal: Will report anxiety at manageable levels  Description: INTERVENTIONS:  1. Administer medication as ordered  2. Teach and rehearse alternative coping skills  3. Provide emotional support with 1:1 interaction with staff  6/24/2025 2231 by Yoly Ramirez RN  Outcome: Not Progressing     Problem: Depression/Self Harm  Goal: Effect of psychiatric condition will be minimized and patient will be protected from self harm  Description: INTERVENTIONS:  1. Assess impact of patient's symptoms on level of functioning, self care needs and offer support as indicated  2. Assess patient/family knowledge of depression, impact on illness and need for teaching  3. Provide emotional support, presence and reassurance  4. Assess for possible suicidal thoughts or ideation. If patient expresses suicidal thoughts or statements do not leave alone, initiate Suicide Precautions, move to a room close to the nursing station and obtain sitter  5. Initiate consults as appropriate with Mental Health Professional, Spiritual Care, Psychosocial CNS, and consider a recommendation to the [facility] for a Psychiatric Consultation  6/24/2025 2231 by Yoly Ramirez RN  Outcome: Not Progressing     Problem: Risk for Elopement  Goal: Patient will not exit the unit/facility without proper excort  Outcome: Progressing     Problem: Chronic Conditions and Co-morbidities  Goal: Patient's chronic conditions and co-morbidity symptoms are monitored and maintained or improved  Outcome: Progressing     Problem: Safety - Adult  Goal: Free from fall injury  Outcome: Progressing     Problem: Self Harm/Suicidality  Goal: Will have no self-injury during hospital stay  Description: INTERVENTIONS:  1.  Ensure constant observer at bedside with Q15M safety checks  2.  Maintain a safe environment  3.  Secure patient belongings  4.  Ensure family/visitors adhere to safety recommendations  5.  Ensure safety tray has been added to patient's diet order  6.

## 2025-06-25 NOTE — PROGRESS NOTES
Wound care: Podiatry following for bilateral foot wounds, Per patients nurse no other need for wound care to see patient. Wound care will sign off, re-consult if needed. Sally Meredith RN

## 2025-06-25 NOTE — GROUP NOTE
Group Therapy Note    Date: 6/25/2025    Group Start Time: 0930  Group End Time: 1010  Group Topic: Cognitive Skills    [facility]    Estela Marsh LISW        Group Therapy Note    Attendees: 8       Patient's Goal:  Pt attended group therapy where we read the poem, \"Desiderata,\" and discussed \"Fair Fighting Rules - ways to communicate effectively when a confrontational conversation is due.    Notes:  Pt was an active participant in group.    Status After Intervention:  Unchanged    Participation Level: Active Listener and Interactive    Participation Quality: Appropriate and Attentive      Speech:  normal      Thought Process/Content: Logical      Affective Functioning: Congruent      Mood: euthymic      Level of consciousness:  Alert and Attentive      Response to Learning: Able to verbalize current knowledge/experience and Able to retain information      Endings: None Reported    Modes of Intervention: Education, Support, Socialization, Exploration, Clarifying, and Problem-solving      Discipline Responsible: /Counselor      Signature:  LORRAINE Melgar

## 2025-06-25 NOTE — CARE COORDINATION
Ree received a call from Imelda at [facility] [phone]. She stated that she can take pt Friday afternoon after 4:30pm or Saturday morning.

## 2025-06-25 NOTE — CARE COORDINATION
Treatment team met with pt. She stated that she is doing good. She noted some anxiety still. We discussed that there will be a bed available at the crisis unit on Friday evening. She was agreeable.

## 2025-06-25 NOTE — BH NOTE
Patient denies suicidal ideation, homicidal ideations. Patient reports VH stating that she sees silhouettes and shadows.Presents friendly, flat, calm and cooperative during assessment.  Patient is out on the unit and is social with peers at times. Isolative to her room this evening.  Medications taken without issue.  No complaints or concerns verbalized at this time.  No unit problems reported.  Will continue to observe and support.

## 2025-06-25 NOTE — BH NOTE
Wound care and dressing changes completed to bilateral feet.   Betadine, 4x4, roll gauze to wounds on left and right foot; daily changes

## 2025-06-25 NOTE — PLAN OF CARE
Problem: Depression/Self Harm  Goal: Effect of psychiatric condition will be minimized and patient will be protected from self harm  Description: INTERVENTIONS:  1. Assess impact of patient's symptoms on level of functioning, self care needs and offer support as indicated  2. Assess patient/family knowledge of depression, impact on illness and need for teaching  3. Provide emotional support, presence and reassurance  4. Assess for possible suicidal thoughts or ideation. If patient expresses suicidal thoughts or statements do not leave alone, initiate Suicide Precautions, move to a room close to the nursing station and obtain sitter  5. Initiate consults as appropriate with Mental Health Professional, Spiritual Care, Psychosocial CNS, and consider a recommendation to the [facility] for a Psychiatric Consultation  Outcome: Progressing     Problem: Anxiety  Goal: Will report anxiety at manageable levels  Description: INTERVENTIONS:  1. Administer medication as ordered  2. Teach and rehearse alternative coping skills  3. Provide emotional support with 1:1 interaction with staff  Outcome: Progressing     Problem: Chronic Conditions and Co-morbidities  Goal: Patient's chronic conditions and co-morbidity symptoms are monitored and maintained or improved  Outcome: Progressing

## 2025-06-25 NOTE — PROGRESS NOTES
Department of Podiatry  Progress Note    SUBJECTIVE:  Mae Calvillo is seen at bedside for bilateral diabetic foot wounds. No acute events overnight. Patient denies any N/V/D/F/C/SOB/CP. Patient denies changes in pain or sensation to bilateral lower extremities. No other pedal complaints at this time.     OBJECTIVE:    Scheduled Meds:   risperiDONE  1 mg Oral Daily    risperiDONE  2 mg Oral Nightly    atorvastatin  20 mg Oral Daily    valsartan  160 mg Oral Daily    buPROPion  150 mg Oral Daily    prazosin  1 mg Oral Nightly    divalproex  250 mg Oral 2 times per day    metFORMIN  500 mg Oral Daily with breakfast     Continuous Infusions:  PRN Meds:.traZODone, chlordiazePOXIDE, ammonium lactate, acetaminophen, magnesium hydroxide, aluminum & magnesium hydroxide-simethicone, hydrOXYzine HCl, haloperidol **OR** haloperidol lactate    Allergies   Allergen Reactions    Colchicine     Darvon [Propoxyphene] Other (See Comments)     GI Upset       BP (!) 101/59   Pulse 85   Temp 97.4 °F (36.3 °C) (Temporal)   Resp 16   Ht 1.778 m (5' 10\")   Wt 107 kg (236 lb)   SpO2 97%   BMI 33.86 kg/m²       EXAM:    VASCULAR:  DP and PT pulses are palpable. CFT < 5 seconds B/L.  Warm to warm from the tibial tuberosity to the distal aspect of the digits dorsally. Hair growth not noted to the distal aspects dorsally.     NEUROLOGIC:  Light touch is impaired past the level of the ankle bilaterally     MUSCULOSKELETAL: Deformities are not noted in Bilateral lower extremities.  5/5 Gross Muscle strength in all 4 quadrants.     DERM:  Skin is clean and dry to the bilateral lower extremities. Toenails 1-5 b/l are abnormal in thickness, color and length. Webspaces 1-4 b/l are C/D/I. Edema is mild. Erythema is absent. Wounds are Present on the Bilateral lower extremity.  Wound noted on plantar medial aspect of left hallux.  Wound demonstrates a mixed granular fibrotic base with a hyperkeratotic border.  Minimal drainage noted on

## 2025-06-26 LAB
GLUCOSE BLD-MCNC: 187 MG/DL (ref 74–99)
GLUCOSE BLD-MCNC: 234 MG/DL (ref 74–99)

## 2025-06-26 PROCEDURE — 82962 GLUCOSE BLOOD TEST: CPT

## 2025-06-26 PROCEDURE — 6370000000 HC RX 637 (ALT 250 FOR IP): Performed by: PSYCHIATRY & NEUROLOGY

## 2025-06-26 PROCEDURE — 99232 SBSQ HOSP IP/OBS MODERATE 35: CPT

## 2025-06-26 PROCEDURE — 1240000000 HC EMOTIONAL WELLNESS R&B

## 2025-06-26 PROCEDURE — 6370000000 HC RX 637 (ALT 250 FOR IP)

## 2025-06-26 PROCEDURE — 6370000000 HC RX 637 (ALT 250 FOR IP): Performed by: EMERGENCY MEDICINE

## 2025-06-26 PROCEDURE — 6370000000 HC RX 637 (ALT 250 FOR IP): Performed by: NURSE PRACTITIONER

## 2025-06-26 RX ORDER — DIVALPROEX SODIUM 250 MG/1
250 TABLET, DELAYED RELEASE ORAL DAILY
Status: DISCONTINUED | OUTPATIENT
Start: 2025-06-27 | End: 2025-06-27 | Stop reason: HOSPADM

## 2025-06-26 RX ORDER — RISPERIDONE 2 MG/1
2 TABLET ORAL NIGHTLY
Status: COMPLETED | OUTPATIENT
Start: 2025-06-26 | End: 2025-06-26

## 2025-06-26 RX ORDER — RISPERIDONE 1 MG/1
1 TABLET ORAL ONCE
Status: COMPLETED | OUTPATIENT
Start: 2025-06-26 | End: 2025-06-26

## 2025-06-26 RX ORDER — DIVALPROEX SODIUM 500 MG/1
500 TABLET, DELAYED RELEASE ORAL NIGHTLY
Status: DISCONTINUED | OUTPATIENT
Start: 2025-06-26 | End: 2025-06-27 | Stop reason: HOSPADM

## 2025-06-26 RX ADMIN — ACETAMINOPHEN 650 MG: 325 TABLET ORAL at 22:40

## 2025-06-26 RX ADMIN — METFORMIN HYDROCHLORIDE 500 MG: 500 TABLET, EXTENDED RELEASE ORAL at 08:46

## 2025-06-26 RX ADMIN — TRAZODONE HYDROCHLORIDE 50 MG: 50 TABLET ORAL at 22:39

## 2025-06-26 RX ADMIN — RISPERIDONE 1 MG: 1 TABLET, FILM COATED ORAL at 10:47

## 2025-06-26 RX ADMIN — DIVALPROEX SODIUM 500 MG: 500 TABLET, DELAYED RELEASE ORAL at 22:40

## 2025-06-26 RX ADMIN — RISPERIDONE 2 MG: 2 TABLET, FILM COATED ORAL at 22:39

## 2025-06-26 RX ADMIN — BUPROPION HYDROCHLORIDE 150 MG: 150 TABLET, EXTENDED RELEASE ORAL at 08:46

## 2025-06-26 RX ADMIN — RISPERIDONE 1 MG: 1 TABLET, FILM COATED ORAL at 08:46

## 2025-06-26 RX ADMIN — DIVALPROEX SODIUM 250 MG: 250 TABLET, DELAYED RELEASE ORAL at 08:46

## 2025-06-26 RX ADMIN — ATORVASTATIN CALCIUM 20 MG: 10 TABLET, FILM COATED ORAL at 22:39

## 2025-06-26 RX ADMIN — HYDROXYZINE HYDROCHLORIDE 50 MG: 50 TABLET ORAL at 22:40

## 2025-06-26 RX ADMIN — VALSARTAN 160 MG: 160 TABLET, FILM COATED ORAL at 08:46

## 2025-06-26 RX ADMIN — PRAZOSIN HYDROCHLORIDE 1 MG: 1 CAPSULE ORAL at 22:39

## 2025-06-26 ASSESSMENT — PAIN SCALES - GENERAL
PAINLEVEL_OUTOF10: 0
PAINLEVEL_OUTOF10: 10
PAINLEVEL_OUTOF10: 10

## 2025-06-26 ASSESSMENT — PAIN DESCRIPTION - ORIENTATION
ORIENTATION: RIGHT;LEFT
ORIENTATION: LOWER

## 2025-06-26 ASSESSMENT — PAIN DESCRIPTION - LOCATION
LOCATION: BACK;ARM;LEG
LOCATION: BACK

## 2025-06-26 ASSESSMENT — PAIN DESCRIPTION - DESCRIPTORS: DESCRIPTORS: ACHING

## 2025-06-26 NOTE — PROGRESS NOTES
BEHAVIORAL HEALTH FOLLOW-UP NOTE     6/26/2025     Patient was seen and examined in person, Chart reviewed   Patient's case discussed with staff/team    Chief Complaint: Suicidal ideation    Interim History:   Patient was seen today in her room she is sitting on her bed she is flat does appear a little anxious today she is calm, cooperative.  She states that she continues to have some anxiety and racing thoughts but states she feels that is baseline for her.  She states that she would like to have a long-acting injection prior to discharge the patient was scheduled for tomorrow.  She has remained in agreement to go to [place] crisis unit on discharge.  She does denies suicidal homicidal ideation intent or plan currently denies auditory visual hallucinations but does states she sees silhouettes at times.  She has been taking her medication, she has had no behavioral disturbances, she is attending group.  Sleep and appetite reported to be fair    Appetite: [x] Normal/Unchanged  [] Increased  [] Decreased      Sleep:       [x] Normal/Unchanged  [] Fair       [] Poor              Energy:    [x] Normal/Unchanged  [] Increased  [] Decreased        SI [] Present  [x] Absent    HI  []Present  [x] Absent     Aggression:  [] yes  [x] no    Patient is [x] able  [] unable to CONTRACT FOR SAFETY     PAST MEDICAL/PSYCHIATRIC HISTORY:   Past Medical History:   Diagnosis Date    Anemia     stable at this time  2023    Arthritis     COVID 05/2022 11/2022-    Diabetes mellitus (HCC)     diet controlled    History of blood transfusion 2012    Hyperlipidemia     Hypertension     Lumbar pain        FAMILY/SOCIAL HISTORY:  Family History   Problem Relation Age of Onset    Diabetes Mother     Hypertension Mother     Diabetes Father     Hypertension Father     Gout Father     Heart Disease None     Ovarian Cancer None     Uterine Cancer None     Breast Cancer None      Social History     Socioeconomic History    Marital status: Single  *      LABS:    No results for input(s): \"WBC\", \"HGB\", \"PLT\" in the last 72 hours.    No results for input(s): \"NA\", \"K\", \"CL\", \"CO2\", \"BUN\", \"CREATININE\", \"GLUCOSE\" in the last 72 hours.    No results for input(s): \"BILITOT\", \"ALKPHOS\", \"AST\", \"ALT\" in the last 72 hours.    Lab Results   Component Value Date/Time    BARBSCNU NEGATIVE 06/16/2025 05:37 AM    LABBENZ NEGATIVE 06/16/2025 05:37 AM    LABMETH NEGATIVE 06/16/2025 05:37 AM    ETOH 137 06/16/2025 05:37 AM     Lab Results   Component Value Date/Time    TSH 1.460 11/28/2022 10:37 AM     No results found for: \"LITHIUM\"  Lab Results   Component Value Date    VALPROATE 34 (L) 06/24/2025           Treatment Plan:  Reviewed current Medications with the patient.   Risks, benefits, side effects, drug-to-drug interactions and alternatives to treatment were discussed.  Collateral information:   CD evaluation  Encourage patient to attend group and other milieu activities.  Discharge planning discussed with the patient and treatment team.    Continue Wellbutrin XL to 150 mg daily at patient request  Prazosin 1 mg at bedtime for nightmares and flashbacks  Depakote and 500 mg twice daily for mood stabilization  Risperdal 2 mg  daily, 2 mg nightly to stop today after p.m. dose  Uzedy 100 milligram injection to be given 6/27/2025 will be due every 30 days      PSYCHOTHERAPY/COUNSELING:  [x] Therapeutic interview  [x] Supportive  [] CBT  [] Ongoing  [] Other    [x] Patient continues to need, on a daily basis, active treatment furnished directly by or requiring the supervision of inpatient psychiatric personnel      Anticipated Length of stay: 3 to 10 days based on stability        NOTE: This report was transcribed using voice recognition software. Every effort was made to ensure accuracy; however, inadvertent computerized transcription errors may be present.     Electronically signed by VALERIE Fox CNP on 6/26/2025 at 11:30 AM

## 2025-06-26 NOTE — PLAN OF CARE
Problem: Anxiety  Goal: Will report anxiety at manageable levels  Description: INTERVENTIONS:  1. Administer medication as ordered  2. Teach and rehearse alternative coping skills  3. Provide emotional support with 1:1 interaction with staff  6/25/2025 2257 by Yoly Ramirez RN  Outcome: Not Progressing     Problem: Depression/Self Harm  Goal: Effect of psychiatric condition will be minimized and patient will be protected from self harm  Description: INTERVENTIONS:  1. Assess impact of patient's symptoms on level of functioning, self care needs and offer support as indicated  2. Assess patient/family knowledge of depression, impact on illness and need for teaching  3. Provide emotional support, presence and reassurance  4. Assess for possible suicidal thoughts or ideation. If patient expresses suicidal thoughts or statements do not leave alone, initiate Suicide Precautions, move to a room close to the nursing station and obtain sitter  5. Initiate consults as appropriate with Mental Health Professional, Spiritual Care, Psychosocial CNS, and consider a recommendation to the LIP for a Psychiatric Consultation  6/25/2025 2257 by Yoly Ramirez RN  Outcome: Not Progressing     Problem: Risk for Elopement  Goal: Patient will not exit the unit/facility without proper excort  Outcome: Progressing     Problem: Safety - Adult  Goal: Free from fall injury  Outcome: Progressing     Problem: Self Harm/Suicidality  Goal: Will have no self-injury during hospital stay  Description: INTERVENTIONS:  1.  Ensure constant observer at bedside with Q15M safety checks  2.  Maintain a safe environment  3.  Secure patient belongings  4.  Ensure family/visitors adhere to safety recommendations  5.  Ensure safety tray has been added to patient's diet order  6.  Every shift and PRN: Re-assess suicidal risk via Frequent Screener    Outcome: Progressing     Problem: Anxiety  Goal: Will report anxiety at manageable levels  Description:

## 2025-06-26 NOTE — CARE COORDINATION
Ree called [facility] [phone] to confirm discharge tomorrow 6/27. Ree spoke with Maritza, she stated that they will take pt tomorrow after 4:30pm.

## 2025-06-26 NOTE — GROUP NOTE
Group Therapy Note    Date: 6/26/2025    Group Start Time: 1045  Group End Time: 1115  Group Topic: Psychoeducation    [facility] 2    Gale Thomason CTRS    Group Therapy Note    Attendees: 12    Date: 6/26/2025  Start Time: 1045  End Time:  1115  Number of Participants: 12    Type of Group: Psychoeducation    Name:  Social Support     Patient's Goal:  Identified types of social support, how social support affects mental health and ways to improve social support.     Notes:  CTRS led educational group discussion on social support. Encouraged patients to share their experiences. Patient was actively engaged in group discussion and made positive responses.    Status After Intervention:  Improved    Participation Level: Active Listener and Interactive    Participation Quality: Appropriate, Attentive, and Sharing      Speech:  normal      Thought Process/Content: Logical  Linear      Affective Functioning: Congruent      Mood: Appropriate      Level of consciousness:  Alert and Attentive      Response to Learning: Able to verbalize current knowledge/experience, Able to verbalize/acknowledge new learning, Able to retain information, Capable of insight, Able to change behavior, and Progressing to goal      Endings: None Reported    Modes of Intervention: Education, Support, Socialization, Exploration, Clarifying, and Problem-solving      Discipline Responsible: Psychoeducational Specialist      Signature:  GONZALO Moody

## 2025-06-26 NOTE — CARE COORDINATION
Treatment team met with pt. She stated that she is agreeable to the HAMMER. She understands that she will be going to the crisis unit tomorrow. She stated that her anxiety was high yesterday. She stated that she took a shower and the door locked and she panicked.

## 2025-06-26 NOTE — BH NOTE
Patient denies suicidal ideation, homicidal ideations. Patient reports visual hallucinations of seeing silhouettes. Endorses racing thoughts and paranoia and states this is everyday. Patient rates her anxiety a 10/10 and depression a 7/10. She states her sleep is not well but appetite is good.   Presents flat, sad, calm and cooperative during assessment. She does brighten some with conversation.  Patient is out on the unit and is social with peers.  Medications taken without issue.  No other complaints or concerns verbalized at this time.  No unit problems reported.  Will continue to observe and support.

## 2025-06-26 NOTE — PLAN OF CARE
Pt denies homicidal/suicidal thoughts. Denies hallucinations. Pt appears flat sad and withdrawn. Pt is compliant with medications and attending group therapy. Pt is interactive with select peers on the unit. Pt reports anxiety 8 on a scale 0-10. Will continue to monitor and provide support.   Problem: Involuntary Admit  Goal: Will cooperate with staff recommendations and doctor's orders and will demonstrate appropriate behavior  Description: INTERVENTIONS:  1. Treat underlying conditions and offer medication as ordered  2. Educate regarding involuntary admission procedures and rules  3. Contain excessive/inappropriate behavior per unit and hospital policies  Outcome: Progressing     Problem: Nutrition Deficit:  Goal: Optimize nutritional status  Outcome: Progressing     Problem: Pain  Goal: Verbalizes/displays adequate comfort level or baseline comfort level  Outcome: Not Progressing

## 2025-06-26 NOTE — CARE COORDINATION
MARC called pt's son Pablo [phone] (FAZAL signed) to inform him of discharge tomorrow to the crisis unit. He understood the plan. He had no questions or concerns.

## 2025-06-27 VITALS
WEIGHT: 236 LBS | BODY MASS INDEX: 33.79 KG/M2 | DIASTOLIC BLOOD PRESSURE: 68 MMHG | TEMPERATURE: 97.8 F | HEART RATE: 95 BPM | RESPIRATION RATE: 17 BRPM | OXYGEN SATURATION: 96 % | HEIGHT: 70 IN | SYSTOLIC BLOOD PRESSURE: 111 MMHG

## 2025-06-27 LAB
GLUCOSE BLD-MCNC: 176 MG/DL (ref 74–99)
GLUCOSE BLD-MCNC: 238 MG/DL (ref 74–99)

## 2025-06-27 PROCEDURE — 82962 GLUCOSE BLOOD TEST: CPT

## 2025-06-27 PROCEDURE — 6370000000 HC RX 637 (ALT 250 FOR IP): Performed by: EMERGENCY MEDICINE

## 2025-06-27 PROCEDURE — 6370000000 HC RX 637 (ALT 250 FOR IP): Performed by: NURSE PRACTITIONER

## 2025-06-27 PROCEDURE — 6370000000 HC RX 637 (ALT 250 FOR IP): Performed by: PSYCHIATRY & NEUROLOGY

## 2025-06-27 PROCEDURE — 99239 HOSP IP/OBS DSCHRG MGMT >30: CPT

## 2025-06-27 PROCEDURE — 6370000000 HC RX 637 (ALT 250 FOR IP)

## 2025-06-27 RX ORDER — DIVALPROEX SODIUM 500 MG/1
500 TABLET, DELAYED RELEASE ORAL NIGHTLY
Qty: 30 TABLET | Refills: 0 | Status: SHIPPED | OUTPATIENT
Start: 2025-06-27 | End: 2025-07-27

## 2025-06-27 RX ORDER — DIVALPROEX SODIUM 250 MG/1
250 TABLET, DELAYED RELEASE ORAL DAILY
Qty: 30 TABLET | Refills: 0 | Status: SHIPPED | OUTPATIENT
Start: 2025-06-28 | End: 2025-07-28

## 2025-06-27 RX ORDER — VALSARTAN 160 MG/1
160 TABLET ORAL DAILY
Qty: 14 TABLET | Refills: 0 | Status: SHIPPED | OUTPATIENT
Start: 2025-06-27 | End: 2025-07-11

## 2025-06-27 RX ORDER — ATORVASTATIN CALCIUM 20 MG/1
20 TABLET, FILM COATED ORAL DAILY
Qty: 14 TABLET | Refills: 0 | Status: SHIPPED | OUTPATIENT
Start: 2025-06-27 | End: 2025-07-11

## 2025-06-27 RX ORDER — BUPROPION HYDROCHLORIDE 150 MG/1
150 TABLET ORAL DAILY
Qty: 30 TABLET | Refills: 0 | Status: SHIPPED | OUTPATIENT
Start: 2025-06-28 | End: 2025-07-28

## 2025-06-27 RX ORDER — METFORMIN HYDROCHLORIDE 500 MG/1
500 TABLET, EXTENDED RELEASE ORAL
Qty: 14 TABLET | Refills: 0 | Status: SHIPPED | OUTPATIENT
Start: 2025-06-28 | End: 2025-07-12

## 2025-06-27 RX ORDER — PRAZOSIN HYDROCHLORIDE 1 MG/1
1 CAPSULE ORAL NIGHTLY
Qty: 30 CAPSULE | Refills: 0 | Status: SHIPPED | OUTPATIENT
Start: 2025-06-27 | End: 2025-07-27

## 2025-06-27 RX ADMIN — BUPROPION HYDROCHLORIDE 150 MG: 150 TABLET, EXTENDED RELEASE ORAL at 08:33

## 2025-06-27 RX ADMIN — HYDROXYZINE HYDROCHLORIDE 50 MG: 50 TABLET ORAL at 08:35

## 2025-06-27 RX ADMIN — DIVALPROEX SODIUM 250 MG: 250 TABLET, DELAYED RELEASE ORAL at 08:33

## 2025-06-27 RX ADMIN — VALSARTAN 160 MG: 160 TABLET, FILM COATED ORAL at 08:32

## 2025-06-27 RX ADMIN — METFORMIN HYDROCHLORIDE 500 MG: 500 TABLET, EXTENDED RELEASE ORAL at 08:32

## 2025-06-27 ASSESSMENT — PAIN SCALES - GENERAL: PAINLEVEL_OUTOF10: 0

## 2025-06-27 NOTE — PROGRESS NOTES
Spiritual Support Group Note    Number of Participants in Group:     10                  Time: 3 pm    Goal: Relief from isolation and loneliness             Tenisha Sharing             Self-understanding and gain insight              Acceptance and belonging            Recognize they are not alone                Socialization             Empowerment       Encouragement    Topic:  [] Spiritual Wellness and Self Care                  [] Hope                     [] Connecting with Divine/Others        [] Thankfulness and Gratitude               []  Meaningfulness and Purpose               [] Forgiveness               [] Peace               [] Connect to Community      [x] Other: Focused on Tenisha, goals, consistency, and sustainability     Participation Level:   [x] Active Listener   [] Minimal   [] Monopolizing   [] Interactive   [] No Participation   []  Other:     Attention:   [x] Alert   [] Distractible   [] Drowsy   [] Poor   [] Other:    Manner:   [x] Cooperative   [] Suspicious   [] Withdrawn   [] Guarded   [] Irritable   [] Inhospitable   [] Other:     Others Comments from Group:

## 2025-06-27 NOTE — DISCHARGE SUMMARY
[facility]  Discharge Note    Pt discharged with followings belongings:   Dental Appliances: None  Vision - Corrective Lenses: Eyeglasses  Hearing Aid: None  Jewelry: None  Body Piercings Removed: N/A  Clothing: Footwear, Pants, Shirt  Other Valuables: Keys   Valuables sent home with or returned to patient. Patient educated on aftercare instructions: yes  Information faxed to n/a by n/a  at 12:40 PM .Patient verbalize understanding of AVS:  yes.    Status EXAM upon discharge:  Mental Status and Behavioral Exam  Normal: No  Level of Assistance: Independent/Self  Facial Expression: Flat  Affect: Blunt  Level of Consciousness: Alert  Frequency of Checks: 4 times per hour, close  Mood:Normal: No  Mood: Anxious, Depressed  Motor Activity:Normal: No  Motor Activity: Decreased  Eye Contact: Fair  Observed Behavior: Friendly, Withdrawn  Sexual Misconduct History: Current - no  Preception: Bloomery to person, Bloomery to time, Bloomery to place, Bloomery to situation  Attention:Normal: No  Attention: Distractible  Thought Processes: Unremarkable  Thought Content:Normal: Yes  Thought Content: Other (comment)  Depression Symptoms: Feelings of hopelessess, Feelings of helplessness  Anxiety Symptoms: Generalized  Mary Symptoms: No problems reported or observed.  Hallucinations: None  Delusions: No  Memory:Normal: Yes  Memory: Poor recent, Poor remote  Insight and Judgment: No  Insight and Judgment: Poor insight, Unmotivated    Tobacco Screening:  Practical Counseling, on admission, kaye X, if applicable and completed (first 3 are required if patient doesn't refuse):            ( ) Recognizing danger situations (included triggers and roadblocks)                    ( ) Coping skills (new ways to manage stress,relaxation techniques, changing routine, distraction)                                                           ( ) Basic information about quitting (benefits of quitting, techniques in how to quit, available

## 2025-06-27 NOTE — PROGRESS NOTES
Patient attended a spiritual care group session facilitated by  services. The session centered on the theme of \"Tenisha in Action: Taking Steps Toward Consistency and Growth\". Patient was encouraged  to explore the connection between personal tenisha, intentional goal setting, and sustainable behavorial change. The discussion focussed on identifying small, realistic steps that align with personal values and promote spiritual, emotional and personal growth. Emphasis was placed on consistency and follow-through as expressions of active tenisha. Patient was engaged, participated appropriately, and expressed interest in developing consistent habits that support their recovery and overall well-being.         Chaplain Casie Ybarra

## 2025-06-27 NOTE — PROGRESS NOTES
Department of Podiatry  Progress Note    SUBJECTIVE:  Mae Calvillo is seen at bedside for bilateral diabetic foot wounds. No acute events overnight. Patient off the floor at time of rounding; patient taking shower.  Chart check completed without incident.  Discharge order noted.    OBJECTIVE:    Scheduled Meds:   risperiDONE ER  100 mg SubCUTAneous Q30 Days    divalproex  250 mg Oral Daily    divalproex  500 mg Oral Nightly    atorvastatin  20 mg Oral Daily    valsartan  160 mg Oral Daily    buPROPion  150 mg Oral Daily    prazosin  1 mg Oral Nightly    metFORMIN  500 mg Oral Daily with breakfast     Continuous Infusions:  PRN Meds:.benzocaine, traZODone, chlordiazePOXIDE, ammonium lactate, acetaminophen, magnesium hydroxide, aluminum & magnesium hydroxide-simethicone, hydrOXYzine HCl, haloperidol **OR** haloperidol lactate    Allergies   Allergen Reactions    Colchicine     Darvon [Propoxyphene] Other (See Comments)     GI Upset       /68   Pulse 95   Temp 97.8 °F (36.6 °C) (Temporal)   Resp 17   Ht 1.778 m (5' 10\")   Wt 107 kg (236 lb)   SpO2 96%   BMI 33.86 kg/m²       EXAM:  Patient is off the floor at time of rounding.    Previous exam:    VASCULAR:  DP and PT pulses are palpable. CFT < 5 seconds B/L.  Warm to warm from the tibial tuberosity to the distal aspect of the digits dorsally. Hair growth not noted to the distal aspects dorsally.     NEUROLOGIC:  Light touch is impaired past the level of the ankle bilaterally     MUSCULOSKELETAL: Deformities are not noted in Bilateral lower extremities.  5/5 Gross Muscle strength in all 4 quadrants.     DERM:  Skin is clean and dry to the bilateral lower extremities. Toenails 1-5 b/l are abnormal in thickness, color and length. Webspaces 1-4 b/l are C/D/I. Edema is mild. Erythema is absent. Wounds are Present on the Bilateral lower extremity.  Wound noted on plantar medial aspect of left hallux.  Wound demonstrates a mixed granular fibrotic base

## 2025-06-27 NOTE — CARE COORDINATION
MARC called the [facility] Phone: [phone] Fax: [phone] and spoke with Mairtza who stated that the pt is accepted for today and he needs to come after 4:30 PM.     MARC called pt's son Pablo [phone] (FAZAL signed) to inform him of pt's discharge to the [facility] today. Pablo denied any questions or concerns for pt's discharge today.

## 2025-06-27 NOTE — CARE COORDINATION
MARC met with pt in treatment team with NP and RN. Pt stated that she will be going to the crisis unit today and SW informed pt that her son was updated on this information. Pt stated tat she has canker sores and would like something for this prior to being discharged. NP and pt discussed the medications and pt stated that she got her HAMMER today. Pt stated that all se is able to do is try and she is willing to do this. Pt stated that she does have anxiety and depression, however this is normal for her.      MARC called [facility] to reschedule pt's appointment for medication management on 7/7 at 11:45 AM and counseling on 7/10 at 9 AM. SW informed them of the pt's HAMMER due risperiDONE ER (UZEDY) subcutaneous injection 100 mg on 7/7.     [facility] Phone: [phone]  will need to be contacted for transportation.     In order to ensure appropriate transition and discharge planning is in place, the following documents have been transmitted to [facility], as the new outpatient provider:    The d/c diagnosis was transmitted to the next care provider  The reason for hospitalization was transmitted to the next care provider  The d/c medications (dosage and indication) were transmitted to the next care provider   The continuing care plan was transmitted to the next care provider
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
## 2025-06-27 NOTE — PLAN OF CARE
Problem: Anxiety  Goal: Will report anxiety at manageable levels  Description: INTERVENTIONS:  1. Administer medication as ordered  2. Teach and rehearse alternative coping skills  3. Provide emotional support with 1:1 interaction with staff  Outcome: Not Progressing     Problem: Depression/Self Harm  Goal: Effect of psychiatric condition will be minimized and patient will be protected from self harm  Description: INTERVENTIONS:  1. Assess impact of patient's symptoms on level of functioning, self care needs and offer support as indicated  2. Assess patient/family knowledge of depression, impact on illness and need for teaching  3. Provide emotional support, presence and reassurance  4. Assess for possible suicidal thoughts or ideation. If patient expresses suicidal thoughts or statements do not leave alone, initiate Suicide Precautions, move to a room close to the nursing station and obtain sitter  5. Initiate consults as appropriate with Mental Health Professional, Spiritual Care, Psychosocial CNS, and consider a recommendation to the LIP for a Psychiatric Consultation  Outcome: Not Progressing

## 2025-06-27 NOTE — GROUP NOTE
Group Therapy Note    Date: 6/27/2025    Group Start Time: 1500  Group End Time: 1545  Group Topic: Recreational    [facility] 1    Sarah Talavera, GONZALO    Type of Group: Psychoeducation    Module Name:  think trivia     Patient's Goal:  pt will be able to participate in trivia with other pts, take turns and answer appropriately..     Notes:  pt pleasant and engaged in group, participating appropriately.     Status After Intervention:  Improved    Participation Level: Active Listener and Interactive    Participation Quality: Appropriate, Attentive, and Sharing      Speech:  normal      Thought Process/Content: Logical      Affective Functioning: Congruent      Mood: euthymic      Level of consciousness:  Alert, Oriented x4, and Attentive      Response to Learning: Able to verbalize/acknowledge new learning, Able to retain information, and Progressing to goal      Endings: None Reported    Modes of Intervention: Support, Socialization, and Activity      Discipline Responsible: Psychoeducational Specialist      Signature:  Sarah Talavera, JENS

## 2025-06-27 NOTE — GROUP NOTE
Shared goal for the day as to continue to take my meds.                                                                       Group Therapy Note    Date: 6/27/2025    Group Start Time: 1100  Group End Time: 1130  Group Topic: Community Meeting    [facility] AC[facility]  1    Sarah Talavera, GONZALO    Type of Group:Community Meeting    Patient's Goal:  Patient will be able to id staffing assignments, expectations of patients, and general information re: floor rules.   Will be prompted to share goal for the day.     Notes:  Patient appeared to be an active listener, taking in information presented and was prompted to share goal for the day.    Status After Intervention:  improved    Participation Level: active listener    Participation Quality: appropriate    Speech:  normal    Thought Process/Content: logical     Affective Functioning:congruent    Mood: euthymic    Level of consciousness:  alert     Response to Learning: verbalize, retain     Endings:none reported    Modes of Intervention: education, support, clarifying     Discipline Responsible: Psychoeducation       Signature:  GONZALO Romero

## 2025-06-27 NOTE — BH NOTE
Patient denies suicidal ideation, homicidal ideations and AH. Pt endorses VH of \"shadows\".  Pt reports anxiety 9/10 and depression 8/10. Presents flat, sad, withdrawn, calm and cooperative during assessment.  Patient is out on the unit and is social with select peers.  Medications taken without issue.  No complaints or concerns verbalized at this time.  No unit problems reported.  Will continue to observe and support.

## 2025-06-27 NOTE — PLAN OF CARE
Pt denies homicidal/suicidal thoughts. Reports visual hallucinations of shadows. Pt denies auditory hallucinations. Pt reports I have anxiety a lot actually. Pt affect is flat and blunt. Pt is withdrawn and interact with select peers on the unit. Will continue to monitor and provide support.    Problem: Risk for Elopement  Goal: Patient will not exit the unit/facility without proper excort  Outcome: Progressing     Problem: Chronic Conditions and Co-morbidities  Goal: Patient's chronic conditions and co-morbidity symptoms are monitored and maintained or improved  Outcome: Progressing     Problem: Safety - Adult  Goal: Free from fall injury  Outcome: Progressing     Problem: Anxiety  Goal: Will report anxiety at manageable levels  Description: INTERVENTIONS:  1. Administer medication as ordered  2. Teach and rehearse alternative coping skills  3. Provide emotional support with 1:1 interaction with staff  6/26/2025 2201 by Bridgette Sheikh, RN  Outcome: Not Progressing     Problem: Depression/Self Harm  Goal: Effect of psychiatric condition will be minimized and patient will be protected from self harm  Description: INTERVENTIONS:  1. Assess impact of patient's symptoms on level of functioning, self care needs and offer support as indicated  2. Assess patient/family knowledge of depression, impact on illness and need for teaching  3. Provide emotional support, presence and reassurance  4. Assess for possible suicidal thoughts or ideation. If patient expresses suicidal thoughts or statements do not leave alone, initiate Suicide Precautions, move to a room close to the nursing station and obtain sitter  5. Initiate consults as appropriate with Mental Health Professional, Spiritual Care, Psychosocial CNS, and consider a recommendation to the LIP for a Psychiatric Consultation  6/26/2025 2201 by Bridgette Sheikh, RN  Outcome: Not Progressing

## 2025-06-27 NOTE — PROGRESS NOTES
CLINICAL PHARMACY NOTE: MEDS TO BEDS    Total # of Prescriptions Filled: 7   The following medications were delivered to the patient:  Divalproex dr 250 mg  Divalproex dr 500 mg  Metformin er 500 mg  Bupropion er 150 mg  Atorvastatin 20 mg  Valsartan 160 mg    Additional Documentation:   Delivered to Tatyana CANO

## 2025-06-28 NOTE — CARE COORDINATION
MARC called the [facility] Phone: [phone] Fax: [phone] and was advised that the pt is there and they have questions regarding pt's wounds. MARC transferred call to RN.

## 2025-07-10 ENCOUNTER — OFFICE VISIT (OUTPATIENT)
Dept: FAMILY MEDICINE CLINIC | Age: 55
End: 2025-07-10

## 2025-07-10 VITALS
HEIGHT: 70 IN | HEART RATE: 87 BPM | DIASTOLIC BLOOD PRESSURE: 64 MMHG | SYSTOLIC BLOOD PRESSURE: 96 MMHG | TEMPERATURE: 97.1 F | RESPIRATION RATE: 18 BRPM | BODY MASS INDEX: 33.86 KG/M2 | OXYGEN SATURATION: 100 %

## 2025-07-10 DIAGNOSIS — K91.2 MALNUTRITION FOLLOWING GASTROINTESTINAL SURGERY: ICD-10-CM

## 2025-07-10 DIAGNOSIS — L60.0 INGROWING TOENAIL: Primary | ICD-10-CM

## 2025-07-10 DIAGNOSIS — F43.10 PTSD (POST-TRAUMATIC STRESS DISORDER): ICD-10-CM

## 2025-07-10 DIAGNOSIS — E78.2 MIXED HYPERLIPIDEMIA: ICD-10-CM

## 2025-07-10 DIAGNOSIS — I10 PRIMARY HYPERTENSION: ICD-10-CM

## 2025-07-10 DIAGNOSIS — F31.81 BIPOLAR II DISORDER (HCC): ICD-10-CM

## 2025-07-10 DIAGNOSIS — E11.9 TYPE 2 DIABETES MELLITUS WITHOUT COMPLICATION, WITHOUT LONG-TERM CURRENT USE OF INSULIN (HCC): ICD-10-CM

## 2025-07-10 DIAGNOSIS — J30.89 SEASONAL ALLERGIC RHINITIS DUE TO OTHER ALLERGIC TRIGGER: ICD-10-CM

## 2025-07-10 LAB — HBA1C MFR BLD: 7.6 %

## 2025-07-10 RX ORDER — CICLOPIROX OLAMINE 7.7 MG/100ML
1 SUSPENSION TOPICAL 2 TIMES DAILY
Qty: 30 ML | Refills: 0 | Status: SHIPPED | OUTPATIENT
Start: 2025-07-10

## 2025-07-10 RX ORDER — VALSARTAN 160 MG/1
160 TABLET ORAL DAILY
Qty: 14 TABLET | Refills: 0 | Status: SHIPPED | OUTPATIENT
Start: 2025-07-10 | End: 2025-07-24

## 2025-07-10 RX ORDER — BUPROPION HYDROCHLORIDE 150 MG/1
150 TABLET ORAL DAILY
Qty: 30 TABLET | Refills: 0 | Status: SHIPPED | OUTPATIENT
Start: 2025-07-10 | End: 2025-08-09

## 2025-07-10 RX ORDER — DULAGLUTIDE 1.5 MG/.5ML
1.5 INJECTION, SOLUTION SUBCUTANEOUS WEEKLY
Qty: 2 ML | Refills: 2 | Status: CANCELLED | OUTPATIENT
Start: 2025-07-10

## 2025-07-10 RX ORDER — AMMONIUM LACTATE 12 G/100G
LOTION TOPICAL
Qty: 225 G | Refills: 1 | Status: SHIPPED | OUTPATIENT
Start: 2025-07-10

## 2025-07-10 RX ORDER — M-VIT,TX,IRON,MINS/CALC/FOLIC 27MG-0.4MG
1 TABLET ORAL DAILY
Qty: 30 TABLET | Refills: 5 | Status: SHIPPED | OUTPATIENT
Start: 2025-07-10 | End: 2026-07-10

## 2025-07-10 RX ORDER — RISPERIDONE 1 MG/1
TABLET ORAL
COMMUNITY
Start: 2025-07-01

## 2025-07-10 RX ORDER — ATORVASTATIN CALCIUM 20 MG/1
20 TABLET, FILM COATED ORAL DAILY
Qty: 14 TABLET | Refills: 0 | Status: SHIPPED | OUTPATIENT
Start: 2025-07-10 | End: 2025-07-24

## 2025-07-10 RX ORDER — CETIRIZINE HYDROCHLORIDE 10 MG/1
10 TABLET ORAL DAILY
Qty: 30 TABLET | Refills: 1 | Status: SHIPPED | OUTPATIENT
Start: 2025-07-10

## 2025-07-10 RX ORDER — AZELASTINE 1 MG/ML
1 SPRAY, METERED NASAL 2 TIMES DAILY
Qty: 30 ML | Refills: 1 | Status: SHIPPED | OUTPATIENT
Start: 2025-07-10

## 2025-07-10 RX ORDER — BLOOD PRESSURE TEST KIT
KIT MISCELLANEOUS
Qty: 100 EACH | Refills: 5 | Status: SHIPPED | OUTPATIENT
Start: 2025-07-10

## 2025-07-10 RX ORDER — MUPIROCIN 2 %
1 OINTMENT (GRAM) TOPICAL 2 TIMES DAILY
Qty: 1 EACH | Refills: 0 | Status: SHIPPED | OUTPATIENT
Start: 2025-07-10

## 2025-07-10 NOTE — PROGRESS NOTES
S: 54 y.o. female with   Chief Complaint   Patient presents with    Depression     Little improvements     Anxiety     Little improvements        Diabetes-A1c stable above 7 wants to increase Trulicity    O: VS:  height is 1.778 m (5' 10\"). Her temporal temperature is 97.1 °F (36.2 °C). Her blood pressure is 96/64 and her pulse is 87. Her respiration is 18 and oxygen saturation is 100%.   BP Readings from Last 3 Encounters:   07/10/25 96/64   04/30/25 135/74   04/10/25 109/61     See resident note      Impression/Plan:   1) diabetes increased Trulicity  2) close follow up with psych recommended.       Health Maintenance Due   Topic Date Due    DTaP/Tdap/Td vaccine (2 - Td or Tdap) 12/05/2024         Attending Physician Statement  I have discussed the case, including pertinent history and exam findings with the resident.  I agree with the documented assessment and plan.      Jimmy Yarbrough MD  
route 2 times daily Use in each nostril as directed 30 mL 1    buPROPion (WELLBUTRIN XL) 150 MG extended release tablet Take 1 tablet by mouth daily 30 tablet 0    cetirizine (ZYRTEC) 10 MG tablet Take 1 tablet by mouth daily 30 tablet 1    empagliflozin (JARDIANCE) 25 MG tablet Take 1 tablet by mouth daily 90 tablet 1    Multiple Vitamins-Minerals (THERAPEUTIC MULTIVITAMIN-MINERALS) tablet Take 1 tablet by mouth daily 30 tablet 5    Ciclopirox Olamine 0.77 % SUSP Apply 1 Application topically in the morning and at bedtime 30 mL 0    mupirocin (BACTROBAN) 2 % ointment Apply 1 each topically 2 times daily Apply topically 2 times daily to fernando foot wounds 1 each 0    valsartan (DIOVAN) 160 MG tablet Take 1 tablet by mouth daily for 14 days 14 tablet 0    Dulaglutide 3 MG/0.5ML SOAJ Inject 3 mg into the skin every 7 days 2 mL 0    Alcohol Swabs PADS Use as needed daily for blood glucose checks 100 each 5    divalproex (DEPAKOTE) 250 MG DR tablet Take 1 tablet by mouth daily 30 tablet 0    divalproex (DEPAKOTE) 500 MG DR tablet Take 1 tablet by mouth at bedtime 30 tablet 0    prazosin (MINIPRESS) 1 MG capsule Take 1 capsule by mouth nightly 30 capsule 0    [START ON 7/27/2025] risperiDONE ER (UZEDY) subcutaneous injection Inject 0.28 mLs into the skin every 30 days Will be due for 100 mg injection 7/27/2025 1 each 0    hydrOXYzine HCl (ATARAX) 50 MG tablet Take 1 tablet by mouth every 12 hours as needed for Anxiety      Continuous Glucose Sensor (FREESTYLE LUIS 3 SENSOR) [facility] [place] 1 each by Does not apply route 4 times daily 1 each 3    miconazole (MICOTIN) 2 % powder Apply topically 2 times daily. 45 g 2    Handicap Placard MISC by Does not apply route Please provide handicap placard fr two years starting on 2-. 1 each 0    Continuous Glucose Sensor (FREESTYLE LUIS 3 PLUS SENSOR) [facility] [place] Place 1 application  onto the skin every 14 days 1 each 5     No current facility-administered medications for this visit.

## 2025-07-11 ASSESSMENT — ENCOUNTER SYMPTOMS
ABDOMINAL PAIN: 0
VOMITING: 0
CHEST TIGHTNESS: 0
COUGH: 0
CONSTIPATION: 0
DIARRHEA: 0
SORE THROAT: 0
NAUSEA: 0
RHINORRHEA: 0
SHORTNESS OF BREATH: 0
EYE REDNESS: 0

## 2025-07-29 DIAGNOSIS — E78.2 MIXED HYPERLIPIDEMIA: ICD-10-CM

## 2025-07-30 ENCOUNTER — HOSPITAL ENCOUNTER (INPATIENT)
Age: 55
DRG: 314 | End: 2025-07-30
Attending: EMERGENCY MEDICINE | Admitting: HOSPITALIST
Payer: COMMERCIAL

## 2025-07-30 ENCOUNTER — APPOINTMENT (OUTPATIENT)
Dept: GENERAL RADIOLOGY | Age: 55
DRG: 314 | End: 2025-07-30
Payer: COMMERCIAL

## 2025-07-30 DIAGNOSIS — M86.9 OSTEOMYELITIS, UNSPECIFIED SITE, UNSPECIFIED TYPE (HCC): ICD-10-CM

## 2025-07-30 DIAGNOSIS — M86.9 OSTEOMYELITIS OF LEFT FOOT, UNSPECIFIED TYPE (HCC): Primary | ICD-10-CM

## 2025-07-30 PROBLEM — E11.51 DIABETIC PERIPHERAL ANGIOPATHY (HCC): Status: ACTIVE | Noted: 2024-08-20

## 2025-07-30 PROBLEM — L88 PYODERMA GANGRENOSUM (HCC): Status: ACTIVE | Noted: 2018-07-11

## 2025-07-30 PROBLEM — M25.50 POLYARTHRALGIA: Status: ACTIVE | Noted: 2017-01-12

## 2025-07-30 PROBLEM — E66.9 OBESITY: Status: ACTIVE | Noted: 2025-07-30

## 2025-07-30 LAB
ALBUMIN SERPL-MCNC: 3.6 G/DL (ref 3.5–5.2)
ALP SERPL-CCNC: 105 U/L (ref 35–104)
ALT SERPL-CCNC: 24 U/L (ref 0–35)
ANION GAP SERPL CALCULATED.3IONS-SCNC: 13 MMOL/L (ref 7–16)
AST SERPL-CCNC: 51 U/L (ref 0–35)
BASOPHILS # BLD: 0.05 K/UL (ref 0–0.2)
BASOPHILS NFR BLD: 1 % (ref 0–2)
BILIRUB SERPL-MCNC: 0.7 MG/DL (ref 0–1.2)
BUN SERPL-MCNC: 15 MG/DL (ref 6–20)
CALCIUM SERPL-MCNC: 9.9 MG/DL (ref 8.6–10)
CHLORIDE SERPL-SCNC: 99 MMOL/L (ref 98–107)
CO2 SERPL-SCNC: 24 MMOL/L (ref 22–29)
CREAT SERPL-MCNC: 0.9 MG/DL (ref 0.5–1)
CRP SERPL HS-MCNC: 29.4 MG/L (ref 0–5)
EOSINOPHIL # BLD: 0.22 K/UL (ref 0.05–0.5)
EOSINOPHILS RELATIVE PERCENT: 3 % (ref 0–6)
ERYTHROCYTE [DISTWIDTH] IN BLOOD BY AUTOMATED COUNT: 17 % (ref 11.5–15)
ERYTHROCYTE [SEDIMENTATION RATE] IN BLOOD BY WESTERGREN METHOD: 80 MM/HR (ref 0–20)
GFR, ESTIMATED: 72 ML/MIN/1.73M2
GLUCOSE BLD-MCNC: 211 MG/DL (ref 74–99)
GLUCOSE SERPL-MCNC: 111 MG/DL (ref 74–99)
HCT VFR BLD AUTO: 36.8 % (ref 34–48)
HGB BLD-MCNC: 12.4 G/DL (ref 11.5–15.5)
IMM GRANULOCYTES # BLD AUTO: <0.03 K/UL (ref 0–0.58)
IMM GRANULOCYTES NFR BLD: 0 % (ref 0–5)
LYMPHOCYTES NFR BLD: 1.72 K/UL (ref 1.5–4)
LYMPHOCYTES RELATIVE PERCENT: 27 % (ref 20–42)
MCH RBC QN AUTO: 32.9 PG (ref 26–35)
MCHC RBC AUTO-ENTMCNC: 33.7 G/DL (ref 32–34.5)
MCV RBC AUTO: 97.6 FL (ref 80–99.9)
MONOCYTES NFR BLD: 0.5 K/UL (ref 0.1–0.95)
MONOCYTES NFR BLD: 8 % (ref 2–12)
NEUTROPHILS NFR BLD: 61 % (ref 43–80)
NEUTS SEG NFR BLD: 3.89 K/UL (ref 1.8–7.3)
PLATELET # BLD AUTO: 184 K/UL (ref 130–450)
PMV BLD AUTO: 9.8 FL (ref 7–12)
POTASSIUM SERPL-SCNC: 4.6 MMOL/L (ref 3.5–5.1)
PROT SERPL-MCNC: 8.7 G/DL (ref 6.4–8.3)
RBC # BLD AUTO: 3.77 M/UL (ref 3.5–5.5)
SODIUM SERPL-SCNC: 135 MMOL/L (ref 136–145)
WBC OTHER # BLD: 6.4 K/UL (ref 4.5–11.5)

## 2025-07-30 PROCEDURE — 2580000003 HC RX 258: Performed by: HOSPITALIST

## 2025-07-30 PROCEDURE — 1200000000 HC SEMI PRIVATE

## 2025-07-30 PROCEDURE — 99222 1ST HOSP IP/OBS MODERATE 55: CPT | Performed by: HOSPITALIST

## 2025-07-30 PROCEDURE — 87077 CULTURE AEROBIC IDENTIFY: CPT

## 2025-07-30 PROCEDURE — 73630 X-RAY EXAM OF FOOT: CPT

## 2025-07-30 PROCEDURE — 6370000000 HC RX 637 (ALT 250 FOR IP): Performed by: HOSPITALIST

## 2025-07-30 PROCEDURE — 87205 SMEAR GRAM STAIN: CPT

## 2025-07-30 PROCEDURE — 99285 EMERGENCY DEPT VISIT HI MDM: CPT

## 2025-07-30 PROCEDURE — 85652 RBC SED RATE AUTOMATED: CPT

## 2025-07-30 PROCEDURE — 80053 COMPREHEN METABOLIC PANEL: CPT

## 2025-07-30 PROCEDURE — 83036 HEMOGLOBIN GLYCOSYLATED A1C: CPT

## 2025-07-30 PROCEDURE — 86140 C-REACTIVE PROTEIN: CPT

## 2025-07-30 PROCEDURE — 85025 COMPLETE CBC W/AUTO DIFF WBC: CPT

## 2025-07-30 PROCEDURE — 82962 GLUCOSE BLOOD TEST: CPT

## 2025-07-30 PROCEDURE — 86403 PARTICLE AGGLUT ANTBDY SCRN: CPT

## 2025-07-30 PROCEDURE — 2500000003 HC RX 250 WO HCPCS: Performed by: HOSPITALIST

## 2025-07-30 PROCEDURE — 87070 CULTURE OTHR SPECIMN AEROBIC: CPT

## 2025-07-30 RX ORDER — OXYCODONE HYDROCHLORIDE 5 MG/1
10 TABLET ORAL EVERY 4 HOURS PRN
Refills: 0 | Status: DISPENSED | OUTPATIENT
Start: 2025-07-30

## 2025-07-30 RX ORDER — ATORVASTATIN CALCIUM 20 MG/1
20 TABLET, FILM COATED ORAL DAILY
Qty: 30 TABLET | Refills: 0 | Status: ON HOLD | OUTPATIENT
Start: 2025-07-30

## 2025-07-30 RX ORDER — SODIUM CHLORIDE 0.9 % (FLUSH) 0.9 %
5-40 SYRINGE (ML) INJECTION PRN
Status: ACTIVE | OUTPATIENT
Start: 2025-07-30

## 2025-07-30 RX ORDER — MAGNESIUM HYDROXIDE/ALUMINUM HYDROXICE/SIMETHICONE 120; 1200; 1200 MG/30ML; MG/30ML; MG/30ML
30 SUSPENSION ORAL EVERY 6 HOURS PRN
Status: ACTIVE | OUTPATIENT
Start: 2025-07-30

## 2025-07-30 RX ORDER — POTASSIUM CHLORIDE 1500 MG/1
40 TABLET, EXTENDED RELEASE ORAL PRN
Status: ACTIVE | OUTPATIENT
Start: 2025-07-30

## 2025-07-30 RX ORDER — MIRTAZAPINE 15 MG/1
15 TABLET, FILM COATED ORAL NIGHTLY
Status: ON HOLD | COMMUNITY

## 2025-07-30 RX ORDER — PRAZOSIN HYDROCHLORIDE 1 MG/1
1 CAPSULE ORAL NIGHTLY
Status: DISPENSED | OUTPATIENT
Start: 2025-07-30

## 2025-07-30 RX ORDER — INSULIN LISPRO 100 [IU]/ML
0-8 INJECTION, SOLUTION INTRAVENOUS; SUBCUTANEOUS EVERY 6 HOURS SCHEDULED
Status: DISCONTINUED | OUTPATIENT
Start: 2025-07-31 | End: 2025-08-03

## 2025-07-30 RX ORDER — GLUCAGON 1 MG/ML
1 KIT INJECTION PRN
Status: ACTIVE | OUTPATIENT
Start: 2025-07-30

## 2025-07-30 RX ORDER — BUPROPION HYDROCHLORIDE 150 MG/1
150 TABLET ORAL DAILY
Status: DISPENSED | OUTPATIENT
Start: 2025-07-31

## 2025-07-30 RX ORDER — SODIUM CHLORIDE 9 MG/ML
INJECTION, SOLUTION INTRAVENOUS CONTINUOUS
Status: ACTIVE | OUTPATIENT
Start: 2025-07-31 | End: 2025-07-31

## 2025-07-30 RX ORDER — ONDANSETRON 4 MG/1
4 TABLET, ORALLY DISINTEGRATING ORAL EVERY 8 HOURS PRN
Status: DISCONTINUED | OUTPATIENT
Start: 2025-07-30 | End: 2025-08-01 | Stop reason: SDUPTHER

## 2025-07-30 RX ORDER — DEXTROSE MONOHYDRATE 100 MG/ML
INJECTION, SOLUTION INTRAVENOUS CONTINUOUS PRN
Status: ACTIVE | OUTPATIENT
Start: 2025-07-30

## 2025-07-30 RX ORDER — MAGNESIUM SULFATE IN WATER 40 MG/ML
2000 INJECTION, SOLUTION INTRAVENOUS PRN
Status: ACTIVE | OUTPATIENT
Start: 2025-07-30

## 2025-07-30 RX ORDER — POTASSIUM CHLORIDE 7.45 MG/ML
10 INJECTION INTRAVENOUS PRN
Status: ACTIVE | OUTPATIENT
Start: 2025-07-30

## 2025-07-30 RX ORDER — SODIUM CHLORIDE 0.9 % (FLUSH) 0.9 %
5-40 SYRINGE (ML) INJECTION EVERY 12 HOURS SCHEDULED
Status: ACTIVE | OUTPATIENT
Start: 2025-07-30

## 2025-07-30 RX ORDER — OXYCODONE HYDROCHLORIDE 5 MG/1
5 TABLET ORAL EVERY 4 HOURS PRN
Refills: 0 | Status: DISPENSED | OUTPATIENT
Start: 2025-07-30

## 2025-07-30 RX ORDER — VITS A,C,E/LUTEIN/MINERALS 300MCG-200
1 TABLET ORAL DAILY
Status: DISPENSED | OUTPATIENT
Start: 2025-07-31

## 2025-07-30 RX ORDER — RISPERIDONE 2 MG/1
2 TABLET ORAL NIGHTLY
Status: ON HOLD | COMMUNITY

## 2025-07-30 RX ORDER — DIVALPROEX SODIUM 250 MG/1
250 TABLET, DELAYED RELEASE ORAL DAILY
Status: ON HOLD | COMMUNITY
End: 2025-08-03 | Stop reason: HOSPADM

## 2025-07-30 RX ORDER — GABAPENTIN 300 MG/1
300 CAPSULE ORAL NIGHTLY
Status: DISPENSED | OUTPATIENT
Start: 2025-07-30

## 2025-07-30 RX ORDER — ONDANSETRON 2 MG/ML
4 INJECTION INTRAMUSCULAR; INTRAVENOUS EVERY 6 HOURS PRN
Status: DISCONTINUED | OUTPATIENT
Start: 2025-07-30 | End: 2025-08-01 | Stop reason: SDUPTHER

## 2025-07-30 RX ORDER — RISPERIDONE 2 MG/1
2 TABLET ORAL NIGHTLY
Status: DISPENSED | OUTPATIENT
Start: 2025-07-30

## 2025-07-30 RX ORDER — GABAPENTIN 300 MG/1
300 CAPSULE ORAL NIGHTLY
Status: ON HOLD | COMMUNITY

## 2025-07-30 RX ORDER — ENOXAPARIN SODIUM 100 MG/ML
30 INJECTION SUBCUTANEOUS 2 TIMES DAILY
Status: DISCONTINUED | OUTPATIENT
Start: 2025-07-30 | End: 2025-07-31 | Stop reason: SDUPTHER

## 2025-07-30 RX ORDER — SENNOSIDES 8.6 MG/1
1 TABLET ORAL DAILY PRN
Status: ACTIVE | OUTPATIENT
Start: 2025-07-30

## 2025-07-30 RX ORDER — ACETAMINOPHEN 325 MG/1
650 TABLET ORAL EVERY 6 HOURS PRN
Status: ACTIVE | OUTPATIENT
Start: 2025-07-30

## 2025-07-30 RX ORDER — DIVALPROEX SODIUM 250 MG/1
250 TABLET, DELAYED RELEASE ORAL DAILY
Status: DISPENSED | OUTPATIENT
Start: 2025-07-31

## 2025-07-30 RX ORDER — SODIUM CHLORIDE 9 MG/ML
INJECTION, SOLUTION INTRAVENOUS PRN
Status: ACTIVE | OUTPATIENT
Start: 2025-07-30

## 2025-07-30 RX ORDER — CETIRIZINE HYDROCHLORIDE 10 MG/1
10 TABLET ORAL DAILY
Status: DISPENSED | OUTPATIENT
Start: 2025-07-31

## 2025-07-30 RX ORDER — HYDROXYZINE PAMOATE 25 MG/1
50 CAPSULE ORAL EVERY 12 HOURS PRN
Status: DISPENSED | OUTPATIENT
Start: 2025-07-30

## 2025-07-30 RX ORDER — AZELASTINE 1 MG/ML
1 SPRAY, METERED NASAL 2 TIMES DAILY
Status: DISCONTINUED | OUTPATIENT
Start: 2025-07-30 | End: 2025-07-30 | Stop reason: DRUGHIGH

## 2025-07-30 RX ORDER — MIRTAZAPINE 15 MG/1
15 TABLET, FILM COATED ORAL NIGHTLY
Status: DISPENSED | OUTPATIENT
Start: 2025-07-30

## 2025-07-30 RX ORDER — DICYCLOMINE HYDROCHLORIDE 10 MG/1
10 CAPSULE ORAL
Status: DISCONTINUED | OUTPATIENT
Start: 2025-07-30 | End: 2025-08-02

## 2025-07-30 RX ORDER — DIVALPROEX SODIUM 250 MG/1
500 TABLET, DELAYED RELEASE ORAL NIGHTLY
Status: DISPENSED | OUTPATIENT
Start: 2025-07-30

## 2025-07-30 RX ORDER — ACETAMINOPHEN 650 MG/1
650 SUPPOSITORY RECTAL EVERY 6 HOURS PRN
Status: ACTIVE | OUTPATIENT
Start: 2025-07-30

## 2025-07-30 RX ORDER — DICYCLOMINE HYDROCHLORIDE 10 MG/1
10 CAPSULE ORAL
Status: ON HOLD | COMMUNITY
End: 2025-08-03 | Stop reason: HOSPADM

## 2025-07-30 RX ORDER — ATORVASTATIN CALCIUM 20 MG/1
20 TABLET, FILM COATED ORAL DAILY
Status: DISPENSED | OUTPATIENT
Start: 2025-07-31

## 2025-07-30 RX ORDER — DOXYCYCLINE 100 MG/1
100 CAPSULE ORAL 2 TIMES DAILY
Status: ON HOLD | COMMUNITY
End: 2025-08-03 | Stop reason: HOSPADM

## 2025-07-30 RX ORDER — RISPERIDONE 1 MG/1
1 TABLET ORAL DAILY
Status: DISPENSED | OUTPATIENT
Start: 2025-07-31

## 2025-07-30 RX ADMIN — GABAPENTIN 300 MG: 300 CAPSULE ORAL at 22:49

## 2025-07-30 RX ADMIN — SODIUM CHLORIDE, PRESERVATIVE FREE 10 ML: 5 INJECTION INTRAVENOUS at 22:49

## 2025-07-30 RX ADMIN — PRAZOSIN HYDROCHLORIDE 1 MG: 1 CAPSULE ORAL at 22:49

## 2025-07-30 RX ADMIN — RISPERIDONE 2 MG: 2 TABLET, FILM COATED ORAL at 22:49

## 2025-07-30 RX ADMIN — INSULIN LISPRO 2 UNITS: 100 INJECTION, SOLUTION INTRAVENOUS; SUBCUTANEOUS at 22:50

## 2025-07-30 RX ADMIN — DICYCLOMINE HYDROCHLORIDE 10 MG: 10 CAPSULE ORAL at 22:49

## 2025-07-30 RX ADMIN — Medication 3 MG: at 22:49

## 2025-07-30 RX ADMIN — MIRTAZAPINE 15 MG: 15 TABLET, FILM COATED ORAL at 22:51

## 2025-07-30 RX ADMIN — DIVALPROEX SODIUM 500 MG: 250 TABLET, DELAYED RELEASE ORAL at 22:49

## 2025-07-30 RX ADMIN — SODIUM CHLORIDE: 0.9 INJECTION, SOLUTION INTRAVENOUS at 23:01

## 2025-07-30 ASSESSMENT — PAIN - FUNCTIONAL ASSESSMENT: PAIN_FUNCTIONAL_ASSESSMENT: 0-10

## 2025-07-30 ASSESSMENT — PAIN SCALES - GENERAL: PAINLEVEL_OUTOF10: 9

## 2025-07-30 NOTE — CONSULTS
Department of Podiatry   Consult Note    Reason for Consult: Second digit left foot wound    CHIEF COMPLAINT: Worsening left foot wound    HISTORY OF PRESENT ILLNESS:                The patient is a 55 y.o. female with significant past medical history of type 2 diabetes mellitus without complication, lumbar radiculopathy, who presents as Consult to podiatry service for worsening left foot wound of second left digit.  Patient was seen in clinic today by Dr. Rivera and was sent in for possible second digit left amputation.  Patient states that this wound has been worsening over time.  Patient endorses occasional pain to the site.  Patient notes that she is diabetic but moderately well-controlled.  Patient states that she gets dressing changes from Dr. Rivera's office weekly.  Patient also endorses wound to right plantar foot but states that wound is doing well.  Patient does not feel sick or have any signs of constitutional symptomology for worsening infection at this time.  Patient is nervous for procedure.  Patient denies any N/V/D/F/C/SOB/CP and has no other pedal complaints at this time.     Past Medical History:        Diagnosis Date    Anemia     stable at this time  2023    Arthritis     COVID 05/2022 11/2022-    Diabetes mellitus (HCC)     diet controlled    History of blood transfusion 2012    Hyperlipidemia     Hypertension     Lumbar pain        Past Surgical History:        Procedure Laterality Date    COLONOSCOPY      COLPOSCOPY      2001 : outside [place]     ESOPHAGOGASTRODUODENOSCOPY      GASTRIC BAND      august 2022    HYSTERECTOMY (CERVIX STATUS UNKNOWN) Bilateral     total hysterectomy due to fibroids    PAIN MANAGEMENT PROCEDURE N/A 02/14/2023    LUMBAR TRANSFORAMINAL EPIDURAL INJECTION RIGHT  L3 & LEFT l4 UNDER FLUOROSCOPIC GUIDANCE performed by Ben Steinberg MD at [facility] OR    PAIN MANAGEMENT PROCEDURE Bilateral 04/06/2023    LUMBAR TRANSFORAMINAL EPIDURAL STEROID INJECTION RIGHT L3  Sensation mildly diminished to the level of the digits diminished, more noticeably to the second digit of the left foot    DERM: Full-thickness wound appreciated to the distal tuft of the second digit of the left foot with exposed bone and malodorous in nature.  Very faint crepitus which could be secondary to free air.  Wound probes to bone.  There is no residual tracking lymphangitis to the forefoot, or significant residual erythema.  Wound itself is fibrogranular in nature and tunnels.  Second digit left foot with mild increase in size and swelling.  No calor to the site.  No signs of tracking erythema as well.  Full-thickness wound appreciated to fourth metatarsal head of the right foot plantarly.  Wound base is fibrogranular with pink overlying biofilm.  No calor to the site or ascending erythema.  No malodor to the site.  Minimal drainage to the site.    MUSCULOSKELETAL: Digital contractures appreciated to the lesser digits of bilateral lower extremity.  Pes planus foot type appreciated bilaterally.  5/5 Gross Muscle strength in all 4 quadrants.                 CONSULTS:  IP CONSULT TO PODIATRY    MEDICATION:  Scheduled Meds:  Continuous Infusions:  PRN Meds:.    RADIOLOGY:  XR FOOT LEFT (MIN 3 VIEWS)    (Results Pending)       Vitals:    /77   Pulse 97   Temp 98.1 °F (36.7 °C) (Oral)   Resp 14   Ht 1.778 m (5' 10\")   Wt 106.6 kg (235 lb)   SpO2 97%   BMI 33.72 kg/m²     LABS:   No results for input(s): \"WBC\", \"HGB\", \"HCT\", \"PLT\" in the last 72 hours.  No results for input(s): \"NA\", \"K\", \"CL\", \"CO2\", \"PHOS\", \"BUN\", \"CREATININE\" in the last 72 hours.    Invalid input(s): \"CA\"  No results for input(s): \"INR\", \"APTT\" in the last 72 hours.    Invalid input(s): \"PROT\"    ASSESSMENTS:   Osteomyelitis, 2nd digit Left foot  Full-thickness wound, left foot, second digit, POA, infected  Full-thickness wound, right foot, fourth metatarsal head, POA, stable  Type 2 diabetes mellitus    PLAN:    Patient was

## 2025-07-30 NOTE — TELEPHONE ENCOUNTER
Name of Medication(s) Requested:  Requested Prescriptions     Pending Prescriptions Disp Refills    atorvastatin (LIPITOR) 20 MG tablet [Pharmacy Med Name: Atorvastatin Calcium Oral Tablet 20 MG] 30 tablet 0     Sig: TAKE 1 TABLET BY MOUTH EVERY DAY       Medication is on current medication list Yes    Dosage and directions were verified? No    Quantity verified:       Pharmacy Verified?  Yes    Last Appointment:  7/10/2025    Future appts:  Future Appointments   Date Time Provider Department Center   8/12/2025  2:20 PM [facility]   9/12/2025  2:30 PM Rose Gonzales, APRN - CNP Surg Weight [facility]   11/12/2025  9:45 AM Ben Steinberg MD [facility] PAIN MAR [facility]        (If no appt send self scheduling link. .REFILLAPPT)  Scheduling request sent?     [] Yes  [x] No    Does patient need updated?  [] Yes  [x] No

## 2025-07-31 ENCOUNTER — ANESTHESIA EVENT (OUTPATIENT)
Dept: OPERATING ROOM | Age: 55
End: 2025-07-31
Payer: COMMERCIAL

## 2025-07-31 ENCOUNTER — ANESTHESIA (OUTPATIENT)
Dept: OPERATING ROOM | Age: 55
End: 2025-07-31
Payer: COMMERCIAL

## 2025-07-31 ENCOUNTER — APPOINTMENT (OUTPATIENT)
Dept: CT IMAGING | Age: 55
DRG: 314 | End: 2025-07-31
Payer: COMMERCIAL

## 2025-07-31 LAB
ANION GAP SERPL CALCULATED.3IONS-SCNC: 14 MMOL/L (ref 7–16)
BASOPHILS # BLD: 0.03 K/UL (ref 0–0.2)
BASOPHILS NFR BLD: 1 % (ref 0–2)
BUN SERPL-MCNC: 17 MG/DL (ref 6–20)
CALCIUM SERPL-MCNC: 9.6 MG/DL (ref 8.6–10)
CHLORIDE SERPL-SCNC: 102 MMOL/L (ref 98–107)
CO2 SERPL-SCNC: 22 MMOL/L (ref 22–29)
CREAT SERPL-MCNC: 0.8 MG/DL (ref 0.5–1)
EOSINOPHIL # BLD: 0.21 K/UL (ref 0.05–0.5)
EOSINOPHILS RELATIVE PERCENT: 4 % (ref 0–6)
ERYTHROCYTE [DISTWIDTH] IN BLOOD BY AUTOMATED COUNT: 17.1 % (ref 11.5–15)
GFR, ESTIMATED: 80 ML/MIN/1.73M2
GLUCOSE BLD-MCNC: 135 MG/DL (ref 74–99)
GLUCOSE BLD-MCNC: 151 MG/DL (ref 74–99)
GLUCOSE SERPL-MCNC: 130 MG/DL (ref 74–99)
HBA1C MFR BLD: 8.2 % (ref 4–5.6)
HCT VFR BLD AUTO: 35.8 % (ref 34–48)
HGB BLD-MCNC: 11.9 G/DL (ref 11.5–15.5)
IMM GRANULOCYTES # BLD AUTO: <0.03 K/UL (ref 0–0.58)
IMM GRANULOCYTES NFR BLD: 0 % (ref 0–5)
LYMPHOCYTES NFR BLD: 1.74 K/UL (ref 1.5–4)
LYMPHOCYTES RELATIVE PERCENT: 34 % (ref 20–42)
MCH RBC QN AUTO: 32.3 PG (ref 26–35)
MCHC RBC AUTO-ENTMCNC: 33.2 G/DL (ref 32–34.5)
MCV RBC AUTO: 97.3 FL (ref 80–99.9)
MONOCYTES NFR BLD: 0.42 K/UL (ref 0.1–0.95)
MONOCYTES NFR BLD: 8 % (ref 2–12)
NEUTROPHILS NFR BLD: 53 % (ref 43–80)
NEUTS SEG NFR BLD: 2.68 K/UL (ref 1.8–7.3)
PLATELET # BLD AUTO: 177 K/UL (ref 130–450)
PMV BLD AUTO: 10 FL (ref 7–12)
POTASSIUM SERPL-SCNC: 4.9 MMOL/L (ref 3.5–5.1)
RBC # BLD AUTO: 3.68 M/UL (ref 3.5–5.5)
SODIUM SERPL-SCNC: 138 MMOL/L (ref 136–145)
WBC OTHER # BLD: 5.1 K/UL (ref 4.5–11.5)

## 2025-07-31 PROCEDURE — 82962 GLUCOSE BLOOD TEST: CPT

## 2025-07-31 PROCEDURE — 87185 SC STD ENZYME DETCJ PER NZM: CPT

## 2025-07-31 PROCEDURE — 87205 SMEAR GRAM STAIN: CPT

## 2025-07-31 PROCEDURE — 6360000002 HC RX W HCPCS

## 2025-07-31 PROCEDURE — 6360000002 HC RX W HCPCS: Performed by: ANESTHESIOLOGY

## 2025-07-31 PROCEDURE — 88305 TISSUE EXAM BY PATHOLOGIST: CPT

## 2025-07-31 PROCEDURE — 7100000010 HC PHASE II RECOVERY - FIRST 15 MIN: Performed by: PODIATRIST

## 2025-07-31 PROCEDURE — 6360000002 HC RX W HCPCS: Performed by: PODIATRIST

## 2025-07-31 PROCEDURE — 88307 TISSUE EXAM BY PATHOLOGIST: CPT

## 2025-07-31 PROCEDURE — 2580000003 HC RX 258

## 2025-07-31 PROCEDURE — 87102 FUNGUS ISOLATION CULTURE: CPT

## 2025-07-31 PROCEDURE — 99232 SBSQ HOSP IP/OBS MODERATE 35: CPT | Performed by: STUDENT IN AN ORGANIZED HEALTH CARE EDUCATION/TRAINING PROGRAM

## 2025-07-31 PROCEDURE — 87070 CULTURE OTHR SPECIMN AEROBIC: CPT

## 2025-07-31 PROCEDURE — 80048 BASIC METABOLIC PNL TOTAL CA: CPT

## 2025-07-31 PROCEDURE — 6370000000 HC RX 637 (ALT 250 FOR IP): Performed by: HOSPITALIST

## 2025-07-31 PROCEDURE — 0QBR0ZX EXCISION OF LEFT TOE PHALANX, OPEN APPROACH, DIAGNOSTIC: ICD-10-PCS | Performed by: PODIATRIST

## 2025-07-31 PROCEDURE — 85025 COMPLETE CBC W/AUTO DIFF WBC: CPT

## 2025-07-31 PROCEDURE — 3600000012 HC SURGERY LEVEL 2 ADDTL 15MIN: Performed by: PODIATRIST

## 2025-07-31 PROCEDURE — 3600000002 HC SURGERY LEVEL 2 BASE: Performed by: PODIATRIST

## 2025-07-31 PROCEDURE — 73700 CT LOWER EXTREMITY W/O DYE: CPT

## 2025-07-31 PROCEDURE — 7100000011 HC PHASE II RECOVERY - ADDTL 15 MIN: Performed by: PODIATRIST

## 2025-07-31 PROCEDURE — 87206 SMEAR FLUORESCENT/ACID STAI: CPT

## 2025-07-31 PROCEDURE — 3700000000 HC ANESTHESIA ATTENDED CARE: Performed by: PODIATRIST

## 2025-07-31 PROCEDURE — 2709999900 HC NON-CHARGEABLE SUPPLY: Performed by: PODIATRIST

## 2025-07-31 PROCEDURE — 1200000000 HC SEMI PRIVATE

## 2025-07-31 PROCEDURE — 87116 MYCOBACTERIA CULTURE: CPT

## 2025-07-31 PROCEDURE — 0Y6S0Z0 DETACHMENT AT LEFT 2ND TOE, COMPLETE, OPEN APPROACH: ICD-10-PCS | Performed by: PODIATRIST

## 2025-07-31 PROCEDURE — 87015 SPECIMEN INFECT AGNT CONCNTJ: CPT

## 2025-07-31 PROCEDURE — 2580000003 HC RX 258: Performed by: HOSPITALIST

## 2025-07-31 PROCEDURE — 3700000001 HC ADD 15 MINUTES (ANESTHESIA): Performed by: PODIATRIST

## 2025-07-31 PROCEDURE — 2500000003 HC RX 250 WO HCPCS: Performed by: HOSPITALIST

## 2025-07-31 PROCEDURE — 87075 CULTR BACTERIA EXCEPT BLOOD: CPT

## 2025-07-31 PROCEDURE — 87077 CULTURE AEROBIC IDENTIFY: CPT

## 2025-07-31 PROCEDURE — 88311 DECALCIFY TISSUE: CPT

## 2025-07-31 RX ORDER — SODIUM CHLORIDE 0.9 % (FLUSH) 0.9 %
5-40 SYRINGE (ML) INJECTION PRN
Status: DISCONTINUED | OUTPATIENT
Start: 2025-07-31 | End: 2025-07-31

## 2025-07-31 RX ORDER — SODIUM CHLORIDE 0.9 % (FLUSH) 0.9 %
5-40 SYRINGE (ML) INJECTION EVERY 12 HOURS SCHEDULED
Status: ACTIVE | OUTPATIENT
Start: 2025-07-31

## 2025-07-31 RX ORDER — PROPOFOL 10 MG/ML
INJECTION, EMULSION INTRAVENOUS
Status: DISCONTINUED | OUTPATIENT
Start: 2025-07-31 | End: 2025-07-31 | Stop reason: SDUPTHER

## 2025-07-31 RX ORDER — SODIUM CHLORIDE 9 MG/ML
INJECTION, SOLUTION INTRAVENOUS PRN
Status: ACTIVE | OUTPATIENT
Start: 2025-07-31

## 2025-07-31 RX ORDER — ONDANSETRON 2 MG/ML
4 INJECTION INTRAMUSCULAR; INTRAVENOUS EVERY 6 HOURS PRN
Status: ACTIVE | OUTPATIENT
Start: 2025-07-31

## 2025-07-31 RX ORDER — SODIUM CHLORIDE 9 MG/ML
INJECTION, SOLUTION INTRAVENOUS PRN
Status: DISCONTINUED | OUTPATIENT
Start: 2025-07-31 | End: 2025-07-31

## 2025-07-31 RX ORDER — ENOXAPARIN SODIUM 100 MG/ML
30 INJECTION SUBCUTANEOUS 2 TIMES DAILY
Status: DISPENSED | OUTPATIENT
Start: 2025-07-31

## 2025-07-31 RX ORDER — SODIUM CHLORIDE 9 MG/ML
INJECTION, SOLUTION INTRAVENOUS
Status: DISCONTINUED | OUTPATIENT
Start: 2025-07-31 | End: 2025-07-31 | Stop reason: SDUPTHER

## 2025-07-31 RX ORDER — ONDANSETRON 4 MG/1
4 TABLET, ORALLY DISINTEGRATING ORAL EVERY 8 HOURS PRN
Status: ACTIVE | OUTPATIENT
Start: 2025-07-31

## 2025-07-31 RX ORDER — PROCHLORPERAZINE EDISYLATE 5 MG/ML
5 INJECTION INTRAMUSCULAR; INTRAVENOUS
Status: DISCONTINUED | OUTPATIENT
Start: 2025-07-31 | End: 2025-07-31

## 2025-07-31 RX ORDER — LIDOCAINE HYDROCHLORIDE 20 MG/ML
INJECTION, SOLUTION EPIDURAL; INFILTRATION; INTRACAUDAL; PERINEURAL
Status: DISCONTINUED | OUTPATIENT
Start: 2025-07-31 | End: 2025-07-31 | Stop reason: SDUPTHER

## 2025-07-31 RX ORDER — ONDANSETRON 2 MG/ML
INJECTION INTRAMUSCULAR; INTRAVENOUS
Status: DISCONTINUED | OUTPATIENT
Start: 2025-07-31 | End: 2025-07-31 | Stop reason: SDUPTHER

## 2025-07-31 RX ORDER — BUPIVACAINE HYDROCHLORIDE 5 MG/ML
INJECTION, SOLUTION EPIDURAL; INTRACAUDAL; PERINEURAL PRN
Status: DISCONTINUED | OUTPATIENT
Start: 2025-07-31 | End: 2025-07-31 | Stop reason: ALTCHOICE

## 2025-07-31 RX ORDER — SODIUM CHLORIDE 0.9 % (FLUSH) 0.9 %
5-40 SYRINGE (ML) INJECTION EVERY 12 HOURS SCHEDULED
Status: DISCONTINUED | OUTPATIENT
Start: 2025-07-31 | End: 2025-07-31

## 2025-07-31 RX ORDER — SODIUM CHLORIDE 0.9 % (FLUSH) 0.9 %
5-40 SYRINGE (ML) INJECTION PRN
Status: ACTIVE | OUTPATIENT
Start: 2025-07-31

## 2025-07-31 RX ADMIN — ENOXAPARIN SODIUM 30 MG: 100 INJECTION SUBCUTANEOUS at 21:24

## 2025-07-31 RX ADMIN — OXYCODONE HYDROCHLORIDE 10 MG: 5 TABLET ORAL at 23:36

## 2025-07-31 RX ADMIN — HYDROMORPHONE HYDROCHLORIDE 0.25 MG: 1 INJECTION, SOLUTION INTRAMUSCULAR; INTRAVENOUS; SUBCUTANEOUS at 18:18

## 2025-07-31 RX ADMIN — DIVALPROEX SODIUM 500 MG: 250 TABLET, DELAYED RELEASE ORAL at 21:23

## 2025-07-31 RX ADMIN — SODIUM CHLORIDE 3000 MG: 9 INJECTION, SOLUTION INTRAVENOUS at 21:36

## 2025-07-31 RX ADMIN — OXYCODONE HYDROCHLORIDE 5 MG: 5 TABLET ORAL at 19:26

## 2025-07-31 RX ADMIN — SODIUM CHLORIDE: 9 INJECTION, SOLUTION INTRAVENOUS at 17:02

## 2025-07-31 RX ADMIN — GABAPENTIN 300 MG: 300 CAPSULE ORAL at 21:23

## 2025-07-31 RX ADMIN — PROPOFOL 125 MCG/KG/MIN: 10 INJECTION, EMULSION INTRAVENOUS at 17:44

## 2025-07-31 RX ADMIN — MIRTAZAPINE 15 MG: 15 TABLET, FILM COATED ORAL at 21:22

## 2025-07-31 RX ADMIN — RISPERIDONE 2 MG: 2 TABLET, FILM COATED ORAL at 21:23

## 2025-07-31 RX ADMIN — PRAZOSIN HYDROCHLORIDE 1 MG: 1 CAPSULE ORAL at 21:22

## 2025-07-31 RX ADMIN — SODIUM CHLORIDE, PRESERVATIVE FREE 10 ML: 5 INJECTION INTRAVENOUS at 21:27

## 2025-07-31 RX ADMIN — LIDOCAINE HYDROCHLORIDE 100 MG: 20 INJECTION, SOLUTION EPIDURAL; INFILTRATION; INTRACAUDAL; PERINEURAL at 17:43

## 2025-07-31 RX ADMIN — SODIUM CHLORIDE: 0.9 INJECTION, SOLUTION INTRAVENOUS at 13:52

## 2025-07-31 RX ADMIN — PROPOFOL 20 MG: 10 INJECTION, EMULSION INTRAVENOUS at 17:49

## 2025-07-31 RX ADMIN — PROPOFOL 80 MG: 10 INJECTION, EMULSION INTRAVENOUS at 17:43

## 2025-07-31 RX ADMIN — ONDANSETRON 4 MG: 2 INJECTION, SOLUTION INTRAMUSCULAR; INTRAVENOUS at 17:43

## 2025-07-31 RX ADMIN — DICYCLOMINE HYDROCHLORIDE 10 MG: 10 CAPSULE ORAL at 21:22

## 2025-07-31 ASSESSMENT — PAIN DESCRIPTION - PAIN TYPE
TYPE: SURGICAL PAIN

## 2025-07-31 ASSESSMENT — PAIN SCALES - GENERAL
PAINLEVEL_OUTOF10: 8
PAINLEVEL_OUTOF10: 5
PAINLEVEL_OUTOF10: 7

## 2025-07-31 ASSESSMENT — PAIN DESCRIPTION - ORIENTATION
ORIENTATION: LEFT

## 2025-07-31 ASSESSMENT — PAIN DESCRIPTION - LOCATION
LOCATION: FOOT
LOCATION: TOE (COMMENT WHICH ONE)

## 2025-07-31 ASSESSMENT — PAIN DESCRIPTION - DESCRIPTORS
DESCRIPTORS: ACHING
DESCRIPTORS: SHARP
DESCRIPTORS: ACHING;SORE
DESCRIPTORS: ACHING
DESCRIPTORS: ACHING;SORE

## 2025-07-31 ASSESSMENT — PAIN DESCRIPTION - FREQUENCY
FREQUENCY: CONTINUOUS

## 2025-07-31 ASSESSMENT — LIFESTYLE VARIABLES: SMOKING_STATUS: 0

## 2025-07-31 ASSESSMENT — PAIN DESCRIPTION - ONSET
ONSET: ON-GOING

## 2025-07-31 ASSESSMENT — PAIN - FUNCTIONAL ASSESSMENT
PAIN_FUNCTIONAL_ASSESSMENT: NONE - DENIES PAIN
PAIN_FUNCTIONAL_ASSESSMENT: ACTIVITIES ARE NOT PREVENTED

## 2025-07-31 NOTE — CONSULTS
[facility]  [facility]  Consultation Note     Admit Date: 7/30/2025  5:17 PM    Reason for Consult:   Right foot osteomyelitis    Attending Physician:  Tay Hawkins MD    HISTORY OF PRESENT ILLNESS:             The history is obtained from extensive review of available past medical records. The patient is a 55 y.o. female who is known to the ID service.   [facility] for a worsening wound to her left second digit.  Patient was seen and sent in by Dr. Rivera for a possible left second digit amputation.  Left foot x-ray shows new focal bone destruction/loss involving the middle and distal phalanges of the left second toe consistent with osteomyelitis, new periosteal reaction involving the mid and distal aspect of the left second proximal phalanx consistent with osteomyelitis, CT of the left foot is pending.  Patient has been seen by podiatry and scheduled for left second digit amputation and left foot bone biopsy today.  She denies any fevers, chills, nausea, vomiting or diarrhea.  States she was prescribed an antibiotic recently and was supposed to pick it up from her pharmacy but never did.   Since admission, patient has been afebrile.  CRP 29.4.  Within normal limits.  ESR 80.  Wound culture pending.  ID was consulted for further recommendations.    Past Medical History:      6/18/2025.  [facility] and seen by Dr. Mckenzie for diabetic foot ulcers.  It was felt there was no clinical signs of infection around the ulcers and antibiotics were not started at that time.  Podiatry and vascular were consulted to see.  MRI of the left foot showed possible early osteomyelitis of the 1st and 2nd phalanges.  ID then signed off.        Diagnosis Date    Anemia     stable at this time  2023    Arthritis     COVID 05/2022 11/2022-    Diabetes mellitus (HCC)     diet controlled    History of blood transfusion 2012    Hyperlipidemia     Hypertension      (8/26/2019)    Received from [facility]    Stress     Stress: 0   Social Connections: Not on File (8/26/2019)    Received from [facility]    Social Connections     Social Connections and Isolation: 0   Housing Stability: Low Risk  (7/30/2025)    Housing Stability Vital Sign     Unable to Pay for Housing in the Last Year: No     Number of Times Moved in the Last Year: 0     Homeless in the Last Year: No        Family History:       Problem Relation Age of Onset    Diabetes Mother     Hypertension Mother     Diabetes Father     Hypertension Father     Gout Father     Heart Disease None     Ovarian Cancer None     Uterine Cancer None     Breast Cancer None    . Otherwise non-pertinent to the chief complaint.    REVIEW OF SYSTEMS:    Constitutional: Negative for fevers, chills, diaphoresis  Neurologic: Negative   Psychiatric: Negative  Rheumatologic: Negative   Endocrine: Negative  Hematologic: Negative  Immunologic: Negative  ENT: Negative  Respiratory: Negative   Cardiovascular: Negative  GI: Negative  : Negative  Musculoskeletal: Negative  Skin: As per HPI.    PHYSICAL EXAM:    Vitals:   /74   Pulse 90   Temp 97.9 °F (36.6 °C) (Oral)   Resp 18   Ht 1.778 m (5' 10\")   Wt 111.1 kg (245 lb)   SpO2 95%   BMI 35.15 kg/m²   Constitutional: The patient is awake, alert, and oriented.  Resting in bed.  In no apparent distress.  Skin: Warm and dry. No rashes were noted.   HEENT: Eyes show round, and reactive pupils. No jaundice. Moist mucous membranes, no ulcerations, no thrush.   Neck: Supple to movements. No lymphadenopathy.   Chest: No use of accessory muscles to breathe. Symmetrical expansion. Auscultation reveals no wheezing, crackles, or rhonchi.   Cardiovascular: S1 and S2 are rhythmic and regular. No murmurs appreciated.   Abdomen: Positive bowel sounds to auscultation. Benign to palpation.   Extremities: Right and left feet in dressings.  Left foot dressing has malodorous drainage noted.  Lines:

## 2025-07-31 NOTE — PROGRESS NOTES
[facility] Hospitalist Progress Note    Admitting Date and Time: 7/30/2025  5:17 PM  Admit Dx: Osteomyelitis (HCC) [M86.9]    Subjective:  Patient is being followed for Osteomyelitis (HCC) [M86.9]   Pt feels well  Per RN: no additional concerns    ROS: denies fever, chills, cp, sob, n/v, HA unless otherwise noted above    Meds:   atorvastatin  20 mg Oral Daily    buPROPion  150 mg Oral Daily    cetirizine  10 mg Oral Daily    dicyclomine  10 mg Oral 4x Daily AC & HS    divalproex  250 mg Oral Daily    divalproex  500 mg Oral Nightly    gabapentin  300 mg Oral Nightly    mirtazapine  15 mg Oral Nightly    ocuvite-lutein  1 tablet Oral Daily    prazosin  1 mg Oral Nightly    risperiDONE  2 mg Oral Nightly    risperiDONE  1 mg Oral Daily    sodium chloride flush  5-40 mL IntraVENous 2 times per day    enoxaparin  30 mg SubCUTAneous BID    insulin lispro  0-8 Units SubCUTAneous 4 times per day     hydrOXYzine pamoate, 50 mg, Q12H PRN  glucose, 4 tablet, PRN  dextrose bolus, 125 mL, PRN   Or  dextrose bolus, 250 mL, PRN  glucagon (rDNA), 1 mg, PRN  dextrose, , Continuous PRN  sodium chloride flush, 5-40 mL, PRN  sodium chloride, , PRN  potassium chloride, 40 mEq, PRN   Or  potassium alternative oral replacement, 40 mEq, PRN   Or  potassium chloride, 10 mEq, PRN  magnesium sulfate, 2,000 mg, PRN  ondansetron, 4 mg, Q8H PRN   Or  ondansetron, 4 mg, Q6H PRN  melatonin, 3 mg, Nightly PRN  senna, 1 tablet, Daily PRN  aluminum & magnesium hydroxide-simethicone, 30 mL, Q6H PRN  acetaminophen, 650 mg, Q6H PRN   Or  acetaminophen, 650 mg, Q6H PRN  oxyCODONE, 5 mg, Q4H PRN   Or  oxyCODONE, 10 mg, Q4H PRN       Objective:  /74   Pulse 90   Temp 97.9 °F (36.6 °C) (Oral)   Resp 18   Ht 1.778 m (5' 10\")   Wt 111.1 kg (245 lb)   SpO2 95%   BMI 35.15 kg/m²   General Appearance: alert and oriented to person, place, time. NAD, sitting in bed talking on phone  Skin: warm and dry  Head: normocephalic and  atraumatic  Eyes: PERRL, EOMI, conjunctivae normal  Neck: neck supple, trachea midline   Pulmonary/Chest: CTAB, no w/r/r, no respiratory distress, RA  Cardiovascular: RRR, no murmurs  Abdomen: soft, non-tender, non-distended, normal bowel sounds  Extremities: right foot wrapped in ACE and gauze  Neurologic: no cranial nerve deficit and speech normal    Labs:  Recent Labs     07/30/25  1824 07/31/25  0353   * 138   K 4.6 4.9   CL 99 102   CO2 24 22   BUN 15 17   CREATININE 0.9 0.8   GLUCOSE 111* 130*   CALCIUM 9.9 9.6     Recent Labs     07/30/25  1824 07/31/25  0353   WBC 6.4 5.1   RBC 3.77 3.68   HGB 12.4 11.9   HCT 36.8 35.8   MCV 97.6 97.3   MCH 32.9 32.3   MCHC 33.7 33.2   RDW 17.0* 17.1*    177   MPV 9.8 10.0     Radiology:  XR FOOT LEFT (MIN 3 VIEWS)   Final Result   1. New focal bone destruction/loss involving the middle and distal phalanges   of the left 2nd toe consistent with osteomyelitis.   2. New periosteal reaction involving the mid and distal aspect of the left   2nd proximal phalanx consistent with osteomyelitis.              Assessment:  Principal Problem:    Osteomyelitis (HCC)  Active Problems:    Disorder of sleep-wake cycle    Spinal stenosis of lumbar region    Anxiety    Controlled type 2 diabetes mellitus with complication, without long-term current use of insulin (HCC)    PTSD (post-traumatic stress disorder)    Diabetic foot infection (HCC)    Bipolar affective disorder, mixed, severe, with psychotic behavior (HCC)    Dyslipidemia    Diabetic peripheral angiopathy (HCC)    Essential hypertension    Polyarthralgia    Obesity  Resolved Problems:    * No resolved hospital problems. *    Plan:  Left plantar foot wound, left 2nd toe OM: Pods following, sent from office on 7/30 for possible amputation of left 2nd digit. Plan left 2nd toe amputation and left foot bone bx 7/31, defer abx pending Cxs and surgery, ID following and plan for Unasyn (did note take doxycycline prescribed as

## 2025-07-31 NOTE — H&P
[facility] Hospitalist Group History and Physical    PATIENT DEMOGRAPHICS  Name: Mae Calvillo  Age: 55 y.o.  Sex: female      ASSESSMENT  Principal Problem:    Osteomyelitis (HCC)  Active Problems:    Diabetic foot infection (HCC)    Disorder of sleep-wake cycle    Spinal stenosis of lumbar region    Controlled type 2 diabetes mellitus with complication, without long-term current use of insulin (HCC)    Essential hypertension    Bipolar affective disorder, mixed, severe, with psychotic behavior (HCC)    Dyslipidemia    Diabetic peripheral angiopathy (HCC)    Anxiety    PTSD (post-traumatic stress disorder)    Polyarthralgia    Obesity  Resolved Problems:    * No resolved hospital problems. *         PLAN  Osteomyelitis of of 2nd Phalanx of L Foot: POA, not septic, ESR, CRP wound/bone cx. Consult to ID. Podiatry following.  NIDDM: diet controlled, LDSSI with accu-cheks.    HLD: Cont home atorvastatin.   HTN: hold Diovan for now  Bipolar disorder/PTSD/Anxiety: Continue home psychotropics.   Class II Obesity:  Body mass index is 35.15 kg/m².,  on diet & lifestyle modifications.               CARE COORDINATION  Consultations: IP CONSULT TO PODIATRY  Code Status: Full Code  DVT PPx: [x]Lovenox []Heparin []SCDs [] Warfarin/NOAC []Encouraged ambulation  Disposition: [x]Med/Surg  []Med/Tele [] Intermediate [] ICU/CCU  Admit status: [] Observation [x] Inpatient       CHIEF COMPLAINT  had concerns including Wound Check (L foot, Pt was seen at podiatry and was told to come in to be seen for sx).    HISTORY OF PRESENT ILLNESS    55-year-old female with PMH of type 2 diabetes mellitus, lumbar radiculopathy presenting with worsening left foot wound of second left digit for unspecified duration. Patient reports the wound has been worsening over time with occasional pain at the site. She receives weekly dressing changes at Dr. Rivera's office and also has a right plantar foot wound that is reportedly doing  Ben Steinberg MD at [facility] [place]    [place]  DELIV[place] W/POSTPARTUM CARE      1991 :      [place]  DEL[place] W/POSTPART[place] CARE      1998 :      [place] CONIZATION CERVIX W/WO D&C RPR KNIFE/LASER      2001 : outside [facility] TOOTH EXTRACTION         Family History   Problem Relation Age of Onset    Diabetes Mother     Hypertension Mother     Diabetes Father     Hypertension Father     Gout Father     Heart Disease None     Ovarian Cancer None     Uterine Cancer None     Breast Cancer None              [START ON 2025] atorvastatin  20 mg Oral Daily    [START ON 2025] buPROPion  150 mg Oral Daily    [START ON 2025] cetirizine  10 mg Oral Daily    dicyclomine  10 mg Oral 4x Daily AC & HS    [START ON 2025] divalproex  250 mg Oral Daily    divalproex  500 mg Oral Nightly    gabapentin  300 mg Oral Nightly    mirtazapine  15 mg Oral Nightly    [START ON 2025] ocuvite-lutein  1 tablet Oral Daily    prazosin  1 mg Oral Nightly    risperiDONE  2 mg Oral Nightly    [START ON 2025] risperiDONE  1 mg Oral Daily    sodium chloride flush  5-40 mL IntraVENous 2 times per day    enoxaparin  30 mg SubCUTAneous BID    [START ON 2025] insulin lispro  0-8 Units SubCUTAneous 4 times per day         dextrose      sodium chloride      [START ON 2025] sodium chloride          hydrOXYzine pamoate, melatonin, glucose, dextrose bolus **OR** dextrose bolus, glucagon (rDNA), dextrose, sodium chloride flush, sodium chloride, potassium chloride **OR** potassium alternative oral replacement **OR** potassium chloride, magnesium sulfate, ondansetron **OR** ondansetron, melatonin, senna, aluminum & magnesium hydroxide-simethicone, acetaminophen **OR** acetaminophen, oxyCODONE **OR** oxyCODONE     Allergies   Allergen Reactions    Colchicine     Darvon [Propoxyphene] Other (See Comments)     GI Upset         REVIEW OF SYSTEMS  A comprehensive review of systems was negative

## 2025-07-31 NOTE — PROGRESS NOTES
Pharmacy Note    Mae Calvillo was ordered Jardiance (for Type 2 Diabetes).  Per the [facility] Formulary Committee Policy, this medication is non-formulary and not stocked by the Pharmacy.  The medication can be reordered at discharge.

## 2025-07-31 NOTE — CARE COORDINATION
Social Work discharge planning  Pt for toe amputation then anticipate po meds at discharge. Pt and son live together. She has a PCP. She goes to outpt counseling at [facility]. She has CGM and glucometer. She gets medical transport assist through her insurance and will likely need to go home at discharge.  [phone]. Will need follow up discharge planning post op.  Electronically signed by LORRAINE Navarro on 7/31/2025 at 3:18 PM

## 2025-07-31 NOTE — ANESTHESIA PRE PROCEDURE
COLONOSCOPY      COLPOSCOPY      2001 : outside [place]     ESOPHAGOGASTRODUODENOSCOPY      GASTRIC BAND      2022    HYSTERECTOMY (CERVIX STATUS UNKNOWN) Bilateral     total hysterectomy due to fibroids    PAIN MANAGEMENT PROCEDURE N/A 2023    LUMBAR TRANSFORAMINAL EPIDURAL INJECTION RIGHT  L3 & LEFT l4 UNDER FLUOROSCOPIC GUIDANCE performed by Ben Steinberg MD at [facility] OR    PAIN MANAGEMENT PROCEDURE Bilateral 2023    LUMBAR TRANSFORAMINAL EPIDURAL STEROID INJECTION RIGHT L3 & LEFT L4 UNDER FLUOROSCOPIC GUIDANCE performed by Ben Steinberg MD at [facility] OR    PAIN MANAGEMENT PROCEDURE N/A 06/15/2023    LUMBAR EPIDURAL STEROID INJECTION UNDER FLUOROSCOPIC GUIDANCE AT L3-L4 performed by Ben Steinberg MD at [facility] OR    PAIN MANAGEMENT PROCEDURE Right 10/3/2024    LUMBAR EPIDURAL STEROID INJECTION L5-S1 UNDER FLUOROSCOPIC GUIDANCE performed by Ben Steinberg MD at [facility] [place]    [place]      [place]      [place] :      [place] CERV[place] W/WO D&C RPR KNIFE/LASER      2001 : outside [place]     WISDOM TOOTH EXTRACTION         Social History:    Social History     Tobacco Use    Smoking status: Former     Current packs/day: 0.00     Average packs/day: 0.5 packs/day for 1 year (0.5 ttl pk-yrs)     Types: Cigarettes     Start date:      Quit date:      Years since quittin.5    Smokeless tobacco: Never    Tobacco comments:     Quit [email or web address]   Substance Use Topics    Alcohol use: Yes     Alcohol/week: 2.0 standard drinks of alcohol     Types: 2 Shots of liquor per week     Comment: twice a week                                Counseling given: Not Answered  Tobacco comments: Quit [email or web address]      Vital Signs (Current):   Vitals:    25 2126 25 0056 25 0701 25 1601   BP: 120/80  100/74 (!) 99/56   Pulse: 92  90 85   Resp: 17  18 20

## 2025-07-31 NOTE — ED NOTES
ED TO INPATIENT SBAR HANDOFF    Patient Name: Mae Calvillo   :  1970  55 y.o.   Preferred Name  Jo-Ann  Family/Caregiver Present no   Restraints no   C-SSRS:    Sitter no   Sepsis Risk Score Sepsis V2 Risk Score: 10.2    PLEASE NOTE--Encounter / Re-Admission Within 30 Days  This patient has had another encounter or admission within the last 30 days.      Readmission Risk Score: 14.8      Situation  Chief Complaint   Patient presents with    Wound Check     L foot, Pt was seen at podiatry and was told to come in to be seen for sx     Brief Description of Patient's Condition: pt has a chronic wound on left toe. Possible surgical repair or removal.   Mental Status: oriented  Arrived from: home    Imaging:   XR FOOT LEFT (MIN 3 VIEWS)   Final Result   1. New focal bone destruction/loss involving the middle and distal phalanges   of the left 2nd toe consistent with osteomyelitis.   2. New periosteal reaction involving the mid and distal aspect of the left   2nd proximal phalanx consistent with osteomyelitis.           Abnormal labs:   Abnormal Labs Reviewed   CBC WITH AUTO DIFFERENTIAL - Abnormal; Notable for the following components:       Result Value    RDW 17.0 (*)     All other components within normal limits   COMPREHENSIVE METABOLIC PANEL - Abnormal; Notable for the following components:    Sodium 135 (*)     Glucose 111 (*)     Total Protein 8.7 (*)     Alkaline Phosphatase 105 (*)     AST 51 (*)     All other components within normal limits        Background  History:   Past Medical History:   Diagnosis Date    Anemia     stable at this time      Arthritis     COVID 2022-    Diabetes mellitus (HCC)     diet controlled    History of blood transfusion 2012    Hyperlipidemia     Hypertension     Lumbar pain        Assessment    Vitals: MEWS Score: 0  Level of Consciousness: Alert (0)   Vitals:    25 1632   BP: 107/77   Pulse: 97   Resp: 14   Temp: 98.1 °F (36.7 °C)   TempSrc:

## 2025-07-31 NOTE — PROGRESS NOTES
4 Eyes Skin Assessment     NAME:  Mae Calvillo  YOB: 1970  MEDICAL RECORD NUMBER:  [id number]    The patient is being assessed for  Admission    I agree that at least one RN has performed a thorough Head to Toe Skin Assessment on the patient. ALL assessment sites listed below have been assessed.      Areas assessed by both nurses:    Head, Face, Ears, Shoulders, Back, Chest, Arms, Elbows, Hands, Sacrum. Buttock, Coccyx, Ischium, Legs. Feet and Heels, and Under Medical Devices         Does the Patient have a Wound? Yes wound(s) were present on assessment. LDA wound assessment was Initiated and completed by RN       Zion Prevention initiated by RN: No  Wound Care Orders initiated by RN: Yes    For hospital-acquired stage 1 & 2 and ALL Stage 3,4, Unstageable, DTI, NWPT, and Complex wounds: place order “IP Wound Care/Ostomy Nurse Eval and Treat” by RN under : No    New Ostomies, if present place, Ostomy referral order under : No     Nurse 1 eSignature: Electronically signed by Blaine Guerrero RN on 7/30/25 at 9:50 PM EDT    **SHARE this note so that the co-signing nurse can place an eSignature**    Nurse 2 eSignature: Electronically signed by Liya Bowie RN on 7/31/25 at 12:48 AM EDT

## 2025-07-31 NOTE — ED PROVIDER NOTES
Department of Emergency Medicine   ED  Provider Note  Admit Date/RoomTime: 2025  5:17 PM  ED Room: [facility]/0513        Written by: Cornelia Garsia DO  Patient Name: Mae Calvillo  Attending Provider: Tay Hawkins MD  Admit Date: 2025  5:17 PM  MRN: [id number]    : 1970      History of Present Illness:  25, Time: 12:01 AM EDT  Chief Complaint   Patient presents with    Wound Check     L foot, Pt was seen at podiatry and was told to come in to be seen for sx           HPI   Patient is a 55 y.o. presets for foot pain. Folowing with podiatry for wounds b/l. Has 2nd digit wound worsening over last few week. Non healing and send here for sx eval. No fevers, chills, n/v        Review of Systems:   A complete review of systems was performed and pertinent positives and negatives are stated within HPI, all other systems reviewed and are negative.        --------------------------------------------- PAST HISTORY ---------------------------------------------  Past Medical History:  has a past medical history of Anemia, Arthritis, COVID, Diabetes mellitus (HCC), History of blood transfusion, Hyperlipidemia, Hypertension, and Lumbar pain.    Past Surgical History:  has a past surgical history that includes pr  delivery only w/postpartum care; pr  delivery only w/postpartum care; Colposcopy; pr conization cervix w/wo d&c rpr knife/laser; Gastric Band; Hysterectomy (Bilateral); Pain management procedure (N/A, 2023); [place] tooth extraction; Pain management procedure (Bilateral, 2023); Pain management procedure (N/A, 06/15/2023); Colonoscopy; Esophagogastroduodenoscopy; Pain management procedure (Right, 10/3/2024); and Toe amputation (Left, 2025).    Social History:  reports that she quit smoking about 23 years ago. Her smoking use included cigarettes. She started smoking about 24 years ago. She has a 0.5 pack-year smoking history. She has never used smokeless  ORGANISMS SEEN     Culture PENDING    CBC with Auto Differential   Result Value Ref Range    WBC 6.4 4.5 - 11.5 k/uL    RBC 3.77 3.50 - 5.50 m/uL    Hemoglobin 12.4 11.5 - 15.5 g/dL    Hematocrit 36.8 34.0 - 48.0 %    MCV 97.6 80.0 - 99.9 fL    MCH 32.9 26.0 - 35.0 pg    MCHC 33.7 32.0 - 34.5 g/dL    RDW 17.0 (H) 11.5 - 15.0 %    Platelets 184 130 - 450 k/uL    MPV 9.8 7.0 - 12.0 fL    Neutrophils % 61 43.0 - 80.0 %    Lymphocytes % 27 20.0 - 42.0 %    Monocytes % 8 2.0 - 12.0 %    Eosinophils % 3 0 - 6 %    Basophils % 1 0.0 - 2.0 %    Immature Granulocytes % 0 0.0 - 5.0 %    Neutrophils Absolute 3.89 1.80 - 7.30 k/uL    Lymphocytes Absolute 1.72 1.50 - 4.00 k/uL    Monocytes Absolute 0.50 0.10 - 0.95 k/uL    Eosinophils Absolute 0.22 0.05 - 0.50 k/uL    Basophils Absolute 0.05 0.00 - 0.20 k/uL    Immature Granulocytes Absolute <0.03 0.00 - 0.58 k/uL   CMP   Result Value Ref Range    Sodium 135 (L) 136 - 145 mmol/L    Potassium 4.6 3.5 - 5.1 mmol/L    Chloride 99 98 - 107 mmol/L    CO2 24 22 - 29 mmol/L    Anion Gap 13 7 - 16 mmol/L    Glucose 111 (H) 74 - 99 mg/dL    BUN 15 6 - 20 mg/dL    Creatinine 0.9 0.5 - 1.0 mg/dL    Est, Glom Filt Rate 72 >60 mL/min/1.73m2    Calcium 9.9 8.6 - 10.0 mg/dL    Total Protein 8.7 (H) 6.4 - 8.3 g/dL    Albumin 3.6 3.5 - 5.2 g/dL    Total Bilirubin 0.7 0.0 - 1.2 mg/dL    Alkaline Phosphatase 105 (H) 35 - 104 U/L    ALT 24 0 - 35 U/L    AST 51 (H) 0 - 35 U/L   Sedimentation Rate   Result Value Ref Range    Sed Rate, Automated 80 (H) 0 - 20 mm/Hr   C-Reactive Protein   Result Value Ref Range    CRP 29.4 (H) 0.0 - 5.0 mg/L   Hemoglobin A1c   Result Value Ref Range    Hemoglobin A1C 8.2 (H) 4.0 - 5.6 %   Basic Metabolic Panel w/ Reflex to MG   Result Value Ref Range    Sodium 138 136 - 145 mmol/L    Potassium 4.9 3.5 - 5.1 mmol/L    Chloride 102 98 - 107 mmol/L    CO2 22 22 - 29 mmol/L    Anion Gap 14 7 - 16 mmol/L    Glucose 130 (H) 74 - 99 mg/dL    BUN 17 6 - 20 mg/dL    Creatinine

## 2025-07-31 NOTE — PLAN OF CARE
Problem: Chronic Conditions and Co-morbidities  Goal: Patient's chronic conditions and co-morbidity symptoms are monitored and maintained or improved  7/31/2025 1053 by Rika Ruiz RN  Outcome: Progressing     Problem: Pain  Goal: Verbalizes/displays adequate comfort level or baseline comfort level  7/31/2025 1053 by Rika Ruiz RN  Outcome: Progressing     Problem: Safety - Adult  Goal: Free from fall injury  7/31/2025 1053 by Rika Ruiz RN  Outcome: Progressing     Problem: ABCDS Injury Assessment  Goal: Absence of physical injury  Outcome: Progressing

## 2025-07-31 NOTE — PROGRESS NOTES
Pharmacy Note    Mae Calvillo was ordered Astelin.  Per the [facility] Formulary Committee Policy, this medication is non-formulary and not stocked by the Pharmacy.  The medication can be reordered at discharge.

## 2025-07-31 NOTE — ACP (ADVANCE CARE PLANNING)
Advance Care Planning   Healthcare Decision Maker:    Primary Decision Maker: verónica walker - Jose Carlos - [phone]    Secondary Decision Maker: Jose Walker - Jose Carlos - [phone]    Supplemental (Other) Decision Maker: Marguerite Calvillo - Other - [phone]    Click here to complete Healthcare Decision Makers including selection of the Healthcare Decision Maker Relationship (ie \"Primary\").

## 2025-07-31 NOTE — ANESTHESIA POSTPROCEDURE EVALUATION
Department of Anesthesiology  Postprocedure Note    Patient: Mae Calvillo  MRN: [id number]  YOB: 1970  Date of evaluation: 7/31/2025    Procedure Summary       Date: 07/31/25 Room / Location: [address]    Anesthesia Start: 1734 Anesthesia Stop: 1815    Procedure: LEFT SECOND DIGIT AMPUTATION WITH BONE BIOPSY (Left: Toes) Diagnosis:       Osteomyelitis, unspecified site, unspecified type (HCC)      (Osteomyelitis, unspecified site, unspecified type (HCC) [M86.9])    Surgeons: Rajat Rivera DPM Responsible Provider: Karine Lares MD    Anesthesia Type: MAC ASA Status: 3            Anesthesia Type: MAC    Martinez Phase I: Martinez Score: 10    Martinez Phase II: Martinez Score: 10    Anesthesia Post Evaluation    Patient location during evaluation: PACU  Patient participation: complete - patient participated  Level of consciousness: awake and alert  Pain score: 0  Airway patency: patent  Nausea & Vomiting: no vomiting and no nausea  Cardiovascular status: hemodynamically stable  Respiratory status: spontaneous ventilation, nonlabored ventilation and acceptable  Hydration status: stable  Pain management: adequate        No notable events documented.

## 2025-07-31 NOTE — BRIEF OP NOTE
Brief Postoperative Note      Patient: Mae Calvillo  YOB: 1970  MRN: [id number]    Date of Procedure: 7/31/2025    Pre-Op Diagnosis Codes:      * Osteomyelitis, unspecified site, unspecified type (HCC) [M86.9]    Post-Op Diagnosis: Same       Procedure(s):  LEFT SECOND DIGIT AMPUTATION WITH BONE BIOPSY    Surgeon(s):  Rajat Rivera DPM    Assistant:  * No surgical staff found *    Anesthesia: Monitor Anesthesia Care    Estimated Blood Loss (mL): Minimal    Complications: None    Specimens:   ID Type Source Tests Collected by Time Destination   1 : LEFT FOOT SECOND DIGIT (CULTURE SWABS) Body Fluid Fluid CULTURE, ANAEROBIC, CULTURE, FUNGUS, GRAM STAIN, CULTURE, BODY FLUID (WITH GRAM STAIN), CULTURE WITH SMEAR, ACID FAST BACILLIUS Rajat Rivera DPM 7/31/2025 1758    2 : LEFT FOOT GREAT TOE BONE Bone Bone CULTURE, ANAEROBIC, CULTURE, FUNGUS, GRAM STAIN, CULTURE, SURGICAL, CULTURE WITH SMEAR, ACID FAST Rajat Smith DPM 7/31/2025 1800    A : LEFT FOOT SECOND DIGIT Specimen Toe SURGICAL PATHOLOGY Rajat Rivera DPM 7/31/2025 1757    B : LEFT FOOT GREAT TOE BONE Bone Bone SURGICAL PATHOLOGY Rajat Rivera DPM 7/31/2025 1802        Implants:  * No implants in log *      Drains: * No LDAs found *    Findings:  Present At Time Of Surgery (PATOS) (choose all levels that have infection present):  - Organ Space infection (below fascia) present as evidenced by osteomyelitis  Other Findings: necrosis left 2nd toe    Electronically signed by Rajat Rivera DPM on 7/31/2025 at 6:07 PM

## 2025-08-01 LAB
ANION GAP SERPL CALCULATED.3IONS-SCNC: 10 MMOL/L (ref 7–16)
BASOPHILS # BLD: 0.03 K/UL (ref 0–0.2)
BASOPHILS NFR BLD: 1 % (ref 0–2)
BUN SERPL-MCNC: 20 MG/DL (ref 6–20)
CALCIUM SERPL-MCNC: 9 MG/DL (ref 8.6–10)
CHLORIDE SERPL-SCNC: 100 MMOL/L (ref 98–107)
CO2 SERPL-SCNC: 24 MMOL/L (ref 22–29)
CREAT SERPL-MCNC: 1.1 MG/DL (ref 0.5–1)
EOSINOPHIL # BLD: 0.14 K/UL (ref 0.05–0.5)
EOSINOPHILS RELATIVE PERCENT: 3 % (ref 0–6)
ERYTHROCYTE [DISTWIDTH] IN BLOOD BY AUTOMATED COUNT: 17.3 % (ref 11.5–15)
GFR, ESTIMATED: 58 ML/MIN/1.73M2
GLUCOSE BLD-MCNC: 111 MG/DL (ref 74–99)
GLUCOSE BLD-MCNC: 127 MG/DL (ref 74–99)
GLUCOSE BLD-MCNC: 135 MG/DL (ref 74–99)
GLUCOSE BLD-MCNC: 169 MG/DL (ref 74–99)
GLUCOSE SERPL-MCNC: 209 MG/DL (ref 74–99)
HCT VFR BLD AUTO: 35.1 % (ref 34–48)
HGB BLD-MCNC: 11.1 G/DL (ref 11.5–15.5)
IMM GRANULOCYTES # BLD AUTO: <0.03 K/UL (ref 0–0.58)
IMM GRANULOCYTES NFR BLD: 0 % (ref 0–5)
LYMPHOCYTES NFR BLD: 1.1 K/UL (ref 1.5–4)
LYMPHOCYTES RELATIVE PERCENT: 24 % (ref 20–42)
MCH RBC QN AUTO: 32.8 PG (ref 26–35)
MCHC RBC AUTO-ENTMCNC: 31.6 G/DL (ref 32–34.5)
MCV RBC AUTO: 103.8 FL (ref 80–99.9)
MONOCYTES NFR BLD: 0.43 K/UL (ref 0.1–0.95)
MONOCYTES NFR BLD: 9 % (ref 2–12)
NEUTROPHILS NFR BLD: 63 % (ref 43–80)
NEUTS SEG NFR BLD: 2.94 K/UL (ref 1.8–7.3)
PLATELET # BLD AUTO: 150 K/UL (ref 130–450)
PMV BLD AUTO: 10 FL (ref 7–12)
POTASSIUM SERPL-SCNC: 5.2 MMOL/L (ref 3.5–5.1)
RBC # BLD AUTO: 3.38 M/UL (ref 3.5–5.5)
SODIUM SERPL-SCNC: 133 MMOL/L (ref 136–145)
WBC OTHER # BLD: 4.7 K/UL (ref 4.5–11.5)

## 2025-08-01 PROCEDURE — 6360000002 HC RX W HCPCS

## 2025-08-01 PROCEDURE — 85025 COMPLETE CBC W/AUTO DIFF WBC: CPT

## 2025-08-01 PROCEDURE — 6370000000 HC RX 637 (ALT 250 FOR IP): Performed by: HOSPITALIST

## 2025-08-01 PROCEDURE — 1200000000 HC SEMI PRIVATE

## 2025-08-01 PROCEDURE — 2580000003 HC RX 258

## 2025-08-01 PROCEDURE — 2500000003 HC RX 250 WO HCPCS: Performed by: HOSPITALIST

## 2025-08-01 PROCEDURE — 80048 BASIC METABOLIC PNL TOTAL CA: CPT

## 2025-08-01 PROCEDURE — 99232 SBSQ HOSP IP/OBS MODERATE 35: CPT | Performed by: STUDENT IN AN ORGANIZED HEALTH CARE EDUCATION/TRAINING PROGRAM

## 2025-08-01 PROCEDURE — 82962 GLUCOSE BLOOD TEST: CPT

## 2025-08-01 RX ADMIN — I-VITE, TAB 1000-60-2MG (60/BT) 1 TABLET: TAB at 10:48

## 2025-08-01 RX ADMIN — SODIUM CHLORIDE 3000 MG: 9 INJECTION, SOLUTION INTRAVENOUS at 10:03

## 2025-08-01 RX ADMIN — SODIUM CHLORIDE, PRESERVATIVE FREE 10 ML: 5 INJECTION INTRAVENOUS at 20:33

## 2025-08-01 RX ADMIN — DICYCLOMINE HYDROCHLORIDE 10 MG: 10 CAPSULE ORAL at 20:32

## 2025-08-01 RX ADMIN — MIRTAZAPINE 15 MG: 15 TABLET, FILM COATED ORAL at 20:33

## 2025-08-01 RX ADMIN — RISPERIDONE 2 MG: 2 TABLET, FILM COATED ORAL at 20:32

## 2025-08-01 RX ADMIN — DICYCLOMINE HYDROCHLORIDE 10 MG: 10 CAPSULE ORAL at 06:10

## 2025-08-01 RX ADMIN — RISPERIDONE 1 MG: 2 TABLET, FILM COATED ORAL at 10:45

## 2025-08-01 RX ADMIN — DICYCLOMINE HYDROCHLORIDE 10 MG: 10 CAPSULE ORAL at 17:05

## 2025-08-01 RX ADMIN — PRAZOSIN HYDROCHLORIDE 1 MG: 1 CAPSULE ORAL at 20:33

## 2025-08-01 RX ADMIN — GABAPENTIN 300 MG: 300 CAPSULE ORAL at 20:33

## 2025-08-01 RX ADMIN — BUPROPION HYDROCHLORIDE 150 MG: 150 TABLET, EXTENDED RELEASE ORAL at 10:45

## 2025-08-01 RX ADMIN — ENOXAPARIN SODIUM 30 MG: 100 INJECTION SUBCUTANEOUS at 10:49

## 2025-08-01 RX ADMIN — OXYCODONE HYDROCHLORIDE 10 MG: 5 TABLET ORAL at 20:00

## 2025-08-01 RX ADMIN — ENOXAPARIN SODIUM 30 MG: 100 INJECTION SUBCUTANEOUS at 20:32

## 2025-08-01 RX ADMIN — ATORVASTATIN CALCIUM 20 MG: 20 TABLET, FILM COATED ORAL at 10:45

## 2025-08-01 RX ADMIN — CETIRIZINE HYDROCHLORIDE 10 MG: 10 TABLET, FILM COATED ORAL at 10:45

## 2025-08-01 RX ADMIN — SODIUM CHLORIDE 3000 MG: 9 INJECTION, SOLUTION INTRAVENOUS at 20:31

## 2025-08-01 RX ADMIN — SODIUM CHLORIDE 3000 MG: 9 INJECTION, SOLUTION INTRAVENOUS at 14:54

## 2025-08-01 RX ADMIN — SODIUM CHLORIDE 3000 MG: 9 INJECTION, SOLUTION INTRAVENOUS at 03:00

## 2025-08-01 RX ADMIN — DIVALPROEX SODIUM 250 MG: 250 TABLET, DELAYED RELEASE ORAL at 10:45

## 2025-08-01 RX ADMIN — OXYCODONE HYDROCHLORIDE 10 MG: 5 TABLET ORAL at 14:52

## 2025-08-01 RX ADMIN — DIVALPROEX SODIUM 500 MG: 250 TABLET, DELAYED RELEASE ORAL at 20:32

## 2025-08-01 RX ADMIN — OXYCODONE HYDROCHLORIDE 10 MG: 5 TABLET ORAL at 06:09

## 2025-08-01 RX ADMIN — OXYCODONE HYDROCHLORIDE 10 MG: 5 TABLET ORAL at 10:46

## 2025-08-01 ASSESSMENT — PAIN SCALES - GENERAL
PAINLEVEL_OUTOF10: 2
PAINLEVEL_OUTOF10: 3
PAINLEVEL_OUTOF10: 8

## 2025-08-01 ASSESSMENT — PAIN DESCRIPTION - LOCATION
LOCATION: FOOT

## 2025-08-01 ASSESSMENT — PAIN DESCRIPTION - DESCRIPTORS
DESCRIPTORS: ACHING
DESCRIPTORS: ACHING
DESCRIPTORS: SHARP;STABBING
DESCRIPTORS: ACHING

## 2025-08-01 ASSESSMENT — PAIN DESCRIPTION - ORIENTATION
ORIENTATION: LEFT

## 2025-08-01 ASSESSMENT — PAIN - FUNCTIONAL ASSESSMENT: PAIN_FUNCTIONAL_ASSESSMENT: ACTIVITIES ARE NOT PREVENTED

## 2025-08-01 NOTE — OP NOTE
sterile fashion.  At this time, using a #10 blade, I performed a linear incision on the dorsal aspect of left 2nd metatarsophalangeal joint.  Incision taken through subcutaneous tissue, bleeders were ligated as appropriate.  At this point, I extended the incision distally towards the base of the toe.  At that level, performed medial and lateral incisions encompassing the digit and connecting the plantar aspect of the toe.  At this point, extended the incision another 2 cm proximal plantarly.  Full-thickness dissection was then performed to disarticulate the toe from the metatarsophalangeal joint.  At this point, I then irrigated the area with copious amounts of normal saline.  Post irrigation, I was able to then reapproximate the skin flap using 3-0 nylon suture.  Excellent closure at this time.  No tension was placed on the closure.  Capillary refill was brisk to the flap postoperatively.  Next, attention was directed to the left great toe where at this point using a #10 blade, performed a stab incision at the distal aspect of the left great toe distal phalanx.  This incision was taken through subcutaneous tissue, bleeders were ligated as appropriate.  Next, I was able to bluntly dissect with the hemostat.  Next, at this point, I introduced a Jamshidi needle.  At this point, I was able to then extract cortical cancellous bone for pathological and microbiological examination from the left distal phalanx bone.  At this point, I then irrigated the area with copious amounts of normal saline.  Post-irrigation, closed that incision with 3-0 nylon suture.  Excellent closure at this time.  Postoperative bandage was applied.  The patient overall tolerated the procedure and anesthesia well, appeared in satisfactory condition.  Vital signs stable.  Vascular status intact to the left lower extremity.  The patient was transferred to PACU for further monitoring prior to readmission to the nursing floor.          SHELLY URIBE,  DPM      D:  07/31/2025 18:12:42     T:  08/01/2025 01:15:56     BACILIO/JERARDO  Job #:  [id number]     Doc#:  [id number]

## 2025-08-01 NOTE — PROGRESS NOTES
Department of Podiatry  Progress Note    SUBJECTIVE:  Patient seen bedside for amputation second digit, left foot with bone biopsy of first digit.  Patient states that she is doing well today.  Patient was endorsing some pain however her pain medication is started to control this.  No acute events overnight. Patient denies any N/V/D/F/C/SOB/CP and has no other pedal complaints at this time.     OBJECTIVE:    Scheduled Meds:   ampicillin-sulbactam  3,000 mg IntraVENous Q6H    sodium chloride flush  5-40 mL IntraVENous 2 times per day    enoxaparin  30 mg SubCUTAneous BID    atorvastatin  20 mg Oral Daily    buPROPion  150 mg Oral Daily    cetirizine  10 mg Oral Daily    dicyclomine  10 mg Oral 4x Daily AC & HS    divalproex  250 mg Oral Daily    divalproex  500 mg Oral Nightly    gabapentin  300 mg Oral Nightly    mirtazapine  15 mg Oral Nightly    ocuvite-lutein  1 tablet Oral Daily    prazosin  1 mg Oral Nightly    risperiDONE  2 mg Oral Nightly    risperiDONE  1 mg Oral Daily    sodium chloride flush  5-40 mL IntraVENous 2 times per day    insulin lispro  0-8 Units SubCUTAneous 4 times per day     Continuous Infusions:   sodium chloride      dextrose      sodium chloride       PRN Meds:.sodium chloride flush, sodium chloride, ondansetron **OR** ondansetron, hydrOXYzine pamoate, glucose, dextrose bolus **OR** dextrose bolus, glucagon (rDNA), dextrose, sodium chloride flush, sodium chloride, potassium chloride **OR** potassium alternative oral replacement **OR** potassium chloride, magnesium sulfate, melatonin, senna, aluminum & magnesium hydroxide-simethicone, acetaminophen **OR** acetaminophen, oxyCODONE **OR** oxyCODONE    Allergies   Allergen Reactions    Colchicine     Darvon [Propoxyphene] Other (See Comments)     GI Upset       /67   Pulse 85   Temp 98.2 °F (36.8 °C) (Oral)   Resp 16   Ht 1.778 m (5' 10\")   Wt 114 kg (251 lb 4.8 oz)   SpO2 99%   BMI 36.06 kg/m²       EXAM:    VASCULAR:  DP and PT

## 2025-08-01 NOTE — CARE COORDINATION
Social Work discharge planning  Pt had amputation of left 2nd toe this am.  Per ID, do not anticipate IV antibiotics upon discharge.   She gets medical transport assist through her insurance.  At discharge, call [phone] for transport home if needed.  Electronically signed by LORRAINE Navarro on 8/1/2025 at 10:43 AM

## 2025-08-01 NOTE — PROGRESS NOTES
[facility] Quality Flow/Interdisciplinary Rounds Progress Note        Quality Flow Rounds held on August 1, 2025    Disciplines Attending:  Bedside Nurse, , and Nursing Unit Leadership    Mae Calvillo was admitted on 7/30/2025  5:17 PM    Anticipated Discharge Date:       Disposition:    Zion Score:  Zion Scale Score: 20    [facility] RISK OF UNPLANNED READMISSION 2.0             21.8 Total Score        Discussed patient goal for the day, patient clinical progression, and barriers to discharge.  The following Goal(s) of the Day/Commitment(s) have been identified:  Diagnostics - Report Results - await surgical cultures      Rika Ruiz RN  August 1, 2025

## 2025-08-01 NOTE — PROGRESS NOTES
[facility]  [facility]  Progress Note    SUBJECTIVE:  Chief Complaint   Patient presents with    Wound Check     L foot, Pt was seen at podiatry and was told to come in to be seen for sx     No new complaints.  She underwent amputation.  Tolerating antibiotics.    Review of systems:  As stated above in the chief complaint, otherwise negative.    Medications:  Scheduled Meds:   ampicillin-sulbactam  3,000 mg IntraVENous Q6H    sodium chloride flush  5-40 mL IntraVENous 2 times per day    enoxaparin  30 mg SubCUTAneous BID    atorvastatin  20 mg Oral Daily    buPROPion  150 mg Oral Daily    cetirizine  10 mg Oral Daily    dicyclomine  10 mg Oral 4x Daily AC & HS    divalproex  250 mg Oral Daily    divalproex  500 mg Oral Nightly    gabapentin  300 mg Oral Nightly    mirtazapine  15 mg Oral Nightly    ocuvite-lutein  1 tablet Oral Daily    prazosin  1 mg Oral Nightly    risperiDONE  2 mg Oral Nightly    risperiDONE  1 mg Oral Daily    sodium chloride flush  5-40 mL IntraVENous 2 times per day    insulin lispro  0-8 Units SubCUTAneous 4 times per day     Continuous Infusions:   sodium chloride      dextrose      sodium chloride       PRN Meds:sodium chloride flush, sodium chloride, ondansetron **OR** ondansetron, hydrOXYzine pamoate, glucose, dextrose bolus **OR** dextrose bolus, glucagon (rDNA), dextrose, sodium chloride flush, sodium chloride, potassium chloride **OR** potassium alternative oral replacement **OR** potassium chloride, magnesium sulfate, melatonin, senna, aluminum & magnesium hydroxide-simethicone, acetaminophen **OR** acetaminophen, oxyCODONE **OR** oxyCODONE    OBJECTIVE:  /70   Pulse 80   Temp 97.9 °F (36.6 °C) (Oral)   Resp 18   Ht 1.778 m (5' 10\")   Wt 114 kg (251 lb 4.8 oz)   SpO2 98%   BMI 36.06 kg/m²   Temp  Av.6 °F (36.4 °C)  Min: 96.6 °F (35.9 °C)  Max: 98.2 °F (36.8 °C)  Constitutional: The patient is sitting up on the side of the bed.  She

## 2025-08-01 NOTE — PROGRESS NOTES
Spiritual Health History and Assessment/Progress Note  [facility]    Attempted Encounter,  ,  ,      Name: Mae Calvillo MRN: [id number]    Age: 55 y.o.     Sex: female   Language: English   Amish: None   Osteomyelitis ([facility])     Date: 8/1/2025                           [facility]        Referral/Consult From: Rounding   Encounter Overview/Reason: Attempted Encounter  Service Provided For: Patient    Tenisha, Belief, Meaning:   Patient unable to assess at this time  Family/Friends No family/friends present      Importance and Influence:  Patient unable to assess at this time  Family/Friends No family/friends present    Community:  Patient Unable to assess. Patient was sleeping.   prayed a silent prayer for the patient..  Family/Friends No family/friends present    Assessment and Plan of Care:     Patient Interventions include: Unable to assess. Patient was sleeping.   prayed a silent prayer for the patient.  Family/Friends Interventions include: No family/friends present    Patient Plan of Care: Spiritual Care available upon further referral  Family/Friends Plan of Care: No family/friends present    Electronically signed by Chaplain Ele on 8/1/2025 at 7:14 PM

## 2025-08-01 NOTE — PROGRESS NOTES
[facility] Hospitalist Progress Note    Admitting Date and Time: 7/30/2025  5:17 PM  Admit Dx: Osteomyelitis (HCC) [M86.9]    Subjective:  Patient is being followed for Osteomyelitis (HCC) [M86.9]   Pt feels well today  Per RN: no additional concerns    ROS: denies fever, chills, cp, sob, n/v, HA unless otherwise noted above    Meds:   ampicillin-sulbactam  3,000 mg IntraVENous Q6H    sodium chloride flush  5-40 mL IntraVENous 2 times per day    enoxaparin  30 mg SubCUTAneous BID    atorvastatin  20 mg Oral Daily    buPROPion  150 mg Oral Daily    cetirizine  10 mg Oral Daily    dicyclomine  10 mg Oral 4x Daily AC & HS    divalproex  250 mg Oral Daily    divalproex  500 mg Oral Nightly    gabapentin  300 mg Oral Nightly    mirtazapine  15 mg Oral Nightly    ocuvite-lutein  1 tablet Oral Daily    prazosin  1 mg Oral Nightly    risperiDONE  2 mg Oral Nightly    risperiDONE  1 mg Oral Daily    sodium chloride flush  5-40 mL IntraVENous 2 times per day    insulin lispro  0-8 Units SubCUTAneous 4 times per day     sodium chloride flush, 5-40 mL, PRN  sodium chloride, , PRN  ondansetron, 4 mg, Q8H PRN   Or  ondansetron, 4 mg, Q6H PRN  hydrOXYzine pamoate, 50 mg, Q12H PRN  glucose, 4 tablet, PRN  dextrose bolus, 125 mL, PRN   Or  dextrose bolus, 250 mL, PRN  glucagon (rDNA), 1 mg, PRN  dextrose, , Continuous PRN  sodium chloride flush, 5-40 mL, PRN  sodium chloride, , PRN  potassium chloride, 40 mEq, PRN   Or  potassium alternative oral replacement, 40 mEq, PRN   Or  potassium chloride, 10 mEq, PRN  magnesium sulfate, 2,000 mg, PRN  melatonin, 3 mg, Nightly PRN  senna, 1 tablet, Daily PRN  aluminum & magnesium hydroxide-simethicone, 30 mL, Q6H PRN  acetaminophen, 650 mg, Q6H PRN   Or  acetaminophen, 650 mg, Q6H PRN  oxyCODONE, 5 mg, Q4H PRN   Or  oxyCODONE, 10 mg, Q4H PRN       Objective:  /70   Pulse 80   Temp 97.9 °F (36.6 °C) (Oral)   Resp 18   Ht 1.778 m (5' 10\")   Wt 114 kg (251 lb 4.8 oz)   SpO2

## 2025-08-02 LAB
ANION GAP SERPL CALCULATED.3IONS-SCNC: 11 MMOL/L (ref 7–16)
BASOPHILS # BLD: 0.03 K/UL (ref 0–0.2)
BASOPHILS NFR BLD: 1 % (ref 0–2)
BUN SERPL-MCNC: 22 MG/DL (ref 6–20)
CALCIUM SERPL-MCNC: 9.3 MG/DL (ref 8.6–10)
CHLORIDE SERPL-SCNC: 105 MMOL/L (ref 98–107)
CO2 SERPL-SCNC: 23 MMOL/L (ref 22–29)
CREAT SERPL-MCNC: 0.9 MG/DL (ref 0.5–1)
CREAT UR-MCNC: 56.8 MG/DL (ref 29–226)
EOSINOPHIL # BLD: 0.07 K/UL (ref 0.05–0.5)
EOSINOPHILS RELATIVE PERCENT: 1 % (ref 0–6)
ERYTHROCYTE [DISTWIDTH] IN BLOOD BY AUTOMATED COUNT: 16.8 % (ref 11.5–15)
GFR, ESTIMATED: 75 ML/MIN/1.73M2
GLUCOSE BLD-MCNC: 103 MG/DL (ref 74–99)
GLUCOSE BLD-MCNC: 154 MG/DL (ref 74–99)
GLUCOSE BLD-MCNC: 157 MG/DL (ref 74–99)
GLUCOSE BLD-MCNC: 187 MG/DL (ref 74–99)
GLUCOSE BLD-MCNC: 227 MG/DL (ref 74–99)
GLUCOSE BLD-MCNC: 247 MG/DL (ref 74–99)
GLUCOSE BLD-MCNC: 249 MG/DL (ref 74–99)
GLUCOSE BLD-MCNC: 354 MG/DL (ref 74–99)
GLUCOSE SERPL-MCNC: 178 MG/DL (ref 74–99)
HCT VFR BLD AUTO: 33 % (ref 34–48)
HGB BLD-MCNC: 10.8 G/DL (ref 11.5–15.5)
IMM GRANULOCYTES # BLD AUTO: <0.03 K/UL (ref 0–0.58)
IMM GRANULOCYTES NFR BLD: 0 % (ref 0–5)
LYMPHOCYTES NFR BLD: 1.05 K/UL (ref 1.5–4)
LYMPHOCYTES RELATIVE PERCENT: 20 % (ref 20–42)
MCH RBC QN AUTO: 32.6 PG (ref 26–35)
MCHC RBC AUTO-ENTMCNC: 32.7 G/DL (ref 32–34.5)
MCV RBC AUTO: 99.7 FL (ref 80–99.9)
MICROORGANISM SPEC CULT: NORMAL
MICROORGANISM/AGENT SPEC: NORMAL
MONOCYTES NFR BLD: 0.4 K/UL (ref 0.1–0.95)
MONOCYTES NFR BLD: 8 % (ref 2–12)
NEUTROPHILS NFR BLD: 70 % (ref 43–80)
NEUTS SEG NFR BLD: 3.6 K/UL (ref 1.8–7.3)
PLATELET # BLD AUTO: 140 K/UL (ref 130–450)
PMV BLD AUTO: 9.8 FL (ref 7–12)
POTASSIUM SERPL-SCNC: 4.2 MMOL/L (ref 3.5–5.1)
POTASSIUM SERPL-SCNC: 5.6 MMOL/L (ref 3.5–5.1)
POTASSIUM, UR: 18.4 MMOL/L
RBC # BLD AUTO: 3.31 M/UL (ref 3.5–5.5)
SERVICE CMNT-IMP: NORMAL
SODIUM SERPL-SCNC: 139 MMOL/L (ref 136–145)
SODIUM UR-SCNC: 104 MMOL/L
SPECIMEN DESCRIPTION: NORMAL
WBC OTHER # BLD: 5.2 K/UL (ref 4.5–11.5)

## 2025-08-02 PROCEDURE — 84300 ASSAY OF URINE SODIUM: CPT

## 2025-08-02 PROCEDURE — 84133 ASSAY OF URINE POTASSIUM: CPT

## 2025-08-02 PROCEDURE — 6370000000 HC RX 637 (ALT 250 FOR IP): Performed by: NURSE PRACTITIONER

## 2025-08-02 PROCEDURE — 1200000000 HC SEMI PRIVATE

## 2025-08-02 PROCEDURE — 2580000003 HC RX 258

## 2025-08-02 PROCEDURE — 2580000003 HC RX 258: Performed by: NURSE PRACTITIONER

## 2025-08-02 PROCEDURE — 6360000002 HC RX W HCPCS

## 2025-08-02 PROCEDURE — 6370000000 HC RX 637 (ALT 250 FOR IP): Performed by: HOSPITALIST

## 2025-08-02 PROCEDURE — 6360000002 HC RX W HCPCS: Performed by: NURSE PRACTITIONER

## 2025-08-02 PROCEDURE — 2500000003 HC RX 250 WO HCPCS

## 2025-08-02 PROCEDURE — 84132 ASSAY OF SERUM POTASSIUM: CPT

## 2025-08-02 PROCEDURE — 99232 SBSQ HOSP IP/OBS MODERATE 35: CPT | Performed by: STUDENT IN AN ORGANIZED HEALTH CARE EDUCATION/TRAINING PROGRAM

## 2025-08-02 PROCEDURE — 85025 COMPLETE CBC W/AUTO DIFF WBC: CPT

## 2025-08-02 PROCEDURE — 82962 GLUCOSE BLOOD TEST: CPT

## 2025-08-02 PROCEDURE — 82570 ASSAY OF URINE CREATININE: CPT

## 2025-08-02 PROCEDURE — 2500000003 HC RX 250 WO HCPCS: Performed by: NURSE PRACTITIONER

## 2025-08-02 PROCEDURE — 80048 BASIC METABOLIC PNL TOTAL CA: CPT

## 2025-08-02 RX ORDER — DEXTROSE MONOHYDRATE 100 MG/ML
INJECTION, SOLUTION INTRAVENOUS CONTINUOUS PRN
Status: ACTIVE | OUTPATIENT
Start: 2025-08-02

## 2025-08-02 RX ORDER — GLUCAGON 1 MG/ML
1 KIT INJECTION PRN
Status: ACTIVE | OUTPATIENT
Start: 2025-08-02

## 2025-08-02 RX ORDER — CALCIUM GLUCONATE 20 MG/ML
1000 INJECTION, SOLUTION INTRAVENOUS ONCE
Status: COMPLETED | OUTPATIENT
Start: 2025-08-02 | End: 2025-08-02

## 2025-08-02 RX ORDER — 0.9 % SODIUM CHLORIDE 0.9 %
500 INTRAVENOUS SOLUTION INTRAVENOUS ONCE
Status: COMPLETED | OUTPATIENT
Start: 2025-08-02 | End: 2025-08-02

## 2025-08-02 RX ORDER — INDOMETHACIN 25 MG/1
50 CAPSULE ORAL ONCE
Status: COMPLETED | OUTPATIENT
Start: 2025-08-02 | End: 2025-08-02

## 2025-08-02 RX ADMIN — SODIUM CHLORIDE 500 ML: 0.9 INJECTION, SOLUTION INTRAVENOUS at 06:50

## 2025-08-02 RX ADMIN — Medication 4.5 MG: at 20:52

## 2025-08-02 RX ADMIN — OXYCODONE HYDROCHLORIDE 10 MG: 5 TABLET ORAL at 12:19

## 2025-08-02 RX ADMIN — INSULIN LISPRO 2 UNITS: 100 INJECTION, SOLUTION INTRAVENOUS; SUBCUTANEOUS at 00:26

## 2025-08-02 RX ADMIN — DIVALPROEX SODIUM 500 MG: 250 TABLET, DELAYED RELEASE ORAL at 20:19

## 2025-08-02 RX ADMIN — RISPERIDONE 2 MG: 2 TABLET, FILM COATED ORAL at 20:20

## 2025-08-02 RX ADMIN — MIRTAZAPINE 15 MG: 15 TABLET, FILM COATED ORAL at 20:20

## 2025-08-02 RX ADMIN — SODIUM BICARBONATE 50 MEQ: 84 INJECTION, SOLUTION INTRAVENOUS at 08:00

## 2025-08-02 RX ADMIN — I-VITE, TAB 1000-60-2MG (60/BT) 1 TABLET: TAB at 08:01

## 2025-08-02 RX ADMIN — OXYCODONE HYDROCHLORIDE 10 MG: 5 TABLET ORAL at 06:31

## 2025-08-02 RX ADMIN — ENOXAPARIN SODIUM 30 MG: 100 INJECTION SUBCUTANEOUS at 08:01

## 2025-08-02 RX ADMIN — CALCIUM GLUCONATE 1000 MG: 20 INJECTION, SOLUTION INTRAVENOUS at 06:52

## 2025-08-02 RX ADMIN — ATORVASTATIN CALCIUM 20 MG: 20 TABLET, FILM COATED ORAL at 08:01

## 2025-08-02 RX ADMIN — SODIUM CHLORIDE 3000 MG: 9 INJECTION, SOLUTION INTRAVENOUS at 09:48

## 2025-08-02 RX ADMIN — SODIUM ZIRCONIUM CYCLOSILICATE 10 G: 10 POWDER, FOR SUSPENSION ORAL at 06:34

## 2025-08-02 RX ADMIN — CETIRIZINE HYDROCHLORIDE 10 MG: 10 TABLET, FILM COATED ORAL at 08:01

## 2025-08-02 RX ADMIN — SODIUM CHLORIDE, PRESERVATIVE FREE 10 ML: 5 INJECTION INTRAVENOUS at 20:23

## 2025-08-02 RX ADMIN — DIVALPROEX SODIUM 250 MG: 250 TABLET, DELAYED RELEASE ORAL at 09:02

## 2025-08-02 RX ADMIN — GABAPENTIN 300 MG: 300 CAPSULE ORAL at 20:20

## 2025-08-02 RX ADMIN — SODIUM CHLORIDE 3000 MG: 9 INJECTION, SOLUTION INTRAVENOUS at 13:37

## 2025-08-02 RX ADMIN — HYDROXYZINE PAMOATE 50 MG: 25 CAPSULE ORAL at 20:53

## 2025-08-02 RX ADMIN — SODIUM CHLORIDE 3000 MG: 9 INJECTION, SOLUTION INTRAVENOUS at 02:35

## 2025-08-02 RX ADMIN — RISPERIDONE 1 MG: 2 TABLET, FILM COATED ORAL at 09:02

## 2025-08-02 RX ADMIN — DICYCLOMINE HYDROCHLORIDE 10 MG: 10 CAPSULE ORAL at 09:02

## 2025-08-02 RX ADMIN — INSULIN HUMAN 10 UNITS: 100 INJECTION, SOLUTION PARENTERAL at 08:17

## 2025-08-02 RX ADMIN — BUPROPION HYDROCHLORIDE 150 MG: 150 TABLET, EXTENDED RELEASE ORAL at 09:02

## 2025-08-02 RX ADMIN — SODIUM CHLORIDE 3000 MG: 9 INJECTION, SOLUTION INTRAVENOUS at 20:36

## 2025-08-02 RX ADMIN — ENOXAPARIN SODIUM 30 MG: 100 INJECTION SUBCUTANEOUS at 20:20

## 2025-08-02 RX ADMIN — OXYCODONE HYDROCHLORIDE 10 MG: 5 TABLET ORAL at 20:52

## 2025-08-02 RX ADMIN — DICYCLOMINE HYDROCHLORIDE 10 MG: 10 CAPSULE ORAL at 06:31

## 2025-08-02 RX ADMIN — DEXTROSE 250 ML: 10 SOLUTION INTRAVENOUS at 08:25

## 2025-08-02 ASSESSMENT — PAIN SCALES - WONG BAKER
WONGBAKER_NUMERICALRESPONSE: HURTS A LITTLE BIT
WONGBAKER_NUMERICALRESPONSE: HURTS A LITTLE BIT

## 2025-08-02 ASSESSMENT — PAIN SCALES - GENERAL
PAINLEVEL_OUTOF10: 8
PAINLEVEL_OUTOF10: 7
PAINLEVEL_OUTOF10: 9
PAINLEVEL_OUTOF10: 0
PAINLEVEL_OUTOF10: 7

## 2025-08-02 ASSESSMENT — PAIN DESCRIPTION - ORIENTATION
ORIENTATION: LEFT

## 2025-08-02 ASSESSMENT — PAIN DESCRIPTION - LOCATION
LOCATION: LEG;FOOT
LOCATION: FOOT
LOCATION: ABDOMEN
LOCATION: LEG;FOOT

## 2025-08-02 ASSESSMENT — PAIN DESCRIPTION - DESCRIPTORS
DESCRIPTORS: ACHING;DISCOMFORT;DULL;SORE
DESCRIPTORS: ACHING;DISCOMFORT
DESCRIPTORS: ACHING;DISCOMFORT;SORE
DESCRIPTORS: SHARP
DESCRIPTORS: ACHING;DISCOMFORT;DULL

## 2025-08-02 ASSESSMENT — PAIN - FUNCTIONAL ASSESSMENT
PAIN_FUNCTIONAL_ASSESSMENT: ACTIVITIES ARE NOT PREVENTED
PAIN_FUNCTIONAL_ASSESSMENT: PREVENTS OR INTERFERES SOME ACTIVE ACTIVITIES AND ADLS

## 2025-08-02 NOTE — PLAN OF CARE
Problem: Chronic Conditions and Co-morbidities  Goal: Patient's chronic conditions and co-morbidity symptoms are monitored and maintained or improved  8/2/2025 0142 by Bernie Dawson RN  Outcome: Progressing  8/1/2025 1808 by Cornelia Kaur RN  Outcome: Progressing     Problem: Pain  Goal: Verbalizes/displays adequate comfort level or baseline comfort level  8/2/2025 0142 by Bernie Dawson RN  Outcome: Progressing  8/1/2025 1808 by Cornelia Kaur RN  Outcome: Progressing     Problem: Safety - Adult  Goal: Free from fall injury  8/2/2025 0142 by Bernie Dawson RN  Outcome: Progressing  8/1/2025 1808 by Cornelia Kaur RN  Outcome: Progressing     Problem: ABCDS Injury Assessment  Goal: Absence of physical injury  8/2/2025 0142 by Bernie Dawson RN  Outcome: Progressing  8/1/2025 1808 by Cornelia Kaur RN  Outcome: Progressing     Problem: Discharge Planning  Goal: Discharge to home or other facility with appropriate resources  8/2/2025 0142 by Bernie Dawson RN  Outcome: Progressing  8/1/2025 1808 by Cornelia Kaur RN  Outcome: Progressing

## 2025-08-02 NOTE — PROGRESS NOTES
[facility]  ANNEMARIE  Progress Note    SUBJECTIVE:  Chief Complaint   Patient presents with    Wound Check     L foot, Pt was seen at podiatry and was told to come in to be seen for sx     Patient is sitting up in bed.  Says that she is having some pain in her left foot today  Tolerating antibiotics  Denies diarrhea  No fevers    Review of systems:  As stated above in the chief complaint, otherwise negative.    Medications:  Scheduled Meds:   ampicillin-sulbactam  3,000 mg IntraVENous Q6H    sodium chloride flush  5-40 mL IntraVENous 2 times per day    enoxaparin  30 mg SubCUTAneous BID    atorvastatin  20 mg Oral Daily    buPROPion  150 mg Oral Daily    cetirizine  10 mg Oral Daily    dicyclomine  10 mg Oral 4x Daily AC & HS    divalproex  250 mg Oral Daily    divalproex  500 mg Oral Nightly    gabapentin  300 mg Oral Nightly    mirtazapine  15 mg Oral Nightly    ocuvite-lutein  1 tablet Oral Daily    prazosin  1 mg Oral Nightly    risperiDONE  2 mg Oral Nightly    risperiDONE  1 mg Oral Daily    sodium chloride flush  5-40 mL IntraVENous 2 times per day    insulin lispro  0-8 Units SubCUTAneous 4 times per day     Continuous Infusions:   dextrose      sodium chloride      dextrose      sodium chloride       PRN Meds:glucose, dextrose bolus **OR** dextrose bolus, glucagon (rDNA), dextrose, sodium chloride flush, sodium chloride, ondansetron **OR** ondansetron, hydrOXYzine pamoate, glucose, dextrose bolus **OR** dextrose bolus, glucagon (rDNA), dextrose, sodium chloride flush, sodium chloride, potassium chloride **OR** potassium alternative oral replacement **OR** potassium chloride, magnesium sulfate, melatonin, senna, aluminum & magnesium hydroxide-simethicone, acetaminophen **OR** acetaminophen, oxyCODONE **OR** oxyCODONE    OBJECTIVE:  /77   Pulse 82   Temp 97.9 °F (36.6 °C) (Oral)   Resp 18   Ht 1.778 m (5' 10\")   Wt 113.9 kg (251 lb)   SpO2 97%   BMI 36.01 kg/m²

## 2025-08-02 NOTE — PROGRESS NOTES
4 Eyes Skin Assessment     NAME:  Mae Calvillo  YOB: 1970  MEDICAL RECORD NUMBER:  [id number]    The patient is being assessed for  Admission    I agree that at least one RN has performed a thorough Head to Toe Skin Assessment on the patient. ALL assessment sites listed below have been assessed.      Areas assessed by both nurses:    Head, Face, Ears, Shoulders, Back, Chest, Arms, Elbows, Hands, Sacrum. Buttock, Coccyx, Ischium, and Legs. Feet and Heels        Does the Patient have a Wound? Yes wound(s) were present on assessment. LDA wound assessment was Initiated and completed by RN  Pod. Dressing       Zion Prevention initiated by RN: No  Wound Care Orders initiated by RN: No    For hospital-acquired stage 1 & 2 and ALL Stage 3,4, Unstageable, DTI, NWPT, and Complex wounds: place order “IP Wound Care/Ostomy Nurse Eval and Treat” by RN under : No    New Ostomies, if present place, Ostomy referral order under : No     Nurse 1 eSignature: Electronically signed by Dina Moreno RN on 8/2/25 at 11:26 AM EDT    **SHARE this note so that the co-signing nurse can place an eSignature**    Nurse 2 eSignature: Electronically signed by Harshad Moreno RN on 8/2/25 at 12:01 PM EDT

## 2025-08-02 NOTE — PROGRESS NOTES
Department of Podiatry  Progress Note    SUBJECTIVE:  Patient seen bedside for amputation second digit, left hallux bone biopsy, POD 2.  Patient thankful for obtaining surgical shoe at this time. no acute events overnight.  While in room, nursing noted that patient's ABX will be held for this dose as primary wants to treat for hyperkalemia at this time.  Patient states that she is amenable.  Patient denies any N/V/D/F/C/SOB/CP and has no other pedal complaints at this time.     OBJECTIVE:    Scheduled Meds:   ampicillin-sulbactam  3,000 mg IntraVENous Q6H    sodium chloride flush  5-40 mL IntraVENous 2 times per day    enoxaparin  30 mg SubCUTAneous BID    atorvastatin  20 mg Oral Daily    buPROPion  150 mg Oral Daily    cetirizine  10 mg Oral Daily    dicyclomine  10 mg Oral 4x Daily AC & HS    divalproex  250 mg Oral Daily    divalproex  500 mg Oral Nightly    gabapentin  300 mg Oral Nightly    mirtazapine  15 mg Oral Nightly    ocuvite-lutein  1 tablet Oral Daily    prazosin  1 mg Oral Nightly    risperiDONE  2 mg Oral Nightly    risperiDONE  1 mg Oral Daily    sodium chloride flush  5-40 mL IntraVENous 2 times per day    insulin lispro  0-8 Units SubCUTAneous 4 times per day     Continuous Infusions:   dextrose      sodium chloride      dextrose      sodium chloride       PRN Meds:.glucose, dextrose bolus **OR** dextrose bolus, glucagon (rDNA), dextrose, sodium chloride flush, sodium chloride, ondansetron **OR** ondansetron, hydrOXYzine pamoate, glucose, dextrose bolus **OR** dextrose bolus, glucagon (rDNA), dextrose, sodium chloride flush, sodium chloride, potassium chloride **OR** potassium alternative oral replacement **OR** potassium chloride, magnesium sulfate, melatonin, senna, aluminum & magnesium hydroxide-simethicone, acetaminophen **OR** acetaminophen, oxyCODONE **OR** oxyCODONE    Allergies   Allergen Reactions    Colchicine     Darvon [Propoxyphene] Other (See Comments)     GI Upset       /77

## 2025-08-02 NOTE — PROGRESS NOTES
[facility] Hospitalist Progress Note    Admitting Date and Time: 7/30/2025  5:17 PM  Admit Dx: Osteomyelitis (HCC) [M86.9]    Subjective:  Patient is being followed for Osteomyelitis (HCC) [M86.9]   Pt feels well today  Per RN: no additional concerns    ROS: denies fever, chills, cp, sob, n/v, HA unless otherwise noted above    Meds:   sodium chloride  500 mL IntraVENous Once    insulin regular  10 Units IntraVENous Once    And    dextrose bolus  250 mL IntraVENous Once    sodium bicarbonate  50 mEq IntraVENous Once    ampicillin-sulbactam  3,000 mg IntraVENous Q6H    sodium chloride flush  5-40 mL IntraVENous 2 times per day    enoxaparin  30 mg SubCUTAneous BID    atorvastatin  20 mg Oral Daily    buPROPion  150 mg Oral Daily    cetirizine  10 mg Oral Daily    dicyclomine  10 mg Oral 4x Daily AC & HS    divalproex  250 mg Oral Daily    divalproex  500 mg Oral Nightly    gabapentin  300 mg Oral Nightly    mirtazapine  15 mg Oral Nightly    ocuvite-lutein  1 tablet Oral Daily    prazosin  1 mg Oral Nightly    risperiDONE  2 mg Oral Nightly    risperiDONE  1 mg Oral Daily    sodium chloride flush  5-40 mL IntraVENous 2 times per day    insulin lispro  0-8 Units SubCUTAneous 4 times per day     glucose, 4 tablet, PRN  dextrose bolus, 125 mL, PRN   Or  dextrose bolus, 250 mL, PRN  glucagon (rDNA), 1 mg, PRN  dextrose, , Continuous PRN  sodium chloride flush, 5-40 mL, PRN  sodium chloride, , PRN  ondansetron, 4 mg, Q8H PRN   Or  ondansetron, 4 mg, Q6H PRN  hydrOXYzine pamoate, 50 mg, Q12H PRN  glucose, 4 tablet, PRN  dextrose bolus, 125 mL, PRN   Or  dextrose bolus, 250 mL, PRN  glucagon (rDNA), 1 mg, PRN  dextrose, , Continuous PRN  sodium chloride flush, 5-40 mL, PRN  sodium chloride, , PRN  potassium chloride, 40 mEq, PRN   Or  potassium alternative oral replacement, 40 mEq, PRN   Or  potassium chloride, 10 mEq, PRN  magnesium sulfate, 2,000 mg, PRN  melatonin, 3 mg, Nightly PRN  senna, 1 tablet, Daily

## 2025-08-02 NOTE — PLAN OF CARE
Problem: Chronic Conditions and Co-morbidities  Goal: Patient's chronic conditions and co-morbidity symptoms are monitored and maintained or improved  8/2/2025 1125 by Dina Moreno RN  Outcome: Progressing  8/2/2025 0142 by Bernie Dawson RN  Outcome: Progressing     Problem: Pain  Goal: Verbalizes/displays adequate comfort level or baseline comfort level  8/2/2025 1125 by Dina Moreno RN  Outcome: Progressing  8/2/2025 0142 by Bernie Dawson RN  Outcome: Progressing     Problem: Safety - Adult  Goal: Free from fall injury  8/2/2025 1125 by Dina Moreno RN  Outcome: Progressing  8/2/2025 0142 by Bernie Dawson RN  Outcome: Progressing     Problem: ABCDS Injury Assessment  Goal: Absence of physical injury  8/2/2025 1125 by Dina Moreno RN  Outcome: Progressing  8/2/2025 0142 by Bernie Dawson RN  Outcome: Progressing     Problem: Discharge Planning  Goal: Discharge to home or other facility with appropriate resources  8/2/2025 1125 by Dina Moreno RN  Outcome: Progressing  8/2/2025 0142 by Bernie Dawson RN  Outcome: Progressing

## 2025-08-03 VITALS
HEART RATE: 83 BPM | DIASTOLIC BLOOD PRESSURE: 68 MMHG | HEIGHT: 70 IN | WEIGHT: 250 LBS | OXYGEN SATURATION: 98 % | RESPIRATION RATE: 18 BRPM | SYSTOLIC BLOOD PRESSURE: 98 MMHG | TEMPERATURE: 98.1 F | BODY MASS INDEX: 35.79 KG/M2

## 2025-08-03 LAB
ANION GAP SERPL CALCULATED.3IONS-SCNC: 10 MMOL/L (ref 7–16)
BASOPHILS # BLD: 0.03 K/UL (ref 0–0.2)
BASOPHILS NFR BLD: 1 % (ref 0–2)
BUN SERPL-MCNC: 20 MG/DL (ref 6–20)
CALCIUM SERPL-MCNC: 8.9 MG/DL (ref 8.6–10)
CHLORIDE SERPL-SCNC: 103 MMOL/L (ref 98–107)
CO2 SERPL-SCNC: 25 MMOL/L (ref 22–29)
CREAT SERPL-MCNC: 0.8 MG/DL (ref 0.5–1)
EOSINOPHIL # BLD: 0.18 K/UL (ref 0.05–0.5)
EOSINOPHILS RELATIVE PERCENT: 4 % (ref 0–6)
ERYTHROCYTE [DISTWIDTH] IN BLOOD BY AUTOMATED COUNT: 17 % (ref 11.5–15)
GFR, ESTIMATED: 81 ML/MIN/1.73M2
GLUCOSE BLD-MCNC: 127 MG/DL (ref 74–99)
GLUCOSE BLD-MCNC: 128 MG/DL (ref 74–99)
GLUCOSE BLD-MCNC: 165 MG/DL (ref 74–99)
GLUCOSE BLD-MCNC: 173 MG/DL (ref 74–99)
GLUCOSE BLD-MCNC: 217 MG/DL (ref 74–99)
GLUCOSE SERPL-MCNC: 127 MG/DL (ref 74–99)
HCT VFR BLD AUTO: 34.8 % (ref 34–48)
HGB BLD-MCNC: 10.8 G/DL (ref 11.5–15.5)
IMM GRANULOCYTES # BLD AUTO: <0.03 K/UL (ref 0–0.58)
IMM GRANULOCYTES NFR BLD: 0 % (ref 0–5)
LYMPHOCYTES NFR BLD: 1.81 K/UL (ref 1.5–4)
LYMPHOCYTES RELATIVE PERCENT: 44 % (ref 20–42)
MCH RBC QN AUTO: 32.3 PG (ref 26–35)
MCHC RBC AUTO-ENTMCNC: 31 G/DL (ref 32–34.5)
MCV RBC AUTO: 104.2 FL (ref 80–99.9)
MICROORGANISM SPEC CULT: ABNORMAL
MICROORGANISM SPEC CULT: NORMAL
MICROORGANISM/AGENT SPEC: ABNORMAL
MICROORGANISM/AGENT SPEC: NORMAL
MONOCYTES NFR BLD: 0.37 K/UL (ref 0.1–0.95)
MONOCYTES NFR BLD: 9 % (ref 2–12)
NEUTROPHILS NFR BLD: 42 % (ref 43–80)
NEUTS SEG NFR BLD: 1.75 K/UL (ref 1.8–7.3)
PLATELET # BLD AUTO: 155 K/UL (ref 130–450)
PMV BLD AUTO: 10 FL (ref 7–12)
POTASSIUM SERPL-SCNC: 4.5 MMOL/L (ref 3.5–5.1)
RBC # BLD AUTO: 3.34 M/UL (ref 3.5–5.5)
SERVICE CMNT-IMP: ABNORMAL
SERVICE CMNT-IMP: ABNORMAL
SERVICE CMNT-IMP: NORMAL
SODIUM SERPL-SCNC: 138 MMOL/L (ref 136–145)
SPECIMEN DESCRIPTION: ABNORMAL
SPECIMEN DESCRIPTION: NORMAL
WBC OTHER # BLD: 4.2 K/UL (ref 4.5–11.5)

## 2025-08-03 PROCEDURE — 6360000002 HC RX W HCPCS

## 2025-08-03 PROCEDURE — 2580000003 HC RX 258

## 2025-08-03 PROCEDURE — 2500000003 HC RX 250 WO HCPCS: Performed by: REGISTERED NURSE

## 2025-08-03 PROCEDURE — 85025 COMPLETE CBC W/AUTO DIFF WBC: CPT

## 2025-08-03 PROCEDURE — 99232 SBSQ HOSP IP/OBS MODERATE 35: CPT | Performed by: STUDENT IN AN ORGANIZED HEALTH CARE EDUCATION/TRAINING PROGRAM

## 2025-08-03 PROCEDURE — 80048 BASIC METABOLIC PNL TOTAL CA: CPT

## 2025-08-03 PROCEDURE — 1200000000 HC SEMI PRIVATE

## 2025-08-03 PROCEDURE — 82962 GLUCOSE BLOOD TEST: CPT

## 2025-08-03 PROCEDURE — 6370000000 HC RX 637 (ALT 250 FOR IP): Performed by: HOSPITALIST

## 2025-08-03 PROCEDURE — 2500000003 HC RX 250 WO HCPCS

## 2025-08-03 PROCEDURE — 6360000002 HC RX W HCPCS: Performed by: REGISTERED NURSE

## 2025-08-03 RX ORDER — SENNOSIDES 8.6 MG/1
1 TABLET ORAL DAILY PRN
Qty: 10 TABLET | Refills: 0 | Status: SHIPPED | OUTPATIENT
Start: 2025-08-03 | End: 2025-08-13

## 2025-08-03 RX ORDER — INSULIN LISPRO 100 [IU]/ML
0-8 INJECTION, SOLUTION INTRAVENOUS; SUBCUTANEOUS
Status: ACTIVE | OUTPATIENT
Start: 2025-08-03

## 2025-08-03 RX ADMIN — DIVALPROEX SODIUM 500 MG: 250 TABLET, DELAYED RELEASE ORAL at 20:18

## 2025-08-03 RX ADMIN — ENOXAPARIN SODIUM 30 MG: 100 INJECTION SUBCUTANEOUS at 20:19

## 2025-08-03 RX ADMIN — GABAPENTIN 300 MG: 300 CAPSULE ORAL at 20:18

## 2025-08-03 RX ADMIN — RISPERIDONE 2 MG: 2 TABLET, FILM COATED ORAL at 20:18

## 2025-08-03 RX ADMIN — I-VITE, TAB 1000-60-2MG (60/BT) 1 TABLET: TAB at 07:46

## 2025-08-03 RX ADMIN — SODIUM CHLORIDE 3000 MG: 9 INJECTION, SOLUTION INTRAVENOUS at 07:45

## 2025-08-03 RX ADMIN — DIVALPROEX SODIUM 250 MG: 250 TABLET, DELAYED RELEASE ORAL at 08:00

## 2025-08-03 RX ADMIN — SODIUM CHLORIDE 3000 MG: 9 INJECTION, SOLUTION INTRAVENOUS at 20:18

## 2025-08-03 RX ADMIN — SODIUM CHLORIDE, PRESERVATIVE FREE 10 ML: 5 INJECTION INTRAVENOUS at 20:19

## 2025-08-03 RX ADMIN — ENOXAPARIN SODIUM 30 MG: 100 INJECTION SUBCUTANEOUS at 07:47

## 2025-08-03 RX ADMIN — OXYCODONE HYDROCHLORIDE 10 MG: 5 TABLET ORAL at 02:58

## 2025-08-03 RX ADMIN — BUPROPION HYDROCHLORIDE 150 MG: 150 TABLET, EXTENDED RELEASE ORAL at 07:47

## 2025-08-03 RX ADMIN — SODIUM CHLORIDE 3000 MG: 9 INJECTION, SOLUTION INTRAVENOUS at 01:16

## 2025-08-03 RX ADMIN — SODIUM CHLORIDE 500 MG: 9 INJECTION INTRAMUSCULAR; INTRAVENOUS; SUBCUTANEOUS at 13:28

## 2025-08-03 RX ADMIN — OXYCODONE HYDROCHLORIDE 10 MG: 5 TABLET ORAL at 09:35

## 2025-08-03 RX ADMIN — CETIRIZINE HYDROCHLORIDE 10 MG: 10 TABLET, FILM COATED ORAL at 07:47

## 2025-08-03 RX ADMIN — OXYCODONE HYDROCHLORIDE 10 MG: 5 TABLET ORAL at 23:03

## 2025-08-03 RX ADMIN — ATORVASTATIN CALCIUM 20 MG: 20 TABLET, FILM COATED ORAL at 07:47

## 2025-08-03 RX ADMIN — RISPERIDONE 1 MG: 2 TABLET, FILM COATED ORAL at 07:47

## 2025-08-03 RX ADMIN — OXYCODONE HYDROCHLORIDE 10 MG: 5 TABLET ORAL at 16:24

## 2025-08-03 RX ADMIN — MIRTAZAPINE 15 MG: 15 TABLET, FILM COATED ORAL at 20:18

## 2025-08-03 RX ADMIN — SODIUM CHLORIDE 3000 MG: 9 INJECTION, SOLUTION INTRAVENOUS at 13:30

## 2025-08-03 ASSESSMENT — PAIN SCALES - GENERAL
PAINLEVEL_OUTOF10: 8
PAINLEVEL_OUTOF10: 10
PAINLEVEL_OUTOF10: 5
PAINLEVEL_OUTOF10: 9
PAINLEVEL_OUTOF10: 10
PAINLEVEL_OUTOF10: 7
PAINLEVEL_OUTOF10: 7
PAINLEVEL_OUTOF10: 0
PAINLEVEL_OUTOF10: 9
PAINLEVEL_OUTOF10: 7

## 2025-08-03 ASSESSMENT — PAIN DESCRIPTION - LOCATION
LOCATION: FOOT

## 2025-08-03 ASSESSMENT — PAIN DESCRIPTION - DESCRIPTORS
DESCRIPTORS: SHARP;SQUEEZING;SORE
DESCRIPTORS: SORE;SHARP;SHOOTING
DESCRIPTORS: SQUEEZING;SHARP

## 2025-08-03 ASSESSMENT — PAIN SCALES - WONG BAKER
WONGBAKER_NUMERICALRESPONSE: HURTS A LITTLE BIT
WONGBAKER_NUMERICALRESPONSE: NO HURT
WONGBAKER_NUMERICALRESPONSE: HURTS A LITTLE BIT

## 2025-08-03 ASSESSMENT — PAIN DESCRIPTION - PAIN TYPE
TYPE: SURGICAL PAIN
TYPE: SURGICAL PAIN

## 2025-08-03 ASSESSMENT — PAIN DESCRIPTION - ORIENTATION
ORIENTATION: RIGHT
ORIENTATION: LEFT

## 2025-08-03 NOTE — PROGRESS NOTES
[facility]  ANNEMARIE  Progress Note    SUBJECTIVE:  Chief Complaint   Patient presents with    Wound Check     L foot, Pt was seen at podiatry and was told to come in to be seen for sx     Patient is sitting up in bed.  In no distress.  Says she is only having pain when she gets up to walk  Tolerating antibiotics  No fevers    Review of systems:  As stated above in the chief complaint, otherwise negative.    Medications:  Scheduled Meds:   insulin lispro  0-8 Units SubCUTAneous 4x Daily AC & HS    ampicillin-sulbactam  3,000 mg IntraVENous Q6H    sodium chloride flush  5-40 mL IntraVENous 2 times per day    enoxaparin  30 mg SubCUTAneous BID    atorvastatin  20 mg Oral Daily    buPROPion  150 mg Oral Daily    cetirizine  10 mg Oral Daily    divalproex  250 mg Oral Daily    divalproex  500 mg Oral Nightly    gabapentin  300 mg Oral Nightly    mirtazapine  15 mg Oral Nightly    ocuvite-lutein  1 tablet Oral Daily    prazosin  1 mg Oral Nightly    risperiDONE  2 mg Oral Nightly    risperiDONE  1 mg Oral Daily    sodium chloride flush  5-40 mL IntraVENous 2 times per day     Continuous Infusions:   dextrose      sodium chloride      dextrose      sodium chloride       PRN Meds:glucose, dextrose bolus **OR** dextrose bolus, glucagon (rDNA), dextrose, melatonin, sodium chloride flush, sodium chloride, ondansetron **OR** ondansetron, hydrOXYzine pamoate, glucose, dextrose bolus **OR** dextrose bolus, glucagon (rDNA), dextrose, sodium chloride flush, sodium chloride, potassium chloride **OR** potassium alternative oral replacement **OR** potassium chloride, magnesium sulfate, senna, aluminum & magnesium hydroxide-simethicone, acetaminophen **OR** acetaminophen, oxyCODONE **OR** oxyCODONE    OBJECTIVE:  BP (!) 95/53   Pulse 96   Temp 98.1 °F (36.7 °C) (Oral)   Resp 20   Ht 1.778 m (5' 10\")   Wt 113.4 kg (250 lb)   SpO2 98%   BMI 35.87 kg/m²   Temp  Av °F (36.7 °C)  Min: 97.9 °F

## 2025-08-03 NOTE — PROGRESS NOTES
[facility] Hospitalist Progress Note    Admitting Date and Time: 7/30/2025  5:17 PM  Admit Dx: Osteomyelitis (HCC) [M86.9]    Subjective:  Patient is being followed for Osteomyelitis (HCC) [M86.9]   Pt feels well today  Per RN: no additional concerns    ROS: denies fever, chills, cp, sob, n/v, HA unless otherwise noted above    Meds:   insulin lispro  0-8 Units SubCUTAneous 4x Daily AC & HS    ampicillin-sulbactam  3,000 mg IntraVENous Q6H    sodium chloride flush  5-40 mL IntraVENous 2 times per day    enoxaparin  30 mg SubCUTAneous BID    atorvastatin  20 mg Oral Daily    buPROPion  150 mg Oral Daily    cetirizine  10 mg Oral Daily    divalproex  250 mg Oral Daily    divalproex  500 mg Oral Nightly    gabapentin  300 mg Oral Nightly    mirtazapine  15 mg Oral Nightly    ocuvite-lutein  1 tablet Oral Daily    prazosin  1 mg Oral Nightly    risperiDONE  2 mg Oral Nightly    risperiDONE  1 mg Oral Daily    sodium chloride flush  5-40 mL IntraVENous 2 times per day     glucose, 4 tablet, PRN  dextrose bolus, 125 mL, PRN   Or  dextrose bolus, 250 mL, PRN  glucagon (rDNA), 1 mg, PRN  dextrose, , Continuous PRN  melatonin, 4.5 mg, Nightly PRN  sodium chloride flush, 5-40 mL, PRN  sodium chloride, , PRN  ondansetron, 4 mg, Q8H PRN   Or  ondansetron, 4 mg, Q6H PRN  hydrOXYzine pamoate, 50 mg, Q12H PRN  glucose, 4 tablet, PRN  dextrose bolus, 125 mL, PRN   Or  dextrose bolus, 250 mL, PRN  glucagon (rDNA), 1 mg, PRN  dextrose, , Continuous PRN  sodium chloride flush, 5-40 mL, PRN  sodium chloride, , PRN  potassium chloride, 40 mEq, PRN   Or  potassium alternative oral replacement, 40 mEq, PRN   Or  potassium chloride, 10 mEq, PRN  magnesium sulfate, 2,000 mg, PRN  senna, 1 tablet, Daily PRN  aluminum & magnesium hydroxide-simethicone, 30 mL, Q6H PRN  acetaminophen, 650 mg, Q6H PRN   Or  acetaminophen, 650 mg, Q6H PRN  oxyCODONE, 5 mg, Q4H PRN   Or  oxyCODONE, 10 mg, Q4H PRN       Objective:  BP 96/76   Pulse 96

## 2025-08-03 NOTE — PLAN OF CARE
Problem: Chronic Conditions and Co-morbidities  Goal: Patient's chronic conditions and co-morbidity symptoms are monitored and maintained or improved  8/2/2025 2016 by Meliton Cerna RN  Outcome: Progressing  8/2/2025 1125 by Dina Moreno RN  Outcome: Progressing     Problem: Pain  Goal: Verbalizes/displays adequate comfort level or baseline comfort level  8/2/2025 2016 by Meliton Cerna RN  Outcome: Progressing  8/2/2025 1125 by Dina Moreno RN  Outcome: Progressing     Problem: Safety - Adult  Goal: Free from fall injury  8/2/2025 2016 by Meliton Cerna RN  Outcome: Progressing  8/2/2025 1125 by Dina Moreno RN  Outcome: Progressing     Problem: ABCDS Injury Assessment  Goal: Absence of physical injury  8/2/2025 2016 by Meliton Cerna RN  Outcome: Progressing  8/2/2025 1125 by Dina Moreno RN  Outcome: Progressing     Problem: Discharge Planning  Goal: Discharge to home or other facility with appropriate resources  8/2/2025 2016 by Meliton Cerna RN  Outcome: Progressing  8/2/2025 1125 by Dina Moreno RN  Outcome: Progressing

## 2025-08-04 LAB
ANION GAP SERPL CALCULATED.3IONS-SCNC: 10 MMOL/L (ref 7–16)
BASOPHILS # BLD: 0.03 K/UL (ref 0–0.2)
BASOPHILS NFR BLD: 1 % (ref 0–2)
BUN SERPL-MCNC: 19 MG/DL (ref 6–20)
CALCIUM SERPL-MCNC: 9 MG/DL (ref 8.6–10)
CHLORIDE SERPL-SCNC: 100 MMOL/L (ref 98–107)
CO2 SERPL-SCNC: 23 MMOL/L (ref 22–29)
CREAT SERPL-MCNC: 0.8 MG/DL (ref 0.5–1)
EOSINOPHIL # BLD: 0.24 K/UL (ref 0.05–0.5)
EOSINOPHILS RELATIVE PERCENT: 6 % (ref 0–6)
ERYTHROCYTE [DISTWIDTH] IN BLOOD BY AUTOMATED COUNT: 16.7 % (ref 11.5–15)
GFR, ESTIMATED: 88 ML/MIN/1.73M2
GLUCOSE BLD-MCNC: 129 MG/DL (ref 74–99)
GLUCOSE BLD-MCNC: 147 MG/DL (ref 74–99)
GLUCOSE BLD-MCNC: 187 MG/DL (ref 74–99)
GLUCOSE BLD-MCNC: 262 MG/DL (ref 74–99)
GLUCOSE SERPL-MCNC: 124 MG/DL (ref 74–99)
HCT VFR BLD AUTO: 33.1 % (ref 34–48)
HGB BLD-MCNC: 10.7 G/DL (ref 11.5–15.5)
IMM GRANULOCYTES # BLD AUTO: <0.03 K/UL (ref 0–0.58)
IMM GRANULOCYTES NFR BLD: 0 % (ref 0–5)
LYMPHOCYTES NFR BLD: 1.64 K/UL (ref 1.5–4)
LYMPHOCYTES RELATIVE PERCENT: 38 % (ref 20–42)
MCH RBC QN AUTO: 32.3 PG (ref 26–35)
MCHC RBC AUTO-ENTMCNC: 32.3 G/DL (ref 32–34.5)
MCV RBC AUTO: 100 FL (ref 80–99.9)
MICROORGANISM SPEC CULT: ABNORMAL
MICROORGANISM SPEC CULT: NO GROWTH
MICROORGANISM/AGENT SPEC: NORMAL
MONOCYTES NFR BLD: 0.52 K/UL (ref 0.1–0.95)
MONOCYTES NFR BLD: 12 % (ref 2–12)
NEUTROPHILS NFR BLD: 43 % (ref 43–80)
NEUTS SEG NFR BLD: 1.84 K/UL (ref 1.8–7.3)
PLATELET # BLD AUTO: 145 K/UL (ref 130–450)
PMV BLD AUTO: 9.9 FL (ref 7–12)
POTASSIUM SERPL-SCNC: 4.3 MMOL/L (ref 3.5–5.1)
RBC # BLD AUTO: 3.31 M/UL (ref 3.5–5.5)
SERVICE CMNT-IMP: ABNORMAL
SERVICE CMNT-IMP: NORMAL
SODIUM SERPL-SCNC: 133 MMOL/L (ref 136–145)
SPECIMEN DESCRIPTION: ABNORMAL
SPECIMEN DESCRIPTION: NORMAL
WBC OTHER # BLD: 4.3 K/UL (ref 4.5–11.5)

## 2025-08-04 PROCEDURE — 6360000002 HC RX W HCPCS: Performed by: REGISTERED NURSE

## 2025-08-04 PROCEDURE — 1200000000 HC SEMI PRIVATE

## 2025-08-04 PROCEDURE — 99232 SBSQ HOSP IP/OBS MODERATE 35: CPT | Performed by: STUDENT IN AN ORGANIZED HEALTH CARE EDUCATION/TRAINING PROGRAM

## 2025-08-04 PROCEDURE — 2500000003 HC RX 250 WO HCPCS

## 2025-08-04 PROCEDURE — 80048 BASIC METABOLIC PNL TOTAL CA: CPT

## 2025-08-04 PROCEDURE — 6360000002 HC RX W HCPCS

## 2025-08-04 PROCEDURE — 2500000003 HC RX 250 WO HCPCS: Performed by: REGISTERED NURSE

## 2025-08-04 PROCEDURE — 85025 COMPLETE CBC W/AUTO DIFF WBC: CPT

## 2025-08-04 PROCEDURE — 2500000003 HC RX 250 WO HCPCS: Performed by: SPECIALIST

## 2025-08-04 PROCEDURE — 2580000003 HC RX 258

## 2025-08-04 PROCEDURE — 6370000000 HC RX 637 (ALT 250 FOR IP): Performed by: HOSPITALIST

## 2025-08-04 PROCEDURE — 6370000000 HC RX 637 (ALT 250 FOR IP): Performed by: NURSE PRACTITIONER

## 2025-08-04 PROCEDURE — 82962 GLUCOSE BLOOD TEST: CPT

## 2025-08-04 RX ORDER — SODIUM CHLORIDE 0.9 % (FLUSH) 0.9 %
5-40 SYRINGE (ML) INJECTION PRN
Status: DISCONTINUED | OUTPATIENT
Start: 2025-08-04 | End: 2025-08-06 | Stop reason: HOSPADM

## 2025-08-04 RX ORDER — SODIUM CHLORIDE 0.9 % (FLUSH) 0.9 %
5-40 SYRINGE (ML) INJECTION EVERY 12 HOURS SCHEDULED
Status: DISCONTINUED | OUTPATIENT
Start: 2025-08-04 | End: 2025-08-06 | Stop reason: HOSPADM

## 2025-08-04 RX ORDER — SODIUM CHLORIDE 9 MG/ML
INJECTION, SOLUTION INTRAVENOUS PRN
Status: DISCONTINUED | OUTPATIENT
Start: 2025-08-04 | End: 2025-08-06 | Stop reason: HOSPADM

## 2025-08-04 RX ORDER — LIDOCAINE HYDROCHLORIDE 10 MG/ML
50 INJECTION, SOLUTION EPIDURAL; INFILTRATION; INTRACAUDAL; PERINEURAL ONCE
Status: COMPLETED | OUTPATIENT
Start: 2025-08-04 | End: 2025-08-05

## 2025-08-04 RX ADMIN — OXYCODONE HYDROCHLORIDE 5 MG: 5 TABLET ORAL at 06:31

## 2025-08-04 RX ADMIN — ATORVASTATIN CALCIUM 20 MG: 20 TABLET, FILM COATED ORAL at 08:06

## 2025-08-04 RX ADMIN — INSULIN LISPRO 4 UNITS: 100 INJECTION, SOLUTION INTRAVENOUS; SUBCUTANEOUS at 20:39

## 2025-08-04 RX ADMIN — RISPERIDONE 1 MG: 2 TABLET, FILM COATED ORAL at 08:07

## 2025-08-04 RX ADMIN — ENOXAPARIN SODIUM 30 MG: 100 INJECTION SUBCUTANEOUS at 20:38

## 2025-08-04 RX ADMIN — I-VITE, TAB 1000-60-2MG (60/BT) 1 TABLET: TAB at 08:06

## 2025-08-04 RX ADMIN — GABAPENTIN 300 MG: 300 CAPSULE ORAL at 20:36

## 2025-08-04 RX ADMIN — SODIUM CHLORIDE 3000 MG: 9 INJECTION, SOLUTION INTRAVENOUS at 20:34

## 2025-08-04 RX ADMIN — CETIRIZINE HYDROCHLORIDE 10 MG: 10 TABLET, FILM COATED ORAL at 08:06

## 2025-08-04 RX ADMIN — SODIUM CHLORIDE 3000 MG: 9 INJECTION, SOLUTION INTRAVENOUS at 01:20

## 2025-08-04 RX ADMIN — OXYCODONE HYDROCHLORIDE 10 MG: 5 TABLET ORAL at 11:20

## 2025-08-04 RX ADMIN — SODIUM CHLORIDE, PRESERVATIVE FREE 10 ML: 5 INJECTION INTRAVENOUS at 20:38

## 2025-08-04 RX ADMIN — ENOXAPARIN SODIUM 30 MG: 100 INJECTION SUBCUTANEOUS at 08:07

## 2025-08-04 RX ADMIN — BUPROPION HYDROCHLORIDE 150 MG: 150 TABLET, EXTENDED RELEASE ORAL at 08:06

## 2025-08-04 RX ADMIN — SODIUM CHLORIDE 3000 MG: 9 INJECTION, SOLUTION INTRAVENOUS at 13:37

## 2025-08-04 RX ADMIN — DAPTOMYCIN 500 MG: 500 INJECTION, POWDER, LYOPHILIZED, FOR SOLUTION INTRAVENOUS at 12:00

## 2025-08-04 RX ADMIN — DIVALPROEX SODIUM 500 MG: 250 TABLET, DELAYED RELEASE ORAL at 20:36

## 2025-08-04 RX ADMIN — SODIUM CHLORIDE, PRESERVATIVE FREE 10 ML: 5 INJECTION INTRAVENOUS at 08:07

## 2025-08-04 RX ADMIN — OXYCODONE HYDROCHLORIDE 10 MG: 5 TABLET ORAL at 19:01

## 2025-08-04 RX ADMIN — SODIUM CHLORIDE 3000 MG: 9 INJECTION, SOLUTION INTRAVENOUS at 08:10

## 2025-08-04 RX ADMIN — DIVALPROEX SODIUM 250 MG: 250 TABLET, DELAYED RELEASE ORAL at 08:06

## 2025-08-04 RX ADMIN — MIRTAZAPINE 15 MG: 15 TABLET, FILM COATED ORAL at 20:36

## 2025-08-04 RX ADMIN — RISPERIDONE 2 MG: 2 TABLET, FILM COATED ORAL at 20:36

## 2025-08-04 ASSESSMENT — PAIN SCALES - GENERAL
PAINLEVEL_OUTOF10: 8
PAINLEVEL_OUTOF10: 9
PAINLEVEL_OUTOF10: 10
PAINLEVEL_OUTOF10: 10
PAINLEVEL_OUTOF10: 4
PAINLEVEL_OUTOF10: 8
PAINLEVEL_OUTOF10: 7

## 2025-08-04 ASSESSMENT — PAIN DESCRIPTION - DESCRIPTORS
DESCRIPTORS: ACHING;DISCOMFORT;DULL
DESCRIPTORS: ACHING;DISCOMFORT;DULL

## 2025-08-04 ASSESSMENT — PAIN DESCRIPTION - ONSET
ONSET: ON-GOING
ONSET: ON-GOING

## 2025-08-04 ASSESSMENT — PAIN DESCRIPTION - PAIN TYPE
TYPE: SURGICAL PAIN
TYPE: SURGICAL PAIN
TYPE: CHRONIC PAIN

## 2025-08-04 ASSESSMENT — PAIN DESCRIPTION - ORIENTATION
ORIENTATION: RIGHT;LEFT
ORIENTATION: LEFT;RIGHT
ORIENTATION: RIGHT;LEFT

## 2025-08-04 ASSESSMENT — PAIN DESCRIPTION - LOCATION
LOCATION: FOOT

## 2025-08-04 NOTE — PROGRESS NOTES
[facility]  ANNEMARIE  Progress Note    SUBJECTIVE:  Chief Complaint   Patient presents with    Wound Check     L foot, Pt was seen at podiatry and was told to come in to be seen for sx     Sitting up in bed.  In no distress.  No fevers  Tolerating antibiotics      Review of systems:  As stated above in the chief complaint, otherwise negative.    Medications:  Scheduled Meds:   insulin lispro  0-8 Units SubCUTAneous 4x Daily AC & HS    DAPTOmycin (CUBICIN) 500 mg in sodium chloride (PF) 0.9 % 10 mL IV syringe  6 mg/kg (Adjusted) IntraVENous Q24H    ampicillin-sulbactam  3,000 mg IntraVENous Q6H    sodium chloride flush  5-40 mL IntraVENous 2 times per day    enoxaparin  30 mg SubCUTAneous BID    atorvastatin  20 mg Oral Daily    buPROPion  150 mg Oral Daily    cetirizine  10 mg Oral Daily    divalproex  250 mg Oral Daily    divalproex  500 mg Oral Nightly    gabapentin  300 mg Oral Nightly    mirtazapine  15 mg Oral Nightly    ocuvite-lutein  1 tablet Oral Daily    prazosin  1 mg Oral Nightly    risperiDONE  2 mg Oral Nightly    risperiDONE  1 mg Oral Daily    sodium chloride flush  5-40 mL IntraVENous 2 times per day     Continuous Infusions:   dextrose      sodium chloride      dextrose      sodium chloride       PRN Meds:glucose, dextrose bolus **OR** dextrose bolus, glucagon (rDNA), dextrose, melatonin, sodium chloride flush, sodium chloride, ondansetron **OR** ondansetron, hydrOXYzine pamoate, glucose, dextrose bolus **OR** dextrose bolus, glucagon (rDNA), dextrose, sodium chloride flush, sodium chloride, potassium chloride **OR** potassium alternative oral replacement **OR** potassium chloride, magnesium sulfate, senna, aluminum & magnesium hydroxide-simethicone, acetaminophen **OR** acetaminophen, oxyCODONE **OR** oxyCODONE    OBJECTIVE:  /76   Pulse 81   Temp 97.8 °F (36.6 °C) (Axillary)   Resp 19   Ht 1.778 m (5' 10\")   Wt 113.4 kg (250 lb)   SpO2 100%   BMI

## 2025-08-04 NOTE — PLAN OF CARE
Problem: Chronic Conditions and Co-morbidities  Goal: Patient's chronic conditions and co-morbidity symptoms are monitored and maintained or improved  8/4/2025 1003 by Dina Moreno RN  Outcome: Progressing  8/3/2025 2209 by Patricia Silverman RN  Outcome: Progressing  Flowsheets (Taken 8/3/2025 2159)  Care Plan - Patient's Chronic Conditions and Co-Morbidity Symptoms are Monitored and Maintained or Improved: Monitor and assess patient's chronic conditions and comorbid symptoms for stability, deterioration, or improvement     Problem: Pain  Goal: Verbalizes/displays adequate comfort level or baseline comfort level  8/4/2025 1003 by Dina Moreno RN  Outcome: Progressing  8/3/2025 2209 by Patricia Silverman RN  Outcome: Progressing     Problem: Safety - Adult  Goal: Free from fall injury  8/4/2025 1003 by Dina Moreno RN  Outcome: Progressing  8/3/2025 2209 by Patricia Silverman RN  Outcome: Progressing     Problem: ABCDS Injury Assessment  Goal: Absence of physical injury  8/4/2025 1003 by Dina Moreno RN  Outcome: Progressing  8/3/2025 2209 by Patricia Silverman RN  Outcome: Progressing     Problem: Discharge Planning  Goal: Discharge to home or other facility with appropriate resources  Outcome: Progressing  Flowsheets (Taken 8/3/2025 2159 by Patricia Silverman RN)  Discharge to home or other facility with appropriate resources: Identify barriers to discharge with patient and caregiver

## 2025-08-04 NOTE — CARE COORDINATION
Transition of Care-met with patient-transfer from . She is interested in [facility] for wound care to left foot/toe, had recent left second toe amputation 8/1. She requested [facility], has used in the past, referral and orders in [facility]. ID following, remains on IV Daptomycin and Rocephin, awaiting final cultures. Discharge plan is home with son and [facility].        Alyssa REYES, RN  [facility]

## 2025-08-04 NOTE — PROGRESS NOTES
[facility] Hospitalist Progress Note    Admitting Date and Time: 7/30/2025  5:17 PM  Admit Dx: Osteomyelitis (HCC) [M86.9]    Subjective:  Patient is being followed for Osteomyelitis (HCC) [M86.9]   Pt feels well today  Per RN: no additional concerns    ROS: denies fever, chills, cp, sob, n/v, HA unless otherwise noted above    Meds:   sodium chloride flush  5-40 mL IntraVENous 2 times per day    lidocaine 1 % injection  50 mg IntraDERmal Once    insulin lispro  0-8 Units SubCUTAneous 4x Daily AC & HS    DAPTOmycin (CUBICIN) 500 mg in sodium chloride (PF) 0.9 % 10 mL IV syringe  6 mg/kg (Adjusted) IntraVENous Q24H    ampicillin-sulbactam  3,000 mg IntraVENous Q6H    enoxaparin  30 mg SubCUTAneous BID    atorvastatin  20 mg Oral Daily    buPROPion  150 mg Oral Daily    cetirizine  10 mg Oral Daily    divalproex  250 mg Oral Daily    divalproex  500 mg Oral Nightly    gabapentin  300 mg Oral Nightly    mirtazapine  15 mg Oral Nightly    ocuvite-lutein  1 tablet Oral Daily    prazosin  1 mg Oral Nightly    risperiDONE  2 mg Oral Nightly    risperiDONE  1 mg Oral Daily     sodium chloride flush, 5-40 mL, PRN  sodium chloride, , PRN  glucose, 4 tablet, PRN  dextrose bolus, 125 mL, PRN   Or  dextrose bolus, 250 mL, PRN  glucagon (rDNA), 1 mg, PRN  dextrose, , Continuous PRN  melatonin, 4.5 mg, Nightly PRN  ondansetron, 4 mg, Q8H PRN   Or  ondansetron, 4 mg, Q6H PRN  hydrOXYzine pamoate, 50 mg, Q12H PRN  glucose, 4 tablet, PRN  dextrose bolus, 125 mL, PRN   Or  dextrose bolus, 250 mL, PRN  glucagon (rDNA), 1 mg, PRN  dextrose, , Continuous PRN  potassium chloride, 40 mEq, PRN   Or  potassium alternative oral replacement, 40 mEq, PRN   Or  potassium chloride, 10 mEq, PRN  magnesium sulfate, 2,000 mg, PRN  senna, 1 tablet, Daily PRN  aluminum & magnesium hydroxide-simethicone, 30 mL, Q6H PRN  acetaminophen, 650 mg, Q6H PRN   Or  acetaminophen, 650 mg, Q6H PRN  oxyCODONE, 5 mg, Q4H PRN   Or  oxyCODONE, 10 mg, Q4H

## 2025-08-04 NOTE — PROGRESS NOTES
Department of Podiatry  Progress Note    SUBJECTIVE:  Patient seen bedside for amputation second digit, left hallux bone biopsy, POD 4.  Patient reminded resident to wear surgical shoe for the patient as the one that was given to her was too long.  Patient wondering if she will need IV antibiotics outpatient.  Patient denies any N/V/D/F/C/SOB/CP and has no other pedal complaints at this time.     OBJECTIVE:    Scheduled Meds:   insulin lispro  0-8 Units SubCUTAneous 4x Daily AC & HS    DAPTOmycin (CUBICIN) 500 mg in sodium chloride (PF) 0.9 % 10 mL IV syringe  6 mg/kg (Adjusted) IntraVENous Q24H    ampicillin-sulbactam  3,000 mg IntraVENous Q6H    sodium chloride flush  5-40 mL IntraVENous 2 times per day    enoxaparin  30 mg SubCUTAneous BID    atorvastatin  20 mg Oral Daily    buPROPion  150 mg Oral Daily    cetirizine  10 mg Oral Daily    divalproex  250 mg Oral Daily    divalproex  500 mg Oral Nightly    gabapentin  300 mg Oral Nightly    mirtazapine  15 mg Oral Nightly    ocuvite-lutein  1 tablet Oral Daily    prazosin  1 mg Oral Nightly    risperiDONE  2 mg Oral Nightly    risperiDONE  1 mg Oral Daily    sodium chloride flush  5-40 mL IntraVENous 2 times per day     Continuous Infusions:   dextrose      sodium chloride      dextrose      sodium chloride       PRN Meds:.glucose, dextrose bolus **OR** dextrose bolus, glucagon (rDNA), dextrose, melatonin, sodium chloride flush, sodium chloride, ondansetron **OR** ondansetron, hydrOXYzine pamoate, glucose, dextrose bolus **OR** dextrose bolus, glucagon (rDNA), dextrose, sodium chloride flush, sodium chloride, potassium chloride **OR** potassium alternative oral replacement **OR** potassium chloride, magnesium sulfate, senna, aluminum & magnesium hydroxide-simethicone, acetaminophen **OR** acetaminophen, oxyCODONE **OR** oxyCODONE    Allergies   Allergen Reactions    Colchicine     Darvon [Propoxyphene] Other (See Comments)     GI Upset       /76   Pulse 81

## 2025-08-04 NOTE — PLAN OF CARE
Problem: Chronic Conditions and Co-morbidities  Goal: Patient's chronic conditions and co-morbidity symptoms are monitored and maintained or improved  8/3/2025 2209 by Patricia Silverman RN  Outcome: Progressing  Flowsheets (Taken 8/3/2025 2159)  Care Plan - Patient's Chronic Conditions and Co-Morbidity Symptoms are Monitored and Maintained or Improved: Monitor and assess patient's chronic conditions and comorbid symptoms for stability, deterioration, or improvement  8/3/2025 1117 by Dina Moreno RN  Outcome: Progressing     Problem: Pain  Goal: Verbalizes/displays adequate comfort level or baseline comfort level  8/3/2025 2209 by Patricia Silverman RN  Outcome: Progressing  8/3/2025 1117 by Dina Moreno RN  Outcome: Progressing     Problem: Safety - Adult  Goal: Free from fall injury  8/3/2025 2209 by Patricia Silverman RN  Outcome: Progressing  8/3/2025 1117 by Dina Moreno RN  Outcome: Progressing     Problem: ABCDS Injury Assessment  Goal: Absence of physical injury  8/3/2025 2209 by Patricia Silverman RN  Outcome: Progressing  8/3/2025 1117 by Dina Moreno RN  Outcome: Progressing     Problem: Discharge Planning  Goal: Discharge to home or other facility with appropriate resources  Recent Flowsheet Documentation  Taken 8/3/2025 2159 by Patricia Silverman RN  Discharge to home or other facility with appropriate resources: Identify barriers to discharge with patient and caregiver  8/3/2025 1117 by Dina Moreno RN  Outcome: Progressing

## 2025-08-04 NOTE — PROGRESS NOTES
Department of Podiatry  Progress Note    SUBJECTIVE:  Patient seen bedside for amputation second digit, left hallux bone biopsy, POD 3.  Patient states her surgical shoe a little long.  Patient states that her hyperkalemia improved.  Patient states that she is amenable.  Patient denies any N/V/D/F/C/SOB/CP and has no other pedal complaints at this time.     OBJECTIVE:    Scheduled Meds:   insulin lispro  0-8 Units SubCUTAneous 4x Daily AC & HS    DAPTOmycin (CUBICIN) 500 mg in sodium chloride (PF) 0.9 % 10 mL IV syringe  6 mg/kg (Adjusted) IntraVENous Q24H    ampicillin-sulbactam  3,000 mg IntraVENous Q6H    sodium chloride flush  5-40 mL IntraVENous 2 times per day    enoxaparin  30 mg SubCUTAneous BID    atorvastatin  20 mg Oral Daily    buPROPion  150 mg Oral Daily    cetirizine  10 mg Oral Daily    divalproex  250 mg Oral Daily    divalproex  500 mg Oral Nightly    gabapentin  300 mg Oral Nightly    mirtazapine  15 mg Oral Nightly    ocuvite-lutein  1 tablet Oral Daily    prazosin  1 mg Oral Nightly    risperiDONE  2 mg Oral Nightly    risperiDONE  1 mg Oral Daily    sodium chloride flush  5-40 mL IntraVENous 2 times per day     Continuous Infusions:   dextrose      sodium chloride      dextrose      sodium chloride       PRN Meds:.glucose, dextrose bolus **OR** dextrose bolus, glucagon (rDNA), dextrose, melatonin, sodium chloride flush, sodium chloride, ondansetron **OR** ondansetron, hydrOXYzine pamoate, glucose, dextrose bolus **OR** dextrose bolus, glucagon (rDNA), dextrose, sodium chloride flush, sodium chloride, potassium chloride **OR** potassium alternative oral replacement **OR** potassium chloride, magnesium sulfate, senna, aluminum & magnesium hydroxide-simethicone, acetaminophen **OR** acetaminophen, oxyCODONE **OR** oxyCODONE    Allergies   Allergen Reactions    Colchicine     Darvon [Propoxyphene] Other (See Comments)     GI Upset       /76   Pulse 81   Temp 97.8 °F (36.6 °C) (Axillary)

## 2025-08-05 ENCOUNTER — APPOINTMENT (OUTPATIENT)
Dept: GENERAL RADIOLOGY | Age: 55
DRG: 314 | End: 2025-08-05
Payer: COMMERCIAL

## 2025-08-05 LAB
ANION GAP SERPL CALCULATED.3IONS-SCNC: 11 MMOL/L (ref 7–16)
BASOPHILS # BLD: 0.03 K/UL (ref 0–0.2)
BASOPHILS NFR BLD: 1 % (ref 0–2)
BUN SERPL-MCNC: 17 MG/DL (ref 6–20)
CALCIUM SERPL-MCNC: 9.1 MG/DL (ref 8.6–10)
CHLORIDE SERPL-SCNC: 101 MMOL/L (ref 98–107)
CO2 SERPL-SCNC: 24 MMOL/L (ref 22–29)
CREAT SERPL-MCNC: 0.8 MG/DL (ref 0.5–1)
EOSINOPHIL # BLD: 0.17 K/UL (ref 0.05–0.5)
EOSINOPHILS RELATIVE PERCENT: 4 % (ref 0–6)
ERYTHROCYTE [DISTWIDTH] IN BLOOD BY AUTOMATED COUNT: 16.8 % (ref 11.5–15)
GFR, ESTIMATED: 86 ML/MIN/1.73M2
GLUCOSE BLD-MCNC: 123 MG/DL (ref 74–99)
GLUCOSE BLD-MCNC: 137 MG/DL (ref 74–99)
GLUCOSE BLD-MCNC: 164 MG/DL (ref 74–99)
GLUCOSE BLD-MCNC: 267 MG/DL (ref 74–99)
GLUCOSE SERPL-MCNC: 142 MG/DL (ref 74–99)
HCT VFR BLD AUTO: 35.4 % (ref 34–48)
HGB BLD-MCNC: 11.2 G/DL (ref 11.5–15.5)
IMM GRANULOCYTES # BLD AUTO: <0.03 K/UL (ref 0–0.58)
IMM GRANULOCYTES NFR BLD: 0 % (ref 0–5)
LYMPHOCYTES NFR BLD: 1.35 K/UL (ref 1.5–4)
LYMPHOCYTES RELATIVE PERCENT: 33 % (ref 20–42)
MCH RBC QN AUTO: 32.6 PG (ref 26–35)
MCHC RBC AUTO-ENTMCNC: 31.6 G/DL (ref 32–34.5)
MCV RBC AUTO: 102.9 FL (ref 80–99.9)
MONOCYTES NFR BLD: 0.36 K/UL (ref 0.1–0.95)
MONOCYTES NFR BLD: 9 % (ref 2–12)
NEUTROPHILS NFR BLD: 53 % (ref 43–80)
NEUTS SEG NFR BLD: 2.13 K/UL (ref 1.8–7.3)
PLATELET # BLD AUTO: 152 K/UL (ref 130–450)
PMV BLD AUTO: 9.9 FL (ref 7–12)
POTASSIUM SERPL-SCNC: 4.3 MMOL/L (ref 3.5–5.1)
RBC # BLD AUTO: 3.44 M/UL (ref 3.5–5.5)
SODIUM SERPL-SCNC: 136 MMOL/L (ref 136–145)
WBC OTHER # BLD: 4.1 K/UL (ref 4.5–11.5)

## 2025-08-05 PROCEDURE — 02HV33Z INSERTION OF INFUSION DEVICE INTO SUPERIOR VENA CAVA, PERCUTANEOUS APPROACH: ICD-10-PCS | Performed by: STUDENT IN AN ORGANIZED HEALTH CARE EDUCATION/TRAINING PROGRAM

## 2025-08-05 PROCEDURE — 76937 US GUIDE VASCULAR ACCESS: CPT

## 2025-08-05 PROCEDURE — 2580000003 HC RX 258

## 2025-08-05 PROCEDURE — 99239 HOSP IP/OBS DSCHRG MGMT >30: CPT | Performed by: STUDENT IN AN ORGANIZED HEALTH CARE EDUCATION/TRAINING PROGRAM

## 2025-08-05 PROCEDURE — 2500000003 HC RX 250 WO HCPCS: Performed by: SPECIALIST

## 2025-08-05 PROCEDURE — 36569 INSJ PICC 5 YR+ W/O IMAGING: CPT

## 2025-08-05 PROCEDURE — 6360000002 HC RX W HCPCS

## 2025-08-05 PROCEDURE — C1751 CATH, INF, PER/CENT/MIDLINE: HCPCS

## 2025-08-05 PROCEDURE — 1200000000 HC SEMI PRIVATE

## 2025-08-05 PROCEDURE — 2500000003 HC RX 250 WO HCPCS: Performed by: REGISTERED NURSE

## 2025-08-05 PROCEDURE — 85025 COMPLETE CBC W/AUTO DIFF WBC: CPT

## 2025-08-05 PROCEDURE — 80048 BASIC METABOLIC PNL TOTAL CA: CPT

## 2025-08-05 PROCEDURE — 71045 X-RAY EXAM CHEST 1 VIEW: CPT

## 2025-08-05 PROCEDURE — 6370000000 HC RX 637 (ALT 250 FOR IP): Performed by: HOSPITALIST

## 2025-08-05 PROCEDURE — 6360000002 HC RX W HCPCS: Performed by: SPECIALIST

## 2025-08-05 PROCEDURE — 36415 COLL VENOUS BLD VENIPUNCTURE: CPT

## 2025-08-05 PROCEDURE — 6370000000 HC RX 637 (ALT 250 FOR IP): Performed by: NURSE PRACTITIONER

## 2025-08-05 PROCEDURE — 82962 GLUCOSE BLOOD TEST: CPT

## 2025-08-05 PROCEDURE — 6360000002 HC RX W HCPCS: Performed by: REGISTERED NURSE

## 2025-08-05 RX ADMIN — SODIUM CHLORIDE, PRESERVATIVE FREE 10 ML: 5 INJECTION INTRAVENOUS at 19:59

## 2025-08-05 RX ADMIN — I-VITE, TAB 1000-60-2MG (60/BT) 1 TABLET: TAB at 09:24

## 2025-08-05 RX ADMIN — SODIUM CHLORIDE 1000 MG: 9 INJECTION INTRAMUSCULAR; INTRAVENOUS; SUBCUTANEOUS at 15:20

## 2025-08-05 RX ADMIN — DAPTOMYCIN 500 MG: 500 INJECTION, POWDER, LYOPHILIZED, FOR SOLUTION INTRAVENOUS at 12:13

## 2025-08-05 RX ADMIN — RISPERIDONE 2 MG: 2 TABLET, FILM COATED ORAL at 19:58

## 2025-08-05 RX ADMIN — BUPROPION HYDROCHLORIDE 150 MG: 150 TABLET, EXTENDED RELEASE ORAL at 09:25

## 2025-08-05 RX ADMIN — SODIUM CHLORIDE 3000 MG: 9 INJECTION, SOLUTION INTRAVENOUS at 09:27

## 2025-08-05 RX ADMIN — DIVALPROEX SODIUM 500 MG: 250 TABLET, DELAYED RELEASE ORAL at 19:59

## 2025-08-05 RX ADMIN — SODIUM CHLORIDE, PRESERVATIVE FREE 10 ML: 5 INJECTION INTRAVENOUS at 09:30

## 2025-08-05 RX ADMIN — INSULIN LISPRO 4 UNITS: 100 INJECTION, SOLUTION INTRAVENOUS; SUBCUTANEOUS at 19:58

## 2025-08-05 RX ADMIN — DIVALPROEX SODIUM 250 MG: 250 TABLET, DELAYED RELEASE ORAL at 09:24

## 2025-08-05 RX ADMIN — RISPERIDONE 1 MG: 2 TABLET, FILM COATED ORAL at 09:24

## 2025-08-05 RX ADMIN — SODIUM CHLORIDE 3000 MG: 9 INJECTION, SOLUTION INTRAVENOUS at 02:16

## 2025-08-05 RX ADMIN — ENOXAPARIN SODIUM 30 MG: 100 INJECTION SUBCUTANEOUS at 19:57

## 2025-08-05 RX ADMIN — MIRTAZAPINE 15 MG: 15 TABLET, FILM COATED ORAL at 19:58

## 2025-08-05 RX ADMIN — OXYCODONE HYDROCHLORIDE 10 MG: 5 TABLET ORAL at 16:06

## 2025-08-05 RX ADMIN — OXYCODONE HYDROCHLORIDE 10 MG: 5 TABLET ORAL at 07:12

## 2025-08-05 RX ADMIN — ATORVASTATIN CALCIUM 20 MG: 20 TABLET, FILM COATED ORAL at 09:24

## 2025-08-05 RX ADMIN — OXYCODONE HYDROCHLORIDE 10 MG: 5 TABLET ORAL at 00:06

## 2025-08-05 RX ADMIN — LIDOCAINE HYDROCHLORIDE 20 MG: 10 SOLUTION INTRAVENOUS at 11:30

## 2025-08-05 RX ADMIN — OXYCODONE HYDROCHLORIDE 10 MG: 5 TABLET ORAL at 20:06

## 2025-08-05 RX ADMIN — OXYCODONE HYDROCHLORIDE 10 MG: 5 TABLET ORAL at 12:13

## 2025-08-05 RX ADMIN — CETIRIZINE HYDROCHLORIDE 10 MG: 10 TABLET, FILM COATED ORAL at 09:24

## 2025-08-05 RX ADMIN — GABAPENTIN 300 MG: 300 CAPSULE ORAL at 19:58

## 2025-08-05 RX ADMIN — ENOXAPARIN SODIUM 30 MG: 100 INJECTION SUBCUTANEOUS at 09:25

## 2025-08-05 ASSESSMENT — PAIN SCALES - GENERAL
PAINLEVEL_OUTOF10: 8
PAINLEVEL_OUTOF10: 7
PAINLEVEL_OUTOF10: 8
PAINLEVEL_OUTOF10: 8

## 2025-08-05 ASSESSMENT — PAIN DESCRIPTION - PAIN TYPE
TYPE: SURGICAL PAIN
TYPE: SURGICAL PAIN

## 2025-08-05 ASSESSMENT — PAIN DESCRIPTION - ORIENTATION
ORIENTATION: RIGHT;LEFT
ORIENTATION: LEFT

## 2025-08-05 ASSESSMENT — PAIN DESCRIPTION - DESCRIPTORS
DESCRIPTORS: DISCOMFORT;SHARP
DESCRIPTORS: SHARP;DISCOMFORT
DESCRIPTORS: ACHING;SORE
DESCRIPTORS: ACHING;SORE;DISCOMFORT

## 2025-08-05 ASSESSMENT — PAIN DESCRIPTION - LOCATION
LOCATION: FOOT
LOCATION: TOE (COMMENT WHICH ONE)
LOCATION: FOOT
LOCATION: BACK;LEG
LOCATION: FOOT

## 2025-08-05 NOTE — PLAN OF CARE
Problem: Chronic Conditions and Co-morbidities  Goal: Patient's chronic conditions and co-morbidity symptoms are monitored and maintained or improved  8/5/2025 1205 by Fransisco Trammell RN  Outcome: Progressing  8/5/2025 0035 by Jackie Hand RN  Outcome: Progressing     Problem: Pain  Goal: Verbalizes/displays adequate comfort level or baseline comfort level  8/5/2025 1205 by Fransisco Trammell RN  Outcome: Progressing  8/5/2025 0035 by Jackie Hand RN  Outcome: Progressing     Problem: Safety - Adult  Goal: Free from fall injury  8/5/2025 1205 by Fransisco Trammell RN  Outcome: Progressing  8/5/2025 0035 by Jackie Hand RN  Outcome: Progressing     Problem: ABCDS Injury Assessment  Goal: Absence of physical injury  Outcome: Progressing     Problem: Discharge Planning  Goal: Discharge to home or other facility with appropriate resources  Outcome: Progressing

## 2025-08-05 NOTE — PROGRESS NOTES
Department of Podiatry  Progress Note    SUBJECTIVE:  Patient seen bedside for amputation second digit, left hallux bone biopsy, POD 5.  Patient was wondering about the verbiage of a consent form for IV infusion.  There is 1 line that states a safety home precaution test was done and patient did not review that information was not reviewed with her.  Patient willing to do infusion but just wants to make sure is not signing something she did not to peer patient denies any N/V/D/F/C/SOB/CP and has no other pedal complaints at this time.     OBJECTIVE:    Scheduled Meds:   ertapenem (INVanz) 1,000 mg in sodium chloride (PF) 0.9 % 10 mL IV syringe  1,000 mg IntraVENous Q24H    sodium chloride flush  5-40 mL IntraVENous 2 times per day    insulin lispro  0-8 Units SubCUTAneous 4x Daily AC & HS    DAPTOmycin (CUBICIN) 500 mg in sodium chloride (PF) 0.9 % 10 mL IV syringe  6 mg/kg (Adjusted) IntraVENous Q24H    enoxaparin  30 mg SubCUTAneous BID    atorvastatin  20 mg Oral Daily    buPROPion  150 mg Oral Daily    cetirizine  10 mg Oral Daily    divalproex  250 mg Oral Daily    divalproex  500 mg Oral Nightly    gabapentin  300 mg Oral Nightly    mirtazapine  15 mg Oral Nightly    ocuvite-lutein  1 tablet Oral Daily    prazosin  1 mg Oral Nightly    risperiDONE  2 mg Oral Nightly    risperiDONE  1 mg Oral Daily     Continuous Infusions:   sodium chloride      dextrose      dextrose       PRN Meds:.sodium chloride flush, sodium chloride, glucose, dextrose bolus **OR** dextrose bolus, glucagon (rDNA), dextrose, melatonin, ondansetron **OR** ondansetron, hydrOXYzine pamoate, glucose, dextrose bolus **OR** dextrose bolus, glucagon (rDNA), dextrose, potassium chloride **OR** potassium alternative oral replacement **OR** potassium chloride, magnesium sulfate, senna, aluminum & magnesium hydroxide-simethicone, acetaminophen **OR** acetaminophen, oxyCODONE **OR** oxyCODONE    Allergies   Allergen Reactions    Colchicine     Darvon

## 2025-08-05 NOTE — PROGRESS NOTES
Comprehensive Nutrition Assessment    Type and Reason for Visit:  Initial, LOS    Nutrition Recommendations/Plan:   Continue diet as ordered  Add Wound Healing ONS BID  Continue inpatient monitoring     Malnutrition Assessment:  Malnutrition Status:  At risk for malnutrition (08/05/25 1224)    Context:  Chronic Illness     Findings of the 6 clinical characteristics of malnutrition:  Energy Intake:  No decrease in energy intake  Weight Loss:  Mild weight loss     Body Fat Loss:  No body fat loss     Muscle Mass Loss:  No muscle mass loss    Fluid Accumulation:  Unable to assess (multifactorial)     Strength:  Not Performed    Nutrition Assessment:    Pt admitted for osteomyelitits. PMH of DM, ETOH abuse, PTSD, bipolar disorder, gastric band (August 2022). Pt s/p left 2nd digit amputation w/ bone biopsy 7/31. Reports a good appetite though eats small portions 2/2 h/o gastric band. Eating % of meals per chart. Will add Wound Healing ONS BID. Will continue inpatient monitoring.    Nutrition Related Findings:    A/O x4, abd WDL, I/O +1.7 L, +2 BLE edema, insulin, MVI, remeron,  Wound Type: Full Thickness, Surgical Incision (amp L 2nd digit; right foot plantar)       Current Nutrition Intake & Therapies:    Average Meal Intake: %  Average Supplements Intake: None Ordered  ADULT DIET; Regular; 5 carb choices (75 gm/meal)  ADULT ORAL NUTRITION SUPPLEMENT; Dinner, Breakfast; Wound Healing Oral Supplement    Anthropometric Measures:  Height: 177.8 cm (5' 10\")  Ideal Body Weight (IBW): 150 lbs (68 kg)    Admission Body Weight: 106.6 kg (235 lb 0.2 oz) (7/30 bed scale)  Current Body Weight: 113.4 kg (250 lb) (8/4), 166.7 % IBW. Weight Source: Bed scale  Current BMI (kg/m2): 35.9  Usual Body Weight: 124.8 kg (275 lb 2.2 oz) (9/23/24 OV)     % Weight Change (Calculated): -9.1  Weight Adjustment For: No Adjustment                 BMI Categories: Obese Class 2 (BMI 35.0 -39.9)    Estimated Daily Nutrient

## 2025-08-05 NOTE — PROGRESS NOTES
[facility]  ANNEMARIE  Progress Note    SUBJECTIVE:  Chief Complaint   Patient presents with    Wound Check     L foot, Pt was seen at podiatry and was told to come in to be seen for sx     Patient is sitting up in bed  Got her PICC line this morning  No fevers  Pain is controlled    Review of systems:  As stated above in the chief complaint, otherwise negative.    Medications:  Scheduled Meds:   ertapenem (INVanz) 1,000 mg in sodium chloride (PF) 0.9 % 10 mL IV syringe  1,000 mg IntraVENous Q24H    sodium chloride flush  5-40 mL IntraVENous 2 times per day    insulin lispro  0-8 Units SubCUTAneous 4x Daily AC & HS    DAPTOmycin (CUBICIN) 500 mg in sodium chloride (PF) 0.9 % 10 mL IV syringe  6 mg/kg (Adjusted) IntraVENous Q24H    enoxaparin  30 mg SubCUTAneous BID    atorvastatin  20 mg Oral Daily    buPROPion  150 mg Oral Daily    cetirizine  10 mg Oral Daily    divalproex  250 mg Oral Daily    divalproex  500 mg Oral Nightly    gabapentin  300 mg Oral Nightly    mirtazapine  15 mg Oral Nightly    ocuvite-lutein  1 tablet Oral Daily    prazosin  1 mg Oral Nightly    risperiDONE  2 mg Oral Nightly    risperiDONE  1 mg Oral Daily     Continuous Infusions:   sodium chloride      dextrose      dextrose       PRN Meds:sodium chloride flush, sodium chloride, glucose, dextrose bolus **OR** dextrose bolus, glucagon (rDNA), dextrose, melatonin, ondansetron **OR** ondansetron, hydrOXYzine pamoate, glucose, dextrose bolus **OR** dextrose bolus, glucagon (rDNA), dextrose, potassium chloride **OR** potassium alternative oral replacement **OR** potassium chloride, magnesium sulfate, senna, aluminum & magnesium hydroxide-simethicone, acetaminophen **OR** acetaminophen, oxyCODONE **OR** oxyCODONE    OBJECTIVE:  /87   Pulse 82   Temp 98.6 °F (37 °C) (Oral)   Resp 18   Ht 1.778 m (5' 10\")   Wt 113.4 kg (250 lb)   SpO2 98%   BMI 35.87 kg/m²   Temp  Av.4 °F (36.9 °C)  Min: 98.2 °F

## 2025-08-05 NOTE — DISCHARGE INSTRUCTIONS
[facility]  ([facility])  [address]  [address]  [place] [address]  Phone [phone]   Fax [phone]    Roosevelt Simeon MD, FACP MD Dayna Au MD Indra P. Limbu, MD Arindam Ghatak, MD Sheng Singh, CNS Chris Garcia, APRN, CNS  Tiesha Blum, APRN-CNP Jose Moore, APRN, CNS Ting Oliva, APRN-CNP                 STANDING ORDERS (“ID Protocol”)     Visiting nurses are to write the Primary Care Physician and their own call back number on all laboratory requisition forms.   Abnormal lab values are called to the physician by the nurse and NOT by the laboratory.   Fax all labs to the office in a timely manner, during office hours. All faxes should include nurse’s name and call back number.  Vascular Access Devices or VADs (TLC, PICC, Midline, etc) will be replaced as necessary.  Draw all blood work from VADs, except for drug levels.  If unable to access a VAD, insert a peripheral catheter temporarily. Contact the Primary Care Physician or [facility] office for surgical referral.  Use tPA (Cathflo®, Alteplase®) as per agency protocol to restore patency of VAD.  Saline flush 10ml or heparin flush 10U/cc IV daily and as needed to maintain line patency.  Remove VAD upon completion of IV antibiotics, unless otherwise specified by the ordering physician.  If VAD cannot be removed, schedule appointment at office for removal.  If VAD was placed by Radiology, schedule appointment for removal.  Notify ordering physician or office if patient requires admission to the hospital with reason for admission.  Discontinue all blood work upon completion of IV antibiotics, unless otherwise specified by ordering physician.  Notify ordering physician if the patient does not receive the scheduled antibiotic for 24 hours or more.  The Pharmacy and Home Health Agency may adjust the timing of the infusion and blood work to accommodate the patient’s home

## 2025-08-05 NOTE — PROCEDURES
PICC    Catheter insertion date: 8/5/2025     Product Number:  [id number]   Lot No: [id number]    Gauge: 17   Lumen: single   R Basilic    Vein Diameter: 0.39cm   Arm circumference: 30cm   Catheter Length: 46cm   Internal Length: 46cm   External Length 0cm  CXR obtained, p wave observed but equipment would not bullseye.  CXR reads good position, floor notifed ok to use.      : HOWARD Ugalde RN [facility]      PROCEDURE NOTE  Date: 8/5/2025   Name: Mae Calvillo  YOB: 1970    Procedures

## 2025-08-05 NOTE — CARE COORDINATION
Transition of Care-spoke with patient, she prefers [facility]. Provided with Service Agreement from [employer], will read and return to be faxed to [facility] to run benefits, still need script. [facility] has accepted, orders in EPIC. Anticipate discharge once ID has finalized and provided script for home infusion.    Addendum-patient did not sign service agreement, unsure of safety clause. Call to [facility] to see if they can assist, also consulted [employer]. PICC in place, waiting on ID for script and accepting [facility].    Alyssa WORLEYN, RN  [facility]

## 2025-08-06 VITALS
TEMPERATURE: 98.2 F | SYSTOLIC BLOOD PRESSURE: 112 MMHG | HEIGHT: 70 IN | BODY MASS INDEX: 37.29 KG/M2 | RESPIRATION RATE: 16 BRPM | HEART RATE: 81 BPM | WEIGHT: 260.5 LBS | OXYGEN SATURATION: 97 % | DIASTOLIC BLOOD PRESSURE: 71 MMHG

## 2025-08-06 LAB
ANION GAP SERPL CALCULATED.3IONS-SCNC: 14 MMOL/L (ref 7–16)
BASOPHILS # BLD: 0.03 K/UL (ref 0–0.2)
BASOPHILS NFR BLD: 1 % (ref 0–2)
BUN SERPL-MCNC: 16 MG/DL (ref 6–20)
CALCIUM SERPL-MCNC: 9.5 MG/DL (ref 8.6–10)
CHLORIDE SERPL-SCNC: 104 MMOL/L (ref 98–107)
CO2 SERPL-SCNC: 23 MMOL/L (ref 22–29)
CREAT SERPL-MCNC: 0.8 MG/DL (ref 0.5–1)
EOSINOPHIL # BLD: 0.16 K/UL (ref 0.05–0.5)
EOSINOPHILS RELATIVE PERCENT: 3 % (ref 0–6)
ERYTHROCYTE [DISTWIDTH] IN BLOOD BY AUTOMATED COUNT: 16.8 % (ref 11.5–15)
GFR, ESTIMATED: >90 ML/MIN/1.73M2
GLUCOSE BLD-MCNC: 241 MG/DL (ref 74–99)
GLUCOSE BLD-MCNC: 273 MG/DL (ref 74–99)
GLUCOSE SERPL-MCNC: 218 MG/DL (ref 74–99)
HCT VFR BLD AUTO: 34.7 % (ref 34–48)
HGB BLD-MCNC: 11.3 G/DL (ref 11.5–15.5)
IMM GRANULOCYTES # BLD AUTO: <0.03 K/UL (ref 0–0.58)
IMM GRANULOCYTES NFR BLD: 0 % (ref 0–5)
LYMPHOCYTES NFR BLD: 1.3 K/UL (ref 1.5–4)
LYMPHOCYTES RELATIVE PERCENT: 28 % (ref 20–42)
MCH RBC QN AUTO: 32.3 PG (ref 26–35)
MCHC RBC AUTO-ENTMCNC: 32.6 G/DL (ref 32–34.5)
MCV RBC AUTO: 99.1 FL (ref 80–99.9)
MICROORGANISM SPEC CULT: NORMAL
MONOCYTES NFR BLD: 0.46 K/UL (ref 0.1–0.95)
MONOCYTES NFR BLD: 10 % (ref 2–12)
NEUTROPHILS NFR BLD: 58 % (ref 43–80)
NEUTS SEG NFR BLD: 2.73 K/UL (ref 1.8–7.3)
PLATELET, FLUORESCENCE: 147 K/UL (ref 130–450)
PMV BLD AUTO: 9.7 FL (ref 7–12)
POTASSIUM SERPL-SCNC: 4.8 MMOL/L (ref 3.5–5.1)
RBC # BLD AUTO: 3.5 M/UL (ref 3.5–5.5)
SERVICE CMNT-IMP: NORMAL
SODIUM SERPL-SCNC: 141 MMOL/L (ref 136–145)
SPECIMEN DESCRIPTION: NORMAL
WBC OTHER # BLD: 4.7 K/UL (ref 4.5–11.5)

## 2025-08-06 PROCEDURE — 36592 COLLECT BLOOD FROM PICC: CPT

## 2025-08-06 PROCEDURE — 6370000000 HC RX 637 (ALT 250 FOR IP): Performed by: NURSE PRACTITIONER

## 2025-08-06 PROCEDURE — 80048 BASIC METABOLIC PNL TOTAL CA: CPT

## 2025-08-06 PROCEDURE — 99239 HOSP IP/OBS DSCHRG MGMT >30: CPT | Performed by: STUDENT IN AN ORGANIZED HEALTH CARE EDUCATION/TRAINING PROGRAM

## 2025-08-06 PROCEDURE — 6370000000 HC RX 637 (ALT 250 FOR IP): Performed by: HOSPITALIST

## 2025-08-06 PROCEDURE — 2500000003 HC RX 250 WO HCPCS: Performed by: SPECIALIST

## 2025-08-06 PROCEDURE — 82962 GLUCOSE BLOOD TEST: CPT

## 2025-08-06 PROCEDURE — 2500000003 HC RX 250 WO HCPCS: Performed by: REGISTERED NURSE

## 2025-08-06 PROCEDURE — 6360000002 HC RX W HCPCS: Performed by: REGISTERED NURSE

## 2025-08-06 PROCEDURE — 6360000002 HC RX W HCPCS

## 2025-08-06 PROCEDURE — 85025 COMPLETE CBC W/AUTO DIFF WBC: CPT

## 2025-08-06 RX ADMIN — RISPERIDONE 1 MG: 2 TABLET, FILM COATED ORAL at 09:56

## 2025-08-06 RX ADMIN — OXYCODONE HYDROCHLORIDE 10 MG: 5 TABLET ORAL at 00:47

## 2025-08-06 RX ADMIN — I-VITE, TAB 1000-60-2MG (60/BT) 1 TABLET: TAB at 09:56

## 2025-08-06 RX ADMIN — ENOXAPARIN SODIUM 30 MG: 100 INJECTION SUBCUTANEOUS at 09:55

## 2025-08-06 RX ADMIN — CETIRIZINE HYDROCHLORIDE 10 MG: 10 TABLET, FILM COATED ORAL at 09:56

## 2025-08-06 RX ADMIN — OXYCODONE HYDROCHLORIDE 10 MG: 5 TABLET ORAL at 06:11

## 2025-08-06 RX ADMIN — SODIUM CHLORIDE 1000 MG: 9 INJECTION INTRAMUSCULAR; INTRAVENOUS; SUBCUTANEOUS at 13:39

## 2025-08-06 RX ADMIN — OXYCODONE HYDROCHLORIDE 10 MG: 5 TABLET ORAL at 15:08

## 2025-08-06 RX ADMIN — DIVALPROEX SODIUM 250 MG: 250 TABLET, DELAYED RELEASE ORAL at 09:55

## 2025-08-06 RX ADMIN — SODIUM CHLORIDE, PRESERVATIVE FREE 10 ML: 5 INJECTION INTRAVENOUS at 09:57

## 2025-08-06 RX ADMIN — DAPTOMYCIN 500 MG: 500 INJECTION, POWDER, LYOPHILIZED, FOR SOLUTION INTRAVENOUS at 13:40

## 2025-08-06 RX ADMIN — ATORVASTATIN CALCIUM 20 MG: 20 TABLET, FILM COATED ORAL at 11:14

## 2025-08-06 RX ADMIN — INSULIN LISPRO 2 UNITS: 100 INJECTION, SOLUTION INTRAVENOUS; SUBCUTANEOUS at 11:40

## 2025-08-06 RX ADMIN — BUPROPION HYDROCHLORIDE 150 MG: 150 TABLET, EXTENDED RELEASE ORAL at 09:55

## 2025-08-06 RX ADMIN — INSULIN LISPRO 4 UNITS: 100 INJECTION, SOLUTION INTRAVENOUS; SUBCUTANEOUS at 06:10

## 2025-08-06 RX ADMIN — OXYCODONE HYDROCHLORIDE 10 MG: 5 TABLET ORAL at 10:40

## 2025-08-06 ASSESSMENT — PAIN DESCRIPTION - DESCRIPTORS
DESCRIPTORS: DISCOMFORT;SHARP
DESCRIPTORS: DISCOMFORT;SHARP
DESCRIPTORS: SHARP;DISCOMFORT
DESCRIPTORS: DISCOMFORT;SHARP
DESCRIPTORS: DISCOMFORT;SHARP

## 2025-08-06 ASSESSMENT — PAIN SCALES - GENERAL
PAINLEVEL_OUTOF10: 8
PAINLEVEL_OUTOF10: 8
PAINLEVEL_OUTOF10: 7
PAINLEVEL_OUTOF10: 8
PAINLEVEL_OUTOF10: 8
PAINLEVEL_OUTOF10: 9

## 2025-08-06 ASSESSMENT — PAIN DESCRIPTION - LOCATION
LOCATION: FOOT
LOCATION: TOE (COMMENT WHICH ONE)
LOCATION: FOOT

## 2025-08-06 ASSESSMENT — PAIN DESCRIPTION - ORIENTATION
ORIENTATION: LEFT

## 2025-08-06 ASSESSMENT — PAIN DESCRIPTION - PAIN TYPE
TYPE: SURGICAL PAIN

## 2025-08-06 NOTE — DISCHARGE SUMMARY
[facility] Hospitalist Physician Discharge Summary     Manny Cheung MD  [address] [address]  [phone]    Go on 8/12/2025  Your hospital follow up appointment is onTuesday August 12th @ 2:20pm!     ! ! IT IS VERY IMPORTANT YOU KEEP THIS APPOINTMENT ! !    Simon Lind MD  [address] [address]  [phone]    Schedule an appointment as soon as possible for a visit in 2 week(s)  For outpatient follow up    Activity level: as tolerated  Dispo: [facility]    Condition on discharge: stable    Patient ID:  Mae Calvillo, 55 y.o., 1970  [id number]    Admit date: 7/30/2025  Discharge date and time:  8/5/2025  3:44 PM    Admission Diagnoses: Principal Problem:    Osteomyelitis (HCC)  Active Problems:    Disorder of sleep-wake cycle    Spinal stenosis of lumbar region    Anxiety    Controlled type 2 diabetes mellitus with complication, without long-term current use of insulin (HCC)    PTSD (post-traumatic stress disorder)    Bipolar disorder (HCC)    Diabetic foot infection (HCC)    Bipolar affective disorder, mixed, severe, with psychotic behavior (HCC)    Dyslipidemia    Diabetic peripheral angiopathy (HCC)    Essential hypertension    Polyarthralgia    Obesity  Resolved Problems:    * No resolved hospital problems. *    Discharge Diagnoses: Principal Problem:    Osteomyelitis (HCC)  Active Problems:    Disorder of sleep-wake cycle    Spinal stenosis of lumbar region    Anxiety    Controlled type 2 diabetes mellitus with complication, without long-term current use of insulin (HCC)    PTSD (post-traumatic stress disorder)    Bipolar disorder (HCC)    Diabetic foot infection (HCC)    Bipolar affective disorder, mixed, severe, with psychotic behavior (HCC)    Dyslipidemia    Diabetic peripheral angiopathy (HCC)    Essential hypertension    Polyarthralgia    Obesity  Resolved Problems:    * No resolved hospital problems. *    Consults:  IP CONSULT TO 
4 times daily     gabapentin 300 MG capsule  Commonly known as: NEURONTIN     Handicap Placard Misc  by Does not apply route Please provide handicap placard fr two years starting on 2-.     hydrOXYzine HCl 50 MG tablet  Commonly known as: ATARAX     melatonin 5 MG Tabs tablet     miconazole 2 % powder  Commonly known as: MICOTIN  Apply topically 2 times daily.     mirtazapine 15 MG tablet  Commonly known as: REMERON     mupirocin 2 % ointment  Commonly known as: BACTROBAN  Apply 1 each topically 2 times daily Apply topically 2 times daily to fernando foot wounds     prazosin 1 MG capsule  Commonly known as: MINIPRESS  Take 1 capsule by mouth nightly     therapeutic multivitamin-minerals tablet  Take 1 tablet by mouth daily     valsartan 160 MG tablet  Commonly known as: DIOVAN  Take 1 tablet by mouth daily for 14 days           * This list has 2 medication(s) that are the same as other medications prescribed for you. Read the directions carefully, and ask your doctor or other care provider to review them with you.                STOP taking these medications      dicyclomine 10 MG capsule  Commonly known as: BENTYL     doxycycline hyclate 100 MG capsule  Commonly known as: VIBRAMYCIN               Where to Get Your Medications        These medications were sent to [id number] - [place], [place] - [address] - P [phone] - F [phone] [address] [address]      Phone: [phone]   senna 8.6 MG tablet       You can get these medications from any pharmacy    Bring a paper prescription for each of these medications  DAPTOmycin infusion  ertapenem infusion         Note that 31 minutes was spent in preparing discharge papers, discussing discharge with patient, medication review, etc.    NOTE: Portions of this report may have been transcribed using voice recognition software. Every effort was made to ensure accuracy; however, inadvertent computerized transcription

## 2025-08-06 NOTE — PROGRESS NOTES
Department of Podiatry  Progress Note    SUBJECTIVE:  Patient seen bedside for amputation second digit, left hallux bone biopsy, POD 6.  Patient states that she may be able to discharge today after infusion/PICC line.  Patient notes she still has not received correct surgical shoes for discharge.  Patient denies any N/V/D/F/C/SOB/CP and has no other pedal complaints at this time.     OBJECTIVE:    Scheduled Meds:   ertapenem (INVanz) 1,000 mg in sodium chloride (PF) 0.9 % 10 mL IV syringe  1,000 mg IntraVENous Q24H    sodium chloride flush  5-40 mL IntraVENous 2 times per day    insulin lispro  0-8 Units SubCUTAneous 4x Daily AC & HS    DAPTOmycin (CUBICIN) 500 mg in sodium chloride (PF) 0.9 % 10 mL IV syringe  6 mg/kg (Adjusted) IntraVENous Q24H    enoxaparin  30 mg SubCUTAneous BID    atorvastatin  20 mg Oral Daily    buPROPion  150 mg Oral Daily    cetirizine  10 mg Oral Daily    divalproex  250 mg Oral Daily    divalproex  500 mg Oral Nightly    gabapentin  300 mg Oral Nightly    mirtazapine  15 mg Oral Nightly    ocuvite-lutein  1 tablet Oral Daily    prazosin  1 mg Oral Nightly    risperiDONE  2 mg Oral Nightly    risperiDONE  1 mg Oral Daily     Continuous Infusions:   sodium chloride      dextrose      dextrose       PRN Meds:.sodium chloride flush, sodium chloride, glucose, dextrose bolus **OR** dextrose bolus, glucagon (rDNA), dextrose, melatonin, ondansetron **OR** ondansetron, hydrOXYzine pamoate, glucose, dextrose bolus **OR** dextrose bolus, glucagon (rDNA), dextrose, potassium chloride **OR** potassium alternative oral replacement **OR** potassium chloride, magnesium sulfate, senna, aluminum & magnesium hydroxide-simethicone, acetaminophen **OR** acetaminophen, oxyCODONE **OR** oxyCODONE    Allergies   Allergen Reactions    Colchicine     Darvon [Propoxyphene] Other (See Comments)     GI Upset       /71   Pulse 81   Temp 98.2 °F (36.8 °C) (Oral)   Resp 16   Ht 1.778 m (5' 10\")   Wt 118.2 kg

## 2025-08-06 NOTE — PROGRESS NOTES
98.2 °F (36.8 °C)  Max: 98.6 °F (37 °C)  Constitutional: The patient is sitting up in bed.  In no distress.  Awake and alert.  Skin: Warm and dry. No rashes were noted.   HEENT: Round and reactive pupils.  Moist mucous membranes.  No ulcerations or thrush.  Neck: Supple to movements.   Chest: Good breath sounds.  No crackles.  Cardiovascular: Heart sounds rhythmic and regular.  Abdomen: Positive bowel sounds to auscultation. Benign to palpation.  Extremities: Left foot is wrapped. Second toe amputated. Surgical boot on left foot   Lines: Right PICC in place.    Laboratory and Tests:  Lab Results   Component Value Date    CRP 29.4 (H) 07/30/2025    CRP 24.0 (H) 06/18/2025    CRP 50.0 (H) 06/21/2024     Lab Results   Component Value Date    SEDRATE 80 (H) 07/30/2025    SEDRATE 45 (H) 06/18/2025    SEDRATE 60 (H) 06/20/2024       Radiology:  Reviewed    Microbiology:   Toe culture 5/22/2025: Corynebacterium striatum  Foot wound culture 6/17/2025: Corynebacterium striatum, Staphylococcus epidermidis, Enterococcus faecalis    Toe wound culture 7/30/2025: MRSA, Corynebacterium stratum, Streptococcus anginosus  Body fluid 7/31/2025: Corynebacterium stratum, Streptococcus anginosus, Gemella morbillorum, Streptococcus agalactiae, positive anaerobes  Bone culture 7/31/2025: No growth    ASSESSMENT:  Chronic osteomyelitis left second toe.  Status post amputation left second toe and bone biopsy and left hallux 7/31/2025    PLAN:  Continue Daptomycin and Ertapenem for total of 6 weeks.  Reconcile  The patient can be discharged from ID standpoint  Instructed to call the office and make a follow-up appointment    Simon Lind MD  3:28 PM  8/6/2025

## 2025-08-06 NOTE — CARE COORDINATION
Transition of Care-plan to get today's doses of antibiotics here, then [facility] open case 8/7. Confirmed with [facility] that service agreement and all requested forms were received and we are good to go for home infusion. Discharge plan is home with [facility].    Alyssa REYES, RN  [facility]

## 2025-08-07 ENCOUNTER — CARE COORDINATION (OUTPATIENT)
Dept: CARE COORDINATION | Age: 55
End: 2025-08-07

## 2025-08-07 LAB — SURGICAL PATHOLOGY REPORT: NORMAL

## 2025-08-08 ENCOUNTER — CARE COORDINATION (OUTPATIENT)
Dept: CARE COORDINATION | Age: 55
End: 2025-08-08

## 2025-08-09 LAB
MICROORGANISM SPEC CULT: NORMAL
MICROORGANISM/AGENT SPEC: NORMAL
SERVICE CMNT-IMP: NORMAL
SPECIMEN DESCRIPTION: NORMAL

## 2025-08-12 ENCOUNTER — OFFICE VISIT (OUTPATIENT)
Dept: FAMILY MEDICINE CLINIC | Age: 55
End: 2025-08-12
Payer: COMMERCIAL

## 2025-08-12 ENCOUNTER — TELEPHONE (OUTPATIENT)
Dept: FAMILY MEDICINE CLINIC | Age: 55
End: 2025-08-12

## 2025-08-12 VITALS
SYSTOLIC BLOOD PRESSURE: 98 MMHG | RESPIRATION RATE: 16 BRPM | OXYGEN SATURATION: 96 % | BODY MASS INDEX: 35.92 KG/M2 | HEART RATE: 87 BPM | HEIGHT: 70 IN | DIASTOLIC BLOOD PRESSURE: 58 MMHG | TEMPERATURE: 97.4 F | WEIGHT: 250.88 LBS

## 2025-08-12 DIAGNOSIS — F31.81 BIPOLAR II DISORDER (HCC): ICD-10-CM

## 2025-08-12 DIAGNOSIS — E78.2 MIXED HYPERLIPIDEMIA: ICD-10-CM

## 2025-08-12 DIAGNOSIS — L60.0 INGROWING TOENAIL: ICD-10-CM

## 2025-08-12 DIAGNOSIS — I10 PRIMARY HYPERTENSION: ICD-10-CM

## 2025-08-12 DIAGNOSIS — F41.9 ANXIETY: ICD-10-CM

## 2025-08-12 DIAGNOSIS — F43.10 PTSD (POST-TRAUMATIC STRESS DISORDER): ICD-10-CM

## 2025-08-12 DIAGNOSIS — J30.89 SEASONAL ALLERGIC RHINITIS DUE TO OTHER ALLERGIC TRIGGER: ICD-10-CM

## 2025-08-12 DIAGNOSIS — E11.9 TYPE 2 DIABETES MELLITUS WITHOUT COMPLICATION, WITHOUT LONG-TERM CURRENT USE OF INSULIN (HCC): Primary | ICD-10-CM

## 2025-08-12 DIAGNOSIS — M54.50 LOW BACK PAIN WITHOUT SCIATICA, UNSPECIFIED BACK PAIN LATERALITY, UNSPECIFIED CHRONICITY: ICD-10-CM

## 2025-08-12 PROCEDURE — 3074F SYST BP LT 130 MM HG: CPT

## 2025-08-12 PROCEDURE — 3078F DIAST BP <80 MM HG: CPT

## 2025-08-12 PROCEDURE — 2022F DILAT RTA XM EVC RTNOPTHY: CPT

## 2025-08-12 PROCEDURE — 1111F DSCHRG MED/CURRENT MED MERGE: CPT

## 2025-08-12 PROCEDURE — 99213 OFFICE O/P EST LOW 20 MIN: CPT

## 2025-08-12 PROCEDURE — 3017F COLORECTAL CA SCREEN DOC REV: CPT

## 2025-08-12 PROCEDURE — G8427 DOCREV CUR MEDS BY ELIG CLIN: HCPCS

## 2025-08-12 PROCEDURE — 1036F TOBACCO NON-USER: CPT

## 2025-08-12 PROCEDURE — 3052F HG A1C>EQUAL 8.0%<EQUAL 9.0%: CPT

## 2025-08-12 PROCEDURE — G8417 CALC BMI ABV UP PARAM F/U: HCPCS

## 2025-08-12 RX ORDER — ATORVASTATIN CALCIUM 20 MG/1
20 TABLET, FILM COATED ORAL DAILY
Qty: 30 TABLET | Refills: 5 | Status: SHIPPED | OUTPATIENT
Start: 2025-08-12

## 2025-08-12 RX ORDER — BUPROPION HYDROCHLORIDE 150 MG/1
150 TABLET ORAL DAILY
Qty: 30 TABLET | Refills: 5 | Status: SHIPPED | OUTPATIENT
Start: 2025-08-12

## 2025-08-12 RX ORDER — CETIRIZINE HYDROCHLORIDE 10 MG/1
10 TABLET ORAL DAILY
Qty: 30 TABLET | Refills: 5 | Status: SHIPPED | OUTPATIENT
Start: 2025-08-12

## 2025-08-12 RX ORDER — HYDROXYZINE HYDROCHLORIDE 50 MG/1
50 TABLET, FILM COATED ORAL EVERY 8 HOURS PRN
Qty: 30 TABLET | Refills: 0 | Status: SHIPPED | OUTPATIENT
Start: 2025-08-12

## 2025-08-12 RX ORDER — CICLOPIROX OLAMINE 7.7 MG/100ML
1 SUSPENSION TOPICAL 2 TIMES DAILY
Qty: 30 ML | Refills: 1 | Status: SHIPPED | OUTPATIENT
Start: 2025-08-12

## 2025-08-12 RX ORDER — HYDROXYZINE HYDROCHLORIDE 50 MG/1
50 TABLET, FILM COATED ORAL PRN
Qty: 30 TABLET | Refills: 0 | Status: SHIPPED | OUTPATIENT
Start: 2025-08-12 | End: 2025-08-12

## 2025-08-12 RX ORDER — VALSARTAN 160 MG/1
160 TABLET ORAL DAILY
Qty: 30 TABLET | Refills: 0 | Status: SHIPPED | OUTPATIENT
Start: 2025-08-12

## 2025-08-12 RX ORDER — MUPIROCIN 2 %
1 OINTMENT (GRAM) TOPICAL 2 TIMES DAILY
Qty: 1 EACH | Refills: 1 | Status: SHIPPED | OUTPATIENT
Start: 2025-08-12

## 2025-08-12 RX ORDER — HYDROCHLOROTHIAZIDE 12.5 MG/1
1 CAPSULE ORAL
Qty: 1 EACH | Refills: 5 | Status: SHIPPED | OUTPATIENT
Start: 2025-08-12

## 2025-08-30 LAB
MICROORGANISM SPEC CULT: NORMAL
MICROORGANISM SPEC CULT: NORMAL
MICROORGANISM/AGENT SPEC: NORMAL
MICROORGANISM/AGENT SPEC: NORMAL
SERVICE CMNT-IMP: NORMAL
SERVICE CMNT-IMP: NORMAL
SPECIMEN DESCRIPTION: NORMAL
SPECIMEN DESCRIPTION: NORMAL

## 2025-09-02 LAB
ALBUMIN: 3.7 G/DL (ref 3.5–5.2)
ALP BLD-CCNC: 132 U/L (ref 35–104)
ALT SERPL-CCNC: 51 U/L (ref 0–35)
ANION GAP SERPL CALCULATED.3IONS-SCNC: 14 MMOL/L (ref 7–16)
AST SERPL-CCNC: 56 U/L (ref 0–35)
BASOPHILS ABSOLUTE: 0.04 K/UL (ref 0–0.2)
BASOPHILS RELATIVE PERCENT: 1 % (ref 0–2)
BILIRUB SERPL-MCNC: 0.4 MG/DL (ref 0–1.2)
BUN BLDV-MCNC: 15 MG/DL (ref 6–20)
C-REACTIVE PROTEIN: 11 MG/L (ref 0–5)
CALCIUM SERPL-MCNC: 9.3 MG/DL (ref 8.6–10)
CHLORIDE BLD-SCNC: 103 MMOL/L (ref 98–107)
CO2: 25 MMOL/L (ref 22–29)
CREAT SERPL-MCNC: 0.8 MG/DL (ref 0.5–1)
EOSINOPHILS ABSOLUTE: 0.13 K/UL (ref 0.05–0.5)
EOSINOPHILS RELATIVE PERCENT: 3 % (ref 0–6)
GFR, ESTIMATED: 88 ML/MIN/1.73M2
GLUCOSE BLD-MCNC: 120 MG/DL (ref 74–99)
HCT VFR BLD CALC: 36.7 % (ref 34–48)
HEMOGLOBIN: 12.1 G/DL (ref 11.5–15.5)
IMMATURE GRANULOCYTES %: 0 % (ref 0–5)
IMMATURE GRANULOCYTES ABSOLUTE: <0.03 K/UL (ref 0–0.58)
LYMPHOCYTES ABSOLUTE: 1.58 K/UL (ref 1.5–4)
LYMPHOCYTES RELATIVE PERCENT: 41 % (ref 20–42)
MCH RBC QN AUTO: 31.9 PG (ref 26–35)
MCHC RBC AUTO-ENTMCNC: 33 G/DL (ref 32–34.5)
MCV RBC AUTO: 96.8 FL (ref 80–99.9)
MONOCYTES ABSOLUTE: 0.34 K/UL (ref 0.1–0.95)
MONOCYTES RELATIVE PERCENT: 9 % (ref 2–12)
NEUTROPHILS ABSOLUTE: 1.75 K/UL (ref 1.8–7.3)
NEUTROPHILS RELATIVE PERCENT: 46 % (ref 43–80)
PDW BLD-RTO: 14.6 % (ref 11.5–15)
PLATELET # BLD: 158 K/UL (ref 130–450)
PMV BLD AUTO: 11.1 FL (ref 7–12)
POTASSIUM SERPL-SCNC: 4.2 MMOL/L (ref 3.5–5.1)
RBC # BLD: 3.79 M/UL (ref 3.5–5.5)
SED RATE, AUTOMATED: 59 MM/HR (ref 0–20)
SODIUM BLD-SCNC: 142 MMOL/L (ref 136–145)
TOTAL CK: 95 U/L (ref 0–170)
TOTAL PROTEIN: 8 G/DL (ref 6.4–8.3)
WBC # BLD: 3.8 K/UL (ref 4.5–11.5)

## 2025-09-03 ENCOUNTER — HOSPITAL ENCOUNTER (OUTPATIENT)
Dept: PHYSICAL THERAPY | Age: 55
Setting detail: THERAPIES SERIES
Discharge: HOME | End: 2025-09-03
Payer: COMMERCIAL

## 2025-09-03 PROCEDURE — 97161 PT EVAL LOW COMPLEX 20 MIN: CPT | Performed by: PHYSICAL THERAPIST

## 2025-09-03 ASSESSMENT — PAIN SCALES - GENERAL: PAINLEVEL_OUTOF10: 6

## 2025-09-03 ASSESSMENT — PAIN DESCRIPTION - LOCATION: LOCATION: BACK

## 2025-09-03 ASSESSMENT — PAIN DESCRIPTION - PAIN TYPE: TYPE: CHRONIC PAIN

## 2025-09-03 ASSESSMENT — PAIN DESCRIPTION - ORIENTATION: ORIENTATION: LOWER

## 2025-09-04 ENCOUNTER — OFFICE VISIT (OUTPATIENT)
Dept: FAMILY MEDICINE CLINIC | Age: 55
End: 2025-09-04
Payer: COMMERCIAL

## 2025-09-04 VITALS
DIASTOLIC BLOOD PRESSURE: 68 MMHG | TEMPERATURE: 97.2 F | RESPIRATION RATE: 16 BRPM | BODY MASS INDEX: 35.19 KG/M2 | WEIGHT: 245.8 LBS | OXYGEN SATURATION: 99 % | SYSTOLIC BLOOD PRESSURE: 110 MMHG | HEART RATE: 79 BPM | HEIGHT: 70 IN

## 2025-09-04 DIAGNOSIS — F41.9 ANXIETY: ICD-10-CM

## 2025-09-04 PROCEDURE — G8417 CALC BMI ABV UP PARAM F/U: HCPCS

## 2025-09-04 PROCEDURE — 99213 OFFICE O/P EST LOW 20 MIN: CPT

## 2025-09-04 PROCEDURE — 1111F DSCHRG MED/CURRENT MED MERGE: CPT

## 2025-09-04 PROCEDURE — 1036F TOBACCO NON-USER: CPT

## 2025-09-04 PROCEDURE — 3017F COLORECTAL CA SCREEN DOC REV: CPT

## 2025-09-04 PROCEDURE — G8427 DOCREV CUR MEDS BY ELIG CLIN: HCPCS

## 2025-09-04 PROCEDURE — 3078F DIAST BP <80 MM HG: CPT

## 2025-09-04 PROCEDURE — 3074F SYST BP LT 130 MM HG: CPT

## 2025-09-04 RX ORDER — HYDROXYZINE PAMOATE 50 MG/1
CAPSULE ORAL
COMMUNITY
Start: 2025-07-01

## 2025-09-04 RX ORDER — PRAZOSIN HYDROCHLORIDE 1 MG/1
1 CAPSULE ORAL NIGHTLY
COMMUNITY
Start: 2025-08-20

## 2025-09-04 RX ORDER — GENTAMICIN SULFATE 1 MG/G
OINTMENT TOPICAL
COMMUNITY
Start: 2025-08-09

## 2025-09-05 LAB
ALBUMIN: 3.6 G/DL (ref 3.5–5.2)
ALP BLD-CCNC: 126 U/L (ref 35–104)
ALT SERPL-CCNC: 48 U/L (ref 0–35)
ANION GAP SERPL CALCULATED.3IONS-SCNC: 15 MMOL/L (ref 7–16)
AST SERPL-CCNC: 62 U/L (ref 0–35)
BASOPHILS ABSOLUTE: 0.05 K/UL (ref 0–0.2)
BASOPHILS RELATIVE PERCENT: 1 % (ref 0–2)
BILIRUB SERPL-MCNC: 0.5 MG/DL (ref 0–1.2)
BUN BLDV-MCNC: 13 MG/DL (ref 6–20)
CALCIUM SERPL-MCNC: 9.4 MG/DL (ref 8.6–10)
CHLORIDE BLD-SCNC: 102 MMOL/L (ref 98–107)
CO2: 23 MMOL/L (ref 22–29)
CREAT SERPL-MCNC: 0.7 MG/DL (ref 0.5–1)
EOSINOPHILS ABSOLUTE: 0.12 K/UL (ref 0.05–0.5)
EOSINOPHILS RELATIVE PERCENT: 3 % (ref 0–6)
GFR, ESTIMATED: >90 ML/MIN/1.73M2
GLUCOSE BLD-MCNC: 92 MG/DL (ref 74–99)
HCT VFR BLD CALC: 37.2 % (ref 34–48)
HEMOGLOBIN: 12 G/DL (ref 11.5–15.5)
IMMATURE GRANULOCYTES %: 0 % (ref 0–5)
IMMATURE GRANULOCYTES ABSOLUTE: <0.03 K/UL (ref 0–0.58)
LYMPHOCYTES ABSOLUTE: 1.53 K/UL (ref 1.5–4)
LYMPHOCYTES RELATIVE PERCENT: 41 % (ref 20–42)
MCH RBC QN AUTO: 31.7 PG (ref 26–35)
MCHC RBC AUTO-ENTMCNC: 32.3 G/DL (ref 32–34.5)
MCV RBC AUTO: 98.4 FL (ref 80–99.9)
MONOCYTES ABSOLUTE: 0.3 K/UL (ref 0.1–0.95)
MONOCYTES RELATIVE PERCENT: 8 % (ref 2–12)
NEUTROPHILS ABSOLUTE: 1.7 K/UL (ref 1.8–7.3)
NEUTROPHILS RELATIVE PERCENT: 46 % (ref 43–80)
PDW BLD-RTO: 14.9 % (ref 11.5–15)
PLATELET # BLD: 148 K/UL (ref 130–450)
PMV BLD AUTO: 10.8 FL (ref 7–12)
POTASSIUM SERPL-SCNC: 3.8 MMOL/L (ref 3.5–5.1)
RBC # BLD: 3.78 M/UL (ref 3.5–5.5)
SODIUM BLD-SCNC: 140 MMOL/L (ref 136–145)
TOTAL PROTEIN: 7.9 G/DL (ref 6.4–8.3)
WBC # BLD: 3.7 K/UL (ref 4.5–11.5)

## 2025-09-06 LAB
MICROORGANISM SPEC CULT: NORMAL
MICROORGANISM/AGENT SPEC: NORMAL
SERVICE CMNT-IMP: NORMAL
SPECIMEN DESCRIPTION: NORMAL

## (undated) DEVICE — BANDAGE ADH W1XL3IN NAT FAB WVN FLX DURABLE N ADH PD SEAL

## (undated) DEVICE — TRAP,MUCUS SPECIMEN,40CC: Brand: MEDLINE

## (undated) DEVICE — ELECTRODE PT RET AD L9FT HI MOIST COND ADH HYDRGEL CORDED

## (undated) DEVICE — GLOVE ORANGE PI 7 1/2   MSG9075

## (undated) DEVICE — NEEDLE HYPO 25GA L1.5IN BLU POLYPR HUB S STL REG BVL STR

## (undated) DEVICE — Device

## (undated) DEVICE — DOUBLE BASIN SET: Brand: MEDLINE INDUSTRIES, INC.

## (undated) DEVICE — BLADE,STAINLESS-STEEL,10,STRL,DISPOSABLE: Brand: MEDLINE

## (undated) DEVICE — 3M™ RED DOT™ MONITORING ELECTRODE WITH FOAM TAPE AND STICKY GEL 2560, 50/BAG, 20/CASE, 72/PLT: Brand: RED DOT™

## (undated) DEVICE — 6 ML SYRINGE LUER-LOCK TIP: Brand: MONOJECT

## (undated) DEVICE — GOWN,SIRUS,FABRNF,XL,20/CS: Brand: MEDLINE

## (undated) DEVICE — GLOVE ORTHO 7   MSG9470

## (undated) DEVICE — 12 ML SYRINGE,LUER-LOCK TIP: Brand: MONOJECT

## (undated) DEVICE — NON-DEHP CATHETER EXTENSION SET, MALE LUER LOCK ADAPTER

## (undated) DEVICE — NEEDLE BX 11GA L101MM BNE MAR ASPIR W/ ADPT JAMSH

## (undated) DEVICE — DRAPE,EXTREMITY,89X128,STERILE: Brand: MEDLINE

## (undated) DEVICE — SYRINGE, LUER LOCK, 5ML: Brand: MEDLINE

## (undated) DEVICE — GAUZE,SPONGE,4"X4",8PLY,STRL,LF,10/TRAY: Brand: MEDLINE

## (undated) DEVICE — Device: Brand: PORTEX

## (undated) DEVICE — COUNTER NDL 10 COUNT HLD 20 FOAM BLK SGL MAG

## (undated) DEVICE — APPLICATOR MEDICATED 10.5 CC SOLUTION HI LT ORNG CHLORAPREP

## (undated) DEVICE — DRESSING,GAUZE,XEROFORM,CURAD,1"X8",ST: Brand: CURAD

## (undated) DEVICE — NEEDLE HYPO 18GA L1.5IN PNK POLYPR HUB S STL THN WALL FILL

## (undated) DEVICE — TOWEL,OR,DSP,ST,BLUE,STD,6/PK,12PK/CS: Brand: MEDLINE

## (undated) DEVICE — NEEDLE HYPO 23GA L1.5IN TURQ POLYPR HUB S STL THN WALL IM

## (undated) DEVICE — 4-PORT MANIFOLD: Brand: NEPTUNE 2

## (undated) DEVICE — GLOVE ORANGE PI 7   MSG9070

## (undated) DEVICE — GAUZE,SPONGE,4"X4",12PLY,STERILE,LF,2'S: Brand: MEDLINE

## (undated) DEVICE — TOWEL OR BLUEE 16X26IN ST 8 PACK ORB08 16X26ORTWL

## (undated) DEVICE — BNDG,ELSTC,MATRIX,STRL,3"X5YD,LF,HOOK&LP: Brand: MEDLINE

## (undated) DEVICE — SYRINGE MED 30ML STD CLR PLAS LUERLOCK TIP N CTRL DISP

## (undated) DEVICE — NEEDLE HYPO 18GA L1.5IN PNK POLYPR HUB S STL REG BVL STR

## (undated) DEVICE — NEEDLE, QUINCKE, 22GX5": Brand: MEDLINE

## (undated) DEVICE — MARKER,SKIN,WI/RULER AND LABELS: Brand: MEDLINE

## (undated) DEVICE — BANDAGE,GAUZE,BULKEE II,4.5"X4.1YD,STRL: Brand: MEDLINE

## (undated) DEVICE — SYRINGE MED 5ML STD CLR PLAS LUERLOCK TIP N CTRL DISP

## (undated) DEVICE — 3 ML SYRINGE LUER-LOCK TIP: Brand: MONOJECT